# Patient Record
Sex: FEMALE | Race: WHITE | NOT HISPANIC OR LATINO | Employment: OTHER | URBAN - METROPOLITAN AREA
[De-identification: names, ages, dates, MRNs, and addresses within clinical notes are randomized per-mention and may not be internally consistent; named-entity substitution may affect disease eponyms.]

---

## 2017-02-03 ENCOUNTER — TELEPHONE (OUTPATIENT)
Dept: FAMILY MEDICINE | Facility: CLINIC | Age: 77
End: 2017-02-03

## 2017-02-03 NOTE — TELEPHONE ENCOUNTER
Please call patient and ask her to schedule a appointment to follow up on lipids, fasting labs, thanks! -Lea  Health Maintenance Due   Topic Date Due     DEXA Q2 YR  05/06/2012     EYE EXAM Q1 YEAR( NO INBASKET)  07/24/2016     INFLUENZA VACCINE (SYSTEM ASSIGNED)  09/01/2016     ADVANCE DIRECTIVE PLANNING Q5 YRS (NO INBASKET)  11/03/2016      LDL CHOLESTEROL CALCULATED   Date Value Ref Range Status   10/27/2016 149* <100 mg/dL Final     Comment:     Above desirable:  100-129 mg/dl   Borderline High:  130-159 mg/dL   High:             160-189 mg/dL   Very high:       >189 mg/dl     03/04/2016 91 <100 mg/dL Final     Comment:     Desirable:       <100 mg/dl

## 2017-03-16 ENCOUNTER — ALLIED HEALTH/NURSE VISIT (OUTPATIENT)
Dept: NURSING | Facility: CLINIC | Age: 77
End: 2017-03-16
Payer: COMMERCIAL

## 2017-03-16 VITALS — DIASTOLIC BLOOD PRESSURE: 84 MMHG | SYSTOLIC BLOOD PRESSURE: 138 MMHG

## 2017-03-16 DIAGNOSIS — Z01.30 BP CHECK: Primary | ICD-10-CM

## 2017-03-16 PROCEDURE — 99207 ZZC NO CHARGE NURSE ONLY: CPT

## 2017-03-28 ENCOUNTER — OFFICE VISIT (OUTPATIENT)
Dept: FAMILY MEDICINE | Facility: CLINIC | Age: 77
End: 2017-03-28
Payer: COMMERCIAL

## 2017-03-28 VITALS
HEART RATE: 88 BPM | TEMPERATURE: 97.6 F | DIASTOLIC BLOOD PRESSURE: 82 MMHG | BODY MASS INDEX: 29.41 KG/M2 | SYSTOLIC BLOOD PRESSURE: 138 MMHG | WEIGHT: 166 LBS | HEIGHT: 63 IN

## 2017-03-28 DIAGNOSIS — I10 ESSENTIAL HYPERTENSION WITH GOAL BLOOD PRESSURE LESS THAN 140/90: ICD-10-CM

## 2017-03-28 DIAGNOSIS — E78.5 HYPERLIPIDEMIA LDL GOAL <100: ICD-10-CM

## 2017-03-28 DIAGNOSIS — I10 HYPERTENSION GOAL BP (BLOOD PRESSURE) < 140/90: ICD-10-CM

## 2017-03-28 DIAGNOSIS — I35.0 NONRHEUMATIC AORTIC VALVE STENOSIS: ICD-10-CM

## 2017-03-28 DIAGNOSIS — E11.9 TYPE 2 DIABETES MELLITUS WITHOUT COMPLICATION, WITHOUT LONG-TERM CURRENT USE OF INSULIN (H): ICD-10-CM

## 2017-03-28 DIAGNOSIS — Z01.818 PREOP GENERAL PHYSICAL EXAM: Primary | ICD-10-CM

## 2017-03-28 LAB
ALBUMIN SERPL-MCNC: 4.1 G/DL (ref 3.4–5)
ALP SERPL-CCNC: 90 U/L (ref 40–150)
ALT SERPL W P-5'-P-CCNC: 18 U/L (ref 0–50)
ANION GAP SERPL CALCULATED.3IONS-SCNC: 10 MMOL/L (ref 3–14)
AST SERPL W P-5'-P-CCNC: 11 U/L (ref 0–45)
BASOPHILS # BLD AUTO: 0 10E9/L (ref 0–0.2)
BASOPHILS NFR BLD AUTO: 0.1 %
BILIRUB SERPL-MCNC: 0.5 MG/DL (ref 0.2–1.3)
BUN SERPL-MCNC: 18 MG/DL (ref 7–30)
CALCIUM SERPL-MCNC: 9.5 MG/DL (ref 8.5–10.1)
CHLORIDE SERPL-SCNC: 105 MMOL/L (ref 94–109)
CHOLEST SERPL-MCNC: 215 MG/DL
CO2 SERPL-SCNC: 25 MMOL/L (ref 20–32)
CREAT SERPL-MCNC: 0.7 MG/DL (ref 0.52–1.04)
DIFFERENTIAL METHOD BLD: ABNORMAL
EOSINOPHIL # BLD AUTO: 0.1 10E9/L (ref 0–0.7)
EOSINOPHIL NFR BLD AUTO: 1.7 %
ERYTHROCYTE [DISTWIDTH] IN BLOOD BY AUTOMATED COUNT: 13.2 % (ref 10–15)
GFR SERPL CREATININE-BSD FRML MDRD: 81 ML/MIN/1.7M2
GLUCOSE SERPL-MCNC: 146 MG/DL (ref 70–99)
HBA1C MFR BLD: 8.4 % (ref 4.3–6)
HCT VFR BLD AUTO: 43.8 % (ref 35–47)
HDLC SERPL-MCNC: 67 MG/DL
HGB BLD-MCNC: 14.7 G/DL (ref 11.7–15.7)
LDLC SERPL CALC-MCNC: 131 MG/DL
LYMPHOCYTES # BLD AUTO: 1.6 10E9/L (ref 0.8–5.3)
LYMPHOCYTES NFR BLD AUTO: 22.8 %
MCH RBC QN AUTO: 28.2 PG (ref 26.5–33)
MCHC RBC AUTO-ENTMCNC: 33.6 G/DL (ref 31.5–36.5)
MCV RBC AUTO: 84 FL (ref 78–100)
MONOCYTES # BLD AUTO: 0.5 10E9/L (ref 0–1.3)
MONOCYTES NFR BLD AUTO: 7.1 %
NEUTROPHILS # BLD AUTO: 4.9 10E9/L (ref 1.6–8.3)
NEUTROPHILS NFR BLD AUTO: 68.3 %
NONHDLC SERPL-MCNC: 148 MG/DL
PLATELET # BLD AUTO: 347 10E9/L (ref 150–450)
POTASSIUM SERPL-SCNC: 4.2 MMOL/L (ref 3.4–5.3)
PROT SERPL-MCNC: 7.4 G/DL (ref 6.8–8.8)
RBC # BLD AUTO: 5.21 10E12/L (ref 3.8–5.2)
SODIUM SERPL-SCNC: 140 MMOL/L (ref 133–144)
TRIGL SERPL-MCNC: 83 MG/DL
WBC # BLD AUTO: 7.1 10E9/L (ref 4–11)

## 2017-03-28 PROCEDURE — 80053 COMPREHEN METABOLIC PANEL: CPT | Performed by: FAMILY MEDICINE

## 2017-03-28 PROCEDURE — 93000 ELECTROCARDIOGRAM COMPLETE: CPT | Performed by: FAMILY MEDICINE

## 2017-03-28 PROCEDURE — 36415 COLL VENOUS BLD VENIPUNCTURE: CPT | Performed by: FAMILY MEDICINE

## 2017-03-28 PROCEDURE — 85025 COMPLETE CBC W/AUTO DIFF WBC: CPT | Performed by: FAMILY MEDICINE

## 2017-03-28 PROCEDURE — 83036 HEMOGLOBIN GLYCOSYLATED A1C: CPT | Performed by: FAMILY MEDICINE

## 2017-03-28 PROCEDURE — 80061 LIPID PANEL: CPT | Performed by: FAMILY MEDICINE

## 2017-03-28 PROCEDURE — 99214 OFFICE O/P EST MOD 30 MIN: CPT | Performed by: FAMILY MEDICINE

## 2017-03-28 RX ORDER — METOPROLOL SUCCINATE 25 MG/1
12.5 TABLET, EXTENDED RELEASE ORAL DAILY
Qty: 90 TABLET | Refills: 3
Start: 2017-03-28 | End: 2017-06-20

## 2017-03-28 NOTE — MR AVS SNAPSHOT
After Visit Summary   3/28/2017    Kelly Barnes    MRN: [id number]           Patient Information     Date Of Birth          1940        Visit Information        Provider Department      3/28/2017 9:00 AM Lea Lopez MD [facility]        Today's Diagnoses     Preop general physical exam    -  1    Hypertension goal BP (blood pressure) < 140/90        Hyperlipidemia LDL goal <100        Type 2 diabetes mellitus without complication, without long-term current use of insulin (H)        Nonrheumatic aortic valve stenosis          Care Instructions    Results for orders placed or performed in visit on 03/28/17   CBC with platelets and differential   Result Value Ref Range    WBC 7.1 4.0 - 11.0 10e9/L    RBC Count 5.21 (H) 3.8 - 5.2 10e12/L    Hemoglobin 14.7 11.7 - 15.7 g/dL    Hematocrit 43.8 35.0 - 47.0 %    MCV 84 78 - 100 fl    MCH 28.2 26.5 - 33.0 pg    MCHC 33.6 31.5 - 36.5 g/dL    RDW 13.2 10.0 - 15.0 %    Platelet Count 347 150 - 450 10e9/L    Diff Method Automated Method     % Neutrophils 68.3 %    % Lymphocytes 22.8 %    % Monocytes 7.1 %    % Eosinophils 1.7 %    % Basophils 0.1 %    Absolute Neutrophil 4.9 1.6 - 8.3 10e9/L    Absolute Lymphocytes 1.6 0.8 - 5.3 10e9/L    Absolute Monocytes 0.5 0.0 - 1.3 10e9/L    Absolute Eosinophils 0.1 0.0 - 0.7 10e9/L    Absolute Basophils 0.0 0.0 - 0.2 10e9/L   Hemoglobin A1c   Result Value Ref Range    Hemoglobin A1C 8.4 (H) 4.3 - 6.0 %      Before Your Surgery      Call your surgeon if there is any change in your health. This includes signs of a cold or flu (such as a sore throat, runny nose, cough, rash or fever).    Do not smoke, drink alcohol or take over the counter medicine (unless your surgeon or primary care doctor tells you to) for the 24 hours before and after surgery.    If you take prescribed drugs: Follow your doctor s orders about which medicines to take and which to stop until after surgery.    Eating and  drinking prior to surgery: follow the instructions from your surgeon    Take a shower or bath the night before surgery. Use the soap your surgeon gave you to gently clean your skin. If you do not have soap from your surgeon, use your regular soap. Do not shave or scrub the surgery site.  Wear clean pajamas and have clean sheets on your bed.         Follow-ups after your visit        Additional Services     CARDIOLOGY PROCEDURE ADULT REFERRAL       Your provider has referred you to:   [facility]: Heart Care - [place] [phone]   [email or web address]  [facility]: Heart Care - [facility] [phone]   [email or web address]    Cardiology procedures to be completed: cardiac echo, reason for procedure hx of aortic stenosis    Please be aware that coverage of these services is subject to the terms and limitations of your health insurance plan.  Call member services at your health plan with any benefit or coverage questions.     Please bring the following to your appointment:  >>   Any x-rays, CTs or MRIs which have been performed.  Contact the facility where they were done to arrange for  prior to your scheduled appointment.   >>   List of current medications  >>   This referral request   >>   Any documents/labs given to you for this referral    [facility]   For scheduling questions call [phone]    [facility]  For scheduling questions call [phone]                  Who to contact     If you have questions or need follow up information about today's clinic visit or your schedule please contact [facility] directly at [phone].  Normal or non-critical lab and imaging results will be communicated to you by MyChart, letter or phone within 4 business days after the clinic has received the results. If you  "do not hear from us within 7 days, please contact the clinic through [employer] or phone. If you have a critical or abnormal lab result, we will notify you by phone as soon as possible.  Submit refill requests through [employer] or call your pharmacy and they will forward the refill request to us. Please allow 3 business days for your refill to be completed.          Additional Information About Your Visit        [employer] Information     [employer] lets you send messages to your doctor, view your test results, renew your prescriptions, schedule appointments and more. To sign up, go to [email or web address] . Click on \"Log in\" on the left side of the screen, which will take you to the Welcome page. Then click on \"Sign up Now\" on the right side of the page.     You will be asked to enter the access code listed below, as well as some personal information. Please follow the directions to create your username and password.     Your access code is: [email or web address]  Expires: 2017 10:09 AM     Your access code will  in 90 days. If you need help or a new code, please call your [place] clinic or [phone].        Care EveryWhere ID     This is your Care EveryWhere ID. This could be used by other organizations to access your [place] medical records  [id number]        Your Vitals Were     Pulse Temperature Height BMI (Body Mass Index)          88 97.6  F (36.4  C) (Tympanic) 5' 3\" (1.6 m) 29.41 kg/m2         Blood Pressure from Last 3 Encounters:   17 (!) 151/93   17 138/84   10/27/16 112/74    Weight from Last 3 Encounters:   17 166 lb (75.3 kg)   10/27/16 170 lb (77.1 kg)   16 166 lb (75.3 kg)              We Performed the Following     CARDIOLOGY PROCEDURE ADULT REFERRAL     CBC with platelets and differential     Comprehensive metabolic panel     EKG 12-lead complete w/read - Clinics     Hemoglobin A1c     Lipid Profile with reflex to direct LDL        Primary Care Provider Office Phone # Fax " #    Lea Lopez [phone]       [facility] [address]  [facility] [address]        Thank you!     Thank you for choosing [facility]  for your care. Our goal is always to provide you with excellent care. Hearing back from our patients is one way we can continue to improve our services. Please take a few minutes to complete the written survey that you may receive in the mail after your visit with us. Thank you!             Your Updated Medication List - Protect others around you: Learn how to safely use, store and throw away your medicines at www.disposemymeds.org.          This list is accurate as of: 3/28/17 10:03 AM.  Always use your most recent med list.                   Brand Name Dispense Instructions for use    AMBIEN 10 MG tablet   Generic drug:  zolpidem     30 tablet    Take 1 tablet by mouth nightly as needed for sleep. at bedtime.       aspirin 81 MG tablet     100    ONE DAILY       * blood glucose monitor Kit     1 kit    1 kit daily.       * blood glucose monitoring meter device kit     1 kit    Use to test blood sugar 3 times daily       * blood glucose monitoring test strip    no brand specified    1 Box    by In Vitro route daily.       * blood glucose monitoring test strip    ONE TOUCH ULTRA    1 Box    Use to test blood sugar  daily or as directed. Dispense 1 box of 100 test strips, #3 refills.       busPIRone 5 MG tablet    BUSPAR    60 tablet    Take 1 tablet (5 mg) by mouth 2 times daily As needed for chest pain       cyclobenzaprine 10 MG tablet    FLEXERIL    30 tablet    Take 0.5-1 tablets (5-10 mg) by mouth 3 times daily as needed for muscle spasms       fluticasone 50 MCG/ACT spray    FLONASE    1 Package    Spray 2 sprays into both nostrils daily       losartan 50 MG tablet    COZAAR    90 tablet    Take 1 tablet (50 mg) by mouth daily       metFORMIN 500 MG tablet    GLUCOPHAGE    180 tablet    Take 1 tablet (500 mg) by  mouth 2 times daily (with meals)       metoprolol 25 MG 24 hr tablet    TOPROL-XL    45 tablet    Take 0.5 tablets (12.5 mg) by mouth every other day       * ONE TOUCH LANCETS Misc          * blood glucose monitoring lancets     1 Box    Use to test blood sugars one times daily or as directed.       STATIN NOT PRESCRIBED (INTENTIONAL)     0 each    Statin not prescribed intentionally due to Other patient refusal (This option does not exclude patient from measure)       * Notice:  This list has 6 medication(s) that are the same as other medications prescribed for you. Read the directions carefully, and ask your doctor or other care provider to review them with you.

## 2017-03-28 NOTE — PROGRESS NOTES
[facility]  [address] [place] [id number] [phone]  Dept: [phone]    PRE-OP EVALUATION:  Today's date: 3/28/2017    Kelly Barnes (: 1940) presents for pre-operative evaluation assessment.  She requires evaluation and anesthesia risk assessment prior to undergoing surgery/procedure for treatment of Cataract .  Proposed procedure: cataract removal    Date of Surgery/ Procedure: 2017 & 2017  Time of Surgery/ Procedure: [facility]  Hospital/Surgical Facility: [facility]  Fax number for surgical facility: [phone]  Primary Physician: Lea Lopez  Type of Anesthesia Anticipated: to be determined    Patient has a Health Care Directive or Living Will:  YES     1. NO - Do you have a history of heart attack, stroke, stent, bypass or surgery on an artery in the head, neck, heart or legs?  2. NO - Do you ever have any pain or discomfort in your chest?  3. NO - Do you have a history of  Heart Failure?  4. NO - Are you troubled by shortness of breath when: walking on the level, up a slight hill or at night?  5. NO - Do you currently have a cold, bronchitis or other respiratory infection?  6. NO - Do you have a cough, shortness of breath or wheezing?  7. NO - Do you sometimes get pains in the calves of your legs when you walk?  8. NO - Do you or anyone in your family have previous history of blood clots?  9. NO - Do you or does anyone in your family have a serious bleeding problem such as prolonged bleeding following surgeries or cuts?  10. NO - Have you ever had problems with anemia or been told to take iron pills?  11. NO - Have you had any abnormal blood loss such as black, tarry or bloody stools, or abnormal vaginal bleeding?  12. NO - Have you ever had a blood transfusion?  13. NO - Have you or any of your relatives ever had problems with anesthesia?  14. NO - Do you have sleep apnea, excessive snoring or daytime drowsiness?  15. NO - Do  you have any prosthetic heart valves?  16. NO - Do you have prosthetic joints?  17. NO - Is there any chance that you may be pregnant?      HPI:                                                      Brief HPI related to upcoming procedure: She's had increasing difficulty seeing, described as feeling as if she's wearing glasses that need to be cleaned, vision is obscured. She has bilateral cataracts; plans to have left cataract removed 4/6 and right 4/20. She doesn't have any prior hx of eye surgery.      DIABETES - Patient has a longstanding history of DiabetesType Type II . Patient is being treated with oral agents and denies significant side effects. Control has been fair.                                                                                               .  HYPERTENSION - Patient has longstanding history of mod-severe HTN , currently denies any symptoms referable to elevated blood pressure. Specifically denies chest pain, palpitations, dyspnea, orthopnea, PND or peripheral edema. Blood pressure readings have been in normal range. Current medication regimen is as listed below. Patient denies any side effects of medication.                                                                                                                                                                                          .  HYPERLIPIDEMIA - Patient has a long history of significant Hyperlipidemia requiring medication for treatment with recent good control. Patient reports no problems or side effects with the medication.                                                                                                                                                       .    MEDICAL HISTORY:                                                      Patient Active Problem List    Diagnosis Date Noted     Nonrheumatic aortic valve stenosis 03/28/2017     Priority: Medium     Musculoskeletal chest pain 11/25/2016     Priority:  Medium     Type 2 diabetes mellitus without complication, without long-term current use of insulin (H) 10/07/2016     Priority: Medium     Aortic stenosis 02/29/2016     Priority: Medium     With new murmur noted 2/2016, normal echo, repeat echo 2/2017.       Obesity, Class I, BMI 30-34.9 11/11/2015     Priority: Medium     Leg edema, left 07/24/2015     Priority: Medium     Atypical chest pain 07/24/2015     Priority: Medium     Dizzy spells 07/24/2015     Priority: Medium     Hyperlipidemia LDL goal <100 11/01/2012     Priority: Medium     Obesity      Priority: Medium     Hypertension goal BP (blood pressure) < 140/90      Priority: Medium     Osteopenia 01/14/2010     Priority: Medium     Allergic rhinitis      Priority: Medium     Problem list name updated by automated process. Provider to review        Past Medical History:   Diagnosis Date     Allergic rhinitis, cause unspecified      Aortic stenosis 2/29/2016    With new murmur noted 2/2016, normal echo, repeat echo 2/2017.     cuboid     left foot     Hyperlipidemia LDL goal <100 11/1/2012     Hypertension goal BP (blood pressure) < 140/90      Obesity      Obesity, Class I, BMI 30-34.9 11/11/2015     Pneumonia 3/16     Type 2 diabetes, HbA1c goal < 7% (H)      Past Surgical History:   Procedure Laterality Date     C NONSPECIFIC PROCEDURE      none     Current Outpatient Prescriptions   Medication Sig Dispense Refill     cyclobenzaprine (FLEXERIL) 10 MG tablet Take 0.5-1 tablets (5-10 mg) by mouth 3 times daily as needed for muscle spasms 30 tablet 1     metFORMIN (GLUCOPHAGE) 500 MG tablet Take 1 tablet (500 mg) by mouth 2 times daily (with meals) 180 tablet 3     losartan (COZAAR) 50 MG tablet Take 1 tablet (50 mg) by mouth daily 90 tablet 3     busPIRone (BUSPAR) 5 MG tablet Take 1 tablet (5 mg) by mouth 2 times daily As needed for chest pain 60 tablet 1     metoprolol (TOPROL-XL) 25 MG 24 hr tablet Take 0.5 tablets (12.5 mg) by mouth every other day 45  tablet 3     blood glucose monitoring (ONE TOUCH DELICA) lancets Use to test blood sugars one times daily or as directed. 1 Box 11     blood glucose monitoring (ONE TOUCH ULTRA) test strip Use to test blood sugar  daily or as directed. Dispense 1 box of 100 test strips, #3 refills. 1 Box 3     STATIN NOT PRESCRIBED, INTENTIONAL, Statin not prescribed intentionally due to Other patient refusal (This option does not exclude patient from measure) 0 each 0     ONE TOUCH LANCETS MISC        fluticasone (FLONASE) 50 MCG/ACT nasal spray Spray 2 sprays into both nostrils daily 1 Package 1     blood glucose monitoring (ONE TOUCH ULTRA 2) meter device kit Use to test blood sugar 3 times daily 1 kit 0     blood glucose monitor KIT 1 kit daily. 1 kit 0     glucose blood VI test strips strip by In Vitro route daily. 1 Box 12     AMBIEN 10 MG tablet Take 1 tablet by mouth nightly as needed for sleep. at bedtime. 30 tablet 1     ASPIRIN 81 MG OR TABS ONE DAILY 100 3     OTC products: no recent use of OTC ASA, NSAIDS or Steroids    Allergies   Allergen Reactions     Ibuprofen      numbness in hands and feet      Latex Allergy: NO    Social History   Substance Use Topics     Smoking status: Never Smoker     Smokeless tobacco: Never Used     Alcohol use 6.0 oz/week      Comment: 1-2 drinks per week     History   Drug Use No       REVIEW OF SYSTEMS:                                                    C: NEGATIVE for fever, chills, change in weight  I: NEGATIVE for worrisome rashes, moles or lesions  E: NEGATIVE for vision changes or irritation  E/M: NEGATIVE for ear, mouth and throat problems  R: NEGATIVE for significant cough or SOB  B: NEGATIVE for masses, tenderness or discharge  CV: NEGATIVE for chest pain, palpitations or peripheral edema  GI: NEGATIVE for nausea, abdominal pain, heartburn, or change in bowel habits  : NEGATIVE for frequency, dysuria, or hematuria  M: NEGATIVE for significant arthralgias or myalgia  N: NEGATIVE  "for weakness, dizziness or paresthesias  E: NEGATIVE for temperature intolerance, skin/hair changes  H: NEGATIVE for bleeding problems  P: NEGATIVE for changes in mood or affect    EXAM:                                                    /82  Pulse 88  Temp 97.6  F (36.4  C) (Tympanic)  Ht 5' 3\" (1.6 m)  Wt 166 lb (75.3 kg)  BMI 29.41 kg/m2  GENERAL APPEARANCE: healthy, alert and no distress  HENT: ear canals and TM's normal and nose and mouth without ulcers or lesions  RESP: lungs clear to auscultation - no rales, rhonchi or wheezes  CV: regular rate and rhythm, normal S1 S2, no S3 or S4 and no murmur, click or rub   ABDOMEN: soft, nontender, no HSM or masses and bowel sounds normal  NEURO: Normal strength and tone, sensory exam grossly normal, mentation intact and speech normal    DIAGNOSTICS:                                                    EKG: appears normal, NSR, normal axis, normal intervals, no acute ST/T changes c/w ischemia, unchanged from previous tracings    Recent Labs   Lab Test  10/27/16   0825  03/29/16   1545  03/04/16   0930  03/02/16   1957   11/10/15   0829   HGB   --    --   14.1  15.1   < >   --    PLT   --    --   332  245   < >   --    NA  138  138  131*  131*   < >   --    POTASSIUM  4.0  4.0  4.5  4.1   < >   --    CR  0.82  0.66  0.73  0.88   < >   --    A1C  7.5*   --    --    --    --   7.0*    < > = values in this interval not displayed.      Results for orders placed or performed in visit on 03/28/17   CBC with platelets and differential   Result Value Ref Range    WBC 7.1 4.0 - 11.0 10e9/L    RBC Count 5.21 (H) 3.8 - 5.2 10e12/L    Hemoglobin 14.7 11.7 - 15.7 g/dL    Hematocrit 43.8 35.0 - 47.0 %    MCV 84 78 - 100 fl    MCH 28.2 26.5 - 33.0 pg    MCHC 33.6 31.5 - 36.5 g/dL    RDW 13.2 10.0 - 15.0 %    Platelet Count 347 150 - 450 10e9/L    Diff Method Automated Method     % Neutrophils 68.3 %    % Lymphocytes 22.8 %    % Monocytes 7.1 %    % Eosinophils 1.7 %    % " Basophils 0.1 %    Absolute Neutrophil 4.9 1.6 - 8.3 10e9/L    Absolute Lymphocytes 1.6 0.8 - 5.3 10e9/L    Absolute Monocytes 0.5 0.0 - 1.3 10e9/L    Absolute Eosinophils 0.1 0.0 - 0.7 10e9/L    Absolute Basophils 0.0 0.0 - 0.2 10e9/L   Hemoglobin A1c   Result Value Ref Range    Hemoglobin A1C 8.4 (H) 4.3 - 6.0 %        IMPRESSION:                                                    Reason for surgery/procedure: cataracts  Diagnosis/reason for consult: pre-op    The proposed surgical procedure is considered LOW risk.    REVISED CARDIAC RISK INDEX  The patient has the following serious cardiovascular risks for perioperative complications such as (MI, PE, VFib and 3  AV Block):  No serious cardiac risks  INTERPRETATION: 0 risks: Class I (very low risk - 0.4% complication rate)    The patient has the following additional risks for perioperative complications:  No identified additional risks      ICD-10-CM    1. Preop general physical exam Z01.818 EKG 12-lead complete w/read - Clinics   2. Hypertension goal BP (blood pressure) < 140/90 I10 CBC with platelets and differential     Comprehensive metabolic panel   3. Hyperlipidemia LDL goal <100 E78.5 Lipid Profile with reflex to direct LDL   4. Type 2 diabetes mellitus without complication, without long-term current use of insulin (H) E11.9 Hemoglobin A1c   5. Nonrheumatic aortic valve stenosis I35.0 CARDIOLOGY PROCEDURE ADULT REFERRAL       RECOMMENDATIONS:                                                    -Patient is to take all scheduled medications on the day of surgery.    APPROVAL GIVEN to proceed with proposed procedure, without further diagnostic evaluation       Signed Electronically by: Lea Lopez MD    Copy of this evaluation report is provided to requesting physician.    [facility] Preop Guidelines

## 2017-03-28 NOTE — PATIENT INSTRUCTIONS
Results for orders placed or performed in visit on 03/28/17   CBC with platelets and differential   Result Value Ref Range    WBC 7.1 4.0 - 11.0 10e9/L    RBC Count 5.21 (H) 3.8 - 5.2 10e12/L    Hemoglobin 14.7 11.7 - 15.7 g/dL    Hematocrit 43.8 35.0 - 47.0 %    MCV 84 78 - 100 fl    MCH 28.2 26.5 - 33.0 pg    MCHC 33.6 31.5 - 36.5 g/dL    RDW 13.2 10.0 - 15.0 %    Platelet Count 347 150 - 450 10e9/L    Diff Method Automated Method     % Neutrophils 68.3 %    % Lymphocytes 22.8 %    % Monocytes 7.1 %    % Eosinophils 1.7 %    % Basophils 0.1 %    Absolute Neutrophil 4.9 1.6 - 8.3 10e9/L    Absolute Lymphocytes 1.6 0.8 - 5.3 10e9/L    Absolute Monocytes 0.5 0.0 - 1.3 10e9/L    Absolute Eosinophils 0.1 0.0 - 0.7 10e9/L    Absolute Basophils 0.0 0.0 - 0.2 10e9/L   Hemoglobin A1c   Result Value Ref Range    Hemoglobin A1C 8.4 (H) 4.3 - 6.0 %      Before Your Surgery      Call your surgeon if there is any change in your health. This includes signs of a cold or flu (such as a sore throat, runny nose, cough, rash or fever).    Do not smoke, drink alcohol or take over the counter medicine (unless your surgeon or primary care doctor tells you to) for the 24 hours before and after surgery.    If you take prescribed drugs: Follow your doctor s orders about which medicines to take and which to stop until after surgery.    Eating and drinking prior to surgery: follow the instructions from your surgeon    Take a shower or bath the night before surgery. Use the soap your surgeon gave you to gently clean your skin. If you do not have soap from your surgeon, use your regular soap. Do not shave or scrub the surgery site.  Wear clean pajamas and have clean sheets on your bed.

## 2017-03-28 NOTE — PROGRESS NOTES
Patient was seen today in clinic.  I discussed results in clinic, please see clinic progress note.    Lea Lopez 3/28/2017

## 2017-03-28 NOTE — LETTER
[facility]   [address] [place]   [address]  [phone]    March 30, 2017      Kelly Barnes  [address] [facility] [address]              Dear Kathleen Kowalski!  It was a pleasure to see you in clinic!  Thank you for getting labs done.   I hope the surgery goes well and you recover quickly!    As we discussed, your cholesterol is not at goal and as you know, I do recommend starting a statin to lower your LDL, but I know you don't want to do this!  I'm here anytime you want to discuss this!    The testing of your kidney function, liver function and electrolytes was normal.     Your HgA1C, also called glycosylated hemoglobin, which measures the level of sugar in your blood over the past few months, is not  goal, so we need to find a way to control your blood sugar better.  However, it should be safe to proceed with the surgery.  I think increasing your metformin should lower your blood sugars and get your A1C to goal soon. Please return to clinic in about 3 months to recheck your A1C.    Your cbc, or complete blood count, which measures red blood cells (to check for anemia and other vitamin deficiencies) and white blood cells (to check for infection and leukemia) is normal, which is great!  Your platelets, which reflect liver function and ability to clot, are normal as well.     If you have any questions, please contact the clinic or schedule an appointment with me, thank you!      Results for orders placed or performed in visit on 03/28/17   CBC with platelets and differential   Result Value Ref Range    WBC 7.1 4.0 - 11.0 10e9/L    RBC Count 5.21 (H) 3.8 - 5.2 10e12/L    Hemoglobin 14.7 11.7 - 15.7 g/dL    Hematocrit 43.8 35.0 - 47.0 %    MCV 84 78 - 100 fl    MCH 28.2 26.5 - 33.0 pg    MCHC 33.6 31.5 - 36.5 g/dL    RDW 13.2 10.0 - 15.0 %    Platelet Count 347 150 - 450 10e9/L    Diff Method Automated Method     % Neutrophils 68.3 %    % Lymphocytes 22.8 %    % Monocytes  7.1 %    % Eosinophils 1.7 %    % Basophils 0.1 %    Absolute Neutrophil 4.9 1.6 - 8.3 10e9/L    Absolute Lymphocytes 1.6 0.8 - 5.3 10e9/L    Absolute Monocytes 0.5 0.0 - 1.3 10e9/L    Absolute Eosinophils 0.1 0.0 - 0.7 10e9/L    Absolute Basophils 0.0 0.0 - 0.2 10e9/L   Lipid Profile with reflex to direct LDL   Result Value Ref Range    Cholesterol 215 (H) <200 mg/dL    Triglycerides 83 <150 mg/dL    HDL Cholesterol 67 >49 mg/dL    LDL Cholesterol Calculated 131 (H) <100 mg/dL    Non HDL Cholesterol 148 (H) <130 mg/dL   Hemoglobin A1c   Result Value Ref Range    Hemoglobin A1C 8.4 (H) 4.3 - 6.0 %   Comprehensive metabolic panel   Result Value Ref Range    Sodium 140 133 - 144 mmol/L    Potassium 4.2 3.4 - 5.3 mmol/L    Chloride 105 94 - 109 mmol/L    Carbon Dioxide 25 20 - 32 mmol/L    Anion Gap 10 3 - 14 mmol/L    Glucose 146 (H) 70 - 99 mg/dL    Urea Nitrogen 18 7 - 30 mg/dL    Creatinine 0.70 0.52 - 1.04 mg/dL    GFR Estimate 81 >60 mL/min/1.7m2    GFR Estimate If Black >90   GFR Calc   >60 mL/min/1.7m2    Calcium 9.5 8.5 - 10.1 mg/dL    Bilirubin Total 0.5 0.2 - 1.3 mg/dL    Albumin 4.1 3.4 - 5.0 g/dL    Protein Total 7.4 6.8 - 8.8 g/dL    Alkaline Phosphatase 90 40 - 150 U/L    ALT 18 0 - 50 U/L    AST 11 0 - 45 U/L           Sincerely,    Lea Lopez MD/nr

## 2017-03-28 NOTE — NURSING NOTE
"Chief Complaint   Patient presents with     Pre-Op Exam       Initial There were no vitals taken for this visit. Estimated body mass index is 30.11 kg/(m^2) as calculated from the following:    Height as of 3/29/16: 5' 3\" (1.6 m).    Weight as of 10/27/16: 170 lb (77.1 kg).  Medication Reconciliation: complete     Vidya Webber MA    "

## 2017-03-29 DIAGNOSIS — E11.9 TYPE 2 DIABETES MELLITUS WITHOUT COMPLICATION, WITHOUT LONG-TERM CURRENT USE OF INSULIN (H): ICD-10-CM

## 2017-03-30 NOTE — PROGRESS NOTES
Hello!  It was a pleasure to see you in clinic!  Thank you for getting labs done.   I hope the surgery goes well and you recover quickly!    As we discussed, your cholesterol is not at goal and as you know, I do recommend starting a statin to lower your LDL, but I know you don't want to do this!  I'm here anytime you want to discuss this!    The testing of your kidney function, liver function and electrolytes was normal.     Your HgA1C, also called glycosylated hemoglobin, which measures the level of sugar in your blood over the past few months, is not  goal, so we need to find a way to control your blood sugar better.  However, it should be safe to proceed with the surgery.  I think increasing your metformin should lower your blood sugars and get your A1C to goal soon. Please return to clinic in about 3 months to recheck your A1C.    Your cbc, or complete blood count, which measures red blood cells (to check for anemia and other vitamin deficiencies) and white blood cells (to check for infection and leukemia) is normal, which is great!  Your platelets, which reflect liver function and ability to clot, are normal as well.     If you have any questions, please contact the clinic or schedule an appointment with me, thank you!    Sincerely,  Dr. Lea Lopez MD  3/29/2017

## 2017-05-03 ENCOUNTER — HOSPITAL ENCOUNTER (OUTPATIENT)
Dept: CARDIOLOGY | Facility: CLINIC | Age: 77
Discharge: HOME | End: 2017-05-03
Attending: FAMILY MEDICINE | Admitting: FAMILY MEDICINE
Payer: COMMERCIAL

## 2017-05-03 DIAGNOSIS — Z86.79 HISTORY OF AORTIC VALVE STENOSIS: ICD-10-CM

## 2017-05-03 PROCEDURE — 93306 TTE W/DOPPLER COMPLETE: CPT

## 2017-05-03 PROCEDURE — 93306 TTE W/DOPPLER COMPLETE: CPT | Mod: 26 | Performed by: INTERNAL MEDICINE

## 2017-05-04 NOTE — PROGRESS NOTES
Thank you for getting your echo!  Everything looks pretty good. I hope the surgery goes well!    Sincerely,  Dr. Lea Lopez MD  5/3/2017

## 2017-05-09 ENCOUNTER — OFFICE VISIT (OUTPATIENT)
Dept: CARDIOLOGY | Facility: CLINIC | Age: 77
End: 2017-05-09
Attending: FAMILY MEDICINE
Payer: COMMERCIAL

## 2017-05-09 VITALS
WEIGHT: 165 LBS | SYSTOLIC BLOOD PRESSURE: 118 MMHG | HEART RATE: 72 BPM | HEIGHT: 62 IN | DIASTOLIC BLOOD PRESSURE: 68 MMHG | BODY MASS INDEX: 30.36 KG/M2

## 2017-05-09 DIAGNOSIS — R60.9 EDEMA, UNSPECIFIED TYPE: ICD-10-CM

## 2017-05-09 DIAGNOSIS — Z86.79 HISTORY OF AORTIC VALVE STENOSIS: Primary | ICD-10-CM

## 2017-05-09 DIAGNOSIS — E08.00 DIABETES MELLITUS DUE TO UNDERLYING CONDITION WITH HYPEROSMOLARITY WITHOUT COMA, UNSPECIFIED LONG TERM INSULIN USE STATUS: ICD-10-CM

## 2017-05-09 DIAGNOSIS — E78.5 HYPERLIPIDEMIA LDL GOAL <130: ICD-10-CM

## 2017-05-09 DIAGNOSIS — I10 BENIGN ESSENTIAL HYPERTENSION: ICD-10-CM

## 2017-05-09 PROCEDURE — 99204 OFFICE O/P NEW MOD 45 MIN: CPT | Performed by: INTERNAL MEDICINE

## 2017-05-09 NOTE — MR AVS SNAPSHOT
"              After Visit Summary   5/9/2017    Kelly Barnes    MRN: [id number]           Patient Information     Date Of Birth          1940        Visit Information        Provider Department      5/9/2017 11:15 AM Edward Bustamante MD [facility] [place]        Today's Diagnoses     History of aortic valve stenosis    -  1    Diabetes mellitus due to underlying condition with hyperosmolarity without coma, unspecified long term insulin use status (H)        Benign essential hypertension        Edema, unspecified type        Hyperlipidemia LDL goal <130           Follow-ups after your visit        Who to contact     If you have questions or need follow up information about today's clinic visit or your schedule please contact [facility] directly at [phone].  Normal or non-critical lab and imaging results will be communicated to you by BBspacehart, letter or phone within 4 business days after the clinic has received the results. If you do not hear from us within 7 days, please contact the clinic through MyChart or phone. If you have a critical or abnormal lab result, we will notify you by phone as soon as possible.  Submit refill requests through [employer] or call your pharmacy and they will forward the refill request to us. Please allow 3 business days for your refill to be completed.          Additional Information About Your Visit        BBspaceharHackPad Information     [employer] lets you send messages to your doctor, view your test results, renew your prescriptions, schedule appointments and more. To sign up, go to [email or web address] . Click on \"Log in\" on the left side of the screen, which will take you to the Welcome page. Then click on \"Sign up Now\" on the right side of the page.     You will be asked to enter the access code listed below, as well as some personal information. Please follow the directions to create your username " "and password.     Your access code is: [email or web address]  Expires: 2017 10:09 AM     Your access code will  in 90 days. If you need help or a new code, please call your [facility] clinic or [phone].        Care EveryWhere ID     This is your Care EveryWhere ID. This could be used by other organizations to access your [facility] medical records  [id number]        Your Vitals Were     Pulse Height BMI (Body Mass Index)             72 1.575 m (5' 2\") 30.18 kg/m2          Blood Pressure from Last 3 Encounters:   17 118/68   17 138/82   17 138/84    Weight from Last 3 Encounters:   17 74.8 kg (165 lb)   17 75.3 kg (166 lb)   10/27/16 77.1 kg (170 lb)              Today, you had the following     No orders found for display       Primary Care Provider Office Phone # Fax #    Lea Lopez [phone]       [facility] [address]  [facility] [address]        Thank you!     Thank you for choosing [facility] [place]  for your care. Our goal is always to provide you with excellent care. Hearing back from our patients is one way we can continue to improve our services. Please take a few minutes to complete the written survey that you may receive in the mail after your visit with us. Thank you!             Your Updated Medication List - Protect others around you: Learn how to safely use, store and throw away your medicines at www.disposemymeds.org.          This list is accurate as of: 17  3:21 PM.  Always use your most recent med list.                   Brand Name Dispense Instructions for use    AMBIEN 10 MG tablet   Generic drug:  zolpidem     30 tablet    Take 1 tablet by mouth nightly as needed for sleep. at bedtime.       aspirin 81 MG tablet     100    ONE DAILY       * blood glucose monitor Kit     1 kit    1 kit daily.       * blood glucose monitoring meter device kit     1 kit    Use to test blood " sugar 3 times daily       * blood glucose monitoring test strip    no brand specified    1 Box    by In Vitro route daily.       * blood glucose monitoring test strip    ONE TOUCH ULTRA    1 Box    Use to test blood sugar  daily or as directed. Dispense 1 box of 100 test strips, #3 refills.       busPIRone 5 MG tablet    BUSPAR    60 tablet    Take 1 tablet (5 mg) by mouth 2 times daily As needed for chest pain       cyclobenzaprine 10 MG tablet    FLEXERIL    30 tablet    Take 0.5-1 tablets (5-10 mg) by mouth 3 times daily as needed for muscle spasms       fluticasone 50 MCG/ACT spray    FLONASE    1 Package    Spray 2 sprays into both nostrils daily       losartan 50 MG tablet    COZAAR    90 tablet    Take 1 tablet (50 mg) by mouth daily       metFORMIN 500 MG tablet    GLUCOPHAGE    270 tablet    Take 2 tablets in the morning and one tablet at night       metoprolol 25 MG 24 hr tablet    TOPROL-XL    90 tablet    Take 0.5 tablets (12.5 mg) by mouth daily       * ONE TOUCH LANCETS Misc          * blood glucose monitoring lancets     1 Box    Use to test blood sugars one times daily or as directed.       STATIN NOT PRESCRIBED (INTENTIONAL)     0 each    Statin not prescribed intentionally due to Other patient refusal (This option does not exclude patient from measure)       * Notice:  This list has 6 medication(s) that are the same as other medications prescribed for you. Read the directions carefully, and ask your doctor or other care provider to review them with you.

## 2017-05-09 NOTE — PROGRESS NOTES
HPI and Plan:   See dictation    No orders of the defined types were placed in this encounter.    No orders of the defined types were placed in this encounter.    There are no discontinued medications.      Encounter Diagnoses   Name Primary?     History of aortic valve stenosis Yes     Diabetes mellitus due to underlying condition with hyperosmolarity without coma, unspecified long term insulin use status (H)      Benign essential hypertension      Edema, unspecified type      Hyperlipidemia LDL goal <130        CURRENT MEDICATIONS:  Current Outpatient Prescriptions   Medication Sig Dispense Refill     metFORMIN (GLUCOPHAGE) 500 MG tablet Take 2 tablets in the morning and one tablet at night 270 tablet 3     blood glucose monitoring (ONE TOUCH ULTRA) test strip Use to test blood sugar  daily or as directed. Dispense 1 box of 100 test strips, #3 refills. 1 Box 3     metoprolol (TOPROL-XL) 25 MG 24 hr tablet Take 0.5 tablets (12.5 mg) by mouth daily 90 tablet 3     cyclobenzaprine (FLEXERIL) 10 MG tablet Take 0.5-1 tablets (5-10 mg) by mouth 3 times daily as needed for muscle spasms 30 tablet 1     losartan (COZAAR) 50 MG tablet Take 1 tablet (50 mg) by mouth daily 90 tablet 3     busPIRone (BUSPAR) 5 MG tablet Take 1 tablet (5 mg) by mouth 2 times daily As needed for chest pain 60 tablet 1     blood glucose monitoring (ONE TOUCH DELICA) lancets Use to test blood sugars one times daily or as directed. 1 Box 11     STATIN NOT PRESCRIBED, INTENTIONAL, Statin not prescribed intentionally due to Other patient refusal (This option does not exclude patient from measure) 0 each 0     ONE TOUCH LANCETS MISC        fluticasone (FLONASE) 50 MCG/ACT nasal spray Spray 2 sprays into both nostrils daily 1 Package 1     blood glucose monitoring (ONE TOUCH ULTRA 2) meter device kit Use to test blood sugar 3 times daily 1 kit 0     glucose blood VI test strips strip by In Vitro route daily. 1 Box 12     AMBIEN 10 MG tablet Take 1  tablet by mouth nightly as needed for sleep. at bedtime. 30 tablet 1     ASPIRIN 81 MG OR TABS ONE DAILY 100 3     blood glucose monitor KIT 1 kit daily. 1 kit 0       ALLERGIES     Allergies   Allergen Reactions     Ibuprofen      numbness in hands and feet       PAST MEDICAL HISTORY:  Past Medical History:   Diagnosis Date     Allergic rhinitis, cause unspecified      Aortic stenosis 2/29/2016    With new murmur noted 2/2016, normal echo, repeat echo 2/2017.     Atypical chest pain 7/24/2015     cuboid     left foot     Hyperlipidemia LDL goal <100 11/1/2012     Hypertension goal BP (blood pressure) < 140/90      Musculoskeletal chest pain 11/25/2016     Obesity, Class I, BMI 30-34.9 11/11/2015     Pneumonia 3/16     Type 2 diabetes, HbA1c goal < 7% (H)        PAST SURGICAL HISTORY:  Past Surgical History:   Procedure Laterality Date     C NONSPECIFIC PROCEDURE      none       FAMILY HISTORY:  Family History   Problem Relation Age of Onset     Hypertension Mother      DIABETES No family hx of      CEREBROVASCULAR DISEASE No family hx of      Breast Cancer No family hx of      Cancer - colorectal No family hx of      Prostate Cancer No family hx of        SOCIAL HISTORY:  Social History     Social History     Marital status:      Spouse name: N/A     Number of children: N/A     Years of education: N/A     Social History Main Topics     Smoking status: Never Smoker     Smokeless tobacco: Never Used     Alcohol use 6.0 oz/week      Comment: 1-2 drinks per week     Drug use: No     Sexual activity: Yes     Partners: Male     Other Topics Concern      Service No     Blood Transfusions No     Caffeine Concern No     Occupational Exposure No     Hobby Hazards No     Sleep Concern Yes     Stress Concern No     Weight Concern Yes     Special Diet No     Back Care No     Exercise Yes     walk     Bike Helmet No     Seat Belt Yes     Self-Exams Yes     Parent/Sibling W/ Cabg, Mi Or Angioplasty Before 65f 55m?  "No     dad had a heart attack in his 70's     Social History Narrative       Review of Systems:  Skin:  Negative     Eyes:  Negative    ENT:  Negative    Respiratory:  Negative    Cardiovascular:  Negative;palpitations;chest pain Positive for;edema  Gastroenterology: Negative    Genitourinary:  Negative    Musculoskeletal:  Negative    Neurologic:  Negative    Psychiatric:  Negative    Heme/Lymph/Imm:  Negative for    Endocrine:  Positive for diabetes    Physical Exam:  Vitals: /68  Pulse 72  Ht 1.575 m (5' 2\")  Wt 74.8 kg (165 lb)  BMI 30.18 kg/m2    Constitutional:  cooperative, alert and oriented, well developed, well nourished, in no acute distress        Skin:  warm and dry to the touch, no apparent skin lesions or masses noted        Head:  normocephalic, no masses or lesions        Eyes:  pupils equal and round, conjunctivae and lids unremarkable, sclera white, no xanthalasma, EOMS intact, no nystagmus        ENT:  no pallor or cyanosis, dentition good        Neck:  carotid pulses are full and equal bilaterally, JVP normal, no carotid bruit, no thyromegaly        Chest:  normal breath sounds, clear to auscultation, normal A-P diameter, normal symmetry, normal respiratory excursion, no use of accessory muscles        Cardiac: regular rhythm;normal S1 and S2       grade 1;systolic murmur          Abdomen:  abdomen soft, non-tender, BS normoactive, no mass, no HSM, no bruits        Vascular: pulses full and equal, no bruits auscultated                                      Extremities and Back:  no deformities, clubbing, cyanosis, erythema observed        Neurological:  affect appropriate, oriented to time, person and place          Recent Lab Results:  LIPID RESULTS:  Lab Results   Component Value Date    CHOL 215 (H) 03/28/2017    HDL 67 03/28/2017     (H) 03/28/2017    TRIG 83 03/28/2017    CHOLHDLRATIO 3.1 11/10/2015       LIVER ENZYME RESULTS:  Lab Results   Component Value Date    AST 11 " 03/28/2017    ALT 18 03/28/2017       CBC RESULTS:  Lab Results   Component Value Date    WBC 7.1 03/28/2017    RBC 5.21 (H) 03/28/2017    HGB 14.7 03/28/2017    HCT 43.8 03/28/2017    MCV 84 03/28/2017    MCH 28.2 03/28/2017    MCHC 33.6 03/28/2017    RDW 13.2 03/28/2017     03/28/2017       BMP RESULTS:  Lab Results   Component Value Date     03/28/2017    POTASSIUM 4.2 03/28/2017    CHLORIDE 105 03/28/2017    CO2 25 03/28/2017    ANIONGAP 10 03/28/2017     (H) 03/28/2017    BUN 18 03/28/2017    CR 0.70 03/28/2017    GFRESTIMATED 81 03/28/2017    GFRESTBLACK >90   GFR Calc   03/28/2017    JAN 9.5 03/28/2017        A1C RESULTS:  Lab Results   Component Value Date    A1C 8.4 (H) 03/28/2017       INR RESULTS:  Lab Results   Component Value Date    INR 1.10 01/27/2005           CC  Lea Lopez MD  [facility]  [address] [place] [address]

## 2017-05-09 NOTE — LETTER
5/9/2017    Lea Lopez MD  [facility]  [address] [place] [address]    RE: Kelly CHIRINOS Cameron       Dear Colleague,    I had the pleasure of seeing Kelly Barnes in the [facility].    Kelly is a very pleasant 77-year-old referred here for an abnormal echocardiogram.  She has a history of hypertension, diabetes, hyperlipidemia and underwent an echocardiogram showing some aortic valve disease.  Kelly currently denies any chest pain, chest pressure, shortness of breath, heart racing, palpitations, lightheadedness, dizziness, syncope, near-syncope, PND or orthopnea.  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  She has no pain of her lower extremities.  She has never had a stroke, myocardial infarction, angina pectoris or acute coronary syndrome.      Her father has had a myocardial infarction in his 60s, and her mother had hypertension.  She has refused statin therapy in the past.  She is not sure why she is here but was told it may have to do with her valve condition on her echocardiogram.  Review of her echocardiogram shows that actually the LV function is normal.  There is trivial aortic valve stenosis with a mean gradient of 5 mmHg.  There is no aortic insufficiency.  There is 1+ pulmonic insufficiency.  Pulmonary pressure estimates are normal.     Outpatient Encounter Prescriptions as of 5/9/2017   Medication Sig Dispense Refill     metFORMIN (GLUCOPHAGE) 500 MG tablet Take 2 tablets in the morning and one tablet at night 270 tablet 3     blood glucose monitoring (ONE TOUCH ULTRA) test strip Use to test blood sugar  daily or as directed. Dispense 1 box of 100 test strips, #3 refills. 1 Box 3     [DISCONTINUED] metoprolol (TOPROL-XL) 25 MG 24 hr tablet Take 0.5 tablets (12.5 mg) by mouth daily 90 tablet 3     cyclobenzaprine (FLEXERIL) 10 MG  tablet Take 0.5-1 tablets (5-10 mg) by mouth 3 times daily as needed for muscle spasms 30 tablet 1     losartan (COZAAR) 50 MG tablet Take 1 tablet (50 mg) by mouth daily 90 tablet 3     busPIRone (BUSPAR) 5 MG tablet Take 1 tablet (5 mg) by mouth 2 times daily As needed for chest pain 60 tablet 1     blood glucose monitoring (ONE TOUCH DELICA) lancets Use to test blood sugars one times daily or as directed. 1 Box 11     STATIN NOT PRESCRIBED, INTENTIONAL, Statin not prescribed intentionally due to Other patient refusal (This option does not exclude patient from measure) 0 each 0     ONE TOUCH LANCETS MISC        fluticasone (FLONASE) 50 MCG/ACT nasal spray Spray 2 sprays into both nostrils daily 1 Package 1     blood glucose monitoring (ONE TOUCH ULTRA 2) meter device kit Use to test blood sugar 3 times daily 1 kit 0     glucose blood VI test strips strip by In Vitro route daily. 1 Box 12     AMBIEN 10 MG tablet Take 1 tablet by mouth nightly as needed for sleep. at bedtime. 30 tablet 1     ASPIRIN 81 MG OR TABS ONE DAILY 100 3     blood glucose monitor KIT 1 kit daily. 1 kit 0     No facility-administered encounter medications on file as of 5/9/2017.       ASSESSMENT AND PLAN:   1.  Kelly has trivial aortic valve stenosis which is asymptomatic.  No intervention or therapy is warranted.  I would recommend an echocardiogram perhaps be repeated in 3 to 5 years with Dr. Lopez.      Regarding her hyperlipidemia, she is diabetic and has family history.  I would recommend medical therapy to achieve an LDL near 100.  I told her she could likely get away with a low dose statin, and she will speak with Dr. Lopez about that, as she feels like she would be willing to do so now.      Her hypertension is well controlled.  She has no anginal-like symptoms.  Her blood pressure and physical exam are stable.  I am happy to see her back on an as-needed basis.     Again, thank you for allowing me to participate in the care of your  patient.      Sincerely,    ROMIE DANIEL MD     [facility]

## 2017-05-10 NOTE — PROGRESS NOTES
HISTORY OF PRESENT ILLNESS:  Kelly is a very pleasant 77-year-old referred here for an abnormal echocardiogram.  She has a history of hypertension, diabetes, hyperlipidemia and underwent an echocardiogram showing some aortic valve disease.  Kelly currently denies any chest pain, chest pressure, shortness of breath, heart racing, palpitations, lightheadedness, dizziness, syncope, near-syncope, PND or orthopnea.  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  She has no pain of her lower extremities.  She has never had a stroke, myocardial infarction, angina pectoris or acute coronary syndrome.      Her father has had a myocardial infarction in his 60s, and her mother had hypertension.  She has refused statin therapy in the past.  She is not sure why she is here but was told it may have to do with her valve condition on her echocardiogram.  Review of her echocardiogram shows that actually the LV function is normal.  There is trivial aortic valve stenosis with a mean gradient of 5 mmHg.  There is no aortic insufficiency.  There is 1+ pulmonic insufficiency.  Pulmonary pressure estimates are normal.      ASSESSMENT AND PLAN:   1.  Kelly has trivial aortic valve stenosis which is asymptomatic.  No intervention or therapy is warranted.  I would recommend an echocardiogram perhaps be repeated in 3 to 5 years with Dr. Lopez.      Regarding her hyperlipidemia, she is diabetic and has family history.  I would recommend medical therapy to achieve an LDL near 100.  I told her she could likely get away with a low dose statin, and she will speak with Dr. Lopez about that, as she feels like she would be willing to do so now.      Her hypertension is well controlled.  She has no anginal-like symptoms.  Her blood pressure and physical exam are stable.  I am happy to see her back on an as-needed basis.      cc:   Lea  MD Jessica    [address]  [address]         ROMIE DANIEL MD [facility]             D: 2017 11:34   T: 2017 21:58   MT: EMILIANA      Name:     HAIM ROCK   MRN:      [id number]        Account:      [id number]   :      1940           Service Date: 2017      Document: [id number]

## 2017-06-19 DIAGNOSIS — I10 HYPERTENSION GOAL BP (BLOOD PRESSURE) < 140/90: ICD-10-CM

## 2017-06-20 RX ORDER — METOPROLOL SUCCINATE 25 MG/1
TABLET, EXTENDED RELEASE ORAL
Qty: 45 TABLET | Refills: 1 | Status: SHIPPED | OUTPATIENT
Start: 2017-06-20 | End: 2017-10-27

## 2017-06-20 NOTE — TELEPHONE ENCOUNTER
Prescription approved per [facility] Refill Protocol.  KATHLEEN Roper, RN      metoprolol (TOPROL-XL) 25 MG 24 hr tablet      Last Written Prescription Date: N/A  Last Fill Quantity: N/A, # refills: n/A    Last Office Visit with [facility], [facility] or [facility] prescribing provider:  3/28/17   Future Office Visit:        BP Readings from Last 3 Encounters:   05/09/17 118/68   03/28/17 138/82   03/16/17 138/84

## 2017-10-24 ENCOUNTER — TELEPHONE (OUTPATIENT)
Dept: FAMILY MEDICINE | Facility: CLINIC | Age: 77
End: 2017-10-24

## 2017-10-24 DIAGNOSIS — I10 HYPERTENSION GOAL BP (BLOOD PRESSURE) < 140/90: ICD-10-CM

## 2017-10-24 DIAGNOSIS — E11.9 TYPE 2 DIABETES MELLITUS WITHOUT COMPLICATION, WITHOUT LONG-TERM CURRENT USE OF INSULIN (H): Primary | ICD-10-CM

## 2017-10-24 NOTE — TELEPHONE ENCOUNTER
Pt has appt on 10-27-17.  Would like to come in on an earlier date to get lab work done prior to appt.  No orders in chart.  Will you put lab orders in so we can schedule this appt?  Please call when done.  Ok to leave message on voicemail.

## 2017-10-25 ENCOUNTER — ALLIED HEALTH/NURSE VISIT (OUTPATIENT)
Dept: NURSING | Facility: CLINIC | Age: 77
End: 2017-10-25
Payer: COMMERCIAL

## 2017-10-25 VITALS — HEART RATE: 93 BPM | DIASTOLIC BLOOD PRESSURE: 92 MMHG | SYSTOLIC BLOOD PRESSURE: 158 MMHG

## 2017-10-25 DIAGNOSIS — I10 HYPERTENSION GOAL BP (BLOOD PRESSURE) < 140/90: Primary | ICD-10-CM

## 2017-10-25 PROCEDURE — 99207 ZZC NO CHARGE NURSE ONLY: CPT

## 2017-10-26 DIAGNOSIS — I10 HYPERTENSION GOAL BP (BLOOD PRESSURE) < 140/90: ICD-10-CM

## 2017-10-26 DIAGNOSIS — E11.9 TYPE 2 DIABETES MELLITUS WITHOUT COMPLICATION, WITHOUT LONG-TERM CURRENT USE OF INSULIN (H): ICD-10-CM

## 2017-10-26 LAB
CREAT UR-MCNC: 102 MG/DL
HBA1C MFR BLD: 7.3 % (ref 4.3–6)
MICROALBUMIN UR-MCNC: 334 MG/L
MICROALBUMIN/CREAT UR: 327.45 MG/G CR (ref 0–25)
TSH SERPL DL<=0.005 MIU/L-ACNC: 1.52 MU/L (ref 0.4–4)

## 2017-10-26 PROCEDURE — 36415 COLL VENOUS BLD VENIPUNCTURE: CPT | Performed by: FAMILY MEDICINE

## 2017-10-26 PROCEDURE — 83036 HEMOGLOBIN GLYCOSYLATED A1C: CPT | Performed by: FAMILY MEDICINE

## 2017-10-26 PROCEDURE — 84443 ASSAY THYROID STIM HORMONE: CPT | Performed by: FAMILY MEDICINE

## 2017-10-26 PROCEDURE — 82043 UR ALBUMIN QUANTITATIVE: CPT | Performed by: FAMILY MEDICINE

## 2017-10-26 NOTE — LETTER
October 27, 2017      Kelly Barnes  [address]  [place] [address]        Dear ,    We are writing to inform you of your test results.    Hello!  It was a pleasure to see you in clinic!  Thank you for getting labs done.     Your thyroid function is normal, which is good news.  This means that you do not have hyperthyroidism or hypothyroidism.     Your microalbumen is elevated. This means you have some protein in the urine. It indicates that your kidneys are being affected by diabetes. Keeping blood pressure and blood fats low is the best treatment in order to keep this  stable. People with microalbumen should avoid anti-inflamatory agents such as motrin, alleve and ibuprofen as much as possible. Anybody that has this should be on losartanl-as you already are-since this is protective for the kidneys.    Your HgA1C, also called glycosylated hemoglobin, which measures the level of sugar in your blood over the past few months, is at goal, and more than a point lower than 7 months ago, which is great! Congratulations!    Keep up the good work!    Resulted Orders   TSH with free T4 reflex   Result Value Ref Range    TSH 1.52 0.40 - 4.00 mU/L   Hemoglobin A1c   Result Value Ref Range    Hemoglobin A1C 7.3 (H) 4.3 - 6.0 %   Albumin Random Urine Quantitative with Creat Ratio   Result Value Ref Range    Creatinine Urine 102 mg/dL    Albumin Urine mg/L 334 mg/L    Albumin Urine mg/g Cr 327.45 (H) 0 - 25 mg/g Cr       If you have any questions or concerns, please call the clinic at the number listed above.     Sincerely,  Lea Lopez MD/nr

## 2017-10-27 ENCOUNTER — OFFICE VISIT (OUTPATIENT)
Dept: FAMILY MEDICINE | Facility: CLINIC | Age: 77
End: 2017-10-27
Payer: COMMERCIAL

## 2017-10-27 VITALS
TEMPERATURE: 97.5 F | DIASTOLIC BLOOD PRESSURE: 72 MMHG | OXYGEN SATURATION: 95 % | RESPIRATION RATE: 14 BRPM | WEIGHT: 166 LBS | HEIGHT: 62 IN | SYSTOLIC BLOOD PRESSURE: 118 MMHG | BODY MASS INDEX: 30.55 KG/M2

## 2017-10-27 DIAGNOSIS — E11.9 TYPE 2 DIABETES MELLITUS WITHOUT COMPLICATION, WITHOUT LONG-TERM CURRENT USE OF INSULIN (H): ICD-10-CM

## 2017-10-27 DIAGNOSIS — Z23 NEED FOR PNEUMOCOCCAL VACCINATION: ICD-10-CM

## 2017-10-27 DIAGNOSIS — E78.5 HYPERLIPIDEMIA LDL GOAL <100: Primary | ICD-10-CM

## 2017-10-27 DIAGNOSIS — Z51.81 ENCOUNTER FOR THERAPEUTIC DRUG MONITORING: ICD-10-CM

## 2017-10-27 DIAGNOSIS — I10 ESSENTIAL HYPERTENSION WITH GOAL BLOOD PRESSURE LESS THAN 140/90: ICD-10-CM

## 2017-10-27 PROCEDURE — 99214 OFFICE O/P EST MOD 30 MIN: CPT | Mod: 25 | Performed by: FAMILY MEDICINE

## 2017-10-27 PROCEDURE — G0009 ADMIN PNEUMOCOCCAL VACCINE: HCPCS | Performed by: FAMILY MEDICINE

## 2017-10-27 PROCEDURE — 99207 C FOOT EXAM  NO CHARGE: CPT | Performed by: FAMILY MEDICINE

## 2017-10-27 PROCEDURE — 90732 PPSV23 VACC 2 YRS+ SUBQ/IM: CPT | Performed by: FAMILY MEDICINE

## 2017-10-27 PROCEDURE — 99207 C PAF COMPLETED  NO CHARGE: CPT | Mod: 25 | Performed by: FAMILY MEDICINE

## 2017-10-27 RX ORDER — METOPROLOL SUCCINATE 25 MG/1
12.5 TABLET, EXTENDED RELEASE ORAL DAILY
Qty: 45 TABLET | Refills: 3 | Status: SHIPPED | OUTPATIENT
Start: 2017-10-27 | End: 2018-10-25

## 2017-10-27 RX ORDER — SIMVASTATIN 10 MG
10 TABLET ORAL AT BEDTIME
Qty: 90 TABLET | Refills: 1 | Status: SHIPPED | OUTPATIENT
Start: 2017-10-27 | End: 2018-10-25

## 2017-10-27 RX ORDER — LOSARTAN POTASSIUM 50 MG/1
50 TABLET ORAL DAILY
Qty: 90 TABLET | Refills: 3 | Status: SHIPPED | OUTPATIENT
Start: 2017-10-27 | End: 2018-10-25

## 2017-10-27 NOTE — MR AVS SNAPSHOT
After Visit Summary   10/27/2017    Kelly Barnes    MRN: [id number]           Patient Information     Date Of Birth          1940        Visit Information        Provider Department      10/27/2017 8:40 AM Lea Lopez MD [facility]        Today's Diagnoses     Hyperlipidemia LDL goal <100    -  1    Type 2 diabetes mellitus without complication, without long-term current use of insulin (H)        Essential hypertension with goal blood pressure less than 140/90        Encounter for therapeutic drug monitoring          Care Instructions    Results for orders placed or performed in visit on 10/26/17   TSH with free T4 reflex   Result Value Ref Range    TSH 1.52 0.40 - 4.00 mU/L   Hemoglobin A1c   Result Value Ref Range    Hemoglobin A1C 7.3 (H) 4.3 - 6.0 %   Albumin Random Urine Quantitative with Creat Ratio   Result Value Ref Range    Creatinine Urine 102 mg/dL    Albumin Urine mg/L 334 mg/L    Albumin Urine mg/g Cr 327.45 (H) 0 - 25 mg/g Cr      GFR Estimate   Date Value Ref Range Status   03/28/2017 81 >60 mL/min/1.7m2 Final     Comment:     Non  GFR Calc   10/27/2016 68 >60 mL/min/1.7m2 Final     Comment:     Non  GFR Calc   03/29/2016 87 >60 mL/min/1.7m2 Final     Comment:     Non  GFR Calc     Recent Labs   Lab Test  03/28/17   0941  10/27/16   0825   11/10/15   0829  07/23/15   0800   CHOL  215*  232*   < >  210*  235*   HDL  67  64   < >  67  69   LDL  131*  149*   < >  126  147*   TRIG  83  93   < >  86  96   CHOLHDLRATIO   --    --    --   3.1  3.4    < > = values in this interval not displayed.     Start taking statin at night (either with dinner or at bedtime, whatever is easiest).  If you notice muscle aches while taking this medication, schedule a lab appointment to check your CK and liver function. Orders are already in, so you just need to schedule a lab appointment at clinic.          Follow-ups after your  "visit        Follow-up notes from your care team     Return in about 6 months (around 2018), or chronic disease.      Future tests that were ordered for you today     Open Future Orders        Priority Expected Expires Ordered    CK total Routine 2018 10/27/2018 10/27/2017    Lipid panel reflex to direct LDL Fasting Routine 2018 10/27/2018 10/27/2017    Hepatic panel Routine 2018 10/27/2018 10/27/2017            Who to contact     If you have questions or need follow up information about today's clinic visit or your schedule please contact [facility] directly at [phone].  Normal or non-critical lab and imaging results will be communicated to you by MyChart, letter or phone within 4 business days after the clinic has received the results. If you do not hear from us within 7 days, please contact the clinic through [facility] or phone. If you have a critical or abnormal lab result, we will notify you by phone as soon as possible.  Submit refill requests through [employer] or call your pharmacy and they will forward the refill request to us. Please allow 3 business days for your refill to be completed.          Additional Information About Your Visit        MyChart Information     [employer] lets you send messages to your doctor, view your test results, renew your prescriptions, schedule appointments and more. To sign up, go to [email or web address] . Click on \"Log in\" on the left side of the screen, which will take you to the Welcome page. Then click on \"Sign up Now\" on the right side of the page.     You will be asked to enter the access code listed below, as well as some personal information. Please follow the directions to create your username and password.     Your access code is: [email or web address]  Expires: 2018  3:37 PM     Your access code will  in 90 days. If you need help or a new code, please call your [facility] or [phone].        Care EveryWhere ID     This is " "your Care EveryWhere ID. This could be used by other organizations to access your [facility] medical records  [id number]        Your Vitals Were     Temperature Respirations Height Pulse Oximetry BMI (Body Mass Index)       97.5  F (36.4  C) (Tympanic) 14 5' 2\" (1.575 m) 95% 30.36 kg/m2        Blood Pressure from Last 3 Encounters:   10/27/17 118/72   10/25/17 (!) 158/92   05/09/17 118/68    Weight from Last 3 Encounters:   10/27/17 166 lb (75.3 kg)   05/09/17 165 lb (74.8 kg)   03/28/17 166 lb (75.3 kg)              We Performed the Following     PAF COMPLETED          Today's Medication Changes          These changes are accurate as of: 10/27/17  9:33 AM.  If you have any questions, ask your nurse or doctor.               These medicines have changed or have updated prescriptions.        Dose/Directions    metoprolol 25 MG 24 hr tablet   Commonly known as:  TOPROL-XL   This may have changed:  See the new instructions.   Used for:  Essential hypertension with goal blood pressure less than 140/90   Changed by:  Lea Lopez MD        Dose:  12.5 mg   Take 0.5 tablets (12.5 mg) by mouth daily   Quantity:  45 tablet   Refills:  3            Where to get your medicines      These medications were sent to [facility] [place], [place] - [address]  [address] [facility] [place] [facility] [address]     Phone:  [phone]     blood glucose monitoring test strip    losartan 50 MG tablet    metFORMIN 500 MG tablet    metoprolol 25 MG 24 hr tablet                Primary Care Provider Office Phone # Fax #    Lea Lopez [phone]       [address]  [facility] [address]        Equal Access to Services     LOREE CORONADO : Umair Rincon, keenan vázquez, libra kaalmada myrna, anastasia sellers. So [facility] [phone].    ATENCIÓN: Si habla español, tiene a whyte disposición servicios gratuitos de asistencia lingüística. Llame al " [phone].    We comply with applicable federal civil rights laws and [place] laws. We do not discriminate on the basis of race, color, national origin, age, disability, sex, sexual orientation, or gender identity.            Thank you!     Thank you for choosing [facility]  for your care. Our goal is always to provide you with excellent care. Hearing back from our patients is one way we can continue to improve our services. Please take a few minutes to complete the written survey that you may receive in the mail after your visit with us. Thank you!             Your Updated Medication List - Protect others around you: Learn how to safely use, store and throw away your medicines at www.disposemymeds.org.          This list is accurate as of: 10/27/17  9:33 AM.  Always use your most recent med list.                   Brand Name Dispense Instructions for use Diagnosis    AMBIEN 10 MG tablet   Generic drug:  zolpidem     30 tablet    Take 1 tablet by mouth nightly as needed for sleep. at bedtime.    Insomnia       aspirin 81 MG tablet     100    ONE DAILY    Unspecified essential hypertension       * blood glucose monitor Kit     1 kit    1 kit daily.    Type 2 diabetes, HbA1c goal < 7% (H)       * blood glucose monitoring meter device kit     1 kit    Use to test blood sugar 3 times daily    DM type 2, goal A1C below 8.0       * blood glucose monitoring test strip    no brand specified    1 Box    by In Vitro route daily.    Type 2 diabetes, HbA1c goal < 7% (H)       * blood glucose monitoring test strip    ONE TOUCH ULTRA    1 Box    Use to test blood sugar  daily or as directed. Dispense 1 box of 100 test strips, #3 refills.    Type 2 diabetes mellitus without complication, without long-term current use of insulin (H)       busPIRone 5 MG tablet    BUSPAR    60 tablet    Take 1 tablet (5 mg) by mouth 2 times daily As needed for chest pain    Atypical chest pain       cyclobenzaprine 10 MG tablet     FLEXERIL    30 tablet    Take 0.5-1 tablets (5-10 mg) by mouth 3 times daily as needed for muscle spasms    Musculoskeletal chest pain       fluticasone 50 MCG/ACT spray    FLONASE    1 Package    Spray 2 sprays into both nostrils daily    Allergic rhinitis, cause unspecified       losartan 50 MG tablet    COZAAR    90 tablet    Take 1 tablet (50 mg) by mouth daily    Essential hypertension with goal blood pressure less than 140/90       metFORMIN 500 MG tablet    GLUCOPHAGE    270 tablet    Take 2 tablets in the morning and one tablet at night    Type 2 diabetes mellitus without complication, without long-term current use of insulin (H)       metoprolol 25 MG 24 hr tablet    TOPROL-XL    45 tablet    Take 0.5 tablets (12.5 mg) by mouth daily    Essential hypertension with goal blood pressure less than 140/90       * ONE TOUCH LANCETS Misc           * blood glucose monitoring lancets     1 Box    Use to test blood sugars one times daily or as directed.    Type 2 diabetes mellitus without complication, without long-term current use of insulin (H)       STATIN NOT PRESCRIBED (INTENTIONAL)     0 each    Statin not prescribed intentionally due to Other patient refusal (This option does not exclude patient from measure)    Type 2 diabetes mellitus without complication (H)       * Notice:  This list has 6 medication(s) that are the same as other medications prescribed for you. Read the directions carefully, and ask your doctor or other care provider to review them with you.

## 2017-10-27 NOTE — PATIENT INSTRUCTIONS
Results for orders placed or performed in visit on 10/26/17   TSH with free T4 reflex   Result Value Ref Range    TSH 1.52 0.40 - 4.00 mU/L   Hemoglobin A1c   Result Value Ref Range    Hemoglobin A1C 7.3 (H) 4.3 - 6.0 %   Albumin Random Urine Quantitative with Creat Ratio   Result Value Ref Range    Creatinine Urine 102 mg/dL    Albumin Urine mg/L 334 mg/L    Albumin Urine mg/g Cr 327.45 (H) 0 - 25 mg/g Cr      GFR Estimate   Date Value Ref Range Status   03/28/2017 81 >60 mL/min/1.7m2 Final     Comment:     Non  GFR Calc   10/27/2016 68 >60 mL/min/1.7m2 Final     Comment:     Non  GFR Calc   03/29/2016 87 >60 mL/min/1.7m2 Final     Comment:     Non  GFR Calc     Recent Labs   Lab Test  03/28/17   0941  10/27/16   0825   11/10/15   0829  07/23/15   0800   CHOL  215*  232*   < >  210*  235*   HDL  67  64   < >  67  69   LDL  131*  149*   < >  126  147*   TRIG  83  93   < >  86  96   CHOLHDLRATIO   --    --    --   3.1  3.4    < > = values in this interval not displayed.     Start taking statin at night (either with dinner or at bedtime, whatever is easiest).  If you notice muscle aches while taking this medication, schedule a lab appointment to check your CK and liver function. Orders are already in, so you just need to schedule a lab appointment at clinic.

## 2017-10-27 NOTE — PROGRESS NOTES
Hello!  It was a pleasure to see you in clinic!  Thank you for getting labs done.     Your thyroid function is normal, which is good news.  This means that you do not have hyperthyroidism or hypothyroidism.     Your microalbumen is elevated. This means you have some protein in the urine. It indicates that your kidneys are being affected by diabetes. Keeping blood pressure and blood fats low is the best treatment in order to keep this  stable. People with microalbumen should avoid anti-inflamatory agents such as motrin, alleve and ibuprofen as much as possible. Anybody that has this should be on losartanl-as you already are-since this is protective for the kidneys.    Your HgA1C, also called glycosylated hemoglobin, which measures the level of sugar in your blood over the past few months, is at goal, and more than a point lower than 7 months ago, which is great! Congratulations!    Keep up the good work!    Sincerely,  Dr. Lea Lopez MD  10/27/2017

## 2017-10-27 NOTE — NURSING NOTE
"Chief Complaint   Patient presents with     Hypertension       Initial /72  Temp 97.5  F (36.4  C) (Tympanic)  Resp 14  Ht 5' 2\" (1.575 m)  Wt 166 lb (75.3 kg)  SpO2 95%  BMI 30.36 kg/m2 Estimated body mass index is 30.36 kg/(m^2) as calculated from the following:    Height as of this encounter: 5' 2\" (1.575 m).    Weight as of this encounter: 166 lb (75.3 kg).  Medication Reconciliation: complete     Vidya Webber MA    "

## 2017-10-27 NOTE — PROGRESS NOTES
SUBJECTIVE:   Kelly Barnes is a 77 year old female who presents to clinic today for the following health issues:      Diabetes Follow-up      Patient is checking blood sugars: Once or Twice a day - 8-830 Am & Noon or right before supper    Diabetic concerns: None     Symptoms of hypoglycemia (low blood sugar): none     Paresthesias (numbness or burning in feet) or sores: No     Date of last diabetic eye exam: 05/2017    Hyperlipidemia Follow-Up      Rate your low fat/cholesterol diet?: fair    Taking statin?  No    Other lipid medications/supplements?:  none    Hypertension Follow-up      Outpatient blood pressures are being checked at home.  Results are 130s/70s.  Low Salt Diet: no added salt  She uses an Omron machine at home, she has had highly variable blood pressure measurements at home,   Last night 133/80 right arm, 152/81 left arm at same time, adjusted, recheck 132/77  She doesn't usually have anything caffeinated because it can cause chest pain  Otherwise no chest pain or SOB noted      Amount of exercise or physical activity: 6-7 days/week for an average of 15-30 minutes    Problems taking medications regularly: No    Medication side effects: none    Diet: regular (no restrictions)    Problem list and histories reviewed & adjusted, as indicated.  Additional history: as documented    Patient Active Problem List   Diagnosis     Allergic rhinitis     Osteopenia     Obesity     Hypertension goal BP (blood pressure) < 140/90     Hyperlipidemia LDL goal <100     Leg edema, left     Atypical chest pain     Dizzy spells     Obesity, Class I, BMI 30-34.9     Aortic stenosis     Type 2 diabetes mellitus without complication, without long-term current use of insulin (H)     Musculoskeletal chest pain     Nonrheumatic aortic valve stenosis     Past Surgical History:   Procedure Laterality Date     C NONSPECIFIC PROCEDURE      none       Social History   Substance Use Topics     Smoking status: Never Smoker      Smokeless tobacco: Never Used     Alcohol use 6.0 oz/week      Comment: 1-2 drinks per week     Family History   Problem Relation Age of Onset     Hypertension Mother      DIABETES No family hx of      CEREBROVASCULAR DISEASE No family hx of      Breast Cancer No family hx of      Cancer - colorectal No family hx of      Prostate Cancer No family hx of          Current Outpatient Prescriptions   Medication Sig Dispense Refill     metFORMIN (GLUCOPHAGE) 500 MG tablet Take 2 tablets in the morning and one tablet at night 270 tablet 3     losartan (COZAAR) 50 MG tablet Take 1 tablet (50 mg) by mouth daily 90 tablet 3     metoprolol (TOPROL-XL) 25 MG 24 hr tablet Take 0.5 tablets (12.5 mg) by mouth daily 45 tablet 3     blood glucose monitoring (ONE TOUCH ULTRA) test strip Use to test blood sugar  daily or as directed. Dispense 1 box of 100 test strips, #3 refills. 1 Box 3     simvastatin (ZOCOR) 10 MG tablet Take 1 tablet (10 mg) by mouth At Bedtime 90 tablet 1     cyclobenzaprine (FLEXERIL) 10 MG tablet Take 0.5-1 tablets (5-10 mg) by mouth 3 times daily as needed for muscle spasms 30 tablet 1     busPIRone (BUSPAR) 5 MG tablet Take 1 tablet (5 mg) by mouth 2 times daily As needed for chest pain 60 tablet 1     blood glucose monitoring (ONE TOUCH DELICA) lancets Use to test blood sugars one times daily or as directed. 1 Box 11     STATIN NOT PRESCRIBED, INTENTIONAL, Statin not prescribed intentionally due to Other patient refusal (This option does not exclude patient from measure) 0 each 0     ONE TOUCH LANCETS MISC        fluticasone (FLONASE) 50 MCG/ACT nasal spray Spray 2 sprays into both nostrils daily 1 Package 1     blood glucose monitoring (ONE TOUCH ULTRA 2) meter device kit Use to test blood sugar 3 times daily 1 kit 0     blood glucose monitor KIT 1 kit daily. 1 kit 0     glucose blood VI test strips strip by In Vitro route daily. 1 Box 12     AMBIEN 10 MG tablet Take 1 tablet by mouth nightly as needed for  "sleep. at bedtime. 30 tablet 1     ASPIRIN 81 MG OR TABS ONE DAILY 100 3     [DISCONTINUED] metoprolol (TOPROL-XL) 25 MG 24 hr tablet TAKE ONE-HALF TABLET BY MOUTH EVERY DAY 45 tablet 1     [DISCONTINUED] metFORMIN (GLUCOPHAGE) 500 MG tablet Take 2 tablets in the morning and one tablet at night 270 tablet 3     [DISCONTINUED] losartan (COZAAR) 50 MG tablet Take 1 tablet (50 mg) by mouth daily 90 tablet 3     Allergies   Allergen Reactions     Ibuprofen      numbness in hands and feet         Reviewed and updated as needed this visit by clinical staff     Reviewed and updated as needed this visit by Provider         ROS:  Constitutional, HEENT, cardiovascular, pulmonary, GI, , musculoskeletal, neuro, skin, endocrine and psych systems are negative, except as otherwise noted.      OBJECTIVE:   /72  Temp 97.5  F (36.4  C) (Tympanic)  Resp 14  Ht 5' 2\" (1.575 m)  Wt 166 lb (75.3 kg)  SpO2 95%  BMI 30.36 kg/m2  Body mass index is 30.36 kg/(m^2).  GENERAL: healthy, alert and no distress  EYES: Eyes grossly normal to inspection, PERRL and conjunctivae and sclerae normal  HENT: ear canals and TM's normal, nose and mouth without ulcers or lesions  NECK: no adenopathy, no asymmetry, masses, or scars, thyroid normal to palpation and no carotid bruits  RESP: lungs clear to auscultation - no rales, rhonchi or wheezes  CV: regular rate and rhythm, normal S1 S2, no S3 or S4, no murmur, click or rub, no peripheral edema and peripheral pulses strong  ABDOMEN: soft, nontender, no hepatosplenomegaly, no masses and bowel sounds normal  MS: no gross musculoskeletal defects noted, no edema  SKIN: no suspicious lesions or rashes  NEURO: Normal strength and tone, mentation intact and speech normal  PSYCH: mentation appears normal, affect normal/bright  Diabetic foot exam: normal DP and PT pulses, no trophic changes or ulcerative lesions, normal sensory exam and normal monofilament exam    Diagnostic Test Results:  Results for " orders placed or performed in visit on 10/26/17   TSH with free T4 reflex   Result Value Ref Range    TSH 1.52 0.40 - 4.00 mU/L   Hemoglobin A1c   Result Value Ref Range    Hemoglobin A1C 7.3 (H) 4.3 - 6.0 %   Albumin Random Urine Quantitative with Creat Ratio   Result Value Ref Range    Creatinine Urine 102 mg/dL    Albumin Urine mg/L 334 mg/L    Albumin Urine mg/g Cr 327.45 (H) 0 - 25 mg/g Cr      No results found for this or any previous visit (from the past 24 hour(s)).    ASSESSMENT/PLAN:       1. Type 2 diabetes mellitus without complication, without long-term current use of insulin (H)  At goal, improved!  The current medical regimen is effective;  continue present plan and medications.   - metFORMIN (GLUCOPHAGE) 500 MG tablet; Take 2 tablets in the morning and one tablet at night  Dispense: 270 tablet; Refill: 3  - blood glucose monitoring (ONE TOUCH ULTRA) test strip; Use to test blood sugar  daily or as directed. Dispense 1 box of 100 test strips, #3 refills.  Dispense: 1 Box; Refill: 3    2. Essential hypertension with goal blood pressure less than 140/90  BP Readings from Last 3 Encounters:   10/27/17 118/72   10/25/17 (!) 158/92   05/09/17 118/68    at goal  - losartan (COZAAR) 50 MG tablet; Take 1 tablet (50 mg) by mouth daily  Dispense: 90 tablet; Refill: 3  - metoprolol (TOPROL-XL) 25 MG 24 hr tablet; Take 0.5 tablets (12.5 mg) by mouth daily  Dispense: 45 tablet; Refill: 3    3. Hyperlipidemia LDL goal <100  LDL Cholesterol Calculated   Date Value Ref Range Status   03/28/2017 131 (H) <100 mg/dL Final     Comment:     Above desirable:  100-129 mg/dl   Borderline High:  130-159 mg/dL   High:             160-189 mg/dL   Very high:       >189 mg/dl     ] not at goal, discussed pros/cons statins, she has some concerns, as  had elevated CK, but agreed to start today.  If develops muscle aches, she will stop statin and schedule lab appointment, future orders placed today.  - CK total; Future  -  Lipid panel reflex to direct LDL Fasting; Future  - Hepatic panel; Future  - simvastatin (ZOCOR) 10 MG tablet; Take 1 tablet (10 mg) by mouth At Bedtime  Dispense: 90 tablet; Refill: 1    4. Encounter for therapeutic drug monitoring  See above  - CK total; Future  - Lipid panel reflex to direct LDL Fasting; Future  - Hepatic panel; Future    5. Need for pneumococcal vaccination  Done today  - PNEUMOCOCCAL VACCINE,ADULT,SQ OR IM    Return to clinic 6 months routine chronic disease management.  Flu shot offered, recommended, patient declined today.     Lea Lopez MD  [facility]

## 2018-01-12 ENCOUNTER — TELEPHONE (OUTPATIENT)
Dept: FAMILY MEDICINE | Facility: CLINIC | Age: 78
End: 2018-01-12

## 2018-01-12 NOTE — TELEPHONE ENCOUNTER
Patient called and spoke with writer.    Per patient:  1. Cold symptoms   A. Nasal congestion   B. Dry cough  2. Temperature is 100.8 F  3. Slight body aches  4. Denied:   A. Sore throat   B. Vomiting   C. Diarrhea   D. Difficulty breathing   E. Known exposure to Influenza  5. Should she go to Urgent Care?    Writer informed patient she is welcome to be evaluated in Urgent Care, but writer would recommend home care measures:  1. Rest  2. Push fluids, frequent small sips of water or sugar free sports drink  3. Warm tea with 1/2 tsp honey  4. Breathe steam several times daily to help break up nasal congestion  5. Sleep with humidifier on  6. Can take Tylenol for elevated temperature.  Follow medication label instructions. Reviewed [facility] hours today, per patient's request  7. Call back with any new, changing or worsening symptoms.  Reviewed [facility] coverage during off hours.    MICHAEL DiggsN, RN

## 2018-04-24 DIAGNOSIS — E11.9 TYPE 2 DIABETES MELLITUS WITHOUT COMPLICATION, WITHOUT LONG-TERM CURRENT USE OF INSULIN (H): ICD-10-CM

## 2018-04-24 NOTE — TELEPHONE ENCOUNTER
"Requested Prescriptions   Pending Prescriptions Disp Refills     ONETOUCH DELICA LANCETS 33G MISC [Pharmacy Med Name: ONETOUCH DELICA LANCETS 33G  MISC]  Last Written Prescription Date:  10/7/2016  Last Fill Quantity: 1,  # refills: 11   Last Office Visit: 10/27/2017   Future Office Visit:      100 each 11     Sig: USE TO TEST BLOOD SUGAR ONCE A DAY OR AS DIRECTED    Diabetic Supplies Protocol Passed    4/24/2018  6:38 AM       Passed - Patient is 18 years of age or older       Passed - Recent (6 mo) or future (30 days) visit within the authorizing provider's specialty    Patient had office visit in the last 6 months or has a visit in the next 30 days with authorizing provider.  See \"Patient Info\" tab in inbasket, or \"Choose Columns\" in Meds & Orders section of the refill encounter.              "

## 2018-04-25 RX ORDER — LANCETS 33 GAUGE
1 EACH MISCELLANEOUS 2 TIMES DAILY
Qty: 100 EACH | Refills: 11 | Status: SHIPPED | OUTPATIENT
Start: 2018-04-25 | End: 2019-11-22

## 2018-04-25 NOTE — TELEPHONE ENCOUNTER
Health Maintenance Due   Topic Date Due     DEXA Q2 YR  05/06/2012     ADVANCE DIRECTIVE PLANNING Q5 YRS  11/03/2016     INFLUENZA VACCINE (1) 09/01/2017     A1C Q3 MO  01/26/2018     CMP Q1 YR  03/28/2018     LIPID MONITORING Q1 YEAR  03/28/2018      BP Readings from Last 3 Encounters:   10/27/17 118/72   10/25/17 (!) 158/92   05/09/17 118/68      Please schedule office appointment with labs, thank you! Dr. Lopze lancets refilled.

## 2018-10-25 ENCOUNTER — OFFICE VISIT (OUTPATIENT)
Dept: FAMILY MEDICINE | Facility: CLINIC | Age: 78
End: 2018-10-25
Payer: COMMERCIAL

## 2018-10-25 VITALS
OXYGEN SATURATION: 99 % | BODY MASS INDEX: 29.63 KG/M2 | DIASTOLIC BLOOD PRESSURE: 72 MMHG | SYSTOLIC BLOOD PRESSURE: 128 MMHG | RESPIRATION RATE: 14 BRPM | HEART RATE: 70 BPM | TEMPERATURE: 98.1 F | HEIGHT: 62 IN | WEIGHT: 161 LBS

## 2018-10-25 DIAGNOSIS — E66.811 OBESITY, CLASS I, BMI 30-34.9: ICD-10-CM

## 2018-10-25 DIAGNOSIS — I10 HYPERTENSION GOAL BP (BLOOD PRESSURE) < 140/90: Primary | ICD-10-CM

## 2018-10-25 DIAGNOSIS — E11.9 TYPE 2 DIABETES MELLITUS WITHOUT COMPLICATION, WITHOUT LONG-TERM CURRENT USE OF INSULIN (H): ICD-10-CM

## 2018-10-25 DIAGNOSIS — Z80.3 FAMILY HISTORY OF BREAST CANCER IN SISTER: ICD-10-CM

## 2018-10-25 DIAGNOSIS — E78.5 HYPERLIPIDEMIA LDL GOAL <100: ICD-10-CM

## 2018-10-25 LAB
ALBUMIN SERPL-MCNC: 3.8 G/DL (ref 3.4–5)
ALP SERPL-CCNC: 86 U/L (ref 40–150)
ALT SERPL W P-5'-P-CCNC: 18 U/L (ref 0–50)
ANION GAP SERPL CALCULATED.3IONS-SCNC: 10 MMOL/L (ref 3–14)
AST SERPL W P-5'-P-CCNC: 16 U/L (ref 0–45)
BILIRUB SERPL-MCNC: 0.4 MG/DL (ref 0.2–1.3)
BUN SERPL-MCNC: 19 MG/DL (ref 7–30)
CALCIUM SERPL-MCNC: 9.2 MG/DL (ref 8.5–10.1)
CHLORIDE SERPL-SCNC: 103 MMOL/L (ref 94–109)
CHOLEST SERPL-MCNC: 215 MG/DL
CO2 SERPL-SCNC: 25 MMOL/L (ref 20–32)
CREAT SERPL-MCNC: 0.82 MG/DL (ref 0.52–1.04)
GFR SERPL CREATININE-BSD FRML MDRD: 67 ML/MIN/1.7M2
GLUCOSE SERPL-MCNC: 130 MG/DL (ref 70–99)
HBA1C MFR BLD: 6.7 % (ref 0–5.6)
HDLC SERPL-MCNC: 74 MG/DL
LDLC SERPL CALC-MCNC: 126 MG/DL
NONHDLC SERPL-MCNC: 141 MG/DL
POTASSIUM SERPL-SCNC: 4.3 MMOL/L (ref 3.4–5.3)
PROT SERPL-MCNC: 7.6 G/DL (ref 6.8–8.8)
SODIUM SERPL-SCNC: 138 MMOL/L (ref 133–144)
TRIGL SERPL-MCNC: 76 MG/DL

## 2018-10-25 PROCEDURE — 36415 COLL VENOUS BLD VENIPUNCTURE: CPT | Performed by: FAMILY MEDICINE

## 2018-10-25 PROCEDURE — 80053 COMPREHEN METABOLIC PANEL: CPT | Performed by: FAMILY MEDICINE

## 2018-10-25 PROCEDURE — 80061 LIPID PANEL: CPT | Performed by: FAMILY MEDICINE

## 2018-10-25 PROCEDURE — 99214 OFFICE O/P EST MOD 30 MIN: CPT | Performed by: FAMILY MEDICINE

## 2018-10-25 PROCEDURE — 83036 HEMOGLOBIN GLYCOSYLATED A1C: CPT | Performed by: FAMILY MEDICINE

## 2018-10-25 PROCEDURE — 99207 C FOOT EXAM  NO CHARGE: CPT | Mod: 25 | Performed by: FAMILY MEDICINE

## 2018-10-25 RX ORDER — METOPROLOL SUCCINATE 25 MG/1
12.5 TABLET, EXTENDED RELEASE ORAL DAILY
Qty: 45 TABLET | Refills: 3 | Status: SHIPPED | OUTPATIENT
Start: 2018-10-25 | End: 2019-11-22

## 2018-10-25 RX ORDER — SIMVASTATIN 10 MG
10 TABLET ORAL AT BEDTIME
Qty: 90 TABLET | Refills: 1
Start: 2018-10-25 | End: 2019-10-17

## 2018-10-25 RX ORDER — LOSARTAN POTASSIUM 50 MG/1
50 TABLET ORAL DAILY
Qty: 90 TABLET | Refills: 3 | Status: SHIPPED | OUTPATIENT
Start: 2018-10-25 | End: 2019-10-17

## 2018-10-25 NOTE — LETTER
[facility]  [address] [address]  [phone]        February 27, 2019    Kelly Barnes  [address] [facility] [address]              Dear Kelly Barnes    This is to remind you that your fasting lab is due.    You may call our office at [phone] to schedule an appointment.    Please disregard this notice if you have already had your labs drawn or made an appointment.        Sincerely,        Lea Lopez MD

## 2018-10-25 NOTE — PATIENT INSTRUCTIONS
Results for orders placed or performed in visit on 10/25/18   Hemoglobin A1c   Result Value Ref Range    Hemoglobin A1C 6.7 (H) 0 - 5.6 %      The 10-year ASCVD risk score (Abdiazizayush RAMIREZ Jr, et al., 2013) is: 44.4%    Values used to calculate the score:      Age: 78 years      Sex: Female      Is Non- : No      Diabetic: Yes      Tobacco smoker: No      Systolic Blood Pressure: 128 mmHg      Is BP treated: Yes      HDL Cholesterol: 67 mg/dL      Total Cholesterol: 215 mg/dL     Please start the statin!    Clinical referral to Mammography [facility] Appointment line [phone]

## 2018-10-25 NOTE — MR AVS SNAPSHOT
After Visit Summary   10/25/2018    Kelly Barnes    MRN: [id number]           Patient Information     Date Of Birth          1940        Visit Information        Provider Department      10/25/2018 9:40 AM Lea Lopez MD [facility]        Today's Diagnoses     Hypertension goal BP (blood pressure) < 140/90    -  1    Hyperlipidemia LDL goal <100        Type 2 diabetes mellitus without complication, without long-term current use of insulin (H)        Obesity, Class I, BMI 30-34.9        Essential hypertension with goal blood pressure less than 140/90          Care Instructions    Results for orders placed or performed in visit on 10/25/18   Hemoglobin A1c   Result Value Ref Range    Hemoglobin A1C 6.7 (H) 0 - 5.6 %      The 10-year ASCVD risk score (Abdiazizayush RAMIREZ Jr, et al., 2013) is: 44.4%    Values used to calculate the score:      Age: 78 years      Sex: Female      Is Non- : No      Diabetic: Yes      Tobacco smoker: No      Systolic Blood Pressure: 128 mmHg      Is BP treated: Yes      HDL Cholesterol: 67 mg/dL      Total Cholesterol: 215 mg/dL     Please start the statin!          Follow-ups after your visit        Who to contact     If you have questions or need follow up information about today's clinic visit or your schedule please contact [facility] directly at [phone].  Normal or non-critical lab and imaging results will be communicated to you by MyChart, letter or phone within 4 business days after the clinic has received the results. If you do not hear from us within 7 days, please contact the clinic through MyChart or phone. If you have a critical or abnormal lab result, we will notify you by phone as soon as possible.  Submit refill requests through [employer] or call your pharmacy and they will forward the refill request to us. Please allow 3 business days for your refill to be completed.          Additional  "Information About Your Visit        Care EveryWhere ID     This is your Care EveryWhere ID. This could be used by other organizations to access your [place] medical records  [id number]        Your Vitals Were     Pulse Temperature Respirations Height Pulse Oximetry BMI (Body Mass Index)    70 98.1  F (36.7  C) (Oral) 14 5' 2\" (1.575 m) 99% 29.45 kg/m2       Blood Pressure from Last 3 Encounters:   10/25/18 128/72   10/27/17 118/72   10/25/17 (!) 158/92    Weight from Last 3 Encounters:   10/25/18 161 lb (73 kg)   10/27/17 166 lb (75.3 kg)   05/09/17 165 lb (74.8 kg)              We Performed the Following     Albumin Random Urine Quantitative with Creat Ratio     Basic metabolic panel     C FOOT EXAM  NO CHARGE     Comprehensive metabolic panel     Hemoglobin A1c     Lipid panel reflex to direct LDL Fasting          Today's Medication Changes          These changes are accurate as of 10/25/18 10:26 AM.  If you have any questions, ask your nurse or doctor.               Stop taking these medicines if you haven't already. Please contact your care team if you have questions.     cyclobenzaprine 10 MG tablet   Commonly known as:  FLEXERIL                Where to get your medicines      These medications were sent to [facility] [address]  [address] [facility] [address]     Phone:  [phone]     blood glucose monitoring test strip    losartan 50 MG tablet    metFORMIN 500 MG tablet    metoprolol succinate 25 MG 24 hr tablet         Some of these will need a paper prescription and others can be bought over the counter.  Ask your nurse if you have questions.     You don't need a prescription for these medications     simvastatin 10 MG tablet                Primary Care Provider Office Phone # Fax #    Lea Lopez [phone] [address]  [facility] [address]        Equal Access to Services     CARTER CORONADO AH: Umair sinha " Mckenzie, waanjumda luqadaha, qaybta kaalmada myrna, anastasia sellers. So [facility] [phone].    ATENCIÓN: Si patricia null, tiene a whyte disposición servicios gratuitos de asistencia lingüística. Michelle al [phone].    We comply with applicable federal civil rights laws and [place] laws. We do not discriminate on the basis of race, color, national origin, age, disability, sex, sexual orientation, or gender identity.            Thank you!     Thank you for choosing [facility]  for your care. Our goal is always to provide you with excellent care. Hearing back from our patients is one way we can continue to improve our services. Please take a few minutes to complete the written survey that you may receive in the mail after your visit with us. Thank you!             Your Updated Medication List - Protect others around you: Learn how to safely use, store and throw away your medicines at www.disposemymeds.org.          This list is accurate as of 10/25/18 10:26 AM.  Always use your most recent med list.                   Brand Name Dispense Instructions for use Diagnosis    aspirin 81 MG tablet     100    ONE DAILY    Unspecified essential hypertension       * blood glucose monitor Kit     1 kit    1 kit daily.    Type 2 diabetes, HbA1c goal < 7% (H)       * blood glucose monitoring meter device kit     1 kit    Use to test blood sugar 3 times daily    DM type 2, goal A1C below 8.0       blood glucose monitoring test strip    ONETOUCH ULTRA    1 Box    Use to test blood sugar  daily or as directed. Dispense 1 box of 100 test strips, #3 refills.    Type 2 diabetes mellitus without complication, without long-term current use of insulin (H)       fluticasone 50 MCG/ACT spray    FLONASE    1 Package    Spray 2 sprays into both nostrils daily    Allergic rhinitis, cause unspecified       losartan 50 MG tablet    COZAAR    90 tablet    Take 1 tablet (50 mg) by mouth daily    Essential  hypertension with goal blood pressure less than 140/90       metFORMIN 500 MG tablet    GLUCOPHAGE    270 tablet    Take 2 tablets in the morning and one tablet at night    Type 2 diabetes mellitus without complication, without long-term current use of insulin (H)       metoprolol succinate 25 MG 24 hr tablet    TOPROL-XL    45 tablet    Take 0.5 tablets (12.5 mg) by mouth daily    Essential hypertension with goal blood pressure less than 140/90       * ONETOUCH LANCETS Misc           * ONETOUCH DELICA LANCETS 33G Misc     100 each    1 Device by In Vitro route 2 times daily Please schedule office appointment with labs, thank you! Dr. Lopez    Type 2 diabetes mellitus without complication, without long-term current use of insulin (H)       simvastatin 10 MG tablet    ZOCOR    90 tablet    Take 1 tablet (10 mg) by mouth At Bedtime    Hyperlipidemia LDL goal <100       * Notice:  This list has 4 medication(s) that are the same as other medications prescribed for you. Read the directions carefully, and ask your doctor or other care provider to review them with you.

## 2018-10-25 NOTE — LETTER
October 26, 2018      Kelly CHIRINOS Cameron  [address]  [facility] [address]        Dear ,    We are writing to inform you of your test results.    Hello!  It was a pleasure to see you in clinic!  Thank you for getting labs done.     The testing of your blood sugar, liver function and electrolytes was normal.  Kidney function is assessed by blood work that measures creatinine and calculates estimated GFR (glomerular filtration rate).  By current criteria you do have stage 3 chronic kidney disease as your eGFR is < 60.  This is not something that is urgent as your value has not changed much with time.    However, it does mean will monitor your labs (urine and blood tests) every 6-12 months and we will keep trying to make sure your blood pressure and cholesterol are at goal to keep your kidneys as healthy as possible.     Your HgA1C, also called glycosylated hemoglobin, which measures the level of sugar in your blood over the past few months, is at goal, which is great! Congratulations!    Your cholesterol is a little abnormal. Your total cholesterol and LDL are too high. As we discussed, starting a statin (lipid lowering medication) will reduce your chance of having a stroke or heart attack.  Please return to clinic in about 3 months to recheck your fasting cholesterol levels. The medication works best if you take it at night.  You can still have grapefruit daily if you like (just not 8 grapefruit at once!).    If you have any questions, please contact the clinic or schedule an appointment with me, thank you!    Resulted Orders   Hemoglobin A1c   Result Value Ref Range    Hemoglobin A1C 6.7 (H) 0 - 5.6 %      Comment:      Normal <5.7% Prediabetes 5.7-6.4%  Diabetes 6.5% or higher - adopted from ADA   consensus guidelines.     Comprehensive metabolic panel   Result Value Ref Range    Sodium 138 133 - 144 mmol/L    Potassium 4.3 3.4 - 5.3 mmol/L    Chloride 103 94 - 109 mmol/L    Carbon Dioxide 25 20 - 32  mmol/L    Anion Gap 10 3 - 14 mmol/L    Glucose 130 (H) 70 - 99 mg/dL      Comment:      Fasting specimen    Urea Nitrogen 19 7 - 30 mg/dL    Creatinine 0.82 0.52 - 1.04 mg/dL    GFR Estimate 67 >60 mL/min/1.7m2      Comment:      Non  GFR Calc    GFR Estimate If Black 82 >60 mL/min/1.7m2      Comment:       GFR Calc    Calcium 9.2 8.5 - 10.1 mg/dL    Bilirubin Total 0.4 0.2 - 1.3 mg/dL    Albumin 3.8 3.4 - 5.0 g/dL    Protein Total 7.6 6.8 - 8.8 g/dL    Alkaline Phosphatase 86 40 - 150 U/L    ALT 18 0 - 50 U/L    AST 16 0 - 45 U/L   Lipid panel reflex to direct LDL Fasting   Result Value Ref Range    Cholesterol 215 (H) <200 mg/dL      Comment:      Desirable:       <200 mg/dl    Triglycerides 76 <150 mg/dL      Comment:      Fasting specimen    HDL Cholesterol 74 >49 mg/dL    LDL Cholesterol Calculated 126 (H) <100 mg/dL      Comment:      Above desirable:  100-129 mg/dl  Borderline High:  130-159 mg/dL  High:             160-189 mg/dL  Very high:       >189 mg/dl      Non HDL Cholesterol 141 (H) <130 mg/dL      Comment:      Above Desirable:  130-159 mg/dl  Borderline high:  160-189 mg/dl  High:             190-219 mg/dl  Very high:       >219 mg/dl       If you have any questions or concerns, please call the clinic at the number listed above.     Sincerely,      Lea Lopez MD/nr

## 2018-10-25 NOTE — PROGRESS NOTES
Health Maintenance Due   Topic Date Due     DEXA Q2 YR  05/06/2012     FOOT EXAM Q1 YEAR  10/27/2018     EYE EXAM Q1 YEAR  10/27/2018          SUBJECTIVE:   Kelly Barnes is a 78 year old female who presents to clinic today for the following health issues:  Diabetes Follow-up    Patient is checking blood sugars: twice daily.  Results are as follows: 140-145 in am. <120 at noon    Diabetic concerns: None     Symptoms of hypoglycemia (low blood sugar): none     Paresthesias (numbness or burning in feet) or sores: No     Date of last diabetic eye exam: 04/2017    Diabetes Management Resources    Hyperlipidemia Follow-Up      Rate your low fat/cholesterol diet?: fair    Taking statin?  Yes, no muscle aches from statin    Other lipid medications/supplements?:  None  LDL Cholesterol Calculated   Date Value Ref Range Status   03/28/2017 131 (H) <100 mg/dL Final     Comment:     Above desirable:  100-129 mg/dl   Borderline High:  130-159 mg/dL   High:             160-189 mg/dL   Very high:       >189 mg/dl     The 10-year ASCVD risk score ([place] RAMIREZ Jr, et al., 2013) is: 44.4%    Values used to calculate the score:      Age: 78 years      Sex: Female      Is Non- : No      Diabetic: Yes      Tobacco smoker: No      Systolic Blood Pressure: 128 mmHg      Is BP treated: Yes      HDL Cholesterol: 67 mg/dL        Total Cholesterol: 215 mg/dL     Hypertension Follow-up      Outpatient blood pressures are being checked at home.  Results varies.    Low Salt Diet: no added salt  Wt Readings from Last 4 Encounters:   10/25/18 161 lb (73 kg)   10/27/17 166 lb (75.3 kg)   05/09/17 165 lb (74.8 kg)   03/28/17 166 lb (75.3 kg)         BP Readings from Last 2 Encounters:   10/25/18 128/72   10/27/17 118/72     Hemoglobin A1C (%)   Date Value   10/25/2018 6.7 (H)   10/26/2017 7.3 (H)     LDL Cholesterol Calculated (mg/dL)   Date Value   03/28/2017 131 (H)   10/27/2016 149 (H)     GFR Estimate   Date Value Ref Range  Status   03/28/2017 81 >60 mL/min/1.7m2 Final     Comment:     Non  GFR Calc   10/27/2016 68 >60 mL/min/1.7m2 Final     Comment:     Non  GFR Calc   03/29/2016 87 >60 mL/min/1.7m2 Final     Comment:     Non  GFR Calc       Amount of exercise or physical activity: patient walks on a daily basis    Problems taking medications regularly: No    Medication side effects: none    Diet: regular (no added salt, monitoring carbs)    Problem list and histories reviewed & adjusted, as indicated.  Additional history: as documented    Patient Active Problem List   Diagnosis     Allergic rhinitis     Osteopenia     Obesity     Hypertension goal BP (blood pressure) < 140/90     Hyperlipidemia LDL goal <100     Leg edema, left     Atypical chest pain     Dizzy spells     Obesity, Class I, BMI 30-34.9     Aortic stenosis     Type 2 diabetes mellitus without complication, without long-term current use of insulin (H)     Musculoskeletal chest pain     Nonrheumatic aortic valve stenosis     Past Surgical History:   Procedure Laterality Date     C NONSPECIFIC PROCEDURE      none       Social History   Substance Use Topics     Smoking status: Never Smoker     Smokeless tobacco: Never Used     Alcohol use 6.0 oz/week      Comment: 1-2 drinks per week     Family History   Problem Relation Age of Onset     Hypertension Mother      Diabetes No family hx of      Cerebrovascular Disease No family hx of      Breast Cancer No family hx of      Cancer - colorectal No family hx of      Prostate Cancer No family hx of          Current Outpatient Prescriptions   Medication Sig Dispense Refill     AMBIEN 10 MG tablet Take 1 tablet by mouth nightly as needed for sleep. at bedtime. 30 tablet 1     ASPIRIN 81 MG OR TABS ONE DAILY 100 3     blood glucose monitor KIT 1 kit daily. 1 kit 0     blood glucose monitoring (ONE TOUCH ULTRA 2) meter device kit Use to test blood sugar 3 times daily 1 kit 0     blood  glucose monitoring (ONE TOUCH ULTRA) test strip Use to test blood sugar  daily or as directed. Dispense 1 box of 100 test strips, #3 refills. 1 Box 3     fluticasone (FLONASE) 50 MCG/ACT nasal spray Spray 2 sprays into both nostrils daily 1 Package 1     glucose blood VI test strips strip by In Vitro route daily. 1 Box 12     losartan (COZAAR) 50 MG tablet Take 1 tablet (50 mg) by mouth daily 90 tablet 3     metFORMIN (GLUCOPHAGE) 500 MG tablet Take 2 tablets in the morning and one tablet at night 270 tablet 3     metoprolol (TOPROL-XL) 25 MG 24 hr tablet Take 0.5 tablets (12.5 mg) by mouth daily 45 tablet 3     ONE TOUCH LANCETS MISC        ONETOUCH DELICA LANCETS 33G MISC 1 Device by In Vitro route 2 times daily Please schedule office appointment with labs, thank you! Dr. Lopez 100 each 11     busPIRone (BUSPAR) 5 MG tablet Take 1 tablet (5 mg) by mouth 2 times daily As needed for chest pain (Patient not taking: Reported on 10/25/2018) 60 tablet 1     cyclobenzaprine (FLEXERIL) 10 MG tablet Take 0.5-1 tablets (5-10 mg) by mouth 3 times daily as needed for muscle spasms (Patient not taking: Reported on 10/25/2018) 30 tablet 1     simvastatin (ZOCOR) 10 MG tablet Take 1 tablet (10 mg) by mouth At Bedtime (Patient not taking: Reported on 10/25/2018) 90 tablet 1     [DISCONTINUED] losartan (COZAAR) 50 MG tablet Take 1 tablet (50 mg) by mouth daily 90 tablet 3     Allergies   Allergen Reactions     Ibuprofen      numbness in hands and feet     Recent Labs   Lab Test  10/25/18   0920  10/26/17   0808  03/28/17   0941  10/27/16   0825   03/04/16   0930   A1C  6.7*  7.3*  8.4*  7.5*   --    --    LDL   --    --   131*  149*   --   91   HDL   --    --   67  64   --   48*   TRIG   --    --   83  93   --   96   ALT   --    --   18  16   --   34   CR   --    --   0.70  0.82   < >  0.73   GFRESTIMATED   --    --   81  68   < >  77   GFRESTBLACK   --    --   >90   GFR Calc    83   < >  >90   GFR  "Calc     POTASSIUM   --    --   4.2  4.0   < >  4.5   TSH   --   1.52   --   1.26   --    --     < > = values in this interval not displayed.      BP Readings from Last 3 Encounters:   10/25/18 128/72   10/27/17 118/72   10/25/17 (!) 158/92    Wt Readings from Last 3 Encounters:   10/25/18 161 lb (73 kg)   10/27/17 166 lb (75.3 kg)   05/09/17 165 lb (74.8 kg)                    Reviewed and updated as needed this visit by clinical staff  Allergies  Meds  Problems       Reviewed and updated as needed this visit by Provider  Allergies  Meds  Problems         ROS:  Constitutional, HEENT, cardiovascular, pulmonary, GI, , musculoskeletal, neuro, skin, endocrine and psych systems are negative, except as otherwise noted.    OBJECTIVE:     /72 (BP Location: Right arm, Patient Position: Sitting, Cuff Size: Adult Regular)  Pulse 70  Temp 98.1  F (36.7  C) (Oral)  Resp 14  Ht 5' 2\" (1.575 m)  Wt 161 lb (73 kg)  SpO2 99%  BMI 29.45 kg/m2  Body mass index is 29.45 kg/(m^2).  GENERAL: healthy, alert and no distress  NECK: no adenopathy, no asymmetry, masses, or scars and thyroid normal to palpation  RESP: lungs clear to auscultation - no rales, rhonchi or wheezes  CV: regular rate and rhythm, normal S1 S2, no S3 or S4, no murmur, click or rub, no peripheral edema and peripheral pulses strong  MS: no gross musculoskeletal defects noted, no edema  NEURO: Normal strength and tone, mentation intact and speech normal  PSYCH: mentation appears normal, affect normal/bright  FOOT: Foot exam - both sides normal; no swelling, tenderness or skin or vascular lesions. Color and temperature is normal. Sensation is intact. Peripheral pulses are palpable. Toenails are normal.      Diagnostic Test Results:  Results for orders placed or performed in visit on 10/25/18 (from the past 24 hour(s))   Hemoglobin A1c   Result Value Ref Range    Hemoglobin A1C 6.7 (H) 0 - 5.6 %   Comprehensive metabolic panel   Result Value Ref Range "    Sodium 138 133 - 144 mmol/L    Potassium 4.3 3.4 - 5.3 mmol/L    Chloride 103 94 - 109 mmol/L    Carbon Dioxide 25 20 - 32 mmol/L    Anion Gap 10 3 - 14 mmol/L    Glucose 130 (H) 70 - 99 mg/dL    Urea Nitrogen 19 7 - 30 mg/dL    Creatinine 0.82 0.52 - 1.04 mg/dL    GFR Estimate 67 >60 mL/min/1.7m2    GFR Estimate If Black 82 >60 mL/min/1.7m2    Calcium 9.2 8.5 - 10.1 mg/dL    Bilirubin Total 0.4 0.2 - 1.3 mg/dL    Albumin 3.8 3.4 - 5.0 g/dL    Protein Total 7.6 6.8 - 8.8 g/dL    Alkaline Phosphatase 86 40 - 150 U/L    ALT 18 0 - 50 U/L    AST 16 0 - 45 U/L   Lipid panel reflex to direct LDL Fasting   Result Value Ref Range    Cholesterol 215 (H) <200 mg/dL    Triglycerides 76 <150 mg/dL    HDL Cholesterol 74 >49 mg/dL    LDL Cholesterol Calculated 126 (H) <100 mg/dL    Non HDL Cholesterol 141 (H) <130 mg/dL       ASSESSMENT/PLAN:       1. Hypertension goal BP (blood pressure) < 140/90  BP Readings from Last 3 Encounters:   10/25/18 128/72   10/27/17 118/72   10/25/17 (!) 158/92    at goal  The current medical regimen is effective;  continue present plan and medications.   - Comprehensive metabolic panel  - Albumin Random Urine Quantitative with Creat Ratio; Future    - losartan (COZAAR) 50 MG tablet; Take 1 tablet (50 mg) by mouth daily  Dispense: 90 tablet; Refill: 3  - metoprolol succinate (TOPROL-XL) 25 MG 24 hr tablet; Take 0.5 tablets (12.5 mg) by mouth daily  Dispense: 45 tablet; Refill: 3    2. Hyperlipidemia LDL goal <100  LDL Cholesterol Calculated   Date Value Ref Range Status   10/25/2018 126 (H) <100 mg/dL Final     Comment:     Above desirable:  100-129 mg/dl  Borderline High:  130-159 mg/dL  High:             160-189 mg/dL  Very high:       >189 mg/dl      not at goal, strongly encouraged taking statin given elevated ASCVD risk, I discussed this with patient and she agreed for stroke and CAD prevention  Recheck lipids in 3 months  - Comprehensive metabolic panel  - Lipid panel reflex to direct LDL  Fasting  - simvastatin (ZOCOR) 10 MG tablet; Take 1 tablet (10 mg) by mouth At Bedtime  Dispense: 90 tablet; Refill: 1    3. Type 2 diabetes mellitus without complication, without long-term current use of insulin (H)  At goal!  - Hemoglobin A1c  - Comprehensive metabolic panel  - C FOOT EXAM  NO CHARGE  - metFORMIN (GLUCOPHAGE) 500 MG tablet; Take 2 tablets in the morning and one tablet at night  Dispense: 270 tablet; Refill: 3  - blood glucose monitoring (ONETOUCH ULTRA) test strip; Use to test blood sugar  daily or as directed. Dispense 1 box of 100 test strips, #3 refills.  Dispense: 1 Box; Refill: 3    4. Obesity, Class I, BMI 30-34.9  Wt Readings from Last 4 Encounters:   10/25/18 161 lb (73 kg)   10/27/17 166 lb (75.3 kg)   05/09/17 165 lb (74.8 kg)   03/28/17 166 lb (75.3 kg)     Losing weight, great job!    5. Family history of breast cancer in sister  Ongoing mammogram screening  - MA Screening Digital Bilateral; Future    Follow up annually    Lea Lopez MD  [facility] [place]

## 2018-10-25 NOTE — PROGRESS NOTES
Patient was seen today in clinic.  I discussed results in clinic, please see clinic progress note.    Lea Lopez 10/25/2018

## 2018-10-26 NOTE — PROGRESS NOTES
Hello!  It was a pleasure to see you in clinic!  Thank you for getting labs done.     The testing of your blood sugar, liver function and electrolytes was normal.  Kidney function is assessed by blood work that measures creatinine and calculates estimated GFR (glomerular filtration rate).  By current criteria you do have stage 3 chronic kidney disease as your eGFR is < 60.  This is not something that is urgent as your value has not changed much with time.    However, it does mean will monitor your labs (urine and blood tests) every 6-12 months and we will keep trying to make sure your blood pressure and cholesterol are at goal to keep your kidneys as healthy as possible.     Your HgA1C, also called glycosylated hemoglobin, which measures the level of sugar in your blood over the past few months, is at goal, which is great! Congratulations!    Your cholesterol is a little abnormal. Your total cholesterol and LDL are too high. As we discussed, starting a statin (lipid lowering medication) will reduce your chance of having a stroke or heart attack.  Please return to clinic in about 3 months to recheck your fasting cholesterol levels. The medication works best if you take it at night.  You can still have grapefruit daily if you like (just not 8 grapefruit at once!).    If you have any questions, please contact the clinic or schedule an appointment with me, thank you!    Sincerely,  Dr. Lea Lopez MD  10/26/2018

## 2018-10-26 NOTE — PROGRESS NOTES
Strep culture neg, consistent with result given in clinic.  See progress note. No follow up needed.  Lea Lopez 10/26/2018

## 2019-04-01 ENCOUNTER — TELEPHONE (OUTPATIENT)
Dept: FAMILY MEDICINE | Facility: CLINIC | Age: 79
End: 2019-04-01

## 2019-04-01 NOTE — TELEPHONE ENCOUNTER
Labs on 10/25/18 have  and letter was sent on 19.    Please close (not just done) this encounter if nothing more needs to be done with it.    Thanks,  lab

## 2019-07-03 ENCOUNTER — OFFICE VISIT (OUTPATIENT)
Dept: URGENT CARE | Facility: URGENT CARE | Age: 79
End: 2019-07-03
Payer: COMMERCIAL

## 2019-07-03 VITALS
OXYGEN SATURATION: 96 % | TEMPERATURE: 102.2 F | DIASTOLIC BLOOD PRESSURE: 78 MMHG | HEIGHT: 62 IN | BODY MASS INDEX: 30 KG/M2 | SYSTOLIC BLOOD PRESSURE: 142 MMHG | WEIGHT: 163 LBS | HEART RATE: 83 BPM

## 2019-07-03 DIAGNOSIS — N10 ACUTE PYELONEPHRITIS: ICD-10-CM

## 2019-07-03 DIAGNOSIS — R82.90 NONSPECIFIC FINDING ON EXAMINATION OF URINE: ICD-10-CM

## 2019-07-03 DIAGNOSIS — R30.0 DYSURIA: ICD-10-CM

## 2019-07-03 LAB
ALBUMIN UR-MCNC: 100 MG/DL
APPEARANCE UR: ABNORMAL
BACTERIA #/AREA URNS HPF: ABNORMAL /HPF
BASOPHILS # BLD AUTO: 0 10E9/L (ref 0–0.2)
BASOPHILS NFR BLD AUTO: 0.2 %
BILIRUB UR QL STRIP: NEGATIVE
COLOR UR AUTO: YELLOW
DIFFERENTIAL METHOD BLD: ABNORMAL
EOSINOPHIL # BLD AUTO: 0.1 10E9/L (ref 0–0.7)
EOSINOPHIL NFR BLD AUTO: 1.1 %
ERYTHROCYTE [DISTWIDTH] IN BLOOD BY AUTOMATED COUNT: 13.2 % (ref 10–15)
GLUCOSE UR STRIP-MCNC: NEGATIVE MG/DL
HCT VFR BLD AUTO: 39.3 % (ref 35–47)
HGB BLD-MCNC: 13.2 G/DL (ref 11.7–15.7)
HGB UR QL STRIP: ABNORMAL
KETONES UR STRIP-MCNC: NEGATIVE MG/DL
LEUKOCYTE ESTERASE UR QL STRIP: ABNORMAL
LYMPHOCYTES # BLD AUTO: 0.8 10E9/L (ref 0.8–5.3)
LYMPHOCYTES NFR BLD AUTO: 7 %
MCH RBC QN AUTO: 28.4 PG (ref 26.5–33)
MCHC RBC AUTO-ENTMCNC: 33.6 G/DL (ref 31.5–36.5)
MCV RBC AUTO: 85 FL (ref 78–100)
MONOCYTES # BLD AUTO: 0.8 10E9/L (ref 0–1.3)
MONOCYTES NFR BLD AUTO: 6.7 %
NEUTROPHILS # BLD AUTO: 9.6 10E9/L (ref 1.6–8.3)
NEUTROPHILS NFR BLD AUTO: 85 %
NITRATE UR QL: POSITIVE
NON-SQ EPI CELLS #/AREA URNS LPF: ABNORMAL /LPF
PH UR STRIP: 5.5 PH (ref 5–7)
PLATELET # BLD AUTO: 288 10E9/L (ref 150–450)
RBC # BLD AUTO: 4.64 10E12/L (ref 3.8–5.2)
RBC #/AREA URNS AUTO: ABNORMAL /HPF
SOURCE: ABNORMAL
SP GR UR STRIP: 1.02 (ref 1–1.03)
UROBILINOGEN UR STRIP-ACNC: 0.2 EU/DL (ref 0.2–1)
WBC # BLD AUTO: 11.3 10E9/L (ref 4–11)
WBC #/AREA URNS AUTO: ABNORMAL /HPF

## 2019-07-03 PROCEDURE — 85025 COMPLETE CBC W/AUTO DIFF WBC: CPT | Performed by: PHYSICIAN ASSISTANT

## 2019-07-03 PROCEDURE — 99214 OFFICE O/P EST MOD 30 MIN: CPT | Mod: 25 | Performed by: PHYSICIAN ASSISTANT

## 2019-07-03 PROCEDURE — 87086 URINE CULTURE/COLONY COUNT: CPT | Performed by: INTERNAL MEDICINE

## 2019-07-03 PROCEDURE — 87088 URINE BACTERIA CULTURE: CPT | Performed by: INTERNAL MEDICINE

## 2019-07-03 PROCEDURE — 36415 COLL VENOUS BLD VENIPUNCTURE: CPT | Performed by: PHYSICIAN ASSISTANT

## 2019-07-03 PROCEDURE — 96372 THER/PROPH/DIAG INJ SC/IM: CPT | Performed by: PHYSICIAN ASSISTANT

## 2019-07-03 PROCEDURE — 87186 SC STD MICRODIL/AGAR DIL: CPT | Performed by: INTERNAL MEDICINE

## 2019-07-03 PROCEDURE — 81001 URINALYSIS AUTO W/SCOPE: CPT | Performed by: INTERNAL MEDICINE

## 2019-07-03 RX ORDER — ACETAMINOPHEN 325 MG/1
325 TABLET ORAL ONCE
Status: COMPLETED | OUTPATIENT
Start: 2019-07-03 | End: 2019-07-03

## 2019-07-03 RX ORDER — ACETAMINOPHEN 500 MG
500 TABLET ORAL ONCE
Status: DISCONTINUED | OUTPATIENT
Start: 2019-07-03 | End: 2019-07-03

## 2019-07-03 RX ORDER — CEFTRIAXONE SODIUM 1 G
1 VIAL (EA) INJECTION ONCE
Status: COMPLETED | OUTPATIENT
Start: 2019-07-03 | End: 2019-07-03

## 2019-07-03 RX ORDER — CIPROFLOXACIN 500 MG/1
500 TABLET, FILM COATED ORAL 2 TIMES DAILY
Qty: 20 TABLET | Refills: 0 | Status: SHIPPED | OUTPATIENT
Start: 2019-07-03 | End: 2019-10-15

## 2019-07-03 RX ADMIN — ACETAMINOPHEN 325 MG: 325 TABLET ORAL at 18:00

## 2019-07-03 RX ADMIN — Medication 1 G: at 18:00

## 2019-07-03 ASSESSMENT — ENCOUNTER SYMPTOMS
BACK PAIN: 0
SHORTNESS OF BREATH: 0
FREQUENCY: 1
DYSURIA: 0
FLANK PAIN: 0
HEMATURIA: 0
VOMITING: 0
FEVER: 1
COUGH: 0
HEADACHES: 0
CHILLS: 1
NAUSEA: 0
ABDOMINAL PAIN: 0
SORE THROAT: 0

## 2019-07-03 ASSESSMENT — MIFFLIN-ST. JEOR: SCORE: 1172.61

## 2019-07-03 NOTE — PROGRESS NOTES
SUBJECTIVE:   Kelly Barnes is a 79 year old female presenting with a chief complaint of   Chief Complaint   Patient presents with     Urgent Care     Fever     Started feeling under the weather last night; noon today realized she had fever: 100.7 orally.  Notes also has urinary frequency.   .    UTI    Onset of symptoms was 1day(s).  Course of illness is worsening  Severity moderately severe  Current and associated symptoms  Fatigue began last night  Measured fever today at noon- 100.7F. Was 102F in clinic today.  States she had shaking chills in the waiting room- had to buy a bottle of tylenol at the pharmacy for this and took a tylenol in the waiting room.  Admits to urinary frequency. No painful urination. No hematuria.  No nausea, vomiting. No abdominal pain. No flank pain.   No cough, runny nose. No rashes.  Treatment and measures tried OTC advil or tylenol   Predisposing factors include   Occasional UTIs  She is diabetic and has HTN  No known history of CKD - last creatinine 0.82 on 10/25/2018    Review of Systems   Constitutional: Positive for chills, fever and malaise/fatigue.   HENT: Negative for sore throat.    Respiratory: Negative for cough and shortness of breath.    Cardiovascular: Negative for chest pain.   Gastrointestinal: Negative for abdominal pain, nausea and vomiting.   Genitourinary: Positive for frequency. Negative for dysuria, flank pain and hematuria.   Musculoskeletal: Negative for back pain.   Skin: Negative for rash.   Neurological: Negative for headaches (mild).         PMH:  Past Medical History:   Diagnosis Date     Allergic rhinitis, cause unspecified      Aortic stenosis 2/29/2016    With new murmur noted 2/2016, normal echo, repeat echo 2/2017.     Atypical chest pain 7/24/2015     cuboid     left foot     Hyperlipidemia LDL goal <100 11/1/2012     Hypertension goal BP (blood pressure) < 140/90      Musculoskeletal chest pain 11/25/2016     Obesity, Class I, BMI 30-34.9  11/11/2015     Pneumonia 3/16     Type 2 diabetes, HbA1c goal < 7% (H)      Patient Active Problem List   Diagnosis     Allergic rhinitis     Osteopenia     Obesity     Hypertension goal BP (blood pressure) < 140/90     Hyperlipidemia LDL goal <100     Leg edema, left     Atypical chest pain     Dizzy spells     Obesity, Class I, BMI 30-34.9     Aortic stenosis     Type 2 diabetes mellitus without complication, without long-term current use of insulin (H)     Musculoskeletal chest pain     Nonrheumatic aortic valve stenosis     Family history of breast cancer in sister         Current Medications:  Current Outpatient Medications   Medication Sig Dispense Refill     ASPIRIN 81 MG OR TABS ONE DAILY 100 3     blood glucose monitor KIT 1 kit daily. 1 kit 0     blood glucose monitoring (ONE TOUCH ULTRA 2) meter device kit Use to test blood sugar 3 times daily 1 kit 0     blood glucose monitoring (ONETOUCH ULTRA) test strip Use to test blood sugar  daily or as directed. Dispense 1 box of 100 test strips, #3 refills. 1 Box 3     ciprofloxacin (CIPRO) 500 MG tablet Take 1 tablet (500 mg) by mouth 2 times daily for 10 days 20 tablet 0     losartan (COZAAR) 50 MG tablet Take 1 tablet (50 mg) by mouth daily 90 tablet 3     metFORMIN (GLUCOPHAGE) 500 MG tablet Take 2 tablets in the morning and one tablet at night 270 tablet 3     metoprolol succinate (TOPROL-XL) 25 MG 24 hr tablet Take 0.5 tablets (12.5 mg) by mouth daily 45 tablet 3     ONE TOUCH LANCETS MISC        ONETOUCH DELICA LANCETS 33G MISC 1 Device by In Vitro route 2 times daily Please schedule office appointment with labs, thank you! Dr. Lopez 100 each 11     fluticasone (FLONASE) 50 MCG/ACT nasal spray Spray 2 sprays into both nostrils daily (Patient not taking: Reported on 7/3/2019) 1 Package 1     simvastatin (ZOCOR) 10 MG tablet Take 1 tablet (10 mg) by mouth At Bedtime (Patient not taking: Reported on 7/3/2019) 90 tablet 1       Surgical History:  Past  "Surgical History:   Procedure Laterality Date     C NONSPECIFIC PROCEDURE      none       Family History:  Family History   Problem Relation Age of Onset     Hypertension Mother      Diabetes No family hx of      Cerebrovascular Disease No family hx of      Breast Cancer No family hx of      Cancer - colorectal No family hx of      Prostate Cancer No family hx of        Social History:  Social History     Tobacco Use     Smoking status: Never Smoker     Smokeless tobacco: Never Used   Substance Use Topics     Alcohol use: Yes     Alcohol/week: 6.0 oz     Comment: 1-2 drinks per week       OBJECTIVE:  Vitals:    07/03/19 1712   BP: 142/78   Pulse: 83   Temp: 102  F (38.9  C)   TempSrc: Oral   SpO2: 96%   Weight: 73.9 kg (163 lb)   Height: 1.575 m (5' 2\")         General: alert, appears comfortable, NAD. Febrile. Appears nontoxic, is talkative.  EENT: Oropharynx: MMM.   Respiratory: No distress. Lungs CTAB.  Cardiovascular:RRR. No murmurs, clicks gallops or rub.   Gastrointestinal: Abdomen soft, nontender. BS normal. No masses, organomegaly.   : no CVA tenderness.  Neurologic: Follows commands. Gait normal.   Psychiatric: mentation appears normal and affect normal/bright         Labs:  Results for orders placed or performed in visit on 07/03/19 (from the past 24 hour(s))   *UA reflex to Microscopic and Culture ([facility] (except [place] and [place])   Result Value Ref Range    Color Urine Yellow     Appearance Urine Cloudy     Glucose Urine Negative NEG^Negative mg/dL    Bilirubin Urine Negative NEG^Negative    Ketones Urine Negative NEG^Negative mg/dL    Specific Gravity Urine 1.020 1.003 - 1.035    Blood Urine Large (A) NEG^Negative    pH Urine 5.5 5.0 - 7.0 pH    Protein Albumin Urine 100 (A) NEG^Negative mg/dL    Urobilinogen Urine 0.2 0.2 - 1.0 EU/dL    Nitrite Urine Positive (A) NEG^Negative    Leukocyte Esterase Urine Large (A) NEG^Negative    Source Midstream Urine    Urine Microscopic "   Result Value Ref Range    WBC Urine 25-50 (A) OTO5^0 - 5 /HPF    RBC Urine 25-50 (A) OTO2^O - 2 /HPF    Squamous Epithelial /LPF Urine Moderate (A) FEW^Few /LPF    Bacteria Urine Many (A) NEG^Negative /HPF   CBC with platelets differential   Result Value Ref Range    WBC 11.3 (H) 4.0 - 11.0 10e9/L    RBC Count 4.64 3.8 - 5.2 10e12/L    Hemoglobin 13.2 11.7 - 15.7 g/dL    Hematocrit 39.3 35.0 - 47.0 %    MCV 85 78 - 100 fl    MCH 28.4 26.5 - 33.0 pg    MCHC 33.6 31.5 - 36.5 g/dL    RDW 13.2 10.0 - 15.0 %    Platelet Count 288 150 - 450 10e9/L    % Neutrophils 85.0 %    % Lymphocytes 7.0 %    % Monocytes 6.7 %    % Eosinophils 1.1 %    % Basophils 0.2 %    Absolute Neutrophil 9.6 (H) 1.6 - 8.3 10e9/L    Absolute Lymphocytes 0.8 0.8 - 5.3 10e9/L    Absolute Monocytes 0.8 0.0 - 1.3 10e9/L    Absolute Eosinophils 0.1 0.0 - 0.7 10e9/L    Absolute Basophils 0.0 0.0 - 0.2 10e9/L    Diff Method Automated Method        X-Ray was not done.      ASSESSMENT/PLAN:    ICD-10-CM    1. Acute pyelonephritis N10 CBC with platelets differential     cefTRIAXone (ROCEPHIN) injection 1 g     ciprofloxacin (CIPRO) 500 MG tablet     acetaminophen (TYLENOL) tablet 325 mg     DISCONTINUED: acetaminophen (TYLENOL) tablet 500 mg   2. Dysuria R30.0 *UA reflex to Microscopic and Culture ([facility] (except [place] and [place])     Urine Microscopic   3. Nonspecific finding on examination of urine R82.90 Urine Culture Aerobic Bacterial           Medical Decision Making:        Serious Comorbid Conditions: T2DM- well controlled, HTN- well controlled    Differential Diagnosis:   Patient with fever at 102F upon presentation today. She took Tylenol in the waiting room. Upon recheck, temperature was 102F still. Thus, we did administer another 500mg   Patient appeared remarkably well upon exam today, non toxic, and other VS were WNL. Patient with symptom of urinary frequency. Urine was dirty today- positive nitrites, 25-50 WBCs,  "25-50 RBCs, bacteria. I highly doubt urosepsis today based on her general well appearance, and the only SIRS criteria she is meeting is fever.   I obtained CBC with diff today for tracking purposes. CBC showed leukocytosis at 11.3 with neutrophils at 9.3.   I ordered for ceftriaxone 1g which was given in clinic today.     Cipro prescribed for pyelonephritis. Recommend patient follow up for recheck in 1-2 days (likely Friday, given that tomorrow is a holiday).  Discussed if worsening, or persistent fever tomorrow, she should be seen in the ER immediately.       At the end of the encounter, I discussed all available results, as well as the diagnosis and any associated medications. I discussed red flags for immediate return to clinic/ER, as well as indications for follow up. Refer to patient instructions below, which were all addressed with patient. Patient understood and agreed to plan. Patient was appropriate for discharge.      Patient Instructions   We gave a dose of antibiotics in shot form today.  Start taking the oral antibiotic today. Take Cipro twice daily x 10 days.  Your white blood cell count was mildly elevated today. Recommend that you follow up with your primary care provider in 1-2 days for a recheck on how you are doing, and they may repeat blood counts at that time if they would like to.    If you do not have improvement in fever in the next 12 hours, or if you start feeling worse, develop nausea/vomiting, abdominal pain, or any other new, concerning symptoms, go to the ER immediately.    Patient Education     Kidney Infection (Adult Female)    An infection in one or both kidneys is called \"pyelonephritis.\" It usually happens when bacteria (or rarely, viruses, fungi, or other disease-causing organisms) get into the kidney. The bacteria (or other disease-causing organisms) can enter the kidneys from the bladder or blood traveling from other parts of the body. A kidney infection can become serious. It " can cause severe illness, scarring of the kidneys, or kidney failure if not treated properly.  Common causes for this problem include:    Not keeping the genital area clean and dry, which promotes the growth of bacteria    Wiping back to front which drags bacteria from the rectum toward the urinary opening (urethra)    Wearing tight pants or underwear (this lets moisture build up in the genital area, which helps bacteria grow)    Holding urine in for long periods of time    Dehydration  Kidney infections can cause symptoms similar to a bladder infection. Symptoms include:    Pain (or burning) when urinating    Having to urinate more often than usual    Blood in the urine (pink or red)    Abdominal pain or discomfort, usually in the lower abdomen    Pain in the side or back    Pain above the pubic bone    Fever or chills    Vomiting    Loss of appetite  Treatment is oral antibiotics, or in more severe cases, intramuscular or IV antibiotics. These are started right away and may be changed once urine culture results determine the infecting organisms. Treatment helps prevent a more serious kidney infection.  Medicines  Medicines can help in the treatment of a bladder infection:    Take antibiotics until they are used up, even if you feel better. It is important to finish them to make sure the infection is gone.    Unless another medicine was prescribed, you can use over-the-counter medicines for pain, fever, or discomfort. If you have chronic liver of kidney disease, talk with your healthcare provider before using these medicines. Also talk with your provider if you've ever had a stomach ulcer or gastrointestinal (GI) bleeding, or are taking blood thinners.  Home care  The following are general care guidelines:    Stay home from work or school. Rest in bed until your fever breaks and you are feeling better, or as advised by your healthcare provider.    Drink lots of fluid unless you must restrict fluids for other  medical reasons. This will force the medicine into your urinary system and flush the bacteria out of your body. Ask your healthcare provider how much you should drink.    Don't have sex until you have finished all of your medicine and your symptoms are gone.    Don't have caffeine, alcohol, or spicy foods. These foods may irritate the kidneys and bladder.    Don't take bubble baths. Sensitivity to the chemicals in bubble baths can irritate the urethra.    Make sure you wipe from front to back after using the toilet.    Wear loose cloths and cotton underwear.  Prevention  These self-care steps can help prevent future infections:    Drink plenty of fluids to prevent dehydration and flush out the bladder. Do this unless you must restrict fluids for other health reasons, or your healthcare provider told you not to.    Proper cleaning after going to the bathroom in important. Make sure you wipe from front to back after using the toilet.    Urinate more often. Don't try to hold urine in for a long time.    Don't wear tight-fitting pants and underwear.    Improve your diet to prevent constipation. Eat more fruits, vegetables, and fiber. Eat less junk and fatty foods. Constipation can make a urinary tract infection more likely. Talk with your healthcare provider if you have trouble with bowel movements.    Urinate right after intercourse to flush out the bladder.  Follow-up care  Follow up with your healthcare provider, or as advised. Additional testing may be needed to make sure the infection has cleared. Close follow-up and further testing is very important to find the cause and to prevent future infections.  If a urine culture was done, you will be contacted if your treatment needs to be changed. If directed, you may call to find out the results.  If you had an X-ray, CT scan, or other diagnostic test, you will be notified of any new findings that may affect your care.  Call 911  Call 911 if any of the following  occur:    Trouble breathing    Fainting or loss of consciousness    Rapid or very slow heart rate    Weakness, dizziness, or fainting    Difficulty arousing or confusion  When to seek medical advice  Call your healthcare provider right away if any of these occur:    Fever 100.4 F (38 C) or higher, or as directed by your healthcare provider    Not feeling better within 1 to 2 days after starting antibiotics    Any symptom that continues after 3 days of treatment    Increasing pain in the stomach, back, side, or groin area    Repeated vomiting    Not able to take prescribed medicine due to nausea or another reason    Bloody, dark-colored, or foul smelling urine    Trouble urinating or decreased urine output    No urine for 8 hours, no tears when crying, sunken eyes, or dry mouth  Date Last Reviewed: 10/1/2016    3543-1917 [employer]. [address] [place], [place] [address]. All rights reserved. This information is not intended as a substitute for professional medical care. Always follow your healthcare professional's instructions.                     Aneta Rangel PA-C

## 2019-07-04 NOTE — NURSING NOTE
Prior to injection, verified patient identity using patient's name and date of birth.  Due to injection administration, patient instructed to remain in clinic for 15 minutes  afterwards, and to report any adverse reaction to me immediately.    Rocephin    Drug Amount Wasted:  None.  Vial/Syringe: Single dose vial  Expiration Date:  11/20  dank chu

## 2019-07-05 RX ORDER — SULFAMETHOXAZOLE/TRIMETHOPRIM 800-160 MG
1 TABLET ORAL 2 TIMES DAILY
Qty: 20 TABLET | Refills: 0 | Status: SHIPPED | OUTPATIENT
Start: 2019-07-05 | End: 2019-10-15

## 2019-07-05 NOTE — RESULT ENCOUNTER NOTE
LM for pt I will call back but we should switch to septra as it is resistant to cipro though sens to rocephin which she was given in clinic

## 2019-07-06 LAB
BACTERIA SPEC CULT: ABNORMAL
BACTERIA SPEC CULT: ABNORMAL
SPECIMEN SOURCE: ABNORMAL

## 2019-08-19 DIAGNOSIS — E78.5 HYPERLIPIDEMIA LDL GOAL <100: ICD-10-CM

## 2019-08-19 NOTE — TELEPHONE ENCOUNTER
"Requested Prescriptions   Pending Prescriptions Disp Refills     simvastatin (ZOCOR) 10 MG tablet [Pharmacy Med Name: SIMVASTATIN 10MG TABS] 90 tablet 1     Sig: TAKE ONE TABLET BY MOUTH AT BEDTIME  Last Written Prescription Date:  10/25/2018  Last Fill Quantity: 90 tablet,  # refills: 1   Last Office Visit: 10/25/2018   Future Office Visit:            Statins Protocol Passed - 8/19/2019  2:17 PM        Passed - LDL on file in past 12 months     Recent Labs   Lab Test 10/25/18  0920   *           Passed - No abnormal creatine kinase in past 12 months     No lab results found.           Passed - Recent (12 mo) or future (30 days) visit within the authorizing provider's specialty     Patient had office visit in the last 12 months or has a visit in the next 30 days with authorizing provider or within the authorizing provider's specialty.  See \"Patient Info\" tab in inbasket, or \"Choose Columns\" in Meds & Orders section of the refill encounter.            Passed - Medication is active on med list        Passed - Patient is age 18 or older        Passed - No active pregnancy on record        Passed - No positive pregnancy test in past 12 months          "

## 2019-08-21 RX ORDER — SIMVASTATIN 10 MG
TABLET ORAL
Qty: 90 TABLET | Refills: 0 | Status: SHIPPED | OUTPATIENT
Start: 2019-08-21 | End: 2019-10-17

## 2019-08-21 NOTE — TELEPHONE ENCOUNTER
LOV: 10/25/2018    Patient needs FLP in October and f/u office visit.     Thanks! Allyn Nassar RN

## 2019-09-13 ENCOUNTER — TELEPHONE (OUTPATIENT)
Dept: FAMILY MEDICINE | Facility: CLINIC | Age: 79
End: 2019-09-13

## 2019-09-13 DIAGNOSIS — Z13.29 SCREENING FOR THYROID DISORDER: ICD-10-CM

## 2019-09-13 DIAGNOSIS — E11.9 TYPE 2 DIABETES MELLITUS WITHOUT COMPLICATION, WITHOUT LONG-TERM CURRENT USE OF INSULIN (H): Primary | ICD-10-CM

## 2019-09-13 NOTE — TELEPHONE ENCOUNTER
Reason for Call: Request for an order or referral:    Order or referral being requested: orders    Date needed: before my next appointment    Has the patient been seen by the PCP for this problem? YES    Additional comments: contacted patient for making a follow up hypertension visit. Appointment has been made for 10-15-19. Patient would like to come in a week before to do any lab work that you will be wanting to have done. Please put orders in so she can go a head and make that lab only appointment so you will have the results when she is seen     Phone number Patient can be reached at:  Home number on file [phone] (home)    Best Time:  any    Can we leave a detailed message on this number?  YES    Call taken on 9/13/2019 at 3:10 PM by Mayte Joseph

## 2019-09-13 NOTE — TELEPHONE ENCOUNTER
Orders placed for patient. All labs r/t DMII and Thyroid as well if needed.     Thanks! Allyn Nassar RN

## 2019-10-15 ENCOUNTER — OFFICE VISIT (OUTPATIENT)
Dept: FAMILY MEDICINE | Facility: CLINIC | Age: 79
End: 2019-10-15
Payer: COMMERCIAL

## 2019-10-15 VITALS
SYSTOLIC BLOOD PRESSURE: 130 MMHG | WEIGHT: 167 LBS | DIASTOLIC BLOOD PRESSURE: 70 MMHG | OXYGEN SATURATION: 97 % | TEMPERATURE: 98.6 F | HEART RATE: 69 BPM | BODY MASS INDEX: 30.54 KG/M2

## 2019-10-15 DIAGNOSIS — E11.9 TYPE 2 DIABETES MELLITUS WITHOUT COMPLICATION, WITHOUT LONG-TERM CURRENT USE OF INSULIN (H): ICD-10-CM

## 2019-10-15 DIAGNOSIS — M79.89 LEFT LEG SWELLING: ICD-10-CM

## 2019-10-15 DIAGNOSIS — E78.5 HYPERLIPIDEMIA LDL GOAL <100: ICD-10-CM

## 2019-10-15 DIAGNOSIS — I10 HYPERTENSION GOAL BP (BLOOD PRESSURE) < 140/90: Primary | ICD-10-CM

## 2019-10-15 DIAGNOSIS — Z23 NEED FOR PROPHYLACTIC VACCINATION AND INOCULATION AGAINST INFLUENZA: ICD-10-CM

## 2019-10-15 DIAGNOSIS — Z78.9 HEPATITIS A IMMUNE: ICD-10-CM

## 2019-10-15 DIAGNOSIS — M85.89 OSTEOPENIA OF MULTIPLE SITES: ICD-10-CM

## 2019-10-15 LAB
ALBUMIN SERPL-MCNC: 3.8 G/DL (ref 3.4–5)
ALP SERPL-CCNC: 86 U/L (ref 40–150)
ALT SERPL W P-5'-P-CCNC: 21 U/L (ref 0–50)
ANION GAP SERPL CALCULATED.3IONS-SCNC: 6 MMOL/L (ref 3–14)
AST SERPL W P-5'-P-CCNC: 15 U/L (ref 0–45)
BILIRUB SERPL-MCNC: 0.4 MG/DL (ref 0.2–1.3)
BUN SERPL-MCNC: 22 MG/DL (ref 7–30)
CALCIUM SERPL-MCNC: 9 MG/DL (ref 8.5–10.1)
CHLORIDE SERPL-SCNC: 107 MMOL/L (ref 94–109)
CHOLEST SERPL-MCNC: 185 MG/DL
CO2 SERPL-SCNC: 25 MMOL/L (ref 20–32)
CREAT SERPL-MCNC: 0.78 MG/DL (ref 0.52–1.04)
CREAT UR-MCNC: 54 MG/DL
GFR SERPL CREATININE-BSD FRML MDRD: 72 ML/MIN/{1.73_M2}
GLUCOSE SERPL-MCNC: 159 MG/DL (ref 70–99)
HBA1C MFR BLD: 7.6 % (ref 0–5.6)
HDLC SERPL-MCNC: 73 MG/DL
LDLC SERPL CALC-MCNC: 94 MG/DL
MICROALBUMIN UR-MCNC: 13 MG/L
MICROALBUMIN/CREAT UR: 24.08 MG/G CR (ref 0–25)
NONHDLC SERPL-MCNC: 112 MG/DL
POTASSIUM SERPL-SCNC: 4.6 MMOL/L (ref 3.4–5.3)
PROT SERPL-MCNC: 7.2 G/DL (ref 6.8–8.8)
SODIUM SERPL-SCNC: 138 MMOL/L (ref 133–144)
TRIGL SERPL-MCNC: 90 MG/DL
TSH SERPL DL<=0.005 MIU/L-ACNC: 1.11 MU/L (ref 0.4–4)

## 2019-10-15 PROCEDURE — 82043 UR ALBUMIN QUANTITATIVE: CPT | Performed by: FAMILY MEDICINE

## 2019-10-15 PROCEDURE — G0008 ADMIN INFLUENZA VIRUS VAC: HCPCS | Performed by: FAMILY MEDICINE

## 2019-10-15 PROCEDURE — 36415 COLL VENOUS BLD VENIPUNCTURE: CPT | Performed by: FAMILY MEDICINE

## 2019-10-15 PROCEDURE — 99214 OFFICE O/P EST MOD 30 MIN: CPT | Mod: 25 | Performed by: FAMILY MEDICINE

## 2019-10-15 PROCEDURE — 83036 HEMOGLOBIN GLYCOSYLATED A1C: CPT | Performed by: FAMILY MEDICINE

## 2019-10-15 PROCEDURE — 80061 LIPID PANEL: CPT | Performed by: FAMILY MEDICINE

## 2019-10-15 PROCEDURE — 99207 C FOOT EXAM  NO CHARGE: CPT | Performed by: FAMILY MEDICINE

## 2019-10-15 PROCEDURE — 80053 COMPREHEN METABOLIC PANEL: CPT | Performed by: FAMILY MEDICINE

## 2019-10-15 PROCEDURE — 90662 IIV NO PRSV INCREASED AG IM: CPT | Performed by: FAMILY MEDICINE

## 2019-10-15 PROCEDURE — 84443 ASSAY THYROID STIM HORMONE: CPT | Performed by: FAMILY MEDICINE

## 2019-10-15 ASSESSMENT — PATIENT HEALTH QUESTIONNAIRE - PHQ9: SUM OF ALL RESPONSES TO PHQ QUESTIONS 1-9: 0

## 2019-10-15 NOTE — PATIENT INSTRUCTIONS
The losartan found to have a contaminent (NDMA) was made in [place] [employer] and distributed by [employer]      It is time for your dexa!  Please call our clinic to schedule this test. You do not need to fast prior to the test, although it is recommended that you NOT take calcium on the day of the test.    If you have any questions, please contact the clinic or schedule an appointment with me, thank you!     [facility] (formerly [facility])  [address] [place] [address]     Phone: [phone]  Fax: [phone]    I strongly recommend getting the shingles vaccine (Shingrix) if you are over age 50, but please check with your insurance to see how much you might have to pay for this vaccine! If you are on Medicare, it's covered if you get it at a pharmacy but not in a clinic.    This shot will prevent shingles, a painful skin rash that you are at risk for getting if you have ever had chicken pox. Sometimes the pain will last the rest of your life, even after the rash has healed, and there is not much that we can do to relieve the pain.    Please consider getting this vaccine!        Health Maintenance Due   Topic Date Due     DTAP/TDAP/TD IMMUNIZATION (1 - Tdap) 01/29/1966     ZOSTER IMMUNIZATION (1 of 2) 01/29/1991     DEXA  05/06/2012     MEDICARE ANNUAL WELLNESS VISIT  10/27/2017     EYE EXAM  10/27/2018     PHQ-2  01/01/2019     A1C  04/25/2019     INFLUENZA VACCINE (1) 09/01/2019     BMP  10/25/2019     CMP  10/25/2019     LIPID  10/25/2019     MICROALBUMIN  10/25/2019     DIABETIC FOOT EXAM  10/25/2019     FALL RISK ASSESSMENT  10/25/2019     TSH W/FREE T4 REFLEX  10/26/2019

## 2019-10-15 NOTE — PROGRESS NOTES
Subjective     Kelly Barnes is a 79 year old female who presents to clinic today for the following health issues:    HPI   Left leg swelling  She reports that her left side of her body is always swollen, including left leg, left breast, but not face or arm, not associated with pitting edema. She does feel that her left foot is bigger as well. symptoms are worse at the end of the day and improve overnight after lying flat. She denies pain or limitation of activity but has leg swelling from ankle to hip, left leg is noticably bigger. She believes this runs in the family. No history of surgery.  She's had normal cardiac echo 3/29/17 and 2/24/2016.    Hypertension Follow-up      Do you check your blood pressure regularly outside of the clinic? No     Are you following a low salt diet? No    Are your blood pressures ever more than 140 on the top number (systolic) OR more   than 90 on the bottom number (diastolic), for example 140/90? Unknown       How many servings of fruits and vegetables do you eat daily?  2-3    On average, how many sweetened beverages do you drink each day (soda, juice, sweet tea, etc)?   0    How many days per week do you miss taking your medication? 0    Diabetes Follow-up      How often are you checking your blood sugar? A few times a week    What time of day are you checking your blood sugars (select all that apply)?  Before meals; sometimes blood sugars are higher in the morning (159 has been the highest); she eats dinner later at night    Have you had any blood sugars above 200?  No    Have you had any blood sugars below 70?  No    What symptoms do you notice when your blood sugar is low?  None    What concerns do you have today about your diabetes? None     Do you have any of these symptoms? (Select all that apply)  No numbness or tingling in feet.  No redness, sores or blisters on feet.  No complaints of excessive thirst.  No reports of blurry vision.  No significant changes to weight.      Have you had a diabetic eye exam in the last 12 months? Yes- Date of last eye exam: November every year    BP Readings from Last 2 Encounters:   10/15/19 130/70   07/03/19 142/78     Hemoglobin A1C (%)   Date Value   10/15/2019 7.6 (H)   10/25/2018 6.7 (H)     LDL Cholesterol Calculated (mg/dL)   Date Value   10/25/2018 126 (H)   03/28/2017 131 (H)       Diabetes Management Resources  Hyperlipidemia Follow-Up      Are you having any of the following symptoms? (Select all that apply)  No complaints of shortness of breath, chest pain or pressure.  No increased sweating or nausea with activity.  No left-sided neck or arm pain.  No complaints of pain in calves when walking 1-2 blocks.    Are you regularly taking any medication or supplement to lower your cholesterol?   Yes- simvastatin 10 mg daily    Are you having muscle aches or other side effects that you think could be caused by your cholesterol lowering medication?  No        Patient Active Problem List   Diagnosis     Allergic rhinitis     Osteopenia     Obesity     Hypertension goal BP (blood pressure) < 140/90     Hyperlipidemia LDL goal <100     Leg edema, left     Atypical chest pain     Dizzy spells     Obesity, Class I, BMI 30-34.9     Aortic stenosis     Type 2 diabetes mellitus without complication, without long-term current use of insulin (H)     Musculoskeletal chest pain     Nonrheumatic aortic valve stenosis     Family history of breast cancer in sister     Hepatitis A immune     Left leg swelling     Past Surgical History:   Procedure Laterality Date     C NONSPECIFIC PROCEDURE      none       Social History     Tobacco Use     Smoking status: Never Smoker     Smokeless tobacco: Never Used   Substance Use Topics     Alcohol use: Yes     Alcohol/week: 10.0 standard drinks     Comment: 1-2 drinks per week     Family History   Problem Relation Age of Onset     Hypertension Mother      Diabetes No family hx of      Cerebrovascular Disease No family hx of       Breast Cancer No family hx of      Cancer - colorectal No family hx of      Prostate Cancer No family hx of          Current Outpatient Medications   Medication Sig Dispense Refill     ASPIRIN 81 MG OR TABS ONE DAILY 100 3     blood glucose monitor KIT 1 kit daily. 1 kit 0     blood glucose monitoring (ONE TOUCH ULTRA 2) meter device kit Use to test blood sugar 3 times daily 1 kit 0     blood glucose monitoring (ONETOUCH ULTRA) test strip Use to test blood sugar  daily or as directed. Dispense 1 box of 100 test strips, #3 refills. 1 Box 3     losartan (COZAAR) 50 MG tablet Take 1 tablet (50 mg) by mouth daily 90 tablet 3     metFORMIN (GLUCOPHAGE) 500 MG tablet Take 2 tablets in the morning and one tablet at night 270 tablet 3     metoprolol succinate (TOPROL-XL) 25 MG 24 hr tablet Take 0.5 tablets (12.5 mg) by mouth daily 45 tablet 3     ONE TOUCH LANCETS MISC        ONETOUCH DELICA LANCETS 33G MISC 1 Device by In Vitro route 2 times daily Please schedule office appointment with labs, thank you! Dr. Lopez 100 each 11     simvastatin (ZOCOR) 10 MG tablet Take 1 tablet (10 mg) by mouth At Bedtime 90 tablet 1     fluticasone (FLONASE) 50 MCG/ACT nasal spray Spray 2 sprays into both nostrils daily (Patient not taking: Reported on 7/3/2019) 1 Package 1     simvastatin (ZOCOR) 10 MG tablet TAKE ONE TABLET BY MOUTH AT BEDTIME 90 tablet 0     Allergies   Allergen Reactions     Ibuprofen      numbness in hands and feet     Recent Labs   Lab Test 10/15/19  0920 10/25/18  0920 10/26/17  0808 03/28/17  0941 10/27/16  0825   A1C 7.6* 6.7* 7.3* 8.4* 7.5*   LDL  --  126*  --  131* 149*   HDL  --  74  --  67 64   TRIG  --  76  --  83 93   ALT  --  18  --  18 16   CR  --  0.82  --  0.70 0.82   GFRESTIMATED  --  67  --  81 68   GFRESTBLACK  --  82  --  >90   GFR Calc   83   POTASSIUM  --  4.3  --  4.2 4.0   TSH  --   --  1.52  --  1.26      BP Readings from Last 3 Encounters:   10/15/19 130/70   07/03/19 142/78    10/25/18 128/72    Wt Readings from Last 3 Encounters:   10/15/19 75.8 kg (167 lb)   07/03/19 73.9 kg (163 lb)   10/25/18 73 kg (161 lb)           Reviewed and updated as needed this visit by Provider         Review of Systems   ROS COMP: Constitutional, HEENT, cardiovascular, pulmonary, GI, , musculoskeletal, neuro, skin, endocrine and psych systems are negative, except as otherwise noted.      Objective    /70   Pulse 69   Temp 98.6  F (37  C) (Oral)   Wt 75.8 kg (167 lb)   SpO2 97%   BMI 30.54 kg/m    Body mass index is 30.54 kg/m .  Physical Exam   GENERAL: healthy, alert and no distress  EYES: Eyes grossly normal to inspection, PERRL and conjunctivae and sclerae normal  HENT: ear canals and TM's normal, nose and mouth without ulcers or lesions  NECK: no adenopathy, no asymmetry, masses, or scars and thyroid normal to palpation  RESP: lungs clear to auscultation - no rales, rhonchi or wheezes  BREAST: normal without masses, tenderness or nipple discharge and no palpable axillary masses or adenopathy  CV: regular rate and rhythm, normal S1 S2, no S3 or S4, no murmur, click or rub, no peripheral edema and peripheral pulses strong  ABDOMEN: soft, nontender, no hepatosplenomegaly, no masses and bowel sounds normal  MS: no gross musculoskeletal defects noted, no edema, left foot and leg is larger in diameter than right with no pitting edema noted  SKIN: no suspicious lesions or rashes  NEURO: Normal strength and tone, mentation intact and speech normal  PSYCH: mentation appears normal, affect normal/bright       Assessment & Plan     1. Hypertension goal BP (blood pressure) < 140/90  BP Readings from Last 3 Encounters:   10/15/19 130/70   07/03/19 142/78   10/25/18 128/72    at goal  - Comprehensive metabolic panel  - Albumin Random Urine Quantitative with Creat Ratio    2. Hyperlipidemia LDL goal <100  LDL Cholesterol Calculated   Date Value Ref Range Status   10/25/2018 126 (H) <100 mg/dL Final      "Comment:     Above desirable:  100-129 mg/dl  Borderline High:  130-159 mg/dL  High:             160-189 mg/dL  Very high:       >189 mg/dl      needs recheck, will increase statin if still not at goal; she started about 4 weeks ago  The 10-year ASCVD risk score (Abdiaziz RAMIREZ Jr., et al., 2013) is: 49.9%    Values used to calculate the score:      Age: 79 years      Sex: Female      Is Non- : No      Diabetic: Yes      Tobacco smoker: No      Systolic Blood Pressure: 130 mmHg      Is BP treated: Yes      HDL Cholesterol: 74 mg/dL      Total Cholesterol: 215 mg/dL   - Comprehensive metabolic panel  - Lipid panel reflex to direct LDL Fasting    3. Type 2 diabetes mellitus without complication, without long-term current use of insulin (H)  At goal, actually improving! Great job!  - Hemoglobin A1c  - Comprehensive metabolic panel  - TSH with free T4 reflex  - C FOOT EXAM  NO CHARGE    4. Osteopenia of multiple sites  - DX Hip/Pelvis/Spine; Future    5. Left leg larger  Suspect possible lymphedema issue, consider wearing compression stockings if desired, reassurance     BMI:   Estimated body mass index is 30.54 kg/m  as calculated from the following:    Height as of 7/3/19: 1.575 m (5' 2\").    Weight as of this encounter: 75.8 kg (167 lb).   Weight management plan: Discussed healthy diet and exercise guidelines        Return in about 6 months (around 4/15/2020) for chronic disease management.    Lea Lopez MD  [facility]– [place][note 2]      "

## 2019-10-15 NOTE — LETTER
[facility] [facility] [place]  [address] [place] [id number] [phone]          October 18, 2019    Kelly Barnes                                                                                                                     [address] [facility] [address]        Dear Kathleen Mendoza!  It was a pleasure to see you in clinic!  Thank you for getting labs done. Everything looks normal, which is good news.     Your cholesterol looks absolutely fantastic!  Great job! The medication is working incredibly well! Please keep taking it as you have been doing.     Your microalbumen is normal, which is great news. This means you do not have protein in the urine, and that your kidneys are currently functioning well. Keeping blood pressure and blood fats low is the best way to preserve your kidney function over your lifetime.     Your thyroid function is normal, which is good news.  This means that you do not have hyperthyroidism or hypothyroidism.     The testing of your blood sugar, kidney function, liver function and electrolytes was normal.   Keep up the good work!     Results for orders placed or performed in visit on 10/15/19   Hemoglobin A1c   Result Value Ref Range    Hemoglobin A1C 7.6 (H) 0 - 5.6 %   Comprehensive metabolic panel   Result Value Ref Range    Sodium 138 133 - 144 mmol/L    Potassium 4.6 3.4 - 5.3 mmol/L    Chloride 107 94 - 109 mmol/L    Carbon Dioxide 25 20 - 32 mmol/L    Anion Gap 6 3 - 14 mmol/L    Glucose 159 (H) 70 - 99 mg/dL    Urea Nitrogen 22 7 - 30 mg/dL    Creatinine 0.78 0.52 - 1.04 mg/dL    GFR Estimate 72 >60 mL/min/[1.73_m2]    GFR Estimate If Black 84 >60 mL/min/[1.73_m2]    Calcium 9.0 8.5 - 10.1 mg/dL    Bilirubin Total 0.4 0.2 - 1.3 mg/dL    Albumin 3.8 3.4 - 5.0 g/dL    Protein Total 7.2 6.8 - 8.8 g/dL    Alkaline Phosphatase 86 40 - 150 U/L    ALT 21 0 - 50 U/L    AST 15 0 - 45 U/L   TSH with free T4 reflex   Result Value Ref Range    TSH 1.11 0.40 - 4.00  mU/L   Lipid panel reflex to direct LDL Fasting   Result Value Ref Range    Cholesterol 185 <200 mg/dL    Triglycerides 90 <150 mg/dL    HDL Cholesterol 73 >49 mg/dL    LDL Cholesterol Calculated 94 <100 mg/dL    Non HDL Cholesterol 112 <130 mg/dL   Albumin Random Urine Quantitative with Creat Ratio   Result Value Ref Range    Creatinine Urine 54 mg/dL    Albumin Urine mg/L 13 mg/L    Albumin Urine mg/g Cr 24.08 0 - 25 mg/g Cr         Sincerely,       Lea Lopez MD

## 2019-10-16 DIAGNOSIS — Z80.3 FAMILY HISTORY OF BREAST CANCER IN SISTER: ICD-10-CM

## 2019-10-16 NOTE — TELEPHONE ENCOUNTER
"Requested Prescriptions   Pending Prescriptions Disp Refills     losartan (COZAAR) 50 MG tablet 90 tablet 3     Sig: Take 1 tablet (50 mg) by mouth daily  Last Written Prescription Date:  10/25/2018  Last Fill Quantity: 90 tablet,  # refills: 3   Last Office Visit: 10/15/2019   Future Office Visit:            Angiotensin-II Receptors Passed - 10/16/2019  1:37 PM        Passed - Last blood pressure under 140/90 in past 12 months     BP Readings from Last 3 Encounters:   10/15/19 130/70   07/03/19 142/78   10/25/18 128/72           Passed - Recent (12 mo) or future (30 days) visit within the authorizing provider's specialty     Patient has had an office visit with the authorizing provider or a provider within the authorizing providers department within the previous 12 mos or has a future within next 30 days. See \"Patient Info\" tab in inbasket, or \"Choose Columns\" in Meds & Orders section of the refill encounter.            Passed - Medication is active on med list        Passed - Patient is age 18 or older        Passed - No active pregnancy on record        Passed - Normal serum creatinine on file in past 12 months     Recent Labs   Lab Test 10/15/19  0920   CR 0.78           Passed - Normal serum potassium on file in past 12 months     Recent Labs   Lab Test 10/15/19  0920   POTASSIUM 4.6            Passed - No positive pregnancy test in past 12 months          "

## 2019-10-17 DIAGNOSIS — E78.5 HYPERLIPIDEMIA LDL GOAL <100: ICD-10-CM

## 2019-10-17 RX ORDER — LOSARTAN POTASSIUM 50 MG/1
50 TABLET ORAL DAILY
Qty: 90 TABLET | Refills: 3 | Status: SHIPPED | OUTPATIENT
Start: 2019-10-17 | End: 2020-04-23 | Stop reason: SINTOL

## 2019-10-17 RX ORDER — SIMVASTATIN 10 MG
TABLET ORAL
Qty: 90 TABLET | Refills: 3 | Status: SHIPPED | OUTPATIENT
Start: 2019-10-17 | End: 2019-11-22

## 2019-10-17 NOTE — TELEPHONE ENCOUNTER
Patient came into clinic today asking about this medication that she thought was getting filled for her when she was seen on 10-15-19 which did not happen. Patient is now completely out and needs filled asap

## 2019-10-17 NOTE — TELEPHONE ENCOUNTER
"LOV: 10/15/2019    Per MD note: \"1. Hypertension goal BP (blood pressure) < 140/90      BP Readings from Last 3 Encounters:   10/15/19 130/70   07/03/19 142/78   10/25/18 128/72    at goal  - Comprehensive metabolic panel  Creatinine WNL.     Prescription approved per [facility] [place] [place] Refill Protocol.  Thanks! Allyn Nassar RN      "

## 2019-10-18 NOTE — RESULT ENCOUNTER NOTE
Hello!  It was a pleasure to see you in clinic!  Thank you for getting labs done. Everything looks normal, which is good news.     Your cholesterol looks absolutely fantastic!  Great job! The medication is working incredibly well! Please keep taking it as you have been doing.    Your microalbumen is normal, which is great news. This means you do not have protein in the urine, and that your kidneys are currently functioning well. Keeping blood pressure and blood fats low is the best way to preserve your kidney function over your lifetime.     Your thyroid function is normal, which is good news.  This means that you do not have hyperthyroidism or hypothyroidism.     The testing of your blood sugar, kidney function, liver function and electrolytes was normal.     Keep up the good work!    Sincerely,  Dr. Lea Lopez MD  10/17/2019

## 2019-11-22 DIAGNOSIS — E11.9 TYPE 2 DIABETES MELLITUS WITHOUT COMPLICATION, WITHOUT LONG-TERM CURRENT USE OF INSULIN (H): ICD-10-CM

## 2019-11-22 DIAGNOSIS — E78.5 HYPERLIPIDEMIA LDL GOAL <100: ICD-10-CM

## 2019-11-22 DIAGNOSIS — Z80.3 FAMILY HISTORY OF BREAST CANCER IN SISTER: ICD-10-CM

## 2019-11-22 RX ORDER — LANCETS 33 GAUGE
1 EACH MISCELLANEOUS 2 TIMES DAILY
Qty: 100 EACH | Refills: 11 | Status: SHIPPED | OUTPATIENT
Start: 2019-11-22 | End: 2021-05-13

## 2019-11-22 RX ORDER — METOPROLOL SUCCINATE 25 MG/1
TABLET, EXTENDED RELEASE ORAL
Qty: 45 TABLET | Refills: 3 | Status: SHIPPED | OUTPATIENT
Start: 2019-11-22 | End: 2020-03-31

## 2019-11-22 RX ORDER — SIMVASTATIN 10 MG
TABLET ORAL
Qty: 90 TABLET | Refills: 3 | Status: SHIPPED | OUTPATIENT
Start: 2019-11-22 | End: 2020-06-30 | Stop reason: SINTOL

## 2019-11-22 NOTE — TELEPHONE ENCOUNTER
Pt stopped into clinic saying she needs the following meds filled.  PT was seen on 10/15/19 and felt the meds were filled.  We may have had pharmacy computer issues.  Pt does need the metoprolol by next week.  PT will check with the pharmacy on Monday.  [facility] pharmacy.    Glucose test strips  Lancets  Metformin  Metoprolol  Simvastatin- I  Resent this current rx.  Will send to refill pool.    Filled the remaining meds from mojgan Alexander RN

## 2020-03-30 ENCOUNTER — OFFICE VISIT (OUTPATIENT)
Dept: FAMILY MEDICINE | Facility: CLINIC | Age: 80
End: 2020-03-30
Payer: COMMERCIAL

## 2020-03-30 VITALS — TEMPERATURE: 98 F | SYSTOLIC BLOOD PRESSURE: 174 MMHG | DIASTOLIC BLOOD PRESSURE: 88 MMHG | HEART RATE: 74 BPM

## 2020-03-30 DIAGNOSIS — Z71.84 TRAVEL ADVICE ENCOUNTER: Primary | ICD-10-CM

## 2020-03-30 DIAGNOSIS — I10 BENIGN ESSENTIAL HYPERTENSION: ICD-10-CM

## 2020-03-30 PROCEDURE — 99402 PREV MED CNSL INDIV APPRX 30: CPT | Performed by: NURSE PRACTITIONER

## 2020-03-30 RX ORDER — ATOVAQUONE AND PROGUANIL HYDROCHLORIDE 250; 100 MG/1; MG/1
1 TABLET, FILM COATED ORAL DAILY
Qty: 30 TABLET | Refills: 0 | Status: SHIPPED | OUTPATIENT
Start: 2020-03-30 | End: 2020-04-23

## 2020-03-30 NOTE — PROGRESS NOTES
exempt  Itinerary:  [place] days > [place] > [place] > [place] > [place]   Traveling with [place] ( family)     Will be visiting family member in [place] after tour     Departure Date: 05/11/2020    Return Date: 06/03/2020    Length of Trip: one month    Purpose of Trip: pleasure and visiting family     Urban/Rural: Both    Accommodations: tents, and family home       IMMUNIZATION HISTORY  Have you received any immunizations within the past 4 weeks?  No  Have you ever fainted from having your blood drawn or from an injection?  No  Have you ever had a fever reaction to vaccination?  No  Have you ever had any bad reaction or side effect from any vaccination?  No  Have you ever had hepatitis A or B vaccine?  Yes  Do you live (or work closely) with anyone who has AIDS, an AIDS-like condition, any other immune disorder or who is on chemotherapy for cancer or a   family history of immunodeficiency?  No  Have you received any injection of immune globulin or any blood products during the past 12 months?  No    Patient roomed by [facility]      Special medical concerns: Type 2 Diabetes  BP (!) 177/101 (BP Location: Right arm, Patient Position: Sitting, Cuff Size: Adult Regular)   Pulse 74   Temp 98  F (36.7  C) (Tympanic)   LMP  (Exact Date)   Breastfeeding No      BP double checked 174/88    EXAM: deferred    Immunizations discussed include: Future vaccine include Typhoid and Tdap . Deferred today due to Covid situation  Malaraia prophylaxis recommended: Malarone  Symptomatic treatment for traveler's diarrhea: loperamide/diphenoxylate    ASSESSMENT/PLAN:    ICD-10-CM    1. Travel advice encounter  Z71.84 atovaquone-proguanil (MALARONE) 250-100 MG tablet     I have reviewed general recommendations for safe travel   including: food/water precautions, insect avoidance, safe sex   practices given high prevalence of HIV and other STDs,   roadway safety. Educational materials and [employer] report  provided.    Personal protective measures reviewed including hand sanitizing and contact precautions for the prevention of viral illnesses. Cover coughs and masking if ill during travel and upon return.  Current COVID 19 outbreak.  Monitor / follow current CDC guidelines. Currently Level 4 and Travel is NOT recommended .       Advised patient to follow up with PCP for elevated BP today.  Advised personal home BP monitoring due to Covid situation     Total visit time 30 minutes with over 50% of time spent counseling patient.

## 2020-03-30 NOTE — PATIENT INSTRUCTIONS
Today March 30, 2020    FUTURE VACCINES    Tetanus (Tdap) Vaccine    Typhoid - injectable. This vaccine is valid for two years.   .    These appointments can be made as a NURSE ONLY visit.    **It is very important for the vaccinations to be given on the scheduled day(s), this helps ensure you receive the full effectiveness of the vaccine.**    Please call [facility] with any questions [phone]    Thank you for visiting [facility]

## 2020-03-30 NOTE — LETTER
March 30, 2020      Name: Kelly Barnes  YOB: 1940  Dates of travel: 05/11/2020-06/03/2020  Countries of travel: [place] > [place] > [place] > [place]         Immunization Exemption Letter: Yellow Fever  The traveler named above is my patient and under my medical care and must be exempted from the requirement for yellow fever immunization. This exemption is valid only for the current trip (noted above). The risk of the vaccine is greater than the risk of the disease       Sincerely,          APRIL Apple CNP  3/30/2020 11:06 AM

## 2020-03-31 ENCOUNTER — VIRTUAL VISIT (OUTPATIENT)
Dept: FAMILY MEDICINE | Facility: CLINIC | Age: 80
End: 2020-03-31
Payer: COMMERCIAL

## 2020-03-31 DIAGNOSIS — Z80.3 FAMILY HISTORY OF BREAST CANCER IN SISTER: ICD-10-CM

## 2020-03-31 DIAGNOSIS — E78.5 HYPERLIPIDEMIA LDL GOAL <100: ICD-10-CM

## 2020-03-31 DIAGNOSIS — E11.9 TYPE 2 DIABETES MELLITUS WITHOUT COMPLICATION, WITHOUT LONG-TERM CURRENT USE OF INSULIN (H): ICD-10-CM

## 2020-03-31 DIAGNOSIS — I10 BENIGN ESSENTIAL HYPERTENSION: Primary | ICD-10-CM

## 2020-03-31 PROCEDURE — 99214 OFFICE O/P EST MOD 30 MIN: CPT | Mod: TEL | Performed by: FAMILY MEDICINE

## 2020-03-31 RX ORDER — METOPROLOL SUCCINATE 25 MG/1
25 TABLET, EXTENDED RELEASE ORAL DAILY
Qty: 90 TABLET | Refills: 3 | Status: SHIPPED | OUTPATIENT
Start: 2020-03-31 | End: 2020-05-01 | Stop reason: SINTOL

## 2020-03-31 NOTE — PROGRESS NOTES
"Subjective     Kelly Barnes is a 80 year old female who is being evaluated via a billable telephone visit.      The patient has been notified of following:     \"This telephone visit will be conducted via a call between you and your physician/provider. We have found that certain health care needs can be provided without the need for a physical exam.  This service lets us provide the care you need with a short phone conversation.  If a prescription is necessary we can send it directly to your pharmacy.  If lab work is needed we can place an order for that and you can then stop by our lab to have the test done at a later time.    If during the course of the call the physician/provider feels a telephone visit is not appropriate, you will not be charged for this service.\"     Patient has given verbal consent for Telephone visit?  Yes    Kelly Barnes complains of No chief complaint on file.      ALLERGIES  Ibuprofen    Hypertension Follow-up      Do you check your blood pressure regularly outside of the clinic? Yes     Are you following a low salt diet? No    Are your blood pressures ever more than 140 on the top number (systolic) OR more   than 90 on the bottom number (diastolic), for example 140/90? Yes      How many servings of fruits and vegetables do you eat daily?  4 or more    On average, how many sweetened beverages do you drink each day (Examples: soda, juice, sweet tea, etc.  Do NOT count diet or artificially sweetened beverages)?   0    How many days per week do you exercise enough to make your heart beat faster? 7    How many minutes a day do you exercise enough to make your heart beat faster? 20 - 29    How many days per week do you miss taking your medication? 0    Was seen yesterday for travel clinic as she plans on traveling to [place] in May for approximately 1 month.     Her blood pressure was elevated at the time of her visit.    She feels fine. Did not think her bp machine was working. " Needed new batteries. Changed the batteries. Checked her bp this morning. This morning was in the 150s/80. Her pulse has been in the 60s and 70s.     Patient Active Problem List   Diagnosis     Allergic rhinitis     Osteopenia     Obesity     Hypertension goal BP (blood pressure) < 140/90     Hyperlipidemia LDL goal <100     Leg edema, left     Atypical chest pain     Dizzy spells     Obesity, Class I, BMI 30-34.9     Aortic stenosis     Type 2 diabetes mellitus without complication, without long-term current use of insulin (H)     Musculoskeletal chest pain     Nonrheumatic aortic valve stenosis     Family history of breast cancer in sister     Hepatitis A immune     Left leg swelling     Past Surgical History:   Procedure Laterality Date     C NONSPECIFIC PROCEDURE      none       Social History     Tobacco Use     Smoking status: Never Smoker     Smokeless tobacco: Never Used   Substance Use Topics     Alcohol use: Yes     Alcohol/week: 10.0 standard drinks     Comment: 1-2 drinks per week     Family History   Problem Relation Age of Onset     Hypertension Mother      Diabetes No family hx of      Cerebrovascular Disease No family hx of      Breast Cancer No family hx of      Cancer - colorectal No family hx of      Prostate Cancer No family hx of          Current Outpatient Medications   Medication Sig Dispense Refill     ASPIRIN 81 MG OR TABS ONE DAILY 100 3     atovaquone-proguanil (MALARONE) 250-100 MG tablet Take 1 tablet by mouth daily Start 1 days before exposure to Malaria and continue daily till  7 days after exposure. 30 tablet 0     blood glucose (ONETOUCH ULTRA) test strip Use to test blood sugar twice daily. Dispense 1 box of 200 test strips, #3 refills. 1 Box 3     blood glucose monitor KIT 1 kit daily. 1 kit 0     blood glucose monitoring (ONE TOUCH ULTRA 2) meter device kit Use to test blood sugar 3 times daily 1 kit 0     fluticasone (FLONASE) 50 MCG/ACT nasal spray Spray 2 sprays into both  nostrils daily (Patient not taking: Reported on 7/3/2019) 1 Package 1     losartan (COZAAR) 50 MG tablet Take 1 tablet (50 mg) by mouth daily 90 tablet 3     metFORMIN (GLUCOPHAGE) 500 MG tablet Take 2 tablets in the morning and one tablet at night 270 tablet 3     metoprolol succinate ER (TOPROL-XL) 25 MG 24 hr tablet TAKE ONE-HALF TABLET BY MOUTH EVERY DAY 45 tablet 3     ONE TOUCH LANCETS MISC        ONETOUCH DELICA LANCETS 33G MISC 1 Device by In Vitro route 2 times daily 100 each 11     simvastatin (ZOCOR) 10 MG tablet TAKE ONE TABLET BY MOUTH AT BEDTIME 90 tablet 3     Allergies   Allergen Reactions     Ibuprofen      numbness in hands and feet     Recent Labs   Lab Test 10/15/19  0920 10/25/18  0920 10/26/17  0808 03/28/17  0941   A1C 7.6* 6.7* 7.3* 8.4*   LDL 94 126*  --  131*   HDL 73 74  --  67   TRIG 90 76  --  83   ALT 21 18  --  18   CR 0.78 0.82  --  0.70   GFRESTIMATED 72 67  --  81   GFRESTBLACK 84 82  --  >90   GFR Calc     POTASSIUM 4.6 4.3  --  4.2   TSH 1.11  --  1.52  --       BP Readings from Last 3 Encounters:   03/30/20 (!) 174/88   10/15/19 130/70   07/03/19 142/78    Wt Readings from Last 3 Encounters:   10/15/19 75.8 kg (167 lb)   07/03/19 73.9 kg (163 lb)   10/25/18 73 kg (161 lb)                    Reviewed and updated as needed this visit by Provider         Review of Systems    Denies headaches, vision changes, weakness, numbness, tingling, chest pain, shortness of breath.           Objective   Reported vitals:  There were no vitals taken for this visit.   alert and no distress  Psych: Alert and oriented times 3; coherent speech, normal   rate and volume, able to articulate logical thoughts, able   to abstract reason, no tangential thoughts, no hallucinations   or delusions       Diagnostic Test Results:  Labs reviewed in Epic      Last Comprehensive Metabolic Panel:  Sodium   Date Value Ref Range Status   10/15/2019 138 133 - 144 mmol/L Final     Potassium   Date Value  Ref Range Status   10/15/2019 4.6 3.4 - 5.3 mmol/L Final     Chloride   Date Value Ref Range Status   10/15/2019 107 94 - 109 mmol/L Final     Carbon Dioxide   Date Value Ref Range Status   10/15/2019 25 20 - 32 mmol/L Final     Anion Gap   Date Value Ref Range Status   10/15/2019 6 3 - 14 mmol/L Final     Glucose   Date Value Ref Range Status   10/15/2019 159 (H) 70 - 99 mg/dL Final     Urea Nitrogen   Date Value Ref Range Status   10/15/2019 22 7 - 30 mg/dL Final     Creatinine   Date Value Ref Range Status   10/15/2019 0.78 0.52 - 1.04 mg/dL Final     GFR Estimate   Date Value Ref Range Status   10/15/2019 72 >60 mL/min/[1.73_m2] Final     Comment:     Non  GFR Calc  Starting 12/18/2018, serum creatinine based estimated GFR (eGFR) will be   calculated using the Chronic Kidney Disease Epidemiology Collaboration   (CKD-EPI) equation.       Calcium   Date Value Ref Range Status   10/15/2019 9.0 8.5 - 10.1 mg/dL Final     Bilirubin Total   Date Value Ref Range Status   10/15/2019 0.4 0.2 - 1.3 mg/dL Final     Alkaline Phosphatase   Date Value Ref Range Status   10/15/2019 86 40 - 150 U/L Final     ALT   Date Value Ref Range Status   10/15/2019 21 0 - 50 U/L Final     AST   Date Value Ref Range Status   10/15/2019 15 0 - 45 U/L Final               Assessment/Plan:  1. Benign essential hypertension  Uncontrolled   She has been on metoprolol 12.5 mg daily, losartan 50 mg daily.  Urine albumin and renal function were normal in October. Pulse has been in the 60s to 70s. Will increase metoprolol to 25mg daily and she will continue to monitor her blood pressure periodically at home over the next week or so.  If her blood pressures are not in goal range, she will schedule another telephone visit.  Discussed of her heart rate goes down below 50s she should also let us know.  The benefit to testing the metoprolol is that we do not need her to come into clinic for additional lab work.  Would recommend increasing  losartan to 100 mg daily next if blood pressure remains uncontrolled    2. Type 2 diabetes mellitus without complication, without long-term current use of insulin (H)    She continues metformin 1000 mg in the morning and 500 mg at night.  She will schedule a telephone visit with her PCP in April.    3. Hyperlipidemia LDL goal <100     She continues simvastatin 10 mg daily.      No follow-ups on file.      Phone call duration:  6 minutes    Radha Mejia MD

## 2020-04-13 ENCOUNTER — MYC MEDICAL ADVICE (OUTPATIENT)
Dept: FAMILY MEDICINE | Facility: CLINIC | Age: 80
End: 2020-04-13

## 2020-04-13 NOTE — TELEPHONE ENCOUNTER
I agree with MA BP check and then schedule telephone or video visit if above goal. Radha Mejia M.D.

## 2020-04-14 ENCOUNTER — ALLIED HEALTH/NURSE VISIT (OUTPATIENT)
Dept: NURSING | Facility: CLINIC | Age: 80
End: 2020-04-14
Payer: COMMERCIAL

## 2020-04-14 VITALS — HEART RATE: 74 BPM | SYSTOLIC BLOOD PRESSURE: 139 MMHG | RESPIRATION RATE: 16 BRPM | DIASTOLIC BLOOD PRESSURE: 74 MMHG

## 2020-04-14 DIAGNOSIS — Z01.30 BLOOD PRESSURE CHECK: Primary | ICD-10-CM

## 2020-04-14 PROCEDURE — 99207 ZZC NO CHARGE NURSE ONLY: CPT

## 2020-04-15 ENCOUNTER — TRANSFERRED RECORDS (OUTPATIENT)
Dept: HEALTH INFORMATION MANAGEMENT | Facility: CLINIC | Age: 80
End: 2020-04-15

## 2020-04-23 ENCOUNTER — VIRTUAL VISIT (OUTPATIENT)
Dept: FAMILY MEDICINE | Facility: CLINIC | Age: 80
End: 2020-04-23
Payer: COMMERCIAL

## 2020-04-23 VITALS — HEIGHT: 62 IN | BODY MASS INDEX: 30.73 KG/M2 | WEIGHT: 167 LBS

## 2020-04-23 DIAGNOSIS — I10 HYPERTENSION GOAL BP (BLOOD PRESSURE) < 140/90: ICD-10-CM

## 2020-04-23 DIAGNOSIS — L23.9 ALLERGIC DERMATITIS: Primary | ICD-10-CM

## 2020-04-23 PROCEDURE — 99213 OFFICE O/P EST LOW 20 MIN: CPT | Mod: 95 | Performed by: FAMILY MEDICINE

## 2020-04-23 ASSESSMENT — MIFFLIN-ST. JEOR: SCORE: 1185.76

## 2020-04-23 NOTE — PROGRESS NOTES
"Kelly Barnes is a 80 year old female who is being evaluated via a billable telephone visit.      The patient has been notified of following:     \"This telephone visit will be conducted via a call between you and your physician/provider. We have found that certain health care needs can be provided without the need for a physical exam.  This service lets us provide the care you need with a short phone conversation.  If a prescription is necessary we can send it directly to your pharmacy.  If lab work is needed we can place an order for that and you can then stop by our lab to have the test done at a later time.    Telephone visits are billed at different rates depending on your insurance coverage. During this emergency period, for some insurers they may be billed the same as an in-person visit.  Please reach out to your insurance provider with any questions.    If during the course of the call the physician/provider feels a telephone visit is not appropriate, you will not be charged for this service.\"    Patient has given verbal consent for Telephone visit?  Yes    How would you like to obtain your AVS? [email or web address]    Subjective     Kelly Barnes is a 80 year old female who presents to clinic today for the following health issues:    HPI     Rash      Duration: January    Bumpy and very itchy, very bothersome, and getting worse, spreading, very uncomfortable    Description  Location: back   Itching: severe    Intensity:  moderate    Accompanying signs and symptoms: None    History (similar episodes/previous evaluation): saw derm, had a biopsy which revealed that rash is caused by a medication    Precipitating or alleviating factors:  New exposures:  None  Recent travel: no      Therapies tried and outcome: triamcinolone, tacrolimus- only helping with itching for first few hours    She got medications from the dermatologist that have only helped a little    She has biopsy, and was told that she had a drug reaction, " possibly to losartan     Hypertension Follow-up      Do you check your blood pressure regularly outside of the clinic? Yes     She was seen in a travel clinic on March 30th, and had severely elevated blood pressure of 174/101    She had metoprol increased from 12.5 to 25 mg, which has worked, with home values of 130's/80's; 130/70 most recently, lowest at home 119/66    Are you following a low salt diet? Yes  She denies chest pain, sob headaches, vision changes  She denies side effects from medication  BP Readings from Last 3 Encounters:   04/14/20 139/74   03/30/20 (!) 174/88   10/15/19 130/70          Patient Active Problem List   Diagnosis     Allergic rhinitis     Osteopenia     Obesity     Hypertension goal BP (blood pressure) < 140/90     Hyperlipidemia LDL goal <100     Leg edema, left     Atypical chest pain     Dizzy spells     Obesity, Class I, BMI 30-34.9     Aortic stenosis     Type 2 diabetes mellitus without complication, without long-term current use of insulin (H)     Musculoskeletal chest pain     Nonrheumatic aortic valve stenosis     Family history of breast cancer in sister     Hepatitis A immune     Left leg swelling     Allergic dermatitis     Past Surgical History:   Procedure Laterality Date     C NONSPECIFIC PROCEDURE      none       Social History     Tobacco Use     Smoking status: Never Smoker     Smokeless tobacco: Never Used   Substance Use Topics     Alcohol use: Yes     Alcohol/week: 10.0 standard drinks     Comment: 1-2 drinks per week     Family History   Problem Relation Age of Onset     Hypertension Mother      Diabetes No family hx of      Cerebrovascular Disease No family hx of      Breast Cancer No family hx of      Cancer - colorectal No family hx of      Prostate Cancer No family hx of          Current Outpatient Medications   Medication Sig Dispense Refill     ASPIRIN 81 MG OR TABS ONE DAILY 100 3     blood glucose (ONETOUCH ULTRA) test strip Use to test blood sugar twice  "daily. Dispense 1 box of 200 test strips, #3 refills. 1 Box 3     blood glucose monitor KIT 1 kit daily. 1 kit 0     blood glucose monitoring (ONE TOUCH ULTRA 2) meter device kit Use to test blood sugar 3 times daily 1 kit 0     metFORMIN (GLUCOPHAGE) 500 MG tablet Take 2 tablets in the morning and one tablet at night 270 tablet 3     metoprolol succinate ER (TOPROL-XL) 25 MG 24 hr tablet Take 1 tablet (25 mg) by mouth daily 90 tablet 3     ONE TOUCH LANCETS MISC        ONETOUCH DELICA LANCETS 33G MISC 1 Device by In Vitro route 2 times daily 100 each 11     simvastatin (ZOCOR) 10 MG tablet TAKE ONE TABLET BY MOUTH AT BEDTIME 90 tablet 3     fluticasone (FLONASE) 50 MCG/ACT nasal spray Spray 2 sprays into both nostrils daily (Patient not taking: Reported on 7/3/2019) 1 Package 1     Allergies   Allergen Reactions     Ibuprofen      numbness in hands and feet     Recent Labs   Lab Test 10/15/19  0920 10/25/18  0920 10/26/17  0808 03/28/17  0941   A1C 7.6* 6.7* 7.3* 8.4*   LDL 94 126*  --  131*   HDL 73 74  --  67   TRIG 90 76  --  83   ALT 21 18  --  18   CR 0.78 0.82  --  0.70   GFRESTIMATED 72 67  --  81   GFRESTBLACK 84 82  --  >90   GFR Calc     POTASSIUM 4.6 4.3  --  4.2   TSH 1.11  --  1.52  --       BP Readings from Last 3 Encounters:   04/14/20 139/74   03/30/20 (!) 174/88   10/15/19 130/70    Wt Readings from Last 3 Encounters:   04/23/20 75.8 kg (167 lb)   10/15/19 75.8 kg (167 lb)   07/03/19 73.9 kg (163 lb)                    Reviewed and updated as needed this visit by Provider  Problems         Review of Systems   ROS COMP: Constitutional, HEENT, cardiovascular, pulmonary, GI, , musculoskeletal, neuro, skin, endocrine and psych systems are negative, except as otherwise noted.       Objective   Reported vitals:  Ht 1.575 m (5' 2\")   Wt 75.8 kg (167 lb)   BMI 30.54 kg/m     healthy, alert and no distress  PSYCH: Alert and oriented times 3; coherent speech, normal   rate and volume, " able to articulate logical thoughts, able   to abstract reason, no tangential thoughts, no hallucinations   or delusions  Her affect is normal  RESP: No cough, no audible wheezing, able to talk in full sentences  Remainder of exam unable to be completed due to telephone visits    Diagnostic Test Results:  Labs reviewed in Epic        Assessment/Plan:  1. Allergic dermatitis  Possible losartan related, will stop losartan, has not taken it today, recheck in 24 hours    2. Hypertension goal BP (blood pressure) < 140/90  At goal, but will stop ARB as above, monitor bps at home today, follow up tomorrow 4/24/20   The patient indicates understanding of these issues and agrees with the plan.       Return in about 1 day (around 4/24/2020) for Hypertension follow up.      Phone call duration:  18 minutes    Lea Lopez MD

## 2020-04-24 ENCOUNTER — VIRTUAL VISIT (OUTPATIENT)
Dept: FAMILY MEDICINE | Facility: CLINIC | Age: 80
End: 2020-04-24
Payer: COMMERCIAL

## 2020-04-24 ENCOUNTER — MYC MEDICAL ADVICE (OUTPATIENT)
Dept: FAMILY MEDICINE | Facility: CLINIC | Age: 80
End: 2020-04-24

## 2020-04-24 VITALS — WEIGHT: 165 LBS | HEIGHT: 62 IN | BODY MASS INDEX: 30.36 KG/M2

## 2020-04-24 DIAGNOSIS — I10 HYPERTENSION GOAL BP (BLOOD PRESSURE) < 140/90: ICD-10-CM

## 2020-04-24 DIAGNOSIS — L23.9 ALLERGIC DERMATITIS: Primary | ICD-10-CM

## 2020-04-24 PROCEDURE — 99213 OFFICE O/P EST LOW 20 MIN: CPT | Mod: 95 | Performed by: FAMILY MEDICINE

## 2020-04-24 ASSESSMENT — MIFFLIN-ST. JEOR: SCORE: 1176.69

## 2020-04-24 NOTE — PATIENT INSTRUCTIONS
I recommend taking benedryl (diphenhydramine) at night.  Switch laundry detergents, and double rinse laundry (sheets and nightgown).   Continue using vanicream, aquaphor, vaseline.  Cooler temperatures are better than warmer.

## 2020-04-24 NOTE — PROGRESS NOTES
"Kelly Barnes is a 80 year old female who is being evaluated via a billable telephone visit.      The patient has been notified of following:     \"This telephone visit will be conducted via a call between you and your physician/provider. We have found that certain health care needs can be provided without the need for a physical exam.  This service lets us provide the care you need with a short phone conversation.  If a prescription is necessary we can send it directly to your pharmacy.  If lab work is needed we can place an order for that and you can then stop by our lab to have the test done at a later time.    Telephone visits are billed at different rates depending on your insurance coverage. During this emergency period, for some insurers they may be billed the same as an in-person visit.  Please reach out to your insurance provider with any questions.    If during the course of the call the physician/provider feels a telephone visit is not appropriate, you will not be charged for this service.\"    Patient has given verbal consent for Telephone visit?  Yes    How would you like to obtain your AVS? Oliva    Subjective     Kelly Barnes is a 80 year old female who presents to clinic today for the following health issues:    HPI     Diabetes Follow-up    How often are you checking your blood sugar? One time daily  What time of day are you checking your blood sugars (select all that apply)?  Before and after meals and At bedtime  Have you had any blood sugars above 200?  No  Have you had any blood sugars below 70?  No    What symptoms do you notice when your blood sugar is low?  None    What concerns do you have today about your diabetes? None     Do you have any of these symptoms? (Select all that apply)  No numbness or tingling in feet.  No redness, sores or blisters on feet.  No complaints of excessive thirst.  No reports of blurry vision.  No significant changes to weight.    Have you had a diabetic eye " exam in the last 12 months? Yes- Date of last eye exam: last summer         BP Readings from Last 2 Encounters:   04/14/20 139/74   03/30/20 (!) 174/88     Hemoglobin A1C (%)   Date Value   10/15/2019 7.6 (H)   10/25/2018 6.7 (H)     LDL Cholesterol Calculated (mg/dL)   Date Value   10/15/2019 94   10/25/2018 126 (H)         Hypertension Follow-up      Do you check your blood pressure regularly outside of the clinic? Yes     Are you following a low salt diet? Yes    Are your blood pressures ever more than 140 on the top number (systolic) OR more   than 90 on the bottom number (diastolic), for example 140/90? Yes 146/75 yesterday, this morning 133/72, 135/75; she hasn't taken her medications yet this morning  Stopped losartan yesterday due to rash (see below)      Rash      Duration: Early February ; bumpy, itchy rash, tops of shoulder, back    Not sun exposure    She had similar symptoms in middle of back this time last year, started in winter, used exzema cream which worked well    symptoms are worse this year    Vanicream does provide some relief    She uses Tide free detergent    Description  Location: middle of back, shoulders and sides  Itching: severe, bothersome at night    Intensity:  moderate    Accompanying signs and symptoms: red spots and bumpy    History (similar episodes/previous evaluation): None    Precipitating or alleviating factors:  New exposures:  None  Recent travel: no      Therapies tried and outcome: hydrocortisone cream -  not effective    She uses Tide free detergent,     She stopped losartan yesterday, rash still itchy    PHQ-2 Score:     PHQ-2 ( 1999 Pf[employer]) 4/24/2020 10/25/2018   Q1: Little interest or pleasure in doing things 0 0   Q2: Feeling down, depressed or hopeless 0 0   PHQ-2 Score 0 0       Patient Active Problem List   Diagnosis     Allergic rhinitis     Osteopenia     Obesity     Hypertension goal BP (blood pressure) < 140/90     Hyperlipidemia LDL goal <100     Leg edema,  left     Atypical chest pain     Dizzy spells     Obesity, Class I, BMI 30-34.9     Aortic stenosis     Type 2 diabetes mellitus without complication, without long-term current use of insulin (H)     Musculoskeletal chest pain     Nonrheumatic aortic valve stenosis     Family history of breast cancer in sister     Hepatitis A immune     Left leg swelling     Allergic dermatitis     Past Surgical History:   Procedure Laterality Date     C NONSPECIFIC PROCEDURE      none       Social History     Tobacco Use     Smoking status: Never Smoker     Smokeless tobacco: Never Used   Substance Use Topics     Alcohol use: Yes     Alcohol/week: 10.0 standard drinks     Comment: 1-2 drinks per week     Family History   Problem Relation Age of Onset     Hypertension Mother      Diabetes No family hx of      Cerebrovascular Disease No family hx of      Breast Cancer No family hx of      Cancer - colorectal No family hx of      Prostate Cancer No family hx of          Current Outpatient Medications   Medication Sig Dispense Refill     ASPIRIN 81 MG OR TABS ONE DAILY 100 3     blood glucose (ONETOUCH ULTRA) test strip Use to test blood sugar twice daily. Dispense 1 box of 200 test strips, #3 refills. 1 Box 3     blood glucose monitor KIT 1 kit daily. 1 kit 0     blood glucose monitoring (ONE TOUCH ULTRA 2) meter device kit Use to test blood sugar 3 times daily 1 kit 0     metFORMIN (GLUCOPHAGE) 500 MG tablet Take 2 tablets in the morning and one tablet at night 270 tablet 3     metoprolol succinate ER (TOPROL-XL) 25 MG 24 hr tablet Take 1 tablet (25 mg) by mouth daily 90 tablet 3     ONE TOUCH LANCETS MISC        ONETOUCH DELICA LANCETS 33G MISC 1 Device by In Vitro route 2 times daily 100 each 11     simvastatin (ZOCOR) 10 MG tablet TAKE ONE TABLET BY MOUTH AT BEDTIME 90 tablet 3     fluticasone (FLONASE) 50 MCG/ACT nasal spray Spray 2 sprays into both nostrils daily (Patient not taking: Reported on 7/3/2019) 1 Package 1  "    Allergies   Allergen Reactions     Ibuprofen      numbness in hands and feet     Recent Labs   Lab Test 10/15/19  0920 10/25/18  0920 10/26/17  0808 03/28/17  0941   A1C 7.6* 6.7* 7.3* 8.4*   LDL 94 126*  --  131*   HDL 73 74  --  67   TRIG 90 76  --  83   ALT 21 18  --  18   CR 0.78 0.82  --  0.70   GFRESTIMATED 72 67  --  81   GFRESTBLACK 84 82  --  >90   GFR Calc     POTASSIUM 4.6 4.3  --  4.2   TSH 1.11  --  1.52  --       BP Readings from Last 3 Encounters:   04/14/20 139/74   03/30/20 (!) 174/88   10/15/19 130/70    Wt Readings from Last 3 Encounters:   04/24/20 74.8 kg (165 lb)   04/23/20 75.8 kg (167 lb)   10/15/19 75.8 kg (167 lb)                    Reviewed and updated as needed this visit by Provider         Review of Systems   ROS COMP: Constitutional, HEENT, cardiovascular, pulmonary, GI, , musculoskeletal, neuro, skin, endocrine and psych systems are negative, except as otherwise noted.       Objective   Reported vitals:  Ht 1.575 m (5' 2\")   Wt 74.8 kg (165 lb)   BMI 30.18 kg/m     healthy, alert and no distress  PSYCH: Alert and oriented times 3; coherent speech, normal   rate and volume, able to articulate logical thoughts, able   to abstract reason, no tangential thoughts, no hallucinations   or delusions  Her affect is normal  RESP: No cough, no audible wheezing, able to talk in full sentences  Remainder of exam unable to be completed due to telephone visits    Diagnostic Test Results:  Labs reviewed in Epic        Assessment/Plan:  ASSESSMENT / PLAN:  (L23.9) Allergic dermatitis  (primary encounter diagnosis)  Comment: still persistent and bothersome  I can't find dermatology report, but she will get this refaxed today and I will review  Plan: ongoing petroleum based ointments frequently, see AVS    (I10) Hypertension goal BP (blood pressure) < 140/90  Comment:   BP Readings from Last 3 Encounters:   04/14/20 139/74   03/30/20 (!) 174/88   10/15/19 130/70      Plan: at " goal, do not restart losartan    Patient Instructions   I recommend taking benedryl (diphenhydramine) at night.  Switch laundry detergents, and double rinse laundry (sheets and nightgown).   Continue using vanicream, aquaphor, vaseline.  Cooler temperatures are better than warmer.        Return in about 3 days (around 4/27/2020) for follow up if not improving; recommend face to face visit.      Phone call duration:  25 minutes    Lea Lopez MD

## 2020-04-29 ENCOUNTER — TELEPHONE (OUTPATIENT)
Dept: FAMILY MEDICINE | Facility: CLINIC | Age: 80
End: 2020-04-29

## 2020-04-29 NOTE — TELEPHONE ENCOUNTER
Patient called stating she was told Dr Lopez wanted to see her this week but there are no openings     Patient is asking if Dr Lopez can still see her?     Patient is aware Dr Lopez in not in today     Please advise and ok to send [employer] message as well

## 2020-04-29 NOTE — TELEPHONE ENCOUNTER
,    THere was an opening on Friday with Jessica. I put her on at 3:20. If this does not work for her she has to see another face to face here this week or go to .     Please call her            Thank you,  Josefina Trejo RN

## 2020-04-30 NOTE — TELEPHONE ENCOUNTER
Pathology report was received and was put in scanning in her chart this morning. Called and let pt know we did receive it.     Printed it out and showed Dr Lopez.      Leanna Ferguson, CMA

## 2020-05-01 ENCOUNTER — OFFICE VISIT (OUTPATIENT)
Dept: FAMILY MEDICINE | Facility: CLINIC | Age: 80
End: 2020-05-01
Payer: COMMERCIAL

## 2020-05-01 VITALS
OXYGEN SATURATION: 97 % | RESPIRATION RATE: 16 BRPM | HEART RATE: 70 BPM | SYSTOLIC BLOOD PRESSURE: 136 MMHG | DIASTOLIC BLOOD PRESSURE: 78 MMHG | WEIGHT: 170 LBS | TEMPERATURE: 98.4 F | BODY MASS INDEX: 31.09 KG/M2

## 2020-05-01 DIAGNOSIS — L30.9 DERMATITIS: ICD-10-CM

## 2020-05-01 DIAGNOSIS — I10 HYPERTENSION GOAL BP (BLOOD PRESSURE) < 140/90: Primary | ICD-10-CM

## 2020-05-01 PROCEDURE — 99214 OFFICE O/P EST MOD 30 MIN: CPT | Performed by: FAMILY MEDICINE

## 2020-05-01 RX ORDER — NIFEDIPINE 30 MG
30 TABLET, EXTENDED RELEASE ORAL AT BEDTIME
Qty: 30 TABLET | Refills: 1 | Status: SHIPPED | OUTPATIENT
Start: 2020-05-01 | End: 2020-05-26

## 2020-05-01 NOTE — PATIENT INSTRUCTIONS
Use emollient frequently that is a cream, ointment or gel such as white crisco, hydrolatum, vanicream, vaseline, aquaphor, eucerin, olive oil, or lanolin.  Do not lotions as these may end up being more drying. Apply immediately after bathing (within 3 minutes) to seal in moisture.   Use  hypoallergenic detergents and sensitive skin soap.      Continue using vanicream and aquaphor.        Patient Education     Patient Education    Verapamil Hydrochloride Modified-release tablet    Verapamil Hydrochloride Oral capsule, extended-release    Verapamil Hydrochloride Oral capsule, modified-release    Verapamil Hydrochloride Oral tablet    Verapamil Hydrochloride Oral tablet, extended-release    Verapamil Hydrochloride Solution for injection  Verapamil Hydrochloride Oral tablet  What is this medicine?  VERAPAMIL (fransisca AP a mil) is a calcium-channel blocker. It affects the amount of calcium found in your heart and muscle cells. This relaxes your blood vessels, which can reduce the amount of work the heart has to do. This medicine is used to treat chest pain caused by angina, high blood pressure, and controls heart rate in certain conditions.  This medicine may be used for other purposes; ask your health care provider or pharmacist if you have questions.  What should I tell my health care provider before I take this medicine?  They need to know if you have any of these conditions:    heart or blood vessel disease    heart rhythm disturbances such as sick sinus syndrome, ventricular arrhythmias, Sherwin-Parkinson-White syndrome, or Olpw-Rhhvjl-Mcikup syndrome    liver or kidney disease    low blood pressure    an unusual or allergic reaction to verapamil, other medicines, foods, dyes, or preservatives    pregnant or trying to get pregnant    breast-feeding  How should I use this medicine?  Take this medicine by mouth with a glass of water. Follow the directions on the prescription label. This medicine can be taken with or without  food. Take your doses at regular intervals. Do not take your medicine more often than directed.  Talk to your pediatrician regarding the use of this medicine in children. Special care may be needed.  Overdosage: If you think you have taken too much of this medicine contact a poison control center or emergency room at once.  NOTE: This medicine is only for you. Do not share this medicine with others.  What if I miss a dose?  If you miss a dose, take it as soon as you can. If it is almost time for your next dose, take only that dose. Do not take double or extra doses.  What may interact with this medicine?  Do not take this medicine with any of the following:    cisapride    disopyramide    dofetilide    grapefruit juice    hawthorn    pimozide    red yeast rice  This medicine may also interact with the following medications:    barbiturates such as phenobarbital    cimetidine    cyclosporine    lithium    local anesthetics or general anesthetics    medicines for heart rhythm problems like amiodarone, digoxin, flecainide, procainamide, quinidine    medicines for high blood pressure or heart problems    medicines for seizures like carbamazepine and phenytoin    rifampin, rifabutin or rifapentine    theophylline or aminophylline  This list may not describe all possible interactions. Give your health care provider a list of all the medicines, herbs, non-prescription drugs, or dietary supplements you use. Also tell them if you smoke, drink alcohol, or use illegal drugs. Some items may interact with your medicine.  What should I watch for while using this medicine?  Check your blood pressure and pulse rate regularly. Ask your doctor or health care professional what your blood pressure and pulse rate should be and when you should contact him or her. Do not suddenly stop taking this medicine. Ask your doctor or health care professional how to gradually reduce the dose.  You may get drowsy or dizzy. Do not drive, use  machinery, or do anything that needs mental alertness until you know how this medicine affects you. Do not stand or sit up quickly, especially if you are an older patient. This reduces the risk of dizzy or fainting spells. Alcohol may interfere with the effect of this medicine. Avoid alcoholic drinks.  What side effects may I notice from receiving this medicine?  Side effects that you should report to your doctor or health care professional as soon as possible:    difficulty breathing    dizziness or light headedness    fainting    fast heartbeat, palpitations, irregular heartbeat, or chest pain    skin rash    slow heartbeat    swelling of the legs or ankles  Side effects that usually do not require medical attention (report to your doctor or health care professional if they continue or are bothersome):    constipation    facial flushing    headache    nausea, vomiting    sexual dysfunction    weakness or tiredness  This list may not describe all possible side effects. Call your doctor for medical advice about side effects. You may report side effects to FDA at 7-035-GWW-8766.  Where should I keep my medicine?  Keep out of the reach of children.  Store at room temperature between 15 and 25 degrees C (59 and 77 degrees F). Protect from light. Keep container tightly closed. Throw away any unused medicine after the expiration date.  NOTE:This sheet is a summary. It may not cover all possible information. If you have questions about this medicine, talk to your doctor, pharmacist, or health care provider. Copyright  2016 [employer]

## 2020-05-12 ENCOUNTER — VIRTUAL VISIT (OUTPATIENT)
Dept: FAMILY MEDICINE | Facility: CLINIC | Age: 80
End: 2020-05-12
Payer: COMMERCIAL

## 2020-05-12 DIAGNOSIS — L23.9 ALLERGIC DERMATITIS: Primary | ICD-10-CM

## 2020-05-12 PROCEDURE — 99213 OFFICE O/P EST LOW 20 MIN: CPT | Mod: 95 | Performed by: FAMILY MEDICINE

## 2020-05-12 NOTE — PROGRESS NOTES
"Kelly Barnes is a 80 year old female who is being evaluated via a billable telephone visit.      The patient has been notified of following:     \"This telephone visit will be conducted via a call between you and your physician/provider. We have found that certain health care needs can be provided without the need for a physical exam.  This service lets us provide the care you need with a short phone conversation.  If a prescription is necessary we can send it directly to your pharmacy.  If lab work is needed we can place an order for that and you can then stop by our lab to have the test done at a later time.    Telephone visits are billed at different rates depending on your insurance coverage. During this emergency period, for some insurers they may be billed the same as an in-person visit.  Please reach out to your insurance provider with any questions.    If during the course of the call the physician/provider feels a telephone visit is not appropriate, you will not be charged for this service.\"    Patient has given verbal consent for Telephone visit?  Yes    What phone number would you like to be contacted at? [phone]    How would you like to obtain your AVS? Oliva Bolton     Kelly Barnes is a 80 year old female who presents to clinic today for the following health issues:    HPI  Rash      Duration: 4 months of suspected drug related dermatitis, has not responded to discontinuing losartan and metoprolol.    She feels she gets redness at night since starting nifedipine. She's also felt chilled at night, notes temps of 96.2 since starting nifedipine. Her blood pressure is at goal.    She's discussed this with dermatologist who advised waiting 6-8 weeks after stopping any one agent to really know if it's the causative agent. She stopped metoprolol one week ago and losartan 3 weeks ago.    Rash is worse at the end of the day.    Description  Location: back, and chest   Itching: " severe    Intensity:  severe    Accompanying signs and symptoms: None    History (similar episodes/previous evaluation):     Precipitating or alleviating factors:  New exposures:  None and medication Nifedipine  Recent travel: no      Therapies tried and outcome: topical steroid - unknown name    Cool showers work the best      Patient Active Problem List   Diagnosis     Allergic rhinitis     Osteopenia     Obesity     Hypertension goal BP (blood pressure) < 140/90     Hyperlipidemia LDL goal <100     Leg edema, left     Atypical chest pain     Dizzy spells     Obesity, Class I, BMI 30-34.9     Aortic stenosis     Type 2 diabetes mellitus without complication, without long-term current use of insulin (H)     Musculoskeletal chest pain     Nonrheumatic aortic valve stenosis     Family history of breast cancer in sister     Hepatitis A immune     Left leg swelling     Allergic dermatitis     Dermatitis     Past Surgical History:   Procedure Laterality Date     C NONSPECIFIC PROCEDURE      none       Social History     Tobacco Use     Smoking status: Never Smoker     Smokeless tobacco: Never Used   Substance Use Topics     Alcohol use: Yes     Alcohol/week: 10.0 standard drinks     Comment: 1-2 drinks per week     Family History   Problem Relation Age of Onset     Hypertension Mother      Diabetes No family hx of      Cerebrovascular Disease No family hx of      Breast Cancer No family hx of      Cancer - colorectal No family hx of      Prostate Cancer No family hx of          Current Outpatient Medications   Medication Sig Dispense Refill     ASPIRIN 81 MG OR TABS ONE DAILY 100 3     blood glucose (ONETOUCH ULTRA) test strip Use to test blood sugar twice daily. Dispense 1 box of 200 test strips, #3 refills. 1 Box 3     blood glucose monitor KIT 1 kit daily. 1 kit 0     blood glucose monitoring (ONE TOUCH ULTRA 2) meter device kit Use to test blood sugar 3 times daily 1 kit 0     metFORMIN (GLUCOPHAGE) 500 MG tablet  Take 2 tablets in the morning and one tablet at night 270 tablet 3     NIFEdipine ER (ADALAT CC) 30 MG 24 hr tablet Take 1 tablet (30 mg) by mouth At Bedtime 30 tablet 1     ONE TOUCH LANCETS MISC        ONETOUCH DELICA LANCETS 33G MISC 1 Device by In Vitro route 2 times daily 100 each 11     simvastatin (ZOCOR) 10 MG tablet TAKE ONE TABLET BY MOUTH AT BEDTIME 90 tablet 3     Allergies   Allergen Reactions     Losartan Rash     Metoprolol Rash     Ibuprofen      numbness in hands and feet     Recent Labs   Lab Test 10/15/19  0920 10/25/18  0920 10/26/17  0808 03/28/17  0941   A1C 7.6* 6.7* 7.3* 8.4*   LDL 94 126*  --  131*   HDL 73 74  --  67   TRIG 90 76  --  83   ALT 21 18  --  18   CR 0.78 0.82  --  0.70   GFRESTIMATED 72 67  --  81   GFRESTBLACK 84 82  --  >90   GFR Calc     POTASSIUM 4.6 4.3  --  4.2   TSH 1.11  --  1.52  --       BP Readings from Last 3 Encounters:   05/01/20 136/78   04/14/20 139/74   03/30/20 (!) 174/88    Wt Readings from Last 3 Encounters:   05/01/20 77.1 kg (170 lb)   04/24/20 74.8 kg (165 lb)   04/23/20 75.8 kg (167 lb)                    Reviewed and updated as needed this visit by Provider         Review of Systems   Constitutional, HEENT, cardiovascular, pulmonary, GI, , musculoskeletal, neuro, skin, endocrine and psych systems are negative, except as otherwise noted.       Objective   Reported vitals:  There were no vitals taken for this visit.   healthy, alert and no distress  PSYCH: Alert and oriented times 3; coherent speech, normal   rate and volume, able to articulate logical thoughts, able   to abstract reason, no tangential thoughts, no hallucinations   or delusions  Her affect is normal  RESP: No cough, no audible wheezing, able to talk in full sentences  Remainder of exam unable to be completed due to telephone visits    Diagnostic Test Results:  Labs reviewed in Epic        Assessment/Plan:  1. Allergic dermatitis  Not responding to discontinuation of  "medications as noted above, see HPI. Options include going ahead and discontinuing metformin, but she prefers not to do this as she doesn't want to take an alternate diabetic agent unless she has to do this, and follow up with dermatology for possible laser treatments. I discussed \"wet to dry\" pajama option (bath, cover body with vaseline, dress in wet pajamas, then cover with dry pajamas). She feels she can wait another 2 weeks to see if symptoms subside. Return to clinic 2 weeks for follow up.      Return in about 2 weeks (around 5/26/2020) for follow up if dermatitis not improving; face to face visit.      Phone call duration:  15 minutes    Lea Lopez MD          "

## 2020-05-26 ENCOUNTER — OFFICE VISIT (OUTPATIENT)
Dept: FAMILY MEDICINE | Facility: CLINIC | Age: 80
End: 2020-05-26
Payer: COMMERCIAL

## 2020-05-26 VITALS
BODY MASS INDEX: 30.73 KG/M2 | WEIGHT: 168 LBS | RESPIRATION RATE: 24 BRPM | OXYGEN SATURATION: 98 % | DIASTOLIC BLOOD PRESSURE: 88 MMHG | HEART RATE: 88 BPM | SYSTOLIC BLOOD PRESSURE: 142 MMHG

## 2020-05-26 DIAGNOSIS — L23.9 ALLERGIC DERMATITIS: Primary | ICD-10-CM

## 2020-05-26 DIAGNOSIS — I10 HYPERTENSION GOAL BP (BLOOD PRESSURE) < 140/90: ICD-10-CM

## 2020-05-26 DIAGNOSIS — E11.9 TYPE 2 DIABETES MELLITUS WITHOUT COMPLICATION, WITHOUT LONG-TERM CURRENT USE OF INSULIN (H): ICD-10-CM

## 2020-05-26 PROCEDURE — 99214 OFFICE O/P EST MOD 30 MIN: CPT | Performed by: FAMILY MEDICINE

## 2020-05-26 RX ORDER — NIFEDIPINE 30 MG
30 TABLET, EXTENDED RELEASE ORAL AT BEDTIME
Qty: 30 TABLET | Refills: 3 | Status: SHIPPED | OUTPATIENT
Start: 2020-05-26 | End: 2020-07-09

## 2020-05-26 NOTE — PROGRESS NOTES
Subjective     Kelly Barnes is a 80 year old female who presents to clinic today for the following health issues:    In to follow up on previous visits about allergic reaction  Subjective     Kelly Barnes is a 80 year old female who presents to clinic today for the following health issues:    HPI   Rash      Duration: 2 months     She's been off losartan for almost 5 weeks (stopped 4/23/20) and stopped metoprolol 5/1/20 (4+ weeks), no difference yet    She's also had #4 laser treatments provided by dermatologist with no response    Heat/humidity seems to make itching worse    Description  Location: back  Itching: severe    Intensity:  severe    Accompanying signs and symptoms: spotty, patchy and dry.    History (similar episodes/previous evaluation): None    Precipitating or alleviating factors:  New exposures:  None   Recent travel: no      Therapies tried and outcome: tacrolimus from derm.    Hypertension Follow-up      Do you check your blood pressure regularly outside of the clinic? Yes     Switched to nifedipine due to possible dermatitis response to metoprolold    She does get red legs in the morning, resolves with exercise and blood pressure has been under control    swAre you following a low salt diet? Yes    Are your blood pressures ever more than 140 on the top number (systolic) OR more   than 90 on the bottom number (diastolic), for example 140/90? No  142/88 today  138/75 at home, 132/69 last week  BP Readings from Last 3 Encounters:   05/01/20 136/78   04/14/20 139/74   03/30/20 (!) 174/88      Diabetes Follow-up    How often are you checking your blood sugar? Two times daily  Blood sugar testing frequency justification:  Adjustment of medication(s)   Am fasting 180's, but 140's after eating, takes metformin with breakfast and dinner, eats regular meals  No bedtime snacking, getting regular exercise  Have you had any blood sugars above 200?  No  Have you had any blood sugars below 70?  No    What  symptoms do you notice when your blood sugar is low?  Has never gotten low    What concerns do you have today about your diabetes? None and Other: may have metformin related dermatitis     Do you have any of these symptoms? (Select all that apply)  No numbness or tingling in feet.  No redness, sores or blisters on feet.  No complaints of excessive thirst.  No reports of blurry vision.  No significant changes to weight.    Have you had a diabetic eye exam in the last 12 months? Yes-  Location: schedule for June        BP Readings from Last 2 Encounters:   05/26/20 (!) 142/88   05/01/20 136/78     Hemoglobin A1C (%)   Date Value   10/15/2019 7.6 (H)   10/25/2018 6.7 (H)     LDL Cholesterol Calculated (mg/dL)   Date Value   10/15/2019 94   10/25/2018 126 (H)       Patient Active Problem List   Diagnosis     Allergic rhinitis     Osteopenia     Obesity     Hypertension goal BP (blood pressure) < 140/90     Hyperlipidemia LDL goal <100     Leg edema, left     Atypical chest pain     Dizzy spells     Obesity, Class I, BMI 30-34.9     Aortic stenosis     Type 2 diabetes mellitus without complication, without long-term current use of insulin (H)     Musculoskeletal chest pain     Nonrheumatic aortic valve stenosis     Family history of breast cancer in sister     Hepatitis A immune     Left leg swelling     Allergic dermatitis     Dermatitis     Past Surgical History:   Procedure Laterality Date     C NONSPECIFIC PROCEDURE      none       Social History     Tobacco Use     Smoking status: Never Smoker     Smokeless tobacco: Never Used   Substance Use Topics     Alcohol use: Yes     Alcohol/week: 10.0 standard drinks     Comment: 1-2 drinks per week     Family History   Problem Relation Age of Onset     Hypertension Mother      Diabetes No family hx of      Cerebrovascular Disease No family hx of      Breast Cancer No family hx of      Cancer - colorectal No family hx of      Prostate Cancer No family hx of          Current  Outpatient Medications   Medication Sig Dispense Refill     NIFEdipine ER (ADALAT CC) 30 MG 24 hr tablet Take 1 tablet (30 mg) by mouth At Bedtime 30 tablet 1     ASPIRIN 81 MG OR TABS ONE DAILY 100 3     blood glucose (ONETOUCH ULTRA) test strip Use to test blood sugar twice daily. Dispense 1 box of 200 test strips, #3 refills. 1 Box 3     blood glucose monitor KIT 1 kit daily. 1 kit 0     blood glucose monitoring (ONE TOUCH ULTRA 2) meter device kit Use to test blood sugar 3 times daily 1 kit 0     fluticasone (FLONASE) 50 MCG/ACT nasal spray Spray 2 sprays into both nostrils daily (Patient not taking: Reported on 7/3/2019) 1 Package 1     metFORMIN (GLUCOPHAGE) 500 MG tablet Take 2 tablets in the morning and one tablet at night 270 tablet 3     ONE TOUCH LANCETS MISC        ONETOUCH DELICA LANCETS 33G MISC 1 Device by In Vitro route 2 times daily 100 each 11     simvastatin (ZOCOR) 10 MG tablet TAKE ONE TABLET BY MOUTH AT BEDTIME 90 tablet 3     Allergies   Allergen Reactions     Losartan Rash     Metoprolol Rash     Ibuprofen      numbness in hands and feet     Recent Labs   Lab Test 10/15/19  0920 10/25/18  0920 10/26/17  0808 03/28/17  0941   A1C 7.6* 6.7* 7.3* 8.4*   LDL 94 126*  --  131*   HDL 73 74  --  67   TRIG 90 76  --  83   ALT 21 18  --  18   CR 0.78 0.82  --  0.70   GFRESTIMATED 72 67  --  81   GFRESTBLACK 84 82  --  >90   GFR Calc     POTASSIUM 4.6 4.3  --  4.2   TSH 1.11  --  1.52  --       BP Readings from Last 3 Encounters:   05/01/20 136/78   04/14/20 139/74   03/30/20 (!) 174/88    Wt Readings from Last 3 Encounters:   05/01/20 77.1 kg (170 lb)   04/24/20 74.8 kg (165 lb)   04/23/20 75.8 kg (167 lb)                 Reviewed and updated as needed this visit by Provider  Allergies         Review of Systems   ROS COMP: Constitutional, HEENT, cardiovascular, pulmonary, GI, , musculoskeletal, neuro, skin, endocrine and psych systems are negative, except as otherwise noted.       Objective    /78 (BP Location: Right arm, Patient Position: Sitting, Cuff Size: Adult Regular)   Pulse 70   Temp 98.4  F (36.9  C) (Tympanic)   Resp 16   Wt 77.1 kg (170 lb)   SpO2 97%   BMI 31.09 kg/m    Body mass index is 31.09 kg/m .  Physical Exam   GENERAL: healthy, alert and no distress  RESP: no respiratory distress, normal air movement  SKIN: rough, sandpaper erythematous rash of lower back, bilateral, appears slightly more confluent than last visit NEURO: Normal strength and tone, mentation intact and speech normal  PSYCH: mentation appears normal, affect normal/bright    Diagnostic Test Results:  Labs reviewed in Epic  Dermatology report reviewed      Assessment & Plan     1. Hypertension goal BP (blood pressure) < 140/90  At goal but needed to discontinue metoprolol as possible dermatitis causative agent  Doing well on current dose of nifedipine  - NIFEdipine ER (ADALAT CC) 30 MG 24 hr tablet; Take 1 tablet (30 mg) by mouth At Bedtime  Dispense: 30 tablet; Refill: 1    2. Dermatitis  Dermatologist felt findings most consistent with drug allergy, but rash not responding to discontinue of metoprolol and losartan, although response after cessation can take 4-6 weeks. Laser treatments haven't seemed to help dramatically. I do feel that most likely will need to discontinue metformin and figure out alternative diabetic agent, most likely glimepiride as this appears to be a covered agent. She will wait 2 more weeks to see if rash improves and will let me know if she wants to discontinue metformin and start glimiperide.    3. Type 2 diabetes  See possible medication change above. For now, given am fasting blood sugars not at goal, will switch dosing to 500 qam and 1000 with dinner.  Eye exam scheduled next month, I asked her to have ophthalmologist to sent report to me.         call in about 2 weeks (around 5/15/2020) for dermatitis follow up and will possible change diabetic oral agents as  above.    Lea Lopez MD  [facility]

## 2020-05-26 NOTE — PATIENT INSTRUCTIONS
Patient Education     Glimepiride Oral tablet  What is this medicine?  GLIMEPIRIDE (GLYE me [place] ride) helps to treat type 2 diabetes. Treatment is combined with diet and exercise. This medicine helps your body use insulin better.  This medicine may be used for other purposes; ask your health care provider or pharmacist if you have questions.  What should I tell my health care provider before I take this medicine?  They need to know if you have any of these conditions:    diabetic ketoacidosis    glucose-6-phosphate dehydrogenase deficiency    heart disease    kidney disease    liver disease    severe infection or injury    thyroid disease    an unusual or allergic reaction to glimepiride, sulfa drugs, other medicines, foods, dyes, or preservatives    pregnancy or recent attempts to get pregnant    breast-feeding  How should I use this medicine?  Take this medicine by mouth. Swallow with a drink of water. Follow the directions on the prescription label. Take your dose at the same time each day, with breakfast or your first large meal. Do not take more often than directed.  Talk to your pediatrician regarding the use of this medicine in children. Special care may be needed.  Elderly patients over 65 years old can have a stronger reaction and need a smaller dose.  Overdosage: If you think you have taken too much of this medicine contact a poison control center or emergency room at once.  NOTE: This medicine is only for you. Do not share this medicine with others.  What if I miss a dose?  If you miss a dose, take it as soon as you can. If it is almost time for your next dose, take only that dose. Do not take double or extra doses.  What may interact with this medicine?    bosentan    chloramphenicol    cisapride    clarithromycin    medicines for fungal or yeast infections    metoclopramide    probenecid    warfarin  Many medications may cause an increase or decrease in blood sugar, these include:    alcohol  containing beverages    aspirin and aspirin-like drugs    chloramphenicol    chromium    female hormones, like estrogens or progestins and birth control pills    fluoxetine    heart medicines like disopyramide    isoniazid    male hormones or anabolic steroids    medicines called MAO Inhibitors like Nardil, Parnate, Marplan, Eldepryl    medicines for allergies, asthma, cold, or cough    medicines for mental problems    medicines for weight loss    niacin    NSAIDs, medicines for pain and inflammation, like ibuprofen or naproxen    pentamidine    phenytoin    probenecid    quinolone antibiotics like ciprofloxacin, levofloxacin, ofloxacin    some herbal dietary supplements    steroid medicines like prednisone or cortisone    thyroid medicine    water pills or diuretics  This list may not describe all possible interactions. Give your health care provider a list of all the medicines, herbs, non-prescription drugs, or dietary supplements you use. Also tell them if you smoke, drink alcohol, or use illegal drugs. Some items may interact with your medicine.  What should I watch for while using this medicine?  Visit your doctor or health care professional for regular checks on your progress.  A test called the HbA1C (A1C) will be monitored. This is a simple blood test. It measures your blood sugar control over the last 2 to 3 months. You will receive this test every 3 to 6 months.  Learn how to check your blood sugar. Learn the symptoms of low and high blood sugar and how to manage them.  Always carry a quick-source of sugar with you in case you have symptoms of low blood sugar. Examples include hard sugar candy or glucose tablets. Make sure others know that you can choke if you eat or drink when you develop serious symptoms of low blood sugar, such as seizures or unconsciousness. They must get medical help at once.  Tell your doctor or health care professional if you have high blood sugar. You might need to change the dose  of your medicine. If you are sick or exercising more than usual, you might need to change the dose of your medicine.  Do not skip meals. Ask your doctor or health care professional if you should avoid alcohol. Many nonprescription cough and cold products contain sugar or alcohol. These can affect blood sugar.  This medicine can make you more sensitive to the sun. Keep out of the sun. If you cannot avoid being in the sun, wear protective clothing and use sunscreen. Do not use sun lamps or tanning beds/booths.  Wear a medical ID bracelet or chain, and carry a card that describes your disease and details of your medicine and dosage times.  What side effects may I notice from receiving this medicine?  Side effects that you should report to your doctor or health care professional as soon as possible:    allergic reactions like skin rash, itching or hives, swelling of the face, lips, or tongue    breathing problems    dark urine    fever, chills, sore throat    signs and symptoms of low blood sugar such as feeling anxious, confusion, dizziness, increased hunger, unusually weak or tired, sweating, shakiness, cold, irritable, headache, blurred vision, fast heartbeat, loss of consciousness    unusual bleeding or bruising    yellowing of the eyes or skin  Side effects that usually do not require medical attention (report to your doctor or health care professional if they continue or are bothersome):    diarrhea    dizziness    headache    heartburn    nausea    stomach gas  This list may not describe all possible side effects. Call your doctor for medical advice about side effects. You may report side effects to FDA at 7-799-FDA-8670.  Where should I keep my medicine?  Keep out of the reach of children.  Store at room temperature below 30 degrees C (86 degrees F). Throw away any unused medicine after the expiration date.  NOTE:This sheet is a summary. It may not cover all possible information. If you have questions about this  medicine, talk to your doctor, pharmacist, or health care provider. Copyright  2016 [employer]

## 2020-06-09 ENCOUNTER — VIRTUAL VISIT (OUTPATIENT)
Dept: FAMILY MEDICINE | Facility: CLINIC | Age: 80
End: 2020-06-09
Payer: COMMERCIAL

## 2020-06-09 DIAGNOSIS — E11.9 TYPE 2 DIABETES MELLITUS WITHOUT COMPLICATION, WITHOUT LONG-TERM CURRENT USE OF INSULIN (H): Primary | ICD-10-CM

## 2020-06-09 DIAGNOSIS — L23.9 ALLERGIC DERMATITIS: ICD-10-CM

## 2020-06-09 PROCEDURE — 99213 OFFICE O/P EST LOW 20 MIN: CPT | Mod: 95 | Performed by: FAMILY MEDICINE

## 2020-06-09 NOTE — PATIENT INSTRUCTIONS
1. Type 2 diabetes mellitus without complication, without long-term current use of insulin (H)  Not really at goal right now and with possible metformin dermatitis.   Wait one more week to see if dermatitis improves. If it does, at that point consider increasing metformin to 1000 mg (2 tablets) both morning and evening.    2. Allergic dermatitis  If dermatitis doesn't improve or worsens, I recommend switching from metformin to glimipiride. You can just send a [employer] message with your decision and your pharmacy and I'll send in a prescription.     If dermatitis still doesn't improve after stopping metformin, I would recommend follow up with your dermatologist.

## 2020-06-09 NOTE — PROGRESS NOTES
"Kelly Barnes is a 80 year old female who is being evaluated via a billable telephone visit.      The patient has been notified of following:     \"This telephone visit will be conducted via a call between you and your physician/provider. We have found that certain health care needs can be provided without the need for a physical exam.  This service lets us provide the care you need with a short phone conversation.  If a prescription is necessary we can send it directly to your pharmacy.  If lab work is needed we can place an order for that and you can then stop by our lab to have the test done at a later time.    Telephone visits are billed at different rates depending on your insurance coverage. During this emergency period, for some insurers they may be billed the same as an in-person visit.  Please reach out to your insurance provider with any questions.    If during the course of the call the physician/provider feels a telephone visit is not appropriate, you will not be charged for this service.\"    Patient has given verbal consent for Telephone visit?  Yes    What phone number would you like to be contacted at? [phone]    How would you like to obtain your AVS? Oliva Bolton     Kelly Barnes is a 80 year old female who presents via phone visit today for the following health issues:    HPI  Pt want to discuss/review her medication due to rash that has now decrease.  Rash       Duration: 2 1/2 months     She's been off losartan for almost 5 weeks (stopped 4/23/20) and stopped metoprolol 5/1/20 (4+ weeks), no difference yet    She's also had #4 laser treatments provided by dermatologist with no response    Heat/humidity seems to make itching worse    Description  Location: back  Itching: severe    Intensity:  severe    Accompanying signs and symptoms: spotty, patchy and dry.    History (similar episodes/previous evaluation): None    Precipitating or alleviating factors:  New exposures:  None "   Recent travel: no      Therapies tried and outcome: tacrolimus from derm.    Diabetes Follow-up       How often are you checking your blood sugar? Two times daily    Blood sugar testing frequency justification:  Adjustment of medication(s)     Am fasting 180's, but 140's after eating, takes metformin with breakfast and dinner, 2 at night and one in the morning, she reports that she eats regular meals    No bedtime snacking, getting regular exercise    Have you had any blood sugars above 200?  No    Have you had any blood sugars below 70?  No    What symptoms do you notice when your blood sugar is low?  Has never gotten low    What concerns do you have today about your diabetes? None and Other: may have metformin related dermatitis     Do you have any of these symptoms? (Select all that apply)  No numbness or tingling in feet.  No redness, sores or blisters on feet.  No complaints of excessive thirst.  No reports of blurry vision.  No significant changes to weight.    Have you had a diabetic eye exam in the last 12 months? Yes-  Location: she will schedule for June    Patient Active Problem List   Diagnosis     Allergic rhinitis     Osteopenia     Obesity     Hypertension goal BP (blood pressure) < 140/90     Hyperlipidemia LDL goal <100     Leg edema, left     Atypical chest pain     Dizzy spells     Obesity, Class I, BMI 30-34.9     Aortic stenosis     Type 2 diabetes mellitus without complication, without long-term current use of insulin (H)     Musculoskeletal chest pain     Nonrheumatic aortic valve stenosis     Family history of breast cancer in sister     Hepatitis A immune     Left leg swelling     Allergic dermatitis     Dermatitis     Past Surgical History:   Procedure Laterality Date     C NONSPECIFIC PROCEDURE      none       Social History     Tobacco Use     Smoking status: Never Smoker     Smokeless tobacco: Never Used   Substance Use Topics     Alcohol use: Yes     Alcohol/week: 10.0 standard drinks      Comment: 1-2 drinks per week     Family History   Problem Relation Age of Onset     Hypertension Mother      Diabetes No family hx of      Cerebrovascular Disease No family hx of      Breast Cancer No family hx of      Cancer - colorectal No family hx of      Prostate Cancer No family hx of          Current Outpatient Medications   Medication Sig Dispense Refill     ASPIRIN 81 MG OR TABS ONE DAILY 100 3     blood glucose (ONETOUCH ULTRA) test strip Use to test blood sugar twice daily. Dispense 1 box of 200 test strips, #3 refills. 1 Box 3     blood glucose monitor KIT 1 kit daily. 1 kit 0     blood glucose monitoring (ONE TOUCH ULTRA 2) meter device kit Use to test blood sugar 3 times daily 1 kit 0     metFORMIN (GLUCOPHAGE) 500 MG tablet Take 2 tablets in the morning and one tablet at night 270 tablet 3     NIFEdipine ER (ADALAT CC) 30 MG 24 hr tablet Take 1 tablet (30 mg) by mouth At Bedtime 30 tablet 3     ONE TOUCH LANCETS MISC        ONETOUCH DELICA LANCETS 33G MISC 1 Device by In Vitro route 2 times daily 100 each 11     simvastatin (ZOCOR) 10 MG tablet TAKE ONE TABLET BY MOUTH AT BEDTIME 90 tablet 3     Allergies   Allergen Reactions     Losartan Rash     Metoprolol Rash     Ibuprofen      numbness in hands and feet     Recent Labs   Lab Test 10/15/19  0920 10/25/18  0920 10/26/17  0808 03/28/17  0941   A1C 7.6* 6.7* 7.3* 8.4*   LDL 94 126*  --  131*   HDL 73 74  --  67   TRIG 90 76  --  83   ALT 21 18  --  18   CR 0.78 0.82  --  0.70   GFRESTIMATED 72 67  --  81   GFRESTBLACK 84 82  --  >90   GFR Calc     POTASSIUM 4.6 4.3  --  4.2   TSH 1.11  --  1.52  --       BP Readings from Last 3 Encounters:   05/26/20 (!) 142/88   05/01/20 136/78   04/14/20 139/74    Wt Readings from Last 3 Encounters:   05/26/20 76.2 kg (168 lb)   05/01/20 77.1 kg (170 lb)   04/24/20 74.8 kg (165 lb)                    Reviewed and updated as needed this visit by Provider         Review of Systems    Constitutional, HEENT, cardiovascular, pulmonary, GI, , musculoskeletal, neuro, skin, endocrine and psych systems are negative, except as otherwise noted.       Objective   Reported vitals:  There were no vitals taken for this visit.   healthy, alert and no distress  PSYCH: Alert and oriented times 3; coherent speech, normal   rate and volume, able to articulate logical thoughts, able   to abstract reason, no tangential thoughts, no hallucinations   or delusions  Her affect is normal  RESP: No cough, no audible wheezing, able to talk in full sentences  Remainder of exam unable to be completed due to telephone visits    Diagnostic Test Results:  Labs reviewed in Epic        Assessment/Plan:  1. Type 2 diabetes mellitus without complication, without long-term current use of insulin (H)  Not really at goal right now and with possible metformin dermatitis. She wants to wait one more week to see if dermatitis improves. If it does, at that point she could consider increasing metformin to 1000 mg bid.    2. Allergic dermatitis  See above, if dermatitis doesn't improve or worsens, will switch from metformin to glimipiride. She can just send a [employer] message. If dermatitis still doesn't improve, follow up with dermatologist.    The patient indicates understanding of these issues and agrees with the plan.       Return in about 1 week (around 6/16/2020) for follow up. she will send [employer] message.      Phone call duration:  13 minutes    Lea Lopez MD

## 2020-06-18 ENCOUNTER — MYC MEDICAL ADVICE (OUTPATIENT)
Dept: FAMILY MEDICINE | Facility: CLINIC | Age: 80
End: 2020-06-18

## 2020-06-22 ENCOUNTER — TELEPHONE (OUTPATIENT)
Dept: FAMILY MEDICINE | Facility: CLINIC | Age: 80
End: 2020-06-22

## 2020-06-22 NOTE — TELEPHONE ENCOUNTER
Patient called back stating she would like to at least do a phone visit tomorrow with Dr Juarez if able and wants to speak to Dr Lopez only     Please advise

## 2020-06-22 NOTE — TELEPHONE ENCOUNTER
Reason for call:  Patient reporting a symptom    Symptom or request: pt states she has a rash, blood pressure and diabetic issues she would like to discuss and come in the clinic to be seen for    Duration (how long have symptoms been present): a week    Have you been treated for this before? No    Additional comments: asap     Phone Number patient can be reached at:  Home number on file [phone] (home)    Best Time:  asap    Can we leave a detailed message on this number:  YES    Call taken on 6/22/2020 at 7:25 AM by Neha Lewis

## 2020-06-22 NOTE — TELEPHONE ENCOUNTER
If patient calls back, she may be scheduled with Dr. Lopez this week and same day hold may be used.  See 6/118/20 MyChart encounter for further information.    Writer called patient: no answer and unable to leave voicemail.    Recall at another time or patient may return call to clinic.    MICHAEL DiggsN, RN

## 2020-06-25 ENCOUNTER — OFFICE VISIT (OUTPATIENT)
Dept: FAMILY MEDICINE | Facility: CLINIC | Age: 80
End: 2020-06-25
Payer: COMMERCIAL

## 2020-06-25 VITALS
RESPIRATION RATE: 16 BRPM | SYSTOLIC BLOOD PRESSURE: 146 MMHG | OXYGEN SATURATION: 98 % | BODY MASS INDEX: 29.41 KG/M2 | WEIGHT: 160.8 LBS | TEMPERATURE: 98.5 F | DIASTOLIC BLOOD PRESSURE: 84 MMHG | HEART RATE: 80 BPM

## 2020-06-25 DIAGNOSIS — Z80.3 FAMILY HISTORY OF BREAST CANCER IN SISTER: ICD-10-CM

## 2020-06-25 DIAGNOSIS — I10 HYPERTENSION GOAL BP (BLOOD PRESSURE) < 140/90: ICD-10-CM

## 2020-06-25 DIAGNOSIS — E11.9 TYPE 2 DIABETES MELLITUS WITHOUT COMPLICATION, WITHOUT LONG-TERM CURRENT USE OF INSULIN (H): Primary | ICD-10-CM

## 2020-06-25 PROCEDURE — 99214 OFFICE O/P EST MOD 30 MIN: CPT | Performed by: FAMILY MEDICINE

## 2020-06-25 RX ORDER — GLIMEPIRIDE 2 MG/1
2 TABLET ORAL
Qty: 30 TABLET | Refills: 1 | Status: SHIPPED | OUTPATIENT
Start: 2020-06-25 | End: 2020-07-09

## 2020-06-25 RX ORDER — METOPROLOL SUCCINATE 25 MG/1
25 TABLET, EXTENDED RELEASE ORAL DAILY
Qty: 30 TABLET | Refills: 0
Start: 2020-06-25 | End: 2020-07-09

## 2020-06-25 NOTE — PATIENT INSTRUCTIONS
Stop metformin. Start glimipiride, 2 mg every morning. If blood sugars are too high after about one week, increase to 4 mg every morning. Please let me know!    Shingles vaccine  I strongly recommend getting the shingles vaccine (Shingrix) if you are over age 50, but please check with your insurance to see how much you might have to pay for this vaccine! If you are on most insurance plans including Medicare, it's covered if you get it at a pharmacy but not in a clinic.    This shot will prevent shingles, a painful skin rash that you are at risk for getting if you have ever had chicken pox. Sometimes the pain will last the rest of your life, even after the rash has healed, and there is not much that we can do to relieve the pain. The vaccine is 98% effective at preventing shingles.    Please consider getting this vaccine! You'll need #2 vaccines 2-4 months apart to be protected.

## 2020-07-09 ENCOUNTER — VIRTUAL VISIT (OUTPATIENT)
Dept: FAMILY MEDICINE | Facility: CLINIC | Age: 80
End: 2020-07-09
Payer: COMMERCIAL

## 2020-07-09 DIAGNOSIS — E11.9 TYPE 2 DIABETES MELLITUS WITHOUT COMPLICATION, WITHOUT LONG-TERM CURRENT USE OF INSULIN (H): ICD-10-CM

## 2020-07-09 DIAGNOSIS — I10 HYPERTENSION GOAL BP (BLOOD PRESSURE) < 140/90: Primary | ICD-10-CM

## 2020-07-09 PROCEDURE — 99214 OFFICE O/P EST MOD 30 MIN: CPT | Mod: 95 | Performed by: FAMILY MEDICINE

## 2020-07-09 RX ORDER — METOPROLOL SUCCINATE 25 MG/1
25 TABLET, EXTENDED RELEASE ORAL 2 TIMES DAILY
Qty: 60 TABLET | Refills: 0
Start: 2020-07-09 | End: 2020-07-30

## 2020-07-09 RX ORDER — GLIMEPIRIDE 2 MG/1
2 TABLET ORAL
Qty: 90 TABLET | Refills: 3 | Status: SHIPPED | OUTPATIENT
Start: 2020-07-09 | End: 2020-10-20

## 2020-07-09 RX ORDER — NIFEDIPINE 30 MG
30 TABLET, EXTENDED RELEASE ORAL AT BEDTIME
Qty: 90 TABLET | Refills: 3 | Status: SHIPPED | OUTPATIENT
Start: 2020-07-09 | End: 2020-07-30 | Stop reason: SINTOL

## 2020-07-09 NOTE — PROGRESS NOTES
"Kelly Barnes is a 80 year old female who is being evaluated via a billable telephone visit.      The patient has been notified of following:     \"This telephone visit will be conducted via a call between you and your physician/provider. We have found that certain health care needs can be provided without the need for a physical exam.  This service lets us provide the care you need with a short phone conversation.  If a prescription is necessary we can send it directly to your pharmacy.  If lab work is needed we can place an order for that and you can then stop by our lab to have the test done at a later time.    Telephone visits are billed at different rates depending on your insurance coverage. During this emergency period, for some insurers they may be billed the same as an in-person visit.  Please reach out to your insurance provider with any questions.    If during the course of the call the physician/provider feels a telephone visit is not appropriate, you will not be charged for this service.\"    Patient has given verbal consent for Telephone visit?  Yes    What phone number would you like to be contacted at? [phone]    How would you like to obtain your AVS? Oliva Bolton     Kelly Barnes is a 80 year old female who presents via phone visit today for the following health issues:    HPI  Diabetes Follow-up    How often are you checking your blood sugar? Blood sugar testing frequency justification:  Adjustment of medication(s)l stopped metformin as may have been causing a rash, started glimepiride 6/25/2020  What time of day are you checking your blood sugars (select all that apply)?  Before meals   135 the am, lowest 92  Have you had any blood sugars above 200?  No  Have you had any blood sugars below 70?  No    What symptoms do you notice when your blood sugar is low?  None    What concerns do you have today about your diabetes? None     Do you have any of these symptoms? (Select all that " apply)  No numbness or tingling in feet.  No redness, sores or blisters on feet.  No complaints of excessive thirst.  No reports of blurry vision.  No significant changes to weight.      BP Readings from Last 2 Encounters:   06/25/20 (!) 146/84   05/26/20 (!) 142/88     Hemoglobin A1C (%)   Date Value   10/15/2019 7.6 (H)   10/25/2018 6.7 (H)     LDL Cholesterol Calculated (mg/dL)   Date Value   10/15/2019 94   10/25/2018 126 (H)         Hypertension Follow-up      Do you check your blood pressure regularly outside of the clinic? Yes   Two times daily; seems to go up during the day, so at goal first thing thing in the morning usually good, today 136/80, higher at night 155/84, DBP always < 90  She restarted metoprolol and takes it in the morning, takes nifedipine at night    Dermatitis  She'd stopped the blood pressure medications in the past due to dermatitis, but stopping these didn't help, so she's restarted,   Rash does seem to keep improving so it might have been metformin related.        Are you following a low salt diet? Yes    Are your blood pressures ever more than 140 on the top number (systolic) OR more   than 90 on the bottom number (diastolic), for example 140/90? Yes      How many servings of fruits and vegetables do you eat daily?  2-3    On average, how many sweetened beverages do you drink each day (Examples: soda, juice, sweet tea, etc.  Do NOT count diet or artificially sweetened beverages)?   0    How many days per week do you exercise enough to make your heart beat faster? 7    How many minutes a day do you exercise enough to make your heart beat faster? 20 - 29    How many days per week do you miss taking your medication? 0    Patient Active Problem List   Diagnosis     Allergic rhinitis     Osteopenia     Obesity     Hypertension goal BP (blood pressure) < 140/90     Hyperlipidemia LDL goal <100     Leg edema, left     Atypical chest pain     Dizzy spells     Obesity, Class I, BMI 30-34.9      Aortic stenosis     Type 2 diabetes mellitus without complication, without long-term current use of insulin (H)     Musculoskeletal chest pain     Nonrheumatic aortic valve stenosis     Family history of breast cancer in sister     Hepatitis A immune     Left leg swelling     Allergic dermatitis     Dermatitis     Past Surgical History:   Procedure Laterality Date     C NONSPECIFIC PROCEDURE      none       Social History     Tobacco Use     Smoking status: Never Smoker     Smokeless tobacco: Never Used   Substance Use Topics     Alcohol use: Yes     Alcohol/week: 10.0 standard drinks     Comment: 1-2 drinks per week     Family History   Problem Relation Age of Onset     Hypertension Mother      Diabetes No family hx of      Cerebrovascular Disease No family hx of      Breast Cancer No family hx of      Cancer - colorectal No family hx of      Prostate Cancer No family hx of          Current Outpatient Medications   Medication Sig Dispense Refill     ASPIRIN 81 MG OR TABS ONE DAILY 100 3     blood glucose (ONETOUCH ULTRA) test strip Use to test blood sugar twice daily. Dispense 1 box of 200 test strips, #3 refills. 1 Box 3     blood glucose monitor KIT 1 kit daily. 1 kit 0     blood glucose monitoring (ONE TOUCH ULTRA 2) meter device kit Use to test blood sugar 3 times daily 1 kit 0     glimepiride (AMARYL) 2 MG tablet Take 1 tablet (2 mg) by mouth every morning (before breakfast) 90 tablet 3     metoprolol succinate ER (TOPROL-XL) 25 MG 24 hr tablet Take 1 tablet (25 mg) by mouth 2 times daily 60 tablet 0     NIFEdipine ER (ADALAT CC) 30 MG 24 hr tablet Take 1 tablet (30 mg) by mouth At Bedtime 90 tablet 3     ONE TOUCH LANCETS MISC        ONETOUCH DELICA LANCETS 33G MISC 1 Device by In Vitro route 2 times daily 100 each 11     simvastatin (ZOCOR) 10 MG tablet TAKE ONE TABLET BY MOUTH AT BEDTIME 90 tablet 3     Allergies   Allergen Reactions     Losartan Rash     Ibuprofen      numbness in hands and feet     Recent  Labs   Lab Test 10/15/19  0920 10/25/18  0920 10/26/17  0808 03/28/17  0941   A1C 7.6* 6.7* 7.3* 8.4*   LDL 94 126*  --  131*   HDL 73 74  --  67   TRIG 90 76  --  83   ALT 21 18  --  18   CR 0.78 0.82  --  0.70   GFRESTIMATED 72 67  --  81   GFRESTBLACK 84 82  --  >90   GFR Calc     POTASSIUM 4.6 4.3  --  4.2   TSH 1.11  --  1.52  --       BP Readings from Last 3 Encounters:   06/25/20 (!) 146/84   05/26/20 (!) 142/88   05/01/20 136/78    Wt Readings from Last 3 Encounters:   06/25/20 72.9 kg (160 lb 12.8 oz)   05/26/20 76.2 kg (168 lb)   05/01/20 77.1 kg (170 lb)                    Reviewed and updated as needed this visit by Provider  Problems         Review of Systems   Constitutional, HEENT, cardiovascular, pulmonary, GI, , musculoskeletal, neuro, skin, endocrine and psych systems are negative, except as otherwise noted.       Objective   Reported vitals:  There were no vitals taken for this visit.   healthy, alert and no distress  PSYCH: Alert and oriented times 3; coherent speech, normal   rate and volume, able to articulate logical thoughts, able   to abstract reason, no tangential thoughts, no hallucinations   or delusions  Her affect is normal  RESP: No cough, no audible wheezing, able to talk in full sentences  Remainder of exam unable to be completed due to telephone visits    Diagnostic Test Results:  Labs reviewed in Epic      Assessment/Plan:  1. Hypertension goal BP (blood pressure) < 140/90  Not at goal, will add metoprolol 25 mg with dinner per her request  - metoprolol succinate ER (TOPROL-XL) 25 MG 24 hr tablet; Take 1 tablet (25 mg) by mouth 2 times daily  Dispense: 60 tablet; Refill: 0  - NIFEdipine ER (ADALAT CC) 30 MG 24 hr tablet; Take 1 tablet (30 mg) by mouth At Bedtime  Dispense: 90 tablet; Refill: 3    2. Type 2 diabetes mellitus without complication, without long-term current use of insulin (H)  Blood sugars are at goal! Great job!  - glimepiride (AMARYL) 2 MG tablet;  Take 1 tablet (2 mg) by mouth every morning (before breakfast)  Dispense: 90 tablet; Refill: 3      Return in about 3 weeks (around 7/30/2020) for Hypertension follow up 8:20 am.      Phone call duration:  18 minutes    Lea Lopez MD

## 2020-07-09 NOTE — TELEPHONE ENCOUNTER
See visit note today, increased metoprolol to 50 mg daily.  Sincerely,  Dr. Lea Lopez MD  7/9/2020

## 2020-07-18 ENCOUNTER — VIRTUAL VISIT (OUTPATIENT)
Dept: URGENT CARE | Facility: CLINIC | Age: 80
End: 2020-07-18
Payer: COMMERCIAL

## 2020-07-18 DIAGNOSIS — R50.9 FEVER AND CHILLS: Primary | ICD-10-CM

## 2020-07-18 DIAGNOSIS — R10.9 ABDOMINAL CRAMPS: ICD-10-CM

## 2020-07-18 PROCEDURE — 99213 OFFICE O/P EST LOW 20 MIN: CPT | Mod: 95 | Performed by: PHYSICIAN ASSISTANT

## 2020-07-18 NOTE — PROGRESS NOTES
"Kelly Barnes is a 80 year old female who is being evaluated via a billable telephone visit.      The patient has been notified of following:     \"This telephone visit will be conducted via a call between you and your physician/provider. We have found that certain health care needs can be provided without the need for a physical exam.  This service lets us provide the care you need with a short phone conversation.  If a prescription is necessary we can send it directly to your pharmacy.  If lab work is needed we can place an order for that and you can then stop by our lab to have the test done at a later time.    Telephone visits are billed at different rates depending on your insurance coverage. During this emergency period, for some insurers they may be billed the same as an in-person visit.  Please reach out to your insurance provider with any questions.    If during the course of the call the physician/provider feels a telephone visit is not appropriate, you will not be charged for this service.\"    Patient has given verbal consent for Telephone visit?  Yes    How would you like to obtain your AVS? Zbigniewhart    Subjective     Kelly Barnes is a 80 year old female who presents via phone visit today for the following health issues:    HPI   Patient has developed a fever today. She has not noted any other symptoms other than some constipation that she has been having due to some medication changes.     Acute Illness   Acute illness concerns: fever  Onset: this morning      Fever: YES- 100.1    Chills/Sweats: slight chills    Headache (location?): no    Sinus Pressure:no    Conjunctivitis:  no    Ear Pain: no    Rhinorrhea: no    Congestion: no    Sore Throat: no     Cough: no    Wheeze: no    Decreased Appetite: no    Nausea: no    Stomach pain low in the stomach that comes on in the night and she describes it as feeling like period cramps. She says it seems to come on when she takes her Nifedipine     Vomiting: " no    Diarrhea:  no    Dysuria/Freq.: no    Fatigue/Achiness: YES- some fatigue    Sick/Strep Exposure: no     Therapies Tried and outcome: none       BP Readings from Last 3 Encounters:   06/25/20 (!) 146/84   05/26/20 (!) 142/88   05/01/20 136/78    Wt Readings from Last 3 Encounters:   06/25/20 72.9 kg (160 lb 12.8 oz)   05/26/20 76.2 kg (168 lb)   05/01/20 77.1 kg (170 lb)                    Reviewed and updated as needed this visit by Provider         Review of Systems   Constitutional, HEENT, cardiovascular, pulmonary, GI, , musculoskeletal, neuro, skin, endocrine and psych systems are negative, except as otherwise noted.       Objective   Reported vitals:  There were no vitals taken for this visit.   alert and no distress  PSYCH: Alert and oriented times 3; coherent speech, normal   rate and volume, able to articulate logical thoughts, able   to abstract reason, no tangential thoughts, no hallucinations   or delusions  Her affect is normal  RESP: No cough, no audible wheezing, able to talk in full sentences  Remainder of exam unable to be completed due to telephone visits    Diagnostic Test Results:  none         Assessment/Plan:    ASSESSMENT AND PLAN    ICD-10-CM    1. Fever and chills  R50.9 Symptomatic COVID-19 Virus (Coronavirus) by PCR   2. Abdominal cramps  R10.9 Symptomatic COVID-19 Virus (Coronavirus) by PCR     I will refer patient for Covid testing and we will follow up with results.   Her abdominal symptoms are mild at this time and she believes they are due to constipation. She was instructed to go to the ER if acute abdominal pain, chest pain or difficulty breathing. If Covid testing is negative but her fever persists I would have her follow up with her PCP next week for further evaluation.     Phone call duration:  12 minutes    Pooja Obando PA-C

## 2020-07-18 NOTE — PATIENT INSTRUCTIONS
Patient Education   After Your COVID-19 (Coronavirus) Test  You have been tested for COVID-19 (coronavirus).   If you'll have surgery in the next few days, we'll let you know ahead of time if you have the virus. Please call your surgeon's office with any questions.  For all other patients: Results are usually available within 7 to 10 days. Our testing sites do not have access to your test results.     If your test result is positive, you'll get a phone call letting you know. (A positive test means that you have the virus.)    If your test result is negative, you'll get a letter in the mail. (A negative test suggests you do not have the virus.) If you use [employer], you'll get a message from [employer] when your result is ready.  After 7 to 10 days, if you have not gotten your results:     Call [phone] ([phone]) and ask to speak with our COVID-19 results team.    If you're being treated at an infusion center: Call your infusion center directly.  What are the symptoms of COVID-19?  Symptoms may include any of the following: Fever, cough, trouble breathing, headache, body aches, sore throat, runny or stuffy nose, fatigue (feeling very tired), diarrhea (loose poop), and nausea or vomiting (feeling sick to the stomach or throwing up).  You may already have symptoms of COVID-19, or they may show up later.  What should I do if I have symptoms?  If you're having surgery: Call your surgeon's office.  For all other patients: Stay home and away from others (self-isolate) until ...    You've had no fever--and no medicine that reduces fever--for 3 full days (72 hours), AND    Other symptoms have gotten better. For example, your cough or breathing has improved, AND    At least 10 days have passed since your symptoms first started.  During this time    Stay in your own room, even for meals. Use your own bathroom if you can.    Stay away from others in your home. No hugging, kissing or shaking hands. No  "visitors.    Don't go to work, school or anywhere else.    Clean \"high touch\" surfaces often (doorknobs, counters, handles). Use household cleaning spray or wipes. You'll find a full list of  on the EPA website: www.epa.gov/pesticide-registration/list-n-disinfectants-use-against-sars-cov-2.    Cover your mouth and nose with a mask, tissue or washcloth to avoid spreading germs.    Wash your hands and face often. Use soap and water.    Caregivers in these groups are at risk for severe illness due to COVID-19:  ? People 65 years and older  ? People who live in a nursing home or long-term care facility  ? People with chronic disease (lung, heart, cancer, diabetes, kidney, liver, immunologic)  ? People who have a weakened immune system, including those who:    Are in cancer treatment    Take medicine that weakens the immune system, such as corticosteroids    Had a bone marrow or organ transplant    Have an immune deficiency    Have poorly controlled HIV or AIDS    Are obese (body mass index of 40 or higher)    Smoke regularly    Caregivers should wear gloves while washing dishes, handling laundry and cleaning bedrooms and bathrooms.    Use caution when washing and drying laundry: Don't shake dirty laundry and use the warmest water setting that you can.    For more tips on managing your health at home, go to www.cdc.gov/coronavirus/2019-ncov/downloads/10Things.pdf.  How can I take care of myself at home?  1. Get lots of rest. Drink extra fluids (unless a doctor has told you not to).  2. Take Tylenol (acetaminophen) for fever or pain. If you have liver or kidney problems, ask your family doctor if it's okay to take Tylenol.     Adults can take either:  ? 650 mg (two 325 mg pills) every 4 to 6 hours, or   ? 1,000 mg (two 500 mg pills) every 8 hours as needed.  ? Note: Don't take more than 3,000 mg in one day. Acetaminophen is found in many medicines (both prescribed and over-the-counter medicines). Read all labels " to be sure you don't take too much.   For children, check the Tylenol bottle for the right dose. The dose is based on the child's age or weight.  3. If you have other health problems (like cancer, heart failure, an organ transplant or severe kidney disease): Call your specialty clinic if you don't feel better in the next 2 days.  4. Know when to call 911. Emergency warning signs include:  ? Trouble breathing or shortness of breath  ? Chest pain or pressure that doesn't go away  ? Feeling confused like you haven't felt before, or not being able to wake up  ? Bluish-colored lips or face  5. If your doctor prescribed a blood thinner medicine: Follow their instructions.  Where can I get more information?    [facility] - About COVID-19:   [email or web address]    CDC - If You're Sick: cdc.gov/coronavirus/2019-ncov/about/steps-when-sick.html    CDC - Ending Home Isolation: www.cdc.gov/coronavirus/2019-ncov/hcp/disposition-in-home-patients.html    CDC - Caring for Someone: www.cdc.gov/coronavirus/2019-ncov/if-you-are-sick/care-for-someone.html    [facility] - Interim Guidance for Hospital Discharge to Home: www.health.[facility].[place]/diseases/coronavirus/hcp/hospdischarge.pdf    [facility] clinical trials (COVID-19 research studies): [email or web address].[email or web address]-clinical-trials    Below are the COVID-19 hotlines at the [facility] of [facility] ([facility]). Interpreters are available.  ? For health questions: Call 657-055-3103 or 1-608.985.2376 (7 a.m. to 7 p.m.)  ? For questions about schools and childcare: Call 627-548-8306 or 1-213.605.6484 (7 a.m. to 7 p.m.)    For informational purposes only. Not to replace the advice of your health care provider. Clinically reviewed by Infection Prevention and the [facility] COVID-19 Clinical Team. Copyright   2020 [employer]. All rights reserved. [employer] [id number] - Rev 06/07/20.

## 2020-07-22 DIAGNOSIS — R50.9 FEVER AND CHILLS: ICD-10-CM

## 2020-07-22 DIAGNOSIS — R10.9 ABDOMINAL CRAMPS: ICD-10-CM

## 2020-07-22 PROCEDURE — U0003 INFECTIOUS AGENT DETECTION BY NUCLEIC ACID (DNA OR RNA); SEVERE ACUTE RESPIRATORY SYNDROME CORONAVIRUS 2 (SARS-COV-2) (CORONAVIRUS DISEASE [COVID-19]), AMPLIFIED PROBE TECHNIQUE, MAKING USE OF HIGH THROUGHPUT TECHNOLOGIES AS DESCRIBED BY CMS-2020-01-R: HCPCS | Performed by: PHYSICIAN ASSISTANT

## 2020-07-22 NOTE — LETTER
July 24, 2020        Kelly Barnes  [address]  [place] [address]    COVID-19 Virus PCR to [facility] - Result   Date Value Ref Range Status   07/22/2020 Not Detected  Final     Comment:     Collection of multiple specimens from the same patient may be necessary to   detect the virus. The possibility of a false negative should be considered if   the patient's recent exposure or clinical presentation suggests 2019 nCOV   infection and diagnostic tests for other causes of illness are negative.   Repeat testing may be considered in this setting.  Viral RNA was extracted via a validated method and subsequently underwent   single step reverse transcriptase-real time polymerase chain reaction using   primers to the CDC specified N1,N2 gene targets of CoV2 and human RNP as an   internal control.  A negative result does not rule out the presence of real-time PCR inhibitors   in the specimen or COVID-19 RNA in concentrations below the limit of detection   of the assay. The possibility of a false negative should be considered if the   patients recent exposure or clinical presentation suggests COVID-19.   Additional testing or repeat testing requires consultation with the   laboratory.  Nasopharyngeal specimen is the preferred choice for swab-based SARS CoV2   testing. When collection of a nasopharyngeal swab is not possible the   following are acceptable alternatives:  an oropharyngeal (OP) specimen collected by a healthcare professional, or a   nasal mid-turbinate (NMT) swab collected by a healthcare professional or by   onsite self-collection (using a flocked tapered swab), or an anterior nares   specimen collected by a healthcare professional or by onsite self-collection   (using a round foam swab). (Centers for Disease Control)  Testing performed by [facility], Room [address]210, [address] [place] [address], [place] [address]. This test was developed and its   performance characteristics  determined by the [facility] [employer]   Center. It has not been cleared or approved by the FDA.  The laboratory is regulated under the Clinical Laboratory Improvement   Amendments of 1988 (CLIA-88) as qualified to perform high-complexity testing.   This test is used for clinical purposes. It should not be regarded as   investigational or for research.         No results found for: SARSCOVRES    This letter provides a written record that you were tested for COVID-19.      Your result was negative. This means that we didn t find the virus that causes COVID-19 in your sample. A test may show negative when you do actually have the virus. This can happen when the virus is in the early stages of infection, before you feel illness symptoms.    If you have symptoms   Stay home and away from others (self-isolate) until you meet ALL of the guidelines below:    You ve had no fever--and no medicine that reduces fever--for 3 full days (72 hours). And      Your other symptoms have gotten better. For example, your cough or breathing has improved. And     At least 10 days have passed since your symptoms started.    During this time:    Stay home. Don t go to work, school or anywhere else.     Stay in your own room, including for meals. Use your own bathroom if you can.    Stay away from others in your home. No hugging, kissing or shaking hands. No visitors.    Clean  high touch  surfaces often (doorknobs, counters, handles, etc.). Use a household cleaning spray or wipes. You can find a full list on the EPA website at www.epa.gov/pesticide-registration/list-n-disinfectants-use-against-sars-cov-2.    Cover your mouth and nose with a mask, tissue or washcloth to avoid spreading germs.    Wash your hands and face often with soap and water.    Going back to work  Check with your employer for any guidelines to follow for going back to work.    Employers: This document serves as formal notice that your employee tested  negative for COVID-19, as of the testing date shown above.

## 2020-07-23 LAB
SARS-COV-2 RNA SPEC QL NAA+PROBE: NOT DETECTED
SPECIMEN SOURCE: NORMAL

## 2020-07-30 ENCOUNTER — VIRTUAL VISIT (OUTPATIENT)
Dept: FAMILY MEDICINE | Facility: CLINIC | Age: 80
End: 2020-07-30
Payer: COMMERCIAL

## 2020-07-30 DIAGNOSIS — K59.03 DRUG-INDUCED CONSTIPATION: ICD-10-CM

## 2020-07-30 DIAGNOSIS — Z00.00 ENCOUNTER FOR MEDICARE ANNUAL WELLNESS EXAM: Primary | ICD-10-CM

## 2020-07-30 DIAGNOSIS — I10 HYPERTENSION GOAL BP (BLOOD PRESSURE) < 140/90: ICD-10-CM

## 2020-07-30 DIAGNOSIS — E78.5 HYPERLIPIDEMIA LDL GOAL <100: ICD-10-CM

## 2020-07-30 DIAGNOSIS — E11.9 TYPE 2 DIABETES MELLITUS WITHOUT COMPLICATION, WITHOUT LONG-TERM CURRENT USE OF INSULIN (H): ICD-10-CM

## 2020-07-30 PROCEDURE — G0439 PPPS, SUBSEQ VISIT: HCPCS | Performed by: FAMILY MEDICINE

## 2020-07-30 PROCEDURE — 99214 OFFICE O/P EST MOD 30 MIN: CPT | Mod: 25 | Performed by: FAMILY MEDICINE

## 2020-07-30 RX ORDER — METOPROLOL SUCCINATE 25 MG/1
25 TABLET, EXTENDED RELEASE ORAL DAILY
Qty: 30 TABLET | Refills: 0
Start: 2020-07-30 | End: 2020-10-08

## 2020-07-30 NOTE — NURSING NOTE
Offered pt to schedule Medicare Wellness Visit in the future via video visit. Pt does not have ability to do video visits.     Aylni Calhoun MA

## 2020-07-30 NOTE — PROGRESS NOTES
"Kelly Barnes is a 80 year old female who is being evaluated via a billable telephone visit.      The patient has been notified of following:     \"This telephone visit will be conducted via a call between you and your physician/provider. We have found that certain health care needs can be provided without the need for a physical exam.  This service lets us provide the care you need with a short phone conversation.  If a prescription is necessary we can send it directly to your pharmacy.  If lab work is needed we can place an order for that and you can then stop by our lab to have the test done at a later time.    Telephone visits are billed at different rates depending on your insurance coverage. During this emergency period, for some insurers they may be billed the same as an in-person visit.  Please reach out to your insurance provider with any questions.    If during the course of the call the physician/provider feels a telephone visit is not appropriate, you will not be charged for this service.\"    Patient has given verbal consent for Telephone visit?  Yes    What phone number would you like to be contacted at? [phone]    How would you like to obtain your AVS? Oliva Bolton     Kelly Barnes is a 80 year old female who presents via phone visit today for the following health issues:    HPI   Reason for Visit: Medication problem:     Hyperlipidemia Follow-Up      Are you regularly taking any medication or supplement to lower your cholesterol?   Yes- simvastatin 10 mg daily    Are you having muscle aches or other side effects that you think could be caused by your cholesterol lowering medication?  No    Hypertension Follow-up      Do you check your blood pressure regularly outside of the clinic? Yes     Are you following a low salt diet? Yes    Are your blood pressures ever more than 140 on the top number (systolic) OR more   than 90 on the bottom number (diastolic), for example 140/90? Yes "   140/86, HR 65   144/76 HR 57 yesterday am, noon 130/74 HR 65  BP Readings from Last 3 Encounters:   06/25/20 (!) 146/84   05/26/20 (!) 142/88   05/01/20 136/78      Stopped taking NIFEdipine ER (ADALAT CC) 30 MG 24 hr tablet: side effect: severe constipation and stomach pain,   She started taking metoprolol instead, but noted pulse dropped to low 50's, no symptoms noted (denies orthostatic hypotension) while taking 50 mg daily, so reduced to once daily (.    Patient reports taking metoprolol succinate ER (TOPROL-XL) 25 MG 24 hr tablet: once a day instead of twice a day     Diabetes Follow-up      How often are you checking your blood sugar? 2-3 times a day     What concerns do you have today about your diabetes? Sometimes its high in the mornings; 164, 172, usually 150's - 160's, before dinner 87, 138, 127, 112. Mostly under control      Do you have any of these symptoms? (Select all that apply)  No numbness or tingling in feet.  No redness, sores or blisters on feet.  No complaints of excessive thirst.  No reports of blurry vision.  No significant changes to weight.  (She is trying to lose weight)     Hemoglobin A1C (%)   Date Value   10/15/2019 7.6 (H)   10/25/2018 6.7 (H)     LDL Cholesterol Calculated (mg/dL)   Date Value   10/15/2019 94   10/25/2018 126 (H)       How many servings of fruits and vegetables do you eat daily?  At least 4     On average, how many sweetened beverages do you drink each day (Examples: soda, juice, sweet tea, etc.  Do NOT count diet or artificially sweetened beverages)? 0    How many days per week do you exercise enough to make your heart beat faster? Walking most days unless high humidity     How many days per week do you miss taking your medication? 0    Annual Wellness Visit  Are you in the first 12 months of your Medicare Part B coverage?  No    Physical Health:    In general, how would you rate your overall physical health? good    Outside of work, how many days during the week  "do you exercise?6-7 days/week    Outside of work, approximately how many minutes a day do you exercise?15-30 minutes    If you drink alcohol do you typically have >3 drinks per day or >7 drinks per week? No    Do you usually eat at least 4 servings of fruit and vegetables a day, include whole grains & fiber and avoid regularly eating high fat or \"junk\" foods? Yes    Do you have any problems taking medications regularly? No    Do you have any side effects from medications? ongoing rash, see prior visit notes, improving but still present    Needs assistance for the following daily activities: no assistance needed    Which of the following safety concerns are present in your home?  none identified     Hearing impairment: No    Weight: patient doesn't weight herself  Height: Provided by patient 5\" 2.5\"  BMI: Based on patient-provided information  Blood Pressure: Provided by patient    Mental Health:    In general, how would you rate your overall mental or emotional health? good  PHQ-2 Score:      Do you feel safe in your environment? Yes    Have you ever done Advance Care Planning? (For example, a Health Directive, POLST, or a discussion with a medical provider or your loved ones about your wishes)? Yes, advance care planning is on file.    Fall risk:   Cognitive Screenin) alert, orientated to person place time, memory intact  2) Clock draw: unable to complete on telephone    [employer]. Licensed by the author for use in [facility]; reprinted with permission ([email or web address]). All rights reserved.      Do you have sleep apnea, excessive snoring or daytime drowsiness?: no    Current providers sharing in care for this patient include:   Patient Care Team:  Lea Lopez MD as PCP - General (Family Practice)  Lea Lopez MD as Assigned PCP    Patient Active Problem List   Diagnosis     Allergic rhinitis     Osteopenia     Obesity     Hypertension goal BP (blood " pressure) < 140/90     Hyperlipidemia LDL goal <100     Leg edema, left     Atypical chest pain     Dizzy spells     Obesity, Class I, BMI 30-34.9     Aortic stenosis     Type 2 diabetes mellitus without complication, without long-term current use of insulin (H)     Musculoskeletal chest pain     Nonrheumatic aortic valve stenosis     Family history of breast cancer in sister     Hepatitis A immune     Left leg swelling     Allergic dermatitis     Dermatitis     Drug-induced constipation     Past Surgical History:   Procedure Laterality Date     C NONSPECIFIC PROCEDURE      none       Social History     Tobacco Use     Smoking status: Never Smoker     Smokeless tobacco: Never Used   Substance Use Topics     Alcohol use: Yes     Alcohol/week: 10.0 standard drinks     Comment: 1-2 drinks per week     Family History   Problem Relation Age of Onset     Hypertension Mother      Diabetes No family hx of      Cerebrovascular Disease No family hx of      Breast Cancer No family hx of      Cancer - colorectal No family hx of      Prostate Cancer No family hx of          Current Outpatient Medications   Medication Sig Dispense Refill     ASPIRIN 81 MG OR TABS ONE DAILY 100 3     blood glucose (ONETOUCH ULTRA) test strip Use to test blood sugar twice daily. Dispense 1 box of 200 test strips, #3 refills. 1 Box 3     blood glucose monitor KIT 1 kit daily. 1 kit 0     blood glucose monitoring (ONE TOUCH ULTRA 2) meter device kit Use to test blood sugar 3 times daily 1 kit 0     glimepiride (AMARYL) 2 MG tablet Take 1 tablet (2 mg) by mouth every morning (before breakfast) 90 tablet 3     metoprolol succinate ER (TOPROL-XL) 25 MG 24 hr tablet Take 1 tablet (25 mg) by mouth daily 30 tablet 0     ONE TOUCH LANCETS MISC        ONETOUCH DELICA LANCETS 33G MISC 1 Device by In Vitro route 2 times daily 100 each 11     simvastatin (ZOCOR) 10 MG tablet TAKE ONE TABLET BY MOUTH AT BEDTIME 90 tablet 3     Allergies   Allergen Reactions      Losartan Rash     Ibuprofen      numbness in hands and feet     Recent Labs   Lab Test 10/15/19  0920 10/25/18  0920 10/26/17  0808 03/28/17  0941   A1C 7.6* 6.7* 7.3* 8.4*   LDL 94 126*  --  131*   HDL 73 74  --  67   TRIG 90 76  --  83   ALT 21 18  --  18   CR 0.78 0.82  --  0.70   GFRESTIMATED 72 67  --  81   GFRESTBLACK 84 82  --  >90   GFR Calc     POTASSIUM 4.6 4.3  --  4.2   TSH 1.11  --  1.52  --       BP Readings from Last 3 Encounters:   06/25/20 (!) 146/84   05/26/20 (!) 142/88   05/01/20 136/78    Wt Readings from Last 3 Encounters:   06/25/20 72.9 kg (160 lb 12.8 oz)   05/26/20 76.2 kg (168 lb)   05/01/20 77.1 kg (170 lb)                    Reviewed and updated as needed this visit by Provider         Review of Systems   Constitutional, HEENT, cardiovascular, pulmonary, GI, , musculoskeletal, neuro, skin, endocrine and psych systems are negative, except as otherwise noted.       Objective   Reported vitals:  There were no vitals taken for this visit.     PSYCH: Alert and oriented times 3; coherent speech, normal   rate and volume, able to articulate logical thoughts, able   to abstract reason, no tangential thoughts, no hallucinations   or delusions  Her affect is normal  RESP: No cough, no audible wheezing, able to talk in full sentences  Remainder of exam unable to be completed due to telephone visits    Diagnostic Test Results:  Labs reviewed in Epic        Assessment/Plan:    1. Encounter for Medicare annual wellness exam  routine    2. Hypertension goal BP (blood pressure) < 140/90  At goal right now, isidoro as she develops adverse symptoms at higher doses or alternate medications, so no changes for now  - Comprehensive metabolic panel; Future    3. Hyperlipidemia LDL goal <100  LDL Cholesterol Calculated   Date Value Ref Range Status   10/15/2019 94 <100 mg/dL Final     Comment:     Desirable:       <100 mg/dl      - Lipid panel reflex to direct LDL Fasting; Future    4. Type 2  diabetes mellitus without complication, without long-term current use of insulin (H)  At goal. Recheck labs in October  - Hemoglobin A1c; Future  - Comprehensive metabolic panel; Future  - Albumin Random Urine Quantitative with Creat Ratio; Future    5. Constipation  Possibly CCB related, although still has ongoing problems. I recommend miralax, but she has trouble tolerating it, will continue doculax 1-2 times per week, strongly encouraged high fiber diet with plenty of fluids and ongoing daily exercise, see AVS. The patient indicates understanding of these issues and agrees with the plan.     Return in about 2 months (around 10/12/2020) for fasting labs, Hypertension follow up, Diabetes follow up.      Phone call duration:  24 minutes    Lea Lopez MD

## 2020-07-30 NOTE — PATIENT INSTRUCTIONS
Patient Education   Personalized Prevention Plan  You are due for the preventive services outlined below.  Your care team is available to assist you in scheduling these services.  If you have already completed any of these items, please share that information with your care team to update in your medical record.  Health Maintenance Due   Topic Date Due     Hepatitis B Vaccine (1 of 3 - Risk 3-dose series) [birth year]29/1960     Diptheria Tetanus Pertussis (DTAP/TDAP/TD) Vaccine (1 - Tdap) 01/29/1966     Zoster (Shingles) Vaccine (1 of 2) 01/29/1991     Osteoporosis Screening  05/06/2012     Annual Wellness Visit  10/27/2017     Preventive Health Recommendations    See your health care provider every year to    Review health changes.     Discuss preventive care.      Review your medicines if your doctor has prescribed any.    You no longer need a yearly Pap test unless you've had an abnormal Pap test in the past 10 years. If you have vaginal symptoms, such as bleeding or discharge, be sure to talk with your provider about a Pap test.    Every 1 to 2 years, have a mammogram.  If you are over 69, talk with your health care provider about whether or not you want to continue having screening mammograms.    Every 10 years, have a colonoscopy. Or, have a yearly FIT test (stool test). These exams will check for colon cancer.     Have a cholesterol test every 5 years, or more often if your doctor advises it.     Have a diabetes test (fasting glucose) every three years. If you are at risk for diabetes, you should have this test more often.     At age 65, have a bone density scan (DEXA) to check for osteoporosis (brittle bone disease).    Shots:    Get a flu shot each year.    Get a tetanus shot every 10 years.    Talk to your doctor about your pneumonia vaccines. There are now two you should receive - Pneumovax (PPSV 23) and Prevnar (PCV 13).    Talk to your pharmacist about the shingles vaccine.    Talk to your doctor about the  hepatitis B vaccine.    Nutrition:     Eat at least 5 servings of fruits and vegetables each day.    Eat whole-grain bread, whole-wheat pasta and brown rice instead of white grains and rice.    Get adequate Calcium and Vitamin D.     Lifestyle    Exercise at least 150 minutes a week (30 minutes a day, 5 days a week). This will help you control your weight and prevent disease.    Limit alcohol to one drink per day.    No smoking.     Wear sunscreen to prevent skin cancer.     See your dentist twice a year for an exam and cleaning.    See your eye doctor every 1 to 2 years to screen for conditions such as glaucoma, macular degeneration and cataracts.      Patient Education   Coping with Constipation  What is constipation?  If you have hard, dry stools that are difficult to pass, you have constipation. You may also have bloating, gas and stomach cramps.  How is it treated?  If the problem is severe, your care team can suggest medicine to relieve it (a laxative, stool softener or enema). Do not use medicine unless your care team tells you to.  You should not use an enema (rectal wash) if your white blood cell or platelet count is low.  What else can I do to treat or prevent constipation?    Eat at the same times each day.    Add fiber to your diet by eating:  ? Whole grain breads, cereals and pastas  ? Whole grains such as barley or brown rice  ? Raw vegetables  ? Fresh and dried fruits  ? Dried beans and peas  ? Nuts, seeds and popcorn.    Drink lots of fluids: at least eight to ten 8-ounce glasses each day. Try water, prune juice, warm juices, herbal teas and lemonade.    Have a hot drink with high-fiber foods for breakfast.    Eat the skins on fruits and potatoes. Wash them well before eating.    Add wheat bran to cereals, casseroles and homemade breads.    If gas is a problem:  ? Avoid gas-forming foods such carbonated (fizzy) drinks, broccoli, cabbage, cauliflower, dried beans and peas, peppers and onions.  ? Do  not use a straw.  ? Do not chew gum.    Stay active. Simply getting out for a walk can help. Increase your exercise as you are able.    Try to use the toilet at the same times each day. Keep a record of your bowel movements.    If you take medicine that may cause constipation, your doctor may ask you to take a stool softener.  When should I call my care team?  Call your care team if:    You do not have a bowel movement for two or more days.    You have sudden, severe belly pain.    You notice blood in your stool.    You have severe hemorrhoids (swelling, itching and pain around the anus).  Comments:  __________________________________________  __________________________________________  __________________________________________  __________________________________________  __________________________________________  __________________________________________  __________________________________________  __________________________________________  __________________________________________  __________________________________________  For informational purposes only. Not to replace the advice of your health care provider. Copyright   2006 [employer]. All rights reserved. Clinically reviewed by [facility]. SMARTworks 192984 - REV 04/19.

## 2020-09-05 ENCOUNTER — OFFICE VISIT (OUTPATIENT)
Dept: URGENT CARE | Facility: URGENT CARE | Age: 80
End: 2020-09-05
Payer: COMMERCIAL

## 2020-09-05 VITALS
OXYGEN SATURATION: 98 % | HEART RATE: 77 BPM | HEIGHT: 63 IN | BODY MASS INDEX: 28.35 KG/M2 | WEIGHT: 160 LBS | DIASTOLIC BLOOD PRESSURE: 70 MMHG | SYSTOLIC BLOOD PRESSURE: 138 MMHG | TEMPERATURE: 98.6 F

## 2020-09-05 DIAGNOSIS — L23.7 CONTACT DERMATITIS DUE TO POISON IVY: Primary | ICD-10-CM

## 2020-09-05 PROCEDURE — 99213 OFFICE O/P EST LOW 20 MIN: CPT | Performed by: NURSE PRACTITIONER

## 2020-09-05 ASSESSMENT — MIFFLIN-ST. JEOR: SCORE: 1161.95

## 2020-09-05 NOTE — PATIENT INSTRUCTIONS
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 Use as directed.  Hold on prednisone due to previous adverse effects and diabetes.  Take an antihistamine such as Claritin (loratadine), Zyrtec (cetirizine) or Allegra (fexofenadine) daily.  Take benadryl (diphenhydramine) at night to help reduce itching at night and aid in sleep.  Advil (ibuprofen) for inflammation    Wash area as soon as possible after contact with poision ivy to avoid spread and reduce reaction. Washing in 5-10 minutes can prevent reaction but even if you wash 2 hours after exposure, you can significantly reduce the reaction.  Rash is not spread by fluid in the blisters.  Wash sheets, clothes and animals exposed to get rid of toxins.  Cool compresses, ice packs, cooler showers for itching relief.  Baking soda paste, calamine lotion or aveeno oatmeal packs for itching.  Rub with affected are with ice cube.  Avoid sunlight and heat.  Avoid scratching to prevent secondary infection.  Watch for any signs of infection - (fever, bright red color, more pain, discharge - yellow/white/green) worsening warmth, may need antibiotic for cellulitis  Discussed other allergic reaction symptoms to watch for including difficulty breathing, throat swelling and/or shortness of breath.

## 2020-09-05 NOTE — PROGRESS NOTES
Chief Complaint   Patient presents with     Urgent Care     Pt in clinic to have  eval for bilateral lower leg blisters and rash.     Blister     SUBJECTIVE:  Kelly Barnes is a 80 year old female who presents to the clinic today for a rash.  Onset of rash was 4 day(s) ago.   Rash is gradual onset and still present.   Location of the rash: bilateral lower ankles  Quality/symptoms of rash: pruritic, burning, erythematous, linear, vesicles with larger clear filled bullae, 2+ pitting edema  Associated symptoms include: none  Symptoms are severe and rash seems to be stable.  Previous history of a similar rash? No  Treatment measures tried include:  none  Recent exposure history: gardening  Patient denies new meds, pets, foods, soaps, detergents, lotions, or enviornmental contacts.  Relevant history: diabetic with venous insufficiency, saw cardiologist for bilateral LE edema with normal workup.    Past Medical History:   Diagnosis Date     Allergic rhinitis, cause unspecified      Aortic stenosis 2/29/2016    With new murmur noted 2/2016, normal echo, repeat echo 2/2017.     Atypical chest pain 7/24/2015     cuboid     left foot     Hyperlipidemia LDL goal <100 11/1/2012     Hypertension goal BP (blood pressure) < 140/90      Musculoskeletal chest pain 11/25/2016     Obesity, Class I, BMI 30-34.9 11/11/2015     Pneumonia 3/16     Type 2 diabetes, HbA1c goal < 7% (H)      ASPIRIN 81 MG OR TABS, ONE DAILY  blood glucose (ONETOUCH ULTRA) test strip, Use to test blood sugar twice daily. Dispense 1 box of 200 test strips, #3 refills.  blood glucose monitor KIT, 1 kit daily.  blood glucose monitoring (ONE TOUCH ULTRA 2) meter device kit, Use to test blood sugar 3 times daily  glimepiride (AMARYL) 2 MG tablet, Take 1 tablet (2 mg) by mouth every morning (before breakfast)  metoprolol succinate ER (TOPROL-XL) 25 MG 24 hr tablet, Take 1 tablet (25 mg) by mouth daily  ONE TOUCH LANCETS MISC,   ONETOUCH DELICA LANCETS 33G MISC, 1  "Device by In Vitro route 2 times daily  simvastatin (ZOCOR) 10 MG tablet, TAKE ONE TABLET BY MOUTH AT BEDTIME (Patient not taking: Reported on 9/5/2020)    No current facility-administered medications on file prior to visit.     Social History     Tobacco Use     Smoking status: Never Smoker     Smokeless tobacco: Never Used   Substance Use Topics     Alcohol use: Yes     Alcohol/week: 10.0 standard drinks     Comment: 1-2 drinks per week     Allergies   Allergen Reactions     Losartan Rash     Ibuprofen      numbness in hands and feet     Review of Systems   Constitutional: Negative for activity change, appetite change, chills, diaphoresis, fatigue and fever.   Cardiovascular: Positive for peripheral edema. Negative for chest pain and palpitations.   Gastrointestinal: Negative for abdominal pain, nausea and vomiting.   Musculoskeletal: Negative for arthralgias and myalgias.   Skin: Positive for color change and rash.   Neurological: Negative for weakness, numbness and paresthesias.   Psychiatric/Behavioral: Negative for sleep disturbance.     EXAM:   /70   Pulse 77   Temp 98.6  F (37  C) (Oral)   Ht 1.588 m (5' 2.5\")   Wt 72.6 kg (160 lb)   SpO2 98%   BMI 28.80 kg/m      Physical Exam  Vitals signs reviewed.   Constitutional:       Appearance: Normal appearance.   HENT:      Head: Normocephalic and atraumatic.   Cardiovascular:      Rate and Rhythm: Normal rate.   Pulmonary:      Effort: Pulmonary effort is normal.   Musculoskeletal: Normal range of motion.      Right lower leg: Edema present.      Left lower leg: Edema present.   Skin:     General: Skin is warm and dry.      Findings: Erythema and rash present.      Comments: pruritic, burning, erythematous, linear, vesicles with larger clear filled bullae, 2+ pitting edema bilateral LEs   Neurological:      General: No focal deficit present.      Mental Status: She is alert and oriented to person, place, and time.   Psychiatric:         Mood and " Affect: Mood normal.         Behavior: Behavior normal.       ASSESSMENT:    ICD-10-CM    1. 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 Use as directed.  Hold on prednisone due to previous adverse effects and diabetes.  Take an antihistamine such as Claritin (loratadine), Zyrtec (cetirizine) or Allegra (fexofenadine) daily.  Take benadryl (diphenhydramine) at night to help reduce itching at night and aid in sleep.  Advil (ibuprofen) for inflammation    Wash area as soon as possible after contact with poision ivy to avoid spread and reduce reaction. Washing in 5-10 minutes can prevent reaction but even if you wash 2 hours after exposure, you can significantly reduce the reaction.  Rash is not spread by fluid in the blisters.  Wash sheets, clothes and animals exposed to get rid of toxins.  Cool compresses, ice packs, cooler showers for itching relief.  Baking soda paste, calamine lotion or aveeno oatmeal packs for itching.  Rub with affected are with ice cube.  Avoid sunlight and heat.  Avoid scratching to prevent secondary infection.  Watch for any signs of infection - (fever, bright red color, more pain, discharge - yellow/white/green) worsening warmth, may need antibiotic for cellulitis  Discussed other allergic reaction symptoms to watch for including difficulty breathing, throat swelling and/or shortness of breath.    Follow up with primary care provider with any problems, questions or concerns or if symptoms worsen or fail to improve. Patient agreed to plan and verbalized understanding.    Tiffani John, [facility]

## 2020-09-06 ASSESSMENT — ENCOUNTER SYMPTOMS
CHILLS: 0
MYALGIAS: 0
SLEEP DISTURBANCE: 0
PARESTHESIAS: 0
FEVER: 0
DIAPHORESIS: 0
APPETITE CHANGE: 0
WEAKNESS: 0
ACTIVITY CHANGE: 0
NAUSEA: 0
PALPITATIONS: 0
FATIGUE: 0
COLOR CHANGE: 1
ABDOMINAL PAIN: 0
ARTHRALGIAS: 0
NUMBNESS: 0
VOMITING: 0

## 2020-09-15 ENCOUNTER — TRANSFERRED RECORDS (OUTPATIENT)
Dept: HEALTH INFORMATION MANAGEMENT | Facility: CLINIC | Age: 80
End: 2020-09-15

## 2020-09-16 ENCOUNTER — TRANSFERRED RECORDS (OUTPATIENT)
Dept: HEALTH INFORMATION MANAGEMENT | Facility: CLINIC | Age: 80
End: 2020-09-16

## 2020-09-17 ENCOUNTER — OFFICE VISIT (OUTPATIENT)
Dept: FAMILY MEDICINE | Facility: CLINIC | Age: 80
End: 2020-09-17
Payer: COMMERCIAL

## 2020-09-17 VITALS
DIASTOLIC BLOOD PRESSURE: 86 MMHG | RESPIRATION RATE: 16 BRPM | OXYGEN SATURATION: 98 % | BODY MASS INDEX: 29.01 KG/M2 | HEART RATE: 68 BPM | SYSTOLIC BLOOD PRESSURE: 142 MMHG | WEIGHT: 161.2 LBS | TEMPERATURE: 98.2 F

## 2020-09-17 DIAGNOSIS — R60.0 LOWER EXTREMITY EDEMA: ICD-10-CM

## 2020-09-17 DIAGNOSIS — L13.9 BULLOUS ERUPTION: ICD-10-CM

## 2020-09-17 DIAGNOSIS — I10 HYPERTENSION GOAL BP (BLOOD PRESSURE) < 140/90: Primary | ICD-10-CM

## 2020-09-17 DIAGNOSIS — R82.90 CLOUDY URINE: ICD-10-CM

## 2020-09-17 PROCEDURE — 99214 OFFICE O/P EST MOD 30 MIN: CPT | Performed by: FAMILY MEDICINE

## 2020-09-17 PROCEDURE — 99207 C FOOT EXAM  NO CHARGE: CPT | Performed by: FAMILY MEDICINE

## 2020-09-17 RX ORDER — TRIAMCINOLONE ACETONIDE 1 MG/G
OINTMENT TOPICAL
COMMUNITY
Start: 2020-09-11 | End: 2020-12-20

## 2020-09-17 NOTE — PATIENT INSTRUCTIONS
Please schedule a fasting lab appointment at [facility] anytime, thank you!    Address: [address], [place] [address]    Phone: [phone]

## 2020-09-17 NOTE — PROGRESS NOTES
Subjective     Kelly Barnes is a 80 year old female who presents to clinic today for the following health issues:    HPI       Hypertension Follow-up      Do you check your blood pressure regularly outside of the clinic? Yes     Are you following a low salt diet? Sort of    Are your blood pressures ever more than 140 on the top number (systolic) OR more   than 90 on the bottom number (diastolic), for example 140/90? Yes sometimes on top  BP Readings from Last 3 Encounters:   09/17/20 (!) 142/86   09/05/20 138/70   06/25/20 (!) 146/84          How many servings of fruits and vegetables do you eat daily?  4 or more    On average, how many sweetened beverages do you drink each day (Examples: soda, juice, sweet tea, etc.  Do NOT count diet or artificially sweetened beverages)?   0    How many days per week do you exercise enough to make your heart beat faster? 6    How many minutes a day do you exercise enough to make your heart beat faster? 30 - 60    How many days per week do you miss taking your medication? 0    Rash  Onset/Duration: 2 weeks  Description  Location: AKILAH legs, hips  Character: red, blisters  Itching: moderate  Intensity:  moderate  Progression of Symptoms:  improving  Accompanying signs and symptoms:   Fever: no  Body aches or joint pain: no  Sore throat symptoms: no  Recent cold symptoms: no  History:           Previous episodes of similar rash: None, had prior different rash on back for years which is now gone  New exposures:  Possibly from a plant, unsure  Recent travel: no  Exposure to similar rash: no  Precipitating or alleviating factors: none  Therapies tried and outcome: triamcinolone from derm prescribed 9/11    Lower extremity edema  Onset years ago, getting worse, now bothering her and would like some treatment. Her brother has similar problems.       Review of Systems   Constitutional, HEENT, cardiovascular, pulmonary, GI, , musculoskeletal, neuro, skin, endocrine and psych systems are  negative, except as otherwise noted.      Objective    BP (!) 142/86 (BP Location: Right arm, Patient Position: Chair, Cuff Size: Adult Regular)   Pulse 68   Temp 98.2  F (36.8  C) (Oral)   Resp 16   Wt 73.1 kg (161 lb 3.2 oz)   SpO2 98%   BMI 29.01 kg/m    Body mass index is 29.01 kg/m .  Physical Exam   GENERAL: healthy, alert and no distress  NECK: no adenopathy, no asymmetry, masses, or scars and thyroid normal to palpation  RESP: lungs clear to auscultation - no rales, rhonchi or wheezes  CV: regular rate and rhythm, normal S1 S2, no S3 or S4, no murmur, click or rub, no peripheral edema and peripheral pulses strong  MS: 2+ edema to mid shin with skin thickening and reddening  SKIN: lower extremities are erythematous and indurated with scattered clear-straw colored fluid filled bullae, some appear to have collapsed, with wet, white circular erosions noted. Scattered erythematous, indurated patches noted of both flanks and scattered erythematous nodules noted of arms.    No results found for any visits on 09/17/20.      No components found for: URINE    Assessment & Plan     Hypertension goal BP (blood pressure) < 140/90  BP Readings from Last 3 Encounters:   09/17/20 (!) 142/86   09/05/20 138/70   06/25/20 (!) 146/84    not at goal, but current health problem as below  , and has had multiple medication changes in an effort to eradicate a persistent dermatitis. I did NOT change medications today but will monitor closely.    Bullous eruption  New, previously undiagnosed problem with uncertain prognosis and additional work-up planned.   She had biopsy done yesterday by outside dermatologist, reports not available through [facility] and not yet scanned. Differential large and includes possibly (although rare) bullous pemphigoid (note Kelly does NOT have oral lesions), medication related allergy but NOT Lazo Johnsons, patient was concerned about this and I could reassure her, dermatitis herpetiformis,  etc. Follow up with dermatology is essential for evaluation, diagnosis and treatment, and she is already following up with Dr. Parra.     Lower extremity edema  See orders  - LYMPHEDEMA THERAPY REFERRAL; Future    Cloudy urine  No other evidence of UTI, leave UA as desired  - **UA reflex to Microscopic FUTURE anytime; Future       Return in about 1 week (around 9/24/2020) for dermatologist for biopsy results.    Lea Lopez MD  [facility]

## 2020-10-07 DIAGNOSIS — I10 HYPERTENSION GOAL BP (BLOOD PRESSURE) < 140/90: ICD-10-CM

## 2020-10-07 DIAGNOSIS — E78.5 HYPERLIPIDEMIA LDL GOAL <100: ICD-10-CM

## 2020-10-07 DIAGNOSIS — E11.9 TYPE 2 DIABETES MELLITUS WITHOUT COMPLICATION, WITHOUT LONG-TERM CURRENT USE OF INSULIN (H): ICD-10-CM

## 2020-10-07 LAB
ALBUMIN SERPL-MCNC: 3.5 G/DL (ref 3.4–5)
ALP SERPL-CCNC: 91 U/L (ref 40–150)
ALT SERPL W P-5'-P-CCNC: 20 U/L (ref 0–50)
ANION GAP SERPL CALCULATED.3IONS-SCNC: 3 MMOL/L (ref 3–14)
AST SERPL W P-5'-P-CCNC: 15 U/L (ref 0–45)
BILIRUB SERPL-MCNC: 0.3 MG/DL (ref 0.2–1.3)
BUN SERPL-MCNC: 27 MG/DL (ref 7–30)
CALCIUM SERPL-MCNC: 9.3 MG/DL (ref 8.5–10.1)
CHLORIDE SERPL-SCNC: 108 MMOL/L (ref 94–109)
CHOLEST SERPL-MCNC: 223 MG/DL
CO2 SERPL-SCNC: 26 MMOL/L (ref 20–32)
CREAT SERPL-MCNC: 0.96 MG/DL (ref 0.52–1.04)
GFR SERPL CREATININE-BSD FRML MDRD: 56 ML/MIN/{1.73_M2}
GLUCOSE SERPL-MCNC: 152 MG/DL (ref 70–99)
HBA1C MFR BLD: 7.2 % (ref 0–5.6)
HDLC SERPL-MCNC: 71 MG/DL
LDLC SERPL CALC-MCNC: 134 MG/DL
NONHDLC SERPL-MCNC: 152 MG/DL
POTASSIUM SERPL-SCNC: 4.6 MMOL/L (ref 3.4–5.3)
PROT SERPL-MCNC: 7.6 G/DL (ref 6.8–8.8)
SODIUM SERPL-SCNC: 137 MMOL/L (ref 133–144)
TRIGL SERPL-MCNC: 90 MG/DL

## 2020-10-07 PROCEDURE — 36415 COLL VENOUS BLD VENIPUNCTURE: CPT | Performed by: FAMILY MEDICINE

## 2020-10-07 PROCEDURE — 80053 COMPREHEN METABOLIC PANEL: CPT | Performed by: FAMILY MEDICINE

## 2020-10-07 PROCEDURE — 83036 HEMOGLOBIN GLYCOSYLATED A1C: CPT | Performed by: FAMILY MEDICINE

## 2020-10-07 PROCEDURE — 82043 UR ALBUMIN QUANTITATIVE: CPT | Performed by: FAMILY MEDICINE

## 2020-10-07 PROCEDURE — 80061 LIPID PANEL: CPT | Performed by: FAMILY MEDICINE

## 2020-10-08 ENCOUNTER — OFFICE VISIT (OUTPATIENT)
Dept: FAMILY MEDICINE | Facility: CLINIC | Age: 80
End: 2020-10-08
Payer: COMMERCIAL

## 2020-10-08 VITALS
RESPIRATION RATE: 15 BRPM | BODY MASS INDEX: 27.64 KG/M2 | HEIGHT: 63 IN | DIASTOLIC BLOOD PRESSURE: 74 MMHG | SYSTOLIC BLOOD PRESSURE: 152 MMHG | TEMPERATURE: 97.6 F | WEIGHT: 156 LBS | HEART RATE: 74 BPM | OXYGEN SATURATION: 98 %

## 2020-10-08 DIAGNOSIS — L13.9 BULLOUS ERUPTION: ICD-10-CM

## 2020-10-08 DIAGNOSIS — L30.9 DERMATITIS: ICD-10-CM

## 2020-10-08 DIAGNOSIS — Z23 FLU VACCINE NEED: ICD-10-CM

## 2020-10-08 DIAGNOSIS — I10 HYPERTENSION GOAL BP (BLOOD PRESSURE) < 140/90: Primary | ICD-10-CM

## 2020-10-08 LAB
CREAT UR-MCNC: 51 MG/DL
MICROALBUMIN UR-MCNC: 117 MG/L
MICROALBUMIN/CREAT UR: 228.07 MG/G CR (ref 0–25)

## 2020-10-08 PROCEDURE — G0008 ADMIN INFLUENZA VIRUS VAC: HCPCS | Performed by: FAMILY MEDICINE

## 2020-10-08 PROCEDURE — 99214 OFFICE O/P EST MOD 30 MIN: CPT | Performed by: FAMILY MEDICINE

## 2020-10-08 PROCEDURE — 90662 IIV NO PRSV INCREASED AG IM: CPT | Performed by: FAMILY MEDICINE

## 2020-10-08 RX ORDER — LOSARTAN POTASSIUM AND HYDROCHLOROTHIAZIDE 12.5; 5 MG/1; MG/1
1 TABLET ORAL DAILY
Qty: 30 TABLET | Refills: 3 | Status: SHIPPED | OUTPATIENT
Start: 2020-10-08 | End: 2020-10-20

## 2020-10-08 ASSESSMENT — MIFFLIN-ST. JEOR: SCORE: 1143.8

## 2020-10-08 NOTE — NURSING NOTE

## 2020-10-08 NOTE — PROGRESS NOTES
Subjective     Kelly Barnes is a 80 year old female who presents to clinic today for the following health issues:    HPI       Hypertension Follow-up      Do you check your blood pressure regularly outside of the clinic? Yes, at least once per day, usually 130's/70's     Are you following a low salt diet? Yes    Are your blood pressures ever more than 140 on the top number (systolic) OR more   than 90 on the bottom number (diastolic), for example 140/90? No   Lisinopril has caused significant coughing in sister and brother  She's worried about verapamil causing more swelling in her legs.      How many servings of fruits and vegetables do you eat daily?  2-3    On average, how many sweetened beverages do you drink each day (Examples: soda, juice, sweet tea, etc.  Do NOT count diet or artificially sweetened beverages)?   0    How many days per week do you exercise enough to make your heart beat faster? 7    How many minutes a day do you exercise enough to make your heart beat faster? 20 - 29    How many days per week do you miss taking your medication? 0        2. Rash   pt state was recommend to follow up on rash. She's just seen the the dermatologist and has been told she should wear prescription pressure socks to reduce edema in her lower leg and ankles. She has been told by dermatologist she does not have bullous pemphigus and medication reaction still the most likely diagnosis. Leg and back are itchy, but lower leg blisters are healing.    Diabetes Follow-up    How often are you checking your blood sugar? One time daily  Fasting 180 before taking glimipiride, and 120's after taking it  What time of day are you checking your blood sugars (select all that apply)?  Before meals  Have you had any blood sugars above 200?  No  Have you had any blood sugars below 70?  No    What symptoms do you notice when your blood sugar is low?  None    What concerns do you have today about your diabetes? None     Do you have any  "of these symptoms? (Select all that apply)  No numbness or tingling in feet.  No redness, sores or blisters on feet.  No complaints of excessive thirst.  No reports of blurry vision.  No significant changes to weight.      BP Readings from Last 2 Encounters:   10/08/20 (!) 152/74   09/17/20 (!) 142/86     Hemoglobin A1C (%)   Date Value   10/07/2020 7.2 (H)   10/15/2019 7.6 (H)     LDL Cholesterol Calculated (mg/dL)   Date Value   10/07/2020 134 (H)   10/15/2019 94         Hyperlipidemia Follow-Up      Are you regularly taking any medication or supplement to lower your cholesterol?   No  Medication discontinued due to possible dermatitis related to statin    Past Medical History:   Diagnosis Date     Allergic rhinitis, cause unspecified      Aortic stenosis 2/29/2016    With new murmur noted 2/2016, normal echo, repeat echo 2/2017.     Atypical chest pain 7/24/2015     cuboid     left foot     Hyperlipidemia LDL goal <100 11/1/2012     Hypertension goal BP (blood pressure) < 140/90      Musculoskeletal chest pain 11/25/2016     Obesity, Class I, BMI 30-34.9 11/11/2015     Pneumonia 3/16     Type 2 diabetes, HbA1c goal < 7% (H)          Allergies   Allergen Reactions     Ibuprofen Other (See Comments)     numbness in hands and feet      Review of Systems   Constitutional, HEENT, cardiovascular, pulmonary, gi and gu systems are negative, except as otherwise noted.      Objective    BP (!) 152/74 (BP Location: Right arm, Patient Position: Chair, Cuff Size: Adult Regular)   Pulse 74   Temp 97.6  F (36.4  C) (Tympanic)   Resp 15   Ht 1.588 m (5' 2.5\")   Wt 70.8 kg (156 lb)   SpO2 98%   BMI 28.08 kg/m    Body mass index is 28.08 kg/m .  Physical Exam   GENERAL: healthy, alert and no distress  NECK: no adenopathy, no asymmetry, masses, or scars and thyroid normal to palpation  RESP: lungs clear to auscultation - no rales, rhonchi or wheezes  CV: regular rate and rhythm, normal S1 S2, no S3 or S4, no murmur, click or " "rub, no peripheral edema and peripheral pulses strong  ABDOMEN: soft, nontender, no hepatosplenomegaly, no masses and bowel sounds normal  MS: no gross musculoskeletal defects noted, no edema  SKIN: both ankles have slight hypermelanosis with circular, dark rings consistent with healing blisters, no new lesions noted. Cluster of small erythematous bumps of left lower back most consistent with mosquito bites.          Assessment & Plan     Hypertension goal BP (blood pressure) < 140/90  BP Readings from Last 3 Encounters:   10/08/20 (!) 152/74   09/17/20 (!) 142/86   09/05/20 138/70    not at goal, added medication as below, check BP at home daily and let me know results next week  - losartan-hydrochlorothiazide (HYZAAR) 50-12.5 MG tablet; Take 1 tablet by mouth daily    Bullous eruption  And  Dermatitis\  Ongoing and evolving symptoms despite stopping and replacing all prior medications  Dermatologist has suspected a medication related dermatitis, patient is wondering about allergy referral, certainly seems reasonable, see below  - ALLERGY/ASTHMA ADULT REFERRAL    Flu vaccine need    - ADMIN INFLUENZA VIRUS VAC (MEDICARE)  - INFLUENZA (HIGH DOSE) 3 VALENT VACCINE [27594]     BMI:   Estimated body mass index is 28.08 kg/m  as calculated from the following:    Height as of this encounter: 1.588 m (5' 2.5\").    Weight as of this encounter: 70.8 kg (156 lb).           Return in about 1 week (around 10/15/2020) for Hypertension follow up.    Lea Lopez MD  [facility]    "

## 2020-10-08 NOTE — PATIENT INSTRUCTIONS
Recent Labs   Lab Test 10/07/20  0831 10/15/19  0920 11/10/15  0829 11/10/15  0829 07/23/15  0800   CHOL 223* 185   < > 210* 235*   HDL 71 73   < > 67 69   * 94   < > 126 147*   TRIG 90 90   < > 86 96   CHOLHDLRATIO  --   --   --  3.1 3.4    < > = values in this interval not displayed.     1. Consider stopping aspirin for 4-8 weeks

## 2020-10-15 PROBLEM — N18.30 CKD (CHRONIC KIDNEY DISEASE) STAGE 3, GFR 30-59 ML/MIN (H): Status: ACTIVE | Noted: 2020-10-15

## 2020-10-15 NOTE — RESULT ENCOUNTER NOTE
"Hello!  It was a pleasure to see you in clinic!  Thank you for getting labs done.     Your microalbumen is elevated. This means you have some protein in the urine. It indicates that your kidneys are being affected by diabetes. Keeping blood pressure and blood fats low is the best treatment in order to keep this  stable. People with microalbumen should avoid anti-inflamatory agents such as motrin, alleve and ibuprofen as much as possible.     Your LDL, or bad cholesterol, is not at goal. LDL is also called \"bad cholesterol\" because it contributes to heart disease and stroke.  If you aren't already, I recommend increasing whole grains with every meal, and adding flax seed to your food.      One easy way to do this is to eat oatmeal every morning.  Steel cut oats take longer to cook but are better for you than instant, but any oatmeal at all improves your cholesterol.  You can add flax seed to oatmeal, yoghurt, applesauce and other soft foods; or include it in food that you make including baked goods.    If you are able to make changes, I recommend rechecking your fasting lipids in about 2-3 months to see if your cholesterol has improved. If it still hasn't, I do recommend starting a cholesterol lowering medication called a statin. Your LDL last year was normal, so I do think diet changes will work!    The testing of your blood sugar, liver function and electrolytes was normal.  Kidney function is assessed by blood work that measures creatinine and calculates estimated GFR (glomerular filtration rate).  By current criteria you do have stage 3 chronic kidney disease as your eGFR is < 60.  This is not something that is urgent as your value has not changed much with time.    Your HgA1C, also called glycosylated hemoglobin, which measures the level of sugar in your blood over the past few months, is at goal of less than 9, which is great! Congratulations!    However, it does mean will monitor your labs (urine and blood " tests) every 6-12 months and we will keep trying to make sure your blood pressure and cholesterol are at goal to keep your kidneys as healthy as possible.     If you have any questions, please contact the clinic or schedule an appointment with me, thank you!    Sincerely,    Lea Lopez MD   10/15/2020

## 2020-10-20 ENCOUNTER — OFFICE VISIT (OUTPATIENT)
Dept: FAMILY MEDICINE | Facility: CLINIC | Age: 80
End: 2020-10-20
Payer: COMMERCIAL

## 2020-10-20 VITALS — SYSTOLIC BLOOD PRESSURE: 121 MMHG | DIASTOLIC BLOOD PRESSURE: 72 MMHG

## 2020-10-20 DIAGNOSIS — N18.31 STAGE 3A CHRONIC KIDNEY DISEASE (H): ICD-10-CM

## 2020-10-20 DIAGNOSIS — E11.9 TYPE 2 DIABETES MELLITUS WITHOUT COMPLICATION, WITHOUT LONG-TERM CURRENT USE OF INSULIN (H): Primary | ICD-10-CM

## 2020-10-20 DIAGNOSIS — E78.5 HYPERLIPIDEMIA LDL GOAL <100: ICD-10-CM

## 2020-10-20 DIAGNOSIS — I10 HYPERTENSION GOAL BP (BLOOD PRESSURE) < 140/90: ICD-10-CM

## 2020-10-20 PROCEDURE — 99443 PR PHYSICIAN TELEPHONE EVALUATION 21-30 MIN: CPT | Mod: 95 | Performed by: FAMILY MEDICINE

## 2020-10-20 RX ORDER — MUPIROCIN 20 MG/G
OINTMENT TOPICAL
COMMUNITY
Start: 2020-09-24 | End: 2021-05-13

## 2020-10-20 RX ORDER — FLUOCINONIDE TOPICAL SOLUTION USP, 0.05% 0.5 MG/ML
SOLUTION TOPICAL
COMMUNITY
Start: 2020-03-18 | End: 2021-05-13

## 2020-10-20 RX ORDER — TACROLIMUS 1 MG/G
OINTMENT TOPICAL
COMMUNITY
Start: 2020-04-15 | End: 2020-12-20

## 2020-10-20 RX ORDER — NIFEDIPINE 30 MG
TABLET, EXTENDED RELEASE ORAL
COMMUNITY
Start: 2020-07-21 | End: 2020-12-20

## 2020-10-20 RX ORDER — SIMVASTATIN 10 MG
TABLET ORAL
COMMUNITY
Start: 2020-03-17 | End: 2020-12-20

## 2020-10-20 RX ORDER — GLIMEPIRIDE 2 MG/1
TABLET ORAL
Qty: 90 TABLET | Refills: 3
Start: 2020-10-20 | End: 2020-10-20

## 2020-10-20 RX ORDER — LOSARTAN POTASSIUM 50 MG/1
TABLET ORAL
COMMUNITY
Start: 2020-04-07 | End: 2020-12-20

## 2020-10-20 RX ORDER — LOSARTAN POTASSIUM AND HYDROCHLOROTHIAZIDE 12.5; 5 MG/1; MG/1
1 TABLET ORAL DAILY
Qty: 90 TABLET | Refills: 3 | Status: SHIPPED | OUTPATIENT
Start: 2020-10-20 | End: 2021-02-09

## 2020-10-20 RX ORDER — GLIMEPIRIDE 2 MG/1
TABLET ORAL
Qty: 90 TABLET | Refills: 3 | Status: SHIPPED | OUTPATIENT
Start: 2020-10-20 | End: 2021-02-09

## 2020-10-20 RX ORDER — GLIMEPIRIDE 1 MG/1
1 TABLET ORAL
Qty: 90 TABLET | Refills: 3 | Status: SHIPPED | OUTPATIENT
Start: 2020-10-20 | End: 2021-01-05 | Stop reason: DRUGHIGH

## 2020-10-20 RX ORDER — METOPROLOL SUCCINATE 25 MG/1
TABLET, EXTENDED RELEASE ORAL
COMMUNITY
Start: 2020-09-09 | End: 2020-12-20

## 2020-10-20 NOTE — PROGRESS NOTES
"Kelly Barnes is a 80 year old female who is being evaluated via a billable telephone visit.      The patient has been notified of following:     \"This telephone visit will be conducted via a call between you and your physician/provider. We have found that certain health care needs can be provided without the need for a physical exam.  This service lets us provide the care you need with a short phone conversation.  If a prescription is necessary we can send it directly to your pharmacy.  If lab work is needed we can place an order for that and you can then stop by our lab to have the test done at a later time.    Telephone visits are billed at different rates depending on your insurance coverage. During this emergency period, for some insurers they may be billed the same as an in-person visit.  Please reach out to your insurance provider with any questions.    If during the course of the call the physician/provider feels a telephone visit is not appropriate, you will not be charged for this service.\"    Patient has given verbal consent for Telephone visit?  Yes    What phone number would you like to be contacted at? [phone]    How would you like to obtain your AVS? Oliva Bolton     Kelly Barnes is a 80 year old female who presents via phone visit today for the following health issues:    HPI     Hypertension Follow-up      Do you check your blood pressure regularly outside of the clinic? Yes 133/71    Are you following a low salt diet? Yes    Are your blood pressures ever more than 140 on the top number (systolic) OR more   than 90 on the bottom number (diastolic), for example 140/90? No   121/79,  133/74, 125/68  Today:  127/75 left, 139/79 right (taken first), repeated 10 minutes 121/72    BP Readings from Last 3 Encounters:   10/20/20 121/72   10/08/20 (!) 152/74   09/17/20 (!) 142/86     GFR Estimate   Date Value Ref Range Status   10/07/2020 56 (L) >60 mL/min/[1.73_m2] Final     Comment:     " Non  GFR Calc  Starting 12/18/2018, serum creatinine based estimated GFR (eGFR) will be   calculated using the Chronic Kidney Disease Epidemiology Collaboration   (CKD-EPI) equation.     10/15/2019 72 >60 mL/min/[1.73_m2] Final     Comment:     Non  GFR Calc  Starting 12/18/2018, serum creatinine based estimated GFR (eGFR) will be   calculated using the Chronic Kidney Disease Epidemiology Collaboration   (CKD-EPI) equation.     10/25/2018 67 >60 mL/min/1.7m2 Final     Comment:     Non  GFR Calc       How many servings of fruits and vegetables do you eat daily?  2-3    On average, how many sweetened beverages do you drink each day (Examples: soda, juice, sweet tea, etc.  Do NOT count diet or artificially sweetened beverages)?   1    How many days per week do you exercise enough to make your heart beat faster? 3 or less    How many minutes a day do you exercise enough to make your heart beat faster? 9 or less    How many days per week do you miss taking your medication? 0    Other concerns: Follow-up on rash on the back since FEB, but is going away.    Hyperlipidemia Follow-Up      Are you regularly taking any medication or supplement to lower your cholesterol?   Yes- simvastatin 10 mg    Are you having muscle aches or other side effects that you think could be caused by your cholesterol lowering medication?  No   LDL Cholesterol Calculated   Date Value Ref Range Status   10/07/2020 134 (H) <100 mg/dL Final     Comment:     Above desirable:  100-129 mg/dl  Borderline High:  130-159 mg/dL  High:             160-189 mg/dL  Very high:       >189 mg/dl         Chronic Kidney Disease Follow-up      Do you take any over the counter pain medicine?: No    Diabetes Follow-up    How often are you checking your blood sugar? Two times daily  Blood sugar testing frequency justification:  Patient modifying lifestyle changes (diet, exercise) with blood sugars and recent medication  change, and elevated am blood sugars  What time of day are you checking your blood sugars (select all that apply)?  Before meals  Have you had any blood sugars above 200?  Yes AM before breakfast  Have you had any blood sugars below 70?  No    What symptoms do you notice when your blood sugar is low?  None    What concerns do you have today about your diabetes? Blood sugar is often over 200     Do you have any of these symptoms? (Select all that apply)  No numbness or tingling in feet.  No redness, sores or blisters on feet.  No complaints of excessive thirst.  No reports of blurry vision.  No significant changes to weight.  Eats breakfast 9:30 am, lunch 1:30, dinner around 7 pm, and she doesn't snack before bedtime  She walks every day, sometimes morning after breakfast and sometimes before dinner  AM blood sugars: 176, 199. 204, 220, 202 am fasting  Evening before dinner sugars: 106, 102, 116, 129 199 (after large lunch with friends)    BP Readings from Last 2 Encounters:   10/20/20 121/72   10/08/20 (!) 152/74     Hemoglobin A1C (%)   Date Value   10/07/2020 7.2 (H)   10/15/2019 7.6 (H)     LDL Cholesterol Calculated (mg/dL)   Date Value   10/07/2020 134 (H)   10/15/2019 94       Review of Systems   Constitutional, HEENT, cardiovascular, pulmonary, GI, , musculoskeletal, neuro, skin, endocrine and psych systems are negative, except as otherwise noted.       Objective          Vitals:  No vitals were obtained today due to virtual visit.  /72      healthy, alert and no distress  PSYCH: Alert and oriented times 3; coherent speech, normal   rate and volume, able to articulate logical thoughts, able   to abstract reason, no tangential thoughts, no hallucinations   or delusions  Her affect is normal  RESP: No cough, no audible wheezing, able to talk in full sentences  Remainder of exam unable to be completed due to telephone visits    No results found for this or any previous visit (from the past 24 hour(s)).         Assessment/Plan:    Assessment & Plan     Type 2 diabetes mellitus without complication, without long-term current use of insulin (H)  Not at goal, glimepiride appears to wear off over night  - glimepiride (AMARYL) 2 MG tablet; Take 2 mg with breakfast and 1 mg (1/2 tablet) with dinner.    Hypertension goal BP (blood pressure) < 140/90  BP Readings from Last 3 Encounters:   10/20/20 121/72   10/08/20 (!) 152/74   09/17/20 (!) 142/86    At goal!  The current medical regimen is effective;  continue present plan and medications.    - losartan-hydrochlorothiazide (HYZAAR) 50-12.5 MG tablet; Take 1 tablet by mouth daily    Stage 3a chronic kidney disease  Newer diagnosis, will check    Hyperlipidemia LDL goal <100  LDL Cholesterol Calculated   Date Value Ref Range Status   10/07/2020 134 (H) <100 mg/dL Final     Comment:     Above desirable:  100-129 mg/dl  Borderline High:  130-159 mg/dL  High:             160-189 mg/dL  Very high:       >189 mg/dl        Recent Labs   Lab Test 10/07/20  0831 10/15/19  0920 11/10/15  0829 11/10/15  0829 07/23/15  0800   CHOL 223* 185   < > 210* 235*   HDL 71 73   < > 67 69   * 94   < > 126 147*   TRIG 90 90   < > 86 96   CHOLHDLRATIO  --   --   --  3.1 3.4    < > = values in this interval not displayed.   Now on a statin, repeat labs in 3 months        Return in about 3 months (around 1/20/2021) for consider repeat cholesterol and A1C check.    Lea Lopez MD  [facility]    Phone call duration:  22 minutes

## 2020-10-20 NOTE — PATIENT INSTRUCTIONS
You can call to schedule a blood pressure check ([place] only visit) at [facility] anytime.  Address: [address], [place] [address]  Phone: [phone]      Shingles vaccine  I strongly recommend getting the shingles vaccine (Shingrix) if you are over age 50, but please check with your insurance to see how much you might have to pay for this vaccine! If you are on most insurance plans including Medicare, it's covered if you get it at a pharmacy but not in a clinic.    This shot will prevent shingles, a painful skin rash that you are at risk for getting if you have ever had chicken pox. Sometimes the pain will last the rest of your life, even after the rash has healed, and there is not much that we can do to relieve the pain. The vaccine is 98% effective at preventing shingles.    Please consider getting this vaccine! You'll need #2 vaccines 2-4 months apart to be protected.      You are also due for a tetanus vaccine, you can get this at the same time.  If you're traveling abroad, I recommend getting a Hepatitis A vaccine as well.

## 2020-12-07 ENCOUNTER — VIRTUAL VISIT (OUTPATIENT)
Dept: FAMILY MEDICINE | Facility: OTHER | Age: 80
End: 2020-12-07
Payer: COMMERCIAL

## 2020-12-07 PROCEDURE — 99421 OL DIG E/M SVC 5-10 MIN: CPT | Performed by: PHYSICIAN ASSISTANT

## 2020-12-08 DIAGNOSIS — Z20.822 SUSPECTED 2019 NOVEL CORONAVIRUS INFECTION: ICD-10-CM

## 2020-12-08 DIAGNOSIS — Z20.822 SUSPECTED 2019 NOVEL CORONAVIRUS INFECTION: Primary | ICD-10-CM

## 2020-12-08 PROCEDURE — U0003 INFECTIOUS AGENT DETECTION BY NUCLEIC ACID (DNA OR RNA); SEVERE ACUTE RESPIRATORY SYNDROME CORONAVIRUS 2 (SARS-COV-2) (CORONAVIRUS DISEASE [COVID-19]), AMPLIFIED PROBE TECHNIQUE, MAKING USE OF HIGH THROUGHPUT TECHNOLOGIES AS DESCRIBED BY CMS-2020-01-R: HCPCS | Performed by: FAMILY MEDICINE

## 2020-12-08 NOTE — PROGRESS NOTES
"Date: 2020 22:12:43  Clinician: Elijah Mitchell  Clinician NPI: [id number]  Patient: Kelly Barnes  Patient : 1940  Patient Address: [address] [place] [id number]  Patient Phone: [phone]  Visit Protocol: URI  Patient Summary:  Kelly is a 80 year old ( : 1940 ) female who initiated a [facility] Visit for COVID-19 (Coronavirus) evaluation and screening. When asked the question \"Please sign me up to receive news, health information and promotions from [facility].\", Kelly responded \"No\".    Kelly states her symptoms started today.   Her symptoms consist of chills. Kelly also feels feverish.   Symptom details   Temperature: Her current temperature is 100.9 degrees Fahrenheit. Kelly has had a temperature over 100 degrees Fahrenheit for 1-2 days.    Kelly denies having ear pain, headache, wheezing, cough, nasal congestion, nausea, vomiting, rhinitis, facial pain or pressure, myalgias, malaise, sore throat, teeth pain, ageusia, diarrhea, and anosmia. She also denies taking antibiotic medication in the past month and having recent facial or sinus surgery in the past 60 days. She is not experiencing dyspnea.   Precipitating events  She has not recently been exposed to someone with influenza. Kelly has not been in close contact with any high risk individuals.   Pertinent COVID-19 (Coronavirus) information  Kelly does not work or volunteer as healthcare worker or a . In the past 14 days, Kelly has not worked or volunteered at a healthcare facility or group living setting.   In the past 14 days, she also has not lived in a congregate living setting.   Kelly has not had a close contact with a laboratory-confirmed COVID-19 patient within 14 days of symptom onset.    Since 2019, Kelly has been tested for COVID-19 and has not had upper respiratory infection or influenza-like illness.      Result of COVID-19 test: Negative     Date of her COVID-19 test: 2020      Pertinent " medical history  Kelly has diabetes. She has been told by her provider that her diabetes is in control.   She has not been told by her provider to avoid NSAIDs.   Kelly does not get yeast infections when she takes antibiotics.   She denies having immunosuppressive conditions (e.g., chemotherapy, HIV, organ transplant, long-term use of steroids or other immunosuppressive medications, splenectomy). She does not have severe COPD and congestive heart failure. She does not have asthma.   Kelly does not need a return to work/school note.   Weight: 158 lbs   Kelly does not smoke or use smokeless tobacco.   Weight: 158 lbs    MEDICATIONS: glimepiride oral, losartan-hydrochlorothiazide oral, ALLERGIES: NKDA  Clinician Response:  Dear Kelly,  Based on the information provided, you have an influenza-like illness. This is an infection that has the same symptoms of the flu, but the specific virus is not known. Lab testing would be required to confirm the flu virus, but this is often not necessary because the treatment will be the same no matter what is causing your symptoms.  Your symptoms should improve gradually over the next week.  Medication information  I am prescribing:     Ibuprofen 800 mg oral tablet. Take 1 tablet by mouth 3 times per day as needed for 7 days. Do not exceed 2400mg in 24 hours. There are no refills with this prescription.   The CDC recommends treatment for flu only if antiviral medications can be started within 48 hours of the first flu symptoms or if you fall into one of the high-risk groups that are more likely to get flu complications.  Since you do not meet guidelines for treatment with antiviral medications, antiviral treatment is not recommended for you. Treatment focuses on controlling your symptoms.  Unless you are allergic to the over-the-counter medication(s) below, I recommend using:     Acetaminophen (Tylenol or store brand) oral tablet. Take 1-2 tablets by mouth every 4-6 hours to help with  the discomfort.   Over-the-counter medications do not require a prescription. Ask the pharmacist if you have any questions.  Self care  Steps you can take to be as comfortable as possible:     Rest.    Drink plenty of fluids.     If you have a fever, stay home until your temperature has returned to normal for 24 hours and you feel well enough for daily activities. And of course, wash your hands often to prevent spreading the flu and other illnesses. However, the best way to prevent the flu is to get a flu shot before each flu season.  When to seek care  Please be seen in a clinic or urgent care if any of the following occur:   New symptoms develop, or symptoms become worse   Call ahead before going to the clinic or urgent care.  Additional treatment plan   Your symptoms show that you may have coronavirus (COVID-19). This illness can cause fever, cough and trouble breathing. Many people get a mild case and get better on their own. Some people can get very sick.  What should I do?  We would like to test you for this virus.   1. Please call [phone] to schedule your visit. Explain that you were referred by [facility] to have a COVID-19 test. Be ready to share your [facility] visit [facility] number.  * If you need to schedule in [facility] please call [phone] or for [employer] employees please call [phone].  * If you need to schedule in the [place] area please call [phone]. [place] employees call [phone].  The following will serve as your written order for this COVID Test, ordered by me, for the indication of suspected COVID [Z20.828]: The test will be ordered in [employer], our electronic health record, after you are scheduled. It will show as ordered and authorized by Franklin Parra MD.  Order: COVID-19 (Coronavirus) PCR for SYMPTOMATIC testing from [facility].   2. When it's time for your COVID test:  Stay at least 6 feet away from others. (If someone will drive you to your test, stay in the backseat, as far away from  "the  as you can.)   Cover your mouth and nose with a mask, tissue or washcloth.  Go straight to the testing site. Don't make any stops on the way there or back.      3.Starting now: Stay home and away from others (self-isolate) until:   You've had no fever---and no medicine that reduces fever---for one full day (24 hours). And...   Your other symptoms have gotten better. For example, your cough or breathing has improved. And...   At least 10 days have passed since your symptoms started.       During this time, don't leave the house except for testing or medical care.   Stay in your own room, even for meals. Use your own bathroom if you can.   Stay away from others in your home. No hugging, kissing or shaking hands. No visitors.  Don't go to work, school or anywhere else.    Clean \"high touch\" surfaces often (doorknobs, counters, handles, etc.). Use a household cleaning spray or wipes. You'll find a full list of  on the EPA website: www.epa.gov/pesticide-registration/list-n-disinfectants-use-against-sars-cov-2.   Cover your mouth and nose with a mask, tissue or washcloth to avoid spreading germs.  Wash your hands and face often. Use soap and water.  Caregivers in these groups are at risk for severe illness due to COVID-19:  o People 65 years and older  o People who live in a nursing home or long-term care facility  o People with chronic disease (lung, heart, cancer, diabetes, kidney, liver, immunologic)  o People who have a weakened immune system, including those who:   Are in cancer treatment  Take medicine that weakens the immune system, such as corticosteroids  Had a bone marrow or organ transplant  Have an immune deficiency  Have poorly controlled HIV or AIDS  Are obese (body mass index of 40 or higher)  Smoke regularly   o Caregivers should wear gloves while washing dishes, handling laundry and cleaning bedrooms and bathrooms.  o Use caution when washing and drying laundry: Don't shake dirty " laundry, and use the warmest water setting that you can.  o For more tips, go to www.cdc.gov/coronavirus/2019-ncov/downloads/10Things.pdf.    4.Sign up for [employer]. We know it's scary to hear that you might have COVID-19. We want to track your symptoms to make sure you're okay over the next 2 weeks. Please look for an email from [employer]---this is a free, online program that we'll use to keep in touch. To sign up, follow the link in the email. Learn more at [email or web address]  How can I take care of myself?   Get lots of rest. Drink extra fluids (unless a doctor has told you not to).   Take Tylenol (acetaminophen) for fever or pain. If you have liver or kidney problems, ask your family doctor if it's okay to take Tylenol.   Adults can take either:    650 mg (two 325 mg pills) every 4 to 6 hours, or...   1,000 mg (two 500 mg pills) every 8 hours as needed.    Note: Don't take more than 3,000 mg in one day. Acetaminophen is found in many medicines (both prescribed and over-the-counter medicines). Read all labels to be sure you don't take too much.   For children, check the Tylenol bottle for the right dose. The dose is based on the child's age or weight.    If you have other health problems (like cancer, heart failure, an organ transplant or severe kidney disease): Call your specialty clinic if you don't feel better in the next 2 days.       Know when to call 911. Emergency warning signs include:    Trouble breathing or shortness of breath Pain or pressure in the chest that doesn't go away Feeling confused like you haven't felt before, or not being able to wake up Bluish-colored lips or face.  Where can I get more information?    [employer] -- About COVID-19: [email or web address]   CDC -- What to Do If You're Sick: www.cdc.gov/coronavirus/2019-ncov/about/steps-when-sick.html   CDC -- Ending Home Isolation: www.cdc.gov/coronavirus/2019-ncov/hcp/disposition-in-home-patients.html    CDC -- Caring for Someone: www.cdc.gov/coronavirus/2019-ncov/if-you-are-sick/care-for-someone.html   [facility] -- Interim Guidance for Hospital Discharge to Home: www.health.[facility].[place].us/diseases/coronavirus/hcp/hospdischarge.pdf   [facility] clinical trials (COVID-19 research studies): clinicalaffairs.[facility]-clinical-trials    Below are the COVID-19 hotlines at the [place] Department of Health ([facility]). Interpreters are available.    For health questions: Call 167-729-7950 or 1-695.423.2879 (7 a.m. to 7 p.m.) For questions about schools and childcare: Call 168-309-1002 or 1-100.585.7426 (7 a.m. to 7 p.m.)    COVID-19 (Coronavirus) General Information  Because there is currently no vaccine to prevent infection, the best way to protect yourself is to avoid being exposed to this virus. Common symptoms of COVID-19 include but are not limited to fever, cough, and shortness of breath. These symptoms appear 2-14 days after you are exposed to the virus that causes COVID-19. Click here for more information from the CDC on how to protect yourself.  If you are sick with COVID-19 or suspect you are infected with the virus that causes COVID-19, follow the steps here from the CDC to help prevent the disease from spreading to people in your home and community.  Click here for general information from the CDC on testing.  If you develop any of these emergency warning signs for COVID-19, get medical attention immediately:     Trouble breathing    Persistent pain or pressure in the chest    New confusion or inability to arouse    Bluish lips or face      Call your doctor or clinic before going in. Call [phone] if you have a medical emergency and notify the  you have or think you may have COVID-19.  For more detailed and up to date information on COVID-19 (Coronavirus), please visit the CDC website.   Diagnosis: COVID-19  Diagnosis ICD: U07.1  Prescription: ibuprofen (IBU) 800 mg oral tablet 21 tablet, 7 days supply.  Take 1 tablet by mouth 3 times per day as needed for 7 days. Refills: 0, Refill as needed: no, Allow substitutions: yes

## 2020-12-09 LAB
SARS-COV-2 RNA SPEC QL NAA+PROBE: NOT DETECTED
SPECIMEN SOURCE: NORMAL

## 2020-12-19 ENCOUNTER — HOSPITAL ENCOUNTER (INPATIENT)
Facility: CLINIC | Age: 80
LOS: 3 days | Discharge: HOME | DRG: 872 | End: 2020-12-22
Attending: INTERNAL MEDICINE | Admitting: STUDENT IN AN ORGANIZED HEALTH CARE EDUCATION/TRAINING PROGRAM
Payer: COMMERCIAL

## 2020-12-19 ENCOUNTER — APPOINTMENT (OUTPATIENT)
Dept: GENERAL RADIOLOGY | Facility: CLINIC | Age: 80
DRG: 872 | End: 2020-12-19
Attending: INTERNAL MEDICINE
Payer: COMMERCIAL

## 2020-12-19 ENCOUNTER — OFFICE VISIT (OUTPATIENT)
Dept: URGENT CARE | Facility: URGENT CARE | Age: 80
End: 2020-12-19
Payer: COMMERCIAL

## 2020-12-19 VITALS
BODY MASS INDEX: 28.08 KG/M2 | WEIGHT: 156 LBS | SYSTOLIC BLOOD PRESSURE: 124 MMHG | DIASTOLIC BLOOD PRESSURE: 79 MMHG | OXYGEN SATURATION: 97 % | TEMPERATURE: 101.2 F | HEART RATE: 113 BPM

## 2020-12-19 DIAGNOSIS — E87.6 HYPOKALEMIA: ICD-10-CM

## 2020-12-19 DIAGNOSIS — N10 PYELONEPHRITIS, ACUTE: Primary | ICD-10-CM

## 2020-12-19 DIAGNOSIS — N17.9 ACUTE KIDNEY INJURY (H): ICD-10-CM

## 2020-12-19 DIAGNOSIS — N10 ACUTE PYELONEPHRITIS: ICD-10-CM

## 2020-12-19 DIAGNOSIS — R35.0 URINARY FREQUENCY: ICD-10-CM

## 2020-12-19 DIAGNOSIS — R50.9 FEVER AND CHILLS: ICD-10-CM

## 2020-12-19 DIAGNOSIS — R30.0 DYSURIA: ICD-10-CM

## 2020-12-19 DIAGNOSIS — Z20.828 CONTACT WITH AND (SUSPECTED) EXPOSURE TO OTHER VIRAL COMMUNICABLE DISEASES: ICD-10-CM

## 2020-12-19 DIAGNOSIS — A41.9 SEPSIS, DUE TO UNSPECIFIED ORGANISM, UNSPECIFIED WHETHER ACUTE ORGAN DYSFUNCTION PRESENT (H): ICD-10-CM

## 2020-12-19 DIAGNOSIS — R82.90 NONSPECIFIC FINDING ON EXAMINATION OF URINE: ICD-10-CM

## 2020-12-19 LAB
ALBUMIN SERPL-MCNC: 3.1 G/DL (ref 3.4–5)
ALBUMIN UR-MCNC: 100 MG/DL
ALBUMIN UR-MCNC: 30 MG/DL
ALP SERPL-CCNC: 87 U/L (ref 40–150)
ALT SERPL W P-5'-P-CCNC: 19 U/L (ref 0–50)
ANION GAP SERPL CALCULATED.3IONS-SCNC: 10 MMOL/L (ref 3–14)
APPEARANCE UR: ABNORMAL
APPEARANCE UR: ABNORMAL
AST SERPL W P-5'-P-CCNC: 15 U/L (ref 0–45)
BACTERIA #/AREA URNS HPF: ABNORMAL /HPF
BACTERIA #/AREA URNS HPF: ABNORMAL /HPF
BASOPHILS # BLD AUTO: 0 10E9/L (ref 0–0.2)
BASOPHILS # BLD AUTO: 0 10E9/L (ref 0–0.2)
BASOPHILS NFR BLD AUTO: 0.2 %
BASOPHILS NFR BLD AUTO: 0.2 %
BILIRUB SERPL-MCNC: 0.5 MG/DL (ref 0.2–1.3)
BILIRUB UR QL STRIP: NEGATIVE
BILIRUB UR QL STRIP: NEGATIVE
BUN SERPL-MCNC: 28 MG/DL (ref 7–30)
CALCIUM SERPL-MCNC: 8.8 MG/DL (ref 8.5–10.1)
CHLORIDE SERPL-SCNC: 99 MMOL/L (ref 94–109)
CO2 SERPL-SCNC: 25 MMOL/L (ref 20–32)
COLOR UR AUTO: YELLOW
COLOR UR AUTO: YELLOW
CREAT SERPL-MCNC: 1.29 MG/DL (ref 0.52–1.04)
CRP SERPL-MCNC: 140 MG/L (ref 0–8)
DIFFERENTIAL METHOD BLD: ABNORMAL
DIFFERENTIAL METHOD BLD: ABNORMAL
EOSINOPHIL # BLD AUTO: 0 10E9/L (ref 0–0.7)
EOSINOPHIL # BLD AUTO: 0 10E9/L (ref 0–0.7)
EOSINOPHIL NFR BLD AUTO: 0 %
EOSINOPHIL NFR BLD AUTO: 0.2 %
ERYTHROCYTE [DISTWIDTH] IN BLOOD BY AUTOMATED COUNT: 13 % (ref 10–15)
ERYTHROCYTE [DISTWIDTH] IN BLOOD BY AUTOMATED COUNT: 13.2 % (ref 10–15)
ERYTHROCYTE [SEDIMENTATION RATE] IN BLOOD BY WESTERGREN METHOD: 30 MM/H (ref 0–30)
GFR SERPL CREATININE-BSD FRML MDRD: 39 ML/MIN/{1.73_M2}
GLUCOSE SERPL-MCNC: 273 MG/DL (ref 70–99)
GLUCOSE UR STRIP-MCNC: 30 MG/DL
GLUCOSE UR STRIP-MCNC: NEGATIVE MG/DL
HCT VFR BLD AUTO: 38.7 % (ref 35–47)
HCT VFR BLD AUTO: 44.5 % (ref 35–47)
HGB BLD-MCNC: 12.8 G/DL (ref 11.7–15.7)
HGB BLD-MCNC: 14.9 G/DL (ref 11.7–15.7)
HGB UR QL STRIP: ABNORMAL
HGB UR QL STRIP: ABNORMAL
IMM GRANULOCYTES # BLD: 0.1 10E9/L (ref 0–0.4)
IMM GRANULOCYTES NFR BLD: 0.5 %
KETONES UR STRIP-MCNC: 10 MG/DL
KETONES UR STRIP-MCNC: 15 MG/DL
LACTATE BLD-SCNC: 1.2 MMOL/L (ref 0.7–2)
LEUKOCYTE ESTERASE UR QL STRIP: ABNORMAL
LEUKOCYTE ESTERASE UR QL STRIP: ABNORMAL
LYMPHOCYTES # BLD AUTO: 0.5 10E9/L (ref 0.8–5.3)
LYMPHOCYTES # BLD AUTO: 1.1 10E9/L (ref 0.8–5.3)
LYMPHOCYTES NFR BLD AUTO: 2.6 %
LYMPHOCYTES NFR BLD AUTO: 6 %
MCH RBC QN AUTO: 27.4 PG (ref 26.5–33)
MCH RBC QN AUTO: 27.8 PG (ref 26.5–33)
MCHC RBC AUTO-ENTMCNC: 33.1 G/DL (ref 31.5–36.5)
MCHC RBC AUTO-ENTMCNC: 33.5 G/DL (ref 31.5–36.5)
MCV RBC AUTO: 83 FL (ref 78–100)
MCV RBC AUTO: 83 FL (ref 78–100)
MONOCYTES # BLD AUTO: 1 10E9/L (ref 0–1.3)
MONOCYTES # BLD AUTO: 1 10E9/L (ref 0–1.3)
MONOCYTES NFR BLD AUTO: 5.7 %
MONOCYTES NFR BLD AUTO: 5.7 %
MUCOUS THREADS #/AREA URNS LPF: PRESENT /LPF
NEUTROPHILS # BLD AUTO: 16 10E9/L (ref 1.6–8.3)
NEUTROPHILS # BLD AUTO: 16.1 10E9/L (ref 1.6–8.3)
NEUTROPHILS NFR BLD AUTO: 87.9 %
NEUTROPHILS NFR BLD AUTO: 91 %
NITRATE UR QL: POSITIVE
NITRATE UR QL: POSITIVE
NON-SQ EPI CELLS #/AREA URNS LPF: ABNORMAL /LPF
NRBC # BLD AUTO: 0 10*3/UL
NRBC BLD AUTO-RTO: 0 /100
PH UR STRIP: 5.5 PH (ref 5–7)
PH UR STRIP: 5.5 PH (ref 5–7)
PLATELET # BLD AUTO: 330 10E9/L (ref 150–450)
PLATELET # BLD AUTO: 364 10E9/L (ref 150–450)
POTASSIUM SERPL-SCNC: 2.9 MMOL/L (ref 3.4–5.3)
PROT SERPL-MCNC: 7.1 G/DL (ref 6.8–8.8)
RBC # BLD AUTO: 4.68 10E12/L (ref 3.8–5.2)
RBC # BLD AUTO: 5.36 10E12/L (ref 3.8–5.2)
RBC #/AREA URNS AUTO: >182 /HPF (ref 0–2)
RBC #/AREA URNS AUTO: ABNORMAL /HPF
SODIUM SERPL-SCNC: 134 MMOL/L (ref 133–144)
SOURCE: ABNORMAL
SOURCE: ABNORMAL
SP GR UR STRIP: 1.01 (ref 1–1.03)
SP GR UR STRIP: 1.02 (ref 1–1.03)
SQUAMOUS #/AREA URNS AUTO: 2 /HPF (ref 0–1)
TRANS CELLS #/AREA URNS HPF: 5 /HPF (ref 0–1)
UROBILINOGEN UR STRIP-ACNC: 0.2 EU/DL (ref 0.2–1)
UROBILINOGEN UR STRIP-MCNC: NORMAL MG/DL (ref 0–2)
WBC # BLD AUTO: 17.7 10E9/L (ref 4–11)
WBC # BLD AUTO: 18.2 10E9/L (ref 4–11)
WBC #/AREA URNS AUTO: >182 /HPF (ref 0–5)
WBC #/AREA URNS AUTO: ABNORMAL /HPF
WBC CLUMPS #/AREA URNS HPF: PRESENT /HPF

## 2020-12-19 PROCEDURE — 83605 ASSAY OF LACTIC ACID: CPT | Performed by: INTERNAL MEDICINE

## 2020-12-19 PROCEDURE — 85025 COMPLETE CBC W/AUTO DIFF WBC: CPT | Performed by: INTERNAL MEDICINE

## 2020-12-19 PROCEDURE — 87088 URINE BACTERIA CULTURE: CPT | Performed by: PHYSICIAN ASSISTANT

## 2020-12-19 PROCEDURE — 96361 HYDRATE IV INFUSION ADD-ON: CPT | Performed by: INTERNAL MEDICINE

## 2020-12-19 PROCEDURE — 80053 COMPREHEN METABOLIC PANEL: CPT | Performed by: INTERNAL MEDICINE

## 2020-12-19 PROCEDURE — C9803 HOPD COVID-19 SPEC COLLECT: HCPCS | Performed by: INTERNAL MEDICINE

## 2020-12-19 PROCEDURE — 99207 PR INPT ADMISSION FROM CLINIC: CPT | Mod: 25 | Performed by: PHYSICIAN ASSISTANT

## 2020-12-19 PROCEDURE — 36415 COLL VENOUS BLD VENIPUNCTURE: CPT | Performed by: PHYSICIAN ASSISTANT

## 2020-12-19 PROCEDURE — 87086 URINE CULTURE/COLONY COUNT: CPT | Performed by: INTERNAL MEDICINE

## 2020-12-19 PROCEDURE — 99291 CRITICAL CARE FIRST HOUR: CPT | Mod: 25 | Performed by: INTERNAL MEDICINE

## 2020-12-19 PROCEDURE — 71045 X-RAY EXAM CHEST 1 VIEW: CPT

## 2020-12-19 PROCEDURE — 120N000002 HC R&B MED SURG/OB UMMC

## 2020-12-19 PROCEDURE — 87186 SC STD MICRODIL/AGAR DIL: CPT | Performed by: PHYSICIAN ASSISTANT

## 2020-12-19 PROCEDURE — 85025 COMPLETE CBC W/AUTO DIFF WBC: CPT | Performed by: PHYSICIAN ASSISTANT

## 2020-12-19 PROCEDURE — 258N000003 HC RX IP 258 OP 636: Performed by: INTERNAL MEDICINE

## 2020-12-19 PROCEDURE — 93005 ELECTROCARDIOGRAM TRACING: CPT | Performed by: INTERNAL MEDICINE

## 2020-12-19 PROCEDURE — 87040 BLOOD CULTURE FOR BACTERIA: CPT | Performed by: INTERNAL MEDICINE

## 2020-12-19 PROCEDURE — 87086 URINE CULTURE/COLONY COUNT: CPT | Performed by: PHYSICIAN ASSISTANT

## 2020-12-19 PROCEDURE — 81001 URINALYSIS AUTO W/SCOPE: CPT | Performed by: PHYSICIAN ASSISTANT

## 2020-12-19 PROCEDURE — 99285 EMERGENCY DEPT VISIT HI MDM: CPT | Mod: 25 | Performed by: INTERNAL MEDICINE

## 2020-12-19 PROCEDURE — 85652 RBC SED RATE AUTOMATED: CPT | Performed by: INTERNAL MEDICINE

## 2020-12-19 PROCEDURE — 86140 C-REACTIVE PROTEIN: CPT | Performed by: INTERNAL MEDICINE

## 2020-12-19 PROCEDURE — 87636 SARSCOV2 & INF A&B AMP PRB: CPT | Performed by: INTERNAL MEDICINE

## 2020-12-19 PROCEDURE — 96372 THER/PROPH/DIAG INJ SC/IM: CPT | Performed by: PHYSICIAN ASSISTANT

## 2020-12-19 PROCEDURE — 93010 ELECTROCARDIOGRAM REPORT: CPT | Performed by: INTERNAL MEDICINE

## 2020-12-19 PROCEDURE — 81003 URINALYSIS AUTO W/O SCOPE: CPT | Performed by: INTERNAL MEDICINE

## 2020-12-19 RX ORDER — SODIUM CHLORIDE 9 MG/ML
INJECTION, SOLUTION INTRAVENOUS CONTINUOUS
Status: DISCONTINUED | OUTPATIENT
Start: 2020-12-19 | End: 2020-12-21

## 2020-12-19 RX ORDER — SULFAMETHOXAZOLE/TRIMETHOPRIM 800-160 MG
1 TABLET ORAL 2 TIMES DAILY
Qty: 20 TABLET | Refills: 0 | Status: ON HOLD | OUTPATIENT
Start: 2020-12-19 | End: 2020-12-22

## 2020-12-19 RX ORDER — CEFTRIAXONE SODIUM 1 G
1 VIAL (EA) INJECTION ONCE
Status: DISCONTINUED | OUTPATIENT
Start: 2020-12-19 | End: 2020-12-19

## 2020-12-19 RX ORDER — PIPERACILLIN SODIUM, TAZOBACTAM SODIUM 3; .375 G/15ML; G/15ML
3.38 INJECTION, POWDER, LYOPHILIZED, FOR SOLUTION INTRAVENOUS ONCE
Status: COMPLETED | OUTPATIENT
Start: 2020-12-19 | End: 2020-12-20

## 2020-12-19 RX ORDER — POTASSIUM CHLORIDE 7.45 MG/ML
10 INJECTION INTRAVENOUS CONTINUOUS
Status: DISCONTINUED | OUTPATIENT
Start: 2020-12-19 | End: 2020-12-20

## 2020-12-19 RX ADMIN — SODIUM CHLORIDE 1000 ML: 9 INJECTION, SOLUTION INTRAVENOUS at 23:18

## 2020-12-19 RX ADMIN — Medication 1 G: at 11:51

## 2020-12-19 ASSESSMENT — ENCOUNTER SYMPTOMS
FATIGUE: 1
EYE REDNESS: 0
ARTHRALGIAS: 0
NECK STIFFNESS: 0
HEADACHES: 0
VOMITING: 0
WEAKNESS: 1
APPETITE CHANGE: 1
ABDOMINAL PAIN: 0
COLOR CHANGE: 0
DIARRHEA: 0
FEVER: 1
DYSURIA: 1
SHORTNESS OF BREATH: 0
DIFFICULTY URINATING: 0
CONFUSION: 0
COUGH: 0
BACK PAIN: 0
NAUSEA: 0

## 2020-12-19 NOTE — PROGRESS NOTES
SUBJECTIVE:   Kelly Barnes is a 80 year old female presenting with a chief complaint of fever and chills.  Fever 12/7-12/10/20.  Increased urinary frequency for 4 days.  Intermittent burning.  Feels otehrwise well with the exception of the fever/chills      Past Medical History:   Diagnosis Date     Allergic rhinitis, cause unspecified      Aortic stenosis 2/29/2016    With new murmur noted 2/2016, normal echo, repeat echo 2/2017.     Atypical chest pain 7/24/2015     cuboid     left foot     Hyperlipidemia LDL goal <100 11/1/2012     Hypertension goal BP (blood pressure) < 140/90      Musculoskeletal chest pain 11/25/2016     Obesity, Class I, BMI 30-34.9 11/11/2015     Pneumonia 3/16     Type 2 diabetes, HbA1c goal < 7% (H)      Current Outpatient Medications   Medication Sig Dispense Refill     ASPIRIN 81 MG OR TABS ONE DAILY 100 3     blood glucose (NO BRAND SPECIFIED) test strip        blood glucose (ONETOUCH ULTRA) test strip Use to test blood sugar twice daily. Dispense 1 box of 200 test strips, #3 refills. 1 Box 3     blood glucose monitor KIT 1 kit daily. 1 kit 0     blood glucose monitoring (ONE TOUCH ULTRA 2) meter device kit Use to test blood sugar 3 times daily 1 kit 0     fluocinonide (LIDEX) 0.05 % external solution        glimepiride (AMARYL) 1 MG tablet Take 1 tablet (1 mg) by mouth daily (with dinner) She will also take separate 2 mg dose with breakfast. 90 tablet 3     glimepiride (AMARYL) 2 MG tablet Take 2 mg with breakfast (she will also take a 1 mg tablet with dinner) 90 tablet 3     losartan (COZAAR) 50 MG tablet        losartan-hydrochlorothiazide (HYZAAR) 50-12.5 MG tablet Take 1 tablet by mouth daily 90 tablet 3     mupirocin (BACTROBAN) 2 % external ointment        ONE TOUCH LANCETS MISC        ONETOUCH DELICA LANCETS 33G MISC 1 Device by In Vitro route 2 times daily 100 each 11     tacrolimus (PROTOPIC) 0.1 % external ointment        triamcinolone (KENALOG) 0.1 % external ointment         metoprolol succinate ER (TOPROL-XL) 25 MG 24 hr tablet        NIFEdipine ER (ADALAT CC) 30 MG 24 hr tablet        simvastatin (ZOCOR) 10 MG tablet        Social History     Tobacco Use     Smoking status: Never Smoker     Smokeless tobacco: Never Used   Substance Use Topics     Alcohol use: Yes     Alcohol/week: 10.0 standard drinks     Comment: 1-2 drinks per week       ROS:  10 point ROS negative except as listed above      OBJECTIVE:  /79   Pulse 113   Temp 101.2  F (38.4  C) (Tympanic)   Wt 70.8 kg (156 lb)   SpO2 97%   BMI 28.08 kg/m    GENERAL APPEARANCE: healthy, alert and no distress  EYES:  conjunctiva clear  RESP: lungs clear to auscultation - no rales, rhonchi or wheezes  CV: regular rates and rhythm, normal S1 S2, no murmur noted  NEURO: Normal strength and tone, sensory exam grossly normal,  normal speech and mentation  SKIN: no suspicious lesions or rashes      Results for orders placed or performed in visit on 12/19/20   *UA reflex to Microscopic and Culture ([facility] (except [place] and [place])     Status: Abnormal    Specimen: Midstream Urine   Result Value Ref [facility]    Color Urine Yellow     Appearance Urine Cloudy     Glucose Urine Negative NEG^Negative mg/dL    Bilirubin Urine Negative NEG^Negative    Ketones Urine 15 (A) NEG^Negative mg/dL    Specific Gravity Urine 1.015 1.003 - 1.035    Blood Urine Moderate (A) NEG^Negative    pH Urine 5.5 5.0 - 7.0 pH    Protein Albumin Urine 30 (A) NEG^Negative mg/dL    Urobilinogen Urine 0.2 0.2 - 1.0 EU/dL    Nitrite Urine Positive (A) NEG^Negative    Leukocyte Esterase Urine Large (A) NEG^Negative    Source Midstream Urine    CBC with platelets and differential     Status: Abnormal   Result Value Ref [facility]    WBC 18.2 (H) 4.0 - 11.0 10e9/L    RBC Count 5.36 (H) 3.8 - 5.2 10e12/L    Hemoglobin 14.9 11.7 - 15.7 g/dL    Hematocrit 44.5 35.0 - 47.0 %    MCV 83 78 - 100 fl    MCH 27.8 26.5 - 33.0 pg    MCHC 33.5 31.5 - 36.5  g/dL    RDW 13.2 10.0 - 15.0 %    Platelet Count 364 150 - 450 10e9/L    Diff Method Automated Method     % Neutrophils 87.9 %    % Lymphocytes 6.0 %    % Monocytes 5.7 %    % Eosinophils 0.2 %    % Basophils 0.2 %    Absolute Neutrophil 16.0 (H) 1.6 - 8.3 10e9/L    Absolute Lymphocytes 1.1 0.8 - 5.3 10e9/L    Absolute Monocytes 1.0 0.0 - 1.3 10e9/L    Absolute Eosinophils 0.0 0.0 - 0.7 10e9/L    Absolute Basophils 0.0 0.0 - 0.2 10e9/L   Urine Microscopic     Status: Abnormal   Result Value Ref [facility]    WBC Urine  (A) OTO5^0 - 5 /HPF    RBC Urine 10-25 (A) OTO2^O - 2 /HPF    Squamous Epithelial /LPF Urine Moderate (A) FEW^Few /LPF    Bacteria Urine Many (A) NEG^Negative /HPF   Urine Culture Aerobic Bacterial     Status: None (Preliminary result)    Specimen: Midstream Urine   Result Value Ref [facility]    Specimen Description Midstream Urine     Special Requests Specimen received in preservative     Culture Micro PENDING        ASSESSMENT:  (N10) Pyelonephritis, acute  (primary encounter diagnosis)  Plan: cefTRIAXone (ROCEPHIN) injection 1 g, INJECTION        INTRAMUSCULAR OR SUB-Q,         sulfamethoxazole-trimethoprim (BACTRIM DS)         800-160 MG tablet    Red flags and emergent follow up discussed, and understood by patient  Follow up with PCP if symptoms worsen or fail to improve      (R50.9) Fever and chills  Plan: CBC with platelets and differential, Urine         Microscopic  Close follow up with ER if feeling even the least bit worse.      (R35.0) Urinary frequency  Plan: *UA reflex to Microscopic and Culture ([facility]         [facility] (except [place] and         [place])      (R30.0) Dysuria  Plan: *UA reflex to Microscopic and Culture ([facility]         and [facility] (except [place] and         [place])      (R82.90) Nonspecific finding on examination of urine  Plan: Urine Culture Aerobic Bacterial      Patient Instructions   Emergency Department with worsening of  symptoms    Please call if not improving in 24-48 hours    Patient Education     Kidney Infection (Adult Female)     An infection in one or both kidneys is called pyelonephritis. It usually happens when bacteria ) get into the kidney. Rarely it is caused when other germs such as viruses, fungi, or other disease-causing organisms get into the kidney. The bacteria or other disease-causing organisms can enter the kidneys from the bladder or blood traveling from other parts of the body. A kidney infection can become serious. It can cause severe illness, scarring of the kidneys, or kidney failure if not treated correctly.  Common causes for this problem include:    Not keeping the genital area clean and dry, which promotes the growth of bacteria    Wiping back to front. This drags bacteria from the rectum toward the urinary opening (urethra).    Wearing tight pants or underwear. This lets moisture build up in the genital area, which helps bacteria grow.    Holding urine in for long periods of time    Dehydration    Urinary tract infections    Blockages of urine draining from the kidney, such as a kidney stone  Kidney infections can cause symptoms similar to a bladder infection. Symptoms include:    Pain (or burning) when urinating    Having to urinate more often than usual    Blood in the urine (pink or red)    Belly (abdominal) pain or discomfort, usually in the lower abdomen    Pain in the side or back    Pain above the pubic bone    Fever or chills    Vomiting    Loss of appetite  Treatment is oral antibiotics. More severe cases are treated with intramuscular or IV (intravenous) antibiotics. These are started right away and may be changed once urine culture results show the infecting organisms. Treatment helps prevent a more serious kidney infection. Symptoms of kidney infections can vary based on your age.  Medicines  Medicines can help in the treatment of a bladder infection:    Take antibiotics exactly as  prescribed and until they are used up, even if you feel better. It's important to finish them to make sure the infection is gone.    Unless another medicine was prescribed, you can use over-the-counter medicines for pain, fever, or discomfort. If you have chronic liver of kidney disease, talk with your healthcare provider before using these medicines. Also talk with your provider if you've ever had a stomach ulcer or digestive bleeding, or are taking blood thinners.  Home care  The following are general care guidelines:    Stay home from work or school. Rest in bed until your fever breaks and you are feeling better, or as advised by your healthcare provider.    Drink lots of fluid unless you must restrict fluids for other medical reasons. This will force the medicine into your urinary system and flush the bacteria out of your body. Ask your healthcare provider how much you should drink.    Don't have sex until you have finished all of your medicine and your symptoms are gone.    Don't have caffeine, alcohol, or spicy foods. These foods may irritate the kidneys and bladder.    Don't take bubble baths. Sensitivity to the chemicals in bubble baths can irritate the urethra.    Make sure you wipe from front to back after using the toilet.    Wear loose cloths and cotton underwear.  Prevention  These self-care steps can help prevent future infections:    Drink plenty of fluids to prevent dehydration and flush out the bladder. Do this unless you must restrict fluids for other health reasons, or your healthcare provider told you not to.    Correct cleaning after going to the bathroom in important. Make sure you wipe from front to back after using the toilet.    Urinate more often. Don't try to hold urine in for a long time.    Don't wear tight-fitting pants and underwear.    Improve your diet to prevent constipation. Eat more fruits, vegetables, and fiber. Eat less junk and fatty foods. Constipation can make a urinary tract  infection more likely. Talk with your healthcare provider if you have trouble with bowel movements.    Urinate right after sex to flush out the bladder.  Follow-up care  Follow up with your healthcare provider, or as advised. Additional testing may be needed to make sure the infection has cleared. Close follow-up and further testing is very important to find the cause and to prevent future infections.  If a urine culture was done, you will be contacted if your treatment needs to be changed. If directed, you may call to find out the results.  If you had an X-ray, CT scan, or other diagnostic test, you will be notified of any new findings that may affect your care.  Call 911  Call 911 if any of the following occur:    Trouble breathing    Fainting or loss of consciousness    Rapid or very slow heart rate    Weakness, dizziness, or fainting    Trouble arousing or confusion  When to seek medical advice  Call your healthcare provider right away if any of these occur:    Fever 100.4 F (38 C) or higher, or as directed by your healthcare provider    Not feeling better or symptoms get worse within 1 to 2 days after starting antibiotics    Any symptom that continues after 3 days of treatment    Increasing pain in the stomach, back, side, or groin area    Repeated vomiting    Not able to take prescribed medicine due to nausea or another reason    Bloody, dark-colored, or foul smelling urine    Trouble urinating or decreased urine output    No urine for 8 hours, no tears when crying, confusion, sunken eyes, or dry mouth  [employer] last reviewed this educational content on 11/1/2019 2000-2020 [employer]. [address] [place] [address], [place] [address]. All rights reserved. This information is not intended as a substitute for professional medical care. Always follow your healthcare professional's instructions.

## 2020-12-20 LAB
ANION GAP SERPL CALCULATED.3IONS-SCNC: 5 MMOL/L (ref 3–14)
ANION GAP SERPL CALCULATED.3IONS-SCNC: 7 MMOL/L (ref 3–14)
BACTERIA SPEC CULT: ABNORMAL
BUN SERPL-MCNC: 20 MG/DL (ref 7–30)
BUN SERPL-MCNC: 22 MG/DL (ref 7–30)
CALCIUM SERPL-MCNC: 8.1 MG/DL (ref 8.5–10.1)
CALCIUM SERPL-MCNC: 8.2 MG/DL (ref 8.5–10.1)
CHLORIDE SERPL-SCNC: 104 MMOL/L (ref 94–109)
CHLORIDE SERPL-SCNC: 106 MMOL/L (ref 94–109)
CO2 SERPL-SCNC: 26 MMOL/L (ref 20–32)
CO2 SERPL-SCNC: 27 MMOL/L (ref 20–32)
CREAT SERPL-MCNC: 1.08 MG/DL (ref 0.52–1.04)
CREAT SERPL-MCNC: 1.11 MG/DL (ref 0.52–1.04)
ERYTHROCYTE [DISTWIDTH] IN BLOOD BY AUTOMATED COUNT: 13 % (ref 10–15)
FLUAV+FLUBV RNA SPEC QL NAA+PROBE: NEGATIVE
FLUAV+FLUBV RNA SPEC QL NAA+PROBE: NEGATIVE
GFR SERPL CREATININE-BSD FRML MDRD: 47 ML/MIN/{1.73_M2}
GFR SERPL CREATININE-BSD FRML MDRD: 48 ML/MIN/{1.73_M2}
GLUCOSE BLDC GLUCOMTR-MCNC: 110 MG/DL (ref 70–99)
GLUCOSE BLDC GLUCOMTR-MCNC: 162 MG/DL (ref 70–99)
GLUCOSE BLDC GLUCOMTR-MCNC: 183 MG/DL (ref 70–99)
GLUCOSE BLDC GLUCOMTR-MCNC: 185 MG/DL (ref 70–99)
GLUCOSE SERPL-MCNC: 198 MG/DL (ref 70–99)
GLUCOSE SERPL-MCNC: 251 MG/DL (ref 70–99)
HCT VFR BLD AUTO: 37.7 % (ref 35–47)
HGB BLD-MCNC: 12.5 G/DL (ref 11.7–15.7)
LABORATORY COMMENT REPORT: NORMAL
Lab: ABNORMAL
MAGNESIUM SERPL-MCNC: 2 MG/DL (ref 1.6–2.3)
MCH RBC QN AUTO: 27.8 PG (ref 26.5–33)
MCHC RBC AUTO-ENTMCNC: 33.2 G/DL (ref 31.5–36.5)
MCV RBC AUTO: 84 FL (ref 78–100)
PLATELET # BLD AUTO: 303 10E9/L (ref 150–450)
POTASSIUM SERPL-SCNC: 3.8 MMOL/L (ref 3.4–5.3)
POTASSIUM SERPL-SCNC: 3.9 MMOL/L (ref 3.4–5.3)
RBC # BLD AUTO: 4.49 10E12/L (ref 3.8–5.2)
RSV RNA SPEC QL NAA+PROBE: NEGATIVE
SARS-COV-2 RNA SPEC QL NAA+PROBE: NEGATIVE
SODIUM SERPL-SCNC: 137 MMOL/L (ref 133–144)
SODIUM SERPL-SCNC: 138 MMOL/L (ref 133–144)
SPECIMEN SOURCE: ABNORMAL
SPECIMEN SOURCE: NORMAL
WBC # BLD AUTO: 13.9 10E9/L (ref 4–11)

## 2020-12-20 PROCEDURE — 999N001017 HC STATISTIC GLUCOSE BY METER IP

## 2020-12-20 PROCEDURE — 250N000011 HC RX IP 250 OP 636: Performed by: STUDENT IN AN ORGANIZED HEALTH CARE EDUCATION/TRAINING PROGRAM

## 2020-12-20 PROCEDURE — 96366 THER/PROPH/DIAG IV INF ADDON: CPT | Performed by: INTERNAL MEDICINE

## 2020-12-20 PROCEDURE — 250N000013 HC RX MED GY IP 250 OP 250 PS 637: Performed by: INTERNAL MEDICINE

## 2020-12-20 PROCEDURE — 258N000003 HC RX IP 258 OP 636: Performed by: INTERNAL MEDICINE

## 2020-12-20 PROCEDURE — 83735 ASSAY OF MAGNESIUM: CPT | Performed by: STUDENT IN AN ORGANIZED HEALTH CARE EDUCATION/TRAINING PROGRAM

## 2020-12-20 PROCEDURE — 85027 COMPLETE CBC AUTOMATED: CPT | Performed by: STUDENT IN AN ORGANIZED HEALTH CARE EDUCATION/TRAINING PROGRAM

## 2020-12-20 PROCEDURE — 96365 THER/PROPH/DIAG IV INF INIT: CPT | Performed by: INTERNAL MEDICINE

## 2020-12-20 PROCEDURE — 36415 COLL VENOUS BLD VENIPUNCTURE: CPT | Performed by: STUDENT IN AN ORGANIZED HEALTH CARE EDUCATION/TRAINING PROGRAM

## 2020-12-20 PROCEDURE — 80048 BASIC METABOLIC PNL TOTAL CA: CPT | Performed by: STUDENT IN AN ORGANIZED HEALTH CARE EDUCATION/TRAINING PROGRAM

## 2020-12-20 PROCEDURE — 80048 BASIC METABOLIC PNL TOTAL CA: CPT | Performed by: INTERNAL MEDICINE

## 2020-12-20 PROCEDURE — 120N000002 HC R&B MED SURG/OB UMMC

## 2020-12-20 PROCEDURE — 250N000011 HC RX IP 250 OP 636: Performed by: INTERNAL MEDICINE

## 2020-12-20 PROCEDURE — 96368 THER/DIAG CONCURRENT INF: CPT | Performed by: INTERNAL MEDICINE

## 2020-12-20 PROCEDURE — 99222 1ST HOSP IP/OBS MODERATE 55: CPT | Mod: AI | Performed by: STUDENT IN AN ORGANIZED HEALTH CARE EDUCATION/TRAINING PROGRAM

## 2020-12-20 PROCEDURE — 96367 TX/PROPH/DG ADDL SEQ IV INF: CPT | Performed by: INTERNAL MEDICINE

## 2020-12-20 RX ORDER — AMOXICILLIN 250 MG
2 CAPSULE ORAL 2 TIMES DAILY PRN
Status: DISCONTINUED | OUTPATIENT
Start: 2020-12-20 | End: 2020-12-22 | Stop reason: HOSPADM

## 2020-12-20 RX ORDER — PIPERACILLIN SODIUM, TAZOBACTAM SODIUM 2; .25 G/10ML; G/10ML
2.25 INJECTION, POWDER, LYOPHILIZED, FOR SOLUTION INTRAVENOUS EVERY 6 HOURS
Status: DISCONTINUED | OUTPATIENT
Start: 2020-12-20 | End: 2020-12-21

## 2020-12-20 RX ORDER — NICOTINE POLACRILEX 4 MG
15-30 LOZENGE BUCCAL
Status: DISCONTINUED | OUTPATIENT
Start: 2020-12-20 | End: 2020-12-22 | Stop reason: HOSPADM

## 2020-12-20 RX ORDER — POTASSIUM CHLORIDE 750 MG/1
40 TABLET, EXTENDED RELEASE ORAL ONCE
Status: COMPLETED | OUTPATIENT
Start: 2020-12-20 | End: 2020-12-20

## 2020-12-20 RX ORDER — LIDOCAINE 40 MG/G
CREAM TOPICAL
Status: DISCONTINUED | OUTPATIENT
Start: 2020-12-20 | End: 2020-12-22 | Stop reason: HOSPADM

## 2020-12-20 RX ORDER — AMOXICILLIN 250 MG
1 CAPSULE ORAL 2 TIMES DAILY PRN
Status: DISCONTINUED | OUTPATIENT
Start: 2020-12-20 | End: 2020-12-22 | Stop reason: HOSPADM

## 2020-12-20 RX ORDER — ONDANSETRON 4 MG/1
4 TABLET, ORALLY DISINTEGRATING ORAL EVERY 6 HOURS PRN
Status: DISCONTINUED | OUTPATIENT
Start: 2020-12-20 | End: 2020-12-22 | Stop reason: HOSPADM

## 2020-12-20 RX ORDER — ONDANSETRON 2 MG/ML
4 INJECTION INTRAMUSCULAR; INTRAVENOUS EVERY 6 HOURS PRN
Status: DISCONTINUED | OUTPATIENT
Start: 2020-12-20 | End: 2020-12-22 | Stop reason: HOSPADM

## 2020-12-20 RX ORDER — ACETAMINOPHEN 325 MG/1
650 TABLET ORAL EVERY 4 HOURS PRN
Status: DISCONTINUED | OUTPATIENT
Start: 2020-12-20 | End: 2020-12-22 | Stop reason: HOSPADM

## 2020-12-20 RX ORDER — DEXTROSE MONOHYDRATE 25 G/50ML
25-50 INJECTION, SOLUTION INTRAVENOUS
Status: DISCONTINUED | OUTPATIENT
Start: 2020-12-20 | End: 2020-12-22 | Stop reason: HOSPADM

## 2020-12-20 RX ADMIN — POTASSIUM CHLORIDE 40 MEQ: 750 TABLET, EXTENDED RELEASE ORAL at 01:31

## 2020-12-20 RX ADMIN — VANCOMYCIN HYDROCHLORIDE 1500 MG: 10 INJECTION, POWDER, LYOPHILIZED, FOR SOLUTION INTRAVENOUS at 02:03

## 2020-12-20 RX ADMIN — SODIUM CHLORIDE: 9 INJECTION, SOLUTION INTRAVENOUS at 01:22

## 2020-12-20 RX ADMIN — POTASSIUM CHLORIDE 10 MEQ: 7.46 INJECTION, SOLUTION INTRAVENOUS at 01:25

## 2020-12-20 RX ADMIN — PIPERACILLIN SODIUM AND TAZOBACTAM SODIUM 3.38 G: 3; .375 INJECTION, POWDER, LYOPHILIZED, FOR SOLUTION INTRAVENOUS at 01:23

## 2020-12-20 RX ADMIN — PIPERACILLIN SODIUM AND TAZOBACTAM SODIUM 2.25 G: 2; .25 INJECTION, POWDER, LYOPHILIZED, FOR SOLUTION INTRAVENOUS at 14:04

## 2020-12-20 RX ADMIN — SODIUM CHLORIDE: 9 INJECTION, SOLUTION INTRAVENOUS at 14:05

## 2020-12-20 RX ADMIN — PIPERACILLIN SODIUM AND TAZOBACTAM SODIUM 2.25 G: 2; .25 INJECTION, POWDER, LYOPHILIZED, FOR SOLUTION INTRAVENOUS at 08:41

## 2020-12-20 RX ADMIN — PIPERACILLIN SODIUM AND TAZOBACTAM SODIUM 2.25 G: 2; .25 INJECTION, POWDER, LYOPHILIZED, FOR SOLUTION INTRAVENOUS at 21:38

## 2020-12-20 NOTE — PHARMACY-VANCOMYCIN DOSING SERVICE
Pharmacy Vancomycin Initial Note  Date of Service 2020  Patient's  1940  80 year old, female    Indication: Sepsis    Current estimated CrCl = Estimated Creatinine Clearance: 33 mL/min (A) (based on SCr of 1.29 mg/dL (H)).    Creatinine for last 3 days  2020: 11:07 PM Creatinine 1.29 mg/dL    Recent Vancomycin Level(s) for last 3 days  No results found for requested labs within last 72 hours.      Vancomycin IV Administrations (past 72 hours)      No vancomycin orders with administrations in past 72 hours.                Nephrotoxins and other renal medications (From now, onward)    Start     Dose/Rate Route Frequency Ordered Stop    20 0030  vancomycin (VANCOCIN) 1,500 mg in sodium chloride 0.9 % 250 mL intermittent infusion      20 mg/kg × 70.8 kg  over 90 Minutes Intravenous EVERY 24 HOURS 20 0007      20 2350  piperacillin-tazobactam (ZOSYN) 3.375 g vial to attach to  mL bag      3.375 g  over 30 Minutes Intravenous ONCE 20 2348            Contrast Orders - past 72 hours (72h ago, onward)    None                Plan:  1.  Start vancomycin 1500 mg ( 20 mg/kg ) IV q24h.   2.  Goal Trough Level: 15-20 mg/L   3.  Pharmacy will check trough levels as appropriate in 1-3 Days.    4. Serum creatinine levels will be ordered daily for the first week of therapy and at least twice weekly for subsequent weeks.    5. [place] method utilized to dose vancomycin therapy: Method 2    Carl Ortega [facility]

## 2020-12-20 NOTE — PROGRESS NOTES
Pt was seen, circumstances of admission reviewed.  Admission H&P from this am reviewed    Pt feels well, has been hemodynamically stable    Assessment    Sepsis secondary to pyelonephritis,  GERSON, improved    Plan (as outlined in H&P)  IV antibiotics  IV fluids  Monitor renal function, BG, await culture results

## 2020-12-20 NOTE — ED NOTES
[facility]    ED Nurse to Floor Handoff     Kelly Barnes is a 80 year old female who speaks English and lives with family members,  in a home  They arrived in the ED by car from home    ED Chief Complaint: Fever (seen at urgent care today for fever, dx'd with pyelo-given IM antibiotics. Fever at home was 103.2 around 8pm, came to ED per d/c instructions at )    ED Dx;   Final diagnoses:   None         Needed?: No    Allergies:   Allergies   Allergen Reactions     Ibuprofen Other (See Comments)     numbness in hands and feet   .  Past Medical Hx:   Past Medical History:   Diagnosis Date     Allergic rhinitis, cause unspecified      Aortic stenosis 2/29/2016    With new murmur noted 2/2016, normal echo, repeat echo 2/2017.     Atypical chest pain 7/24/2015     cuboid     left foot     Hyperlipidemia LDL goal <100 11/1/2012     Hypertension goal BP (blood pressure) < 140/90      Musculoskeletal chest pain 11/25/2016     Obesity, Class I, BMI 30-34.9 11/11/2015     Pneumonia 3/16     Type 2 diabetes, HbA1c goal < 7% (H)       Baseline Mental status: WDL  Current Mental Status changes: at basesline    Infection present or suspected this encounter: cultures pending  Sepsis suspected: No  Isolation type: Special Precautions-COVID-19  Patient tested for COVID 19 prior to admission: YES     Activity level - Baseline/Home:  Independent  Activity Level - Current:   Independent    Bariatric equipment needed?: No    In the ED these meds were given:   Medications   0.9% sodium chloride BOLUS (1,000 mLs Intravenous New Bag 12/19/20 7831)     Followed by   sodium chloride 0.9% infusion (has no administration in time range)       Drips running?  Yes    Home pump  No    Current LDAs  Peripheral IV 12/19/20 Right Wrist (Active)   Site Assessment WDL 12/19/20 1014   Line Status Infusing 12/19/20 5597   Dressing Intervention New dressing  12/19/20 3877   Phlebitis Scale  0-->no symptoms 12/19/20 2317   Infiltration Scale 0 12/19/20 2317   Infiltration Site Treatment Method  None 12/19/20 2317   If infiltrated, was a Vesicant infusing? No 12/19/20 2317   Number of days: 0       Labs results:   Labs Ordered and Resulted from Time of ED Arrival Up to the Time of Departure from the ED   CBC WITH PLATELETS DIFFERENTIAL - Abnormal; Notable for the following components:       Result Value    WBC 17.7 (*)     Absolute Neutrophil 16.1 (*)     Absolute Lymphocytes 0.5 (*)     All other components within normal limits   UA MACROSCOPIC WITH REFLEX TO MICRO AND CULTURE - Abnormal; Notable for the following components:    Glucose Urine 30 (*)     Ketones Urine 10 (*)     Blood Urine Large (*)     Protein Albumin Urine 100 (*)     Nitrite Urine Positive (*)     Leukocyte Esterase Urine Large (*)     RBC Urine >182 (*)     WBC Urine >182 (*)     WBC Clumps Present (*)     Bacteria Urine Few (*)     Squamous Epithelial /HPF Urine 2 (*)     Transitional Epi 5 (*)     Mucous Urine Present (*)     All other components within normal limits   COMPREHENSIVE METABOLIC PANEL   LACTIC ACID WHOLE BLOOD   ERYTHROCYTE SEDIMENTATION RATE AUTO   CRP INFLAMMATION   INFLUENZA A/B & SARS-COV2 PCR MULTIPLEX   CARDIAC CONTINUOUS MONITORING   VITAL SIGNS   BLOOD CULTURE   BLOOD CULTURE   URINE CULTURE AEROBIC BACTERIAL       Imaging Studies: No results found for this or any previous visit (from the past 24 hour(s)).    Recent vital signs:   /64   Pulse 86   Temp 99.7  F (37.6  C) (Oral)   Resp 12   SpO2 95%     Radha Coma Scale Score: 15 (12/19/20 2300)       Cardiac Rhythm: Normal Sinus  Pt needs tele? No  Skin/wound Issues: None    Code Status: none in file    Pain control: fair    Nausea control: good    Abnormal labs/tests/findings requiring intervention: awaiting for results    Family present during ED course? No   Family Comments/Social Situation comments: none    Tasks needing completion: None    Ivy  Hellen RN  ascom -- [phone] [facility]

## 2020-12-20 NOTE — ED PROVIDER NOTES
[facility] EMERGENCY DEPARTMENT ([facility])     December 19, 2020  History     Chief Complaint   Patient presents with     Fever     seen at urgent care today for fever, dx'd with pyelo-given IM antibiotics. Fever at home was 103.2 around 8pm, came to ED per d/c instructions at [facility]  Kelly Barnes is a 80 year old female with a PMH of DM 2, HLD, HTN, aortic stenosis, musculoskeletal chest pain, obesity, drug-induced constipation, osteopenia, and CKD stage III who presents the ED today complaining of a fever.  Patient had a urine culture in July 2019 which was positive for E. Coli, was sensitive to second and third-generation cephalosporin, but resistant to quinolones.  Patient initially spiked a temperature on 12/7/2020, and was tested for Covid which was negative.  For the past couple days, she spiked a fever again and was getting weak, so she decided to go to urgent care where she was diagnosed with pyelonephritis and given Rocephin IM and discharged with Bactrim.  She instructed to come to the ED if symptoms get worse, and she spiked another high fever.  She presents to the ED today complaining of fever, fatigue, and weakness.  Patient also endorses slight dysuria, as well as loss of appetite.  Patient can still tolerate p.o. intake somewhat.  Patient denies respiratory symptoms, GI symptoms, or back pain.    I have reviewed the Medications, Allergies, Past Medical and Surgical History, and Social History in the [facility] system.  PAST MEDICAL HISTORY:   Past Medical History:   Diagnosis Date     Allergic rhinitis, cause unspecified      Aortic stenosis 2/29/2016    With new murmur noted 2/2016, normal echo, repeat echo 2/2017.     Atypical chest pain 7/24/2015     cuboid     left foot     Hyperlipidemia LDL goal <100 11/1/2012     Hypertension goal BP (blood pressure) < 140/90      Musculoskeletal chest pain 11/25/2016     Obesity, Class I, BMI 30-34.9 11/11/2015     Pneumonia 3/16     Type 2  diabetes, HbA1c goal < 7% (H)        PAST SURGICAL HISTORY:   Past Surgical History:   Procedure Laterality Date     ZZC NONSPECIFIC PROCEDURE      none       Past medical history, past surgical history, medications, and allergies were reviewed with the patient. Additional pertinent items: None    FAMILY HISTORY:   Family History   Problem Relation Age of Onset     Hypertension Mother      Diabetes No family hx of      Cerebrovascular Disease No family hx of      Breast Cancer No family hx of      Cancer - colorectal No family hx of      Prostate Cancer No family hx of        SOCIAL HISTORY:   Social History     Tobacco Use     Smoking status: Never Smoker     Smokeless tobacco: Never Used   Substance Use Topics     Alcohol use: Yes     Alcohol/week: 10.0 standard drinks     Comment: 1-2 drinks per week     Social history was reviewed with the patient. Additional pertinent items: None      Patient's Medications   New Prescriptions    No medications on file   Previous Medications    ASPIRIN 81 MG OR TABS    ONE DAILY    BLOOD GLUCOSE (NO BRAND SPECIFIED) TEST STRIP        BLOOD GLUCOSE (ONETOUCH ULTRA) TEST STRIP    Use to test blood sugar twice daily. Dispense 1 box of 200 test strips, #3 refills.    BLOOD GLUCOSE MONITOR KIT    1 kit daily.    BLOOD GLUCOSE MONITORING (ONE TOUCH ULTRA 2) METER DEVICE KIT    Use to test blood sugar 3 times daily    FLUOCINONIDE (LIDEX) 0.05 % EXTERNAL SOLUTION        GLIMEPIRIDE (AMARYL) 1 MG TABLET    Take 1 tablet (1 mg) by mouth daily (with dinner) She will also take separate 2 mg dose with breakfast.    GLIMEPIRIDE (AMARYL) 2 MG TABLET    Take 2 mg with breakfast (she will also take a 1 mg tablet with dinner)    LOSARTAN (COZAAR) 50 MG TABLET        LOSARTAN-HYDROCHLOROTHIAZIDE (HYZAAR) 50-12.5 MG TABLET    Take 1 tablet by mouth daily    METOPROLOL SUCCINATE ER (TOPROL-XL) 25 MG 24 HR TABLET        MUPIROCIN (BACTROBAN) 2 % EXTERNAL OINTMENT        NIFEDIPINE ER (ADALAT CC) 30  MG 24 HR TABLET        ONE TOUCH LANCETS MISC        ONETOUCH DELICA LANCETS 33G MISC    1 Device by In Vitro route 2 times daily    SIMVASTATIN (ZOCOR) 10 MG TABLET        SULFAMETHOXAZOLE-TRIMETHOPRIM (BACTRIM DS) 800-160 MG TABLET    Take 1 tablet by mouth 2 times daily for 10 days    TACROLIMUS (PROTOPIC) 0.1 % EXTERNAL OINTMENT        TRIAMCINOLONE (KENALOG) 0.1 % EXTERNAL OINTMENT       Modified Medications    No medications on file   Discontinued Medications    No medications on file          Allergies   Allergen Reactions     Ibuprofen Other (See Comments)     numbness in hands and feet        Review of Systems   Constitutional: Positive for appetite change (loss of appetite), fatigue and fever.   HENT: Negative for congestion.    Eyes: Negative for redness.   Respiratory: Negative for cough and shortness of breath.    Cardiovascular: Negative for chest pain.   Gastrointestinal: Negative for abdominal pain, diarrhea, nausea and vomiting.   Genitourinary: Positive for dysuria (mild). Negative for difficulty urinating.   Musculoskeletal: Negative for arthralgias, back pain and neck stiffness.   Skin: Negative for color change.   Neurological: Positive for weakness. Negative for headaches.   Psychiatric/Behavioral: Negative for confusion.   All other systems reviewed and are negative.    A complete review of systems was performed with pertinent positives and negatives noted in the HPI, and all other systems negative.    Physical Exam   BP: (!) 144/72  Pulse: 108  Temp: 99.1  F (37.3  C)  Resp: 16  SpO2: 96 %      Physical Exam  Constitutional:       General: She is not in acute distress.     Appearance: She is not diaphoretic.   HENT:      Head: Atraumatic.      Mouth/Throat:      Pharynx: No oropharyngeal exudate.   Eyes:      General: No scleral icterus.     Pupils: Pupils are equal, round, and reactive to light.   Neck:      Musculoskeletal: Neck supple.   Cardiovascular:      Rate and Rhythm: Regular  rhythm. Tachycardia present.      Heart sounds: Normal heart sounds. No murmur. No friction rub. No gallop.    Pulmonary:      Effort: Pulmonary effort is normal. No respiratory distress.      Breath sounds: Normal breath sounds. No stridor. No wheezing, rhonchi or rales.   Chest:      Chest wall: No tenderness.   Abdominal:      General: Abdomen is flat. Bowel sounds are normal. There is no distension.      Palpations: Abdomen is soft. There is no mass.      Tenderness: There is no abdominal tenderness. There is no right CVA tenderness, left CVA tenderness, guarding or rebound.      Hernia: No hernia is present.   Musculoskeletal:         General: No tenderness.   Skin:     General: Skin is warm.      Findings: No rash.   Neurological:      General: No focal deficit present.         ED Course   10:40 PM  The patient was seen and examined by Elvin Ramírez MD in Room ED20.        Procedures             EKG Interpretation:      Interpreted by Elvin Ramírez MD, MD  Time reviewed: on arrival  Symptoms at time of EKG: weakness   Rhythm: normal sinus   Rate: Normal  Axis: Normal  Ectopy: none  Conduction: normal  ST Segments/ T Waves: No ST-T wave changes  Q Waves: none  Comparison to prior: Unchanged from 2017    Clinical Impression: no acute changes            Critical Care time was 30 minutes for this patient excluding procedures.        Results for orders placed or performed during the hospital encounter of 12/19/20 (from the past 24 hour(s))   UA reflex to Microscopic and Culture    Specimen: Urine clean catch   Result Value Ref Range    Color Urine Yellow     Appearance Urine Cloudy     Glucose Urine 30 (A) NEG^Negative mg/dL    Bilirubin Urine Negative NEG^Negative    Ketones Urine 10 (A) NEG^Negative mg/dL    Specific Gravity Urine 1.020 1.003 - 1.035    Blood Urine Large (A) NEG^Negative    pH Urine 5.5 5.0 - 7.0 pH    Protein Albumin Urine 100 (A) NEG^Negative mg/dL    Urobilinogen mg/dL Normal 0.0 - 2.0 mg/dL     Nitrite Urine Positive (A) NEG^Negative    Leukocyte Esterase Urine Large (A) NEG^Negative    Source Clean catch urine     RBC Urine >182 (H) 0 - 2 /HPF    WBC Urine >182 (H) 0 - 5 /HPF    WBC Clumps Present (A) NEG^Negative /HPF    Bacteria Urine Few (A) NEG^Negative /HPF    Squamous Epithelial /HPF Urine 2 (H) 0 - 1 /HPF    Transitional Epi 5 (H) 0 - 1 /HPF    Mucous Urine Present (A) NEG^Negative /LPF   EKG 12-lead, tracing only   Result Value Ref Range    Interpretation ECG Click View Image link to view waveform and result    CBC with platelets differential   Result Value Ref Range    WBC 17.7 (H) 4.0 - 11.0 10e9/L    RBC Count 4.68 3.8 - 5.2 10e12/L    Hemoglobin 12.8 11.7 - 15.7 g/dL    Hematocrit 38.7 35.0 - 47.0 %    MCV 83 78 - 100 fl    MCH 27.4 26.5 - 33.0 pg    MCHC 33.1 31.5 - 36.5 g/dL    RDW 13.0 10.0 - 15.0 %    Platelet Count 330 150 - 450 10e9/L    Diff Method Automated Method     % Neutrophils 91.0 %    % Lymphocytes 2.6 %    % Monocytes 5.7 %    % Eosinophils 0.0 %    % Basophils 0.2 %    % Immature Granulocytes 0.5 %    Nucleated RBCs 0 0 /100    Absolute Neutrophil 16.1 (H) 1.6 - 8.3 10e9/L    Absolute Lymphocytes 0.5 (L) 0.8 - 5.3 10e9/L    Absolute Monocytes 1.0 0.0 - 1.3 10e9/L    Absolute Eosinophils 0.0 0.0 - 0.7 10e9/L    Absolute Basophils 0.0 0.0 - 0.2 10e9/L    Abs Immature Granulocytes 0.1 0 - 0.4 10e9/L    Absolute Nucleated RBC 0.0    Comprehensive metabolic panel   Result Value Ref Range    Sodium 134 133 - 144 mmol/L    Potassium 2.9 (L) 3.4 - 5.3 mmol/L    Chloride 99 94 - 109 mmol/L    Carbon Dioxide 25 20 - 32 mmol/L    Anion Gap 10 3 - 14 mmol/L    Glucose 273 (H) 70 - 99 mg/dL    Urea Nitrogen 28 7 - 30 mg/dL    Creatinine 1.29 (H) 0.52 - 1.04 mg/dL    GFR Estimate 39 (L) >60 mL/min/[1.73_m2]    GFR Estimate If Black 45 (L) >60 mL/min/[1.73_m2]    Calcium 8.8 8.5 - 10.1 mg/dL    Bilirubin Total 0.5 0.2 - 1.3 mg/dL    Albumin 3.1 (L) 3.4 - 5.0 g/dL    Protein Total 7.1 6.8  - 8.8 g/dL    Alkaline Phosphatase 87 40 - 150 U/L    ALT 19 0 - 50 U/L    AST 15 0 - 45 U/L   Lactic acid whole blood   Result Value Ref Range    Lactic Acid 1.2 0.7 - 2.0 mmol/L   Erythrocyte sedimentation rate auto   Result Value Ref Range    Sed Rate 30 0 - 30 mm/h   CRP inflammation   Result Value Ref Range    CRP Inflammation 140.0 (H) 0.0 - 8.0 mg/L   XR Chest Port 1 View    Narrative    EXAM: XR CHEST PORT 1 VW  LOCATION: [facility]  DATE/TIME: 12/19/2020 11:37 PM    INDICATION: Fever  COMPARISON: 04/19/2016      Impression    IMPRESSION: Negative chest.     Medications   0.9% sodium chloride BOLUS (1,000 mLs Intravenous New Bag 12/19/20 2543)     Followed by   sodium chloride 0.9% infusion (has no administration in time range)   piperacillin-tazobactam (ZOSYN) 3.375 g vial to attach to  mL bag (has no administration in time range)   potassium chloride 10 mEq in 100 mL sterile water intermittent infusion (premix) (has no administration in time range)   vancomycin (VANCOCIN) 1,500 mg in sodium chloride 0.9 % 250 mL intermittent infusion (has no administration in time range)   potassium chloride ER (KLOR-CON M) CR tablet 40 mEq (has no administration in time range)             Assessments & Plan (with Medical Decision Making)    Acute pyelonephritis not better after rocephin IM then discharged with bactrim at  earlier today, HD stable and lactic acid still normal but GERSON Cr bumped from baseline and K low?-repleting and no EKG change, cx'ed and started zosyn vanco, NS, will admit to Medicine/Hospitalist, provided K ok after repletions.         I have reviewed the nursing notes.    I have reviewed the findings, diagnosis, plan and need for follow up with the patient.    New Prescriptions    No medications on file       Final diagnoses:   Sepsis, due to unspecified organism, unspecified whether acute organ dysfunction present (H)   Acute pyelonephritis   Acute kidney injury (H)   Hypokalemia    I, Maurice Chirinos, am serving as a trained medical scribe to document services personally performed by Elvin Ramírez Md, MD, based on the provider's statements to me.     I, Elvin Ramírez Md, MD, was physically present and have reviewed and verified the accuracy of this note documented by Maurice Chirinos.      12/19/2020   [facility] EMERGENCY DEPARTMENT     Elvin Ramírez MD  12/20/20 0042

## 2020-12-20 NOTE — PLAN OF CARE
VS: VSS, pt denies CP or SOB.   O2: Room air sat. > 90%.   Output: Voiding adequate amount without difficulty.    Last BM: 12/20/20   Activity: Up independent in the room.    Skin: Intact.    Pain: Denies pain or discomfort.    CMS: CMS and neuro intact.    Dressing: None.    Diet: Regular tolerating okay.    LDA: PIV infusing.    Equipment: IV pole and personal belongings.    Plan: TBD.    Additional Info:

## 2020-12-20 NOTE — ED NOTES
[facility]    ED Nurse to Floor Handoff     Kelly Barnes is a 80 year old female who speaks English and lives with family members,  in a home  They arrived in the ED by car from home    ED Chief Complaint: Fever (seen at urgent care today for fever, dx'd with pyelo-given IM antibiotics. Fever at home was 103.2 around 8pm, came to ED per d/c instructions at )    ED Dx;   Final diagnoses:   Sepsis, due to unspecified organism, unspecified whether acute organ dysfunction present (H)   Acute pyelonephritis   Acute kidney injury (H)   Hypokalemia         Needed?: No    Allergies:   Allergies   Allergen Reactions     Ibuprofen Other (See Comments)     numbness in hands and feet   .  Past Medical Hx:   Past Medical History:   Diagnosis Date     Allergic rhinitis, cause unspecified      Aortic stenosis 2/29/2016    With new murmur noted 2/2016, normal echo, repeat echo 2/2017.     Atypical chest pain 7/24/2015     cuboid     left foot     Hyperlipidemia LDL goal <100 11/1/2012     Hypertension goal BP (blood pressure) < 140/90      Musculoskeletal chest pain 11/25/2016     Obesity, Class I, BMI 30-34.9 11/11/2015     Pneumonia 3/16     Type 2 diabetes, HbA1c goal < 7% (H)       Baseline Mental status: WDL  Current Mental Status changes: at basesline    Infection present or suspected this encounter:   Sepsis suspected: yes  Isolation type: No active isolations  Patient tested for COVID 19 prior to admission: YES     Activity level - Baseline/Home:  Independent  Activity Level - Current:   Stand with Assist    Bariatric equipment needed?: No    In the ED these meds were given:   Medications   0.9% sodium chloride BOLUS (0 mLs Intravenous Stopped 12/20/20 0121)     Followed by   sodium chloride 0.9% infusion ( Intravenous Rate/Dose Verify 12/20/20 0435)   potassium chloride 10 mEq in 100 mL sterile water intermittent infusion (premix) (0 mEq Intravenous Stopped  12/20/20 0355)   vancomycin (VANCOCIN) 1,500 mg in sodium chloride 0.9 % 250 mL intermittent infusion (0 mg Intravenous Stopped 12/20/20 0404)   piperacillin-tazobactam (ZOSYN) 3.375 g vial to attach to  mL bag (0 g Intravenous Stopped 12/20/20 0202)   potassium chloride ER (KLOR-CON M) CR tablet 40 mEq (40 mEq Oral Given 12/20/20 0131)       Drips running?  No    Home pump  No    Current LDAs  Peripheral IV 12/19/20 Right Wrist (Active)   Site Assessment WDL 12/19/20 2317   Line Status Infusing 12/19/20 2317   Dressing Intervention New dressing  12/19/20 2317   Phlebitis Scale 0-->no symptoms 12/19/20 2317   Infiltration Scale 0 12/19/20 2317   Infiltration Site Treatment Method  None 12/19/20 2317   If infiltrated, was a Vesicant infusing? No 12/19/20 2317   Number of days: 1       Peripheral IV 12/20/20 Left Hand (Active)   Site Assessment WDL 12/20/20 0116   Line Status Infusing 12/20/20 0116   Dressing Intervention New dressing  12/20/20 0116   Phlebitis Scale 0-->no symptoms 12/20/20 0116   Infiltration Scale 0 12/20/20 0116   Number of days: 0       Labs results:   Labs Ordered and Resulted from Time of ED Arrival Up to the Time of Departure from the ED   CBC WITH PLATELETS DIFFERENTIAL - Abnormal; Notable for the following components:       Result Value    WBC 17.7 (*)     Absolute Neutrophil 16.1 (*)     Absolute Lymphocytes 0.5 (*)     All other components within normal limits   COMPREHENSIVE METABOLIC PANEL - Abnormal; Notable for the following components:    Potassium 2.9 (*)     Glucose 273 (*)     Creatinine 1.29 (*)     GFR Estimate 39 (*)     GFR Estimate If Black 45 (*)     Albumin 3.1 (*)     All other components within normal limits   CRP INFLAMMATION - Abnormal; Notable for the following components:    CRP Inflammation 140.0 (*)     All other components within normal limits   UA MACROSCOPIC WITH REFLEX TO MICRO AND CULTURE - Abnormal; Notable for the following components:    Glucose Urine  30 (*)     Ketones Urine 10 (*)     Blood Urine Large (*)     Protein Albumin Urine 100 (*)     Nitrite Urine Positive (*)     Leukocyte Esterase Urine Large (*)     RBC Urine >182 (*)     WBC Urine >182 (*)     WBC Clumps Present (*)     Bacteria Urine Few (*)     Squamous Epithelial /HPF Urine 2 (*)     Transitional Epi 5 (*)     Mucous Urine Present (*)     All other components within normal limits   BASIC METABOLIC PANEL - Abnormal; Notable for the following components:    Glucose 251 (*)     Creatinine 1.11 (*)     GFR Estimate 47 (*)     GFR Estimate If Black 54 (*)     Calcium 8.2 (*)     All other components within normal limits   LACTIC ACID WHOLE BLOOD   ERYTHROCYTE SEDIMENTATION RATE AUTO   INFLUENZA A/B & SARS-COV2 PCR MULTIPLEX   CARDIAC CONTINUOUS MONITORING   VITAL SIGNS   BLOOD CULTURE   BLOOD CULTURE   URINE CULTURE AEROBIC BACTERIAL       Imaging Studies:   Recent Results (from the past 24 hour(s))   XR Chest Port 1 View    Narrative    EXAM: XR CHEST PORT 1 VW  LOCATION: [facility]  DATE/TIME: 12/19/2020 11:37 PM    INDICATION: Fever  COMPARISON: 04/19/2016      Impression    IMPRESSION: Negative chest.       Recent vital signs:   /73   Pulse 82   Temp 99.7  F (37.6  C) (Oral)   Resp 17   SpO2 94%     Radha Coma Scale Score: 15 (12/19/20 2300)       Cardiac Rhythm: Normal Sinus  Pt needs tele? No  Skin/wound Issues: None    Code Status:     Pain control: pt had none    Nausea control: pt had none    Abnormal labs/tests/findings requiring intervention: K+ replaced and repeat K+ 3.8    Family present during ED course? No   Family Comments/Social Situation comments:     Tasks needing completion: None    EVENS FLOWER RN  [facility] --   [phone] [place] [facility]  [phone] [facility]

## 2020-12-20 NOTE — PROGRESS NOTES
Pt arrived to the unit @0645 via w/c alert and oriented x4. Pt oriented to room and to the call light system. Call light within reach. VSS, IV infusing.

## 2020-12-20 NOTE — H&P
[facility]     History and Physical - Hospitalist Service, [facility] [place]       Date of Admission:  12/19/2020    Assessment & Plan   Kelly Barnes is a 80 year old female with a history of DM2, HLD, HTN, aortic stenosis, and CKD who presented with fever, dysuria, and polyuria for approximately 2 days. Admitted for management of likely pyelonephritis, GERSON and hypokalemia after failure of outpatient management.    Admit to inpatient.    #Pyelonephritis  #Fevers  Constellation of fevers, dysuria, polyuria, and findings on UA suggestive of likely UTI/pyelonephritis. Patient has had prior UTI with E. Coli (sensitive to 3rd gen cephalosporins, resistant to fluoroquinolones) in 2019. She denies a history of MRSA infections in the past. COVID negative one approximately 10 days ago, pending currently. Urine cultures taken here and prior to a dose of ceftriaxone received in urgent care setting on the day of presentation.  -Monitor fever curve, follow blood and urine cultures within EHR (12/19 1109 urine sample appears to be prior to antibiotics).  -Continue zosyn for now, likely can de-escalate antibiotics to ceftriaxone after 24 hours given existing culture data (or based on culture data if available).  -STOP vancomycin as no history of MRSA, prior culture data, GERSON, and zosyn covers most gram positive organisms.   -Acetaminophen prn fevers/pain    #GERSON  #Hypokalemia  GERSON likely pre-renal in the setting of lower intake and higher insensible losses. Received bolus fluids in ED. Last echo in our system 2017 with EF 55-60%. Potassium at admisison 2.9, with repletion received in ED.  -Continue IV fluids with NS at 125cc/hr  -Monitor intake and output  -Monitor and replete potassium per scales  -Monitor magnesium  -Hold losartan-hydrochlorothiazide for now  -Consider further workup for GERSON if not improving on AM BMP.    #DM2: Last A1c 7.2 10/7/2020  -Hold home gliperamide  -sliding  "scale insulin with mealtime and at bedtime POCT glucose checks.    #HTN  -Hold losartan-hydrochlorothiazide for now; restart if renal function improving  -Can consider use of labetalol prn SBP > 180 with hold parameters (though reports history of allergy to metoprolol).       Diet:   Regular  DVT Prophylaxis: Pneumatic Compression Devices  Blanchard Catheter: not present  Code Status:   FULL (confirmed)  Rule Out COVID-19 Handoff:  Kelly is NOT A LOW SUSPICION PUI (needs further investigation).    Follow these instructions:    If COVID test is positive -> continue isolation precautions    If 1st COVID test is negative -> continue isolation precautions    -  Order a repeat COVID test to be done 72 hours after the 1st test  -  Place \"PUI Isolation\" nurse communication order  -  Consider ID consult    If 2nd COVID test performed after 72 hours is negative -> consider discontinuing COVID-specific isolation precautions if clinical course atypical for COVID and/or an alternative diagnosis emerges       Disposition Plan   Expected discharge: 2 - 3 days, recommended to prior living arrangement once antibiotic plan established, renal function improved and SIRS/Sepsis treated.  Entered: Kirk Mandel MD 12/20/2020, 2:26 AM     The patient's care was discussed with the Patient.    Kirk Mandel MD  [facility]   Contact information available via [facility] Paging/Directory      ______________________________________________________________________    Chief Complaint   Fever    History is obtained from the patient    History of Present Illness   Kelly Barnes is a 80 year old female with a history of DM2, HLD, HTN, aortic stenosis, and CKD who presented with fever, dysuria, and polyuria for approximately 2 days.     Patient reports that she developed fevers initially on 12/7/20 and presented related to this. No other symptoms at that time, and COVID testing negative. Fevers " went away. They returned approximately 2 days ago, with a high of 103.2F. She went to urgent care on the day of presentation to the ED, where she was diagnosed with a likely pyelonephritis and treated with IM ceftriaxone and future course of PO bactrim. She went home, but because she had further fevers presented to the ED.    In the ED: febrile but otherwise with stable vital signs. Labs notable for hypokalemia (2.9), creatinine of 1.29 (baseline 0.8-0.9), elevated CRP to 140, normal lactate at 1.2. CBC with leukocytosis of 17.7, ANC 16.1. UA both at ED and UC visit suggestive of infection, with cultures obtained from both. Blood cultures obtained from ED. EKG without significant changes from 2017. CXR read as negative.    Review of Systems    The 10 point Review of Systems is negative other than noted in the HPI or here.     Past Medical History    I have reviewed this patient's medical history and updated it with pertinent information if needed.   Past Medical History:   Diagnosis Date     Allergic rhinitis, cause unspecified      Aortic stenosis 2/29/2016    With new murmur noted 2/2016, normal echo, repeat echo 2/2017.     Atypical chest pain 7/24/2015     cuboid     left foot     Hyperlipidemia LDL goal <100 11/1/2012     Hypertension goal BP (blood pressure) < 140/90      Musculoskeletal chest pain 11/25/2016     Obesity, Class I, BMI 30-34.9 11/11/2015     Pneumonia 3/16     Type 2 diabetes, HbA1c goal < 7% (H)        Past Surgical History   I have reviewed this patient's surgical history and updated it with pertinent information if needed.  Past Surgical History:   Procedure Laterality Date     [facility] NONSPECIFIC PROCEDURE      none       Social History   I have reviewed this patient's social history and updated it with pertinent information if needed.  Social History     Tobacco Use     Smoking status: Never Smoker     Smokeless tobacco: Never Used   Substance Use Topics     Alcohol use: Yes     Alcohol/week:  10.0 standard drinks     Comment: 1-2 drinks per week     Drug use: No       Family History   I have reviewed this patient's family history and updated it with pertinent information if needed.  Family History   Problem Relation Age of Onset     Hypertension Mother      Diabetes No family hx of      Cerebrovascular Disease No family hx of      Breast Cancer No family hx of      Cancer - colorectal No family hx of      Prostate Cancer No family hx of        Prior to Admission Medications   Prior to Admission Medications   Prescriptions Last Dose Informant Patient Reported? Taking?   ASPIRIN 81 MG OR TABS Unknown at Unknown time  Yes No   Sig: ONE DAILY   ONE TOUCH LANCETS MISC Unknown at Unknown time  Yes No   ONETOUCH DELICA LANCETS 33G MISC Unknown at Unknown time  No No   Si Device by In Vitro route 2 times daily   blood glucose (NO BRAND SPECIFIED) test strip Unknown at Unknown time  Yes No   blood glucose (ONETOUCH ULTRA) test strip Unknown at Unknown time  No No   Sig: Use to test blood sugar twice daily. Dispense 1 box of 200 test strips, #3 refills.   blood glucose monitor KIT Unknown at Unknown time  No No   Si kit daily.   blood glucose monitoring (ONE TOUCH ULTRA 2) meter device kit Unknown at Unknown time  No No   Sig: Use to test blood sugar 3 times daily   fluocinonide (LIDEX) 0.05 % external solution Unknown at Unknown time  Yes No   glimepiride (AMARYL) 1 MG tablet Unknown at Unknown time  No No   Sig: Take 1 tablet (1 mg) by mouth daily (with dinner) She will also take separate 2 mg dose with breakfast.   glimepiride (AMARYL) 2 MG tablet 2020 at Unknown time  No Yes   Sig: Take 2 mg with breakfast (she will also take a 1 mg tablet with dinner)   losartan-hydrochlorothiazide (HYZAAR) 50-12.5 MG tablet 2020 at Unknown time  No Yes   Sig: Take 1 tablet by mouth daily   mupirocin (BACTROBAN) 2 % external ointment Unknown at Unknown time  Yes No   sulfamethoxazole-trimethoprim (BACTRIM  DS) 800-160 MG tablet 12/19/2020 at Unknown time  No Yes   Sig: Take 1 tablet by mouth 2 times daily for 10 days      Facility-Administered Medications Last Administration Doses Remaining   cefTRIAXone (ROCEPHIN) injection 1 g 12/19/2020 11:51 AM 0        Allergies   Allergies   Allergen Reactions     Ibuprofen Other (See Comments)     numbness in hands and feet       Physical Exam   Vital Signs: Temp: 99.7  F (37.6  C)(pt took Tyl ~3 hours ago) Temp src: Oral BP: 103/64 Pulse: 86   Resp: 12 SpO2: 95 %      Weight: 0 lbs 0 oz    General Appearance: NAD, pleasant, sitting up on gurney eating crackers  Eyes: PERRL, EOMI  HEENT: MMM, NC/AT  Respiratory: CTAB, no signs of respiratory distress  Cardiovascular: RRR, systolic murmur. LE mild pitting edema bilaterally.  GI: Soft, NTND  Skin: No new rashes  Neurologic: Awake, alert, oriented    Data   Data reviewed today: I reviewed all medications, new labs and imaging results over the last 24 hours. I personally reviewed the EKG tracing showing NSR.    Recent Labs   Lab 12/19/20  2307 12/19/20  1110   WBC 17.7* 18.2*   HGB 12.8 14.9   MCV 83 83    364     --    POTASSIUM 2.9*  --    CHLORIDE 99  --    CO2 25  --    BUN 28  --    CR 1.29*  --    ANIONGAP 10  --    JAN 8.8  --    *  --    ALBUMIN 3.1*  --    PROTTOTAL 7.1  --    BILITOTAL 0.5  --    ALKPHOS 87  --    ALT 19  --    AST 15  --      Recent Results (from the past 24 hour(s))   XR Chest Port 1 View    Narrative    EXAM: XR CHEST PORT 1 VW  LOCATION: [facility]  DATE/TIME: 12/19/2020 11:37 PM    INDICATION: Fever  COMPARISON: 04/19/2016      Impression    IMPRESSION: Negative chest.

## 2020-12-21 LAB
ANION GAP SERPL CALCULATED.3IONS-SCNC: 6 MMOL/L (ref 3–14)
BACTERIA SPEC CULT: NORMAL
BASOPHILS # BLD AUTO: 0 10E9/L (ref 0–0.2)
BASOPHILS NFR BLD AUTO: 0.3 %
BUN SERPL-MCNC: 14 MG/DL (ref 7–30)
CALCIUM SERPL-MCNC: 7.9 MG/DL (ref 8.5–10.1)
CHLORIDE SERPL-SCNC: 110 MMOL/L (ref 94–109)
CO2 SERPL-SCNC: 23 MMOL/L (ref 20–32)
CREAT SERPL-MCNC: 1.04 MG/DL (ref 0.52–1.04)
CRP SERPL-MCNC: 63 MG/L (ref 0–8)
DIFFERENTIAL METHOD BLD: ABNORMAL
EOSINOPHIL # BLD AUTO: 0.1 10E9/L (ref 0–0.7)
EOSINOPHIL NFR BLD AUTO: 1.3 %
ERYTHROCYTE [DISTWIDTH] IN BLOOD BY AUTOMATED COUNT: 13.3 % (ref 10–15)
GFR SERPL CREATININE-BSD FRML MDRD: 51 ML/MIN/{1.73_M2}
GLUCOSE BLDC GLUCOMTR-MCNC: 109 MG/DL (ref 70–99)
GLUCOSE BLDC GLUCOMTR-MCNC: 163 MG/DL (ref 70–99)
GLUCOSE BLDC GLUCOMTR-MCNC: 175 MG/DL (ref 70–99)
GLUCOSE BLDC GLUCOMTR-MCNC: 184 MG/DL (ref 70–99)
GLUCOSE BLDC GLUCOMTR-MCNC: 225 MG/DL (ref 70–99)
GLUCOSE SERPL-MCNC: 169 MG/DL (ref 70–99)
HCT VFR BLD AUTO: 34.8 % (ref 35–47)
HGB BLD-MCNC: 11.3 G/DL (ref 11.7–15.7)
IMM GRANULOCYTES # BLD: 0 10E9/L (ref 0–0.4)
IMM GRANULOCYTES NFR BLD: 0.4 %
INTERPRETATION ECG - MUSE: NORMAL
LYMPHOCYTES # BLD AUTO: 1.1 10E9/L (ref 0.8–5.3)
LYMPHOCYTES NFR BLD AUTO: 11.4 %
Lab: NORMAL
MCH RBC QN AUTO: 27.2 PG (ref 26.5–33)
MCHC RBC AUTO-ENTMCNC: 32.5 G/DL (ref 31.5–36.5)
MCV RBC AUTO: 84 FL (ref 78–100)
MONOCYTES # BLD AUTO: 0.9 10E9/L (ref 0–1.3)
MONOCYTES NFR BLD AUTO: 9.4 %
NEUTROPHILS # BLD AUTO: 7.1 10E9/L (ref 1.6–8.3)
NEUTROPHILS NFR BLD AUTO: 77.2 %
NRBC # BLD AUTO: 0 10*3/UL
NRBC BLD AUTO-RTO: 0 /100
PLATELET # BLD AUTO: 287 10E9/L (ref 150–450)
POTASSIUM SERPL-SCNC: 4.1 MMOL/L (ref 3.4–5.3)
RBC # BLD AUTO: 4.15 10E12/L (ref 3.8–5.2)
SODIUM SERPL-SCNC: 139 MMOL/L (ref 133–144)
SPECIMEN SOURCE: NORMAL
WBC # BLD AUTO: 9.2 10E9/L (ref 4–11)

## 2020-12-21 PROCEDURE — 250N000011 HC RX IP 250 OP 636: Performed by: STUDENT IN AN ORGANIZED HEALTH CARE EDUCATION/TRAINING PROGRAM

## 2020-12-21 PROCEDURE — 250N000013 HC RX MED GY IP 250 OP 250 PS 637: Performed by: INTERNAL MEDICINE

## 2020-12-21 PROCEDURE — 250N000011 HC RX IP 250 OP 636: Performed by: INTERNAL MEDICINE

## 2020-12-21 PROCEDURE — 258N000003 HC RX IP 258 OP 636: Performed by: INTERNAL MEDICINE

## 2020-12-21 PROCEDURE — 80048 BASIC METABOLIC PNL TOTAL CA: CPT | Performed by: INTERNAL MEDICINE

## 2020-12-21 PROCEDURE — 99232 SBSQ HOSP IP/OBS MODERATE 35: CPT | Performed by: INTERNAL MEDICINE

## 2020-12-21 PROCEDURE — 120N000002 HC R&B MED SURG/OB UMMC

## 2020-12-21 PROCEDURE — 36415 COLL VENOUS BLD VENIPUNCTURE: CPT | Performed by: INTERNAL MEDICINE

## 2020-12-21 PROCEDURE — 999N001017 HC STATISTIC GLUCOSE BY METER IP

## 2020-12-21 PROCEDURE — 85025 COMPLETE CBC W/AUTO DIFF WBC: CPT | Performed by: INTERNAL MEDICINE

## 2020-12-21 PROCEDURE — 86140 C-REACTIVE PROTEIN: CPT | Performed by: INTERNAL MEDICINE

## 2020-12-21 RX ORDER — GLIMEPIRIDE 1 MG/1
1 TABLET ORAL 2 TIMES DAILY
Status: DISCONTINUED | OUTPATIENT
Start: 2020-12-21 | End: 2020-12-21

## 2020-12-21 RX ORDER — GLIMEPIRIDE 1 MG/1
1 TABLET ORAL
Status: DISCONTINUED | OUTPATIENT
Start: 2020-12-21 | End: 2020-12-22 | Stop reason: HOSPADM

## 2020-12-21 RX ORDER — CEFTRIAXONE 2 G/1
2 INJECTION, POWDER, FOR SOLUTION INTRAMUSCULAR; INTRAVENOUS EVERY 24 HOURS
Status: DISCONTINUED | OUTPATIENT
Start: 2020-12-21 | End: 2020-12-22 | Stop reason: HOSPADM

## 2020-12-21 RX ORDER — GLIMEPIRIDE 2 MG/1
2 TABLET ORAL
Status: DISCONTINUED | OUTPATIENT
Start: 2020-12-21 | End: 2020-12-22 | Stop reason: HOSPADM

## 2020-12-21 RX ADMIN — CEFTRIAXONE SODIUM 2 G: 2 INJECTION, POWDER, FOR SOLUTION INTRAMUSCULAR; INTRAVENOUS at 08:03

## 2020-12-21 RX ADMIN — PIPERACILLIN SODIUM AND TAZOBACTAM SODIUM 2.25 G: 2; .25 INJECTION, POWDER, LYOPHILIZED, FOR SOLUTION INTRAVENOUS at 03:02

## 2020-12-21 RX ADMIN — GLIMEPIRIDE 1 MG: 1 TABLET ORAL at 18:19

## 2020-12-21 RX ADMIN — GLIMEPIRIDE 2 MG: 2 TABLET ORAL at 10:11

## 2020-12-21 RX ADMIN — SODIUM CHLORIDE: 9 INJECTION, SOLUTION INTRAVENOUS at 03:16

## 2020-12-21 NOTE — PHARMACY-ADMISSION MEDICATION HISTORY
Admission medication history interview status for the 12/19/2020 admission is complete. See Epic admission navigator for allergy information, pharmacy, prior to admission medications and immunization status.     Medication history interview sources:  Patient via phone and surescripts    Changes made to PTA medication list (reason)  Added: none  Deleted: none  Changed: Aspirin 81 mg >> 325 mg po daily prn    Additional medication history information (including reliability of information, actions taken by pharmacist): Patient knows her medications well. She stated that she's only on 2 medications: glimepiride and Losartan-hydrochlorothiazide.   Bactrim: a new prescription. Patient only took 2 doses at home.   Fluocinonide solution and mupirocin ointment: patient wants to keep them on her list.       Prior to Admission medications    Medication Sig Last Dose Taking? Auth Provider   aspirin (ASA) 325 MG EC tablet Take 325 mg by mouth daily as needed for moderate pain  Yes Unknown, Entered By History   glimepiride (AMARYL) 2 MG tablet Take 2 mg with breakfast (she will also take a 1 mg tablet with dinner) 12/19/2020 at Unknown time Yes Lea Lopez MD   losartan-hydrochlorothiazide (HYZAAR) 50-12.5 MG tablet Take 1 tablet by mouth daily 12/19/2020 at Unknown time Yes Lea Lopez MD   sulfamethoxazole-trimethoprim (BACTRIM DS) 800-160 MG tablet Take 1 tablet by mouth 2 times daily for 10 days  12/19/2020 at Unknown time Yes Alvarez Strong PA-C   blood glucose (NO BRAND SPECIFIED) test strip  Unknown at Unknown time  Reported, Patient   blood glucose (ONETOUCH ULTRA) test strip Use to test blood sugar twice daily. Dispense 1 box of 200 test strips, #3 refills. Unknown at Unknown time  Lea Lopez MD   blood glucose monitor KIT 1 kit daily. Unknown at Unknown time  Spatz, Lisa, MD   blood glucose monitoring (ONE TOUCH ULTRA 2) meter device kit Use to test blood sugar 3 times  daily Unknown at Unknown time  Lea Lopez MD   fluocinonide (LIDEX) 0.05 % external solution  Unknown at Unknown time  Reported, Patient   glimepiride (AMARYL) 1 MG tablet Take 1 tablet (1 mg) by mouth daily (with dinner) She will also take separate 2 mg dose with breakfast. Unknown at Unknown time  Lea Lopez MD   mupirocin (BACTROBAN) 2 % external ointment  Unknown at Unknown time  Reported, Patient   ONE TOUCH LANCETS MISC  Unknown at Unknown time  Reported, Patient   ONETOUCH DELICA LANCETS 33G MISC 1 Device by In Vitro route 2 times daily Unknown at Unknown time  Lea Lopez MD         Medication history completed by: Lionel Trevino PharmD, BCPS December 21, 2020

## 2020-12-21 NOTE — PLAN OF CARE
Pt A&O x's 4. VSS. Pt febrile.complained of chills. Declined tylenol.  02 sats in the 90s on RA.  Lungs clear. Denies SOB, CP or nausea. Tolerating regular diet. Bowel sound active in all quadrants. No BM but passing gas. Voiding adequate amount into the toilet. Denies pain when asked. CMS intact, denies N/T. Pt declined PCD. PIV patent and infusing. Pt slept between care and is able to make needs known, call light with in reach. Will continue to monitor.

## 2020-12-21 NOTE — RESULT ENCOUNTER NOTE
The bacteria identified should be killed by the antibiotics you are taking. Unclear why you got worse and wound up in the hospital

## 2020-12-21 NOTE — PROGRESS NOTES
[facility]     Medicine Progress Note - Hospitalist Service       Date of Admission:  12/19/2020    Pt was seen, course reviewed with nursing staff, PA student      Assessment & Plan         Kelly Barnes is a 80 year old female with a PMH of DM2, HLD, HTN, aortic stenosis, and CKD who presented with 2 days of fever, dysuria, and polyuria. Admitted for management of likely pyelonephritis, GERSON and hypokalemia after failure of outpatient management. Patient is currently hemodynamically stable.     #Pyelonephritis  #Fevers  Patient has had prior UTI with E. Coli (sensitive to 3rd gen cephalosporins, resistant to fluoroquinolones) in 2019. Denies history of MRSA. Received single dose IM ceftriaxone and started on bactrim at urgent care 12/19. When admitted later the same day, started on zosyn and vancomycin.    Presented with fevers, dysuria, polyuria, and findings on UA suggestive of likely UTI/pyelonephritis. UC resulted growing E coli with resistance to amoxicillin, ciprofloxacin, and levofloxacin. Sensitive to cephalosporins and somewhat to zosyn. No BC growth on either culture from 12/19. LA of 1.2 on admission. White count 18.2 on admission down to 9.2 on 12/21.  on admission down to 63 on 12/21. Temp of 101.2 on admission down to 98.8 AM of 12/21, though she did have a single fever of 100 with chills overnight. Vitals otherwise WNL. Negative covid, influenza, and RSV with negative CXR.     AM of 12/21 patient has complete resolution of symptoms. No longer has dysuria or increase frequency due to urge. Continues to have increased frequency, but likely due to IV fluids in conjunction with oral fluids. Notes urine is still cloudy.     - Start Ceftriaxone IV 2g once daily for sensitive E. Coli infection   - Stop Vancomycin and Zosyn   - If patient continues to improve, plan to transition to oral abx 12/22. Likely 3rd generation cephalosporin such as cefdinir or  cefpodoxime.   - Continue Acetaminophen prn fevers/pain    #HTN  Has been WNL since admission, most likely due to the setting of sepsis. Up to 137/68 on 12/21 from 103/64 on the day of admission. Has not been receiving her PTA losartan-hydrochlorothiazide.     - Continue holding PTA losartan-hydrochlorothiazide.   - Patient appears hypervolemic the morning of 12/21 but pressures are still on the lower side. Will reevaluate restarting 12/22.     #GERSON  #Hypokalemia  GERSON likely pre-renal in the setting of lower intake and higher insensible losses.    Potassium up to 4.1 on 12/21 from 2.9 on 12/19. Mag 2.0 on 12/20. Creatinine down to 1.04 on 12/21 from 1.29 on admission. No baseline creatinine known, but .96 in 10/2020.     Patient urinating every 2 hours and notes increased swelling in legs compared to normal. 2+ pitting edema on exam. Tolerating PO fluids. Denies difficulty breathing and no bibasilar crackles to suggest pulmonary edema in setting of hypervolemia. 2017 echo showing EF 55-60% without dilation or hypertrophy of ventricles.     - Stop IV fluids. PO from here out.   - No current potassium supplementation necessary.   - Continue trending Creatinine with BMP in the AM's      #DM2: Last A1c 7.2 10/7/2020. Blood sugar 273 on admission, but has been between 110-185 since. According to patient this is her normal range at home on PTA glimepiride.     - Stop insulin   - Start home glimepiride. 2mg in the morning before/at breakfast and 1mg in the evening/at dinner.   - Continue monitoring blood sugars      Diet: Combination Diet Regular Diet Adult    DVT Prophylaxis: Patient refused to use pneumatic compression devices. Now with COVID ruled out patient can ambulate hallways for prevention.   Blanchard Catheter: not present  Code Status: Full Code           Disposition Plan   Expected discharge: Tomorrow, recommended to prior living arrangement once antibiotic plan established and patient remains afebrile  .  ______________________________________________________________________    Interval History   Patient spiked fever of 100 at 0300 with complaints of chills. Otherwise no events overnight and patient remained hemodynamically stable.     Patient notes general improvement of symptoms. Notes that she no longer has increased frequency due to urge or dysuria. Does note continued increased frequency, just not due to urge. Urine remains cloudy. Denies hematuria, suprapubic pain, or flank pain. Notes her legs are normally swollen, but have become more swollen today. Denies redness, pain, or warmth.     Denies current fever, chills, changes in vision, changes in hearing, chest pain, shortness of breath, rhinorrhea, cough, abdominal pain, diarrhea, melena, bloody stools, change in stool consistency, new weakness, or new numbness.     Data reviewed today: I reviewed all medications, new labs and imaging results over the last 24 hours. I personally reviewed no images or EKG's today.    Physical Exam   Vital Signs: Temp: 98.8  F (37.1  C) Temp src: Oral BP: 126/67 Pulse: 77   Resp: 18 SpO2: 98 % O2 Device: None (Room air)    Weight: 0 lbs 0 oz  General Appearance: Well appearing female in no acute distress. Laying comfortably in bed.   Respiratory: Minor crackles in left lower lobe, likely atelectasis due to lack of symptoms with negative CXR/covid/flu/RSV. Otherwise, lungs clear to ausculation bilaterally. Normal work of breathing on room air.   Cardiovascular: 2+ LE pitting edema bilaterally. No erythema, warmth, or pain to compression. Negative homans sign. RRR without rubs, gallops, or murmurs.   GI: Active bowel sounds throughout. Soft, non tender, without masses or organomegaly.   : No CVA tenderness bilaterally.   Skin: No erythema, rashes, or ecchymosis on exposed skin.      Data   Recent Labs   Lab 12/21/20  0705 12/20/20  0654 12/20/20  0405 12/19/20  2307   WBC 9.2 13.9*  --  17.7*   HGB 11.3* 12.5  --  12.8   MCV  84 84  --  83    303  --  330    138 137 134   POTASSIUM 4.1 3.9 3.8 2.9*   CHLORIDE 110* 106 104 99   CO2 23 27 26 25   BUN 14 20 22 28   CR 1.04 1.08* 1.11* 1.29*   ANIONGAP 6 5 7 10   JAN 7.9* 8.1* 8.2* 8.8   * 198* 251* 273*   ALBUMIN  --   --   --  3.1*   PROTTOTAL  --   --   --  7.1   BILITOTAL  --   --   --  0.5   ALKPHOS  --   --   --  87   ALT  --   --   --  19   AST  --   --   --  15     No results found for this or any previous visit (from the past 24 hour(s)).  Medications       cefTRIAXone  2 g Intravenous Q24H     glimepiride  1 mg Oral Daily with supper     glimepiride  2 mg Oral Daily with breakfast     sodium chloride (PF)  3 mL Intracatheter Q8H

## 2020-12-21 NOTE — PLAN OF CARE
VS: VSS. Denies /SOB   O2: >90% on RA    Output: Voiding adequate amounts w/o pain or difficulty    Last BM: 12/21/20   Activity: Up independently, steady gait    Up for meals? Yes   Skin: Intact    Pain: Denies    CMS: Intact   Dressing: None   Diet: Regular, tolerating well. BGs 109 and 175.    LDA: PIV saline locked    Equipment: IV pole   Plan: Potential discharge home tomorrow    Additional Info:

## 2020-12-22 ENCOUNTER — PATIENT OUTREACH (OUTPATIENT)
Dept: CARE COORDINATION | Facility: CLINIC | Age: 80
End: 2020-12-22

## 2020-12-22 VITALS
SYSTOLIC BLOOD PRESSURE: 132 MMHG | DIASTOLIC BLOOD PRESSURE: 81 MMHG | HEART RATE: 79 BPM | RESPIRATION RATE: 16 BRPM | OXYGEN SATURATION: 97 % | TEMPERATURE: 97.1 F

## 2020-12-22 LAB — GLUCOSE BLDC GLUCOMTR-MCNC: 147 MG/DL (ref 70–99)

## 2020-12-22 PROCEDURE — 999N001017 HC STATISTIC GLUCOSE BY METER IP

## 2020-12-22 PROCEDURE — 99238 HOSP IP/OBS DSCHRG MGMT 30/<: CPT | Performed by: INTERNAL MEDICINE

## 2020-12-22 PROCEDURE — 250N000011 HC RX IP 250 OP 636: Performed by: INTERNAL MEDICINE

## 2020-12-22 PROCEDURE — 250N000013 HC RX MED GY IP 250 OP 250 PS 637: Performed by: INTERNAL MEDICINE

## 2020-12-22 PROCEDURE — 99207 PR CDG-CODE INCORRECT PER BILLING BASED ON TIME: CPT | Performed by: INTERNAL MEDICINE

## 2020-12-22 RX ORDER — CEFPODOXIME PROXETIL 200 MG/1
200 TABLET, FILM COATED ORAL 2 TIMES DAILY
Qty: 14 TABLET | Refills: 0 | Status: SHIPPED | OUTPATIENT
Start: 2020-12-23 | End: 2020-12-22

## 2020-12-22 RX ORDER — CEFPODOXIME PROXETIL 200 MG/1
200 TABLET, FILM COATED ORAL 2 TIMES DAILY
Qty: 14 TABLET | Refills: 0 | Status: SHIPPED | OUTPATIENT
Start: 2020-12-23 | End: 2021-05-13

## 2020-12-22 RX ADMIN — GLIMEPIRIDE 2 MG: 2 TABLET ORAL at 08:42

## 2020-12-22 RX ADMIN — CEFTRIAXONE SODIUM 2 G: 2 INJECTION, POWDER, FOR SOLUTION INTRAMUSCULAR; INTRAVENOUS at 08:42

## 2020-12-22 NOTE — PLAN OF CARE
VS: VSS.   O2: >90% on RA    Output: Voiding adequate amounts without difficulty   Last BM: 12/21/20   Activity: Independent   Skin: Intact    Pain: Denies    CMS: Intact   Dressing: None   Diet: Regular   LDA: PIV saline locked    Equipment: IV pole   Plan: Likely discharge home 12/22   Additional Info:

## 2020-12-22 NOTE — PLAN OF CARE
Pt. discharged at 1420 to home, was accompanied by son, and left with personal belongings. Pt. received complete discharge paperwork and antibiotic medication as filled by discharge pharmacy. Pt. was given times of last dose for all discharge medications in writing on discharge medication sheets. Discharge teaching included medication administration education, pain management, and signs and symptoms to watch for. Pt. to follow up with primary care in 14 days. Pt. had no further questions at the time of discharge and no unmet needs were identified.

## 2020-12-22 NOTE — DISCHARGE SUMMARY
[facility]   Hospitalist Discharge Summary      Date of Admission:  12/19/2020  Date of Discharge:  12/22/2020  Discharging Provider: Samson Rg MD    Discharge Diagnoses      Pyelonephritis   Hypertension   Acute Kidney Injury   Hypokalemia   Type 2 Diabetes Mellitus   Sepsis     Follow-ups Needed After Discharge   Follow up with PCP in 2 weeks after finishing outpatient course of antibiotics. Evaluate for signs or symptoms of UTI or pyelonephritis.     Unresulted Labs Ordered in the Past 30 Days of this Admission     Date and Time Order Name Status Description    12/19/2020 2241 Blood culture Preliminary     12/19/2020 2241 Blood culture Preliminary           Discharge Disposition   Discharged to home  Condition at discharge: Stable    Hospital Course     Patient initially presented to an urgent care on 12/19 with 4 days of fevers, dysuria, polyuria, and then had a positive UA. Received one IM dose of ceftriaxone and discharged on oral bactrim. The patient later came in to the ED because of worsening fever and chill post treatment in the urgent care.     Here the patient was found to have GERSON and hypokalemia as well. The patient had vital signs of: 144/72, 108 bpm, 99.1F, 16 RR, 96% O2. Negative COVID, influenza, and RSV. CBC with elevated whitew of 17.7 with ANC of 16.1. CRP elevated to 140. ESR and LA WNL of 30 and 1.2 respectively. The patient was admitted for correction of electrolyte disturbances and treatment for pyelonephritis.    Course of conditions managed discussed below:     Pyelonephritis  UA on presentation had WBC and WBC clumps, bacteria, RBC, Leukocyte esterase, and Nitrites. Patient was started on empiric treatment with IV zosyn and IV vancomycin.    UC grew E. Coli that was resistant to ciprofloxacin, levofloxacin, amoxacillin, and unasyn. This is similar to a past UTI in 2019. Patient taken off zosyn and vanco, and switched to IV ceftriaxone. BC  were ordered and have been negative without any growth throughout admission.     By 12/21 the patient had improvement in symptoms and remained afebrile other than a single elevated temperature of 100 overnight on 12/20. CRP trended down to 63 from 140. White count dropped to 9.2 from 18.2.     On 12/22 the patient remained asymptomatic and afebrile. Another dose of IV ceftriaxone was administered and the patient was discharged on Cefpodoxime 200mg BID for 10 days. (last date 1/1/2021). The patient was hemodynamically stable on discharge.      GERSON, Hypokalemia  On arrival,  potassium of 2.9 and a creatinine of 1.29. No baseline creatinine known, but previously .96 in 10/2020. The patient was treated IV fluids,and K replacement. Creatinine quickly improved and was down to 1.04 on 12/21/2020. Potassium also improved to 4.1 by 12/21/2020.     # Hypertension, Sepsis   Patient has a history of hypertension that is treated at home with losartan-hydrochlorothiazide. Throughout the patient's admission she remained within normal range so her blood pressure medication was held. This was assumed to be related to minor sepsis on admission. The patient had a fever of 101 at the urgent care, was tachycardic at 108, and had an elevated white count of 17.7. Blood pressure now trending higher, so patient t was instructed to start her blood pressure medications starting 12/23/2020.     # Type 2 Diabetes Mellitus  The patient has a history of DM 2 that is normally controlled with  Glimepiride. Last A1C was 7.2 in 10/2020 and patient notes that her blood sugars are normally in the 110-180's at home.     On admission the patients blood glucose was 273 and with the possibility of a minor sepsis the patient was started on Insulin aspart pre prandially and also with correction dosing. The patient's blood glucose corrected back down to normal range of 110-180 by 12/20.  On 12/21 patient was started back on PTA glimerpiride at 2 mg in the AM  and 1 mg at dinner.     Consultations This Hospital Stay   PHARMACY TO DOSE VANCO  CARE MANAGEMENT / SOCIAL WORK IP CONSULT  MEDICATION HISTORY IP PHARMACY CONSULT    Code Status   Full Code    Time Spent on this Encounter   I, Dr. Samson Rg, personally saw the patient today and spent less than or equal to 30 minutes discharging this patient.  ______________________________________________________________________    Physical Exam   Vital Signs: Temp: 97.1  F (36.2  C) Temp src: Oral BP: 132/81 Pulse: 79   Resp: 16 SpO2: 97 % O2 Device: None (Room air)    Weight: 0 lbs 0 oz  General Appearance: Patient well appearing in no acute distress. Sitting up comfortably in bed.   Respiratory: Normal work of breathing on room air. Lungs clear to auscultation bilaterally.   Cardiovascular: RRR without rubs, gallops, or murmurs. LE without edema, erythema, or pain to compression.   : No CVA tenderness bilaterally.   Skin: No ecchymosis, rashes, or erythema of exposed skin.         Primary Care Physician   Lea Lopez    Discharge Orders   No discharge procedures on file.    Significant Results and Procedures   Most Recent 3 CBC's:  Recent Labs   Lab Test 12/21/20  0705 12/20/20  0654 12/19/20  2307   WBC 9.2 13.9* 17.7*   HGB 11.3* 12.5 12.8   MCV 84 84 83    303 330     Most Recent 3 BMP's:  Recent Labs   Lab Test 12/21/20  0705 12/20/20  0654 12/20/20  0405    138 137   POTASSIUM 4.1 3.9 3.8   CHLORIDE 110* 106 104   CO2 23 27 26   BUN 14 20 22   CR 1.04 1.08* 1.11*   ANIONGAP 6 5 7   JAN 7.9* 8.1* 8.2*   * 198* 251*     Most Recent 2 LFT's:  Recent Labs   Lab Test 12/19/20  2307 10/07/20  0831   AST 15 15   ALT 19 20   ALKPHOS 87 91   BILITOTAL 0.5 0.3     Most Recent 3 Creatinines:  Recent Labs   Lab Test 12/21/20  0705 12/20/20  0654 12/20/20  0405   CR 1.04 1.08* 1.11*     Most Recent 3 Hemoglobins:  Recent Labs   Lab Test 12/21/20  0705 12/20/20  0654 12/19/20  2307   HGB 11.3* 12.5  12.8     Most Recent 6 Bacteria Isolates From Any Culture (See EPIC Reports for Culture Details):  Recent Labs   Lab Test 12/19/20  2318 12/19/20  2307 12/19/20  2242 12/19/20  1109 07/03/19  1714 03/02/16 2024   CULT No growth after 2 days No growth after 2 days 10,000 to 50,000 colonies/mL  mixed urogenital erum  Susceptibility testing not routinely done   >100,000 colonies/mL  Escherichia coli  * >100,000 colonies/mL  Escherichia coli  *  10,000 to 50,000 colonies/mL  mixed urogenital erum  Susceptibility testing not routinely done   No growth     Most Recent Hemoglobin A1c:  Recent Labs   Lab Test 10/07/20  0831   A1C 7.2*     Most Recent 6 glucoses:  Recent Labs   Lab Test 12/21/20  0705 12/20/20  0654 12/20/20  0405 12/19/20  2307 10/07/20  0831 10/15/19  0920   * 198* 251* 273* 152* 159*     Most Recent Urinalysis:  Recent Labs   Lab Test 12/19/20  2242 12/19/20  1109   COLOR Yellow Yellow   APPEARANCE Cloudy Cloudy   URINEGLC 30* Negative   URINEBILI Negative Negative   URINEKETONE 10* 15*   SG 1.020 1.015   UBLD Large* Moderate*   URINEPH 5.5 5.5   PROTEIN 100* 30*   UROBILINOGEN  --  0.2   NITRITE Positive* Positive*   LEUKEST Large* Large*   RBCU >182* 10-25*   WBCU >182* *     Most Recent ESR & CRP:  Recent Labs   Lab Test 12/21/20  0705 12/19/20 2307   SED  --  30   CRP 63.0* 140.0*   ,   Results for orders placed or performed during the hospital encounter of 12/19/20   XR Chest Port 1 View    Narrative    EXAM: XR CHEST PORT 1 VW  LOCATION: [facility]  DATE/TIME: 12/19/2020 11:37 PM    INDICATION: Fever  COMPARISON: 04/19/2016      Impression    IMPRESSION: Negative chest.       Discharge Medications   Current Discharge Medication List      CONTINUE these medications which have NOT CHANGED    Details   aspirin (ASA) 325 MG EC tablet Take 325 mg by mouth daily as needed for moderate pain      !! glimepiride (AMARYL) 2 MG tablet Take 2 mg with breakfast (she will also take  a 1 mg tablet with dinner)  Qty: 90 tablet, Refills: 3    Associated Diagnoses: Type 2 diabetes mellitus without complication, without long-term current use of insulin (H)      losartan-hydrochlorothiazide (HYZAAR) 50-12.5 MG tablet Take 1 tablet by mouth daily  Qty: 90 tablet, Refills: 3    Associated Diagnoses: Hypertension goal BP (blood pressure) < 140/90      sulfamethoxazole-trimethoprim (BACTRIM DS) 800-160 MG tablet Take 1 tablet by mouth 2 times daily for 10 days  Qty: 20 tablet, Refills: 0    Associated Diagnoses: Pyelonephritis, acute      !! blood glucose (NO BRAND SPECIFIED) test strip       !! blood glucose (ONETOUCH ULTRA) test strip Use to test blood sugar twice daily. Dispense 1 box of 200 test strips, #3 refills.  Qty: 1 Box, Refills: 3    Associated Diagnoses: Type 2 diabetes mellitus without complication, without long-term current use of insulin (H)      !! blood glucose monitor KIT 1 kit daily.  Qty: 1 kit, Refills: 0    Associated Diagnoses: Type 2 diabetes, HbA1c goal < 7% (H)      !! blood glucose monitoring (ONE TOUCH ULTRA 2) meter device kit Use to test blood sugar 3 times daily  Qty: 1 kit, Refills: 0    Associated Diagnoses: DM type 2, goal A1C below 8.0      fluocinonide (LIDEX) 0.05 % external solution       !! glimepiride (AMARYL) 1 MG tablet Take 1 tablet (1 mg) by mouth daily (with dinner) She will also take separate 2 mg dose with breakfast.  Qty: 90 tablet, Refills: 3    Associated Diagnoses: Type 2 diabetes mellitus without complication, without long-term current use of insulin (H)      mupirocin (BACTROBAN) 2 % external ointment       !! ONE TOUCH LANCETS MISC       !! ONETOUCH DELICA LANCETS 33G MISC 1 Device by In Vitro route 2 times daily  Qty: 100 each, Refills: 11    Associated Diagnoses: Type 2 diabetes mellitus without complication, without long-term current use of insulin (H)       !! - Potential duplicate medications found. Please discuss with provider.        Allergies    Allergies   Allergen Reactions     Ibuprofen Other (See Comments)     numbness in hands and feet

## 2020-12-22 NOTE — CONSULTS
Care Management Initial Consult    General Information  Assessment completed with: Kelly Dykes  Type of CM/SW Visit: Initial Assessment    Primary Care Provider verified and updated as needed: Yes   Readmission within the last 30 days: no previous admission in last 30 days         Advance Care Planning: Advance Care Planning Reviewed: no concerns identified     General Information Comments: (On Consult list.  Work Up pending.)    Communication Assessment  Patient's communication style: spoken language (English or Bilingual)    Hearing Difficulty or Deaf: no   Wear Glasses or Blind: yes    Cognitive  Cognitive/Neuro/Behavioral: WDL     Arousal Level: opens eyes spontaneously  Orientation: oriented x 4  Mood/Behavior: calm, cooperative  Best Language: 0 - No aphasia  Speech: spontaneous, clear    Living Environment:   People in home: child(vanda), adult     Current living Arrangements: house      Able to return to prior arrangements: yes       Family/Social Support:  Care provided by: self  Provides care for: no one  Marital Status:   Children          Description of Support System: Supportive, Involved         Current Resources:   Skilled Home Care Services:    Community Resources: None  Equipment currently used at home: none  Supplies currently used at home:      Employment/Financial:  Employment Status: retired        Financial Concerns: No concerns identified           Lifestyle & Psychosocial Needs:        Socioeconomic History     Marital status:      Spouse name: Not on file     Number of children: Not on file     Years of education: Not on file     Highest education level: Not on file     Tobacco Use     Smoking status: Never Smoker     Smokeless tobacco: Never Used   Substance and Sexual Activity     Alcohol use: Yes     Alcohol/week: 10.0 standard drinks     Comment: 1-2 drinks per week     Drug use: No     Sexual activity: Yes     Partners: Male       Functional Status:  Prior to admission  patient needed assistance: None    Mental Health Status:  No concerns      Chemical Dependency Status:   No concerns         Values/Beliefs:  Spiritual, Cultural Beliefs, Taoist Practices, Values that affect care: no               Additional Information:  Per MD team, patient is ready for discharge today. She will discharge on oral abx. Met with patient to discuss plan and provide IMM. She reports that she lives with her son and he will transport home today. She has no concerns r/t discharge plan.     Grady Abdi RN Care Coordinator [phone]

## 2020-12-23 ENCOUNTER — PATIENT OUTREACH (OUTPATIENT)
Dept: CARE COORDINATION | Facility: CLINIC | Age: 80
End: 2020-12-23

## 2020-12-23 DIAGNOSIS — E87.6 HYPOKALEMIA: Primary | ICD-10-CM

## 2020-12-23 DIAGNOSIS — Z71.89 OTHER SPECIFIED COUNSELING: ICD-10-CM

## 2020-12-23 DIAGNOSIS — N10 ACUTE PYELONEPHRITIS: ICD-10-CM

## 2020-12-23 NOTE — PROGRESS NOTES
Clinic Care Coordination Contact  [facility]/Voicemail    Referral Source: ED Follow-Up  [facility]   Hospitalist Discharge Summary       Date of Admission:  12/19/2020  Date of Discharge:  12/22/2020  Discharging Provider: Samson Rg MD      Discharge Diagnoses      Pyelonephritis   Hypertension   Acute Kidney Injury   Hypokalemia   Type 2 Diabetes Mellitus   Sepsis   Clinical Data: Care Coordinator Outreach  Outreach attempted x 1.  Left message on patient's voicemail with call back information and requested return call.  Plan: Care Coordinator will try to reach patient again in 1-2 business days.  [facility]   Nhung Paredes RN, Care Coordinator   [facility]   E-mail [email or web address]   [phone]

## 2020-12-24 ENCOUNTER — PATIENT OUTREACH (OUTPATIENT)
Dept: NURSING | Facility: CLINIC | Age: 80
End: 2020-12-24
Payer: COMMERCIAL

## 2020-12-24 DIAGNOSIS — N10 ACUTE PYELONEPHRITIS: Primary | ICD-10-CM

## 2020-12-24 NOTE — PROGRESS NOTES
Clinic Care Coordination Contact    Clinic Care Coordination Contact  OUTREACH    Referral Information:       Primary Diagnosis: Genitourinary Disorders    Chief Complaint   Patient presents with     Clinic Care Coordination - Post Hospital     Clinic Care Coordination RN        Universal Utilization:   [facility]   Hospitalist Discharge Summary       Date of Admission:  12/19/2020  Date of Discharge:  12/22/2020  Discharging Provider: Samson Rg MD      Discharge Diagnoses       Pyelonephritis   Hypertension   Acute Kidney Injury   Hypokalemia   Type 2 Diabetes Mellitus   Sepsis     COVID-19 GetWell Loop:   Testing Results: negative   Email on file OR Smartphone: Yes   Does the patient speak English: Yes   Is the patient pregnant: N/A   Candidate for GetWell Loop: Yes   Referral placed per criteria above.   Reviewed wahing hands, disinfecting,distancing,masking and to seek medical help if increased SOB ,fever or increase flank pain   Clinic Utilization  Difficulty keeping appointments:: No  Compliance Concerns: No  No-Show Concerns: No  No PCP office visit in Past Year: No  Utilization    Last refreshed: 12/24/2020  3:37 AM: Hospital Admissions 1           Last refreshed: 12/24/2020  3:37 AM: ED Visits 1           Last refreshed: 12/24/2020  3:37 AM: No Show Count (past year) 0              Current as of: 12/24/2020  3:37 AM            Clinical Concerns:  Current Medical Concerns:  Brief conversation with the patient due to she was not happy with her experience with on care.  Patient continues to be on the antibiotic and no further fever experienced. Patient agrees to call if reoccurrence of fever ,burning frequency or flank pain    Current Behavioral Concerns: Not discussed   Education Provided to patient: See above        Health Maintenance Reviewed: Not assessed  Clinical Pathway: None    Medication Management:  Discussed antibiotic        Socioeconomic History      Marital status:      Spouse name: Not on file     Number of children: Not on file     Years of education: Not on file     Highest education level: Not on file     Tobacco Use     Smoking status: Never Smoker     Smokeless tobacco: Never Used   Substance and Sexual Activity     Alcohol use: Yes     Alcohol/week: 10.0 standard drinks     Comment: 1-2 drinks per week     Drug use: No     Sexual activity: Yes     Partners: Male       Patient/Caregiver understanding: Expresses good understanding of discharge instructions        Future Appointments              Today Rn, [facility] MITESH [facility]          Plan:   Patient will make a hospital follow up with PCP  No unmet needs, no further care coordination needed at this time   [facility]   Nhung Paredes RN, Care Coordinator   [facility]   E-mail [email or web address]   [phone]

## 2020-12-26 LAB
BACTERIA SPEC CULT: NO GROWTH
BACTERIA SPEC CULT: NO GROWTH
SPECIMEN SOURCE: NORMAL
SPECIMEN SOURCE: NORMAL

## 2021-01-05 ENCOUNTER — VIRTUAL VISIT (OUTPATIENT)
Dept: FAMILY MEDICINE | Facility: CLINIC | Age: 81
End: 2021-01-05
Payer: COMMERCIAL

## 2021-01-05 DIAGNOSIS — N12 PYELONEPHRITIS: Primary | ICD-10-CM

## 2021-01-05 DIAGNOSIS — Z87.440 PERSONAL HISTORY OF URINARY TRACT INFECTION: ICD-10-CM

## 2021-01-05 DIAGNOSIS — E78.5 HYPERLIPIDEMIA LDL GOAL <100: ICD-10-CM

## 2021-01-05 DIAGNOSIS — E11.9 TYPE 2 DIABETES MELLITUS WITHOUT COMPLICATION, WITHOUT LONG-TERM CURRENT USE OF INSULIN (H): ICD-10-CM

## 2021-01-05 PROCEDURE — 99442 PR PHYSICIAN TELEPHONE EVALUATION 11-20 MIN: CPT | Mod: 95 | Performed by: FAMILY MEDICINE

## 2021-01-05 RX ORDER — GLIMEPIRIDE 2 MG/1
2 TABLET ORAL 2 TIMES DAILY WITH MEALS
Qty: 180 TABLET | Refills: 3 | Status: SHIPPED | OUTPATIENT
Start: 2021-01-05 | End: 2021-09-21

## 2021-01-05 RX ORDER — SULFAMETHOXAZOLE/TRIMETHOPRIM 800-160 MG
1 TABLET ORAL 2 TIMES DAILY
Qty: 7 TABLET | Refills: 1 | Status: SHIPPED | OUTPATIENT
Start: 2021-01-05 | End: 2021-07-13

## 2021-01-05 NOTE — PROGRESS NOTES
Kelly Barnes is a 80 year old female who is being evaluated via a billable telephone visit.      What phone number would you like to be contacted at? [phone]  How would you like to obtain your AVS? MyChart  Assessment & Plan     Pyelonephritis  Now resolved    Personal history of urinary tract infection  Patient very concerned about reoccurrence given seriousness of recent infection, I reviewed chart and noted past history of sensitivities as noted above, I do recommend starting immediately with bactrim with any symptoms of fever, fatigue, abdominal pain, or dysuria, and could definitely switch to vantin if needed, The patient indicates understanding of these issues and agrees with the plan.   - sulfamethoxazole-trimethoprim (BACTRIM DS) 800-160 MG tablet; Take 1 tablet by mouth 2 times daily    Type 2 diabetes mellitus without complication, without long-term current use of insulin (H)  Blood sugars a little high, will increase dose to 2 mg bid  - glimepiride (AMARYL) 2 MG tablet; Take 1 tablet (2 mg) by mouth 2 times daily (with meals) She will also take separate 2 mg dose with breakfast.    Hyperlipidemia LDL goal <100  Due for fasting labs  - Lipid panel reflex to direct LDL Fasting; Future    Review of external notes as documented above                          Return in about 7 months (around 8/2/2021) for preventive visit (wellness/annual physical exam).    Lea Lopez MD  [facility]     Kelly Barnes is a 80 year old female who presents to clinic today for the following health issues     HPI       Hospital Follow-up Visit:    Hospital/Nursing Home/ Rehab Facility: [facility]  Date of Admission: 12/19/20  Date of Discharge: 12/22/20  Reason(s) for Admission: infections  She's worried about reoccurrence, she had a uti in July 2019, may be prone to getting dehydrated, she she's trying to drink plenty of fluids.  She's had e  coli both times, resistent to amp, cipro and levo, but sensitive to bactrim ,and discharged on cefpodoxime 200 mg po bid for 7 days    Discharge Diagnoses         Pyelonephritis   Hypertension   Acute Kidney Injury   Hypokalemia   Type 2 Diabetes Mellitus   Sepsis       Was your hospitalization related to COVID-19? No   Problems taking medications regularly:  None  Medication changes since discharge: None  Problems adhering to non-medication therapy:  None    Summary of hospitalization:  [facility] discharge summary reviewed  Diagnostic Tests/Treatments reviewed.  Follow up needed: none  Other Healthcare Providers Involved in Patient s Care:         None  Update since discharge: improved. Post Discharge Medication Reconciliation: done.  Plan of care communicated with patient            Review of Systems   Constitutional, HEENT, cardiovascular, pulmonary, GI, , musculoskeletal, neuro, skin, endocrine and psych systems are negative, except as otherwise noted.      Objective           Vitals:  No vitals were obtained today due to virtual visit.    Physical Exam   healthy, alert and no distress  PSYCH: Alert and oriented times 3; coherent speech, normal   rate and volume, able to articulate logical thoughts, able   to abstract reason, no tangential thoughts, no hallucinations   or delusions  Her affect is normal  RESP: No cough, no audible wheezing, able to talk in full sentences  Remainder of exam unable to be completed due to telephone visits       Phone call duration: 17 minutes

## 2021-02-09 ENCOUNTER — VIRTUAL VISIT (OUTPATIENT)
Dept: FAMILY MEDICINE | Facility: CLINIC | Age: 81
End: 2021-02-09
Payer: COMMERCIAL

## 2021-02-09 DIAGNOSIS — E11.9 TYPE 2 DIABETES MELLITUS WITHOUT COMPLICATION, WITHOUT LONG-TERM CURRENT USE OF INSULIN (H): ICD-10-CM

## 2021-02-09 DIAGNOSIS — E78.5 HYPERLIPIDEMIA LDL GOAL <100: Primary | ICD-10-CM

## 2021-02-09 DIAGNOSIS — I10 HYPERTENSION GOAL BP (BLOOD PRESSURE) < 140/90: ICD-10-CM

## 2021-02-09 PROCEDURE — 99442 PR PHYSICIAN TELEPHONE EVALUATION 11-20 MIN: CPT | Mod: 95 | Performed by: FAMILY MEDICINE

## 2021-02-09 RX ORDER — LOSARTAN POTASSIUM AND HYDROCHLOROTHIAZIDE 12.5; 5 MG/1; MG/1
1 TABLET ORAL DAILY
Qty: 90 TABLET | Refills: 3 | Status: SHIPPED | OUTPATIENT
Start: 2021-02-09 | End: 2021-08-27

## 2021-02-09 RX ORDER — SIMVASTATIN 10 MG
TABLET ORAL
Qty: 90 TABLET | Refills: 3 | Status: SHIPPED | OUTPATIENT
Start: 2021-02-09 | End: 2021-08-27

## 2021-02-09 NOTE — PROGRESS NOTES
Kelly is a 81 year old who is being evaluated via a billable telephone visit.      What phone number would you like to be contacted at? [phone]  How would you like to obtain your AVS? MyChart    Assessment & Plan     Hyperlipidemia LDL goal <100  LDL Cholesterol Calculated   Date Value Ref Range Status   10/07/2020 134 (H) <100 mg/dL Final     Comment:     Above desirable:  100-129 mg/dl  Borderline High:  130-159 mg/dL  High:             160-189 mg/dL  Very high:       >189 mg/dl      not at goal, I do recommend restarting statin today, return to clinic 3 months for lipid recheck    Hypertension goal BP (blood pressure) < 140/90  At goal, continue current medications    Type 2 diabetes mellitus without complication, without long-term current use of insulin (H)  At goal      Return in about 3 months (around 5/9/2021) for Diabetes follow up, cholesterol follow up, repeat labs.    Lea Lopez MD  [facility]   Kelly is a 81 year old who presents for the following health issues     HPI       Diabetes Follow-up    How often are you checking your blood sugar? One time daily    What time of day are you checking your blood sugars (select all that apply)?  Before meals   She's noticed that if she walks 30 to 40 minutes daily her blood sugars seem better controlled  She's trying to lower carbohydrates  Have you had any blood sugars above 200?  No  Have you had any blood sugars below 70?  No    What symptoms do you notice when your blood sugar is low?  None    What concerns do you have today about your diabetes? None     Do you have any of these symptoms? (Select all that apply)  No numbness or tingling in feet.  No redness, sores or blisters on feet.  No complaints of excessive thirst.  No reports of blurry vision.  No significant changes to weight.       Hyperlipidemia Follow-Up      Are you regularly taking any medication or supplement to lower your cholesterol?    No    Are you having muscle aches or other side effects that you think could be caused by your cholesterol lowering medication?  N/a  LDL Cholesterol Calculated   Date Value Ref Range Status   10/07/2020 134 (H) <100 mg/dL Final     Comment:     Above desirable:  100-129 mg/dl  Borderline High:  130-159 mg/dL  High:             160-189 mg/dL  Very high:       >189 mg/dl         Hypertension Follow-up      Do you check your blood pressure regularly outside of the clinic? Yes     Are you following a low salt diet? Yes, does not add salt    Are your blood pressures ever more than 140 on the top number (systolic) OR more   than 90 on the bottom number (diastolic), for example 140/90? Yes occasionally but usually 120's/70's, sometimes DBP as low as 60's,  last night 151/81    BP Readings from Last 2 Encounters:   12/22/20 132/81   12/19/20 124/79     Hemoglobin A1C (%)   Date Value   10/07/2020 7.2 (H)   10/15/2019 7.6 (H)     LDL Cholesterol Calculated (mg/dL)   Date Value   10/07/2020 134 (H)   10/15/2019 94         How many servings of fruits and vegetables do you eat daily?  4 or more    On average, how many sweetened beverages do you drink each day (Examples: soda, juice, sweet tea, etc.  Do NOT count diet or artificially sweetened beverages)?   0    How many days per week do you exercise enough to make your heart beat faster? 7    How many minutes a day do you exercise enough to make your heart beat faster? 30 - 60    How many days per week do you miss taking your medication? 0      Review of Systems   Constitutional, HEENT, cardiovascular, pulmonary, GI, , musculoskeletal, neuro, skin, endocrine and psych systems are negative, except as otherwise noted.      Objective           Vitals:  No vitals were obtained today due to virtual visit.    Physical Exam   healthy, alert and no distress  PSYCH: Alert and oriented times 3; coherent speech, normal   rate and volume, able to articulate logical thoughts, able   to  abstract reason, no tangential thoughts, no hallucinations   or delusions  Her affect is normal  RESP: No cough, no audible wheezing, able to talk in full sentences  Remainder of exam unable to be completed due to telephone visits            Phone call duration: 20 minutes

## 2021-02-10 ENCOUNTER — IMMUNIZATION (OUTPATIENT)
Dept: NURSING | Facility: CLINIC | Age: 81
End: 2021-02-10
Payer: COMMERCIAL

## 2021-02-10 PROCEDURE — 0001A PR COVID VAC PFIZER DIL RECON 30 MCG/0.3 ML IM: CPT

## 2021-02-10 PROCEDURE — 91300 PR COVID VAC PFIZER DIL RECON 30 MCG/0.3 ML IM: CPT

## 2021-03-03 ENCOUNTER — IMMUNIZATION (OUTPATIENT)
Dept: NURSING | Facility: CLINIC | Age: 81
End: 2021-03-03
Attending: INTERNAL MEDICINE
Payer: COMMERCIAL

## 2021-03-03 PROCEDURE — 0002A PR COVID VAC PFIZER DIL RECON 30 MCG/0.3 ML IM: CPT

## 2021-03-03 PROCEDURE — 91300 PR COVID VAC PFIZER DIL RECON 30 MCG/0.3 ML IM: CPT

## 2021-05-09 ENCOUNTER — HEALTH MAINTENANCE LETTER (OUTPATIENT)
Age: 81
End: 2021-05-09

## 2021-05-11 DIAGNOSIS — N18.31 STAGE 3A CHRONIC KIDNEY DISEASE (H): ICD-10-CM

## 2021-05-11 DIAGNOSIS — E11.9 TYPE 2 DIABETES MELLITUS WITHOUT COMPLICATION, WITHOUT LONG-TERM CURRENT USE OF INSULIN (H): ICD-10-CM

## 2021-05-11 DIAGNOSIS — I10 HYPERTENSION GOAL BP (BLOOD PRESSURE) < 140/90: ICD-10-CM

## 2021-05-11 DIAGNOSIS — E78.5 HYPERLIPIDEMIA LDL GOAL <100: ICD-10-CM

## 2021-05-11 LAB
ANION GAP SERPL CALCULATED.3IONS-SCNC: 5 MMOL/L (ref 3–14)
BUN SERPL-MCNC: 30 MG/DL (ref 7–30)
CALCIUM SERPL-MCNC: 9.1 MG/DL (ref 8.5–10.1)
CHLORIDE SERPL-SCNC: 108 MMOL/L (ref 94–109)
CHOLEST SERPL-MCNC: 194 MG/DL
CO2 SERPL-SCNC: 27 MMOL/L (ref 20–32)
CREAT SERPL-MCNC: 0.97 MG/DL (ref 0.52–1.04)
GFR SERPL CREATININE-BSD FRML MDRD: 55 ML/MIN/{1.73_M2}
GLUCOSE SERPL-MCNC: 157 MG/DL (ref 70–99)
HBA1C MFR BLD: 7.3 % (ref 0–5.6)
HDLC SERPL-MCNC: 77 MG/DL
LDLC SERPL CALC-MCNC: 103 MG/DL
NONHDLC SERPL-MCNC: 117 MG/DL
POTASSIUM SERPL-SCNC: 4.9 MMOL/L (ref 3.4–5.3)
SODIUM SERPL-SCNC: 140 MMOL/L (ref 133–144)
TRIGL SERPL-MCNC: 68 MG/DL

## 2021-05-11 PROCEDURE — 80048 BASIC METABOLIC PNL TOTAL CA: CPT | Performed by: FAMILY MEDICINE

## 2021-05-11 PROCEDURE — 36415 COLL VENOUS BLD VENIPUNCTURE: CPT | Performed by: FAMILY MEDICINE

## 2021-05-11 PROCEDURE — 80061 LIPID PANEL: CPT | Performed by: FAMILY MEDICINE

## 2021-05-11 PROCEDURE — 83036 HEMOGLOBIN GLYCOSYLATED A1C: CPT | Performed by: FAMILY MEDICINE

## 2021-05-11 NOTE — RESULT ENCOUNTER NOTE
Hello!  It was a pleasure to see you in clinic!  Thank you for getting labs done.     Your HgA1C, also called glycosylated hemoglobin, which measures the level of sugar in your blood over the past few months, is at goal, which is great! Congratulations! Keep up the good work!    If you have any questions, please contact the clinic or schedule an appointment with me, thank you!    Sincerely,    CHRISTA CABALLERO MD   5/11/2021

## 2021-05-13 ENCOUNTER — OFFICE VISIT (OUTPATIENT)
Dept: FAMILY MEDICINE | Facility: CLINIC | Age: 81
End: 2021-05-13
Payer: COMMERCIAL

## 2021-05-13 VITALS
TEMPERATURE: 97.9 F | DIASTOLIC BLOOD PRESSURE: 72 MMHG | SYSTOLIC BLOOD PRESSURE: 128 MMHG | WEIGHT: 158 LBS | OXYGEN SATURATION: 99 % | HEART RATE: 70 BPM | BODY MASS INDEX: 28.44 KG/M2 | RESPIRATION RATE: 16 BRPM

## 2021-05-13 DIAGNOSIS — N18.31 STAGE 3A CHRONIC KIDNEY DISEASE (H): ICD-10-CM

## 2021-05-13 DIAGNOSIS — I10 HYPERTENSION GOAL BP (BLOOD PRESSURE) < 140/90: Primary | ICD-10-CM

## 2021-05-13 DIAGNOSIS — R06.02 SOB (SHORTNESS OF BREATH): ICD-10-CM

## 2021-05-13 DIAGNOSIS — E78.5 HYPERLIPIDEMIA LDL GOAL <100: ICD-10-CM

## 2021-05-13 DIAGNOSIS — E11.9 TYPE 2 DIABETES MELLITUS WITHOUT COMPLICATION, WITHOUT LONG-TERM CURRENT USE OF INSULIN (H): ICD-10-CM

## 2021-05-13 DIAGNOSIS — F41.9 ANXIETY: ICD-10-CM

## 2021-05-13 PROBLEM — N10 ACUTE PYELONEPHRITIS: Status: RESOLVED | Noted: 2020-12-19 | Resolved: 2021-05-13

## 2021-05-13 PROBLEM — A41.9 SEPSIS, DUE TO UNSPECIFIED ORGANISM, UNSPECIFIED WHETHER ACUTE ORGAN DYSFUNCTION PRESENT (H): Status: RESOLVED | Noted: 2020-12-19 | Resolved: 2021-05-13

## 2021-05-13 PROBLEM — N17.9 ACUTE KIDNEY INJURY (H): Status: RESOLVED | Noted: 2020-12-19 | Resolved: 2021-05-13

## 2021-05-13 PROCEDURE — 99214 OFFICE O/P EST MOD 30 MIN: CPT | Performed by: FAMILY MEDICINE

## 2021-05-13 RX ORDER — LANCETS 33 GAUGE
1 EACH MISCELLANEOUS 2 TIMES DAILY
Qty: 100 EACH | Refills: 11 | Status: SHIPPED | OUTPATIENT
Start: 2021-05-13 | End: 2022-09-13

## 2021-05-13 NOTE — PROGRESS NOTES
Blood     Assessment & Plan     Hypertension goal BP (blood pressure) < 140/90  At goal  The current medical regimen is effective;  continue present plan and medications.    Stable.    Hyperlipidemia LDL goal <100  LDL Cholesterol Calculated   Date Value Ref Range Status   05/11/2021 103 (H) <100 mg/dL Final     Comment:     Above desirable:  100-129 mg/dl  Borderline High:  130-159 mg/dL  High:             160-189 mg/dL  Very high:       >189 mg/dl      not quite at goal, but hasn't been on statin very long, repeat labs next visit.    Type 2 diabetes mellitus without complication, without long-term current use of insulin (H)  At goal  The current medical regimen is effective;  continue present plan and medications.    - blood glucose (ONETOUCH ULTRA) test strip; Use to test blood sugar twice daily. Dispense 1 box of 200 test strips, #3 refills.  - OneTouch Delica Lancets 33G MISC; 1 Device by In Vitro route 2 times daily    Stage 3a chronic kidney disease  Stable  GFR Estimate   Date Value Ref Range Status   05/11/2021 55 (L) >60 mL/min/[1.73_m2] Final     Comment:     Non  GFR Calc  Starting 12/18/2018, serum creatinine based estimated GFR (eGFR) will be   calculated using the Chronic Kidney Disease Epidemiology Collaboration   (CKD-EPI) equation.     12/21/2020 51 (L) >60 mL/min/[1.73_m2] Final     Comment:     Non  GFR Calc  Starting 12/18/2018, serum creatinine based estimated GFR (eGFR) will be   calculated using the Chronic Kidney Disease Epidemiology Collaboration   (CKD-EPI) equation.     12/20/2020 48 (L) >60 mL/min/[1.73_m2] Final     Comment:     Non  GFR Calc  Starting 12/18/2018, serum creatinine based estimated GFR (eGFR) will be   calculated using the Chronic Kidney Disease Epidemiology Collaboration   (CKD-EPI) equation.           SOB (shortness of breath)  Due to  Anxiety  Suspected breath holding vs shallow breathing or both,discussed progressive  "relaxation and relaxation breathing, see AVS. Return to clinic if symptoms worsen or progress.                  BMI:   Estimated body mass index is 28.44 kg/m  as calculated from the following:    Height as of 10/8/20: 1.588 m (5' 2.5\").    Weight as of this encounter: 71.7 kg (158 lb).       Return in about 4 months (around 9/13/2021) for preventive visit (wellness/annual physical exam).    Lea Lopez MD  [facility]    Hoang Mendoza is a 81 year old who presents for the following health issues     HPI     Diabetes Follow-up    How often are you checking your blood sugar? Two times daily  Blood sugar testing frequency justification:  some elevated blood sugars  What time of day are you checking your blood sugars (select all that apply)?  Before meals  Have you had any blood sugars above 200?  Yes once 207   Have you had any blood sugars below 70?  No    What symptoms do you notice when your blood sugar is low?  None and Not applicable    What concerns do you have today about your diabetes? None     Do you have any of these symptoms? (Select all that apply)  No numbness or tingling in feet.  No redness, sores or blisters on feet.  No complaints of excessive thirst.  No reports of blurry vision.  No significant changes to weight.      BP Readings from Last 2 Encounters:   05/13/21 128/72   12/22/20 132/81     Hemoglobin A1C (%)   Date Value   05/11/2021 7.3 (H)   10/07/2020 7.2 (H)     LDL Cholesterol Calculated (mg/dL)   Date Value   05/11/2021 103 (H)   10/07/2020 134 (H)     Hyperlipidemia Follow-Up      Are you regularly taking any medication or supplement to lower your cholesterol?   Yes- simvastatin since April (one month)    Are you having muscle aches or other side effects that you think could be caused by your cholesterol lowering medication?  No   LDL Cholesterol Calculated   Date Value Ref Range Status   05/11/2021 103 (H) <100 mg/dL Final     Comment:     Above " desirable:  100-129 mg/dl  Borderline High:  130-159 mg/dL  High:             160-189 mg/dL  Very high:       >189 mg/dl              How many servings of fruits and vegetables do you eat daily?  2-3    On average, how many sweetened beverages do you drink each day (Examples: soda, juice, sweet tea, etc.  Do NOT count diet or artificially sweetened beverages)?   0    How many days per week do you exercise enough to make your heart beat faster? 7    How many minutes a day do you exercise enough to make your heart beat faster? 20 - 29  How many days per week do you miss taking your medication? sometimes    What makes it hard for you to take your medications?  remembering to take and at night    Acute Illness  Acute illness concerns: SOB  Onset/Duration: since March- thinking may be stress related, had this in the past after loosing  and on and off for 8 years .  She's noticed that sometimes lying in bed she'll feel short of breath, takes some deep breaths and symptoms resolve. She hasn't had any sob or exercise intolerance with daily 30 minute walks or climbing stairs.  She's worried about her brother in long term nursing care, and several friends have had serious health problems this year. She does feel that she's anxious.    Hypertension Follow-up      Do you check your blood pressure regularly outside of the clinic? Yes     Are you following a low salt diet? Yes    Are your blood pressures ever more than 140 on the top number (systolic) OR more   than 90 on the bottom number (diastolic), for example 140/90? Yes    Chronic Kidney Disease Follow-up      Do you take any over the counter pain medicine?: No       Review of Systems   Constitutional, HEENT, cardiovascular, pulmonary, GI, , musculoskeletal, neuro, skin, endocrine and psych systems are negative, except as otherwise noted.      Objective    /72   Pulse 70   Temp 97.9  F (36.6  C) (Tympanic)   Resp 16   Wt 71.7 kg (158 lb)   SpO2 99%    BMI 28.44 kg/m    Body mass index is 28.44 kg/m .  Physical Exam   GENERAL: healthy, alert and no distress  NECK: no adenopathy, no asymmetry, masses, or scars and thyroid normal to palpation  RESP: lungs clear to auscultation - no rales, rhonchi or wheezes  CV: regular rates and rhythm, normal S1 S2, no S3 or S4, grade 2/6 soft INOCENCIA murmur heard best over the RLSB, peripheral pulses strong and no peripheral edema  MS: genus valgus with slowed, wide based gait, edema

## 2021-05-13 NOTE — PATIENT INSTRUCTIONS
Patient Education     Progressive Relaxation    Your body needs relaxation to reduce stress and calm the fight-or-flight response. It helps to plan for 20 minutes of relaxation every day when you can take time for yourself. Sit or lie comfortably limiting distractions like phones. Put on some soft music or simply sit in silence.  Then incorporate the progressive relaxation technique below.  How to do progressive relaxation  Progressive relaxation helps your whole body relax. To try this technique, follow these steps:  1. Find a quiet room. Sit in a comfortable chair or lie on your back.  2. Breathe in deeply to a slow count of 5. Feel your belly, chest, and back expand. Breathe out slowly to a count of 5. Do this for several minutes.  3. After a few minutes, breathe in deeply again, but this time tighten the muscles in your feet. Notice how it feels. Hold the tension for 3 seconds.  4. Breathe out while relaxing the tightened muscles. Notice how relaxed you feel.  5. Repeat steps 3 and 4 with another muscle group. You can move from your feet, calves, and thighs to your stomach, arms, and hands.  Remember the 4 A s    Avoid a stressor. For example, if someone is smoking when you re trying to quit, leave the room.    Accept a stressor you can t change, like a job loss, by knowing that your feelings are normal.    Alter how you deal with a stressor. For example, if a constantly ringing phone is a stressor, let the answering machine .    Adapt to some stressors. For example, when starting a new exercise program, instead of focusing on how hard it will be, think how good you will feel.  [employer] last reviewed this educational content on 6/1/2018 2000-2021 [employer]. All rights reserved. This information is not intended as a substitute for professional medical care. Always follow your healthcare professional's instructions.           Patient Education     Breathing Tips to Help You  "Relax  [facility]  How can breathing help me relax?  When we are have strong emotions, our bodies can tense up, and our breathing can change to short, quick breaths. Sometimes, we hold our breath without even thinking about it. Taking slow, deep breaths can help us calm down and feel more balanced.   How do I do it?  Start by tensing different muscle groups as you breathe in through your nose. Then, gradually relax them as you breathe out (exhale) For example:   1. Legs and feet: Point or flex your toes. Breathe in as you tighten the muscles in your legs. Relax these muscles as you breathe out slowly.  2. Arms: Breathe in as you tighten your arm muscles. Pretend you're squeezing meliza with your hands. Relax your muscles as you breathe out slowly.  3. Face: Breathe in as you tighten the muscles of your face. Relax your muscles as you breathe out slowly.  4. Breathe out through pursed lips, like you are blowing out a candle. Notice how the air feels on the tip of your nose, and then expands your belly and ribs. Try sensing your shoulders move up--like you're filling a cup. Focus on allowing the breath to flow, instead of forcing it.  Tips to become the most relaxed:    Imagine the breath first emptying from your belly, then the lung and rib area, and then the upper chest. It helps to count and draw the number out through the entire exhale (for example, \"onnnnnnnnneeeeeee\").    Try to let all the air out when you breathe out. Your out breath (exhale) should be about twice as long as your in breath (inhale).    Close your eyes, or try watching your belly rise and fall. You can also focus on something in the room or a word picture from your imagination.    You can breathe loudly (like you're trying to fog up a mirror) or silently.    Notice how your breath feels as you inhale and exhale through your nose, mouth or both.    It may help to place a hand over your belly, upper chest area or any part of " your body that is tense.    If your mind wanders, bring it back to what you are focusing on. Be kind to yourself--this breathing technique takes practice.    Set a time each day to practice. Work up to longer periods of time, if you can. Even brief periods of regular practice can improve mood and brain function.  For informational purposes only. Not to replace the advice of your health care provider.   Copyright   2013 [facility]. All rights reserved. [employer] 914417 - Rev 02/16.  Wt Readings from Last 4 Encounters:   05/13/21 71.7 kg (158 lb)   12/19/20 70.8 kg (156 lb)   10/08/20 70.8 kg (156 lb)   09/17/20 73.1 kg (161 lb 3.2 oz)

## 2021-05-13 NOTE — RESULT ENCOUNTER NOTE
Hello!  It was a pleasure to see you in clinic!  Thank you for getting labs done. Everything looks normal, which is good news.     Your HgA1C, also called glycosylated hemoglobin, which measures the level of sugar in your blood over the past few months, is at goal, which is great! Congratulations!    The testing of your blood sugar, liver function and electrolytes was normal.  Kidney function is assessed by blood work that measures creatinine and calculates estimated GFR (glomerular filtration rate).  By current criteria you do have stage 3 chronic kidney disease as your eGFR is < 60.  This is not something that is urgent as your value has not changed much with time.    However, it does mean will monitor your labs (urine and blood tests) every 6-12 months and we will keep trying to make sure your blood pressure and cholesterol are at goal to keep your kidneys as healthy as possible.     Your cholesterol looks fine, and will probably improve over the next few months as you've been on the simvastatin for longer.    If you have any questions, please contact the clinic or schedule an appointment with me, thank you!    Sincerely,  Dr. Lea Lopez MD  5/13/2021

## 2021-06-29 ENCOUNTER — TRANSFERRED RECORDS (OUTPATIENT)
Dept: HEALTH INFORMATION MANAGEMENT | Facility: CLINIC | Age: 81
End: 2021-06-29

## 2021-06-29 LAB — RETINOPATHY: NEGATIVE

## 2021-07-08 ENCOUNTER — TELEPHONE (OUTPATIENT)
Dept: FAMILY MEDICINE | Facility: CLINIC | Age: 81
End: 2021-07-08

## 2021-07-08 NOTE — TELEPHONE ENCOUNTER
[facility], Dr. Rodriguez  Normal eye exam, no retinopathy 1/4/2021  Sincerely,  Dr. Lea Lopez MD  7/8/2021

## 2021-07-13 ENCOUNTER — HOSPITAL ENCOUNTER (INPATIENT)
Facility: CLINIC | Age: 81
LOS: 3 days | Discharge: HOME | DRG: 661 | End: 2021-07-16
Attending: EMERGENCY MEDICINE | Admitting: HOSPITALIST
Payer: COMMERCIAL

## 2021-07-13 ENCOUNTER — APPOINTMENT (OUTPATIENT)
Dept: CT IMAGING | Facility: CLINIC | Age: 81
DRG: 661 | End: 2021-07-13
Attending: EMERGENCY MEDICINE
Payer: COMMERCIAL

## 2021-07-13 DIAGNOSIS — N18.30 CKD (CHRONIC KIDNEY DISEASE) STAGE 3, GFR 30-59 ML/MIN (H): ICD-10-CM

## 2021-07-13 DIAGNOSIS — Z11.52 ENCOUNTER FOR SCREENING LABORATORY TESTING FOR SEVERE ACUTE RESPIRATORY SYNDROME CORONAVIRUS 2 (SARS-COV-2): ICD-10-CM

## 2021-07-13 DIAGNOSIS — N20.0 KIDNEY STONE: ICD-10-CM

## 2021-07-13 DIAGNOSIS — N12 EMPHYSEMATOUS PYELONEPHRITIS: ICD-10-CM

## 2021-07-13 DIAGNOSIS — N10 ACUTE PYELONEPHRITIS: Primary | ICD-10-CM

## 2021-07-13 LAB
ALBUMIN SERPL-MCNC: 3.5 G/DL (ref 3.4–5)
ALBUMIN UR-MCNC: 30 MG/DL
ALP SERPL-CCNC: 84 U/L (ref 40–150)
ALT SERPL W P-5'-P-CCNC: 20 U/L (ref 0–50)
ANION GAP SERPL CALCULATED.3IONS-SCNC: 8 MMOL/L (ref 3–14)
APPEARANCE UR: ABNORMAL
AST SERPL W P-5'-P-CCNC: 18 U/L (ref 0–45)
BACTERIA #/AREA URNS HPF: ABNORMAL /HPF
BASOPHILS # BLD AUTO: 0.1 10E3/UL (ref 0–0.2)
BASOPHILS NFR BLD AUTO: 0 %
BILIRUB DIRECT SERPL-MCNC: 0.2 MG/DL (ref 0–0.2)
BILIRUB SERPL-MCNC: 0.8 MG/DL (ref 0.2–1.3)
BILIRUB UR QL STRIP: NEGATIVE
BUN SERPL-MCNC: 20 MG/DL (ref 7–30)
CALCIUM SERPL-MCNC: 8.4 MG/DL (ref 8.5–10.1)
CHLORIDE BLD-SCNC: 100 MMOL/L (ref 94–109)
CO2 SERPL-SCNC: 25 MMOL/L (ref 20–32)
COLOR UR AUTO: ABNORMAL
CREAT SERPL-MCNC: 1.14 MG/DL (ref 0.52–1.04)
EOSINOPHIL # BLD AUTO: 0 10E3/UL (ref 0–0.7)
EOSINOPHIL NFR BLD AUTO: 0 %
ERYTHROCYTE [DISTWIDTH] IN BLOOD BY AUTOMATED COUNT: 12.7 % (ref 10–15)
GFR SERPL CREATININE-BSD FRML MDRD: 46 ML/MIN/1.73M2
GLUCOSE BLD-MCNC: 205 MG/DL (ref 70–99)
GLUCOSE BLDC GLUCOMTR-MCNC: 176 MG/DL (ref 70–99)
GLUCOSE BLDC GLUCOMTR-MCNC: 187 MG/DL (ref 70–99)
GLUCOSE UR STRIP-MCNC: NEGATIVE MG/DL
HBA1C MFR BLD: 7.4 % (ref 0–5.6)
HCT VFR BLD AUTO: 40.6 % (ref 35–47)
HGB BLD-MCNC: 13.6 G/DL (ref 11.7–15.7)
HGB UR QL STRIP: ABNORMAL
HOLD SPECIMEN: NORMAL
IMM GRANULOCYTES # BLD: 0.1 10E3/UL
IMM GRANULOCYTES NFR BLD: 0 %
KETONES UR STRIP-MCNC: 5 MG/DL
LACTATE SERPL-SCNC: 1.4 MMOL/L (ref 0.7–2)
LACTATE SERPL-SCNC: 1.4 MMOL/L (ref 0.7–2)
LEUKOCYTE ESTERASE UR QL STRIP: ABNORMAL
LIPASE SERPL-CCNC: 76 U/L (ref 73–393)
LYMPHOCYTES # BLD AUTO: 0.4 10E3/UL (ref 0.8–5.3)
LYMPHOCYTES NFR BLD AUTO: 2 %
MCH RBC QN AUTO: 27.6 PG (ref 26.5–33)
MCHC RBC AUTO-ENTMCNC: 33.5 G/DL (ref 31.5–36.5)
MCV RBC AUTO: 82 FL (ref 78–100)
MONOCYTES # BLD AUTO: 0.6 10E3/UL (ref 0–1.3)
MONOCYTES NFR BLD AUTO: 3 %
MUCOUS THREADS #/AREA URNS LPF: PRESENT /LPF
NEUTROPHILS # BLD AUTO: 18 10E3/UL (ref 1.6–8.3)
NEUTROPHILS NFR BLD AUTO: 95 %
NITRATE UR QL: POSITIVE
NRBC # BLD AUTO: 0 10E3/UL
NRBC BLD AUTO-RTO: 0 /100
PH UR STRIP: 5.5 [PH] (ref 5–7)
PLATELET # BLD AUTO: 268 10E3/UL (ref 150–450)
POTASSIUM BLD-SCNC: 3.8 MMOL/L (ref 3.4–5.3)
PROT SERPL-MCNC: 6.9 G/DL (ref 6.8–8.8)
RBC # BLD AUTO: 4.93 10E6/UL (ref 3.8–5.2)
RBC URINE: >100 /HPF
SARS-COV-2 RNA RESP QL NAA+PROBE: NEGATIVE
SODIUM SERPL-SCNC: 133 MMOL/L (ref 133–144)
SP GR UR STRIP: 1.02 (ref 1–1.03)
SQUAMOUS EPITHELIAL: 5 /HPF
TRANSITIONAL EPI: 1 /HPF
UROBILINOGEN UR STRIP-MCNC: NORMAL MG/DL
WBC # BLD AUTO: 19.2 10E3/UL (ref 4–11)
WBC CLUMPS #/AREA URNS HPF: PRESENT /HPF
WBC URINE: >100 /HPF

## 2021-07-13 PROCEDURE — 96361 HYDRATE IV INFUSION ADD-ON: CPT | Performed by: EMERGENCY MEDICINE

## 2021-07-13 PROCEDURE — 83605 ASSAY OF LACTIC ACID: CPT | Performed by: EMERGENCY MEDICINE

## 2021-07-13 PROCEDURE — 36592 COLLECT BLOOD FROM PICC: CPT | Performed by: EMERGENCY MEDICINE

## 2021-07-13 PROCEDURE — U0003 INFECTIOUS AGENT DETECTION BY NUCLEIC ACID (DNA OR RNA); SEVERE ACUTE RESPIRATORY SYNDROME CORONAVIRUS 2 (SARS-COV-2) (CORONAVIRUS DISEASE [COVID-19]), AMPLIFIED PROBE TECHNIQUE, MAKING USE OF HIGH THROUGHPUT TECHNOLOGIES AS DESCRIBED BY CMS-2020-01-R: HCPCS | Performed by: EMERGENCY MEDICINE

## 2021-07-13 PROCEDURE — 83690 ASSAY OF LIPASE: CPT | Performed by: EMERGENCY MEDICINE

## 2021-07-13 PROCEDURE — 74176 CT ABD & PELVIS W/O CONTRAST: CPT

## 2021-07-13 PROCEDURE — 99285 EMERGENCY DEPT VISIT HI MDM: CPT | Performed by: EMERGENCY MEDICINE

## 2021-07-13 PROCEDURE — 87086 URINE CULTURE/COLONY COUNT: CPT | Performed by: EMERGENCY MEDICINE

## 2021-07-13 PROCEDURE — 82248 BILIRUBIN DIRECT: CPT | Performed by: EMERGENCY MEDICINE

## 2021-07-13 PROCEDURE — 81001 URINALYSIS AUTO W/SCOPE: CPT | Performed by: EMERGENCY MEDICINE

## 2021-07-13 PROCEDURE — 250N000013 HC RX MED GY IP 250 OP 250 PS 637: Performed by: EMERGENCY MEDICINE

## 2021-07-13 PROCEDURE — 83036 HEMOGLOBIN GLYCOSYLATED A1C: CPT | Performed by: HOSPITALIST

## 2021-07-13 PROCEDURE — 258N000003 HC RX IP 258 OP 636: Performed by: HOSPITALIST

## 2021-07-13 PROCEDURE — 82962 GLUCOSE BLOOD TEST: CPT

## 2021-07-13 PROCEDURE — 85025 COMPLETE CBC W/AUTO DIFF WBC: CPT | Performed by: EMERGENCY MEDICINE

## 2021-07-13 PROCEDURE — C9803 HOPD COVID-19 SPEC COLLECT: HCPCS | Performed by: EMERGENCY MEDICINE

## 2021-07-13 PROCEDURE — 250N000013 HC RX MED GY IP 250 OP 250 PS 637: Performed by: HOSPITALIST

## 2021-07-13 PROCEDURE — 80053 COMPREHEN METABOLIC PANEL: CPT | Performed by: EMERGENCY MEDICINE

## 2021-07-13 PROCEDURE — 87040 BLOOD CULTURE FOR BACTERIA: CPT | Performed by: EMERGENCY MEDICINE

## 2021-07-13 PROCEDURE — 36415 COLL VENOUS BLD VENIPUNCTURE: CPT | Performed by: EMERGENCY MEDICINE

## 2021-07-13 PROCEDURE — 250N000012 HC RX MED GY IP 250 OP 636 PS 637: Performed by: HOSPITALIST

## 2021-07-13 PROCEDURE — 99285 EMERGENCY DEPT VISIT HI MDM: CPT | Mod: 25 | Performed by: EMERGENCY MEDICINE

## 2021-07-13 PROCEDURE — 96365 THER/PROPH/DIAG IV INF INIT: CPT | Performed by: EMERGENCY MEDICINE

## 2021-07-13 PROCEDURE — 250N000011 HC RX IP 250 OP 636: Performed by: EMERGENCY MEDICINE

## 2021-07-13 PROCEDURE — 80048 BASIC METABOLIC PNL TOTAL CA: CPT | Performed by: EMERGENCY MEDICINE

## 2021-07-13 PROCEDURE — 258N000003 HC RX IP 258 OP 636: Performed by: EMERGENCY MEDICINE

## 2021-07-13 PROCEDURE — 120N000002 HC R&B MED SURG/OB UMMC

## 2021-07-13 PROCEDURE — 99222 1ST HOSP IP/OBS MODERATE 55: CPT | Mod: AI | Performed by: HOSPITALIST

## 2021-07-13 RX ORDER — DEXTROSE MONOHYDRATE 25 G/50ML
25-50 INJECTION, SOLUTION INTRAVENOUS
Status: DISCONTINUED | OUTPATIENT
Start: 2021-07-13 | End: 2021-07-16 | Stop reason: HOSPADM

## 2021-07-13 RX ORDER — ACETAMINOPHEN 325 MG/1
650 TABLET ORAL ONCE
Status: COMPLETED | OUTPATIENT
Start: 2021-07-13 | End: 2021-07-13

## 2021-07-13 RX ORDER — ONDANSETRON 2 MG/ML
4 INJECTION INTRAMUSCULAR; INTRAVENOUS ONCE
Status: DISCONTINUED | OUTPATIENT
Start: 2021-07-13 | End: 2021-07-13

## 2021-07-13 RX ORDER — PIPERACILLIN SODIUM, TAZOBACTAM SODIUM 3; .375 G/15ML; G/15ML
3.38 INJECTION, POWDER, LYOPHILIZED, FOR SOLUTION INTRAVENOUS ONCE
Status: DISCONTINUED | OUTPATIENT
Start: 2021-07-13 | End: 2021-07-13

## 2021-07-13 RX ORDER — LIDOCAINE 40 MG/G
CREAM TOPICAL
Status: DISCONTINUED | OUTPATIENT
Start: 2021-07-13 | End: 2021-07-16 | Stop reason: HOSPADM

## 2021-07-13 RX ORDER — NICOTINE POLACRILEX 4 MG
15-30 LOZENGE BUCCAL
Status: DISCONTINUED | OUTPATIENT
Start: 2021-07-13 | End: 2021-07-16 | Stop reason: HOSPADM

## 2021-07-13 RX ORDER — SODIUM CHLORIDE 9 MG/ML
INJECTION, SOLUTION INTRAVENOUS CONTINUOUS
Status: DISCONTINUED | OUTPATIENT
Start: 2021-07-13 | End: 2021-07-16 | Stop reason: HOSPADM

## 2021-07-13 RX ORDER — ACETAMINOPHEN 325 MG/1
650 TABLET ORAL EVERY 4 HOURS PRN
Status: DISCONTINUED | OUTPATIENT
Start: 2021-07-13 | End: 2021-07-16 | Stop reason: HOSPADM

## 2021-07-13 RX ORDER — CEFTRIAXONE 2 G/1
2 INJECTION, POWDER, FOR SOLUTION INTRAMUSCULAR; INTRAVENOUS ONCE
Status: COMPLETED | OUTPATIENT
Start: 2021-07-13 | End: 2021-07-13

## 2021-07-13 RX ORDER — LOSARTAN POTASSIUM AND HYDROCHLOROTHIAZIDE 12.5; 5 MG/1; MG/1
1 TABLET ORAL DAILY
Status: DISCONTINUED | OUTPATIENT
Start: 2021-07-13 | End: 2021-07-13

## 2021-07-13 RX ORDER — CEFTRIAXONE 1 G/1
1 INJECTION, POWDER, FOR SOLUTION INTRAMUSCULAR; INTRAVENOUS EVERY 24 HOURS
Status: DISCONTINUED | OUTPATIENT
Start: 2021-07-14 | End: 2021-07-16 | Stop reason: HOSPADM

## 2021-07-13 RX ORDER — ACETAMINOPHEN 325 MG/1
650 TABLET ORAL EVERY 6 HOURS PRN
Status: DISCONTINUED | OUTPATIENT
Start: 2021-07-13 | End: 2021-07-13

## 2021-07-13 RX ORDER — SIMVASTATIN 10 MG
10 TABLET ORAL AT BEDTIME
Status: DISCONTINUED | OUTPATIENT
Start: 2021-07-13 | End: 2021-07-16 | Stop reason: HOSPADM

## 2021-07-13 RX ADMIN — LOSARTAN POTASSIUM AND HYDROCHLOROTHIAZIDE 1 TABLET: 50; 12.5 TABLET, FILM COATED ORAL at 16:17

## 2021-07-13 RX ADMIN — CEFTRIAXONE SODIUM 2 G: 2 INJECTION, POWDER, FOR SOLUTION INTRAMUSCULAR; INTRAVENOUS at 08:49

## 2021-07-13 RX ADMIN — ACETAMINOPHEN 650 MG: 325 TABLET, FILM COATED ORAL at 15:19

## 2021-07-13 RX ADMIN — SIMVASTATIN 10 MG: 10 TABLET, FILM COATED ORAL at 22:02

## 2021-07-13 RX ADMIN — INSULIN ASPART 1 UNITS: 100 INJECTION, SOLUTION INTRAVENOUS; SUBCUTANEOUS at 18:44

## 2021-07-13 RX ADMIN — ACETAMINOPHEN 650 MG: 325 TABLET, FILM COATED ORAL at 08:49

## 2021-07-13 RX ADMIN — ACETAMINOPHEN 650 MG: 325 TABLET, FILM COATED ORAL at 19:52

## 2021-07-13 RX ADMIN — SODIUM CHLORIDE: 9 INJECTION, SOLUTION INTRAVENOUS at 17:10

## 2021-07-13 RX ADMIN — SODIUM CHLORIDE 1000 ML: 9 INJECTION, SOLUTION INTRAVENOUS at 08:49

## 2021-07-13 ASSESSMENT — ENCOUNTER SYMPTOMS
MYALGIAS: 0
DIZZINESS: 0
SHORTNESS OF BREATH: 0
FEVER: 1
RHINORRHEA: 0
CHILLS: 1
DIARRHEA: 0
HEADACHES: 0
COUGH: 0
NAUSEA: 1
APPETITE CHANGE: 1
SORE THROAT: 0
VOMITING: 1
LIGHT-HEADEDNESS: 0
WOUND: 0
CONSTIPATION: 0
FATIGUE: 1
FREQUENCY: 1
ARTHRALGIAS: 0
DYSURIA: 0
PALPITATIONS: 0

## 2021-07-13 NOTE — ED NOTES
[facility]   ED Nurse to Floor Handoff     Kelly Barnes is a 81 year old female who speaks English and lives with family members,  in a home  They arrived in the ED by car from home    ED Chief Complaint: Abdominal Pain (Patient presents to ED with c/o lower abd pain and fever. Sxs onset yesterday. )    ED Dx;   Final diagnoses:   Emphysematous pyelonephritis         Needed?: No    Allergies:   Allergies   Allergen Reactions     Ibuprofen Other (See Comments)     numbness in hands and feet   .  Past Medical Hx:   Past Medical History:   Diagnosis Date     Acute kidney injury (H) 12/19/2020     Allergic rhinitis, cause unspecified      Aortic stenosis 2/29/2016    With new murmur noted 2/2016, normal echo, repeat echo 2/2017.     Atypical chest pain 7/24/2015     cuboid     left foot     Hyperlipidemia LDL goal <100 11/1/2012     Hypertension goal BP (blood pressure) < 140/90      Musculoskeletal chest pain 11/25/2016     Obesity, Class I, BMI 30-34.9 11/11/2015     Pneumonia 3/16     Sepsis, due to unspecified organism, unspecified whether acute organ dysfunction present (H) 12/19/2020     Type 2 diabetes, HbA1c goal < 7% (H)       Baseline Mental status: WDL  Current Mental Status changes: at basesline    Infection present or suspected this encounter: yes urinary  Sepsis suspected: No  Isolation type: No active isolations  Patient tested for COVID 19 prior to admission: YES     Activity level - Baseline/Home:  Independent  Activity Level - Current:   Independent    Bariatric equipment needed?: No    In the ED these meds were given:   Medications   ondansetron (ZOFRAN) injection 4 mg (4 mg Intravenous Not Given 7/13/21 1059)   0.9% sodium chloride BOLUS (0 mLs Intravenous Stopped 7/13/21 1059)   acetaminophen (TYLENOL) tablet 650 mg (650 mg Oral Given 7/13/21 0849)   cefTRIAXone (ROCEPHIN) 2 g vial to attach to  ml bag for ADULTS or NS 50 ml bag for  PEDS (0 g Intravenous Stopped 7/13/21 0946)       Drips running?  No    Home pump  No    Current LDAs  Peripheral IV 07/13/21 Right (Active)   Number of days: 0       Labs results:   Labs Ordered and Resulted from Time of ED Arrival Up to the Time of Departure from the ED   BASIC METABOLIC PANEL - Abnormal; Notable for the following components:       Result Value    Creatinine 1.14 (*)     Calcium 8.4 (*)     Glucose 205 (*)     GFR Estimate 46 (*)     All other components within normal limits   ROUTINE UA WITH MICROSCOPIC REFLEX TO CULTURE - Abnormal; Notable for the following components:    Appearance Urine Cloudy (*)     Ketones Urine 5  (*)     Blood Urine Large (*)     Protein Albumin Urine 30  (*)     Nitrite Urine Positive (*)     Leukocyte Esterase Urine Large (*)     Bacteria Urine Moderate (*)     WBC Clumps Urine Present (*)     Mucus Urine Present (*)     RBC Urine >100 (*)     WBC Urine >100 (*)     Squamous Epithelials Urine 5 (*)     All other components within normal limits    Narrative:     Urine Culture ordered based on laboratory criteria   CBC WITH PLATELETS AND DIFFERENTIAL - Abnormal; Notable for the following components:    WBC Count 19.2 (*)     Absolute Neutrophils 18.0 (*)     Absolute Lymphocytes 0.4 (*)     Absolute Immature Granulocytes 0.1 (*)     All other components within normal limits   HEPATIC FUNCTION PANEL - Normal   LIPASE - Normal   LACTIC ACID WHOLE BLOOD - Normal   EXTRA RED TOP TUBE   EXTRA GREEN TOP (LITHIUM HEPARIN) TUBE   SARS-COV2 (COVID-19) VIRUS RT-PCR   PERIPHERAL IV CATHETER   PERIPHERAL IV CATHETER   IP ACUTE INDWELLING URINARY CATHETER (CORBIN)   DISCONTINUE INDWELLING URINARY CATHETER (CORBIN)   BLADDER SCAN AND STRAIGHT CATH   BLOOD CULTURE   BLOOD CULTURE   URINE CULTURE   COVID-19 VIRUS (CORONAVIRUS) BY PCR    Narrative:     The following orders were created for panel order Asymptomatic COVID-19 Virus (Coronavirus) by PCR Nasopharyngeal.  Procedure                                Abnormality         Status                     ---------                               -----------         ------                     SARS-COV2 (COVID-19) Vir...[[id number]]                                                   Please view results for these tests on the individual orders.   CBC WITH PLATELETS & DIFFERENTIAL    Narrative:     The following orders were created for panel order CBC with Platelets & Differential.  Procedure                               Abnormality         Status                     ---------                               -----------         ------                     CBC with platelets and d...[[id number]]  Abnormal            Final result                 Please view results for these tests on the individual orders.   EXTRA TUBE    Narrative:     The following orders were created for panel order [place] draw.  Procedure                               Abnormality         Status                     ---------                               -----------         ------                     Extra Red Top Tube[[id number]]                               Final result                 Please view results for these tests on the individual orders.   EXTRA TUBE    Narrative:     The following orders were created for panel order [place] Draw.  Procedure                               Abnormality         Status                     ---------                               -----------         ------                     Extra Green Top (Lithium...[[id number]]                                                   Please view results for these tests on the individual orders.       Imaging Studies:   Recent Results (from the past 24 hour(s))   CT Abdomen Pelvis w/o Contrast    Narrative    CT ABDOMEN PELVIS W/O CONTRAST 7/13/2021 9:38 AM    CLINICAL HISTORY: Abdominal pain, fever  TECHNIQUE: CT scan of the abdomen and pelvis was performed without IV  contrast. Multiplanar reformats were obtained. Dose  reduction  techniques were used.  CONTRAST: None.    COMPARISON: None.    FINDINGS:   LOWER CHEST: Mild coronary artery calcifications.    HEPATOBILIARY: Normal.    PANCREAS: Normal.    SPLEEN: Normal.    ADRENAL GLANDS: Normal.    KIDNEYS/BLADDER: Small amount of air within the bladder lumen as well  as a small amount of air within the right renal collecting system.  Bilateral perinephric edema, asymmetrically greater on the right.  Multiple large stones throughout the right renal collecting system  including a dominant 2.9 cm stone extending from the mid pelvis into  the lower calyceal region. Associated urothelial thickening and  peripelvic edema. No hydronephrosis or hydroureter. No obstructing  ureteral stone.    BOWEL: Normal caliber small bowel. Normal appendix. Extensive colonic  diverticulosis without evidence of acute diverticulitis. No free air  or free fluid.    LYMPH NODES: Normal.    VASCULATURE: Mild atherosclerosis.    PELVIC ORGANS: Normal.    OTHER: None.    MUSCULOSKELETAL: Spinal and pelvic degenerative changes.      Impression    IMPRESSION:   1.  Multiple large nonobstructing right renal stones. No  hydronephrosis or obstructing ureteral stone.  2.  Air within the bladder lumen as well as a small amount of air  within the right renal collecting system. This is a very to reflect  sequelae of recent intervention/catheterization rather than  gas-forming infection. However, correlation is recommended.  3.  Asymmetric right perinephric/peripelvic edema with urothelial  thickening which may reflect inflammatory or infectious pyelitis.  Recommend laboratory correlation. No definite renal abscess/drainable  fluid collection within the limits of a noncontrast exam.    JENNIFER MOSQUEDA MD         SYSTEM ID:  [id number]       Recent vital signs:   /76   Pulse 79   Temp 100.9  F (38.3  C) (Oral)   Resp 20   Wt 72 kg (158 lb 11.2 oz)   SpO2 97%   BMI 28.56 kg/m      Radha Coma Scale Score: 15  (07/13/21 0655)       Cardiac Rhythm: Other  Pt needs tele? No  Skin/wound Issues: None    Code Status:     Pain control: pt had none    Nausea control: pt had none    Abnormal labs/tests/findings requiring intervention:   CT Abdomen Pelvis w/o Contrast   Final Result   IMPRESSION:    1.  Multiple large nonobstructing right renal stones. No   hydronephrosis or obstructing ureteral stone.   2.  Air within the bladder lumen as well as a small amount of air   within the right renal collecting system. This is a very to reflect   sequelae of recent intervention/catheterization rather than   gas-forming infection. However, correlation is recommended.   3.  Asymmetric right perinephric/peripelvic edema with urothelial   thickening which may reflect inflammatory or infectious pyelitis.   Recommend laboratory correlation. No definite renal abscess/drainable   fluid collection within the limits of a noncontrast exam.      JENNIFER MOSQUEDA MD            SYSTEM ID:  [id number]        Labs Ordered and Resulted from Time of ED Arrival Up to the Time of Departure from the ED   BASIC METABOLIC PANEL - Abnormal; Notable for the following components:       Result Value    Creatinine 1.14 (*)     Calcium 8.4 (*)     Glucose 205 (*)     GFR Estimate 46 (*)     All other components within normal limits   ROUTINE UA WITH MICROSCOPIC REFLEX TO CULTURE - Abnormal; Notable for the following components:    Appearance Urine Cloudy (*)     Ketones Urine 5  (*)     Blood Urine Large (*)     Protein Albumin Urine 30  (*)     Nitrite Urine Positive (*)     Leukocyte Esterase Urine Large (*)     Bacteria Urine Moderate (*)     WBC Clumps Urine Present (*)     Mucus Urine Present (*)     RBC Urine >100 (*)     WBC Urine >100 (*)     Squamous Epithelials Urine 5 (*)     All other components within normal limits    Narrative:     Urine Culture ordered based on laboratory criteria   CBC WITH PLATELETS AND DIFFERENTIAL - Abnormal; Notable for the  following components:    WBC Count 19.2 (*)     Absolute Neutrophils 18.0 (*)     Absolute Lymphocytes 0.4 (*)     Absolute Immature Granulocytes 0.1 (*)     All other components within normal limits   HEPATIC FUNCTION PANEL - Normal   LIPASE - Normal   LACTIC ACID WHOLE BLOOD - Normal   EXTRA RED TOP TUBE   EXTRA GREEN TOP (LITHIUM HEPARIN) TUBE   SARS-COV2 (COVID-19) VIRUS RT-PCR   PERIPHERAL IV CATHETER   PERIPHERAL IV CATHETER   IP ACUTE INDWELLING URINARY CATHETER (CORBIN)   DISCONTINUE INDWELLING URINARY CATHETER (CORBIN)   BLADDER SCAN AND STRAIGHT CATH   BLOOD CULTURE   BLOOD CULTURE   URINE CULTURE   COVID-19 VIRUS (CORONAVIRUS) BY PCR    Narrative:     The following orders were created for panel order Asymptomatic COVID-19 Virus (Coronavirus) by PCR Nasopharyngeal.  Procedure                               Abnormality         Status                     ---------                               -----------         ------                     SARS-COV2 (COVID-19) Vir...[[id number]]                                                   Please view results for these tests on the individual orders.   CBC WITH PLATELETS & DIFFERENTIAL    Narrative:     The following orders were created for panel order CBC with Platelets & Differential.  Procedure                               Abnormality         Status                     ---------                               -----------         ------                     CBC with platelets and d...[[id number]]  Abnormal            Final result                 Please view results for these tests on the individual orders.   EXTRA TUBE    Narrative:     The following orders were created for panel order [place] draw.  Procedure                               Abnormality         Status                     ---------                               -----------         ------                     Extra Red Top Tube[[id number]]                               Final result                 Please view  results for these tests on the individual orders.   EXTRA TUBE    Narrative:     The following orders were created for panel order [place] Draw.  Procedure                               Abnormality         Status                     ---------                               -----------         ------                     Extra Green Top (Lithium...[[id number]]                                                   Please view results for these tests on the individual orders.       Family present during ED course? No   Family Comments/Social Situation comments:     Tasks needing completion: None    Pam Garcia RN  [facility] --   [phone] [facility]

## 2021-07-13 NOTE — CONSULTS
Urology Consult, History and Physical    Name: Kelly Barnes    MRN: [id number]   YOB: 1940               Chief Complaint:   Large right renal stones in setting of UTI    History is obtained from the patient and chart review          History of Present Illness:   Kelly Barnes is a 81 year old female with PMH of DM2, HTN, and Urologic hx of rUTIs who presents with fevers, chills and nausea. Symptoms started yesterday. Denies flank pain. Patient denies any history of previous stones. Patient denies dysuria, burning, urgency. Positive for baseline frequency for years. Endorses nausea and dry heaving this morning. She last ate some crackers here in the ER about 1-2 hours ago and just drank some water. Last full meal was yesterday. Not on anticoagulation. No prior surgeries.    In the ER her workup was notable for the following lab results (below), including UA c/w UTI and CT revealing nonobstructing large right renal stone burden (developing staghorn stone) as well as gas in right renal collecting system and bladder but not within the kidney parenchyma or bladder wall. She denied any recent  instrumentation to explain these findings.    Recent Labs   Lab 07/13/21  0721   WBC 19.2*     Recent Labs   Lab 07/13/21  0721   CR 1.14*     Urinalysis  Recent Labs   Lab Test 07/13/21  0724 12/19/20  2242 12/19/20  1109   UBLD Large* Large* Moderate*   NITRITE Positive* Positive* Positive*   LEUKEST Large* Large* Large*   RBCU >100* >182* 10-25*   WBCU >100* >182* *            Past Medical History:     Past Medical History:   Diagnosis Date     Acute kidney injury (H) 12/19/2020     Allergic rhinitis, cause unspecified      Aortic stenosis 2/29/2016    With new murmur noted 2/2016, normal echo, repeat echo 2/2017.     Atypical chest pain 7/24/2015     cuboid     left foot     Hyperlipidemia LDL goal <100 11/1/2012     Hypertension goal BP (blood pressure) < 140/90      Musculoskeletal chest pain  11/25/2016     Obesity, Class I, BMI 30-34.9 11/11/2015     Pneumonia 3/16     Sepsis, due to unspecified organism, unspecified whether acute organ dysfunction present (H) 12/19/2020     Type 2 diabetes, HbA1c goal < 7% (H)             Past Surgical History:     Past Surgical History:   Procedure Laterality Date     ZZC NONSPECIFIC PROCEDURE      none            Social History:     Social History     Tobacco Use     Smoking status: Never Smoker     Smokeless tobacco: Never Used   Substance Use Topics     Alcohol use: Yes     Alcohol/week: 10.0 standard drinks     Comment: 1-2 drinks per week            Family History:     Family History   Problem Relation Age of Onset     Hypertension Mother      Diabetes No family hx of      Cerebrovascular Disease No family hx of      Breast Cancer No family hx of      Cancer - colorectal No family hx of      Prostate Cancer No family hx of             Allergies:     Allergies   Allergen Reactions     Ibuprofen Other (See Comments)     numbness in hands and feet            Medications:     Current Facility-Administered Medications   Medication     acetaminophen (TYLENOL) tablet 650 mg     [START ON 7/14/2021] cefTRIAXone (ROCEPHIN) 1 g vial to attach to  mL bag for ADULTS or NS 50 mL bag for PEDS     glucose gel 15-30 g    Or     dextrose 50 % injection 25-50 mL    Or     glucagon injection 1 mg     enoxaparin ANTICOAGULANT (LOVENOX) injection 40 mg     insulin aspart (NovoLOG) injection (RAPID ACTING)     insulin aspart (NovoLOG) injection (RAPID ACTING)     lidocaine (LMX4) cream     lidocaine 1 % 0.1-1 mL     losartan-hydrochlorothiazide (HYZAAR) 50-12.5 MG per tablet 1 tablet     melatonin tablet 1 mg     ondansetron (ZOFRAN) injection 4 mg     simvastatin (ZOCOR) tablet 10 mg     sodium chloride (PF) 0.9% PF flush 3 mL     sodium chloride (PF) 0.9% PF flush 3 mL     sodium chloride 0.9% infusion     Current Outpatient Medications   Medication Sig     blood glucose  (ONETOUCH ULTRA) test strip Use to test blood sugar twice daily. Dispense 1 box of 200 test strips, #3 refills.     blood glucose monitoring (ONE TOUCH ULTRA 2) meter device kit Use to test blood sugar 3 times daily     glimepiride (AMARYL) 2 MG tablet Take 1 tablet (2 mg) by mouth 2 times daily (with meals) She will also take separate 2 mg dose with breakfast.     losartan-hydrochlorothiazide (HYZAAR) 50-12.5 MG tablet Take 1 tablet by mouth daily     OneTouch Delica Lancets 33G MISC 1 Device by In Vitro route 2 times daily     simvastatin (ZOCOR) 10 MG tablet TAKE ONE TABLET BY MOUTH AT BEDTIME             Review of Systems:   10 point ROS negative unless otherwise specified in HPI          Physical Exam:   VS:  T: 100.9    HR: 77    BP: 133/62    RR: 20   GEN:  AOx3.  NAD.  Pleasant. Shaking/rigors  HEENT:  Sclerae anicteric.  Conjunctivae pink.  MMM.  CV: Warm and well perfused  LUNGS: Non-labored breathing.  BACK:  No CVA tenderness.  ABD:  Soft. Obese. NT.  ND.  No rebound or guarding.  No suprapubic tenderness.  EXT:  No edema.  SKIN:  Warm.  Dry.  No rashes.  NEURO:  CN grossly intact.          Data:   All laboratory data reviewed and highlighted above:    Urine culture - 7/3/19  > 100 K E coli (Resistent to ampicillin, amp-sulbactam, cipro, levaquin)    Urine culture - 12/19/20  > 100 K E coli (Resistent to ampicillin, amp-sulbactam, cipro, levaquin)    Urine culture - 7/13/21 - PENDING    All pertinent imaging reviewed:  CT scan of the abdomen - 7/13/21:   FINDINGS:   LOWER CHEST: Mild coronary artery calcifications.     HEPATOBILIARY: Normal.     PANCREAS: Normal.     SPLEEN: Normal.     ADRENAL GLANDS: Normal.     KIDNEYS/BLADDER: Small amount of air within the bladder lumen as well  as a small amount of air within the right renal collecting system.  Bilateral perinephric edema, asymmetrically greater on the right.  Multiple large stones throughout the right renal collecting system  including a  dominant 2.9 cm stone extending from the mid pelvis into  the lower calyceal region. Associated urothelial thickening and  peripelvic edema. No hydronephrosis or hydroureter. No obstructing  ureteral stone.     BOWEL: Normal caliber small bowel. Normal appendix. Extensive colonic  diverticulosis without evidence of acute diverticulitis. No free air  or free fluid.     LYMPH NODES: Normal.     VASCULATURE: Mild atherosclerosis.     PELVIC ORGANS: Normal.     OTHER: None.     MUSCULOSKELETAL: Spinal and pelvic degenerative changes.                                                                      IMPRESSION:   1.  Multiple large nonobstructing right renal stones. No  hydronephrosis or obstructing ureteral stone.  2.  Air within the bladder lumen as well as a small amount of air  within the right renal collecting system. This is a very to reflect  sequelae of recent intervention/catheterization rather than  gas-forming infection. However, correlation is recommended.  3.  Asymmetric right perinephric/peripelvic edema with urothelial  thickening which may reflect inflammatory or infectious pyelitis.  Recommend laboratory correlation. No definite renal abscess/drainable  fluid collection within the limits of a noncontrast exam.        PVR via bladder scan today - 14 mL         Impression and Plan:   Impression:   Kelly Barnes is a 81 year old female with hx of HTN, DM2, E coli UTIs who presents to ER with UTI as well as large non-obstructing right renal stone burden (developing staghorn) on CT. She has low grade fever (101.5) but HDS, rigors on exam, leukocytosis (19) and UA c/w infection with UCx pending. CT with gas in bladder (but not within bladder wall) and right renal collecting system c/f emphysematous pyelitis. Low PVR. Received ceftriaxone. Warrants empiric IV abx followed by culture directed therapy and observation. Planning for admission to Medicine.      Plan:   -No acute urologic intervention indicated  at this time  -Recommend IR consultation for possible R PNT placement for drainage of kidney in setting of emphysematous pyelitis and in preparation for future PCNL for stone treatment. Discussed with IR on call as well as primary Medicine team. Patient will be NPO Midnight tonight for R PNT tomorrow, 7/14.  -Continue IV abx and narrow based on urine culture  -Patient is voiding well based on PVR obtained in ED but if she were to clinically decompensate please place torres catheter and contact Urology team.  -She should discharge home on a prolonged antibiotic course (at least 14 days) as this is a complicated UTI and stone is likely colonized. Urology to set up surgical intervention as an outpatient while she remains on antibiotics or after completion of antibiotic therapy.  -Urology will follow peripherally. Please page on call resident with any questions or concerns.       Discussed with staff surgeon, Dr. Hernandez.      Edmar Heller MD  Urology, PGY-4  (p) [phone]

## 2021-07-13 NOTE — CONSULTS
INTERVENTIONAL RADIOLOGY CONSULT NOTE    Reason for referral:   Right PNT placement.     History:   Kelly Barnes is a 81 year old female with hx of E coli UTIs who presents to ER with UTI with large stagehorns burden in the right renal collecting system associated with pyelitis.     Currently she has low grade fever (101.5) but HDS. She is being admitted for inpatient for empiric IV abx.     Labs:  Lab Results   Component Value Date    HGB 13.6 07/13/2021    HGB 11.3 12/21/2020     Lab Results   Component Value Date     07/13/2021     12/21/2020     Lab Results   Component Value Date    WBC 19.2 07/13/2021    WBC 9.2 12/21/2020       Lab Results   Component Value Date    INR 1.10 01/27/2005       Lab Results   Component Value Date    PROTTOTAL 6.9 07/13/2021    PROTTOTAL 7.1 12/19/2020      Lab Results   Component Value Date    ALBUMIN 3.5 07/13/2021    ALBUMIN 3.1 12/19/2020     Lab Results   Component Value Date    BILITOTAL 0.8 07/13/2021    BILITOTAL 0.5 12/19/2020     Lab Results   Component Value Date    BILICONJ 0.0 07/30/2002      Lab Results   Component Value Date    ALKPHOS 84 07/13/2021    ALKPHOS 87 12/19/2020     Lab Results   Component Value Date    AST 18 07/13/2021    AST 15 12/19/2020     Lab Results   Component Value Date    ALT 20 07/13/2021    ALT 19 12/19/2020       Lab Results   Component Value Date    CR 1.14 07/13/2021    CR 0.97 05/11/2021     Lab Results   Component Value Date    BUN 20 07/13/2021    BUN 30 05/11/2021       Imaging:   Large staghorn calculi in the right renal calyces with extension into the right renal pelvis. Collectin system is relatively larger than the contralateral side but without nohemi dilation. Left kidney is unremarkable.     Assessment:   82 y/o female with pyelitis secondary to large staghorn calculi in the right renal pelvis/calyces. The collecting system doesn't appear significantly dilated thus making the access to the calyces and renal pelvis  difficult. In addition, because of the staghorn calculi forming a complete pigtail will be extremely difficult therefore limits its ability to adequately drain the collecting system.     Plan:   1. Agree with continues IV antibiotic.   2. If there is an acute changes in patient clinically, IR will defer to urology for retrograde access as adequate draining through PNT will be suboptimal in this case.    3. IR will discuss in the morning round to possibly attempt to gain access into the right kidney for future percutaneous nephrolithotomy. IR to contact primary team regards ultimate decision.   4. Keep Pt NPO overnight for possible procedure the next day.     If procedure has been approved, IR charge RN can be contacted at [phone] for estimated time of procedure.     Discussed with Dr. López (IR Attending).    Miranda Ramirez MD.   Diagnostic/Interventional Radiology PGY-6  IR pass pager: [phone]

## 2021-07-13 NOTE — ED PROVIDER NOTES
"ED Provider Note  [facility]      History     Chief Complaint   Patient presents with     Abdominal Pain     Patient presents to ED with c/o lower abd pain and fever. Sxs onset yesterday.      HPI  Kelly Barnes is a 81 year old female with a past medical history of type 2 diabetes, CKD stage III, hypertension, and hyperlipidemia who presents to the ED for 1 day of fevers and lower abdominal pain.  Peak temp 102.3.  Abdominal pain feels similar to menstrual cramps or like she needs to have a bowel movement.  Reports slightly increased urinary frequency and \"hot\" urine.  Denies dysuria, foul-smelling urine, or urgency.  Also endorses decreased appetite, nausea, dry heaving, dizziness, fatigue. Tylenol did not seem to help with fevers.  She messaged her PCP who prescribed Bactrim.  She took 2 pills yesterday, but none today.    Vaccinated for Covid in March.    Past Medical History  Past Medical History:   Diagnosis Date     Acute kidney injury (H) 12/19/2020     Allergic rhinitis, cause unspecified      Aortic stenosis 2/29/2016    With new murmur noted 2/2016, normal echo, repeat echo 2/2017.     Atypical chest pain 7/24/2015     cuboid     left foot     Hyperlipidemia LDL goal <100 11/1/2012     Hypertension goal BP (blood pressure) < 140/90      Musculoskeletal chest pain 11/25/2016     Obesity, Class I, BMI 30-34.9 11/11/2015     Pneumonia 3/16     Sepsis, due to unspecified organism, unspecified whether acute organ dysfunction present (H) 12/19/2020     Type 2 diabetes, HbA1c goal < 7% (H)      Past Surgical History:   Procedure Laterality Date     ZZC NONSPECIFIC PROCEDURE      none     blood glucose (ONETOUCH ULTRA) test strip  blood glucose monitoring (ONE TOUCH ULTRA 2) meter device kit  glimepiride (AMARYL) 2 MG tablet  losartan-hydrochlorothiazide (HYZAAR) 50-12.5 MG tablet  OneTouch Delica Lancets 33G MISC  simvastatin (ZOCOR) 10 MG tablet  sulfamethoxazole-trimethoprim " (BACTRIM DS) 800-160 MG tablet      Allergies   Allergen Reactions     Ibuprofen Other (See Comments)     numbness in hands and feet     Family History  Family History   Problem Relation Age of Onset     Hypertension Mother      Diabetes No family hx of      Cerebrovascular Disease No family hx of      Breast Cancer No family hx of      Cancer - colorectal No family hx of      Prostate Cancer No family hx of      Social History   Social History     Tobacco Use     Smoking status: Never Smoker     Smokeless tobacco: Never Used   Substance Use Topics     Alcohol use: Yes     Alcohol/week: 10.0 standard drinks     Comment: 1-2 drinks per week     Drug use: No      Past medical history, past surgical history, medications, allergies, family history, and social history were reviewed with the patient. No additional pertinent items.       Review of Systems   Constitutional: Positive for appetite change, chills, fatigue and fever.   HENT: Negative for rhinorrhea and sore throat.    Eyes: Negative for visual disturbance.   Respiratory: Negative for cough and shortness of breath.    Cardiovascular: Positive for leg swelling (chronic). Negative for chest pain and palpitations.   Gastrointestinal: Positive for nausea and vomiting (dry heave). Negative for constipation and diarrhea.   Genitourinary: Positive for frequency. Negative for dysuria and urgency.   Musculoskeletal: Negative for arthralgias and myalgias.   Skin: Negative for wound.   Neurological: Negative for dizziness, light-headedness and headaches.     A complete review of systems was performed with pertinent positives and negatives noted in the HPI, and all other systems negative.    Physical Exam   BP: (!) 141/74  Pulse: 92  Temp: 99.7  F (37.6  C)  Resp: 18  Weight: 72 kg (158 lb 11.2 oz)  SpO2: 95 %  Physical Exam  Constitutional:       General: She is not in acute distress.     Appearance: She is not ill-appearing or diaphoretic.      Comments: Appears younger  than stated age   HENT:      Head: Normocephalic and atraumatic.   Eyes:      Extraocular Movements: Extraocular movements intact.   Cardiovascular:      Rate and Rhythm: Normal rate and regular rhythm.      Heart sounds: Normal heart sounds.   Pulmonary:      Effort: Pulmonary effort is normal.      Breath sounds: Normal breath sounds. No wheezing.   Abdominal:      Palpations: Abdomen is soft.      Tenderness: There is abdominal tenderness in the suprapubic area.   Skin:     General: Skin is warm and dry.   Neurological:      General: No focal deficit present.      Mental Status: She is alert and oriented to person, place, and time.   Psychiatric:         Behavior: Behavior normal.         ED Course      Procedures       The medical record was reviewed and interpreted.  Current labs reviewed and interpreted.  Current images reviewed as below.     Results for orders placed or performed during the hospital encounter of 07/13/21   CT Abdomen Pelvis w/o Contrast     Status: None    Narrative    CT ABDOMEN PELVIS W/O CONTRAST 7/13/2021 9:38 AM    CLINICAL HISTORY: Abdominal pain, fever  TECHNIQUE: CT scan of the abdomen and pelvis was performed without IV  contrast. Multiplanar reformats were obtained. Dose reduction  techniques were used.  CONTRAST: None.    COMPARISON: None.    FINDINGS:   LOWER CHEST: Mild coronary artery calcifications.    HEPATOBILIARY: Normal.    PANCREAS: Normal.    SPLEEN: Normal.    ADRENAL GLANDS: Normal.    KIDNEYS/BLADDER: Small amount of air within the bladder lumen as well  as a small amount of air within the right renal collecting system.  Bilateral perinephric edema, asymmetrically greater on the right.  Multiple large stones throughout the right renal collecting system  including a dominant 2.9 cm stone extending from the mid pelvis into  the lower calyceal region. Associated urothelial thickening and  peripelvic edema. No hydronephrosis or hydroureter. No obstructing  ureteral  stone.    BOWEL: Normal caliber small bowel. Normal appendix. Extensive colonic  diverticulosis without evidence of acute diverticulitis. No free air  or free fluid.    LYMPH NODES: Normal.    VASCULATURE: Mild atherosclerosis.    PELVIC ORGANS: Normal.    OTHER: None.    MUSCULOSKELETAL: Spinal and pelvic degenerative changes.      Impression    IMPRESSION:   1.  Multiple large nonobstructing right renal stones. No  hydronephrosis or obstructing ureteral stone.  2.  Air within the bladder lumen as well as a small amount of air  within the right renal collecting system. This is a very to reflect  sequelae of recent intervention/catheterization rather than  gas-forming infection. However, correlation is recommended.  3.  Asymmetric right perinephric/peripelvic edema with urothelial  thickening which may reflect inflammatory or infectious pyelitis.  Recommend laboratory correlation. No definite renal abscess/drainable  fluid collection within the limits of a noncontrast exam.    JENNIFER MOSQUEDA MD         SYSTEM ID:  [id number]   Basic metabolic panel     Status: Abnormal   Result Value Ref Range    Sodium 133 133 - 144 mmol/L    Potassium 3.8 3.4 - 5.3 mmol/L    Chloride 100 94 - 109 mmol/L    Carbon Dioxide (CO2) 25 20 - 32 mmol/L    Anion Gap 8 3 - 14 mmol/L    Urea Nitrogen 20 7 - 30 mg/dL    Creatinine 1.14 (H) 0.52 - 1.04 mg/dL    Calcium 8.4 (L) 8.5 - 10.1 mg/dL    Glucose 205 (H) 70 - 99 mg/dL    GFR Estimate 46 (L) >60 mL/min/1.73m2   Hepatic function panel     Status: Normal   Result Value Ref Range    Bilirubin Total 0.8 0.2 - 1.3 mg/dL    Bilirubin Direct 0.2 0.0 - 0.2 mg/dL    Protein Total 6.9 6.8 - 8.8 g/dL    Albumin 3.5 3.4 - 5.0 g/dL    Alkaline Phosphatase 84 40 - 150 U/L    AST 18 0 - 45 U/L    ALT 20 0 - 50 U/L   UA with Microscopic reflex to Culture     Status: Abnormal    Specimen: Urine, Midstream   Result Value Ref Range    Color Urine Straw Colorless, Straw, Light Yellow, Yellow     Appearance Urine Cloudy (A) Clear    Glucose Urine Negative Negative mg/dL    Bilirubin Urine Negative Negative    Ketones Urine 5  (A) Negative mg/dL    Specific Gravity Urine 1.023 1.003 - 1.035    Blood Urine Large (A) Negative    pH Urine 5.5 5.0 - 7.0    Protein Albumin Urine 30  (A) Negative mg/dL    Urobilinogen Urine Normal Normal, 2.0 mg/dL    Nitrite Urine Positive (A) Negative    Leukocyte Esterase Urine Large (A) Negative    Bacteria Urine Moderate (A) None Seen /HPF    WBC Clumps Urine Present (A) None Seen /HPF    Mucus Urine Present (A) None Seen /LPF    RBC Urine >100 (H) <=2 /HPF    WBC Urine >100 (H) <=5 /HPF    Squamous Epithelials Urine 5 (H) <=1 /HPF    Transitional Epithelials Urine 1 <=1 /HPF    Narrative    Urine Culture ordered based on laboratory criteria   CBC with platelets and differential     Status: Abnormal   Result Value Ref Range    WBC Count 19.2 (H) 4.0 - 11.0 10e3/uL    RBC Count 4.93 3.80 - 5.20 10e6/uL    Hemoglobin 13.6 11.7 - 15.7 g/dL    Hematocrit 40.6 35.0 - 47.0 %    MCV 82 78 - 100 fL    MCH 27.6 26.5 - 33.0 pg    MCHC 33.5 31.5 - 36.5 g/dL    RDW 12.7 10.0 - 15.0 %    Platelet Count 268 150 - 450 10e3/uL    % Neutrophils 95 %    % Lymphocytes 2 %    % Monocytes 3 %    % Eosinophils 0 %    % Basophils 0 %    % Immature Granulocytes 0 %    NRBCs per 100 WBC 0 <1 /100    Absolute Neutrophils 18.0 (H) 1.6 - 8.3 10e3/uL    Absolute Lymphocytes 0.4 (L) 0.8 - 5.3 10e3/uL    Absolute Monocytes 0.6 0.0 - 1.3 10e3/uL    Absolute Eosinophils 0.0 0.0 - 0.7 10e3/uL    Absolute Basophils 0.1 0.0 - 0.2 10e3/uL    Absolute Immature Granulocytes 0.1 (H) <=0.0 10e3/uL    Absolute NRBCs 0.0 10e3/uL   Extra Red Top Tube     Status: None   Result Value Ref Range    Hold Specimen JIC    Lipase     Status: Normal   Result Value Ref Range    Lipase 76 73 - 393 U/L   Lactic acid whole blood     Status: Normal   Result Value Ref Range    Lactic Acid 1.4 0.7 - 2.0 mmol/L   CBC with Platelets &  Differential     Status: Abnormal    Narrative    The following orders were created for panel order CBC with Platelets & Differential.  Procedure                               Abnormality         Status                     ---------                               -----------         ------                     CBC with platelets and d...[[id number]]  Abnormal            Final result                 Please view results for these tests on the individual orders.   [place] draw     Status: None    Narrative    The following orders were created for panel order [place] draw.  Procedure                               Abnormality         Status                     ---------                               -----------         ------                     Extra Red Top Tube[[id number]]                               Final result                 Please view results for these tests on the individual orders.   [place] Draw     Status: None ()    Narrative    The following orders were created for panel order [place] Draw.  Procedure                               Abnormality         Status                     ---------                               -----------         ------                     Extra Green Top (Lithium...[[id number]]                                                   Please view results for these tests on the individual orders.     Medications   ondansetron (ZOFRAN) injection 4 mg (has no administration in time range)   0.9% sodium chloride BOLUS (1,000 mLs Intravenous New Bag 7/13/21 0849)   acetaminophen (TYLENOL) tablet 650 mg (650 mg Oral Given 7/13/21 0849)   cefTRIAXone (ROCEPHIN) 2 g vial to attach to  ml bag for ADULTS or NS 50 ml bag for PEDS (0 g Intravenous Stopped 7/13/21 0946)        Assessments & Plan (with Medical Decision Making)   81 year old female with a past medical history of type 2 diabetes, CKD stage III, hypertension, and hyperlipidemia who presents to the ED for 1 day of fever (peak 102.5),  "suprapubic discomfort and tenderness, mild urinary frequency, decreased appetite, nausea, dry heaves, dizziness, and fatigue. She took 2 Bactrim yesterday from her PCP. DDx includes UTI, pyelonephritis, infected nephrolithiasis and less likely diverticulitis, constipation, pancreatitis, cholecystitis, or cellulitis. WBC 19.2. Lactate 1.4. Urinalysis showed leukocyte esterase, WBC, RBC, large blood, and nitrite, indicative of UTI vs pyelonephritis vs infected nephrolithiasis. CT showed \"multiple large nonobstructing right renal stone, air within the bladder lumen as well as a small amount of air within the right renal collecting system, right perinephric/peripelvic edema with urothelial thickening which may reflect inflammatory or infectious pyelitis, no definite renal abscess/drainable fluid collection within the limits of a noncontrast exam. Bcx collected, UC pending. Previous UC from 2019 showed E coli sensitive to ceftriaxone.  Urology consulted, agree with ceftriaxone and admission to medicine. Recommended catheter placement.    Attestation- Patient discussed with resident.  Above documentation accurately reflects history and physical.  Patient seen independently of house staff.  81 year old male to the emergency department with lower abdominal pain and UTI type symptoms for 1 day.  She arrived with a temperature of 100.9 but otherwise normal vital signs.  Patient has a normal head neck exam.  Her heart is regular rate and rhythm without murmur.  Lungs are clear to auscultation bilaterally.  Abdomen is soft and nontender.  Diagnostic evaluation reveals a white count of over 19,000.  Her lactate level is normal.  Her urinalysis appears infected.  CT stone protocol reveals air in the bladder as well as in the right kidney.  She has extensive right kidney stones but no ureteral stone or obstruction.  Patient was given ceftriaxone IV.  Urine culture is pending.  Discussed with urology who recommends Blanchard catheter " placement which was ordered.  The patient will be admitted to the hospitalist service for further evaluation and management.  The patient has 1 previous urine culture in our system which was E. coli that was resistant to fluoroquinolones.    GUILLAUME GEORGE MD, MD     Patient discussed with urology and medicine, to be admitted to their service for further management. Plan was discussed with patient who understands and agrees with plan.     I have reviewed the nursing notes. I have reviewed the findings, diagnosis, plan and need for follow up with the patient.    New Prescriptions    No medications on file       Final diagnoses:   Emphysematous pyelonephritis     This patient was seen and discussed with my attending physician.  Latonya Rowell MD  PGY-1 Psychiatry Resident Physician  --  Guillaume George Md  [facility] EMERGENCY DEPARTMENT  7/13/2021     Guillaume George MD  07/13/21 1134

## 2021-07-13 NOTE — H&P
"[facility]  HISTORY AND PHYSICAL   Chief Complaint   Fever    History of Present Illness       Kelly Barnes is 81 year old year old woman with history of DMII, HTN, CKDIII and hyperlipidemia who presented to the ED with fever and lower abdominal discomfort.   She reports being in her usual state of health until two days PTA when she felt tired despite adequate sleep. Yesterday, she reports initial fever of 100.3F and then up to 102.3. There is associated lower abdominal discomfort. She reports \"hot urine\"   but denies dysuria, frequency or foul smelling urine. She spoke to her primary care physician who prescribed bactrim which she only took for one day.     In ED, febrile to 100.9F, otherwise vitals WNL. Her lab work is notable for slightly elevated Cr, leukocytosis of 19 and UA c/w infection. CT abd/pel shows e/o pyelonephritis. There are also renal stones but they are non-obstructing.            Past Medical History     Past Medical History:   Diagnosis Date     Acute kidney injury (H) 12/19/2020     Allergic rhinitis, cause unspecified      Aortic stenosis 2/29/2016    With new murmur noted 2/2016, normal echo, repeat echo 2/2017.     Atypical chest pain 7/24/2015     cuboid     left foot     Hyperlipidemia LDL goal <100 11/1/2012     Hypertension goal BP (blood pressure) < 140/90      Musculoskeletal chest pain 11/25/2016     Obesity, Class I, BMI 30-34.9 11/11/2015     Pneumonia 3/16     Sepsis, due to unspecified organism, unspecified whether acute organ dysfunction present (H) 12/19/2020     Type 2 diabetes, HbA1c goal < 7% (H)          Past Surgical History     Past Surgical History:   Procedure Laterality Date     ZZC NONSPECIFIC PROCEDURE      none        Social History     Social History     Socioeconomic History     Marital status:      Spouse name: Not on file     Number of children: Not on file     Years of education: Not on file     Highest education level: Not " on file   Occupational History     Not on file   Tobacco Use     Smoking status: Never Smoker     Smokeless tobacco: Never Used   Substance and Sexual Activity     Alcohol use: Yes     Alcohol/week: 10.0 standard drinks     Comment: 1-2 drinks per week     Drug use: No     Sexual activity: Yes     Partners: Male   Other Topics Concern      Service No     Blood Transfusions No     Caffeine Concern No     Occupational Exposure No     Hobby Hazards No     Sleep Concern Yes     Stress Concern No     Weight Concern Yes     Special Diet No     Back Care No     Exercise Yes     Comment: walk     Bike Helmet No     Seat Belt Yes     Self-Exams Yes     Parent/sibling w/ CABG, MI or angioplasty before 65F 55M? No     Comment: dad had a heart attack in his 70's   Social History Narrative     Not on file     Social Determinants of Health     Financial Resource Strain:      Difficulty of Paying Living Expenses:    Food Insecurity:      Worried About Running Out of Food in the Last Year:      Ran Out of Food in the Last Year:    Transportation Needs:      Lack of Transportation (Medical):      Lack of Transportation (Non-Medical):    Physical Activity:      Days of Exercise per Week:      Minutes of Exercise per Session:    Stress:      Feeling of Stress :    Social Connections:      Frequency of Communication with Friends and Family:      Frequency of Social Gatherings with Friends and Family:      Attends Anab[employer]ist Services:      Active Member of Clubs or Organizations:      Attends Club or Organization Meetings:      Marital Status:    Intimate Partner Violence:      Fear of Current or Ex-Partner:      Emotionally Abused:      Physically Abused:      Sexually Abused:      Family History     Family History   Problem Relation Age of Onset     Hypertension Mother      Diabetes No family hx of      Cerebrovascular Disease No family hx of      Breast Cancer No family hx of      Cancer - colorectal No family hx of       "Prostate Cancer No family hx of          Medications     Reviewed and medication reconciliation done.  Current Facility-Administered Medications   Medication     acetaminophen (TYLENOL) tablet 650 mg     [START ON 7/14/2021] cefTRIAXone (ROCEPHIN) 1 g vial to attach to  mL bag for ADULTS or NS 50 mL bag for PEDS     glucose gel 15-30 g    Or     dextrose 50 % injection 25-50 mL    Or     glucagon injection 1 mg     enoxaparin ANTICOAGULANT (LOVENOX) injection 40 mg     insulin aspart (NovoLOG) injection (RAPID ACTING)     insulin aspart (NovoLOG) injection (RAPID ACTING)     lidocaine (LMX4) cream     lidocaine 1 % 0.1-1 mL     losartan-hydrochlorothiazide (HYZAAR) 50-12.5 MG per tablet 1 tablet     melatonin tablet 1 mg     ondansetron (ZOFRAN) injection 4 mg     simvastatin (ZOCOR) tablet 10 mg     sodium chloride (PF) 0.9% PF flush 3 mL     sodium chloride (PF) 0.9% PF flush 3 mL     sodium chloride 0.9% infusion     Current Outpatient Medications   Medication     blood glucose (ONETOUCH ULTRA) test strip     blood glucose monitoring (ONE TOUCH ULTRA 2) meter device kit     glimepiride (AMARYL) 2 MG tablet     losartan-hydrochlorothiazide (HYZAAR) 50-12.5 MG tablet     OneTouch Delica Lancets 33G MISC     simvastatin (ZOCOR) 10 MG tablet          Review of Systems     10 point  review of systems negative, except for what is mentioned above      Physical Exam     General:   Vital signs:    Blood pressure 133/62, pulse 77, temperature 100.9  F (38.3  C), temperature source Oral, resp. rate 20, weight 72 kg (158 lb 11.2 oz), SpO2 97 %, not currently breastfeeding.  Estimated body mass index is 28.56 kg/m  as calculated from the following:    Height as of 10/8/20: 1.588 m (5' 2.5\").    Weight as of this encounter: 72 kg (158 lb 11.2 oz).    General: Well appearing, in NAD.  HEENT: NCAT, EOMI, MMM  Neck: Supple  CV: Normal S1S2, regular rhythm, normal rate  Lungs: CTAB, no crackles  Abdomen: Soft, NT, ND, +BS. " No CVA tenderness.  Extremities: No LE edema b/l  Skin: No rashes on exposed skin  Neuro: AAOx3.   Psych: Normal Affect.           Laboratory/Imaging Studies     I have reviewed  laboratory and imaging studies in the Epic. Pertinent findings are as below    CMP  Recent Labs   Lab 07/13/21  0721      POTASSIUM 3.8   CHLORIDE 100   CO2 25   ANIONGAP 8   *   BUN 20   CR 1.14*   GFRESTIMATED 46*   JAN 8.4*   PROTTOTAL 6.9   ALBUMIN 3.5   BILITOTAL 0.8   ALKPHOS 84   AST 18   ALT 20     CBC  Recent Labs   Lab 07/13/21  0721   WBC 19.2*   RBC 4.93   HGB 13.6   HCT 40.6   MCV 82   MCH 27.6   MCHC 33.5   RDW 12.7        INRNo lab results found in last 7 days.      Impression/Plan       Kelly Barnes is an 81 year old year old woman with history of DMII, HTN, CKDIII and hyperlipidemia who presented to the ED with fever and lower abdominal discomfort and admitted for management of pyelonephritis.     Pyelonephritis   - Hemodynamically stable. WBC 19, no lactic acidosis  - Follow blood and urine cultures  - Continue Ceftriaxone 1 g daily pending culture ID/Sensitivity   - IVF  - Tylenol for pain and supportive care    Addendum: Spoke to urology, recommending IR drainage and placement of PCN. Will coordinate. Will make NPO after midnight for procedure in AM. If patient were to decompensate overnight, may need placement of stent by Urology.     Elevated Cr  Hx of CKDIII  - Cr up to 1.14 in setting of above.  - Continue IVF  - Will continue her ACE-I, do not feel this Cr elevation is significant enough to hold but will monitor renal fxn progress     DMII  - Hold home glipizide.  - POC Glucose AC/Bedtime + Insulin sliding scale    HTN/HLD  - Continue Losartan/HCTZ & monitor  - Continue Simvastatin         # Diet: Adult regular  # Prophylaxis: Lovenox  # Central Lines: N/A  # Blanchard: N/A  # Code status: Full code        Brianna Amos MD  Hospitalist ( Internal medicine)  Pager: [phone]

## 2021-07-14 DIAGNOSIS — N20.0 KIDNEY STONE: Primary | ICD-10-CM

## 2021-07-14 LAB
ANION GAP SERPL CALCULATED.3IONS-SCNC: 5 MMOL/L (ref 3–14)
BACTERIA UR CULT: ABNORMAL
BUN SERPL-MCNC: 17 MG/DL (ref 7–30)
CALCIUM SERPL-MCNC: 8 MG/DL (ref 8.5–10.1)
CHLORIDE BLD-SCNC: 107 MMOL/L (ref 94–109)
CO2 SERPL-SCNC: 26 MMOL/L (ref 20–32)
CREAT SERPL-MCNC: 1.05 MG/DL (ref 0.52–1.04)
ERYTHROCYTE [DISTWIDTH] IN BLOOD BY AUTOMATED COUNT: 13.2 % (ref 10–15)
GFR SERPL CREATININE-BSD FRML MDRD: 50 ML/MIN/1.73M2
GLUCOSE BLD-MCNC: 114 MG/DL (ref 70–99)
GLUCOSE BLDC GLUCOMTR-MCNC: 101 MG/DL (ref 70–99)
GLUCOSE BLDC GLUCOMTR-MCNC: 105 MG/DL (ref 70–99)
GLUCOSE BLDC GLUCOMTR-MCNC: 129 MG/DL (ref 70–99)
GLUCOSE BLDC GLUCOMTR-MCNC: 194 MG/DL (ref 70–99)
GLUCOSE BLDC GLUCOMTR-MCNC: 89 MG/DL (ref 70–99)
HCT VFR BLD AUTO: 35 % (ref 35–47)
HGB BLD-MCNC: 11.5 G/DL (ref 11.7–15.7)
MAGNESIUM SERPL-MCNC: 2.2 MG/DL (ref 1.6–2.3)
MCH RBC QN AUTO: 27.3 PG (ref 26.5–33)
MCHC RBC AUTO-ENTMCNC: 32.9 G/DL (ref 31.5–36.5)
MCV RBC AUTO: 83 FL (ref 78–100)
PLATELET # BLD AUTO: 196 10E3/UL (ref 150–450)
POTASSIUM BLD-SCNC: 3.1 MMOL/L (ref 3.4–5.3)
RBC # BLD AUTO: 4.21 10E6/UL (ref 3.8–5.2)
SODIUM SERPL-SCNC: 138 MMOL/L (ref 133–144)
WBC # BLD AUTO: 9.6 10E3/UL (ref 4–11)

## 2021-07-14 PROCEDURE — 250N000011 HC RX IP 250 OP 636: Performed by: HOSPITALIST

## 2021-07-14 PROCEDURE — 250N000013 HC RX MED GY IP 250 OP 250 PS 637: Performed by: HOSPITALIST

## 2021-07-14 PROCEDURE — 120N000002 HC R&B MED SURG/OB UMMC

## 2021-07-14 PROCEDURE — 250N000013 HC RX MED GY IP 250 OP 250 PS 637: Performed by: EMERGENCY MEDICINE

## 2021-07-14 PROCEDURE — 80048 BASIC METABOLIC PNL TOTAL CA: CPT | Performed by: HOSPITALIST

## 2021-07-14 PROCEDURE — 36415 COLL VENOUS BLD VENIPUNCTURE: CPT | Performed by: HOSPITALIST

## 2021-07-14 PROCEDURE — 82962 GLUCOSE BLOOD TEST: CPT

## 2021-07-14 PROCEDURE — 83735 ASSAY OF MAGNESIUM: CPT | Performed by: HOSPITALIST

## 2021-07-14 PROCEDURE — 85027 COMPLETE CBC AUTOMATED: CPT | Performed by: HOSPITALIST

## 2021-07-14 PROCEDURE — 99232 SBSQ HOSP IP/OBS MODERATE 35: CPT | Performed by: HOSPITALIST

## 2021-07-14 PROCEDURE — 258N000003 HC RX IP 258 OP 636: Performed by: HOSPITALIST

## 2021-07-14 RX ORDER — ACETAMINOPHEN 500 MG
500-1000 TABLET ORAL EVERY 6 HOURS PRN
Status: ON HOLD | COMMUNITY
End: 2021-07-16

## 2021-07-14 RX ORDER — POTASSIUM CHLORIDE 750 MG/1
40 TABLET, EXTENDED RELEASE ORAL ONCE
Status: COMPLETED | OUTPATIENT
Start: 2021-07-14 | End: 2021-07-14

## 2021-07-14 RX ADMIN — SODIUM CHLORIDE: 9 INJECTION, SOLUTION INTRAVENOUS at 03:12

## 2021-07-14 RX ADMIN — ACETAMINOPHEN 650 MG: 325 TABLET, FILM COATED ORAL at 08:28

## 2021-07-14 RX ADMIN — POTASSIUM CHLORIDE 40 MEQ: 750 TABLET, EXTENDED RELEASE ORAL at 08:28

## 2021-07-14 RX ADMIN — CEFTRIAXONE SODIUM 1 G: 1 INJECTION, POWDER, FOR SOLUTION INTRAMUSCULAR; INTRAVENOUS at 08:27

## 2021-07-14 RX ADMIN — ACETAMINOPHEN 650 MG: 325 TABLET, FILM COATED ORAL at 20:33

## 2021-07-14 RX ADMIN — ACETAMINOPHEN 650 MG: 325 TABLET, FILM COATED ORAL at 14:16

## 2021-07-14 RX ADMIN — SODIUM CHLORIDE: 9 INJECTION, SOLUTION INTRAVENOUS at 14:16

## 2021-07-14 RX ADMIN — ACETAMINOPHEN 650 MG: 325 TABLET, FILM COATED ORAL at 00:28

## 2021-07-14 ASSESSMENT — ACTIVITIES OF DAILY LIVING (ADL)
DRESSING/BATHING_DIFFICULTY: NO
DIFFICULTY_COMMUNICATING: NO
DIFFICULTY_EATING/SWALLOWING: NO
TOILETING_ISSUES: NO
FALL_HISTORY_WITHIN_LAST_SIX_MONTHS: NO

## 2021-07-14 NOTE — PLAN OF CARE
Pt had an episode of 101.4 F fever at 20:30 but has since improved to 99.4 F after given tylenol.     VS: Temp 99.4 F oral / Resp 16 / Pulse 75 / /66 / SpO2 96%    Output: Pt states she is voiding adequately and without difficulty.     Activity: Pt moves independently in the room. She often sits at the bedside and dangles her feet.     Skin: WDL ex. Bilateral edema of ankles. Pt states this is baseline.     Pain: Pt reported a 7/10 pain level which later improved to a 3/10 with Tylenol. Pain was located in her stomach.     Neuro/CMS: A&Ox4. CMS intact.     Dressing(s): None.    Diet: Regular diet. NPO after midnight tonight due to stent placement tomorrow.     LDA: Currently saline locked on left PIV.     Equipment: Call light.     Plan: Stent placement tomorrow afternoon.     Additional Info: Pt stated that she is concerned that Tylenol is the only thing keeping her vitals to baseline.

## 2021-07-14 NOTE — PROGRESS NOTES
Urology Daily Progress Note    24 hour events/Subjective:     - Afebrile overnight, downtrending leukocytosis   - No pain, feels well    O:  Vitals: /69 (BP Location: Left arm)   Pulse 80   Temp 99.3  F (37.4  C) (Oral)   Resp 16   Wt 72 kg (158 lb 11.2 oz)   SpO2 99%   BMI 28.56 kg/m      General: Alert, interactive, in NAD  Resp: Non-labored breathing on RA  Abdomen: Soft, non tender, non distended  Ext: Warm and well perfused    I/O  UOP NR/350 (voiding spontaneously)    Labs  Today's labs are noted for WBC 9.6, Cr 1.05.    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 PVR minimal.     Initially urology recommended IR placement of R PNT today in anticipation for future stone treatment, though given patient has clinically improved on IV abx overnight, with no fevers, leukocytosis returning WNL, and difficulty with placement given lack of significant dilation of collecting system, they are planning to defer.    Plan:    -NPO at midnight.  -Urology will add on tomorrow for cystoscopy, right ureteral stent placement (7/15/21).  -Continue IV abx, follow cultures and tailor appropriately. Growing >100k E Coli, sensitivites/susceptibilities pending. Blood cultures have been negative thus far.  - Patient is voiding well based on PVR but if she were to clinically decompensate please place torres catheter and contact Urology team.  -Urology will continue to follow. Please page on call with questions or concerns.    Patient seen and examined with Dr. Law.    --    Silva Miramontes,  MD  Urology Resident

## 2021-07-14 NOTE — PROGRESS NOTES
"[facility], [place]   Internal Medicine Daily Note           Interval History/Events     No acute events overnight.  Patient reports she is hungry this morning.  Denies fevers, chills, CP, SOB, n/v/d. Abdominal discomfort has completely resolved.          Review of Systems        4 point ROS including Respiratory, CV, GI and , other than that noted above is negative      Medications   I have reviewed current medications  in the \"current medication\" section of Epic.  Relevant changes include:     Physical Exam       Vital signs:    Blood pressure 115/69, pulse 80, temperature 99.3  F (37.4  C), temperature source Oral, resp. rate 16, weight 72 kg (158 lb 11.2 oz), SpO2 99 %, not currently breastfeeding.  Estimated body mass index is 28.56 kg/m  as calculated from the following:    Height as of 10/8/20: 1.588 m (5' 2.5\").    Weight as of this encounter: 72 kg (158 lb 11.2 oz).      Intake/Output Summary (Last 24 hours) at 7/14/2021 1219  Last data filed at 7/14/2021 0600  Gross per 24 hour   Intake --   Output 350 ml   Net -350 ml        Gen: Well Appearing. In NAD.  HEENT: NC/AT, EOMI, Anicteric Sclera  Neck: Supple  CV: Normal S1S2, Regular rhythm, normal rate  Lungs: Clear to ascultation b/l  Abd: Soft, NT/ND, +BS  Extremties:  No LE Edema b/l   Skin: No rashes on exposed skin  Neurologic: AAOx3     Laboratory and Imaging Studies     I have reviewed  laboratory and imaging studies in the Epic. Pertinent findings are as below:    BMP  Recent Labs   Lab 07/14/21  1119 07/14/21  0839 07/14/21  0639 07/14/21  0156 07/13/21  0721   NA  --   --  138  --  133   POTASSIUM  --   --  3.1*  --  3.8   CHLORIDE  --   --  107  --  100   JAN  --   --  8.0*  --  8.4*   CO2  --   --  26  --  25   BUN  --   --  17  --  20   CR  --   --  1.05*  --  1.14*   * 105* 114* 129* 205*     CBC  Recent Labs   Lab 07/14/21  0639 07/13/21  0721   WBC 9.6 19.2*   RBC 4.21 4.93   HGB 11.5* 13.6   HCT 35.0 " 40.6   MCV 83 82   MCH 27.3 27.6   MCHC 32.9 33.5   RDW 13.2 12.7    268     INRNo lab results found in last 7 days.  LFTs  Recent Labs   Lab 07/13/21  0721   ALKPHOS 84   AST 18   ALT 20   BILITOTAL 0.8   PROTTOTAL 6.9   ALBUMIN 3.5      PANC  Recent Labs   Lab 07/13/21  0723   LIPASE 76           Impression/Plan        Kelly Barnes is an 81 year old year old woman with history of DMII, HTN, CKDIII and hyperlipidemia who presented to the ED with fever and lower abdominal discomfort and admitted for management of pyelonephritis.      Pyelonephritis due to E.Coli  Hx of E. Coli UTIs  - Hemodynamically stable. WBC 19 on admission, now 9.6  - CT w/ large staghorn burden in right renal collecting system   - UCX +E.COli. BCX NGTD.  - Continue Ceftriaxone 1 g daily   - Urology following: Plan for cystoscopy + stent placement raeann. Keep NPO overnight.  - Continue IVF  - Tylenol for pain and supportive care     Hypokalemia  - Replete    Elevated Cr - resolved  Hx of CKDIII  Renal Calculi w/o obstruction/hydronephrosis  - Renal function stable  - Continue IVF. Monitor.      DMII  - Hold home glipizide.  - POC Glucose AC/Bedtime + Insulin sliding scale     HTN/HLD  - Continue Losartan/HCTZ & monitor  - Continue Simvastatin         # Diet: Adult regular  # Prophylaxis: Lovenox (declining due to personal reasons)  # Central Lines: N/A  # Blanchard: N/A  # Code status: Full code      Expected Discharge/Disposition: 2-3 days back to prior living arrangement pending clinical course. Planned for OR raeann with urology.         Pt's care was discussed with bedside RN, patient and  during Care Team Rounds.               Brianna Amos MD  Hospitalist ( Internal medicine)  Pager: [phone]

## 2021-07-14 NOTE — PHARMACY-ADMISSION MEDICATION HISTORY
Admission Medication History Completed by Pharmacy    See [facility] Admission Navigator for allergy information, preferred outpatient pharmacy, prior to admission medications and immunization status.     Medication history sources:  Patient via phone; Surescripts dispense report     Pertinent changes made to PTA medication list:  Added:   - Acetaminophen 500 mg tablets (per patient)   - Aspirin 325 mg tablets (per patient)  Deleted: N/A  Changed: N/A    Additional medication history information:   - Patient was a good historian and was able to recall all medication names, strengths, and directions.   - Of note, patient's PTA losartan/hydrochlorothiazide has been discontinued since admission.   - Patient denies taking any additional Rx/OTC medications other than the ones listed below.    Prior to Admission medications    Medication Sig Last Dose Taking? Auth Provider   acetaminophen (TYLENOL) 500 MG tablet Take 500-1,000 mg by mouth every 6 hours as needed for mild pain Past Month at PRN Yes Unknown, Entered By History   aspirin (ASA) 325 MG EC tablet Take 325 mg by mouth every 6 hours as needed for moderate pain Past Month at PRN  Yes Unknown, Entered By History   glimepiride (AMARYL) 2 MG tablet Take 1 tablet (2 mg) by mouth 2 times daily (with meals)  7/13/2021 Yes Lea Lopez MD   losartan-hydrochlorothiazide (HYZAAR) 50-12.5 MG tablet Take 1 tablet by mouth daily 7/13/2021 Yes Lea Lopez MD   simvastatin (ZOCOR) 10 MG tablet TAKE ONE TABLET BY MOUTH AT BEDTIME 7/13/2021 Yes Lea Lopez MD       Date completed: 07/14/21    Medication history completed by:   Valeria Villalobos, PharmD  PGY-2 Pharmacy Resident   [facility]  *[phone]

## 2021-07-14 NOTE — PLAN OF CARE
VS: BP 98/54   Pulse 75   Temp 98.7  F (37.1  C)   Resp 16   Wt 72 kg (158 lb 11.2 oz)   SpO2 94%   BMI 28.56 kg/m    Room air   Output: Voiding spontaneously without difficulty- denies pain , LBM 7/12.   Lungs: CTA   Activity: Up IND in room   Skin: intact   Pain:   Pain controlled with Q4 tylenol   Neuro/CMS:   A&Ox4, denies numbness and tingling   Dressing(s):   none   Diet:   Carb restricted diet- sliding scale insulin   LDA:   R PIV infusing   Equipment:   IV pole,    Plan:   Continue to monitor and follow plan of care. Able to make needs known and call light within reach.    Additional Info:

## 2021-07-14 NOTE — PROGRESS NOTES
IR follow-up note.    Case and imaging was reviewed this AM with Dr. López and Dr. Arizmendi from IR. Patient with improvement in white count this AM and no fevers noted overnight after initiation of IV antibiotics. Given stone burden in the right renal collecting system and lack of significant dilation it is unlikely IR will be able to gain access and form a pigtail in the kidney. Recommend retrograde access for urologic intervention.    Discussed with Dr. Heller.    Natalia Yost, JOON, APRN  Interventional Radiology   IR on-call pager: [phone]

## 2021-07-14 NOTE — PLAN OF CARE
VS:   BP 98/54   Pulse 75   Temp 98.7  F (37.1  C)   Resp 16   Wt 72 kg (158 lb 11.2 oz)   SpO2 94%   BMI 28.56 kg/m    RA   Output:   Voiding adequately without difficulty per patient. Last BM 07/12/21.    Activity:   Independent   Skin: WDL ex. Bilateral lower leg edema - baseline per patient.    Pain:   Patient stated pain more as bladder pressure as compared to pain for shift. Tylenol given per MAR.    Neuro/CMS:   A&Ox4. CMS intact.    Dressing(s):   None    Diet:   NPO after midnight.    LDA:   PIL infusing  mL/hr.    Equipment:   IV pole, call light.    Plan:   Keep patient NPO until further notice. Continue to assess pain, watch output and plan of care.   Possible drainage and placement of Percutaneous nephrostomy (PCN) after morning rounding.    Additional Info:

## 2021-07-14 NOTE — PLAN OF CARE
VS:    Afebrile, vital signs are stable. O2 saturations wnlWt 72 kg (158 lb 11.2 oz)  BMI 28.56 kg/m    RA   Output:    Voiding adequately without difficulty per patient. Last BM 07/12/21.    Activity:    Independent   Skin: WDL ex. Bilateral lower leg edema - baseline per patient.    Pain:    Patient stated pain more as bladder pressure as compared to pain for shift. Tylenol given per MAR.    Neuro/CMS:    A&Ox4. CMS intact.    Dressing(s):    None    Diet:    Regular diet. Will need to be  NPO after midnight for stent placement tomorrow.    LDA:    PIL infusing  mL/hr.    Equipment:    IV pole, call light.    Plan:    Stent placement tomorrow afternoon.    Additional Info:

## 2021-07-15 ENCOUNTER — APPOINTMENT (OUTPATIENT)
Dept: GENERAL RADIOLOGY | Facility: CLINIC | Age: 81
DRG: 661 | End: 2021-07-15
Attending: UROLOGY
Payer: COMMERCIAL

## 2021-07-15 ENCOUNTER — ANESTHESIA EVENT (OUTPATIENT)
Dept: SURGERY | Facility: CLINIC | Age: 81
DRG: 661 | End: 2021-07-15
Payer: COMMERCIAL

## 2021-07-15 ENCOUNTER — ANESTHESIA (OUTPATIENT)
Dept: SURGERY | Facility: CLINIC | Age: 81
DRG: 661 | End: 2021-07-15
Payer: COMMERCIAL

## 2021-07-15 DIAGNOSIS — N20.0 KIDNEY STONE: Primary | ICD-10-CM

## 2021-07-15 LAB
ANION GAP SERPL CALCULATED.3IONS-SCNC: 8 MMOL/L (ref 3–14)
BUN SERPL-MCNC: 16 MG/DL (ref 7–30)
CALCIUM SERPL-MCNC: 8.5 MG/DL (ref 8.5–10.1)
CHLORIDE BLD-SCNC: 107 MMOL/L (ref 94–109)
CO2 SERPL-SCNC: 23 MMOL/L (ref 20–32)
CREAT SERPL-MCNC: 1.03 MG/DL (ref 0.52–1.04)
ERYTHROCYTE [DISTWIDTH] IN BLOOD BY AUTOMATED COUNT: 13.2 % (ref 10–15)
GFR SERPL CREATININE-BSD FRML MDRD: 51 ML/MIN/1.73M2
GLUCOSE BLD-MCNC: 166 MG/DL (ref 70–99)
GLUCOSE BLDC GLUCOMTR-MCNC: 132 MG/DL (ref 70–99)
GLUCOSE BLDC GLUCOMTR-MCNC: 163 MG/DL (ref 70–99)
GLUCOSE BLDC GLUCOMTR-MCNC: 168 MG/DL (ref 70–99)
GLUCOSE BLDC GLUCOMTR-MCNC: 204 MG/DL (ref 70–99)
GLUCOSE BLDC GLUCOMTR-MCNC: 211 MG/DL (ref 70–99)
HCT VFR BLD AUTO: 38.3 % (ref 35–47)
HGB BLD-MCNC: 12.2 G/DL (ref 11.7–15.7)
MAGNESIUM SERPL-MCNC: 2.4 MG/DL (ref 1.6–2.3)
MCH RBC QN AUTO: 26.9 PG (ref 26.5–33)
MCHC RBC AUTO-ENTMCNC: 31.9 G/DL (ref 31.5–36.5)
MCV RBC AUTO: 85 FL (ref 78–100)
PLATELET # BLD AUTO: 212 10E3/UL (ref 150–450)
POTASSIUM BLD-SCNC: 4.2 MMOL/L (ref 3.4–5.3)
RBC # BLD AUTO: 4.53 10E6/UL (ref 3.8–5.2)
SODIUM SERPL-SCNC: 138 MMOL/L (ref 133–144)
WBC # BLD AUTO: 7.7 10E3/UL (ref 4–11)

## 2021-07-15 PROCEDURE — 87086 URINE CULTURE/COLONY COUNT: CPT | Performed by: UROLOGY

## 2021-07-15 PROCEDURE — 0T768DZ DILATION OF RIGHT URETER WITH INTRALUMINAL DEVICE, VIA NATURAL OR ARTIFICIAL OPENING ENDOSCOPIC: ICD-10-PCS | Performed by: UROLOGY

## 2021-07-15 PROCEDURE — 250N000013 HC RX MED GY IP 250 OP 250 PS 637: Performed by: STUDENT IN AN ORGANIZED HEALTH CARE EDUCATION/TRAINING PROGRAM

## 2021-07-15 PROCEDURE — 85027 COMPLETE CBC AUTOMATED: CPT | Performed by: HOSPITALIST

## 2021-07-15 PROCEDURE — 999N000141 HC STATISTIC PRE-PROCEDURE NURSING ASSESSMENT: Performed by: UROLOGY

## 2021-07-15 PROCEDURE — 999N000180 XR SURGERY CARM FLUORO LESS THAN 5 MIN: Mod: TC

## 2021-07-15 PROCEDURE — 74420 UROGRAPHY RTRGR +-KUB: CPT | Mod: 26 | Performed by: STUDENT IN AN ORGANIZED HEALTH CARE EDUCATION/TRAINING PROGRAM

## 2021-07-15 PROCEDURE — 360N000082 HC SURGERY LEVEL 2 W/ FLUORO, PER MIN: Performed by: UROLOGY

## 2021-07-15 PROCEDURE — 250N000011 HC RX IP 250 OP 636: Performed by: HOSPITALIST

## 2021-07-15 PROCEDURE — 255N000002 HC RX 255 OP 636: Performed by: UROLOGY

## 2021-07-15 PROCEDURE — C2617 STENT, NON-COR, TEM W/O DEL: HCPCS | Performed by: UROLOGY

## 2021-07-15 PROCEDURE — 80048 BASIC METABOLIC PNL TOTAL CA: CPT | Performed by: HOSPITALIST

## 2021-07-15 PROCEDURE — 258N000003 HC RX IP 258 OP 636: Performed by: NURSE ANESTHETIST, CERTIFIED REGISTERED

## 2021-07-15 PROCEDURE — 99232 SBSQ HOSP IP/OBS MODERATE 35: CPT | Performed by: HOSPITALIST

## 2021-07-15 PROCEDURE — 250N000011 HC RX IP 250 OP 636: Performed by: NURSE ANESTHETIST, CERTIFIED REGISTERED

## 2021-07-15 PROCEDURE — 272N000001 HC OR GENERAL SUPPLY STERILE: Performed by: UROLOGY

## 2021-07-15 PROCEDURE — 36415 COLL VENOUS BLD VENIPUNCTURE: CPT | Performed by: HOSPITALIST

## 2021-07-15 PROCEDURE — 120N000002 HC R&B MED SURG/OB UMMC

## 2021-07-15 PROCEDURE — 83735 ASSAY OF MAGNESIUM: CPT | Performed by: HOSPITALIST

## 2021-07-15 PROCEDURE — 250N000009 HC RX 250: Performed by: NURSE ANESTHETIST, CERTIFIED REGISTERED

## 2021-07-15 PROCEDURE — 370N000017 HC ANESTHESIA TECHNICAL FEE, PER MIN: Performed by: UROLOGY

## 2021-07-15 PROCEDURE — 87186 SC STD MICRODIL/AGAR DIL: CPT | Performed by: UROLOGY

## 2021-07-15 PROCEDURE — C1769 GUIDE WIRE: HCPCS | Performed by: UROLOGY

## 2021-07-15 PROCEDURE — 250N000013 HC RX MED GY IP 250 OP 250 PS 637: Performed by: EMERGENCY MEDICINE

## 2021-07-15 PROCEDURE — 52332 CYSTOSCOPY AND TREATMENT: CPT | Mod: RT | Performed by: STUDENT IN AN ORGANIZED HEALTH CARE EDUCATION/TRAINING PROGRAM

## 2021-07-15 DEVICE — URETERAL STENT
Type: IMPLANTABLE DEVICE | Site: URETER | Status: FUNCTIONAL
Brand: PERCUFLEX™ PLUS

## 2021-07-15 RX ORDER — EPHEDRINE SULFATE 50 MG/ML
INJECTION, SOLUTION INTRAMUSCULAR; INTRAVENOUS; SUBCUTANEOUS PRN
Status: DISCONTINUED | OUTPATIENT
Start: 2021-07-15 | End: 2021-07-15

## 2021-07-15 RX ORDER — FENTANYL CITRATE 50 UG/ML
INJECTION, SOLUTION INTRAMUSCULAR; INTRAVENOUS PRN
Status: DISCONTINUED | OUTPATIENT
Start: 2021-07-15 | End: 2021-07-15

## 2021-07-15 RX ORDER — ONDANSETRON 2 MG/ML
INJECTION INTRAMUSCULAR; INTRAVENOUS PRN
Status: DISCONTINUED | OUTPATIENT
Start: 2021-07-15 | End: 2021-07-15

## 2021-07-15 RX ORDER — SODIUM CHLORIDE, SODIUM LACTATE, POTASSIUM CHLORIDE, CALCIUM CHLORIDE 600; 310; 30; 20 MG/100ML; MG/100ML; MG/100ML; MG/100ML
INJECTION, SOLUTION INTRAVENOUS CONTINUOUS PRN
Status: DISCONTINUED | OUTPATIENT
Start: 2021-07-15 | End: 2021-07-15

## 2021-07-15 RX ORDER — PROPOFOL 10 MG/ML
INJECTION, EMULSION INTRAVENOUS CONTINUOUS PRN
Status: DISCONTINUED | OUTPATIENT
Start: 2021-07-15 | End: 2021-07-15

## 2021-07-15 RX ORDER — LABETALOL HYDROCHLORIDE 5 MG/ML
10 INJECTION, SOLUTION INTRAVENOUS
Status: CANCELLED | OUTPATIENT
Start: 2021-07-15

## 2021-07-15 RX ORDER — ONDANSETRON 4 MG/1
4 TABLET, ORALLY DISINTEGRATING ORAL EVERY 30 MIN PRN
Status: CANCELLED | OUTPATIENT
Start: 2021-07-15

## 2021-07-15 RX ORDER — TOLTERODINE 4 MG/1
4 CAPSULE, EXTENDED RELEASE ORAL DAILY
Status: DISCONTINUED | OUTPATIENT
Start: 2021-07-15 | End: 2021-07-16 | Stop reason: HOSPADM

## 2021-07-15 RX ORDER — ONDANSETRON 2 MG/ML
4 INJECTION INTRAMUSCULAR; INTRAVENOUS EVERY 30 MIN PRN
Status: CANCELLED | OUTPATIENT
Start: 2021-07-15

## 2021-07-15 RX ORDER — FENTANYL CITRATE 50 UG/ML
25 INJECTION, SOLUTION INTRAMUSCULAR; INTRAVENOUS EVERY 5 MIN PRN
Status: CANCELLED | OUTPATIENT
Start: 2021-07-15 | End: 2021-07-15

## 2021-07-15 RX ORDER — SODIUM CHLORIDE, SODIUM LACTATE, POTASSIUM CHLORIDE, CALCIUM CHLORIDE 600; 310; 30; 20 MG/100ML; MG/100ML; MG/100ML; MG/100ML
INJECTION, SOLUTION INTRAVENOUS CONTINUOUS
Status: CANCELLED | OUTPATIENT
Start: 2021-07-15

## 2021-07-15 RX ORDER — TAMSULOSIN HYDROCHLORIDE 0.4 MG/1
0.4 CAPSULE ORAL DAILY
Status: DISCONTINUED | OUTPATIENT
Start: 2021-07-15 | End: 2021-07-16 | Stop reason: HOSPADM

## 2021-07-15 RX ORDER — PROPOFOL 10 MG/ML
INJECTION, EMULSION INTRAVENOUS PRN
Status: DISCONTINUED | OUTPATIENT
Start: 2021-07-15 | End: 2021-07-15

## 2021-07-15 RX ADMIN — ACETAMINOPHEN 650 MG: 325 TABLET, FILM COATED ORAL at 21:23

## 2021-07-15 RX ADMIN — Medication 5 MG: at 12:16

## 2021-07-15 RX ADMIN — CEFTRIAXONE SODIUM 1 G: 1 INJECTION, POWDER, FOR SOLUTION INTRAMUSCULAR; INTRAVENOUS at 09:44

## 2021-07-15 RX ADMIN — TOLTERODINE 4 MG: 4 CAPSULE, EXTENDED RELEASE ORAL at 13:33

## 2021-07-15 RX ADMIN — ACETAMINOPHEN 650 MG: 325 TABLET, FILM COATED ORAL at 15:51

## 2021-07-15 RX ADMIN — INSULIN ASPART 1 UNITS: 100 INJECTION, SOLUTION INTRAVENOUS; SUBCUTANEOUS at 19:14

## 2021-07-15 RX ADMIN — SODIUM CHLORIDE, POTASSIUM CHLORIDE, SODIUM LACTATE AND CALCIUM CHLORIDE: 600; 310; 30; 20 INJECTION, SOLUTION INTRAVENOUS at 11:50

## 2021-07-15 RX ADMIN — SIMVASTATIN 10 MG: 10 TABLET, FILM COATED ORAL at 21:20

## 2021-07-15 RX ADMIN — TAMSULOSIN HYDROCHLORIDE 0.4 MG: 0.4 CAPSULE ORAL at 13:33

## 2021-07-15 RX ADMIN — ACETAMINOPHEN 650 MG: 325 TABLET, FILM COATED ORAL at 05:44

## 2021-07-15 RX ADMIN — FENTANYL CITRATE 12.5 MCG: 50 INJECTION, SOLUTION INTRAMUSCULAR; INTRAVENOUS at 12:21

## 2021-07-15 RX ADMIN — FENTANYL CITRATE 12.5 MCG: 50 INJECTION, SOLUTION INTRAMUSCULAR; INTRAVENOUS at 12:02

## 2021-07-15 RX ADMIN — ACETAMINOPHEN 650 MG: 325 TABLET, FILM COATED ORAL at 10:34

## 2021-07-15 RX ADMIN — PROPOFOL 150 MCG/KG/MIN: 10 INJECTION, EMULSION INTRAVENOUS at 11:59

## 2021-07-15 RX ADMIN — PROPOFOL 20 MG: 10 INJECTION, EMULSION INTRAVENOUS at 11:59

## 2021-07-15 RX ADMIN — ACETAMINOPHEN 650 MG: 325 TABLET, FILM COATED ORAL at 01:44

## 2021-07-15 RX ADMIN — ONDANSETRON 4 MG: 2 INJECTION INTRAMUSCULAR; INTRAVENOUS at 12:26

## 2021-07-15 NOTE — PLAN OF CARE
VS: /70 (BP Location: Left arm)   Pulse 68   Temp 98.2  F (36.8  C) (Oral)   Resp 16   Wt 72 kg (158 lb 11.2 oz)   SpO2 98%   BMI 28.56 kg/m       Output: Voiding spontaneously without difficulties, per pt report - urine yellow/cloudy. LBM 7/12, BS active x4.     Lungs: LS clear, on RA. Denies SOB, chest pain, cough.      Activity: Up to bathroom independently overnight, steady gait. Ambulated to shower this morning w/ SBA.     Skin: Intact   Pain:   Abdominal discomfort/cramping controlled w/ prn tylenol q4hr   Neuro/CMS:   A&Ox4, CMS intact, denies N/T  Edema +2 to bilateral feet - baseline, per pt report     Dressing(s):   None   Diet:   NPO @ midnight   LDA:   L hand PIV - SL   Equipment:   IV pole   Plan:   Stent placement scheduled for this afternoon. Monitor temperature and continue to follow plan of care.      Additional Info:   Pt requests tylenol q4hr to keep temperature WNL

## 2021-07-15 NOTE — PROGRESS NOTES
I met with Ms. Barnes, reviewed nature of empysematous pyelitis, staghorn stone, UTI.  She is doing well symptomatically with improvement in infectious symptoms but still intermittant fevers.  No flank pain.  Reviewed short term recommendation to maximally drain kidney with placement of ureteral stent and delayed stone removal via percutaneous approach.  Risks, benefits, alternatives reviewed.

## 2021-07-15 NOTE — PROGRESS NOTES
"[facility], [place]   Internal Medicine Daily Note           Interval History/Events     No acute events overnight.  Reports feeling comfortable.  Denies fevers, chills, CP, SOB, n/v/d.   For OR this afternoon.       Review of Systems        4 point ROS including Respiratory, CV, GI and , other than that noted above is negative      Medications   I have reviewed current medications  in the \"current medication\" section of Epic.  Relevant changes include:     Physical Exam       Vital signs:    Blood pressure (!) 148/78, pulse 111, temperature 98.4  F (36.9  C), temperature source Oral, resp. rate 18, weight 72 kg (158 lb 11.2 oz), SpO2 97 %, not currently breastfeeding.  Estimated body mass index is 28.56 kg/m  as calculated from the following:    Height as of 10/8/20: 1.588 m (5' 2.5\").    Weight as of this encounter: 72 kg (158 lb 11.2 oz).      Intake/Output Summary (Last 24 hours) at 7/14/2021 1219  Last data filed at 7/14/2021 0600  Gross per 24 hour   Intake --   Output 350 ml   Net -350 ml        Gen: Well Appearing. In NAD.  HEENT: NC/AT, EOMI, Anicteric Sclera  Neck: Supple  CV: Normal S1S2, Regular rhythm, normal rate  Lungs: Clear to ascultation b/l  Abd: Soft, NT/ND, +BS  Extremties:  No LE Edema b/l   Skin: No rashes on exposed skin  Neurologic: AAOx3     Laboratory and Imaging Studies     I have reviewed  laboratory and imaging studies in the Epic. Pertinent findings are as below:    BMP  Recent Labs   Lab 07/15/21  0839 07/15/21  0803 07/15/21  0146 07/14/21 2053 07/14/21  0639 07/13/21  0721     --   --   --  138 133   POTASSIUM 4.2  --   --   --  3.1* 3.8   CHLORIDE 107  --   --   --  107 100   JAN 8.5  --   --   --  8.0* 8.4*   CO2 23  --   --   --  26 25   BUN 16  --   --   --  17 20   CR 1.03  --   --   --  1.05* 1.14*   * 163* 168* 194* 114* 205*     CBC  Recent Labs   Lab 07/15/21  0839 07/14/21  0639 07/13/21  0721   WBC 7.7 9.6 19.2*   RBC 4.53 4.21 " 4.93   HGB 12.2 11.5* 13.6   HCT 38.3 35.0 40.6   MCV 85 83 82   MCH 26.9 27.3 27.6   MCHC 31.9 32.9 33.5   RDW 13.2 13.2 12.7    196 268     INRNo lab results found in last 7 days.  LFTs  Recent Labs   Lab 07/13/21  0721   ALKPHOS 84   AST 18   ALT 20   BILITOTAL 0.8   PROTTOTAL 6.9   ALBUMIN 3.5      PANC  Recent Labs   Lab 07/13/21  0723   LIPASE 76           Impression/Plan        Kelly Barnes is an 81 year old year old woman with history of DMII, HTN, CKDIII and hyperlipidemia who presented to the ED with fever and lower abdominal discomfort and admitted for management of pyelonephritis.      Pyelonephritis due to E.Coli  Hx of E. Coli UTIs  - Hemodynamically stable. 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 BCX NGTD.  - Continue Ceftriaxone 1 g daily   - Urology following: Plan for cystoscopy + stent placement today  - Continue IVF  - Tylenol for pain and supportive care     Hypokalemia  - Resolved.    Elevated Cr - resolved  Hx of CKDIII  Renal Calculi w/o obstruction/hydronephrosis  - Renal function stable  - Continue IVF. Monitor.      DMII  - Hold home glipizide.  - POC Glucose AC/Bedtime + Insulin sliding scale     HTN/HLD  - Continue Losartan/HCTZ & monitor  - Continue Simvastatin         # Diet: Adult regular  # Prophylaxis: Lovenox (declining due to personal reasons)  # Central Lines: N/A  # Blanchard: N/A  # Code status: Full code      Expected Discharge/Disposition: 2-3 days back to prior living arrangement pending clinical course. Planned for OR today with urology.         Pt's care was discussed with bedside RN, patient and  during Care Team Rounds.               Brianna Amos MD  Hospitalist ( Internal medicine)  Pager: [phone]

## 2021-07-15 NOTE — BRIEF OP NOTE
[facility]    Brief Operative Note    Pre-operative diagnosis: Kidney stone [N20.0]  Post-operative diagnosis Same as pre-operative diagnosis    Procedure: Procedure(s):  CYSTOURETEROSCOPY, WITH RETROGRADE PYELOGRAM,  AND STENT INSERTION RIGHT  Surgeon: Surgeon(s) and Role:     * Kirk Law MD - Primary  Anesthesia: General   Estimated blood loss: None  Drains:  6 Fr x 26 cm double J right ureteral stent   Specimens:   ID Type Source Tests Collected by Time Destination   A : Right Kidney Urine for Culture Urine Kidney, Right URINE CULTURE Kirk Law MD 7/15/2021 12:29 PM      Findings:   Large stone of the inferior right renal pelvis w/ moderate hydronephrosis of the upper pole. Stent placed with good curl in the right upper pole and bladder.   Complications: None.  Implants:   Implant Name Type Inv. Item Serial No.  Lot No. LRB No. Used Action   STENT URETERAL PERCUFLEX PLUS [id number] - [id number] Stent STENT URETERAL PERCUFLEX PLUS [id number]  [employer] [id number] Right 1 Implanted

## 2021-07-15 NOTE — ANESTHESIA PREPROCEDURE EVALUATION
Anesthesia Pre-Procedure Evaluation    Patient: Kelly Barnes   MRN: [id number] : 1940        Preoperative Diagnosis: Kidney stone [N20.0]   Procedure : Procedure(s):  CYSTOURETEROSCOPY, WITH RETROGRADE PYELOGRAM, HOLMIUM LASER LITHOTRIPSY AND STENT INSERTION RIGHT     Past Medical History:   Diagnosis Date     Acute kidney injury (H) 2020     Allergic rhinitis, cause unspecified      Aortic stenosis 2016    With new murmur noted 2016, normal echo, repeat echo 2017.     Atypical chest pain 2015     cuboid     left foot     Hyperlipidemia LDL goal <100 2012     Hypertension goal BP (blood pressure) < 140/90      Musculoskeletal chest pain 2016     Obesity, Class I, BMI 30-34.9 2015     Pneumonia 3/16     Sepsis, due to unspecified organism, unspecified whether acute organ dysfunction present (H) 2020     Type 2 diabetes, HbA1c goal < 7% (H)       Past Surgical History:   Procedure Laterality Date     ZZC NONSPECIFIC PROCEDURE      none      Allergies   Allergen Reactions     Ibuprofen Other (See Comments)     numbness in hands and feet      Social History     Tobacco Use     Smoking status: Never Smoker     Smokeless tobacco: Never Used   Substance Use Topics     Alcohol use: Yes     Alcohol/week: 10.0 standard drinks     Comment: 1-2 drinks per week      Wt Readings from Last 1 Encounters:   21 72 kg (158 lb 11.2 oz)        Anesthesia Evaluation            ROS/MED HX  ENT/Pulmonary:       Neurologic:       Cardiovascular:     (+) Dyslipidemia hypertension-----    METS/Exercise Tolerance:     Hematologic:       Musculoskeletal:       GI/Hepatic:       Renal/Genitourinary:     (+) renal disease, type: CRI,     Endo:     (+) type II DM,     Psychiatric/Substance Use:       Infectious Disease:       Malignancy:       Other:            Physical Exam    Airway        Mallampati: II   TM distance: > 3 FB   Neck ROM: full   Mouth opening: > 3 cm    Respiratory  Devices and Support         Dental           Cardiovascular          Rhythm and rate: regular     Pulmonary   pulmonary exam normal                OUTSIDE LABS:  CBC:   Lab Results   Component Value Date    WBC 7.7 07/15/2021    WBC 9.6 07/14/2021    HGB 12.2 07/15/2021    HGB 11.5 (L) 07/14/2021    HCT 38.3 07/15/2021    HCT 35.0 07/14/2021     07/15/2021     07/14/2021     BMP:   Lab Results   Component Value Date     07/15/2021     07/14/2021    POTASSIUM 4.2 07/15/2021    POTASSIUM 3.1 (L) 07/14/2021    CHLORIDE 107 07/15/2021    CHLORIDE 107 07/14/2021    CO2 23 07/15/2021    CO2 26 07/14/2021    BUN 16 07/15/2021    BUN 17 07/14/2021    CR 1.03 07/15/2021    CR 1.05 (H) 07/14/2021     (H) 07/15/2021     (H) 07/15/2021     COAGS:   Lab Results   Component Value Date    INR 1.10 01/27/2005     POC:   Lab Results   Component Value Date     (H) 12/22/2020     HEPATIC:   Lab Results   Component Value Date    ALBUMIN 3.5 07/13/2021    PROTTOTAL 6.9 07/13/2021    ALT 20 07/13/2021    AST 18 07/13/2021    ALKPHOS 84 07/13/2021    BILITOTAL 0.8 07/13/2021     OTHER:   Lab Results   Component Value Date    LACT 1.4 07/13/2021    A1C 7.4 (H) 07/13/2021    JAN 8.5 07/15/2021    MAG 2.4 (H) 07/15/2021    LIPASE 76 07/13/2021    TSH 1.11 10/15/2019    CRP 63.0 (H) 12/21/2020    SED 30 12/19/2020       Anesthesia Plan    ASA Status:  3      Anesthesia Type: MAC.   Induction: Intravenous.   Maintenance: TIVA.        Consents    Anesthesia Plan(s) and associated risks, benefits, and realistic alternatives discussed. Questions answered and patient/representative(s) expressed understanding.     - Discussed with:  Patient         Postoperative Care    Pain management: Multi-modal analgesia.   PONV prophylaxis: Ondansetron (or other 5HT-3)     Comments:                Jeison Wasserman MD

## 2021-07-15 NOTE — PLAN OF CARE
"  VS: BP (!) 148/78 (BP Location: Left arm)   Pulse 111   Temp 98.4  F (36.9  C) (Oral)   Resp 18   Wt 72 kg (158 lb 11.2 oz)   SpO2 97%   BMI 28.56 kg/m     Denies chest pain and SOB  Alert and oriented   O2: >90% on RA    Output: Voids via toilet   Last BM: 21 - per pt report  Bowel sounds active   Activity: Independent   Skin: Intact   Pain: Managed w/ PRN tylenol  \"Barely noticeable pain\" 2/10. Pt did not want anything for pain   CMS: Denies numbness and tingling. Bilateral edema to feet   Dressing: PIV dressing - CDI   Diet: Regular. Was NPO at midnight due to procedure this am.    LDA: PIV L arm - infusing   Equipment: Personal belongings, IV pole   Plan: Continue to monitor. Pt left for surgery to at 10:00am. Got moved up from 2pm   Additional Info: B, 132      Pt left for procedure around 10am  Pt arrived to unit after procedure around 1pm. Pt arrived via cart. Pt was able to ambulate to bed with SBA, had no complaints of pain, and reoriented to unit. Call light within reach. Pt had no further questions at the time of arrival  "

## 2021-07-15 NOTE — ANESTHESIA POSTPROCEDURE EVALUATION
Patient: Kelly Barnes    Procedure(s):  CYSTOURETEROSCOPY, WITH RETROGRADE PYELOGRAM,  AND STENT INSERTION RIGHT    Diagnosis:Kidney stone [N20.0]  Diagnosis Additional Information: No value filed.    Anesthesia Type:  MAC    Note:  Disposition: Outpatient   Postop Pain Control: Uneventful            Sign Out: Well controlled pain   PONV: No   Neuro/Psych: Uneventful            Sign Out: Acceptable/Baseline neuro status   Airway/Respiratory: Uneventful            Sign Out: Acceptable/Baseline resp. status   CV/Hemodynamics: Uneventful            Sign Out: Acceptable CV status; No obvious hypovolemia; No obvious fluid overload   Other NRE:    DID A NON-ROUTINE EVENT OCCUR?            Last vitals:  Vitals:    07/15/21 0624 07/15/21 0955 07/15/21 1346   BP: 137/70 (!) 148/78 129/70   Pulse: 68 111 65   Resp: 16 18 16   Temp: 36.8  C (98.2  F) 36.9  C (98.4  F) 36.6  C (97.8  F)   SpO2: 98% 97% 100%       Last vitals prior to Anesthesia Care Transfer:  CRNA VITALS  7/15/2021 1215 - 7/15/2021 1315      7/15/2021             Resp Rate (observed):  (!) 1          Electronically Signed By: Jeison Wasserman MD  July 15, 2021  1:49 PM

## 2021-07-15 NOTE — ANESTHESIA CARE TRANSFER NOTE
Patient: Kelly Barnes    Procedure(s):  CYSTOURETEROSCOPY, WITH RETROGRADE PYELOGRAM,  AND STENT INSERTION RIGHT    Diagnosis: Kidney stone [N20.0]  Diagnosis Additional Information: No value filed.    Anesthesia Type:   MAC     Note:    Oropharynx: oropharynx clear of all foreign objects and spontaneously breathing  Level of Consciousness: awake  Oxygen Supplementation: room air    Independent Airway: airway patency satisfactory and stable  Dentition: dentition unchanged  Vital Signs Stable: post-procedure vital signs reviewed and stable  Report to RN Given: handoff report given  Patient transferred to: Medical/Surgical Unit  Comments: Patient transported to inpatient room 812 by CRNA and RN. Patient walked from cart to inpatient bed. RN bedside for report.   Handoff Report: Identifed the Patient, Identified the Reponsible Provider, Reviewed the pertinent medical history, Discussed the surgical course, Reviewed Intra-OP anesthesia mangement and issues during anesthesia, Set expectations for post-procedure period and Allowed opportunity for questions and acknowledgement of understanding      Vitals: (Last set prior to Anesthesia Care Transfer)  CRNA VITALS  7/15/2021 1215 - 7/15/2021 1257      7/15/2021             Resp Rate (observed): 10        Electronically Signed By: APRIL Carvalho CRNA  July 15, 2021  12:57 PM

## 2021-07-15 NOTE — OP NOTE
OPERATIVE REPORT    DATE AND TIME OF OP NOTE/SURGERY: July 15, 2021    PREOPERATIVE DIAGNOSIS: Right ureteral stone(s)    POSTOPERATIVE DIAGNOSIS:  Same    PROCEDURES PERFORMED:   1. Cystourethroscopy with right retrograde pyelography  2. Placement of right ureteral stent.  3. Intraoperative interpretation of fluoroscopic imaging.     STAFF SURGEON: Dr. Kirk Law MD    RESIDENT: Oumar Hamlin MD    ANESTHESIA: General    ESTIMATED BLOOD LOSS: 0 mL.     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 The patient was counseled on the alternatives, risks, and benefits and elected to proceed with the above stated procedure.    DESCRIPTION OF PROCEDURE:    After informed consent was obtained, the patient was taken to the operating room, and moved to the operating table.  After adequate anesthesia was induced, the patient was repositioned in dorsal lithotomy position and prepped and draped in the usual sterile fashion. A timeout was taken to confirm correct patient, procedure and laterality.     A 22-Monegasque cystoscope was inserted into a well lubricated urethra. The urethra was unremarkable.  The bladder was free of tumors, stones or diverticuli.  The media was very turbid with a significant amount of debris coming out of the right ureteral orifice. Bilateral ureteral orifices were orthotopic. The appeared normal w/o tumors, diverticula, or stones. A Sensor guidewire was advanced into the right renal pelvis with the aid of a 5-Monegasque open ended ureteral catheter. A urine sample was taken from the renal pelvis, there was a moderate speed drip indicating higher than normal renal pelvis pressure. This was sent for a urine culture. The urine was turbid. A retrograde pyelogram demonstrated moderate upper pole hydronephrosis and a filling defect of the lower pole corresponding  to the location of the staghorn calculi. Note the stone was radio lucent on fluoroscopy. The wire was replaced and a 6Fr x 26 cm double J Right ureteral stent was advanced over the guidewire under fluoroscopic guidance with a good curl in the renal pelvis and bladder.  The stent passed up easily with no appreciable resistance.  The bladder was then drained.    The patient tolerated the procedure well.  There were no complications.       PLAN:   - Return to medicine service, ok to discharge once clinically stable from our perspective   - High dose 500mg keflex for 1 week, QID, then BID keflex until surgery  - Urine culture taken during surgery, should follow and tailor antibiotics accordingly   - Will schedule for PCNL w/ Dr. Law  - Tamsulosin and detrol for symptom control  - Recommend ibuprofen and tylenol for pain control    I, Kirk Law, was present and participatory for the entirety of the procedure

## 2021-07-16 VITALS
BODY MASS INDEX: 28.56 KG/M2 | DIASTOLIC BLOOD PRESSURE: 71 MMHG | WEIGHT: 158.7 LBS | TEMPERATURE: 98.3 F | SYSTOLIC BLOOD PRESSURE: 131 MMHG | OXYGEN SATURATION: 97 % | RESPIRATION RATE: 15 BRPM | HEART RATE: 76 BPM

## 2021-07-16 LAB
ANION GAP SERPL CALCULATED.3IONS-SCNC: 6 MMOL/L (ref 3–14)
BUN SERPL-MCNC: 16 MG/DL (ref 7–30)
CALCIUM SERPL-MCNC: 8.2 MG/DL (ref 8.5–10.1)
CHLORIDE BLD-SCNC: 107 MMOL/L (ref 94–109)
CO2 SERPL-SCNC: 23 MMOL/L (ref 20–32)
CREAT SERPL-MCNC: 1.01 MG/DL (ref 0.52–1.04)
ERYTHROCYTE [DISTWIDTH] IN BLOOD BY AUTOMATED COUNT: 13 % (ref 10–15)
GFR SERPL CREATININE-BSD FRML MDRD: 53 ML/MIN/1.73M2
GLUCOSE BLD-MCNC: 200 MG/DL (ref 70–99)
GLUCOSE BLDC GLUCOMTR-MCNC: 191 MG/DL (ref 70–99)
GLUCOSE BLDC GLUCOMTR-MCNC: 222 MG/DL (ref 70–99)
GLUCOSE BLDC GLUCOMTR-MCNC: 229 MG/DL (ref 70–99)
GLUCOSE BLDC GLUCOMTR-MCNC: 244 MG/DL (ref 70–99)
HCT VFR BLD AUTO: 35.6 % (ref 35–47)
HGB BLD-MCNC: 11.7 G/DL (ref 11.7–15.7)
MCH RBC QN AUTO: 27.3 PG (ref 26.5–33)
MCHC RBC AUTO-ENTMCNC: 32.9 G/DL (ref 31.5–36.5)
MCV RBC AUTO: 83 FL (ref 78–100)
PLATELET # BLD AUTO: 221 10E3/UL (ref 150–450)
POTASSIUM BLD-SCNC: 4 MMOL/L (ref 3.4–5.3)
RBC # BLD AUTO: 4.29 10E6/UL (ref 3.8–5.2)
SODIUM SERPL-SCNC: 136 MMOL/L (ref 133–144)
WBC # BLD AUTO: 8 10E3/UL (ref 4–11)

## 2021-07-16 PROCEDURE — 999N000040 HC STATISTIC CONSULT NO CHARGE VASC ACCESS

## 2021-07-16 PROCEDURE — 80048 BASIC METABOLIC PNL TOTAL CA: CPT | Performed by: STUDENT IN AN ORGANIZED HEALTH CARE EDUCATION/TRAINING PROGRAM

## 2021-07-16 PROCEDURE — 999N000127 HC STATISTIC PERIPHERAL IV START W US GUIDANCE

## 2021-07-16 PROCEDURE — 250N000013 HC RX MED GY IP 250 OP 250 PS 637: Performed by: STUDENT IN AN ORGANIZED HEALTH CARE EDUCATION/TRAINING PROGRAM

## 2021-07-16 PROCEDURE — 85027 COMPLETE CBC AUTOMATED: CPT | Performed by: STUDENT IN AN ORGANIZED HEALTH CARE EDUCATION/TRAINING PROGRAM

## 2021-07-16 PROCEDURE — 258N000003 HC RX IP 258 OP 636

## 2021-07-16 PROCEDURE — 36415 COLL VENOUS BLD VENIPUNCTURE: CPT | Performed by: STUDENT IN AN ORGANIZED HEALTH CARE EDUCATION/TRAINING PROGRAM

## 2021-07-16 PROCEDURE — 250N000011 HC RX IP 250 OP 636: Performed by: STUDENT IN AN ORGANIZED HEALTH CARE EDUCATION/TRAINING PROGRAM

## 2021-07-16 PROCEDURE — 99239 HOSP IP/OBS DSCHRG MGMT >30: CPT | Performed by: HOSPITALIST

## 2021-07-16 RX ORDER — CEPHALEXIN 500 MG/1
500 CAPSULE ORAL 2 TIMES DAILY
Qty: 14 CAPSULE | Refills: 0 | Status: SHIPPED | OUTPATIENT
Start: 2021-07-24 | End: 2021-07-23

## 2021-07-16 RX ORDER — CEPHALEXIN 500 MG/1
500 CAPSULE ORAL 2 TIMES DAILY
Qty: 14 CAPSULE | Refills: 0 | Status: SHIPPED | OUTPATIENT
Start: 2021-07-24 | End: 2021-07-16

## 2021-07-16 RX ORDER — TAMSULOSIN HYDROCHLORIDE 0.4 MG/1
0.4 CAPSULE ORAL DAILY
Qty: 30 CAPSULE | Refills: 0 | Status: SHIPPED | OUTPATIENT
Start: 2021-07-17 | End: 2021-08-27 | Stop reason: SINTOL

## 2021-07-16 RX ORDER — SODIUM CHLORIDE 9 MG/ML
INJECTION, SOLUTION INTRAVENOUS
Status: COMPLETED
Start: 2021-07-16 | End: 2021-07-16

## 2021-07-16 RX ORDER — ACETAMINOPHEN 325 MG/1
650 TABLET ORAL EVERY 6 HOURS PRN
Qty: 112 TABLET | Refills: 0 | Status: SHIPPED | OUTPATIENT
Start: 2021-07-16 | End: 2021-09-07

## 2021-07-16 RX ORDER — ACETAMINOPHEN 325 MG/1
650 TABLET ORAL EVERY 6 HOURS PRN
Qty: 112 TABLET | Refills: 0 | Status: SHIPPED | OUTPATIENT
Start: 2021-07-16 | End: 2021-07-16

## 2021-07-16 RX ORDER — CEPHALEXIN 500 MG/1
500 CAPSULE ORAL 4 TIMES DAILY
Qty: 28 CAPSULE | Refills: 0 | Status: SHIPPED | OUTPATIENT
Start: 2021-07-17 | End: 2021-07-16

## 2021-07-16 RX ORDER — TOLTERODINE 4 MG/1
4 CAPSULE, EXTENDED RELEASE ORAL DAILY
Qty: 30 CAPSULE | Refills: 0 | Status: SHIPPED | OUTPATIENT
Start: 2021-07-17 | End: 2021-08-27 | Stop reason: SINTOL

## 2021-07-16 RX ORDER — TOLTERODINE 4 MG/1
4 CAPSULE, EXTENDED RELEASE ORAL DAILY
Qty: 30 CAPSULE | Refills: 0 | Status: SHIPPED | OUTPATIENT
Start: 2021-07-17 | End: 2021-07-16

## 2021-07-16 RX ORDER — TAMSULOSIN HYDROCHLORIDE 0.4 MG/1
0.4 CAPSULE ORAL DAILY
Qty: 30 CAPSULE | Refills: 0 | Status: SHIPPED | OUTPATIENT
Start: 2021-07-17 | End: 2021-07-16

## 2021-07-16 RX ORDER — CEPHALEXIN 500 MG/1
500 CAPSULE ORAL 4 TIMES DAILY
Qty: 28 CAPSULE | Refills: 0 | Status: SHIPPED | OUTPATIENT
Start: 2021-07-17 | End: 2021-07-23

## 2021-07-16 RX ADMIN — SODIUM CHLORIDE 500 ML: 9 INJECTION, SOLUTION INTRAVENOUS at 09:42

## 2021-07-16 RX ADMIN — TAMSULOSIN HYDROCHLORIDE 0.4 MG: 0.4 CAPSULE ORAL at 08:39

## 2021-07-16 RX ADMIN — INSULIN ASPART 1 UNITS: 100 INJECTION, SOLUTION INTRAVENOUS; SUBCUTANEOUS at 08:37

## 2021-07-16 RX ADMIN — INSULIN ASPART 2 UNITS: 100 INJECTION, SOLUTION INTRAVENOUS; SUBCUTANEOUS at 13:23

## 2021-07-16 RX ADMIN — TOLTERODINE 4 MG: 4 CAPSULE, EXTENDED RELEASE ORAL at 08:39

## 2021-07-16 RX ADMIN — CEFTRIAXONE SODIUM 1 G: 1 INJECTION, POWDER, FOR SOLUTION INTRAMUSCULAR; INTRAVENOUS at 09:40

## 2021-07-16 NOTE — PLAN OF CARE
VS: BP (!) (P) 148/81 (BP Location: Right arm)   Pulse (P) 84   Temp (P) 98.7  F (37.1  C) (Oral)   Resp (P) 15   Wt 72 kg (158 lb 11.2 oz)   SpO2 (P) 96%   BMI 28.56 kg/m        Output: Voiding spontaneously without difficulties. LBM 7/16, BS active x4.      Lungs: LS clear, on RA. Denies SOB, chest pain, cough.       Activity: Up to bathroom independently overnight, steady gait.       Skin: Intact   Pain:    Abdominal discomfort/cramping, but declined intervention   Neuro/CMS:    A&Ox4, CMS intact, denies N/T  Edema +2 to bilateral feet - baseline, per pt report      Dressing(s):    None   Diet:    Regular diet, tolerating well   LDA:    L hand PIV - SL   Equipment:    IV pole   Plan:    Anticipated discharge later today. Monitor VS and continue to follow plan of care.       Additional Info:    Pt states that she feels like she has a fever, even though it's WNL; tylenol offered, but pt declined

## 2021-07-16 NOTE — PROGRESS NOTES
Urology Daily Progress Note    24 hour events/Subjective:     - No acute events overnight   - Feels well, does note increased frequency of urination (every 2hrs)   - Otherwise no other issues, pain minimal   - Subjective chills improving, afebrile objectively overnight    O:  Vitals: /71 (BP Location: Right arm)   Pulse 76   Temp 98.3  F (36.8  C) (Oral)   Resp 15   Wt 72 kg (158 lb 11.2 oz)   SpO2 97%   BMI 28.56 kg/m      General: Alert, interactive, in NAD  Resp: Non-labored breathing  Abdomen: Soft, non tender  Ext: Warm and well perfused    I/O  UOP  Voiding spontaneously    Labs  No AM labs    Heme:Recent Labs   Lab 07/15/21  0839 07/14/21  0639 07/13/21  0721   WBC 7.7 9.6 19.2*   HGB 12.2 11.5* 13.6    196 268     Chem:  Recent Labs   Lab 07/15/21  0839 07/14/21  0639 07/13/21  0721    138 133   POTASSIUM 4.2 3.1* 3.8   CR 1.03 1.05* 1.14*       Assessment/Plan  Kelly Barnes is a 81 year old female who presented with 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 Now status post right ureteral stent placement on 7/15/21.    PLAN:   - Ok to discharge once clinically stable from our perspective   - High dose 500mg keflex for 1 week, QID, then BID keflex until surgery  - Urine culture taken during surgery, should follow and tailor antibiotics accordingly   - Will schedule for PCNL w/ Dr. Law  - Discharge with tamsulosin and detrol for symptom control  - Recommend ibuprofen and tylenol for pain control  - Urology signing off    To be discussed with Dr. Law.    --    Silva Miramontes MD  Urology Resident

## 2021-07-16 NOTE — DISCHARGE SUMMARY
Discharge Summary    Kelly Barnes MRN# [id number]   YOB: 1940 Age: 81 year old     Date of Admission:  7/13/2021  Date of Discharge:  7/16/2021  2:56 PM  Admitting Physician:  Sharmin Rangel MD  Discharge Physician:  Brianna Amos MD   Discharging Service:  Internal Medicine     Primary Provider: Lea Lopez          Discharge Diagnosis:     Acute Pyelonephritis due to E.Coli  Renal Calculi            Brief History of Illness:     For more details, please refer to the admission H&P on 7/13/2021             Hospital Course:     Kelly Barnes is an 81 year old year old woman with history of DMII, HTN, CKDIII and hyperlipidemia who presented to the ED with fever and lower abdominal discomfort and admitted for management of pyelonephritis.      Pyelonephritis due to E.Coli  Renal Calculi w/o obstruction/hydronephrosis  Hx of E. Coli UTIs  - Hemodynamically stable. 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 BCX NGTD. Was receiving IV ceftriaxone daily.  - Urology following: s/p cystoscopy + stent placement on 7/15  - Per Urology, discharge with keflex 500 mg QID x 7 days then will be on BID dosing until planned PCNL with urology outpatient.   - Patient DC'd with 2 weeks worth of anti-biotics. Told to follow up with urology sometime next week.   - Flomax on discharge  - Tylenol & detrol for symptomatic relief  - Informed to return to the ED in event of fevers.    Hypokalemia  - Resolved following repletion.     Elevated Cr - resolved  Hx of CKDIII  - Renal function stable     DMII  - Resume home glipizide     HTN/HLD  - Continue Losartan/HCTZ & monitor  - Continue Simvastatin        Discharge Medications:     Current Discharge Medication List      START taking these medications    Details   !! cephALEXin (KEFLEX) 500 MG capsule Take 1 capsule (500 mg) by mouth 4 times daily for 7 days  Qty: 28 capsule, Refills: 0     Associated Diagnoses: Kidney stone; Acute pyelonephritis      !! cephALEXin (KEFLEX) 500 MG capsule Take 1 capsule (500 mg) by mouth 2 times daily for 7 days  Qty: 14 capsule, Refills: 0    Associated Diagnoses: Kidney stone; Acute pyelonephritis      tamsulosin (FLOMAX) 0.4 MG capsule Take 1 capsule (0.4 mg) by mouth daily  Qty: 30 capsule, Refills: 0    Associated Diagnoses: Kidney stone      tolterodine ER (DETROL LA) 4 MG 24 hr capsule Take 1 capsule (4 mg) by mouth daily  Qty: 30 capsule, Refills: 0    Associated Diagnoses: Kidney stone       !! - Potential duplicate medications found. Please discuss with provider.      CONTINUE these medications which have CHANGED    Details   acetaminophen (TYLENOL) 325 MG tablet Take 2 tablets (650 mg) by mouth every 6 hours as needed for mild pain or fever  Qty: 112 tablet, Refills: 0    Associated Diagnoses: Kidney stone; Acute pyelonephritis         CONTINUE these medications which have NOT CHANGED    Details   blood glucose (ONETOUCH ULTRA) test strip Use to test blood sugar twice daily. Dispense 1 box of 200 test strips, #3 refills.  Qty: 100 strip, Refills: 3    Associated Diagnoses: Type 2 diabetes mellitus without complication, without long-term current use of insulin (H)      blood glucose monitoring (ONE TOUCH ULTRA 2) meter device kit Use to test blood sugar 3 times daily  Qty: 1 kit, Refills: 0    Associated Diagnoses: DM type 2, goal A1C below 8.0      glimepiride (AMARYL) 2 MG tablet Take 1 tablet (2 mg) by mouth 2 times daily (with meals) She will also take separate 2 mg dose with breakfast.  Qty: 180 tablet, Refills: 3    Associated Diagnoses: Type 2 diabetes mellitus without complication, without long-term current use of insulin (H)      losartan-hydrochlorothiazide (HYZAAR) 50-12.5 MG tablet Take 1 tablet by mouth daily  Qty: 90 tablet, Refills: 3    Associated Diagnoses: Hypertension goal BP (blood pressure) < 140/90      OneTouch Delica Lancets 33G MISC 1  Device by In Vitro route 2 times daily  Qty: 100 each, Refills: 11    Associated Diagnoses: Type 2 diabetes mellitus without complication, without long-term current use of insulin (H)      simvastatin (ZOCOR) 10 MG tablet TAKE ONE TABLET BY MOUTH AT BEDTIME  Qty: 90 tablet, Refills: 3    Associated Diagnoses: Hyperlipidemia LDL goal <100         STOP taking these medications       aspirin (ASA) 325 MG EC tablet Comments:   Reason for Stopping:                   Procedures/Consultations:   - s/p cystoscopy + stent placement on 7/15  - Consultation during this admission received from urology           Final Day of Progress before Discharge:     Kelly reports urinary frequency but otherwise is feeling well. She denies fevers or chills. She denies abdominal or flank pain. She is not having nausea/vomiting. She is eager to go home.    Physical Exam:  Blood pressure (!) (P) 148/81, pulse (P) 84, temperature (P) 98.7  F (37.1  C), temperature source (P) Oral, resp. rate (P) 15, weight 72 kg (158 lb 11.2 oz), SpO2 (P) 96 %, not currently breastfeeding.  Gen: Well Appearing. In NAD.  HEENT: NC/AT, EOMI, Anicteric Sclera  Neck: Supple  CV: Normal S1S2, Regular rhythm, normal rate  Lungs: Clear to ascultation b/l  Abd: Soft, NT/ND, +BS  Extremties:  No LE Edema b/l   Skin: No rashes on exposed skin  Neurologic: AAOx3    Data:  All laboratory data reviewed             Condition on Discharge:     Discharge condition: Stable   Code status on discharge: Full Code                Discharge Disposition:     Discharged to home               Pending Results:     Unresulted Labs Ordered in the Past 30 Days of this Admission     Date and Time Order Name Status Description    7/15/2021 12:30 PM Urine Culture In process     7/13/2021  8:15 AM Blood Culture Line, venous Preliminary     7/13/2021  8:15 AM Blood Culture Peripheral Blood Preliminary                   Discharge Instructions and Follow-Up:     Discharge Procedure Orders   Reason  for your hospital stay   Order Comments: Acute pyelonephritis due to E. Coli   Kidney Stone     Activity   Order Comments: Your activity upon discharge: activity as tolerated     Order Specific Question Answer Comments   Is discharge order? Yes      Follow Up ([facility])   Order Comments: Follow up with primary care provider, Lea Lopez, within 7 days to evaluate medication change, to evaluate treatment change, for hospital follow- up, and regarding new diagnosis.  No follow up labs or test are needed.    Follow up with Dr. Law , at [address] [place] [address], [id number]  within 2 weeks to evaluate medication change, to evaluate treatment change, and guidance on antibiotics prior to surgery. No follow up labs or test are needed.    Dr. Law's office number : [phone]      Appointments on [place] and/or [facility] (with [facility] or [facility] provider or service). Call [phone] if you haven't heard regarding these appointments within 7 days of discharge.     Diet   Order Comments: Follow this diet upon discharge: Orders Placed This Encounter      Regular Diet Adult     Order Specific Question Answer Comments   Is discharge order? Yes           Attestation:  Brianna Amos MD.    Time spent on patient: 35 minutes total including face to face and coordinating care time reviewing current illness, any medication changes, and the care plan for today.

## 2021-07-16 NOTE — PLAN OF CARE
"  VS: BP (!) (P) 148/81 (BP Location: Right arm)   Pulse (P) 84   Temp (P) 98.7  F (37.1  C) (Oral)   Resp (P) 15   Wt 72 kg (158 lb 11.2 oz)   SpO2 (P) 96%   BMI 28.56 kg/m     Denies chest pain and SOB  Alert and oriented   O2: >90% on RA   Output: Voids via toilet   Last BM: 21  Bowel sounds active  Pt stated that they had diarrhea this am. When writer asked what BM looked like, pt stated that it looked like dark coffee grounds. Writer paged MD. Amos stated that \"they do not know what to do with that, if she did not throw it up, or does not have bloody stools, not concerned.\"   Activity: Independent   Skin: Skin intact   Pain: Denies. Has PRN tylenol available   CMS: Denies numbness and tingling. BLE edema   Dressing: N/A   Diet: Regular   LDA: PIV L arm - SL   Equipment: Personal belongings, IV pole   Plan: Continue to monitor. Plan to discharge home today.   Pt discharged home today at 2:45pm   Additional Info: B, 244 - 2 units given before pt left per pt request      "

## 2021-07-16 NOTE — DISCHARGE INSTRUCTIONS
Antibiotics:  Take Keflex 500 mg FOUR TIMES A DAY start on 7/17/2021, last day on 7/23/2021  Take Keflex 500 mg TWO TIMES A DAY start this on 7/24/2021. You are expected to take this twice daily dose until your surgery. Follow up with urology in clinic for refill of anti-biotics.    Return to the Emergency Room for fevers.    Follow up with primary care provider, Lea Lopez, within 7 days to evaluate medication change, to evaluate treatment change, for hospital follow- up, and regarding new diagnosis.  No follow up labs or test are needed.      Follow up with Dr. Law , at [address] [place] [address], [phone]  within 2 weeks to evaluate medication change, to evaluate treatment change, and guidance on antibiotics prior to surgery. No follow up labs or test are needed.    Dr. Law's office number : [phone]

## 2021-07-16 NOTE — DISCHARGE SUMMARY
Pt. discharged at 2:45pm to home, was accompanied by transport and picked up by son per pt report, and left with personal belongings. Pt. received complete discharge paperwork and medications as filled by discharge pharmacy. Pt. was given times of last dose for all discharge medications in writing on discharge medication sheets. Discharge teaching included medication, pain management, activity restrictions, and signs and symptoms of infection. Pt. to follow up with primary provider in 7 days. Pt. had no further questions at the time of discharge and no unmet needs were identified. Pt stated that son will be picking her up at the front door of the [facility].

## 2021-07-16 NOTE — PROGRESS NOTES
Patient is A&O x4, lungs sound clear, bowel sound active. Denied CP, lightheadedness, dizziness, numbness, tingling and SOB. Continue to have BLE edema. VSS.  and 211, insulin given as order. Left lower forearm PIV is patent and SL. Pt. is drinking well and voiding spontaneously without difficulties, LBM 7/14 per pt. Report. Pt. is able to wiggle toes, CMS intact. Denied pain but PRN tylenol given per pt. request. Pt. stated she have fever, temperature has been checked several times, see flow sheet for detail. Pt. is SBA for transfer, education provided to call for assistance. Self repositioned and turned in bed, able to use call light appropriately and make needs known, will continue to monitor patient as POC.

## 2021-07-18 LAB
BACTERIA BLD CULT: NO GROWTH
BACTERIA BLD CULT: NO GROWTH
BACTERIA UR CULT: ABNORMAL

## 2021-07-19 ENCOUNTER — TELEPHONE (OUTPATIENT)
Dept: NURSING | Facility: CLINIC | Age: 81
End: 2021-07-19

## 2021-07-19 ENCOUNTER — TELEPHONE (OUTPATIENT)
Dept: FAMILY MEDICINE | Facility: CLINIC | Age: 81
End: 2021-07-19

## 2021-07-19 ENCOUNTER — TELEPHONE (OUTPATIENT)
Dept: UROLOGY | Facility: CLINIC | Age: 81
End: 2021-07-19

## 2021-07-19 ENCOUNTER — PATIENT OUTREACH (OUTPATIENT)
Dept: CARE COORDINATION | Facility: CLINIC | Age: 81
End: 2021-07-19

## 2021-07-19 NOTE — TELEPHONE ENCOUNTER
Called patient and she has rash on her back only and it is not severe  TOLD her to stop keflex and take benadryl and I will reach out to MD about what medication she should take in place of the Keflex  Chart has been marked Norah Olivia LPN Staff Nurse

## 2021-07-19 NOTE — TELEPHONE ENCOUNTER
.  [facility]: Nurse Triage Note  SITUATION/BACKGROUND                                                      Kelly Barnes is a 81 year old female s/p Cystourethroscopy with right retrograde pyelography; Placement of right ureteral stent; Intraoperative interpretation of fluoroscopic imaging on 7/13/21- 7/16/21 (hospital admission thru discharge).   Patient calling to report having break out on her back since Saturday, July 17th. Rash located on the upper left shoulder. Scattered light red bumps, various sizes, flat and resembles mosquito bites. Itchy.   She had a  similiar reaction in the past that was due to medication ( no longer taking those medications).   Patient prescribed the following new medication per hospital stay/discharge:   Keflex 500 mg tablet - instructed to take qid x 1 week, then BID x 1 week; Detrol 4 mg every day; Flomax 0.4 mg capsule daily.   Denies any other symptoms allergic reaction protocol.   Home care instructions given per allergic reaction protocol.   Routed to Urology as High Priority to review and follow up call to patient at [phone] regarding medication concerns and follow up appointment.       RECOMMENDATION/PLAN                                                      RECOMMENDED DISPOSITION: home care instructions given per allergic reaction protocol.   Seek ED care with difficulty breathing; change in vision; confusion; chest pain; sudden onset of hoarseness. Routed to Urology to review and follow up call to patient at [phone] (mobile)  Will comply with recommendation: Yes    If further questions/concerns or if symptoms do not improve, worsen or new symptoms develop, call your PCP or [phone] to talk with the Resident on call, as soon as possible.    Guideline used: allergic reaction  Telephone Triage Protocols for Nurses, Fifth Edition, Dione Esqueda, RN, RN

## 2021-07-19 NOTE — TELEPHONE ENCOUNTER
Sent message to care team to change her medication   patient called and told again to stop the keflex and take benadryl Norah Olivia LPN Staff Nurse

## 2021-07-19 NOTE — PROGRESS NOTES
Clinic Care Coordination Contact  [facility]/Voicemail    Referral Source: IP Handoff    Clinical Data: Care Coordinator Outreach    Outreach attempted x 1.  Left message on patient's voicemail with call back information and requested return call.    Plan: Care Coordinator will try to reach patient again in 1-2 business days.    Zaida Romano, RN Care Coordinator     [facility] Ambulatory Care Management  [facility] and   [email or web address]    Office: [phone]

## 2021-07-20 ENCOUNTER — NURSE TRIAGE (OUTPATIENT)
Dept: UROLOGY | Facility: CLINIC | Age: 81
End: 2021-07-20

## 2021-07-20 ENCOUNTER — PATIENT OUTREACH (OUTPATIENT)
Dept: NURSING | Facility: CLINIC | Age: 81
End: 2021-07-20
Payer: COMMERCIAL

## 2021-07-20 ASSESSMENT — ACTIVITIES OF DAILY LIVING (ADL): DEPENDENT_IADLS:: INDEPENDENT

## 2021-07-20 NOTE — LETTER
[facility] CARE COORDINATION  [address] [place] [facility] [address]  July 23, 2021    Kelly Barnes  [address] [place] [facility] [address]      Dear Kelly,    I am a clinic care coordinator who works with Lea Lopez MD at [facility]. I wanted to thank you for spending the time to talk with me.  Below is a description of clinic care coordination and how I can further assist you.      The clinic care coordination team is made up of a registered nurse,  and community health worker who understand the health care system. The goal of clinic care coordination is to help you manage your health and improve access to the health care system in the most efficient manner. The team can assist you in meeting your health care goals by providing education, coordinating services, strengthening the communication among your providers and supporting you with any resource needs.    Please feel free to contact me at [phone] with any questions or concerns. We are focused on providing you with the highest-quality healthcare experience possible and that all starts with you.     Sincerely,     Zaida Romano RN Care Coordinator     [facility] Ambulatory Care Management  [facility] and   [email or web address]    Office: [phone]

## 2021-07-20 NOTE — TELEPHONE ENCOUNTER
----- Message from Dione Holley RN sent at 7/20/2021  1:43 PM CDT -----    ----- Message -----  From: Rachel Schuler MD  Sent: 7/19/2021   5:24 PM CDT  To: Dione Holley RN, STEFAN Cook - it looks like this is a patient of Dr Lopez - do you know why the message is coming to me?   ----- Message -----  From: Norah Olivia LPN  Sent: 7/19/2021   4:27 PM CDT  To: Rachel Schuler MD, Dione Holley RN    Given keflex has rash on her back told her to stop she will need another medication Norah Olivia LPN Staff Nurse

## 2021-07-20 NOTE — LETTER
[facility]  Complex Care Plan  About Me:    Patient Name:  Kelly Barnes    YOB: 1940  Age:         81 year old   [place] MRN:    [id number] Telephone Information:  Home Phone [phone]   Mobile [phone]       Address:  [address]  [facility] [address] Email address:  [email or web address]      Emergency Contact(s)    Name Relationship Lgl Grd Work Phone Home Phone Mobile Phone   1BRIDGETTE FIELDS Son   [phone]           Primary language:  English     needed? No   [facility]:  [phone] op. 1  Other communication barriers: None  Preferred Method of Communication:  Mail  Current living arrangement: I live in a private home with family  Mobility Status/ Medical Equipment: Independent        My Access Plan  Medical Emergency 911   Primary Clinic Line [facility] [phone]   24 Hour Appointment Line [phone] or  [phone]) (toll-free)   24 Hour Nurse Line [phone] (toll-free)   Preferred Urgent Care Other   Preferred Hospital [facility]  [phone]   Preferred Pharmacy [facility] [place] [address]     Behavioral Health Crisis Line The National Suicide Prevention Lifeline at 1-981.402.5113 or 911             My Care Team Members  Patient Care Team       Relationship Specialty Notifications Start End    Lea Lopez MD PCP - General Family Practice  6/9/14     Phone: [phone] Fax: [phone]         [address] [facility] [address]    Lea Lopez MD Assigned PCP   10/11/20     Phone: [phone] Fax: [phone]         [facility] [address] [address]    Zaida Romano, RN Lead Care Coordinator  Admissions 7/19/21             My Care Plans  Self Management and Treatment Plan  Goals   Goals        General     Medical  (pt-stated)      Notes - Note created  7/20/2021  3:36 PM by Zaida Romano RN     Goal Statement: I will recover from my recent hospitalization, back to baseline, in the next month.     Date goal set: 7/20/2021  Measure of Success: Patient stated return to baseline    Date to Achieve By: 8/20/2021  Patient expressed understanding of goal: Yes    Action steps to achieve this goal  1. I will consume a diet rich in fiber and drink adequate fluids  2. I will take my medications as prescribed  3. I will attend follow up appointments when scheduled  4. Registered Nurse care coordinator will reach out in 1 week to offer support and resources  5. I will reach out to my care team for any concerning symptoms or questions related to my health              Advance Care Plans/Directives Type:   Type Advanced Care Plans/Directives: Advanced Directive - On File    My Medical and Care Information  Problem List   Patient Active Problem List   Diagnosis     Osteopenia     Hypertension goal BP (blood pressure) < 140/90     Hyperlipidemia LDL goal <100     Leg edema, left     Type 2 diabetes mellitus without complication, without long-term current use of insulin (H)     Family history of breast cancer in sister     Hepatitis A immune     Left leg swelling     Allergic dermatitis     CKD (chronic kidney disease) stage 3, GFR 30-59 ml/min     Hypokalemia     Emphysematous pyelonephritis     Kidney stone     Acute kidney injury (H)     Fever in adult     Diarrhea, unspecified type      Current Medications and Allergies:  See printed Medication Report.  No current facility-administered medications for this visit.     Current Outpatient Medications   Medication     acetaminophen (TYLENOL) 325 MG tablet     blood glucose (ONETOUCH ULTRA) test strip     blood glucose monitoring (ONE TOUCH ULTRA 2) meter device kit     glimepiride (AMARYL) 2 MG tablet     losartan-hydrochlorothiazide (HYZAAR) 50-12.5 MG tablet     nitroFURantoin  macrocrystal-monohydrate (MACROBID) 100 MG capsule     OneTouch Delica Lancets 33G MISC     simvastatin (ZOCOR) 10 MG tablet     sulfamethoxazole-trimethoprim (BACTRIM DS) 800-160 MG tablet     tamsulosin (FLOMAX) 0.4 MG capsule     tolterodine ER (DETROL LA) 4 MG 24 hr capsule     Facility-Administered Medications Ordered in Other Visits   Medication     acetaminophen (TYLENOL) tablet 650 mg     glucose gel 15-30 g    Or     dextrose 50 % injection 25-50 mL    Or     glucagon injection 1 mg     insulin aspart (NovoLOG) injection (RAPID ACTING)     insulin aspart (NovoLOG) injection (RAPID ACTING)     lidocaine (LMX4) cream     lidocaine 1 % 0.1-1 mL     losartan-hydrochlorothiazide (HYZAAR) 50-12.5 MG per tablet 1 tablet     melatonin tablet 1 mg     ondansetron (ZOFRAN) injection 4 mg     simvastatin (ZOCOR) tablet 10 mg     sodium chloride (PF) 0.9% PF flush 3 mL     sodium chloride (PF) 0.9% PF flush 3 mL     sodium chloride 0.9% infusion     tamsulosin (FLOMAX) capsule 0.4 mg     tolterodine ER (DETROL LA) 24 hr capsule 4 mg     vancomycin (VANCOCIN) capsule 125 mg     Care Coordination Start Date: 7/20/2021   Frequency of Care Coordination: weekly   Form Last Updated: 07/23/2021     Zaida Romano, RN Care Coordinator     [facility] Ambulatory Care Management  [facility] and   [email or web address]    Office: [phone]

## 2021-07-20 NOTE — PROGRESS NOTES
Clinic Care Coordination Contact    Clinic Care Coordination Contact  OUTREACH    Referral Information:  Referral Source: IP Handoff    Primary Diagnosis: Genitourinary Disorders    Chief Complaint   Patient presents with     Clinic Care Coordination - Initial     Initial outreach        [place] Utilization:   Clinic Utilization  Difficulty keeping appointments:: No  Compliance Concerns: No  No-Show Concerns: No  No PCP office visit in Past Year: No  Utilization    Hospital Admissions  2             ED Visits  2             No Show Count (past year)  0                Current as of: 7/20/2021  5:50 AM              Clinical Concerns:  Current Medical Concerns: Diarrhea  Current Behavioral Concerns: None identified      Education Provided to patient:     Education provided to patient regarding signs and symptoms to report to care team    RNCC confirmed patient has contact information for scheduling follow up appointments. Patient declined help with scheduling appointments during visit.     Education provided regarding importance of fluids, fiber, and exercise to regulate bowels.     Education provided regarding importance of healthy diet.     RNCC reviewed discharge instructions with patient.     Education provided regarding safe ambulation.     Pain  Pain (GOAL):: No, patient denies pain this visit  Health Maintenance Reviewed: Due/Overdue, patient will address at PCP visit)    Medication Management:  Medication review status: Medications reviewed.  Changes noted per patient report. Patient verbalizes understanding of medication regimen and denies questions or concerns at this time.        Functional Status:  Dependent ADLs:: Independent  Dependent IADLs:: Independent  Bed or wheelchair confined:: No  Mobility Status: Independent  Fallen 2 or more times in the past year?: No  Any fall with injury in the past year?: No    Living Situation:  Current living arrangement:: I live in a private home with family  Type of  residence:: Private home - no stairs    Lifestyle & Psychosocial Needs:    Social Determinants of Health     Tobacco Use: Low Risk      Smoking Tobacco Use: Never Smoker     Smokeless Tobacco Use: Never Used   Alcohol Use:      Frequency of Alcohol Consumption:      Average Number of Drinks:      Frequency of Binge Drinking:    Financial Resource Strain:      Difficulty of Paying Living Expenses:    Food Insecurity:      Worried About Running Out of Food in the Last Year:      Ran Out of Food in the Last Year:    Transportation Needs:      Lack of Transportation (Medical):      Lack of Transportation (Non-Medical):    Physical Activity:      Days of Exercise per Week:      Minutes of Exercise per Session:    Stress:      Feeling of Stress :    Social Connections:      Frequency of Communication with Friends and Family:      Frequency of Social Gatherings with Friends and Family:      Attends Caodaism Services:      Active Member of Clubs or Organizations:      Attends Club or Organization Meetings:      Marital Status:    Intimate Partner Violence:      Fear of Current or Ex-Partner:      Emotionally Abused:      Physically Abused:      Sexually Abused:    Depression: Not at risk     PHQ-2 Score: 0   Housing Stability:      Unable to Pay for Housing in the Last Year:      Number of Places Lived in the Last Year:      Unstable Housing in the Last Year:      Inadequate nutrition (GOAL):: No  Tube Feeding: No  Inadequate activity/exercise (GOAL):: No  Significant changes in sleep pattern (GOAL): No  Transportation means:: Family, Regular car     Caodaism or spiritual beliefs that impact treatment:: No  Mental health DX:: No  Mental health management concern (GOAL):: No  Chemical Dependency Status: No Current Concerns  Informal Support system:: Children      Resources and Interventions:  Current Resources:      Community Resources: None  Supplies used at home:: None  Equipment Currently Used at Home: none    Advance  Care Plan/Directive  Advanced Care Plans/Directives on file:: Yes  Type Advanced Care Plans/Directives: Advanced Directive - On File    Referrals Placed: None     Goals:   Goals        General     Medical (pt-stated)      Notes - Note created  7/20/2021  3:36 PM by Zaida Romano RN     Goal Statement: I will recover from my recent hospitalization, back to baseline, in the next month.     Date goal set: 7/20/2021  Measure of Success: Patient stated return to baseline    Date to Achieve By: 8/20/2021  Patient expressed understanding of goal: Yes    Action steps to achieve this goal  1. I will consume a diet rich in fiber and drink adequate fluids  2. I will take my medications as prescribed  3. I will attend follow up appointments when scheduled  4. Registered Nurse care coordinator will reach out in 1 week to offer support and resources  5. I will reach out to my care team for any concerning symptoms or questions related to my health             Patient/Caregiver understanding: Patient verbalizes understanding of discharge instructions and goals.     Outreach Frequency: weekly  Future Appointments              In 1 month Mona Adams MD [facility]          Plan:    Patient will make appointments with PCP and urology within the next week.     Patient will consume a diet high in fiber, get plenty of fluids, and exercise regularly.    Patient will reach out to care team if temperature increases or she has any concerning symtoms.     Patient will work towards goals as outlined above.     RNCC will follow up in one week.

## 2021-07-20 NOTE — TELEPHONE ENCOUNTER
Sent to Dr Law  Reason for Disposition    [1] Severe localized itching AND [2] after 2 days of steroid cream    Additional Information    Negative: [1] Sudden onset of rash (within last 2 hours) AND [2] difficulty with breathing or swallowing    Negative: Sounds like a life-threatening emergency to the triager    Negative: [1] Localized purple or blood-colored spots or dots AND [2] not from injury or friction AND [3] fever    Negative: Poison ivy, oak, or sumac contact suspected    Negative: Insect bite(s) suspected    Negative: Ringworm suspected (i.e., round pink patch, sometimes looks like ring, usually 1/2 to 1 inch [12-25 mm],  in size, slowly increasing in size)    Negative: Athlete's Foot suspected (i.e., itchy rash between the toes)    Negative: Jock Itch suspected (i.e., itchy rash on inner thighs near genital area)    Negative: Wound infection suspected (i.e., pain, spreading redness, or pus; in a cut, puncture, scrape or sutured wound)    Negative: Impetigo suspected  (i.e., painless infected superficial small sores, less than 1 inch or 2.5 cm, often covered by a soft, yellow-brown scab or crust; sometimes occurring near nasal openings)    Negative: Shingles suspected (i.e., painful rash, multiple small blisters grouped together in one area of body; dermatomal distribution)    Negative: Rash of external female genital area (vulva)    Negative: Rash of penis or scrotum    Negative: Small spot, skin growth, or mole    Negative: Sores or skin ulcer, not a rash    Negative: Localized lump (or swelling) without redness or rash    Negative: Patient sounds very sick or weak to the triager    Negative: [1] Red area or streak AND [2] fever    Negative: [1] Rash is painful to touch AND [2] fever    Negative: [1] Looks infected (spreading redness, pus) AND [2] large red area (> 2 in. or 5 cm)    Negative: [1] Looks infected (spreading redness, pus) AND [2] diabetes mellitus or weak immune system (e.g., HIV  positive, cancer chemo, splenectomy, organ transplant, chronic steroids)    Negative: [1] Localized purple or blood-colored spots or dots AND [2] not from injury or friction AND [3] no fever    Negative: [1] Looks infected (spreading redness, pus) AND [2] no fever    Negative: Looks like a boil, infected sore, deep ulcer or other infected rash    Negative: [1] Localized rash is very painful AND [2] no fever    Negative: Genital area rash    Negative: Lyme disease suspected (e.g., bull's eye rash or tick bite / exposure)    Negative: [1] Pimples (localized) AND [2 ] no improvement after using CARE ADVICE    Negative: Tender bumps in armpits    Negative: Localized rash present > 7 days    Negative: Red, moist, irritated area between skin folds (or under larger breasts)    Negative: [1] Localized area of skin darkening or thickening on lower legs or ankles AND [2] has NOT been evaluated by a doctor (or NP/PA)    Negative: Mild localized rash    Negative: Pimples    Negative: [1] Redness or itching where jewelry (or metal) touches skin AND [2] jewelry contains nickel    Protocols used: RASH OR REDNESS - [id number]

## 2021-07-21 ENCOUNTER — TELEPHONE (OUTPATIENT)
Dept: UROLOGY | Facility: CLINIC | Age: 81
End: 2021-07-21

## 2021-07-21 ENCOUNTER — MYC MEDICAL ADVICE (OUTPATIENT)
Dept: FAMILY MEDICINE | Facility: CLINIC | Age: 81
End: 2021-07-21

## 2021-07-21 ENCOUNTER — OFFICE VISIT (OUTPATIENT)
Dept: FAMILY MEDICINE | Facility: CLINIC | Age: 81
End: 2021-07-21
Payer: COMMERCIAL

## 2021-07-21 VITALS
HEART RATE: 103 BPM | OXYGEN SATURATION: 98 % | BODY MASS INDEX: 26.82 KG/M2 | SYSTOLIC BLOOD PRESSURE: 142 MMHG | WEIGHT: 149 LBS | DIASTOLIC BLOOD PRESSURE: 92 MMHG | TEMPERATURE: 98.5 F

## 2021-07-21 DIAGNOSIS — N10 ACUTE PYELONEPHRITIS: Primary | ICD-10-CM

## 2021-07-21 DIAGNOSIS — E11.9 TYPE 2 DIABETES MELLITUS WITHOUT COMPLICATION, WITHOUT LONG-TERM CURRENT USE OF INSULIN (H): ICD-10-CM

## 2021-07-21 DIAGNOSIS — N20.0 KIDNEY STONE: ICD-10-CM

## 2021-07-21 PROCEDURE — 99495 TRANSJ CARE MGMT MOD F2F 14D: CPT | Performed by: STUDENT IN AN ORGANIZED HEALTH CARE EDUCATION/TRAINING PROGRAM

## 2021-07-21 RX ORDER — SULFAMETHOXAZOLE/TRIMETHOPRIM 800-160 MG
1 TABLET ORAL 2 TIMES DAILY
Qty: 14 TABLET | Refills: 0 | Status: ON HOLD | OUTPATIENT
Start: 2021-07-21 | End: 2021-07-27

## 2021-07-21 NOTE — TELEPHONE ENCOUNTER
Looks like the patient had another physician send bactrim to her pharmacy.  Left message asking her to call if she has questions    Vannesa Moore RN   Care Coordinator Urology

## 2021-07-21 NOTE — PATIENT INSTRUCTIONS
Switch to Bactrim twice daily. I will message Dr. Law's team to help you set up a follow up appointment.    Continue glimepiride twice daily for next two weeks. If blood sugars are still high (>200), you can go up to three times a day but please also schedule a follow up with Dr. Lopez to discuss diabetes.

## 2021-07-21 NOTE — PROGRESS NOTES
Assessment & Plan     Acute pyelonephritis  Rash does not appear like typical drug reaction, however we will switch to Bactrim. Urine sensitivities reviewed. She has been on oral antibiotics since 7/16 and I have prescribed a one week course. She was found to have a large renal stone and is s/p stent placement. Appears plan was to have her on antibiotics until PCNL could be completed outpatient. She does not have follow up set up with urology. I have contacted Dr. Law to  Help coordinate follow up as she is having difficulty setting up an appointment. Overall, her symptoms have improved. She will notify me of any further issues.   - sulfamethoxazole-trimethoprim (BACTRIM DS) 800-160 MG tablet  Dispense: 14 tablet; Refill: 0    Type 2 diabetes mellitus without complication, without long-term current use of insulin (H)  Appears she was supposed to increase glimepiride to three times daily back in January however she did not. Her recent BGs have been elevated in context of acute illness. She is currently taking 2 mg twice daily. I advised continue on this regimen for another week and if still having BG >200 consistently, add additional dose with third meal of day.  Follow up with PCP for diabetes check in 1 month. Advised risk of hypoglycemia and to monitor for symptoms of this and check BGs multiple times per day.     Kidney stone  Plan as above.       40 minutes spent on the date of the encounter doing chart review, history and exam, documentation and further activities per the note    Keila Beasley MD  [facility]    Hoang Mendoza is a 81 year old who presents for the following health issues    HPI       Hospital Follow-up Visit:    Hospital/Nursing Home/IP Rehab Facility: [facility]  Date of Admission: 7/13  Date of Discharge: 7/16/21  Reason(s) for Admission: Acute pyelonephritis, renal calculi       Was your hospitalization  related to COVID-19? No   Problems taking medications regularly:  Yes see below  Medication changes since discharge: added Keflex  Problems adhering to non-medication therapy:  None    Summary of hospitalization:  [facility] discharge summary reviewed  Diagnostic Tests/Treatments reviewed.  Follow up needed: none    Discharge summary reviewed:    Kelly Barnes is an 81 year old year old woman with history of DMII, HTN, CKDIII and hyperlipidemia who presented to the ED with fever and lower abdominal discomfort and admitted for management of pyelonephritis.      Pyelonephritis due to E.Coli  Renal Calculi w/o obstruction/hydronephrosis  Hx of E. Coli UTIs  - Hemodynamically stable. 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 BCX NGTD. Was receiving IV ceftriaxone daily.  - Urology following: s/p cystoscopy + stent placement on 7/15  - Per Urology, discharge with keflex 500 mg QID x 7 days then will be on BID dosing until planned PCNL with urology outpatient.   - Patient DC'd with 2 weeks worth of anti-biotics. Told to follow up with urology sometime next week.   - Flomax on discharge  - Tylenol & detrol for symptomatic relief  - Informed to return to the ED in event of fevers.     Hypokalemia  - Resolved following repletion.     Elevated Cr - resolved  Hx of CKDIII  - Renal function stable     DMII  - Resume home glipizide     HTN/HLD  - Continue Losartan/HCTZ & monitor  - Continue Simvastatin       Other Healthcare Providers Involved in Patient s Care:         None  Update since discharge: SEE BELOW     Patient was discharged on Keflex and noted an allergic reaction to the Keflex described as rash on her back. She contacted her urologist's office and was instructed to switch to Bactrim. She was reluctant to start Bactrim as she had taken it for two days before going into the ER. She was discharged with Keflex to take qid for one  week and then twice daily for an additional week.     Since then, she has been taking Keflex four times daily. She took one today but stopped after being contacted by her urologist's office and being instructed to switch to Bactrim.     Her urine culture grew E Coli. And is resistant to ampicillin, ciprofloxacin, levofloxacin. Sensitive to Septra.     Currently, she has urinary frequency.She is also noting some constipation. She took miralax this morning. Hasn't noted fever today. Temp yesterday was 99.4 and was the highest it has been since discharge. No nausea, vomiting, blood in stool or urine. Notes slight burning with urination, which is mild. Appetite is getting better.     She did restart glimepiride and is taking 2 mg bid. She is wondering why her Rx says to take three times daily.     Post Discharge Medication Reconciliation: discharge medications reconciled and changed, per note/orders.  Plan of care communicated with patient                Objective    BP (!) 142/92   Pulse 103   Temp 98.5  F (36.9  C)   Wt 67.6 kg (149 lb)   SpO2 98%   Breastfeeding No   BMI 26.82 kg/m    Body mass index is 26.82 kg/m .  Physical Exam   GENERAL: healthy, alert and no distress  HENT: ear canals and TM's normal, nose and mouth without ulcers or lesions  NECK: no adenopathy, no asymmetry, masses, or scars and thyroid normal to palpation  RESP: lungs clear to auscultation - no rales, rhonchi or wheezes  CV: regular rate and rhythm, normal S1 S2, no S3 or S4, no murmur, click or rub, no peripheral edema and peripheral pulses strong  ABDOMEN: soft, nontender, no hepatosplenomegaly, no masses and bowel sounds normal  : no CVA tenderness  MS: no gross musculoskeletal defects noted  SKIN: fading diffuse papular rash with some scabbing on her back   NEURO: Normal strength and tone, mentation intact and speech normal  PSYCH: mentation appears normal, affect normal/bright     Labs 7/16 reviewed:  Cr 1.01 and at baseline,  remainder BMP unremarkable except glu 200  CBC wnl  Urine culture as written above

## 2021-07-21 NOTE — TELEPHONE ENCOUNTER
Called patient and told her that she needs to stop keflex but she refuses and still having a rash  She feels she needs to have an antibiotic on board.  Spoke to [facility] care coordinator that Dr Nassar ordered bactrim and she refused to take it.  Norah Olivia, STEFAN Staff Nurse

## 2021-07-22 ENCOUNTER — PATIENT OUTREACH (OUTPATIENT)
Dept: UROLOGY | Facility: CLINIC | Age: 81
End: 2021-07-22

## 2021-07-22 RX ORDER — NITROFURANTOIN MACROCRYSTAL 100 MG
100 CAPSULE ORAL 2 TIMES DAILY
Qty: 20 CAPSULE | Refills: 0 | Status: SHIPPED | OUTPATIENT
Start: 2021-07-22 | End: 2021-07-23

## 2021-07-22 NOTE — TELEPHONE ENCOUNTER
ASSESSMENT / PLAN:  (N10) Acute pyelonephritis  (primary encounter diagnosis)  Comment:   Plan: nitroFURantoin macrocrystal (MACRODANTIN) 100         MG capsule          I sent the following my chart message. Nothing further to do. Note closed.  Sincerely,    Lea Lopez MD   7/22/2021      HI, at this point I recommend switching to an antibiotic called nitrofurantoin (macrodantin), 100 mg twice per day for 10 days, but certainly if you hear something different from your urologist, please go with their recommendation.    I sent the prescription to [facility] pharmacy.    Sincerely,  Dr. Lea Lopez MD  7/22/2021

## 2021-07-22 NOTE — TELEPHONE ENCOUNTER
Dr Lopez - please see patient message.  Recently discontinue from hospital with Pyelonephritis and feels she needs a different antibiotic prescription.  Renu Nix RN  [facility]

## 2021-07-22 NOTE — TELEPHONE ENCOUNTER
Patient left message saying she needs to go to the ER since she has lower abdominal pain when she goes to the bathroom to urinate. She reports she does have flatus with slight stool each time she goes. She rates her pain 8/10. Patient does not remember when her BM was, but has strong urges and can not go completely. She does not want to go to the ER at this time.  She will try using a laxative, prune juice, and possibly try suppository.  Patient wants her surgery expedited to ASAP. She would like to speak with Dr. Law before surgery just to confirm what was said at the first meeting.    Vannesa Moore, RN   Care Coordinator Urology

## 2021-07-23 ENCOUNTER — HOSPITAL ENCOUNTER (INPATIENT)
Facility: CLINIC | Age: 81
LOS: 4 days | Discharge: HOME | DRG: 872 | End: 2021-07-27
Attending: EMERGENCY MEDICINE | Admitting: STUDENT IN AN ORGANIZED HEALTH CARE EDUCATION/TRAINING PROGRAM
Payer: COMMERCIAL

## 2021-07-23 ENCOUNTER — APPOINTMENT (OUTPATIENT)
Dept: CT IMAGING | Facility: CLINIC | Age: 81
DRG: 872 | End: 2021-07-23
Attending: EMERGENCY MEDICINE
Payer: COMMERCIAL

## 2021-07-23 DIAGNOSIS — N20.0 KIDNEY STONE: Primary | ICD-10-CM

## 2021-07-23 DIAGNOSIS — N17.9 ACUTE KIDNEY INJURY (H): ICD-10-CM

## 2021-07-23 DIAGNOSIS — N10 ACUTE PYELONEPHRITIS: Primary | ICD-10-CM

## 2021-07-23 DIAGNOSIS — Z11.52 ENCOUNTER FOR SCREENING LABORATORY TESTING FOR SEVERE ACUTE RESPIRATORY SYNDROME CORONAVIRUS 2 (SARS-COV-2): ICD-10-CM

## 2021-07-23 DIAGNOSIS — R50.9 FEVER IN ADULT: ICD-10-CM

## 2021-07-23 DIAGNOSIS — A04.72 C. DIFFICILE COLITIS: ICD-10-CM

## 2021-07-23 DIAGNOSIS — R19.7 DIARRHEA, UNSPECIFIED TYPE: ICD-10-CM

## 2021-07-23 LAB
ALBUMIN SERPL-MCNC: 3 G/DL (ref 3.4–5)
ALBUMIN UR-MCNC: NEGATIVE MG/DL
ALP SERPL-CCNC: 108 U/L (ref 40–150)
ALT SERPL W P-5'-P-CCNC: 26 U/L (ref 0–50)
ANION GAP SERPL CALCULATED.3IONS-SCNC: 10 MMOL/L (ref 3–14)
ANION GAP SERPL CALCULATED.3IONS-SCNC: 11 MMOL/L (ref 3–14)
APPEARANCE UR: CLEAR
AST SERPL W P-5'-P-CCNC: 12 U/L (ref 0–45)
BACTERIA #/AREA URNS HPF: ABNORMAL /HPF
BASOPHILS # BLD AUTO: 0.1 10E3/UL (ref 0–0.2)
BASOPHILS NFR BLD AUTO: 0 %
BILIRUB SERPL-MCNC: 0.5 MG/DL (ref 0.2–1.3)
BILIRUB UR QL STRIP: ABNORMAL
BUN SERPL-MCNC: 21 MG/DL (ref 7–30)
BUN SERPL-MCNC: 22 MG/DL (ref 7–30)
C COLI+JEJUNI+LARI FUSA STL QL NAA+PROBE: NOT DETECTED
C DIFF TOX B STL QL: POSITIVE
CALCIUM SERPL-MCNC: 8.8 MG/DL (ref 8.5–10.1)
CALCIUM SERPL-MCNC: 9 MG/DL (ref 8.5–10.1)
CHLORIDE BLD-SCNC: 95 MMOL/L (ref 94–109)
CHLORIDE BLD-SCNC: 96 MMOL/L (ref 94–109)
CO2 SERPL-SCNC: 23 MMOL/L (ref 20–32)
CO2 SERPL-SCNC: 23 MMOL/L (ref 20–32)
COLOR UR AUTO: YELLOW
CREAT SERPL-MCNC: 1.41 MG/DL (ref 0.52–1.04)
CREAT SERPL-MCNC: 1.43 MG/DL (ref 0.52–1.04)
EC STX1 GENE STL QL NAA+PROBE: NOT DETECTED
EC STX2 GENE STL QL NAA+PROBE: NOT DETECTED
EOSINOPHIL # BLD AUTO: 0 10E3/UL (ref 0–0.7)
EOSINOPHIL NFR BLD AUTO: 0 %
ERYTHROCYTE [DISTWIDTH] IN BLOOD BY AUTOMATED COUNT: 12.8 % (ref 10–15)
GFR SERPL CREATININE-BSD FRML MDRD: 35 ML/MIN/1.73M2
GFR SERPL CREATININE-BSD FRML MDRD: 35 ML/MIN/1.73M2
GLUCOSE BLD-MCNC: 285 MG/DL (ref 70–99)
GLUCOSE BLD-MCNC: 298 MG/DL (ref 70–99)
GLUCOSE BLDC GLUCOMTR-MCNC: 190 MG/DL (ref 70–99)
GLUCOSE BLDC GLUCOMTR-MCNC: 214 MG/DL (ref 70–99)
GLUCOSE BLDC GLUCOMTR-MCNC: 243 MG/DL (ref 70–99)
GLUCOSE BLDC GLUCOMTR-MCNC: 281 MG/DL (ref 70–99)
GLUCOSE UR STRIP-MCNC: NEGATIVE MG/DL
HCT VFR BLD AUTO: 38.9 % (ref 35–47)
HGB BLD-MCNC: 12.9 G/DL (ref 11.7–15.7)
HGB UR QL STRIP: ABNORMAL
IMM GRANULOCYTES # BLD: 0.2 10E3/UL
IMM GRANULOCYTES NFR BLD: 1 %
KETONES UR STRIP-MCNC: 40 MG/DL
LACTATE SERPL-SCNC: 1.3 MMOL/L (ref 0.7–2)
LEUKOCYTE ESTERASE UR QL STRIP: ABNORMAL
LIPASE SERPL-CCNC: 71 U/L (ref 73–393)
LYMPHOCYTES # BLD AUTO: 0.5 10E3/UL (ref 0.8–5.3)
LYMPHOCYTES NFR BLD AUTO: 2 %
MCH RBC QN AUTO: 27.3 PG (ref 26.5–33)
MCHC RBC AUTO-ENTMCNC: 33.2 G/DL (ref 31.5–36.5)
MCV RBC AUTO: 82 FL (ref 78–100)
MONOCYTES # BLD AUTO: 1.6 10E3/UL (ref 0–1.3)
MONOCYTES NFR BLD AUTO: 5 %
NEUTROPHILS # BLD AUTO: 28.5 10E3/UL (ref 1.6–8.3)
NEUTROPHILS NFR BLD AUTO: 92 %
NITRATE UR QL: NEGATIVE
NOROV GI+II ORF1-ORF2 JNC STL QL NAA+PR: NOT DETECTED
NRBC # BLD AUTO: 0 10E3/UL
NRBC BLD AUTO-RTO: 0 /100
PH UR STRIP: 5 [PH] (ref 5–7)
PLATELET # BLD AUTO: 501 10E3/UL (ref 150–450)
POTASSIUM BLD-SCNC: 3.8 MMOL/L (ref 3.4–5.3)
POTASSIUM BLD-SCNC: 3.8 MMOL/L (ref 3.4–5.3)
PROT SERPL-MCNC: 7.4 G/DL (ref 6.8–8.8)
RBC # BLD AUTO: 4.72 10E6/UL (ref 3.8–5.2)
RBC URINE: 10 /HPF
RVA NSP5 STL QL NAA+PROBE: NOT DETECTED
SALMONELLA SP RPOD STL QL NAA+PROBE: NOT DETECTED
SARS-COV-2 RNA RESP QL NAA+PROBE: NEGATIVE
SHIGELLA SP+EIEC IPAH STL QL NAA+PROBE: NOT DETECTED
SODIUM SERPL-SCNC: 129 MMOL/L (ref 133–144)
SODIUM SERPL-SCNC: 129 MMOL/L (ref 133–144)
SP GR UR STRIP: 1.01 (ref 1–1.03)
SQUAMOUS EPITHELIAL: 3 /HPF
UROBILINOGEN UR STRIP-MCNC: NORMAL MG/DL
V CHOL+PARA RFBL+TRKH+TNAA STL QL NAA+PR: NOT DETECTED
WBC # BLD AUTO: 31 10E3/UL (ref 4–11)
WBC URINE: 50 /HPF
Y ENTERO RECN STL QL NAA+PROBE: NOT DETECTED

## 2021-07-23 PROCEDURE — 36415 COLL VENOUS BLD VENIPUNCTURE: CPT | Performed by: EMERGENCY MEDICINE

## 2021-07-23 PROCEDURE — 99285 EMERGENCY DEPT VISIT HI MDM: CPT | Mod: 25 | Performed by: EMERGENCY MEDICINE

## 2021-07-23 PROCEDURE — 258N000003 HC RX IP 258 OP 636: Performed by: EMERGENCY MEDICINE

## 2021-07-23 PROCEDURE — 87040 BLOOD CULTURE FOR BACTERIA: CPT | Performed by: EMERGENCY MEDICINE

## 2021-07-23 PROCEDURE — 250N000013 HC RX MED GY IP 250 OP 250 PS 637: Performed by: EMERGENCY MEDICINE

## 2021-07-23 PROCEDURE — 96361 HYDRATE IV INFUSION ADD-ON: CPT | Performed by: EMERGENCY MEDICINE

## 2021-07-23 PROCEDURE — 258N000003 HC RX IP 258 OP 636

## 2021-07-23 PROCEDURE — 74176 CT ABD & PELVIS W/O CONTRAST: CPT

## 2021-07-23 PROCEDURE — 96376 TX/PRO/DX INJ SAME DRUG ADON: CPT | Performed by: EMERGENCY MEDICINE

## 2021-07-23 PROCEDURE — 96366 THER/PROPH/DIAG IV INF ADDON: CPT | Performed by: EMERGENCY MEDICINE

## 2021-07-23 PROCEDURE — 83605 ASSAY OF LACTIC ACID: CPT | Performed by: EMERGENCY MEDICINE

## 2021-07-23 PROCEDURE — 87493 C DIFF AMPLIFIED PROBE: CPT | Performed by: EMERGENCY MEDICINE

## 2021-07-23 PROCEDURE — 87506 IADNA-DNA/RNA PROBE TQ 6-11: CPT | Performed by: EMERGENCY MEDICINE

## 2021-07-23 PROCEDURE — 250N000013 HC RX MED GY IP 250 OP 250 PS 637: Performed by: STUDENT IN AN ORGANIZED HEALTH CARE EDUCATION/TRAINING PROGRAM

## 2021-07-23 PROCEDURE — 96375 TX/PRO/DX INJ NEW DRUG ADDON: CPT | Performed by: EMERGENCY MEDICINE

## 2021-07-23 PROCEDURE — 250N000012 HC RX MED GY IP 250 OP 636 PS 637: Performed by: STUDENT IN AN ORGANIZED HEALTH CARE EDUCATION/TRAINING PROGRAM

## 2021-07-23 PROCEDURE — 87086 URINE CULTURE/COLONY COUNT: CPT | Performed by: EMERGENCY MEDICINE

## 2021-07-23 PROCEDURE — 120N000002 HC R&B MED SURG/OB UMMC

## 2021-07-23 PROCEDURE — 99285 EMERGENCY DEPT VISIT HI MDM: CPT | Performed by: EMERGENCY MEDICINE

## 2021-07-23 PROCEDURE — 81001 URINALYSIS AUTO W/SCOPE: CPT | Performed by: EMERGENCY MEDICINE

## 2021-07-23 PROCEDURE — 96365 THER/PROPH/DIAG IV INF INIT: CPT | Performed by: EMERGENCY MEDICINE

## 2021-07-23 PROCEDURE — 82310 ASSAY OF CALCIUM: CPT | Performed by: STUDENT IN AN ORGANIZED HEALTH CARE EDUCATION/TRAINING PROGRAM

## 2021-07-23 PROCEDURE — 99222 1ST HOSP IP/OBS MODERATE 55: CPT | Performed by: STUDENT IN AN ORGANIZED HEALTH CARE EDUCATION/TRAINING PROGRAM

## 2021-07-23 PROCEDURE — 250N000011 HC RX IP 250 OP 636: Performed by: EMERGENCY MEDICINE

## 2021-07-23 PROCEDURE — 82374 ASSAY BLOOD CARBON DIOXIDE: CPT | Performed by: EMERGENCY MEDICINE

## 2021-07-23 PROCEDURE — C9803 HOPD COVID-19 SPEC COLLECT: HCPCS | Performed by: EMERGENCY MEDICINE

## 2021-07-23 PROCEDURE — 85025 COMPLETE CBC W/AUTO DIFF WBC: CPT | Performed by: EMERGENCY MEDICINE

## 2021-07-23 PROCEDURE — 87635 SARS-COV-2 COVID-19 AMP PRB: CPT | Performed by: EMERGENCY MEDICINE

## 2021-07-23 PROCEDURE — 99207 PR CDG-EXAM COMPONENT: MEETS DETAILED - DOWN CODED: CPT | Performed by: STUDENT IN AN ORGANIZED HEALTH CARE EDUCATION/TRAINING PROGRAM

## 2021-07-23 PROCEDURE — 83690 ASSAY OF LIPASE: CPT | Performed by: EMERGENCY MEDICINE

## 2021-07-23 RX ORDER — TAMSULOSIN HYDROCHLORIDE 0.4 MG/1
0.4 CAPSULE ORAL DAILY
Status: DISCONTINUED | OUTPATIENT
Start: 2021-07-23 | End: 2021-07-27 | Stop reason: HOSPADM

## 2021-07-23 RX ORDER — SODIUM CHLORIDE 9 MG/ML
INJECTION, SOLUTION INTRAVENOUS
Status: COMPLETED
Start: 2021-07-23 | End: 2021-07-23

## 2021-07-23 RX ORDER — SULFAMETHOXAZOLE AND TRIMETHOPRIM 400; 80 MG/1; MG/1
1 TABLET ORAL 2 TIMES DAILY
Status: DISCONTINUED | OUTPATIENT
Start: 2021-07-24 | End: 2021-07-25

## 2021-07-23 RX ORDER — SIMVASTATIN 10 MG
10 TABLET ORAL AT BEDTIME
Status: DISCONTINUED | OUTPATIENT
Start: 2021-07-23 | End: 2021-07-27 | Stop reason: HOSPADM

## 2021-07-23 RX ORDER — NICOTINE POLACRILEX 4 MG
15-30 LOZENGE BUCCAL
Status: DISCONTINUED | OUTPATIENT
Start: 2021-07-23 | End: 2021-07-23

## 2021-07-23 RX ORDER — CEFTRIAXONE 2 G/1
2 INJECTION, POWDER, FOR SOLUTION INTRAMUSCULAR; INTRAVENOUS ONCE
Status: COMPLETED | OUTPATIENT
Start: 2021-07-23 | End: 2021-07-23

## 2021-07-23 RX ORDER — TOLTERODINE 4 MG/1
4 CAPSULE, EXTENDED RELEASE ORAL DAILY
Status: DISCONTINUED | OUTPATIENT
Start: 2021-07-23 | End: 2021-07-27 | Stop reason: HOSPADM

## 2021-07-23 RX ORDER — DEXTROSE MONOHYDRATE 25 G/50ML
25-50 INJECTION, SOLUTION INTRAVENOUS
Status: DISCONTINUED | OUTPATIENT
Start: 2021-07-23 | End: 2021-07-27 | Stop reason: HOSPADM

## 2021-07-23 RX ORDER — VANCOMYCIN HYDROCHLORIDE 125 MG/1
125 CAPSULE ORAL 4 TIMES DAILY
Status: DISCONTINUED | OUTPATIENT
Start: 2021-07-23 | End: 2021-07-27 | Stop reason: HOSPADM

## 2021-07-23 RX ORDER — NITROFURANTOIN 25; 75 MG/1; MG/1
100 CAPSULE ORAL 2 TIMES DAILY
Qty: 20 CAPSULE | Refills: 0 | Status: ON HOLD | OUTPATIENT
Start: 2021-07-23 | End: 2021-07-24

## 2021-07-23 RX ORDER — NICOTINE POLACRILEX 4 MG
15-30 LOZENGE BUCCAL
Status: DISCONTINUED | OUTPATIENT
Start: 2021-07-23 | End: 2021-07-27 | Stop reason: HOSPADM

## 2021-07-23 RX ORDER — SODIUM CHLORIDE 9 MG/ML
INJECTION, SOLUTION INTRAVENOUS CONTINUOUS
Status: DISCONTINUED | OUTPATIENT
Start: 2021-07-23 | End: 2021-07-24

## 2021-07-23 RX ORDER — LIDOCAINE 40 MG/G
CREAM TOPICAL
Status: DISCONTINUED | OUTPATIENT
Start: 2021-07-23 | End: 2021-07-27 | Stop reason: HOSPADM

## 2021-07-23 RX ORDER — ACETAMINOPHEN 325 MG/1
650 TABLET ORAL EVERY 6 HOURS PRN
Status: DISCONTINUED | OUTPATIENT
Start: 2021-07-23 | End: 2021-07-27 | Stop reason: HOSPADM

## 2021-07-23 RX ORDER — LOSARTAN POTASSIUM AND HYDROCHLOROTHIAZIDE 12.5; 5 MG/1; MG/1
1 TABLET ORAL DAILY
Status: DISCONTINUED | OUTPATIENT
Start: 2021-07-23 | End: 2021-07-27 | Stop reason: HOSPADM

## 2021-07-23 RX ORDER — ONDANSETRON 2 MG/ML
4 INJECTION INTRAMUSCULAR; INTRAVENOUS EVERY 30 MIN PRN
Status: COMPLETED | OUTPATIENT
Start: 2021-07-23 | End: 2021-07-25

## 2021-07-23 RX ORDER — DEXTROSE MONOHYDRATE 25 G/50ML
25-50 INJECTION, SOLUTION INTRAVENOUS
Status: DISCONTINUED | OUTPATIENT
Start: 2021-07-23 | End: 2021-07-23

## 2021-07-23 RX ADMIN — TAMSULOSIN HYDROCHLORIDE 0.4 MG: 0.4 CAPSULE ORAL at 07:30

## 2021-07-23 RX ADMIN — INSULIN ASPART 2 UNITS: 100 INJECTION, SOLUTION INTRAVENOUS; SUBCUTANEOUS at 07:30

## 2021-07-23 RX ADMIN — VANCOMYCIN HYDROCHLORIDE 125 MG: 125 CAPSULE ORAL at 20:53

## 2021-07-23 RX ADMIN — LOSARTAN POTASSIUM AND HYDROCHLOROTHIAZIDE 1 TABLET: 50; 12.5 TABLET, FILM COATED ORAL at 08:42

## 2021-07-23 RX ADMIN — SODIUM CHLORIDE: 9 INJECTION, SOLUTION INTRAVENOUS at 21:04

## 2021-07-23 RX ADMIN — Medication 500 ML: at 02:06

## 2021-07-23 RX ADMIN — SODIUM CHLORIDE: 9 INJECTION, SOLUTION INTRAVENOUS at 05:33

## 2021-07-23 RX ADMIN — VANCOMYCIN HYDROCHLORIDE 125 MG: 125 CAPSULE ORAL at 07:31

## 2021-07-23 RX ADMIN — ONDANSETRON 4 MG: 2 INJECTION INTRAMUSCULAR; INTRAVENOUS at 02:14

## 2021-07-23 RX ADMIN — SODIUM CHLORIDE: 9 INJECTION, SOLUTION INTRAVENOUS at 11:30

## 2021-07-23 RX ADMIN — SODIUM CHLORIDE 500 ML: 9 INJECTION, SOLUTION INTRAVENOUS at 02:06

## 2021-07-23 RX ADMIN — ONDANSETRON 4 MG: 2 INJECTION INTRAMUSCULAR; INTRAVENOUS at 05:33

## 2021-07-23 RX ADMIN — SIMVASTATIN 10 MG: 10 TABLET, FILM COATED ORAL at 20:53

## 2021-07-23 RX ADMIN — CEFTRIAXONE SODIUM 2 G: 2 INJECTION, POWDER, FOR SOLUTION INTRAMUSCULAR; INTRAVENOUS at 05:33

## 2021-07-23 RX ADMIN — VANCOMYCIN HYDROCHLORIDE 125 MG: 125 CAPSULE ORAL at 12:47

## 2021-07-23 RX ADMIN — SIMVASTATIN 10 MG: 10 TABLET, FILM COATED ORAL at 07:31

## 2021-07-23 RX ADMIN — VANCOMYCIN HYDROCHLORIDE 125 MG: 125 CAPSULE ORAL at 17:50

## 2021-07-23 ASSESSMENT — ACTIVITIES OF DAILY LIVING (ADL)
DOING_ERRANDS_INDEPENDENTLY_DIFFICULTY: NO
CONCENTRATING,_REMEMBERING_OR_MAKING_DECISIONS_DIFFICULTY: NO
ADLS_ACUITY_SCORE: 15
ADLS_ACUITY_SCORE: 15
TOILETING_ISSUES: NO
VISION_MANAGEMENT: GLASSES
WEAR_GLASSES_OR_BLIND: YES
WALKING_OR_CLIMBING_STAIRS_DIFFICULTY: NO
DIFFICULTY_COMMUNICATING: NO
DRESSING/BATHING_DIFFICULTY: NO
DIFFICULTY_EATING/SWALLOWING: NO
FALL_HISTORY_WITHIN_LAST_SIX_MONTHS: NO
HEARING_DIFFICULTY_OR_DEAF: NO

## 2021-07-23 ASSESSMENT — ENCOUNTER SYMPTOMS
HEMATURIA: 0
DYSURIA: 0
COLOR CHANGE: 0
SHORTNESS OF BREATH: 0
FEVER: 1
COUGH: 0
NECK STIFFNESS: 0
ABDOMINAL PAIN: 1
VOMITING: 1
ARTHRALGIAS: 0
BRUISES/BLEEDS EASILY: 0
DIFFICULTY URINATING: 0
EYE REDNESS: 0
CONFUSION: 0
HEADACHES: 0
DIARRHEA: 1

## 2021-07-23 NOTE — PROGRESS NOTES
"Admit to 5B at 1240 from [facility]. Here with CDiff colitis. /58 (BP Location: Right arm)   Pulse 83   Temp 98.2  F (36.8  C) (Oral)   Resp 18   Wt 70.2 kg (154 lb 12.8 oz)   SpO2 94%   BMI 27.86 kg/m    Denies pain or nausea. States that she is \"feeling better than yesterday.\" Up independently in room. BMx2, void x2. Ordering food. . Resting comfortably in bed.     Belongings she comes with at admission: clothes, glasses, jewelery, bag, mona, phone, .    Pair of shoes left at [facility]. Spoke with RN at ED and was told that shoes will be couriered to 5B.  "

## 2021-07-23 NOTE — CONFIDENTIAL NOTE
ASSESSMENT / PLAN:  (N10) Acute pyelonephritis  (primary encounter diagnosis)  Comment: changed to macrobid from macrodantin  Note gfr > 30  GFR Estimate   Date Value Ref Range Status   07/23/2021 35 (L) >60 mL/min/1.73m2 Final     Comment:     As of July 11, 2021, eGFR is calculated by the CKD-EPI creatinine equation, without race adjustment. eGFR can be influenced by muscle mass, exercise, and diet. The reported eGFR is an estimation only and is only applicable if the renal function is stable.   07/16/2021 53 (L) >60 mL/min/1.73m2 Final     Comment:     As of July 11, 2021, eGFR is calculated by the CKD-EPI creatinine equation, without race adjustment. eGFR can be influenced by muscle mass, exercise, and diet. The reported eGFR is an estimation only and is only applicable if the renal function is stable.   07/15/2021 51 (L) >60 mL/min/1.73m2 Final     Comment:     As of July 11, 2021, eGFR is calculated by the CKD-EPI creatinine equation, without race adjustment. eGFR can be influenced by muscle mass, exercise, and diet. The reported eGFR is an estimation only and is only applicable if the renal function is stable.   05/11/2021 55 (L) >60 mL/min/[1.73_m2] Final     Comment:     Non  GFR Calc  Starting 12/18/2018, serum creatinine based estimated GFR (eGFR) will be   calculated using the Chronic Kidney Disease Epidemiology Collaboration   (CKD-EPI) equation.     12/21/2020 51 (L) >60 mL/min/[1.73_m2] Final     Comment:     Non  GFR Calc  Starting 12/18/2018, serum creatinine based estimated GFR (eGFR) will be   calculated using the Chronic Kidney Disease Epidemiology Collaboration   (CKD-EPI) equation.     12/20/2020 48 (L) >60 mL/min/[1.73_m2] Final     Comment:     Non  GFR Calc  Starting 12/18/2018, serum creatinine based estimated GFR (eGFR) will be   calculated using the Chronic Kidney Disease Epidemiology Collaboration   (CKD-EPI) equation.           Plan:  nitroFURantoin macrocrystal-monohydrate         (MACROBID) 100 MG capsule          Sincerely,  Dr. Lea Lopez MD  7/23/2021

## 2021-07-23 NOTE — ED NOTES
[facility]   ED Nurse to Floor Handoff     Kelly Barnes is a 81 year old female who speaks English and lives with family members,  in a home  They arrived in the ED by car from home    ED Chief Complaint: Nausea, Vomiting, & Diarrhea (for about 3-4 days, pt reports she had to go every two hours and watery stool. Nauseated and retching.)    ED Dx;   Final diagnoses:   Diarrhea, unspecified type   Acute kidney injury (H)   Fever in adult   C. difficile colitis         Needed?: No    Allergies:   Allergies   Allergen Reactions     Ibuprofen Other (See Comments)     numbness in hands and feet     Keflex [Cephalexin] Rash   .  Past Medical Hx:   Past Medical History:   Diagnosis Date     Acute kidney injury (H) 12/19/2020     Allergic rhinitis, cause unspecified      Aortic stenosis 2/29/2016    With new murmur noted 2/2016, normal echo, repeat echo 2/2017.     Atypical chest pain 7/24/2015     cuboid     left foot     Hyperlipidemia LDL goal <100 11/1/2012     Hypertension goal BP (blood pressure) < 140/90      Musculoskeletal chest pain 11/25/2016     Obesity, Class I, BMI 30-34.9 11/11/2015     Pneumonia 3/16     Sepsis, due to unspecified organism, unspecified whether acute organ dysfunction present (H) 12/19/2020     Type 2 diabetes, HbA1c goal < 7% (H)       Baseline Mental status: WDL  Current Mental Status changes: at basesline    Infection present or suspected this encounter: cultures pending  Sepsis suspected: No  Isolation type: Enteric  Patient tested for COVID 19 prior to admission: YES     Activity level - Baseline/Home:  Independent  Activity Level - Current:   Independent    Bariatric equipment needed?: No    In the ED these meds were given:   Medications   ondansetron (ZOFRAN) injection 4 mg (4 mg Intravenous Given 7/23/21 0033)   0.9% sodium chloride BOLUS (0 mLs Intravenous Stopped 7/23/21 3332)     Followed by   sodium chloride 0.9% infusion  ( Intravenous New Bag 7/23/21 0533)   lidocaine 1 % 0.1-1 mL (has no administration in time range)   lidocaine (LMX4) cream (has no administration in time range)   sodium chloride (PF) 0.9% PF flush 3 mL (3 mLs Intracatheter Not Given 7/23/21 0714)   sodium chloride (PF) 0.9% PF flush 3 mL (has no administration in time range)   melatonin tablet 1 mg (has no administration in time range)   acetaminophen (TYLENOL) tablet 650 mg (has no administration in time range)   losartan-hydrochlorothiazide (HYZAAR) 50-12.5 MG per tablet 1 tablet (has no administration in time range)   simvastatin (ZOCOR) tablet 10 mg (has no administration in time range)   tamsulosin (FLOMAX) capsule 0.4 mg (has no administration in time range)   tolterodine ER (DETROL LA) 24 hr capsule 4 mg (has no administration in time range)   glucose gel 15-30 g (has no administration in time range)     Or   dextrose 50 % injection 25-50 mL (has no administration in time range)     Or   glucagon injection 1 mg (has no administration in time range)   insulin aspart (NovoLOG) injection (RAPID ACTING) (has no administration in time range)   insulin aspart (NovoLOG) injection (RAPID ACTING) (1 Units Subcutaneous Not Given 7/23/21 0713)   vancomycin (VANCOCIN) capsule 125 mg (has no administration in time range)   cefTRIAXone (ROCEPHIN) 2 g vial to attach to  ml bag for ADULTS or NS 50 ml bag for PEDS (0 g Intravenous Stopped 7/23/21 0656)       Drips running?  Yes    Home pump  No    Current LDAs  Peripheral IV 07/23/21 Left Upper forearm (Active)   Site Assessment WDL 07/23/21 0526   Line Status Infusing 07/23/21 0526   Dressing Intervention New dressing  07/23/21 0526   Phlebitis Scale 0-->no symptoms 07/23/21 0526   Number of days: 0       Labs results:   Labs Ordered and Resulted from Time of ED Arrival Up to the Time of Departure from the ED   COMPREHENSIVE METABOLIC PANEL - Abnormal; Notable for the following components:       Result Value     Sodium 129 (*)     Creatinine 1.41 (*)     Glucose 298 (*)     Albumin 3.0 (*)     GFR Estimate 35 (*)     All other components within normal limits   LIPASE - Abnormal; Notable for the following components:    Lipase 71 (*)     All other components within normal limits   ROUTINE UA WITH MICROSCOPIC REFLEX TO CULTURE - Abnormal; Notable for the following components:    Bilirubin Urine Moderate (*)     Ketones Urine 40  (*)     Blood Urine Large (*)     Leukocyte Esterase Urine Large (*)     Bacteria Urine Few (*)     RBC Urine 10 (*)     WBC Urine 50 (*)     Squamous Epithelials Urine 3 (*)     All other components within normal limits    Narrative:     Urine Culture ordered based on laboratory criteria   CBC WITH PLATELETS AND DIFFERENTIAL - Abnormal; Notable for the following components:    WBC Count 31.0 (*)     Platelet Count 501 (*)     Absolute Neutrophils 28.5 (*)     Absolute Lymphocytes 0.5 (*)     Absolute Monocytes 1.6 (*)     Absolute Immature Granulocytes 0.2 (*)     All other components within normal limits   CLOSTRIDIUM DIFFICILE TOXIN B - Abnormal; Notable for the following components:    C Difficile Toxin B by PCR Positive (*)     All other components within normal limits    Narrative:     The [employer] Xpert C. difficile Assay, performed on the [employer] [place]  [employer]ument Systems, is a qualitative in vitro diagnostic test for rapid detection of toxin B gene sequences from unformed (liquid or soft) stool specimens collected from patients suspected of having Clostridioides difficile infection (CDI). The test utilizes automated real-time polymerase chain reaction (PCR) to detect toxin gene sequences associated with toxin producing C. difficile. The Xpert C. difficile Assay is intended as an aid in the diagnosis of CDI.   LACTIC ACID WHOLE BLOOD - Normal   SARS-COV2 (COVID-19) VIRUS RT-PCR - Normal    Narrative:     Testing was performed using the [employer]  SARS-CoV-2 & Influenza A/B Assay on the [employer]   [employer]  System.  This test should be ordered for the detection of SARS-COV-2 in individuals who meet SARS-CoV-2 clinical and/or epidemiological criteria. Test performance is unknown in asymptomatic patients.  This test is for in vitro diagnostic use under the FDA EUA for laboratories certified under CLIA to perform moderate and/or high complexity testing. This test has not been FDA cleared or approved.  A negative test does not rule out the presence of PCR inhibitors in the specimen or target RNA in concentration below the limit of detection for the assay. The possibility of a false negative should be considered if the patient's recent exposure or clinical presentation suggests COVID-19.  [facility] are certified under the Clinical Laboratory Improvement Amendments of 1988 (CLIA-88) as qualified to perform moderate and/or high complexity laboratory testing.   GLUCOSE MONITOR NURSING POCT   GLUCOSE MONITOR NURSING POCT   GLUCOSE MONITOR NURSING POCT   GLUCOSE MONITOR NURSING POCT   GLUCOSE MONITOR NURSING POCT   PERIPHERAL IV CATHETER   NURSING DRAW AND HOLD   IP ASSIGN PROVIDER TEAM TO TREATMENT TEAM   NO VTE PROPHYLAXIS   VITAL SIGNS   PERIPHERAL IV CATHETER   ACTIVITY   ASSESS FOR HYPOGLYCEMIA SYMPTOMS   NOTIFY PHYSICIAN   COVID-19 VIRUS (CORONAVIRUS) BY PCR    Narrative:     The following orders were created for panel order Asymptomatic COVID-19 Virus (Coronavirus) by PCR Nasopharyngeal.  Procedure                               Abnormality         Status                     ---------                               -----------         ------                     SARS-COV2 (COVID-19) Vir...[[id number]]  Normal              Final result                 Please view results for these tests on the individual orders.   ENTERIC BACTERIA AND VIRUS PANEL BY JOCE STOOL   URINE CULTURE   BLOOD CULTURE   BLOOD CULTURE   CBC WITH PLATELETS & DIFFERENTIAL    Narrative:     The following orders were created for  panel order CBC with platelets differential.  Procedure                               Abnormality         Status                     ---------                               -----------         ------                     CBC with platelets and d...[[id number]]  Abnormal            Final result                 Please view results for these tests on the individual orders.       Imaging Studies:   Recent Results (from the past 24 hour(s))   CT Abdomen Pelvis w/o Contrast    Narrative    EXAM: CT ABDOMEN PELVIS W/O CONTRAST  LOCATION: [facility]  DATE/TIME: 7/23/2021 3:47 AM    INDICATION: Abdominal pain, fever  COMPARISON: CT of the abdomen and pelvis 07/13/2021  TECHNIQUE: CT scan of the abdomen and pelvis was performed without IV contrast. Multiplanar reformats were obtained. Dose reduction techniques were used.  CONTRAST: None.    FINDINGS:   LOWER CHEST: Linear bands of atelectasis in the medial and posterior lower lobes associated with cylindrical bronchiectasis. Small hiatal hernia.    HEPATOBILIARY: Nondistended gallbladder. No calcified gallstones. No bile duct enlargement. Liver is not enlarged. Smooth liver border. No liver lesions.    PANCREAS: Normal.    SPLEEN: Normal.    ADRENAL GLANDS: Normal.    KIDNEYS/BLADDER: Proximal portion of right nephroureteral stent resides in an upper pole calyx and distal portion appropriately in the right posterior bladder. Large right staghorn calculus which occupies most of the renal pelvis and extends into   multiple upper and lower pole calyces, unchanged. Left kidney is normal in size without nephrolithiasis. Urinary bladder contains mild to moderate amount of urine and has a smooth uniform wall. Tiny focus of air in the nondependent bladder from   instrumentation.    BOWEL: Decompressed stomach. Normal caliber small bowel. There are numerous diverticula throughout all segments of the colon. There are no specific diverticula  which are enlarged/inflamed and there is no localized inflammatory stranding in the adjacent   mesenteric fat. Transverse and descending colon are decompressed however the wall wall appears slightly thicker than 10 days earlier. While the sigmoid colon is also decompressed wall thickening is more convincing.    LYMPH NODES: Normal.    VASCULATURE: Mild aortoiliac atheromatous calcifications.    PELVIC ORGANS: The uterus is normal in size. No mass in the adnexa or pelvic free fluid.    MUSCULOSKELETAL: Lower lumbar facet arthropathy. Minimal anterolisthesis of L4 on L5. Small lower thoracic degenerative osteophytes. No new bone or body wall abnormalities.      Impression    IMPRESSION:     1.  Appropriately positioned right nephroureteral stent. Large right staghorn calculus is unchanged.  2.  Development of wall thickening of long segment of the transverse, descending and to a greater extent sigmoid colon, likely an infectious colitis. Diffuse colonic diverticulosis but no specific diverticula are inflamed.         Recent vital signs:   /76   Pulse 111   Temp 99.6  F (37.6  C) (Oral)   Resp 18   Wt 70.2 kg (154 lb 12.8 oz)   SpO2 96%   BMI 27.86 kg/m              Cardiac Rhythm: Normal Sinus  Pt needs tele? No  Skin/wound Issues: None    Code Status: Full Code    Pain control: good    Nausea control: good    Abnormal labs/tests/findings requiring intervention: see EPIC    Family present during ED course? No   Family Comments/Social Situation comments: n/a    Tasks needing completion: None    Tanisha Clayton, RN  [phone] [place] [facility]  [phone] [facility]

## 2021-07-23 NOTE — ED PROVIDER NOTES
"    [facility] [place] EMERGENCY DEPARTMENT ([facility])  7/23/21  History     Chief Complaint   Patient presents with     Nausea, Vomiting, & Diarrhea     for about 3-4 days, pt reports she had to go every two hours and watery stool. Nauseated and retching.     The history is provided by the patient and medical records.     Kelly Barnes is a 81 year old female with a past medical history significant for hypertension, type 2 diabetes mellitus, aortic stenosis, CKD stage III (GFR 30-59 mL/min), recent ED to hospital admission here (7/13/2021) for acute pyelonephritis who presents to the Emergency Department for evaluation of diarrhea, vomiting, and fever.  Patient reports that 1 week ago she was hospitalized for a kidney stone and had a stent placed in her kidney.  Patient reports she was started on Keflex, but this made her break out.  Patient states she was recently switched to Bactrim twice a day and has taken 3 doses currently. Patient states that she has had a fever on and off since she got home from her kidney stent placement.  Patient states she started having nonbloody diarrhea 3 days ago that looked very \"weird\".  Patient reports that since she had her stent put in she urinates, and almost always has a BM at the same time, every 2 hours. Patient denies dysuria or hematuria. Patient states that she began vomiting today and has vomited 4x.  Patient endorses lower abdominal pain that comes and goes.  Patient reports all of her symptoms have worsened in the past few days.  Patient states that she spoke with a nurse today who recommended she take a laxative and to present to the ED if she began vomiting.  Patient reports she took MiraLAX today.  Patient denies chest pain, trouble breathing, cough.  Patient states her leg swelling is at baseline.  Patient reports she had been eating and drinking normally up until today when she began vomiting.    Per review of patient's medical records she presented here to the ED " on 7/13/2021 for a fever and lower abdominal pain.  Patient was admitted for management of kidney stone and pyelonephritis due to E. Coli.  Patient underwent a cystoscopy + stent placement on 7/15.  She was discharged on 7/16 with a prescription for Keflex, Flomax, Detrol LA and was told to follow-up with her PCP within 7 days.    CT ABDOMEN/PELVIS W/O CONTRAST (7/13/2021)                                                                 IMPRESSION:   1.  Multiple large nonobstructing right renal stones. No  hydronephrosis or obstructing ureteral stone.  2.  Air within the bladder lumen as well as a small amount of air  within the right renal collecting system. This is a very to reflect  sequelae of recent intervention/catheterization rather than  gas-forming infection. However, correlation is recommended.  3.  Asymmetric right perinephric/peripelvic edema with urothelial  thickening which may reflect inflammatory or infectious pyelitis.  Recommend laboratory correlation. No definite renal abscess/drainable  fluid collection within the limits of a noncontrast exam.    Past Medical History  Past Medical History:   Diagnosis Date     Acute kidney injury (H) 12/19/2020     Allergic rhinitis, cause unspecified      Aortic stenosis 2/29/2016    With new murmur noted 2/2016, normal echo, repeat echo 2/2017.     Atypical chest pain 7/24/2015     cuboid     left foot     Hyperlipidemia LDL goal <100 11/1/2012     Hypertension goal BP (blood pressure) < 140/90      Musculoskeletal chest pain 11/25/2016     Obesity, Class I, BMI 30-34.9 11/11/2015     Pneumonia 3/16     Sepsis, due to unspecified organism, unspecified whether acute organ dysfunction present (H) 12/19/2020     Type 2 diabetes, HbA1c goal < 7% (H)      Past Surgical History:   Procedure Laterality Date     CYSTOSCOPY, RETROGRADES, INSERT STENT URETER(S), COMBINED Right 7/15/2021    Procedure: CYSTOURETEROSCOPY, WITH RETROGRADE PYELOGRAM,  AND STENT INSERTION  RIGHT;  Surgeon: Kirk Law MD;  Location: [facility] OR     ZC NONSPECIFIC PROCEDURE      none     acetaminophen (TYLENOL) 325 MG tablet  glimepiride (AMARYL) 2 MG tablet  losartan-hydrochlorothiazide (HYZAAR) 50-12.5 MG tablet  simvastatin (ZOCOR) 10 MG tablet  sulfamethoxazole-trimethoprim (BACTRIM DS) 800-160 MG tablet  tamsulosin (FLOMAX) 0.4 MG capsule  tolterodine ER (DETROL LA) 4 MG 24 hr capsule  blood glucose (ONETOUCH ULTRA) test strip  blood glucose monitoring (ONE TOUCH ULTRA 2) meter device kit  OneTouch Delica Lancets 33G MISC      Allergies   Allergen Reactions     Ibuprofen Other (See Comments)     numbness in hands and feet     Keflex [Cephalexin] Rash     Family History  Family History   Problem Relation Age of Onset     Hypertension Mother      Diabetes No family hx of      Cerebrovascular Disease No family hx of      Breast Cancer No family hx of      Cancer - colorectal No family hx of      Prostate Cancer No family hx of      Social History   Social History     Tobacco Use     Smoking status: Never Smoker     Smokeless tobacco: Never Used   Substance Use Topics     Alcohol use: Yes     Alcohol/week: 10.0 standard drinks     Comment: 1-2 drinks per week     Drug use: No      Past medical history, past surgical history, medications, allergies, family history, and social history were reviewed with the patient. No additional pertinent items.       Review of Systems   Constitutional: Positive for fever.   HENT: Negative for congestion.    Eyes: Negative for redness.   Respiratory: Negative for cough and shortness of breath.    Cardiovascular: Negative for chest pain.   Gastrointestinal: Positive for abdominal pain (Lower), diarrhea and vomiting.   Endocrine: Negative for polyuria.   Genitourinary: Negative for difficulty urinating, dysuria and hematuria.   Musculoskeletal: Negative for arthralgias and neck stiffness.   Skin: Negative for color change.   Allergic/Immunologic: Negative for  immunocompromised state.   Neurological: Negative for headaches.   Hematological: Does not bruise/bleed easily.   Psychiatric/Behavioral: Negative for confusion.   All other systems reviewed and are negative.      Physical Exam   BP: 135/76  Pulse: 111  Temp: 99.6  F (37.6  C)  Resp: 18  Weight: 70.2 kg (154 lb 12.8 oz)  SpO2: 96 %  Physical Exam  General: Afebrile, no acute distress   HEENT: Normocephalic, atraumatic, conjunctivae normal. MMM  Neck: non-tender, supple  Cardio: regular rate. regular rhythm   Resp: Normal work of breathing, no respiratory distress, lungs clear bilaterally, no wheezing, rhonchi, rales  Chest/Back: no visual signs of trauma, no CVA tenderness   Abdomen: soft, non distension, no tenderness, no peritoneal signs   Neuro: alert and fully oriented. CN II-XII grossly intact. Grossly normal strength and sensation in all extremities.   MSK: b/l lower extremity edema (baseline per patient), no deformities. Normal range of motion  Integumentary/Skin: no rash visualized, normal color  Psych: normal affect, normal behavior  ED Course     1:22 AM  The patient was seen and examined by Martha Collins MD in Room ED02.   Procedures            Results for orders placed or performed during the hospital encounter of 07/23/21   Comprehensive metabolic panel     Status: Abnormal   Result Value Ref Range    Sodium 129 (L) 133 - 144 mmol/L    Potassium 3.8 3.4 - 5.3 mmol/L    Chloride 95 94 - 109 mmol/L    Carbon Dioxide (CO2) 23 20 - 32 mmol/L    Anion Gap 11 3 - 14 mmol/L    Urea Nitrogen 22 7 - 30 mg/dL    Creatinine 1.41 (H) 0.52 - 1.04 mg/dL    Calcium 8.8 8.5 - 10.1 mg/dL    Glucose 298 (H) 70 - 99 mg/dL    Alkaline Phosphatase 108 40 - 150 U/L    AST 12 0 - 45 U/L    ALT 26 0 - 50 U/L    Protein Total 7.4 6.8 - 8.8 g/dL    Albumin 3.0 (L) 3.4 - 5.0 g/dL    Bilirubin Total 0.5 0.2 - 1.3 mg/dL    GFR Estimate 35 (L) >60 mL/min/1.73m2   Lactic acid whole blood     Status: Normal   Result Value Ref Range     Lactic Acid 1.3 0.7 - 2.0 mmol/L   Lipase     Status: Abnormal   Result Value Ref Range    Lipase 71 (L) 73 - 393 U/L   UA with Microscopic reflex to Culture     Status: Abnormal    Specimen: Urine, Midstream   Result Value Ref Range    Color Urine Yellow Colorless, Straw, Light Yellow, Yellow    Appearance Urine Clear Clear    Glucose Urine Negative Negative mg/dL    Bilirubin Urine Moderate (A) Negative    Ketones Urine 40  (A) Negative mg/dL    Specific Gravity Urine 1.015 1.003 - 1.035    Blood Urine Large (A) Negative    pH Urine 5.0 5.0 - 7.0    Protein Albumin Urine Negative Negative mg/dL    Urobilinogen Urine Normal Normal, 2.0 mg/dL    Nitrite Urine Negative Negative    Leukocyte Esterase Urine Large (A) Negative    Bacteria Urine Few (A) None Seen /HPF    RBC Urine 10 (H) <=2 /HPF    WBC Urine 50 (H) <=5 /HPF    Squamous Epithelials Urine 3 (H) <=1 /HPF    Narrative    Urine Culture ordered based on laboratory criteria   CBC with platelets and differential     Status: Abnormal   Result Value Ref Range    WBC Count 31.0 (H) 4.0 - 11.0 10e3/uL    RBC Count 4.72 3.80 - 5.20 10e6/uL    Hemoglobin 12.9 11.7 - 15.7 g/dL    Hematocrit 38.9 35.0 - 47.0 %    MCV 82 78 - 100 fL    MCH 27.3 26.5 - 33.0 pg    MCHC 33.2 31.5 - 36.5 g/dL    RDW 12.8 10.0 - 15.0 %    Platelet Count 501 (H) 150 - 450 10e3/uL    % Neutrophils 92 %    % Lymphocytes 2 %    % Monocytes 5 %    % Eosinophils 0 %    % Basophils 0 %    % Immature Granulocytes 1 %    NRBCs per 100 WBC 0 <1 /100    Absolute Neutrophils 28.5 (H) 1.6 - 8.3 10e3/uL    Absolute Lymphocytes 0.5 (L) 0.8 - 5.3 10e3/uL    Absolute Monocytes 1.6 (H) 0.0 - 1.3 10e3/uL    Absolute Eosinophils 0.0 0.0 - 0.7 10e3/uL    Absolute Basophils 0.1 0.0 - 0.2 10e3/uL    Absolute Immature Granulocytes 0.2 (H) <=0.0 10e3/uL    Absolute NRBCs 0.0 10e3/uL   SARS-COV2 (COVID-19) Virus RT-PCR     Status: Normal    Specimen: Nasopharyngeal; Swab   Result Value Ref Range    SARS CoV2 PCR  Negative Negative    Narrative    Testing was performed using the [employer]  SARS-CoV-2 & Influenza A/B Assay on the [employer]  Wanda  System.  This test should be ordered for the detection of SARS-COV-2 in individuals who meet SARS-CoV-2 clinical and/or epidemiological criteria. Test performance is unknown in asymptomatic patients.  This test is for in vitro diagnostic use under the FDA EUA for laboratories certified under CLIA to perform moderate and/or high complexity testing. This test has not been FDA cleared or approved.  A negative test does not rule out the presence of PCR inhibitors in the specimen or target RNA in concentration below the limit of detection for the assay. The possibility of a false negative should be considered if the patient's recent exposure or clinical presentation suggests COVID-19.  [facility] are certified under the Clinical Laboratory Improvement Amendments of 1988 (CLIA-88) as qualified to perform moderate and/or high complexity laboratory testing.   Clostridium difficile toxin B PCR     Status: Abnormal    Specimen: Per Rectum; Stool   Result Value Ref Range    C Difficile Toxin B by PCR Positive (A) Negative    Narrative    The [employer] Xpert C. difficile Assay, performed on the [employer]  Instrument Systems, is a qualitative in vitro diagnostic test for rapid detection of toxin B gene sequences from unformed (liquid or soft) stool specimens collected from patients suspected of having Clostridioides difficile infection (CDI). The test utilizes automated real-time polymerase chain reaction (PCR) to detect toxin gene sequences associated with toxin producing C. difficile. The Xpert C. difficile Assay is intended as an aid in the diagnosis of CDI.   Asymptomatic COVID-19 Virus (Coronavirus) by PCR Nasopharyngeal     Status: Normal    Specimen: Nasopharyngeal; Swab    Narrative    The following orders were created for panel order Asymptomatic COVID-19 Virus  (Coronavirus) by PCR Nasopharyngeal.  Procedure                               Abnormality         Status                     ---------                               -----------         ------                     SARS-COV2 (COVID-19) Vir...[[id number]]  Normal              Final result                 Please view results for these tests on the individual orders.   CBC with platelets differential     Status: Abnormal    Narrative    The following orders were created for panel order CBC with platelets differential.  Procedure                               Abnormality         Status                     ---------                               -----------         ------                     CBC with platelets and d...[[id number]]  Abnormal            Final result                 Please view results for these tests on the individual orders.     Medications   ondansetron (ZOFRAN) injection 4 mg (4 mg Intravenous Given 7/23/21 0533)   0.9% sodium chloride BOLUS (0 mLs Intravenous Stopped 7/23/21 0532)     Followed by   sodium chloride 0.9% infusion ( Intravenous New Bag 7/23/21 0533)   lidocaine 1 % 0.1-1 mL (has no administration in time range)   lidocaine (LMX4) cream (has no administration in time range)   sodium chloride (PF) 0.9% PF flush 3 mL (has no administration in time range)   sodium chloride (PF) 0.9% PF flush 3 mL (has no administration in time range)   melatonin tablet 1 mg (has no administration in time range)   acetaminophen (TYLENOL) tablet 650 mg (has no administration in time range)   losartan-hydrochlorothiazide (HYZAAR) 50-12.5 MG per tablet 1 tablet (has no administration in time range)   simvastatin (ZOCOR) tablet 10 mg (has no administration in time range)   tamsulosin (FLOMAX) capsule 0.4 mg (has no administration in time range)   tolterodine ER (DETROL LA) 24 hr capsule 4 mg (has no administration in time range)   glucose gel 15-30 g (has no administration in time range)     Or   dextrose 50 %  injection 25-50 mL (has no administration in time range)     Or   glucagon injection 1 mg (has no administration in time range)   insulin aspart (NovoLOG) injection (RAPID ACTING) (has no administration in time range)   insulin aspart (NovoLOG) injection (RAPID ACTING) (has no administration in time range)   cefTRIAXone (ROCEPHIN) 2 g vial to attach to  ml bag for ADULTS or NS 50 ml bag for PEDS (2 g Intravenous New Bag 7/23/21 0533)        Assessments & Plan (with Medical Decision Making)   Kelly Barnes is a 81 year old female with a past medical history significant for hypertension, type 2 diabetes mellitus, aortic stenosis, CKD stage III (GFR 30-59 mL/min), recent ED to hospital admission here (7/13/2021) for acute pyelonephritis who presents to the Emergency Department for evaluation of diarrhea, nausea, vomiting, and fever.  Upon arrival patient is well-appearing, afebrile, mild distress secondary to diarrhea and nausea.  Patient tachycardic with a heart rate of 111 bpm, temperature 99.6, blood pressure 135/76, oxygen 96% on room air.  Abdomen is soft, nontender, nondistended, no peritoneal signs.  Differential diagnosis includes but is not limited to viral illness versus gastroenteritis versus C. difficile colitis versus colitis versus viral enteritis versus pyelonephritis versus cystitis versus sepsis among others.    At this time will plan for comprehensive labs, stool culture, urinalysis, will hydrate with 500 cc IV fluid bolus, Zofran given for nausea, and plan on reevaluation.  I reviewed comprehensive labs which are remarkable for leukocytosis with white blood cell count of 31, absolute neutrophils 8.5, hemoglobin 12.9, patient does have acute kidney injury with elevation of creatinine to 1.41, glucose 298, sodium mildly low at 129, lipase 71, normal lactic acid at 1.3, no other acute metabolic or electrolyte abnormality.  Urinalysis with moderate amount of bilirubin, ketones, large amount of  blood, large leukocyte esterase, few bacteria, white blood cells 50, red blood cells 10, patient is currently being treated for pyelonephritis along with stent placement on Keflex however recently switched to Bactrim and has had 3 doses of Bactrim.  Stool culture and C. difficile pending.  Given patient's leukocytosis, ongoing symptoms as well as recent pyelonephritis and ureteral stent placement will obtain a noncontrast CT to further evaluate.    Plan on admission to the internal medicine team for further evaluation, IV hydration, IV antibiotics.  Given 2 g of Rocephin in the emergency department.  Patient understands and agrees with the plan.  Discussed this case with internal medicine hospitalist.  C. Difficile positive. CT of abdomen pelvis pending.    I have reviewed the nursing notes. I have reviewed the findings, diagnosis, plan and need for follow up with the patient.    New Prescriptions    No medications on file       Final diagnoses:   Diarrhea, unspecified type   Acute kidney injury (H)   Fever in adult   C. difficile colitis     I, Bianca Cartwright, am serving as a trained medical scribe to document services personally performed by Martha Collins MD, based on the provider's statements to me.     IMartha MD, was physically present and have reviewed and verified the accuracy of this note documented by Bianca Cartwright.  --  Martha Collins MD  [facility] EMERGENCY DEPARTMENT  7/23/2021     Martha Collins MD  07/23/21 0613

## 2021-07-23 NOTE — H&P
[facility]    History and Physical - Hospitalist Service       Date of Admission:  7/23/2021    Assessment & Plan      Kelly Barnes is a 81 year old female admitted on 7/23/2021. She was recently admitted 7/13-7/16 for pyelnephritis, infected stone and ureter stent placement with urology service. She is now presenting with signs and symptoms of infecious diarrhea. Highest on ddx is C. Diff given recent abx exposure and elevated white count.     #Nausea,vomiting, diarrhea   - Antiemetic panel   - fluid plan (IV infiltration, hard stick) is 500cc bolus + mIVF   - await micro results before starting rx for c. Diff (would be oral vanco as this would be primary infxn)     #acute kidney injury   Suspect pre-renal given history. Fluids as above   - recheck in AM     #hyponatremia   Suspect hypovolemia given history. Will recheck with AM labs after bolus + maintenance fluids.   - morning Na     #pyelonephritis with recent stent placement   - CT noncon read in process at time of this note  - urology consult - would like to do anything possible to finish oral abx asap, will need stent retrieval     #DM2  - hold oral anti-hyperglycemics   - POC glucose AC/bedtime + sliding scale insulin  - hypoglycemia protocol     #Home meds   - home aspirin on hold from prev admission, likely because of fresh stent.   - continue Losartan/hctz   - continue statin     #HCM   - has received COVID 19 vaccine        Diet:  Regular   DVT Prophylaxis: Ambulate every shift  Blanchard Catheter: Not present  Central Lines: None  Code Status:   Full     Disposition Plan   Expected discharge: 2-3 days recommended to prior living arrangement once antibiotic plan established.       Barbi Pickett MD  [facility]  Securely message with the Vocera Web Console (learn more here)  Text page via [employer]  Paging/Directory      ______________________________________________________________________    Chief Complaint   Nausea vomiting diarrhea    History is obtained from the patient    History of Present Illness   Kelly Barnes is a 81 year old woman who was admitted - for pyelonephritis and infected stone s/p ureteral stenting presenting with gastrointestinal symptoms for 3 days as well as intermittent measured fever since discharge. She has had constant loose stools and nonbloody diarrhea. Vomiting that started on . She has been able to eat about 1 small meal in the past 24 hours and has been trying very hard to stay hydrated.     She was discharged with plan to f/up with urology to plan stent retrieval. That follow up has not yet happened. She was switched from Keflex to bactrim because of a rash.     She has leg swelling, currently at baseline. She does not have chest pain, shortness of breath, sore throat, congestion, headache, or joint pain. She does have lower abdominal pain that does not change with bowel movements.     Does all ADLs and iADLS on her own. Lives in a house in [place] [place] [facility] with her son.    from brain tumor.      Review of Systems    The 10 point Review of Systems is negative other than noted in the HPI or here.     Past Medical History    I have reviewed this patient's medical history and updated it with pertinent information if needed.   Past Medical History:   Diagnosis Date     Acute kidney injury (H) 2020     Allergic rhinitis, cause unspecified      Aortic stenosis 2016    With new murmur noted 2016, normal echo, repeat echo 2017.     Atypical chest pain 2015     cuboid     left foot     Hyperlipidemia LDL goal <100 2012     Hypertension goal BP (blood pressure) < 140/90      Musculoskeletal chest pain 2016     Obesity, Class I, BMI 30-34.9 2015     Pneumonia 3/16     Sepsis, due to unspecified organism, unspecified  whether acute organ dysfunction present (H) 2020     Type 2 diabetes, HbA1c goal < 7% (H)        Past Surgical History   I have reviewed this patient's surgical history and updated it with pertinent information if needed.  Past Surgical History:   Procedure Laterality Date     CYSTOSCOPY, RETROGRADES, INSERT STENT URETER(S), COMBINED Right 7/15/2021    Procedure: CYSTOURETEROSCOPY, WITH RETROGRADE PYELOGRAM,  AND STENT INSERTION RIGHT;  Surgeon: Kirk Law MD;  Location:  OR     [facility] NONSPECIFIC PROCEDURE      none       Social History   I have reviewed this patient's social history and updated it with pertinent information if needed.  Social History     Tobacco Use     Smoking status: Never Smoker     Smokeless tobacco: Never Used   Substance Use Topics     Alcohol use: Yes     Alcohol/week: 10.0 standard drinks     Comment: 1-2 drinks per week     Drug use: No       Family History   I have reviewed this patient's family history and updated it with pertinent information if needed.  Family History   Problem Relation Age of Onset     Hypertension Mother      Diabetes No family hx of      Cerebrovascular Disease No family hx of      Breast Cancer No family hx of      Cancer - colorectal No family hx of      Prostate Cancer No family hx of        Prior to Admission Medications   Prior to Admission Medications   Prescriptions Last Dose Informant Patient Reported? Taking?   OneTouch Delica Lancets 33G MISC   No No   Si Device by In Vitro route 2 times daily   acetaminophen (TYLENOL) 325 MG tablet 2021 at 6pm  No Yes   Sig: Take 2 tablets (650 mg) by mouth every 6 hours as needed for mild pain or fever   blood glucose (ONETOUCH ULTRA) test strip   No No   Sig: Use to test blood sugar twice daily. Dispense 1 box of 200 test strips, #3 refills.   blood glucose monitoring (ONE TOUCH ULTRA 2) meter device kit   No No   Sig: Use to test blood sugar 3 times daily   glimepiride (AMARYL) 2 MG tablet  7/23/2021 at pm  No Yes   Sig: Take 1 tablet (2 mg) by mouth 2 times daily (with meals) She will also take separate 2 mg dose with breakfast.   Patient taking differently: Take 2 mg by mouth 2 times daily (with meals)    losartan-hydrochlorothiazide (HYZAAR) 50-12.5 MG tablet 7/23/2021 at am  No Yes   Sig: Take 1 tablet by mouth daily   simvastatin (ZOCOR) 10 MG tablet Past Week at Unknown time  No Yes   Sig: TAKE ONE TABLET BY MOUTH AT BEDTIME   sulfamethoxazole-trimethoprim (BACTRIM DS) 800-160 MG tablet 7/23/2021 at pm  No Yes   Sig: Take 1 tablet by mouth 2 times daily for 7 days   tamsulosin (FLOMAX) 0.4 MG capsule 7/22/2021 at Unknown time  No Yes   Sig: Take 1 capsule (0.4 mg) by mouth daily   tolterodine ER (DETROL LA) 4 MG 24 hr capsule 7/22/2021 at Unknown time  No Yes   Sig: Take 1 capsule (4 mg) by mouth daily      Facility-Administered Medications: None     Allergies   Allergies   Allergen Reactions     Ibuprofen Other (See Comments)     numbness in hands and feet     Keflex [Cephalexin] Rash       Physical Exam   Vital Signs: Temp: 99.6  F (37.6  C) Temp src: Oral BP: 135/76 Pulse: 111   Resp: 18 SpO2: 96 % O2 Device: None (Room air)    Weight: 154 lbs 12.8 oz    General Appearance: Well appearing woman in hospital bed   Eyes: Pupils reactive   Respiratory: Breathing comfortably on room air   Cardiovascular: Extremities warm and well perfused 2+ radial pulses   GI: Abd with mild bloating and tender to palpation in bilateral lower quadrants.   Skin: Warm no rash  Musculoskeletal: 2+ edema in bilateral lower legs L>R to the knee bilaterally.   Neurologic: A&O x4     Data   Data reviewed today: I reviewed all medications, new labs and imaging results over the last 24 hours. I personally reviewed the abdominal CT image(s) showing awaiting radiology read. Per my read no abdominal emergencies. .

## 2021-07-23 NOTE — PROGRESS NOTES
[facility]    Medicine Progress Note - Hospitalist Service, Regi Fields       Date of Admission:  7/23/2021    Assessment and plan  81-year-old woman with history of recent pyelonephritis with infected stone and antibiotic use who [place] with nausea vomiting and diarrhea and found to have sepsis due to C. difficile colitis.    Sepsis due to C. difficile colitis  Hemodynamically stable.  Marked leukocytosis related to C. difficile. CT abdomen last night with no evidence of megacolon.  Minimal abdominal tenderness.  Given degree of leukocytosis she is at risk of complications including toxic megacolon. Discussed briefly with surgery to let them know about her in case any issues.  Lactate normal  - Continue oral vancomycin  - trend WBC  - Would get lactate,repeat imaging, consult surgery if she develops worrisome signs/symptoms    Remainder of plan per history and physical from earlier today.  No bill from the service as she was already seen by hospitalist earlier today.    James Hernandez MD   of Medicine  Med-Peds Hospitalist  Pager [phone]      Exam on arrival:  Gen: Awake, alert, NAD  ENT: MMM  CV: RRR, no MRG, no LE edema  Resp: CTAB, non-labored  GI: Soft, minimally tender in the left lower quadrant.  No rebound or guarding.  Positive bowel sounds

## 2021-07-24 LAB
ANION GAP SERPL CALCULATED.3IONS-SCNC: 8 MMOL/L (ref 3–14)
BACTERIA UR CULT: NORMAL
BUN SERPL-MCNC: 13 MG/DL (ref 7–30)
CALCIUM SERPL-MCNC: 8.1 MG/DL (ref 8.5–10.1)
CHLORIDE BLD-SCNC: 104 MMOL/L (ref 94–109)
CO2 SERPL-SCNC: 24 MMOL/L (ref 20–32)
CREAT SERPL-MCNC: 1.05 MG/DL (ref 0.52–1.04)
ERYTHROCYTE [DISTWIDTH] IN BLOOD BY AUTOMATED COUNT: 13.2 % (ref 10–15)
GFR SERPL CREATININE-BSD FRML MDRD: 50 ML/MIN/1.73M2
GLUCOSE BLD-MCNC: 219 MG/DL (ref 70–99)
GLUCOSE BLDC GLUCOMTR-MCNC: 160 MG/DL (ref 70–99)
GLUCOSE BLDC GLUCOMTR-MCNC: 201 MG/DL (ref 70–99)
GLUCOSE BLDC GLUCOMTR-MCNC: 210 MG/DL (ref 70–99)
GLUCOSE BLDC GLUCOMTR-MCNC: 232 MG/DL (ref 70–99)
HCT VFR BLD AUTO: 37 % (ref 35–47)
HGB BLD-MCNC: 12 G/DL (ref 11.7–15.7)
LACTATE SERPL-SCNC: 1.5 MMOL/L (ref 0.7–2)
MCH RBC QN AUTO: 27.3 PG (ref 26.5–33)
MCHC RBC AUTO-ENTMCNC: 32.4 G/DL (ref 31.5–36.5)
MCV RBC AUTO: 84 FL (ref 78–100)
PLATELET # BLD AUTO: 395 10E3/UL (ref 150–450)
POTASSIUM BLD-SCNC: 3.6 MMOL/L (ref 3.4–5.3)
RBC # BLD AUTO: 4.39 10E6/UL (ref 3.8–5.2)
SODIUM SERPL-SCNC: 136 MMOL/L (ref 133–144)
WBC # BLD AUTO: 18 10E3/UL (ref 4–11)

## 2021-07-24 PROCEDURE — 120N000002 HC R&B MED SURG/OB UMMC

## 2021-07-24 PROCEDURE — 258N000003 HC RX IP 258 OP 636: Performed by: INTERNAL MEDICINE

## 2021-07-24 PROCEDURE — 80048 BASIC METABOLIC PNL TOTAL CA: CPT | Performed by: STUDENT IN AN ORGANIZED HEALTH CARE EDUCATION/TRAINING PROGRAM

## 2021-07-24 PROCEDURE — 250N000013 HC RX MED GY IP 250 OP 250 PS 637: Performed by: STUDENT IN AN ORGANIZED HEALTH CARE EDUCATION/TRAINING PROGRAM

## 2021-07-24 PROCEDURE — 85027 COMPLETE CBC AUTOMATED: CPT | Performed by: STUDENT IN AN ORGANIZED HEALTH CARE EDUCATION/TRAINING PROGRAM

## 2021-07-24 PROCEDURE — 258N000003 HC RX IP 258 OP 636: Performed by: EMERGENCY MEDICINE

## 2021-07-24 PROCEDURE — 36415 COLL VENOUS BLD VENIPUNCTURE: CPT | Performed by: STUDENT IN AN ORGANIZED HEALTH CARE EDUCATION/TRAINING PROGRAM

## 2021-07-24 PROCEDURE — 250N000013 HC RX MED GY IP 250 OP 250 PS 637: Performed by: EMERGENCY MEDICINE

## 2021-07-24 PROCEDURE — 83605 ASSAY OF LACTIC ACID: CPT | Performed by: INTERNAL MEDICINE

## 2021-07-24 PROCEDURE — 99233 SBSQ HOSP IP/OBS HIGH 50: CPT | Mod: GC | Performed by: INTERNAL MEDICINE

## 2021-07-24 PROCEDURE — 250N000012 HC RX MED GY IP 250 OP 636 PS 637: Performed by: STUDENT IN AN ORGANIZED HEALTH CARE EDUCATION/TRAINING PROGRAM

## 2021-07-24 PROCEDURE — 36415 COLL VENOUS BLD VENIPUNCTURE: CPT | Performed by: INTERNAL MEDICINE

## 2021-07-24 PROCEDURE — 250N000013 HC RX MED GY IP 250 OP 250 PS 637: Performed by: INTERNAL MEDICINE

## 2021-07-24 RX ORDER — SODIUM CHLORIDE, SODIUM LACTATE, POTASSIUM CHLORIDE, CALCIUM CHLORIDE 600; 310; 30; 20 MG/100ML; MG/100ML; MG/100ML; MG/100ML
INJECTION, SOLUTION INTRAVENOUS CONTINUOUS
Status: DISCONTINUED | OUTPATIENT
Start: 2021-07-24 | End: 2021-07-25

## 2021-07-24 RX ORDER — LACTOBACILLUS RHAMNOSUS GG 10B CELL
1 CAPSULE ORAL 2 TIMES DAILY
Status: DISCONTINUED | OUTPATIENT
Start: 2021-07-24 | End: 2021-07-27 | Stop reason: HOSPADM

## 2021-07-24 RX ADMIN — INSULIN ASPART 2 UNITS: 100 INJECTION, SOLUTION INTRAVENOUS; SUBCUTANEOUS at 11:38

## 2021-07-24 RX ADMIN — ACETAMINOPHEN 650 MG: 325 TABLET, FILM COATED ORAL at 14:25

## 2021-07-24 RX ADMIN — VANCOMYCIN HYDROCHLORIDE 125 MG: 125 CAPSULE ORAL at 17:20

## 2021-07-24 RX ADMIN — TAMSULOSIN HYDROCHLORIDE 0.4 MG: 0.4 CAPSULE ORAL at 08:01

## 2021-07-24 RX ADMIN — VANCOMYCIN HYDROCHLORIDE 125 MG: 125 CAPSULE ORAL at 11:29

## 2021-07-24 RX ADMIN — SULFAMETHOXAZOLE AND TRIMETHOPRIM 1 TABLET: 400; 80 TABLET ORAL at 20:46

## 2021-07-24 RX ADMIN — SODIUM CHLORIDE: 9 INJECTION, SOLUTION INTRAVENOUS at 04:41

## 2021-07-24 RX ADMIN — VANCOMYCIN HYDROCHLORIDE 125 MG: 125 CAPSULE ORAL at 20:46

## 2021-07-24 RX ADMIN — SULFAMETHOXAZOLE AND TRIMETHOPRIM 1 TABLET: 400; 80 TABLET ORAL at 08:01

## 2021-07-24 RX ADMIN — Medication 1 CAPSULE: at 20:46

## 2021-07-24 RX ADMIN — VANCOMYCIN HYDROCHLORIDE 125 MG: 125 CAPSULE ORAL at 08:01

## 2021-07-24 RX ADMIN — INSULIN ASPART 2 UNITS: 100 INJECTION, SOLUTION INTRAVENOUS; SUBCUTANEOUS at 08:01

## 2021-07-24 RX ADMIN — ACETAMINOPHEN 650 MG: 325 TABLET, FILM COATED ORAL at 20:46

## 2021-07-24 RX ADMIN — TOLTERODINE 4 MG: 4 CAPSULE, EXTENDED RELEASE ORAL at 08:01

## 2021-07-24 RX ADMIN — SODIUM CHLORIDE, POTASSIUM CHLORIDE, SODIUM LACTATE AND CALCIUM CHLORIDE: 600; 310; 30; 20 INJECTION, SOLUTION INTRAVENOUS at 11:29

## 2021-07-24 RX ADMIN — INSULIN ASPART 1 UNITS: 100 INJECTION, SOLUTION INTRAVENOUS; SUBCUTANEOUS at 17:49

## 2021-07-24 RX ADMIN — LOSARTAN POTASSIUM AND HYDROCHLOROTHIAZIDE 1 TABLET: 50; 12.5 TABLET, FILM COATED ORAL at 08:01

## 2021-07-24 RX ADMIN — Medication 1 CAPSULE: at 11:29

## 2021-07-24 ASSESSMENT — ACTIVITIES OF DAILY LIVING (ADL)
ADLS_ACUITY_SCORE: 15

## 2021-07-24 NOTE — PLAN OF CARE
Assumed cares [id number]. VSS on RA. Up ad linda in room. Endorsed abdominal pain when having bowel mmovements but declined any interventions. Voiding spontaneously and pt had four loose, watery BMs. LPIV infusing NS @ 125 mL/hr. Sleeping in between cares. Will continue POC.

## 2021-07-24 NOTE — PROGRESS NOTES
Felt like she was continue to improve this morning but by 1730 she was complaining of fever, chills, nausea, increase in frequency of diarrhea. /67   Pulse 106   Temp 100.1  F (37.8  C)   Resp 16   Wt 71.2 kg (156 lb 14.4 oz)   SpO2 96%   BMI 28.24 kg/m    Sepsis protocol triggered, lactic acid result 1.5. Abdomen is distended and soft. Tolerating oral intake. Will monitor closely for any changes in status.

## 2021-07-24 NOTE — PROGRESS NOTES
[facility]    Progress Note - Regi Fields Service        Date of Admission:  7/23/2021    Assessment & Plan      Kelly Barnes is a 81 year old female admitted on 7/23/2021. She was recently admitted 7/13-7/16 for pyelnephritis, infected stone and ureter stent placement with urology service. Presenting with sepsis 2/2 c. Diff.     #Sepsis due to C.diff, resolving:  # Diarrhea 2/2 c.diff   #Nausea,vomiting, improving  Diagnosis based on Temp > 38 C, HR > 90 bpm and WBC > 12 due to C. Diff.  WBC count improving and abdominal exam reassuring. Continues to have frequent BMs  - Antiemetic panel   - Continue oral vanc, will require 10 days (7/23-) (will pan to continue at least until off abx)     #acute kidney injury, resolved   Likely prerenal in the setting of infection and frequent losses due to diarrhea. Repeat creatinine 1.05      #hypovolemic hyponatremia, resolved     #pyelonephritis with recent stent placement   Had been taking keflex after discharge and then recently transitioned to bactrim for ppx until stent removal.  - Continue bactrim   - Plan for outpatient urology follow up for stent removal     #DM2  - hold oral anti-hyperglycemics   - POC glucose AC/bedtime + MSSI   - hypoglycemia protocol      #Home meds   - home aspirin on hold from prev admission, likely because of fresh stent.   - continue Losartan/hctz   - continue statin      #HCM   - has received COVID 19 vaccine      Diet: Combination Diet Regular Diet Adult    DVT Prophylaxis: Low Risk/Ambulatory with no VTE prophylaxis indicated  Blanchard Catheter: Not present  Fluids: PO ad linda  Central Lines: None  Code Status: Full Code      Disposition Plan   Expected discharge: 07/26/2021 recommended to prior living arrangement once able to maintain adequate PO .     The patient's care was discussed with the Attending Physician, Dr. Hernandez and Patient.    Mamie Rizo MD  [place] [id number] Service  [facility]  [facility]  Securely message with the [employer] Web Console (learn more here)  Text page via [facility] Paging/Directory  Please see sign in/sign out for up to date coverage information    Risk Factors Present on Admission               ______________________________________________________________________    Interval History   Nursing notes reviewed. No acute events overnight. Afebrile. Continued to have frequent BMs, waking up every couple hours to use the bathroom.  Able to tolerate broth last night. No further emesis.    4 point ROS otherwise negative    Data reviewed today: I reviewed all medications, new labs and imaging results over the last 24 hours. I personally reviewed no images or EKG's today.    Physical Exam   Vital Signs: Temp: 96.9  F (36.1  C) Temp src: Oral BP: 139/69 Pulse: 98   Resp: 16 SpO2: 98 % O2 Device: None (Room air)    Weight: 159 lbs 6.28 oz  General Appearance: Well appearing resting in bed in no acute distress   Respiratory: Breathing comfortably on room air, lungs clear to auscultation bilaterally without wheezes or crackles  Cardiovascular: RRR, no murmurs  GI: soft, mild suprapubic tenderness, no rebound or guarding  Skin: no rashes or lesions noted on visualized skin    Data   Recent Labs   Lab 07/24/21  0651 07/24/21  0217 07/23/21  2108 07/23/21  0208   WBC 18.0*  --   --  31.0*   HGB 12.0  --   --  12.9   MCV 84  --   --  82     --   --  501*     --   --  129*  129*   POTASSIUM 3.6  --   --  3.8  3.8   CHLORIDE 104  --   --  96  95   CO2 24  --   --  23  23   BUN 13  --   --  21  22   CR 1.05*  --   --  1.43*  1.41*   ANIONGAP 8  --   --  10  11   JAN 8.1*  --   --  9.0  8.8   * 201* 214* 285*  298*   ALBUMIN  --   --   --  3.0*   PROTTOTAL  --   --   --  7.4   BILITOTAL  --   --   --  0.5   ALKPHOS  --   --   --  108   ALT  --   --   --  26   AST  --   --   --  12   LIPASE  --   --   --  71*     No results found for this  or any previous visit (from the past 24 hour(s)).  Medications     lactated ringers         insulin aspart  1-7 Units Subcutaneous TID AC     insulin aspart  1-5 Units Subcutaneous At Bedtime     lactobacillus rhamnosus (GG)  1 capsule Oral BID     losartan-hydrochlorothiazide  1 tablet Oral Daily     simvastatin  10 mg Oral At Bedtime     sodium chloride (PF)  3 mL Intracatheter Q8H     sulfamethoxazole-trimethoprim  1 tablet Oral BID     tamsulosin  0.4 mg Oral Daily     tolterodine ER  4 mg Oral Daily     vancomycin  125 mg Oral 4x Daily

## 2021-07-25 LAB
ALBUMIN UR-MCNC: 50 MG/DL
ANION GAP SERPL CALCULATED.3IONS-SCNC: 9 MMOL/L (ref 3–14)
APPEARANCE UR: ABNORMAL
BASOPHILS # BLD AUTO: 0 10E3/UL (ref 0–0.2)
BASOPHILS # BLD AUTO: 0 10E3/UL (ref 0–0.2)
BASOPHILS NFR BLD AUTO: 0 %
BASOPHILS NFR BLD AUTO: 0 %
BILIRUB UR QL STRIP: NEGATIVE
BUN SERPL-MCNC: 8 MG/DL (ref 7–30)
CALCIUM SERPL-MCNC: 8 MG/DL (ref 8.5–10.1)
CHLORIDE BLD-SCNC: 101 MMOL/L (ref 94–109)
CO2 SERPL-SCNC: 21 MMOL/L (ref 20–32)
COLOR UR AUTO: ABNORMAL
CREAT SERPL-MCNC: 0.93 MG/DL (ref 0.52–1.04)
CRP SERPL-MCNC: 130 MG/L (ref 0–8)
EOSINOPHIL # BLD AUTO: 0.1 10E3/UL (ref 0–0.7)
EOSINOPHIL # BLD AUTO: 0.1 10E3/UL (ref 0–0.7)
EOSINOPHIL NFR BLD AUTO: 0 %
EOSINOPHIL NFR BLD AUTO: 1 %
ERYTHROCYTE [DISTWIDTH] IN BLOOD BY AUTOMATED COUNT: 13.1 % (ref 10–15)
ERYTHROCYTE [DISTWIDTH] IN BLOOD BY AUTOMATED COUNT: 13.1 % (ref 10–15)
GFR SERPL CREATININE-BSD FRML MDRD: 58 ML/MIN/1.73M2
GLUCOSE BLD-MCNC: 265 MG/DL (ref 70–99)
GLUCOSE BLDC GLUCOMTR-MCNC: 202 MG/DL (ref 70–99)
GLUCOSE BLDC GLUCOMTR-MCNC: 202 MG/DL (ref 70–99)
GLUCOSE BLDC GLUCOMTR-MCNC: 225 MG/DL (ref 70–99)
GLUCOSE BLDC GLUCOMTR-MCNC: 266 MG/DL (ref 70–99)
GLUCOSE UR STRIP-MCNC: 100 MG/DL
HCT VFR BLD AUTO: 34.6 % (ref 35–47)
HCT VFR BLD AUTO: 36.9 % (ref 35–47)
HGB BLD-MCNC: 11.4 G/DL (ref 11.7–15.7)
HGB BLD-MCNC: 12.3 G/DL (ref 11.7–15.7)
HGB UR QL STRIP: ABNORMAL
HYALINE CASTS: 10 /LPF
IMM GRANULOCYTES # BLD: 0.1 10E3/UL
IMM GRANULOCYTES # BLD: 0.1 10E3/UL
IMM GRANULOCYTES NFR BLD: 0 %
IMM GRANULOCYTES NFR BLD: 1 %
KETONES UR STRIP-MCNC: 10 MG/DL
LEUKOCYTE ESTERASE UR QL STRIP: ABNORMAL
LYMPHOCYTES # BLD AUTO: 0.8 10E3/UL (ref 0.8–5.3)
LYMPHOCYTES # BLD AUTO: 0.9 10E3/UL (ref 0.8–5.3)
LYMPHOCYTES NFR BLD AUTO: 5 %
LYMPHOCYTES NFR BLD AUTO: 6 %
MAGNESIUM SERPL-MCNC: 1.8 MG/DL (ref 1.6–2.3)
MCH RBC QN AUTO: 27.2 PG (ref 26.5–33)
MCH RBC QN AUTO: 27.3 PG (ref 26.5–33)
MCHC RBC AUTO-ENTMCNC: 32.9 G/DL (ref 31.5–36.5)
MCHC RBC AUTO-ENTMCNC: 33.3 G/DL (ref 31.5–36.5)
MCV RBC AUTO: 82 FL (ref 78–100)
MCV RBC AUTO: 83 FL (ref 78–100)
MONOCYTES # BLD AUTO: 1.1 10E3/UL (ref 0–1.3)
MONOCYTES # BLD AUTO: 1.2 10E3/UL (ref 0–1.3)
MONOCYTES NFR BLD AUTO: 8 %
MONOCYTES NFR BLD AUTO: 8 %
MUCOUS THREADS #/AREA URNS LPF: PRESENT /LPF
NEUTROPHILS # BLD AUTO: 11.9 10E3/UL (ref 1.6–8.3)
NEUTROPHILS # BLD AUTO: 14.2 10E3/UL (ref 1.6–8.3)
NEUTROPHILS NFR BLD AUTO: 84 %
NEUTROPHILS NFR BLD AUTO: 87 %
NITRATE UR QL: NEGATIVE
NRBC # BLD AUTO: 0 10E3/UL
NRBC # BLD AUTO: 0 10E3/UL
NRBC BLD AUTO-RTO: 0 /100
NRBC BLD AUTO-RTO: 0 /100
PH UR STRIP: 6 [PH] (ref 5–7)
PLATELET # BLD AUTO: 388 10E3/UL (ref 150–450)
PLATELET # BLD AUTO: 400 10E3/UL (ref 150–450)
POTASSIUM BLD-SCNC: 3 MMOL/L (ref 3.4–5.3)
POTASSIUM BLD-SCNC: 3.4 MMOL/L (ref 3.4–5.3)
RBC # BLD AUTO: 4.18 10E6/UL (ref 3.8–5.2)
RBC # BLD AUTO: 4.52 10E6/UL (ref 3.8–5.2)
RBC URINE: 159 /HPF
SODIUM SERPL-SCNC: 131 MMOL/L (ref 133–144)
SP GR UR STRIP: 1.01 (ref 1–1.03)
SQUAMOUS EPITHELIAL: 2 /HPF
TRANSITIONAL EPI: 2 /HPF
UROBILINOGEN UR STRIP-MCNC: NORMAL MG/DL
WBC # BLD AUTO: 14 10E3/UL (ref 4–11)
WBC # BLD AUTO: 16.4 10E3/UL (ref 4–11)
WBC URINE: >182 /HPF
YEAST #/AREA URNS HPF: ABNORMAL /HPF

## 2021-07-25 PROCEDURE — 250N000013 HC RX MED GY IP 250 OP 250 PS 637: Performed by: EMERGENCY MEDICINE

## 2021-07-25 PROCEDURE — 258N000003 HC RX IP 258 OP 636: Performed by: INTERNAL MEDICINE

## 2021-07-25 PROCEDURE — 36415 COLL VENOUS BLD VENIPUNCTURE: CPT | Performed by: STUDENT IN AN ORGANIZED HEALTH CARE EDUCATION/TRAINING PROGRAM

## 2021-07-25 PROCEDURE — 80048 BASIC METABOLIC PNL TOTAL CA: CPT | Performed by: INTERNAL MEDICINE

## 2021-07-25 PROCEDURE — 250N000013 HC RX MED GY IP 250 OP 250 PS 637: Performed by: STUDENT IN AN ORGANIZED HEALTH CARE EDUCATION/TRAINING PROGRAM

## 2021-07-25 PROCEDURE — 120N000002 HC R&B MED SURG/OB UMMC

## 2021-07-25 PROCEDURE — 84132 ASSAY OF SERUM POTASSIUM: CPT | Performed by: STUDENT IN AN ORGANIZED HEALTH CARE EDUCATION/TRAINING PROGRAM

## 2021-07-25 PROCEDURE — 250N000013 HC RX MED GY IP 250 OP 250 PS 637: Performed by: INTERNAL MEDICINE

## 2021-07-25 PROCEDURE — 99233 SBSQ HOSP IP/OBS HIGH 50: CPT | Performed by: INTERNAL MEDICINE

## 2021-07-25 PROCEDURE — 81001 URINALYSIS AUTO W/SCOPE: CPT | Performed by: INTERNAL MEDICINE

## 2021-07-25 PROCEDURE — 83735 ASSAY OF MAGNESIUM: CPT | Performed by: INTERNAL MEDICINE

## 2021-07-25 PROCEDURE — 36415 COLL VENOUS BLD VENIPUNCTURE: CPT | Performed by: INTERNAL MEDICINE

## 2021-07-25 PROCEDURE — 85004 AUTOMATED DIFF WBC COUNT: CPT | Performed by: INTERNAL MEDICINE

## 2021-07-25 PROCEDURE — 84132 ASSAY OF SERUM POTASSIUM: CPT | Performed by: INTERNAL MEDICINE

## 2021-07-25 PROCEDURE — 86140 C-REACTIVE PROTEIN: CPT | Performed by: INTERNAL MEDICINE

## 2021-07-25 PROCEDURE — 250N000011 HC RX IP 250 OP 636: Performed by: EMERGENCY MEDICINE

## 2021-07-25 PROCEDURE — 87086 URINE CULTURE/COLONY COUNT: CPT | Performed by: INTERNAL MEDICINE

## 2021-07-25 PROCEDURE — 250N000011 HC RX IP 250 OP 636: Performed by: INTERNAL MEDICINE

## 2021-07-25 RX ORDER — POTASSIUM CHLORIDE 750 MG/1
40 TABLET, EXTENDED RELEASE ORAL ONCE
Status: COMPLETED | OUTPATIENT
Start: 2021-07-25 | End: 2021-07-25

## 2021-07-25 RX ORDER — POTASSIUM CHLORIDE 750 MG/1
20 TABLET, EXTENDED RELEASE ORAL ONCE
Status: COMPLETED | OUTPATIENT
Start: 2021-07-25 | End: 2021-07-25

## 2021-07-25 RX ORDER — PIPERACILLIN SODIUM, TAZOBACTAM SODIUM 3; .375 G/15ML; G/15ML
3.38 INJECTION, POWDER, LYOPHILIZED, FOR SOLUTION INTRAVENOUS EVERY 6 HOURS
Status: DISCONTINUED | OUTPATIENT
Start: 2021-07-25 | End: 2021-07-27

## 2021-07-25 RX ORDER — POTASSIUM CHLORIDE 20MEQ/15ML
40 LIQUID (ML) ORAL ONCE
Status: DISCONTINUED | OUTPATIENT
Start: 2021-07-25 | End: 2021-07-25

## 2021-07-25 RX ORDER — POTASSIUM CHLORIDE 20MEQ/15ML
20 LIQUID (ML) ORAL ONCE
Status: DISCONTINUED | OUTPATIENT
Start: 2021-07-25 | End: 2021-07-25

## 2021-07-25 RX ADMIN — PIPERACILLIN AND TAZOBACTAM 3.38 G: 3; .375 INJECTION, POWDER, LYOPHILIZED, FOR SOLUTION INTRAVENOUS at 20:27

## 2021-07-25 RX ADMIN — VANCOMYCIN HYDROCHLORIDE 125 MG: 125 CAPSULE ORAL at 12:18

## 2021-07-25 RX ADMIN — POTASSIUM CHLORIDE 20 MEQ: 750 TABLET, EXTENDED RELEASE ORAL at 14:20

## 2021-07-25 RX ADMIN — SULFAMETHOXAZOLE AND TRIMETHOPRIM 1 TABLET: 400; 80 TABLET ORAL at 08:06

## 2021-07-25 RX ADMIN — LOSARTAN POTASSIUM AND HYDROCHLOROTHIAZIDE 1 TABLET: 50; 12.5 TABLET, FILM COATED ORAL at 08:06

## 2021-07-25 RX ADMIN — SODIUM CHLORIDE, POTASSIUM CHLORIDE, SODIUM LACTATE AND CALCIUM CHLORIDE 1000 ML: 600; 310; 30; 20 INJECTION, SOLUTION INTRAVENOUS at 10:11

## 2021-07-25 RX ADMIN — TAMSULOSIN HYDROCHLORIDE 0.4 MG: 0.4 CAPSULE ORAL at 08:07

## 2021-07-25 RX ADMIN — VANCOMYCIN HYDROCHLORIDE 125 MG: 125 CAPSULE ORAL at 20:27

## 2021-07-25 RX ADMIN — POTASSIUM CHLORIDE 40 MEQ: 750 TABLET, EXTENDED RELEASE ORAL at 12:28

## 2021-07-25 RX ADMIN — PIPERACILLIN AND TAZOBACTAM 3.38 G: 3; .375 INJECTION, POWDER, LYOPHILIZED, FOR SOLUTION INTRAVENOUS at 15:09

## 2021-07-25 RX ADMIN — POTASSIUM CHLORIDE 40 MEQ: 750 TABLET, EXTENDED RELEASE ORAL at 18:48

## 2021-07-25 RX ADMIN — SIMVASTATIN 10 MG: 10 TABLET, FILM COATED ORAL at 20:26

## 2021-07-25 RX ADMIN — VANCOMYCIN HYDROCHLORIDE 125 MG: 125 CAPSULE ORAL at 08:06

## 2021-07-25 RX ADMIN — Medication 1 CAPSULE: at 08:07

## 2021-07-25 RX ADMIN — INSULIN ASPART 2 UNITS: 100 INJECTION, SOLUTION INTRAVENOUS; SUBCUTANEOUS at 08:07

## 2021-07-25 RX ADMIN — ONDANSETRON 4 MG: 2 INJECTION INTRAMUSCULAR; INTRAVENOUS at 04:19

## 2021-07-25 RX ADMIN — Medication 1 CAPSULE: at 20:27

## 2021-07-25 RX ADMIN — INSULIN ASPART 2 UNITS: 100 INJECTION, SOLUTION INTRAVENOUS; SUBCUTANEOUS at 12:18

## 2021-07-25 RX ADMIN — VANCOMYCIN HYDROCHLORIDE 125 MG: 125 CAPSULE ORAL at 16:20

## 2021-07-25 RX ADMIN — INSULIN ASPART 2 UNITS: 100 INJECTION, SOLUTION INTRAVENOUS; SUBCUTANEOUS at 18:47

## 2021-07-25 ASSESSMENT — ACTIVITIES OF DAILY LIVING (ADL)
ADLS_ACUITY_SCORE: 15

## 2021-07-25 NOTE — PROGRESS NOTES
Diarrhea continues. Abdominal pain and nausea improved from yesterday. Potassium 3.0 and replaced, recheck 3.4 - will give another 40mEq. Tolerating PO intake. Up independently in room, watching Twins on TV and reading. Urine sent for culture and Zosyn IV started. Continue with cares.

## 2021-07-25 NOTE — PLAN OF CARE
Assumed cares [id number]. VSS on RA ex febrile. Given Tylenol x1 and has remained afebrile since. Up to commode independently. Endorsed abdominal pain when having bowel movements but declined any interventions. Voiding spontaneously and pt had multiple loose, watery BMs. One episode of emesis and given Zofran with relief. RPIV SL. BG at HS was 210 and given Novolog per sliding scale. Pt declined 0200 BG check. Sleeping in between cares. Will continue POC.

## 2021-07-25 NOTE — PROVIDER NOTIFICATION
5B. 5226. L.M. Temp of 100.8. Pt not feeling well, Tylenol last given at 1425. Thanks, Marianne #[id number]    Addendum: Provider came to assess patient at bedside.

## 2021-07-25 NOTE — PROVIDER NOTIFICATION
5B. 5226. L.M. FYI: Pt requesting fluids be stopped. LR currently not running. Thanks, Marianne #[id number]    Addendum: Provider discontinued LR continuous fluids.

## 2021-07-25 NOTE — PROGRESS NOTES
[facility]    Progress Note - Regi Fields Service        Date of Admission:  7/23/2021    Assessment & Plan      Kelly Barnes is a 81 year old female admitted on 7/23/2021. She was recently admitted 7/13-7/16 for pyelnephritis, infected stone and ureter stent placement with urology service. Presenting with sepsis 2/2 c. Diff.     #Sepsis due to C.diff, resolving:  #Diarrhea 2/2 c.diff   #Nausea,vomiting, improving  Diagnosis based on Temp > 38 C, HR > 90 bpm and WBC > 12 due to C. Diff.  Fever to 100.8 again on 7/24. WBC count continues to improve and abdominal exam reassuring. Continues to have frequent BMs.  - Antiemetic panel   - Continue oral vanc, will require at least 10 days (7/23-8/1) but would likely benefit from continuing until 2-3 days after completion of the course of her antibiotics.   - Trend CBC until WBC normalizes    #pyelonephritis with recent stent placement   Recent admission 7/13 - 7/16 for pyelonephritis. Stent placed by urology. Urine culture had grown e col resistant to amp/sulbactam and fluroquinolones, sensitive to cephalosporins and bactrim. Had been taking keflex after recent discharge and then recently transitioned to bactrim for ppx until stent removal. Briefly received IV ceftriaxone in the ER, transitioned back to PO bactrim.   - Repeat UA 7/25 with fever yesterday. May have to straight cath due to inability to get clean catch with frequent stools  - Continue bactrim for now. Would consider reescalating antibiotics if fevers persist, UA with increasing inflammation.   - Plan for outpatient urology follow up for stent removal     #Acute kidney injury, resolved   Likely prerenal in the setting of infection and frequent losses due to diarrhea. Repeat creatinine 1.05--baseline.   - Trend Cr with ongoing stool losses  - Repeat LR bolus 7/25 (1L over 10 hours)     #hypovolemic hyponatremia, resolved    #hypokalemia  Related to GI losses  -  K protocol ordered    #DM2  - hold oral anti-hyperglycemics   - POC glucose AC/bedtime + MSSI   - hypoglycemia protocol      #Home meds   - home aspirin on hold from prev admission, likely because of fresh stent.   - continue Losartan/hctz   - continue statin      #HCM   - has received COVID 19 vaccine      Diet: Combination Diet Regular Diet Adult    DVT Prophylaxis: Low Risk/Ambulatory with no VTE prophylaxis indicated  Blanchard Catheter: Not present  Fluids: PO ad linda  Central Lines: None  Code Status: Full Code      Disposition Plan   Expected discharge: 07/26/2021 recommended to prior living arrangement once able to maintain adequate PO .     The patient's care was discussed with the Bedside Nurse and Patient.    Wild Hernandez MD  [address]  Securely message with the Vocera Web Console (learn more here)  Text page via [employer] Paging/Directory  Please see sign in/sign out for up to date coverage information    ______________________________________________________________________    Interval History   Nursing notes reviewed. No acute events overnight. Fever to 100.8 last night and was not feeling well at the time. Still with frequent loose stools and intermittent nausea. She is concerned that pyelonephritis is returning.     4 point ROS otherwise negative    Data reviewed today: I reviewed all medications, new labs and imaging results over the last 24 hours. I personally reviewed no images or EKG's today.    Physical Exam   Vital Signs: Temp: 98.6  F (37  C) Temp src: Oral BP: 119/63 Pulse: 88   Resp: 16 SpO2: 95 % O2 Device: None (Room air)    Weight: 156 lbs 14.4 oz  General Appearance: Well appearing resting in bed in no acute distress   Respiratory: Breathing comfortably on room air, lungs clear to auscultation bilaterally without wheezes or crackles  Cardiovascular: RRR, no murmurs  GI: soft, nontender, no rebound or guarding  Skin: no rashes  or lesions noted on visualized skin    Data   Recent Labs   Lab 07/24/21  2106 07/24/21  1642 07/24/21  1134 07/24/21  0651 07/23/21  0208   WBC  --   --   --  18.0* 31.0*   HGB  --   --   --  12.0 12.9   MCV  --   --   --  84 82   PLT  --   --   --  395 501*   NA  --   --   --  136 129*  129*   POTASSIUM  --   --   --  3.6 3.8  3.8   CHLORIDE  --   --   --  104 96  95   CO2  --   --   --  24 23  23   BUN  --   --   --  13 21  22   CR  --   --   --  1.05* 1.43*  1.41*   ANIONGAP  --   --   --  8 10  11   JAN  --   --   --  8.1* 9.0  8.8   * 160* 232* 219* 285*  298*   ALBUMIN  --   --   --   --  3.0*   PROTTOTAL  --   --   --   --  7.4   BILITOTAL  --   --   --   --  0.5   ALKPHOS  --   --   --   --  108   ALT  --   --   --   --  26   AST  --   --   --   --  12   LIPASE  --   --   --   --  71*     No results found for this or any previous visit (from the past 24 hour(s)).  Medications       insulin aspart  1-7 Units Subcutaneous TID AC     insulin aspart  1-5 Units Subcutaneous At Bedtime     lactobacillus rhamnosus (GG)  1 capsule Oral BID     losartan-hydrochlorothiazide  1 tablet Oral Daily     simvastatin  10 mg Oral At Bedtime     sodium chloride (PF)  3 mL Intracatheter Q8H     sulfamethoxazole-trimethoprim  1 tablet Oral BID     tamsulosin  0.4 mg Oral Daily     tolterodine ER  4 mg Oral Daily     vancomycin  125 mg Oral 4x Daily

## 2021-07-26 LAB
ANION GAP SERPL CALCULATED.3IONS-SCNC: 8 MMOL/L (ref 3–14)
BACTERIA UR CULT: NORMAL
BUN SERPL-MCNC: 10 MG/DL (ref 7–30)
CALCIUM SERPL-MCNC: 8.5 MG/DL (ref 8.5–10.1)
CHLORIDE BLD-SCNC: 104 MMOL/L (ref 94–109)
CO2 SERPL-SCNC: 22 MMOL/L (ref 20–32)
CREAT SERPL-MCNC: 1.01 MG/DL (ref 0.52–1.04)
ERYTHROCYTE [DISTWIDTH] IN BLOOD BY AUTOMATED COUNT: 13.1 % (ref 10–15)
GFR SERPL CREATININE-BSD FRML MDRD: 53 ML/MIN/1.73M2
GLUCOSE BLD-MCNC: 213 MG/DL (ref 70–99)
GLUCOSE BLDC GLUCOMTR-MCNC: 205 MG/DL (ref 70–99)
GLUCOSE BLDC GLUCOMTR-MCNC: 207 MG/DL (ref 70–99)
GLUCOSE BLDC GLUCOMTR-MCNC: 213 MG/DL (ref 70–99)
GLUCOSE BLDC GLUCOMTR-MCNC: 269 MG/DL (ref 70–99)
HCT VFR BLD AUTO: 37.4 % (ref 35–47)
HGB BLD-MCNC: 12.2 G/DL (ref 11.7–15.7)
HOLD SPECIMEN: NORMAL
MCH RBC QN AUTO: 27.4 PG (ref 26.5–33)
MCHC RBC AUTO-ENTMCNC: 32.6 G/DL (ref 31.5–36.5)
MCV RBC AUTO: 84 FL (ref 78–100)
PLATELET # BLD AUTO: 457 10E3/UL (ref 150–450)
POTASSIUM BLD-SCNC: 3.6 MMOL/L (ref 3.4–5.3)
POTASSIUM BLD-SCNC: 3.6 MMOL/L (ref 3.4–5.3)
RBC # BLD AUTO: 4.46 10E6/UL (ref 3.8–5.2)
SODIUM SERPL-SCNC: 134 MMOL/L (ref 133–144)
WBC # BLD AUTO: 10 10E3/UL (ref 4–11)

## 2021-07-26 PROCEDURE — 85027 COMPLETE CBC AUTOMATED: CPT | Performed by: INTERNAL MEDICINE

## 2021-07-26 PROCEDURE — 250N000011 HC RX IP 250 OP 636: Performed by: INTERNAL MEDICINE

## 2021-07-26 PROCEDURE — 99221 1ST HOSP IP/OBS SF/LOW 40: CPT | Performed by: PHYSICIAN ASSISTANT

## 2021-07-26 PROCEDURE — 258N000003 HC RX IP 258 OP 636: Performed by: STUDENT IN AN ORGANIZED HEALTH CARE EDUCATION/TRAINING PROGRAM

## 2021-07-26 PROCEDURE — 36415 COLL VENOUS BLD VENIPUNCTURE: CPT | Performed by: INTERNAL MEDICINE

## 2021-07-26 PROCEDURE — 99233 SBSQ HOSP IP/OBS HIGH 50: CPT | Mod: GC | Performed by: INTERNAL MEDICINE

## 2021-07-26 PROCEDURE — 99222 1ST HOSP IP/OBS MODERATE 55: CPT | Performed by: PHYSICIAN ASSISTANT

## 2021-07-26 PROCEDURE — 120N000002 HC R&B MED SURG/OB UMMC

## 2021-07-26 PROCEDURE — 250N000013 HC RX MED GY IP 250 OP 250 PS 637: Performed by: EMERGENCY MEDICINE

## 2021-07-26 PROCEDURE — 250N000013 HC RX MED GY IP 250 OP 250 PS 637: Performed by: STUDENT IN AN ORGANIZED HEALTH CARE EDUCATION/TRAINING PROGRAM

## 2021-07-26 PROCEDURE — 80048 BASIC METABOLIC PNL TOTAL CA: CPT | Performed by: INTERNAL MEDICINE

## 2021-07-26 PROCEDURE — 250N000013 HC RX MED GY IP 250 OP 250 PS 637: Performed by: INTERNAL MEDICINE

## 2021-07-26 RX ADMIN — PIPERACILLIN AND TAZOBACTAM 3.38 G: 3; .375 INJECTION, POWDER, LYOPHILIZED, FOR SOLUTION INTRAVENOUS at 08:48

## 2021-07-26 RX ADMIN — PIPERACILLIN AND TAZOBACTAM 3.38 G: 3; .375 INJECTION, POWDER, LYOPHILIZED, FOR SOLUTION INTRAVENOUS at 14:40

## 2021-07-26 RX ADMIN — SODIUM CHLORIDE, POTASSIUM CHLORIDE, SODIUM LACTATE AND CALCIUM CHLORIDE 500 ML: 600; 310; 30; 20 INJECTION, SOLUTION INTRAVENOUS at 16:21

## 2021-07-26 RX ADMIN — VANCOMYCIN HYDROCHLORIDE 125 MG: 125 CAPSULE ORAL at 12:30

## 2021-07-26 RX ADMIN — VANCOMYCIN HYDROCHLORIDE 125 MG: 125 CAPSULE ORAL at 08:47

## 2021-07-26 RX ADMIN — PIPERACILLIN AND TAZOBACTAM 3.38 G: 3; .375 INJECTION, POWDER, LYOPHILIZED, FOR SOLUTION INTRAVENOUS at 19:56

## 2021-07-26 RX ADMIN — VANCOMYCIN HYDROCHLORIDE 125 MG: 125 CAPSULE ORAL at 19:55

## 2021-07-26 RX ADMIN — VANCOMYCIN HYDROCHLORIDE 125 MG: 125 CAPSULE ORAL at 16:22

## 2021-07-26 RX ADMIN — Medication 1 CAPSULE: at 19:55

## 2021-07-26 RX ADMIN — TAMSULOSIN HYDROCHLORIDE 0.4 MG: 0.4 CAPSULE ORAL at 08:47

## 2021-07-26 RX ADMIN — INSULIN ASPART 2 UNITS: 100 INJECTION, SOLUTION INTRAVENOUS; SUBCUTANEOUS at 17:55

## 2021-07-26 RX ADMIN — TOLTERODINE 4 MG: 4 CAPSULE, EXTENDED RELEASE ORAL at 08:47

## 2021-07-26 RX ADMIN — INSULIN ASPART 2 UNITS: 100 INJECTION, SOLUTION INTRAVENOUS; SUBCUTANEOUS at 12:29

## 2021-07-26 RX ADMIN — INSULIN ASPART 2 UNITS: 100 INJECTION, SOLUTION INTRAVENOUS; SUBCUTANEOUS at 08:54

## 2021-07-26 RX ADMIN — PIPERACILLIN AND TAZOBACTAM 3.38 G: 3; .375 INJECTION, POWDER, LYOPHILIZED, FOR SOLUTION INTRAVENOUS at 02:30

## 2021-07-26 RX ADMIN — SIMVASTATIN 10 MG: 10 TABLET, FILM COATED ORAL at 22:02

## 2021-07-26 RX ADMIN — Medication 1 CAPSULE: at 08:47

## 2021-07-26 ASSESSMENT — ACTIVITIES OF DAILY LIVING (ADL)
ADLS_ACUITY_SCORE: 15

## 2021-07-26 NOTE — PROGRESS NOTES
Patient is alert and orient, up independently in the room, had watery BM X 2 today and MD is aware of it, Urology and ID team saw patient, still on IV and oral antibiotics, appetite is fair,  and 213. No nausea, rash on the upper back that is getting better now, had shower this afternoon and felt very good.  Continue to monitor.

## 2021-07-26 NOTE — CONSULTS
[facility] CONSULTATION     Patient:  Kelly Barnes   YOB: 1940  Date of Visit:  07/26/2021  Date of Admission: 7/23/2021  Consult Requester:Wild Hernandez MD          Assessment and Recommendations:   ID Problem List:  1. C diff colitis, first episode  2. GERSON, improved  3. Recent E coli pyelonephritis r/t multiple nonobstructing right kidney stones s/p R ureteral stent 7/15; future PCNL planned in next 3-4 weeks per Urology  4. DMT2    Recommendations:  1. Continue oral Vancomycin 125mg QID for treatment of C diff  2. Stop Zosyn  3. Start cefpodoxime 100mg BID for renal stone prophylaxis (previously tolerated this cephalosporin and no side-chain cross-reactivity with cephalexin)      Discussion:  Kelly Barnes is a 81 year old female with PMH significant for HTN, HLD, DM2, CKDIII, and recent admission 7/13-7/16 for E coli pyelonephritis in setting of multiple non-obstructing kidney stones s/p R ureteral stent 7/15 who was admitted on 7/23 and found to have C diff colitis. UC on admission grew only urogenital erum, but with fever of 100.8 on 7/24 a second UA was collected and had higher WBC and RBC counts. UC from that sample is pending. WBC and RBC seen on UA is most consistent with known right ureteral stent given lack of urinary symptoms and lack of CT AP findings to support pyelonephritis. Overall clinical picture seems most consistent with C diff colitis rather than a recurrent or ongoing pyelonephritis. Temp of 100.8 developed less than 48 hours after starting C diff treatment and is within timeframe where fever from C diff would still be expected. She is now afebrile for >24 hours and diarrhea is beginning to improve although still high frequency and very watery. Leukocytosis has also improved dramatically from 31 on admission to 10 today. As such would continue oral Vancomycin for C diff treatment, and transition Zosyn to renal prophylaxis until  lithotripsy per Urology. Would use cefpodoxime, which she has previously tolerated, rather than Bactrim given GERSON on presentation.     Thank you for the consult. ID will continue to follow with you.     Lanny Moreno PA-C   Pronouns: she/her/hers  Infectious Diseases  Pager: [phone]  07/26/2021  ________________________________________________________________    Consult Question: Recurrent pyelonephritis after stent placement, complicated by c diff, consult regarding duration of treatment  Admission Diagnosis: C. difficile colitis [A04.72]  Acute kidney injury (H) [N17.9]  Fever in adult [R50.9]  Diarrhea, unspecified type [R19.7]  Encounter for screening laboratory testing for severe acute respiratory syndrome coronavirus 2 (SARS-CoV-2) [Z20.822]         History of Present Illness:   History obtained from review of chart and discussion with patient.     Kelly Barnes is a 81 year old female with PMH significant for HTN, HLD, DM2, CKDIII, and recent admission 7/13-7/16 for E coli  pyelonephritis in setting of multiple non-obstructing kidney stones s/p R ureteral stent 7/15 who presented to the ED on 7/23 with 3 days of loose, nonbloody diarrhea, intermittent fever, and 1 days of vomiting.    During her previous admission she presented with fever and abdominal discomfort 7/13. CT AP revealed multiple large nonobstructing right renal stones without hydronephrosis. UC 7/13 grew >100K colonies E coli (R to FQLs, amp, amp/sulbactam). She had right ureteral stent placed by urology on 7/15. UC at that time again grew E coli, 10-50K colonies. She was treated with ceftriaxone while in the hospital and then transitioned to Keflex QID for one week, then BID until PCNL could be completed by Urology. She was seen by her PCP on 7/21 and apparently developed a rash that was not typical of a drug rash per clinic note. Antibiotics switched to Bactrim at that point.     She then presented to ED on 7/23 due to 3 days of loose,  nonbloody diarrhea, intermittent fever, and 1 days of vomiting. CT AP revealed stable right ureteral stent, but findings concerning for infectious colitis. Enteric panel negative; C diff positive. UA revealed large blood and LE, 50 WBC and 10 RBC with few bacteria. Cx grew <10K colonies of urogenital erum. 2 sets of BC from admission with NGTD. She was started on oral Vancomycin. Received a dose of ceftriaxone in the ED, but continued on Bactrim on admission. She developed fever up to 100.8 on 7/24. Bactrim switched to Zosyn on 7/25. Repeat UA collected again with large blood and LE, >182 WBC and 150 RBC, no bacteria, but many yeast and 10 hyaline casts. UC is pending.     Today Kelly reports she feels slightly better at the moment. She reports BM about every 2 hours. Often with great urgency. She reports initially stools were watery and green. Now there is some solid material present. She is planning to try to eat a bit for lunch, hard boiled egg and yogurt. She had applesauce and crackers earlier. She denies any current urinary symptoms. She thinks in hindsight during her last admission she may have had some very mild urinary symptoms. She currently denies kidney pain. She endorses fevers at home prior to both of the recent admissions. She took Bactrim for a few doses at home prior to current admission and reports she didn't feel any better.             Review of Systems:   10 point review of systems was negative except for noted as above in HPI.            Past Medical History:     Past Medical History:   Diagnosis Date     Acute kidney injury (H) 12/19/2020     Allergic rhinitis, cause unspecified      Aortic stenosis 2/29/2016    With new murmur noted 2/2016, normal echo, repeat echo 2/2017.     Atypical chest pain 7/24/2015     cuboid     left foot     Hyperlipidemia LDL goal <100 11/1/2012     Hypertension goal BP (blood pressure) < 140/90      Musculoskeletal chest pain 11/25/2016     Obesity, Class I, BMI  30-34.9 11/11/2015     Pneumonia 3/16     Sepsis, due to unspecified organism, unspecified whether acute organ dysfunction present (H) 12/19/2020     Type 2 diabetes, HbA1c goal < 7% (H)             Past Surgical History:     Past Surgical History:   Procedure Laterality Date     CYSTOSCOPY, RETROGRADES, INSERT STENT URETER(S), COMBINED Right 7/15/2021    Procedure: CYSTOURETEROSCOPY, WITH RETROGRADE PYELOGRAM,  AND STENT INSERTION RIGHT;  Surgeon: Kirk Law MD;  Location: [facility] OR     C NONSPECIFIC PROCEDURE      none            Family History:   Reviewed and non-contributory.   Family History   Problem Relation Age of Onset     Hypertension Mother      Diabetes No family hx of      Cerebrovascular Disease No family hx of      Breast Cancer No family hx of      Cancer - colorectal No family hx of      Prostate Cancer No family hx of             Social History:     Social History     Tobacco Use     Smoking status: Never Smoker     Smokeless tobacco: Never Used   Substance Use Topics     Alcohol use: Yes     Alcohol/week: 10.0 standard drinks     Comment: 1-2 drinks per week     History   Sexual Activity     Sexual activity: Yes     Partners: Male            Current Medications:       insulin aspart  1-7 Units Subcutaneous TID AC     insulin aspart  1-5 Units Subcutaneous At Bedtime     lactobacillus rhamnosus (GG)  1 capsule Oral BID     losartan-hydrochlorothiazide  1 tablet Oral Daily     piperacillin-tazobactam  3.375 g Intravenous Q6H     simvastatin  10 mg Oral At Bedtime     sodium chloride (PF)  3 mL Intracatheter Q8H     tamsulosin  0.4 mg Oral Daily     tolterodine ER  4 mg Oral Daily     vancomycin  125 mg Oral 4x Daily            Allergies:     Allergies   Allergen Reactions     Ibuprofen Other (See Comments)     numbness in hands and feet     Keflex [Cephalexin] Rash            Physical Exam:   Vitals were reviewed  Temp:  [96.9  F (36.1  C)-98.1  F (36.7  C)] 98.1  F (36.7  C)  Pulse:   [] 69  Resp:  [16-20] 20  BP: (110-125)/(64-69) 110/66  SpO2:  [92 %-100 %] 92 %    Vitals:    07/23/21 0047 07/23/21 2109 07/24/21 1548 07/25/21 1911   Weight: 70.2 kg (154 lb 12.8 oz) 72.3 kg (159 lb 6.3 oz) 71.2 kg (156 lb 14.4 oz) 66.5 kg (146 lb 9.6 oz)       Constitutional: Adult female seen sitting on EOB, in NAD. Awake, alert, interactive.  HEENT: NC/AT, EOMI, sclera clear, conjunctiva normal, OP with MMM  Respiratory: No increased work of breathing, CTAB, no crackles or wheezing.  Cardiovascular: RRR, no murmur noted. 2+ nonpitting peripheral edema.  GI: +bowel sounds, soft, non-distended and non-tender.  Skin: Warm, dry, well-perfused. No bruising, bleeding, rashes, or lesions on limited exam.  Musculoskeletal: Extremities grossly normal, non-tender. Good strength and ROM in bed.   Neurologic: A&O. Answers questions appropriately, speech normal. Moves all extremities spontaneously.  Neuropsychiatric: Calm. Affect appropriate to situation.           Laboratory Data:     Microbiology:  Culture   Date Value Ref Range Status   07/23/2021 No growth after 3 days  Preliminary   07/23/2021 No growth after 3 days  Preliminary   07/23/2021 <10,000 CFU/mL Urogenital erum  Final   07/15/2021 Culture in progress  Final   07/15/2021 10,000-50,000 CFU/mL Escherichia coli (A)  Final   07/15/2021 <1,000 CFU/mL Urogenital erum  Final   07/13/2021 No Growth  Final   07/13/2021 No Growth  Final   07/13/2021 >100,000 CFU/mL Escherichia coli (A)  Final     Culture Micro   Date Value Ref Range Status   12/19/2020 No growth  Final   12/19/2020 No growth  Final   12/19/2020   Final    10,000 to 50,000 colonies/mL  mixed urogenital erum  Susceptibility testing not routinely done     12/19/2020 >100,000 colonies/mL  Escherichia coli   (A)  Final   07/03/2019 >100,000 colonies/mL  Escherichia coli   (A)  Final   07/03/2019   Final    10,000 to 50,000 colonies/mL  mixed urogenital erum  Susceptibility testing not routinely  done     03/02/2016 No growth  Final   03/02/2016 No growth  Final   03/02/2016 No growth  Final   03/02/2016   Final    50,000 to 100,000 colonies/mL mixed urogenital erum Susceptibility testing not   routinely done     02/29/2016   Final    >100,000 colonies/mL mixed urogenital erum Susceptibility testing not routinely   done     02/26/2013   Final    50 to 100,000 colonies/mL Mixed gram positive erum  Multiple species present, probable perineal contamination.  Susceptibility testing not routinely done       Inflammatory Markers    Recent Labs   Lab Test 07/25/21  0839 12/21/20  0705 12/19/20  2307 03/02/16  0837 02/29/16  0828   SED  --   --  30 15 15   .0* 63.0* 140.0* 110.0* 36.0*       Metabolic Studies       Recent Labs   Lab Test 07/26/21  0815 07/26/21  0754 07/25/21  2336 07/25/21  2146 07/25/21  1825 07/25/21  1614 07/25/21  1142 07/25/21  0839 07/24/21  1801 07/24/21  0651 07/23/21  0208 07/16/21  0736 07/15/21  0839 07/13/21  0724 07/13/21  0721   NA  --  134  --   --   --   --   --  131*  --  136 129*  129* 136 138   < > 133   POTASSIUM  --  3.6 3.6  --   --  3.4  --  3.0*  --  3.6 3.8  3.8 4.0 4.2   < > 3.8   CHLORIDE  --  104  --   --   --   --   --  101  --  104 96  95 107 107   < > 100   CO2  --  22  --   --   --   --   --  21  --  24 23  23 23 23   < > 25   ANIONGAP  --  8  --   --   --   --   --  9  --  8 10  11 6 8   < > 8   BUN  --  10  --   --   --   --   --  8  --  13 21  22 16 16   < > 20   CR  --  1.01  --   --   --   --   --  0.93  --  1.05* 1.43*  1.41* 1.01 1.03   < > 1.14*   GFRESTIMATED  --  53*  --   --   --   --   --  58*  --  50* 35*  35* 53* 51*   < > 46*   * 213*  --  225* 202*  --  202* 265*  --  219* 285*  298* 200* 166*   < > 205*   A1C  --   --   --   --   --   --   --   --   --   --   --   --   --   --  7.4*   JAN  --  8.5  --   --   --   --   --  8.0*  --  8.1* 9.0  8.8 8.2* 8.5   < > 8.4*   MAG  --   --   --   --   --   --   --  1.8  --   --   --    --  2.4*   < >  --    LACT  --   --   --   --   --   --   --   --  1.5  --  1.3  --   --   --   --     < > = values in this interval not displayed.       Hepatic Studies    Recent Labs   Lab Test 07/23/21  0208 07/13/21  0721 12/19/20  2307 10/07/20  0831 10/15/19  0920 10/25/18  0920   BILITOTAL 0.5 0.8 0.5 0.3 0.4 0.4   ALKPHOS 108 84 87 91 86 86   ALBUMIN 3.0* 3.5 3.1* 3.5 3.8 3.8   AST 12 18 15 15 15 16   ALT 26 20 19 20 21 18       Pancreatitis testing    Recent Labs   Lab Test 07/23/21  0208 07/13/21  0723 05/11/21  0724 10/07/20  0831 10/15/19  0920 10/25/18  0920 03/28/17  0941 10/27/16  0825   LIPASE 71* 76  --   --   --   --   --   --    TRIG  --   --  68 90 90 76 83 93       Hematology Studies      Recent Labs   Lab Test 07/25/21  1614 07/25/21  0839 07/24/21  0651 07/23/21  0208 07/16/21  0736 07/15/21  0839 12/21/20  0705 12/20/20  0654 12/19/20  2307 12/19/20  1110 07/03/19  1734 03/28/17  0941 03/04/16  0930   WBC 14.0* 16.4* 18.0* 31.0* 8.0 7.7 9.2   < > 17.7* 18.2* 11.3* 7.1 12.8*   ANEU  --   --   --   --   --   --  7.1  --  16.1* 16.0* 9.6* 4.9 10.8*   ALYM  --   --   --   --   --   --  1.1  --  0.5* 1.1 0.8 1.6 0.8   GINETTE  --   --   --   --   --   --  0.9  --  1.0 1.0 0.8 0.5 1.2   AEOS  --   --   --   --   --   --  0.1  --  0.0 0.0 0.1 0.1 0.0   HGB 12.3 11.4* 12.0 12.9 11.7 12.2 11.3*   < > 12.8 14.9 13.2 14.7 14.1   HCT 36.9 34.6* 37.0 38.9 35.6 38.3 34.8*   < > 38.7 44.5 39.3 43.8 40.7    400 395 501* 221 212 287   < > 330 364 288 347 332    < > = values in this interval not displayed.       Urine Studies     Recent Labs   Lab Test 07/25/21  1230 07/23/21  0140 07/13/21  0724 12/19/20  2242 12/19/20  1109   URINEPH 6.0 5.0 5.5 5.5 5.5   NITRITE Negative Negative Positive* Positive* Positive*   LEUKEST Large* Large* Large* Large* Large*   WBCU >182* 50* >100* >182* *       Last check of C difficile  C Difficile Toxin B by PCR   Date Value Ref Range Status   07/23/2021 Positive  (A) Negative Final     Comment:     Detection of C. difficile nucleic acid in stools confirms the presence of these organisms in diarrheal patients but may not indicate that C. difficile are the etiologic agents of the diarrhea. Results from the Xpert C. difficile assay should be interpreted in conjunction with other laboratory and clinical data available to the clinician.              Imaging:   CT AP 7/23  IMPRESSION:   1.  Appropriately positioned right nephroureteral stent. Large right staghorn calculus is unchanged.  2.  Development of wall thickening of long segment of the transverse, descending and to a greater extent sigmoid colon, likely an infectious colitis. Diffuse colonic diverticulosis but no specific diverticula are inflamed.

## 2021-07-26 NOTE — PLAN OF CARE
Assumed cares [id number]. VSS on RA. Up independently in room. Endorsed abdominal pain when having bowel movements but otherwise denies pain. Voiding spontaneously and continues to have multiple loose, watery BMs. Intermittent nausea but declined Zofran. RPIV SL. BG at HS was 225 and given Novolog per sliding scale. Pt declined 0200 BG check. Sleeping in between cares. Pt reports that overall she is feeling improved. Will continue POC.

## 2021-07-26 NOTE — CONSULTS
Urology Consult History and Physical    Name: Kelly Barnes    MRN: [id number]   YOB: 1940       We were asked to see Kelly Barnes at the request of Dr. Benjamin for evaluation and treatment of the following chief complaint.          Chief Complaint:   - Recurrent pyelonephritis, s/p ureteral stent placement, c diff  History is obtained from patient and review of records          History of Present Illness:   Kelly Barnes is a 81 year old female with PMH significant for HTN, DM II who was admitted 7/13/21 with fevers and found to have a large right staghorn calculus in the setting of pyelonephritis.  IR felt PNT drainage would be suboptimal.  A ureteral stent was thus placed by Dr. Law on 7/15/21.  With stent placement, hydronephrotic drip was noted and a urine collection from the right renal pelvis grew E coli (resistant to amp,ampsul, quinolones) but susceptible to cephalosporins and bactrim.   In the hospital the was receiving ceftriaxone, but was discharged on 7/16 with cephalexin 500mg qid x 7 days then bid til planned PCNL (given 2 weeks of abx).  The Keflex was changed to Bactrim when she developed a rash.      Meanwhile, on 7/23/21 she was admitted through the ED with sepsis due to C diff, now on oral vancomycin.  A CT confirmed infectious colitis - the right ureteral stent was properly positioned in the kidney.  A urine culture on 7/23/21 grew <10K mixed , nonetheless she has been continued on Bactrim for UTI prevention/treatment.  There is a repeat culture from yesterday pending. There are no pending urology appointments and the patient tells me she has had a little trouble connecting with urology team.     Today she is finally feeling better.  Diarrhea is slowing and becoming less urgent. Still having some mixed stool and urine accidents.  Typically has good urinary continence.  No dysuria, hematuria.  No flank pain and she rarely feels the stent at all.            Past Medical  History:     Past Medical History:   Diagnosis Date     Acute kidney injury (H) 12/19/2020     Allergic rhinitis, cause unspecified      Aortic stenosis 2/29/2016    With new murmur noted 2/2016, normal echo, repeat echo 2/2017.     Atypical chest pain 7/24/2015     cuboid     left foot     Hyperlipidemia LDL goal <100 11/1/2012     Hypertension goal BP (blood pressure) < 140/90      Musculoskeletal chest pain 11/25/2016     Obesity, Class I, BMI 30-34.9 11/11/2015     Pneumonia 3/16     Sepsis, due to unspecified organism, unspecified whether acute organ dysfunction present (H) 12/19/2020     Type 2 diabetes, HbA1c goal < 7% (H)             Past Surgical History:     Past Surgical History:   Procedure Laterality Date     CYSTOSCOPY, RETROGRADES, INSERT STENT URETER(S), COMBINED Right 7/15/2021    Procedure: CYSTOURETEROSCOPY, WITH RETROGRADE PYELOGRAM,  AND STENT INSERTION RIGHT;  Surgeon: Kirk Law MD;  Location: [facility] OR     [facility] NONSPECIFIC PROCEDURE      none            Social History:     Social History     Tobacco Use     Smoking status: Never Smoker     Smokeless tobacco: Never Used   Substance Use Topics     Alcohol use: Yes     Alcohol/week: 10.0 standard drinks     Comment: 1-2 drinks per week            Family History:     Family History   Problem Relation Age of Onset     Hypertension Mother      Diabetes No family hx of      Cerebrovascular Disease No family hx of      Breast Cancer No family hx of      Cancer - colorectal No family hx of      Prostate Cancer No family hx of             Allergies:     Allergies   Allergen Reactions     Ibuprofen Other (See Comments)     numbness in hands and feet     Keflex [Cephalexin] Rash            Medications:     Current Facility-Administered Medications   Medication     acetaminophen (TYLENOL) tablet 650 mg     glucose gel 15-30 g    Or     dextrose 50 % injection 25-50 mL    Or     glucagon injection 1 mg     insulin aspart (NovoLOG) injection (RAPID  ACTING)     insulin aspart (NovoLOG) injection (RAPID ACTING)     lactobacillus rhamnosus (GG) (CULTURELL) capsule 1 capsule     lidocaine (LMX4) cream     lidocaine 1 % 0.1-1 mL     losartan-hydrochlorothiazide (HYZAAR) 50-12.5 MG per tablet 1 tablet     melatonin tablet 1 mg     piperacillin-tazobactam (ZOSYN) 3.375 g vial to attach to  mL bag     simvastatin (ZOCOR) tablet 10 mg     sodium chloride (PF) 0.9% PF flush 3 mL     sodium chloride (PF) 0.9% PF flush 3 mL     tamsulosin (FLOMAX) capsule 0.4 mg     tolterodine ER (DETROL LA) 24 hr capsule 4 mg     vancomycin (VANCOCIN) capsule 125 mg             Review of Systems:    ROS: See HPI for pertinent details.  Remainder of 10-point ROS negative.          Physical Exam:   VS:  T: 98.1    HR: 69    BP: 110/66    RR: 20   GEN:  AOx3.  NAD.  Pleasant.  HEENT:  Sclerae anicteric.  Conjunctivae pink.  Moist mucous membranes  LUNGS: Non-labored breathing.  EXT:  Warm, well perfused.    SKIN:  Warm.  Dry.  No rashes.  NEURO:  CN grossly intact.         Data:   All laboratory data reviewed:    Recent Labs   Lab 07/26/21  0758 07/25/21  1614 07/25/21  0839 07/24/21  0651   WBC 10.0 14.0* 16.4* 18.0*   HGB 12.2 12.3 11.4* 12.0   * 388 400 395     Recent Labs   Lab 07/26/21  0815 07/26/21  0754 07/25/21  2336 07/25/21  2146 07/25/21  1825 07/25/21  1614 07/25/21  0839 07/24/21  0651 07/23/21  0208   NA  --  134  --   --   --   --  131* 136 129*  129*   POTASSIUM  --  3.6 3.6  --   --  3.4 3.0* 3.6 3.8  3.8   CHLORIDE  --  104  --   --   --   --  101 104 96  95   CO2  --  22  --   --   --   --  21 24 23  23   BUN  --  10  --   --   --   --  8 13 21  22   CR  --  1.01  --   --   --   --  0.93 1.05* 1.43*  1.41*   * 213*  --  225* 202*  --  265* 219* 285*  298*   JAN  --  8.5  --   --   --   --  8.0* 8.1* 9.0  8.8   MAG  --   --   --   --   --   --  1.8  --   --      Recent Labs   Lab 07/25/21  1230   COLOR Light Yellow   APPEARANCE Slightly  Cloudy*   URINEGLC 100 *   URINEBILI Negative   URINEKETONE 10 *   SG 1.011   URINEPH 6.0   PROTEIN 50 *   NITRITE Negative   LEUKEST Large*   RBCU 159*   WBCU >182*     Results for orders placed or performed during the hospital encounter of 07/23/21   Urine Culture    Specimen: Urine, Clean Catch   Result Value Ref Range    Culture Culture in progress    Urine Culture    Specimen: Urine, Midstream   Result Value Ref Range    Culture <10,000 CFU/mL Urogenital erum        All pertinent imaging reviewed:  EXAM: CT ABDOMEN PELVIS W/O CONTRAST  LOCATION: [facility]  DATE/TIME: 7/23/2021 3:47 AM     INDICATION: Abdominal pain, fever  COMPARISON: CT of the abdomen and pelvis 07/13/2021  TECHNIQUE: CT scan of the abdomen and pelvis was performed without IV contrast. Multiplanar reformats were obtained. Dose reduction techniques were used.  CONTRAST: None.     FINDINGS:   LOWER CHEST: Linear bands of atelectasis in the medial and posterior lower lobes associated with cylindrical bronchiectasis. Small hiatal hernia.     HEPATOBILIARY: Nondistended gallbladder. No calcified gallstones. No bile duct enlargement. Liver is not enlarged. Smooth liver border. No liver lesions.     PANCREAS: Normal.     SPLEEN: Normal.     ADRENAL GLANDS: Normal.     KIDNEYS/BLADDER: Proximal portion of right nephroureteral stent resides in an upper pole calyx and distal portion appropriately in the right posterior bladder. Large right staghorn calculus which occupies most of the renal pelvis and extends into   multiple upper and lower pole calyces, unchanged. Left kidney is normal in size without nephrolithiasis. Urinary bladder contains mild to moderate amount of urine and has a smooth uniform wall. Tiny focus of air in the nondependent bladder from   instrumentation.     BOWEL: Decompressed stomach. Normal caliber small bowel. There are numerous diverticula throughout all segments of the colon.  There are no specific diverticula which are enlarged/inflamed and there is no localized inflammatory stranding in the adjacent   mesenteric fat. Transverse and descending colon are decompressed however the wall wall appears slightly thicker than 10 days earlier. While the sigmoid colon is also decompressed wall thickening is more convincing.     LYMPH NODES: Normal.     VASCULATURE: Mild aortoiliac atheromatous calcifications.     PELVIC ORGANS: The uterus is normal in size. No mass in the adnexa or pelvic free fluid.     MUSCULOSKELETAL: Lower lumbar facet arthropathy. Minimal anterolisthesis of L4 on L5. Small lower thoracic degenerative osteophytes. No new bone or body wall abnormalities.                                                                      IMPRESSION:      1.  Appropriately positioned right nephroureteral stent. Large right staghorn calculus is unchanged.  2.  Development of wall thickening of long segment of the transverse, descending and to a greater extent sigmoid colon, likely an infectious colitis. Diffuse colonic diverticulosis but no specific diverticula are inflamed.         Impression and Plan:   Impression / Plan:   Kelly Barnes is a 81 year old female with a right staghorn calculus that lead to pyelonephritis requiring hospitalization and right ureteral stent placement on 7/15/21 with Dr. Law.     She now is readmitted with sepsis due to C diff.        - Fortunately, she is tolerating the right stent without difficulty.  The UA was dirty, but this is common in the setting of a stent.  UCx from 7/23 was negative.  A CT scan shows a properly positioned stent without hydronephrosis.      - We will coordinate percutaneous nephrolithotomy for her in the next 3-4 weeks.  Dr. Law is working on this. From Urologic standpoint we would prefer she remain on prophylactic antibiotics, but would recommend consulting ID to determine best regimen given recent pyelo then C diff.       Urology will sign off  Will place urology contact info in the d/c navigator, but we should be contacting her.      Discussed with Dr. Law    Thank you for the opportunity to participate in the care of Kelly Barnes.     BONITA NevarezC  Urology Physician Assistant  Personal Pager: [phone]    Please call Job Code:   [phone] to reach the Urology resident or PA on call - Weekdays  [phone] to reach the Urology resident or PA on call - Weeknights and weekends

## 2021-07-26 NOTE — PROGRESS NOTES
[facility]    Progress Note - [facility]        Date of Admission:  7/23/2021    Assessment & Plan      Kelly Barnes is a 81 year old female admitted on 7/23/2021. She was recently admitted 7/13-7/16 for pyelnephritis, infected stone and ureter stent placement with urology service. Presenting with sepsis 2/2 c. Diff.     #Sepsis due to C.diff, resolving:  #Diarrhea 2/2 c.diff   #Nausea,vomiting, improving  Diagnosis based on Temp > 38 C, HR > 90 bpm and WBC > 12 due to C. Diff.  Fever to 100.8 again on 7/24. WBC count continues to improve and abdominal exam reassuring. Continues to have frequent BMs.  - Antiemetic panel   - Continue oral vanc, will require at least 10 days (7/23-8/1) but would likely benefit from continuing until 2-3 days after completion of the course of her antibiotics.   - Trend CBC until WBC normalizes    #pyelonephritis with recent stent placement   Recent admission 7/13 - 7/16 for pyelonephritis. Stent placed by urology. Urine culture had grown e col resistant to amp/sulbactam and fluroquinolones, sensitive to cephalosporins and bactrim. Had been taking keflex after recent discharge and then recently transitioned to bactrim for ppx until stent removal. Briefly received IV ceftriaxone in the ER, transitioned back to PO bactrim. UA 7/26 after fever with increased WBC, culture pending, broadened to zosyn  - Continue Zosyn, pending culture  - Urology and Infectious disease consulted, appreciate recs  - Plan for outpatient urology follow up for stent removal     #Acute kidney injury, resolved   Likely prerenal in the setting of infection and frequent losses due to diarrhea. Repeat creatinine 1.05--baseline.   - Trend Cr with ongoing stool losses  - Repeat LR bolus 7/25 (1L over 10 hours)     #hypovolemic hyponatremia, resolved    #hypokalemia  Related to GI losses  - K protocol ordered    #DM2  - hold oral anti-hyperglycemics   - POC  glucose AC/bedtime + MSSI   - hypoglycemia protocol      #Home meds   - home aspirin on hold from prev admission, likely because of fresh stent.   - continue Losartan/hctz   - continue statin      #HCM   - has received COVID 19 vaccine      Diet: Combination Diet Regular Diet Adult    DVT Prophylaxis: Low Risk/Ambulatory with no VTE prophylaxis indicated  Blanchard Catheter: Not present  Fluids: PO ad linda  Central Lines: None  Code Status: Full Code      Disposition Plan   Expected discharge: 07/26/2021 recommended to prior living arrangement once able to maintain adequate PO .     The patient's care was discussed with the Bedside Nurse and Patient.    Elias Benjamin MD  [place] [id number] Service  [facility]  Securely message with the Vocera Web Console (learn more here)  Text page via [employer] Paging/Directory  Please see sign in/sign out for up to date coverage information    ______________________________________________________________________    Interval History   Nursing notes reviewed. No acute events overnight. Afebrile overnight, patient feels well other than persistent watery diarrhea.     4 point ROS otherwise negative    Data reviewed today: I reviewed all medications, new labs and imaging results over the last 24 hours. I personally reviewed no images or EKG's today.    Physical Exam   Vital Signs: Temp: 96.9  F (36.1  C) Temp src: Oral BP: 115/69 Pulse: 88   Resp: 18 SpO2: 93 % O2 Device: None (Room air)    Weight: 146 lbs 9.6 oz  General Appearance: Well appearing resting in bed in no acute distress   Respiratory: Breathing comfortably on room air, lungs clear to auscultation bilaterally without wheezes or crackles  Cardiovascular: RRR, no murmurs  GI: soft, nontender, no rebound or guarding  Skin: no rashes or lesions noted on visualized skin    Data   Recent Labs   Lab 07/25/21  2336 07/25/21  2146 07/25/21  1825 07/25/21  1614 07/25/21  1142 07/25/21  0839  07/24/21  0651 07/23/21  0208   WBC  --   --   --  14.0*  --  16.4* 18.0* 31.0*   HGB  --   --   --  12.3  --  11.4* 12.0 12.9   MCV  --   --   --  82  --  83 84 82   PLT  --   --   --  388  --  400 395 501*   NA  --   --   --   --   --  131* 136 129*  129*   POTASSIUM 3.6  --   --  3.4  --  3.0* 3.6 3.8  3.8   CHLORIDE  --   --   --   --   --  101 104 96  95   CO2  --   --   --   --   --  21 24 23  23   BUN  --   --   --   --   --  8 13 21  22   CR  --   --   --   --   --  0.93 1.05* 1.43*  1.41*   ANIONGAP  --   --   --   --   --  9 8 10  11   JAN  --   --   --   --   --  8.0* 8.1* 9.0  8.8   GLC  --  225* 202*  --  202* 265* 219* 285*  298*   ALBUMIN  --   --   --   --   --   --   --  3.0*   PROTTOTAL  --   --   --   --   --   --   --  7.4   BILITOTAL  --   --   --   --   --   --   --  0.5   ALKPHOS  --   --   --   --   --   --   --  108   ALT  --   --   --   --   --   --   --  26   AST  --   --   --   --   --   --   --  12   LIPASE  --   --   --   --   --   --   --  71*     No results found for this or any previous visit (from the past 24 hour(s)).  Medications       insulin aspart  1-7 Units Subcutaneous TID AC     insulin aspart  1-5 Units Subcutaneous At Bedtime     lactobacillus rhamnosus (GG)  1 capsule Oral BID     losartan-hydrochlorothiazide  1 tablet Oral Daily     piperacillin-tazobactam  3.375 g Intravenous Q6H     simvastatin  10 mg Oral At Bedtime     sodium chloride (PF)  3 mL Intracatheter Q8H     tamsulosin  0.4 mg Oral Daily     tolterodine ER  4 mg Oral Daily     vancomycin  125 mg Oral 4x Daily

## 2021-07-27 ENCOUNTER — PATIENT OUTREACH (OUTPATIENT)
Dept: CARE COORDINATION | Facility: CLINIC | Age: 81
End: 2021-07-27

## 2021-07-27 VITALS
SYSTOLIC BLOOD PRESSURE: 129 MMHG | TEMPERATURE: 98.4 F | BODY MASS INDEX: 28.15 KG/M2 | DIASTOLIC BLOOD PRESSURE: 69 MMHG | WEIGHT: 156.4 LBS | HEART RATE: 80 BPM | RESPIRATION RATE: 18 BRPM | OXYGEN SATURATION: 98 %

## 2021-07-27 LAB
ANION GAP SERPL CALCULATED.3IONS-SCNC: 8 MMOL/L (ref 3–14)
BUN SERPL-MCNC: 9 MG/DL (ref 7–30)
CALCIUM SERPL-MCNC: 8.2 MG/DL (ref 8.5–10.1)
CHLORIDE BLD-SCNC: 106 MMOL/L (ref 94–109)
CO2 SERPL-SCNC: 23 MMOL/L (ref 20–32)
CREAT SERPL-MCNC: 1 MG/DL (ref 0.52–1.04)
ERYTHROCYTE [DISTWIDTH] IN BLOOD BY AUTOMATED COUNT: 13.2 % (ref 10–15)
GFR SERPL CREATININE-BSD FRML MDRD: 53 ML/MIN/1.73M2
GLUCOSE BLD-MCNC: 194 MG/DL (ref 70–99)
GLUCOSE BLDC GLUCOMTR-MCNC: 183 MG/DL (ref 70–99)
GLUCOSE BLDC GLUCOMTR-MCNC: 216 MG/DL (ref 70–99)
GLUCOSE BLDC GLUCOMTR-MCNC: 241 MG/DL (ref 70–99)
HCT VFR BLD AUTO: 33.8 % (ref 35–47)
HGB BLD-MCNC: 11.1 G/DL (ref 11.7–15.7)
MCH RBC QN AUTO: 27.3 PG (ref 26.5–33)
MCHC RBC AUTO-ENTMCNC: 32.8 G/DL (ref 31.5–36.5)
MCV RBC AUTO: 83 FL (ref 78–100)
PLATELET # BLD AUTO: 426 10E3/UL (ref 150–450)
POTASSIUM BLD-SCNC: 3.4 MMOL/L (ref 3.4–5.3)
POTASSIUM BLD-SCNC: 3.7 MMOL/L (ref 3.4–5.3)
RBC # BLD AUTO: 4.06 10E6/UL (ref 3.8–5.2)
SODIUM SERPL-SCNC: 137 MMOL/L (ref 133–144)
WBC # BLD AUTO: 8.1 10E3/UL (ref 4–11)

## 2021-07-27 PROCEDURE — 80048 BASIC METABOLIC PNL TOTAL CA: CPT | Performed by: STUDENT IN AN ORGANIZED HEALTH CARE EDUCATION/TRAINING PROGRAM

## 2021-07-27 PROCEDURE — 250N000013 HC RX MED GY IP 250 OP 250 PS 637: Performed by: INTERNAL MEDICINE

## 2021-07-27 PROCEDURE — 36415 COLL VENOUS BLD VENIPUNCTURE: CPT | Performed by: INTERNAL MEDICINE

## 2021-07-27 PROCEDURE — 36415 COLL VENOUS BLD VENIPUNCTURE: CPT | Performed by: STUDENT IN AN ORGANIZED HEALTH CARE EDUCATION/TRAINING PROGRAM

## 2021-07-27 PROCEDURE — 250N000013 HC RX MED GY IP 250 OP 250 PS 637: Performed by: STUDENT IN AN ORGANIZED HEALTH CARE EDUCATION/TRAINING PROGRAM

## 2021-07-27 PROCEDURE — 84132 ASSAY OF SERUM POTASSIUM: CPT | Performed by: INTERNAL MEDICINE

## 2021-07-27 PROCEDURE — 99232 SBSQ HOSP IP/OBS MODERATE 35: CPT | Performed by: PHYSICIAN ASSISTANT

## 2021-07-27 PROCEDURE — 250N000011 HC RX IP 250 OP 636: Performed by: INTERNAL MEDICINE

## 2021-07-27 PROCEDURE — 250N000013 HC RX MED GY IP 250 OP 250 PS 637: Performed by: EMERGENCY MEDICINE

## 2021-07-27 PROCEDURE — 99238 HOSP IP/OBS DSCHRG MGMT 30/<: CPT | Mod: GC | Performed by: INTERNAL MEDICINE

## 2021-07-27 PROCEDURE — 85027 COMPLETE CBC AUTOMATED: CPT | Performed by: STUDENT IN AN ORGANIZED HEALTH CARE EDUCATION/TRAINING PROGRAM

## 2021-07-27 RX ORDER — CEFPODOXIME PROXETIL 100 MG/1
100 TABLET, FILM COATED ORAL 2 TIMES DAILY
Status: DISCONTINUED | OUTPATIENT
Start: 2021-07-27 | End: 2021-07-27 | Stop reason: HOSPADM

## 2021-07-27 RX ORDER — CEFPODOXIME PROXETIL 100 MG/1
100 TABLET, FILM COATED ORAL 2 TIMES DAILY
Qty: 60 TABLET | Refills: 0 | Status: ON HOLD | OUTPATIENT
Start: 2021-07-27 | End: 2021-08-14

## 2021-07-27 RX ORDER — VANCOMYCIN HYDROCHLORIDE 125 MG/1
CAPSULE ORAL
Qty: 84 CAPSULE | Refills: 0 | Status: ON HOLD | OUTPATIENT
Start: 2021-07-27 | End: 2021-08-14

## 2021-07-27 RX ORDER — POTASSIUM CHLORIDE 750 MG/1
40 TABLET, EXTENDED RELEASE ORAL ONCE
Status: COMPLETED | OUTPATIENT
Start: 2021-07-27 | End: 2021-07-27

## 2021-07-27 RX ORDER — LACTOBACILLUS RHAMNOSUS GG 10B CELL
1 CAPSULE ORAL 2 TIMES DAILY
Qty: 60 CAPSULE | Refills: 0 | Status: SHIPPED | OUTPATIENT
Start: 2021-07-27 | End: 2021-08-26

## 2021-07-27 RX ADMIN — TAMSULOSIN HYDROCHLORIDE 0.4 MG: 0.4 CAPSULE ORAL at 08:12

## 2021-07-27 RX ADMIN — LOSARTAN POTASSIUM AND HYDROCHLOROTHIAZIDE 1 TABLET: 50; 12.5 TABLET, FILM COATED ORAL at 08:12

## 2021-07-27 RX ADMIN — Medication 1 CAPSULE: at 08:12

## 2021-07-27 RX ADMIN — POTASSIUM CHLORIDE 40 MEQ: 750 TABLET, EXTENDED RELEASE ORAL at 10:03

## 2021-07-27 RX ADMIN — INSULIN ASPART 1 UNITS: 100 INJECTION, SOLUTION INTRAVENOUS; SUBCUTANEOUS at 08:12

## 2021-07-27 RX ADMIN — TOLTERODINE 4 MG: 4 CAPSULE, EXTENDED RELEASE ORAL at 08:12

## 2021-07-27 RX ADMIN — PIPERACILLIN AND TAZOBACTAM 3.38 G: 3; .375 INJECTION, POWDER, LYOPHILIZED, FOR SOLUTION INTRAVENOUS at 02:29

## 2021-07-27 RX ADMIN — INSULIN ASPART 3 UNITS: 100 INJECTION, SOLUTION INTRAVENOUS; SUBCUTANEOUS at 12:23

## 2021-07-27 RX ADMIN — VANCOMYCIN HYDROCHLORIDE 125 MG: 125 CAPSULE ORAL at 08:12

## 2021-07-27 RX ADMIN — PIPERACILLIN AND TAZOBACTAM 3.38 G: 3; .375 INJECTION, POWDER, LYOPHILIZED, FOR SOLUTION INTRAVENOUS at 08:12

## 2021-07-27 RX ADMIN — VANCOMYCIN HYDROCHLORIDE 125 MG: 125 CAPSULE ORAL at 12:23

## 2021-07-27 ASSESSMENT — ACTIVITIES OF DAILY LIVING (ADL)
ADLS_ACUITY_SCORE: 15
ADLS_ACUITY_SCORE: 15
DEPENDENT_IADLS:: INDEPENDENT
ADLS_ACUITY_SCORE: 15

## 2021-07-27 NOTE — PLAN OF CARE
Discharged to: Home   Transportation: via son  Time: 1530  Prescriptions: Picked up at discharge pharmacy  Belongings: Sent home with patient   PIV/Access: Deaccessed  Care Plan and Education discontinued: Completed  Paperwork: Signed and filed

## 2021-07-27 NOTE — PLAN OF CARE
BP (!) 144/79 (BP Location: Left arm)   Pulse 63   Temp 97.4  F (36.3  C) (Oral)   Resp 18   Wt 70.9 kg (156 lb 6.4 oz)   SpO2 96%   BMI 28.15 kg/m       Assumed Cares: [id number]  Neuro: A&O x 4  Respiratory: Denies SOB; vitally stable on RA  Cardiac: Denies chest pain  GI/: Continues having loose stools but denies them being watery; 1 BM over night  Skin: No new skin issues  Lines: R PIV SL  Pain: Intermittent abdominal cramping; denied pain medication   Diet: Regular diet  Activity Level: Up independently  Significant Events: None noted  Plan: Continue with POC; notify provider with changes

## 2021-07-27 NOTE — PROGRESS NOTES
[facility] PROGRESS NOTE     Patient:  Kelly Barnes   YOB: 1940, MRN: [id number]  Date of Visit: 07/27/2021  Date of Admission: 7/23/2021  Consult Requester: Terrie Tidwell DO          Assessment and Recommendations:   ID Problem List:  1. C diff colitis, first episode  2. GERSON, improved  3. Recent E coli pyelonephritis r/t multiple nonobstructing right kidney stones s/p R ureteral stent 7/15; future PCNL planned in next 3-4 weeks per Urology  4. DMT2     Recommendations:   1. Continue oral Vancomycin 125mg QID for 10 days (7/32-8/1) for treatment of C diff  2. After 10 days of C diff treatment is completed decrease frequency of oral vancomycin to 125mg BID to continue through urologic procedure (date TBD, likely 3-4 weeks)  3. Start cefpodoxime 100mg BID for renal stone prophylaxis (previously tolerated this cephalosporin and no side-chain cross-reactivity with cephalexin). Continue until urologic procedure is completed and then stop.     [facility] ID will sign off. Please do not hesitate to contact with additional questions or changes in patient's clinical status.       Discussion:  Kelly Barnes is a 81 year old female with PMH significant for HTN, HLD, DM2, CKDIII, and recent admission 7/13-7/16 for E coli pyelonephritis in setting of multiple non-obstructing kidney stones s/p R ureteral stent 7/15 who was admitted on 7/23 and found to have C diff colitis. UC on admission grew only urogenital erum, but with fever of 100.8 on 7/24 a second UA was collected and had higher WBC and RBC counts. UC from that sample also grew urogenital erum. WBC and RBC seen on UA is most consistent with known right ureteral stent given lack of urinary symptoms and lack of CT AP findings to support pyelonephritis. Overall clinical picture seems most consistent with C diff colitis rather than a recurrent or ongoing pyelonephritis. Temp of 100.8 developed less than 48 hours after  starting C diff treatment and is within timeframe where fever from C diff would still be expected. She is now afebrile for >48 hours and diarrhea is improving. Leukocytosis has also improved dramatically from 31 on admission to [facility] for the past 2 days. With low suspicion for UTI/pyelonephritis, transition back to urologic prophylaxis. Can use cefpodoxime rather than Bactrim or cephalexin. She has previously tolerated cefpodoxime. Plan to treat C diff for 10 days and then transition to BID dosing for prophylaxis given cefpododxime use. Continue oral Vanco and cefpodoxime until lithotripsy per Urology in 3-4 weeks.     Thank you for the consult. ID will continue to follow with you.    Lanny Moreno PA-C   Pronouns: she/her/hers  Infectious Diseases  Pager: [phone]  07/27/2021         Interval History and Events:   Afebrile. Feels much better today overall. Reports urgency is much improved. Continues to have loose stools but reports they are not watery. Ate a good breakfast this morning.          Antimicrobial Treatment:   Current  - PO Vancomycin 7/23  - Zosyn 7/25    Past  - ceftriaxone 7/23  - Bactrim 7/24-7/25            Review of Systems:   Targeted 4 point ROS was completed with pertinent positives and negatives are detailed above.         HPI:   Adopted from initial consult note on 7/26:    Kelly Barnes is a 81 year old female with PMH significant for HTN, HLD, DM2, CKDIII, and recent admission 7/13-7/16 for E coli  pyelonephritis in setting of multiple non-obstructing kidney stones s/p R ureteral stent 7/15 who presented to the ED on 7/23 with 3 days of loose, nonbloody diarrhea, intermittent fever, and 1 days of vomiting.     During her previous admission she presented with fever and abdominal discomfort 7/13. CT AP revealed multiple large nonobstructing right renal stones without hydronephrosis. UC 7/13 grew >100K colonies E coli (R to FQLs, amp, amp/sulbactam). She had right ureteral stent placed by  urology on 7/15. UC at that time again grew E coli, 10-50K colonies. She was treated with ceftriaxone while in the hospital and then transitioned to Keflex QID for one week, then BID until PCNL could be completed by Urology. She was seen by her PCP on 7/21 and apparently developed a rash that was not typical of a drug rash per clinic note. Antibiotics switched to Bactrim at that point.      She then presented to ED on 7/23 due to 3 days of loose, nonbloody diarrhea, intermittent fever, and 1 days of vomiting. CT AP revealed stable right ureteral stent, but findings concerning for infectious colitis. Enteric panel negative; C diff positive. UA revealed large blood and LE, 50 WBC and 10 RBC with few bacteria. Cx grew <10K colonies of urogenital erum. 2 sets of BC from admission with NGTD. She was started on oral Vancomycin. Received a dose of ceftriaxone in the ED, but continued on Bactrim on admission. She developed fever up to 100.8 on 7/24. Bactrim switched to Zosyn on 7/25. Repeat UA collected again with large blood and LE, >182 WBC and 150 RBC, no bacteria, but many yeast and 10 hyaline casts. UC is pending.      Today Kelly reports she feels slightly better at the moment. She reports BM about every 2 hours. Often with great urgency. She reports initially stools were watery and green. Now there is some solid material present. She is planning to try to eat a bit for lunch, hard boiled egg and yogurt. She had applesauce and crackers earlier. She denies any current urinary symptoms. She thinks in hindsight during her last admission she may have had some very mild urinary symptoms. She currently denies kidney pain. She endorses fevers at home prior to both of the recent admissions. She took Bactrim for a few doses at home prior to current admission and reports she didn't feel any better.            Physical Examination:   Temp:  [97.4  F (36.3  C)-98.1  F (36.7  C)] 97.4  F (36.3  C)  Pulse:  [63-87] 63  Resp:   "[18-20] 18  BP: (110-144)/(64-79) 144/79  SpO2:  [92 %-96 %] 96 %    I/O last 3 completed shifts:  In: 480 [P.O.:480]  Out: 300 [Stool:300]    Vitals:    07/23/21 0047 07/23/21 2109 07/24/21 1548 07/25/21 1911   Weight: 70.2 kg (154 lb 12.8 oz) 72.3 kg (159 lb 6.3 oz) 71.2 kg (156 lb 14.4 oz) 66.5 kg (146 lb 9.6 oz)    07/26/21 1929   Weight: 70.9 kg (156 lb 6.4 oz)       Constitutional: Adult female seen lying in bed, in NAD. Awake, alert, interactive.  HEENT: NC/AT, EOMI, sclera clear, conjunctiva normal, OP with MMM  Respiratory: No increased work of breathing  Cardiovascular: Stable, 2+ non-pitting edema of LE  GI: +bowel sounds, soft, non-distended and non-tender.  Skin: Warm, dry, well-perfused. No bruising, bleeding, rashes, or lesions on limited exam.   Neurologic: A&O. Answers questions appropriately, speech normal. Moves all extremities spontaneously.  Neuropsychiatric: Calm. Affect appropriate to situation.         Medications:       insulin aspart  1-7 Units Subcutaneous TID AC     insulin aspart  1-5 Units Subcutaneous At Bedtime     lactobacillus rhamnosus (GG)  1 capsule Oral BID     losartan-hydrochlorothiazide  1 tablet Oral Daily     piperacillin-tazobactam  3.375 g Intravenous Q6H     simvastatin  10 mg Oral At Bedtime     sodium chloride (PF)  3 mL Intracatheter Q8H     tamsulosin  0.4 mg Oral Daily     tolterodine ER  4 mg Oral Daily     vancomycin  125 mg Oral 4x Daily       Antiinfectives:  Anti-infectives (From now, onward)    Start     Dose/Rate Route Frequency Ordered Stop    07/25/21 1430  piperacillin-tazobactam (ZOSYN) 3.375 g vial to attach to  mL bag     Note to Pharmacy: For [facility], [facility] and [facility]: For Zosyn-naive patients, use the \"Zosyn initial dose + extended infusion\" order panel.    3.375 g  over 30 Minutes Intravenous EVERY 6 HOURS 07/25/21 1415      07/23/21 0620  vancomycin (VANCOCIN) capsule 125 mg      125 mg Oral 4 TIMES DAILY 07/23/21 0616                   Laboratory " Data:     Microbiology:  Culture   Date Value Ref Range Status   07/25/2021 10,000-50,000 CFU/mL Mixture of urogenital erum  Final   07/23/2021 No growth after 4 days  Preliminary   07/23/2021 No growth after 4 days  Preliminary   07/23/2021 <10,000 CFU/mL Urogenital erum  Final   07/15/2021 Culture in progress  Final   07/15/2021 10,000-50,000 CFU/mL Escherichia coli (A)  Final   07/15/2021 <1,000 CFU/mL Urogenital erum  Final   07/13/2021 No Growth  Final   07/13/2021 No Growth  Final   07/13/2021 >100,000 CFU/mL Escherichia coli (A)  Final       Inflammatory Markers    Recent Labs   Lab Test 07/25/21  0839 12/21/20  0705 12/19/20  2307 03/02/16  0837 02/29/16  0828   SED  --   --  30 15 15   .0* 63.0* 140.0* 110.0* 36.0*       Immune Globulin Studies  No lab results found.    Metabolic Studies       Recent Labs   Lab Test 07/27/21  0735 07/27/21  0628 07/27/21  0153 07/26/21  2125 07/26/21  1702 07/26/21  1151 07/26/21  0815 07/26/21  0754 07/25/21  2336 07/25/21  1614 07/25/21  0839 07/25/21  0839 07/24/21  1801 07/24/21  0651 07/23/21  0208 07/16/21  0736 07/15/21  0839 07/13/21  0724 07/13/21  0721   NA  --  137  --   --   --   --   --  134  --   --   --  131*  --  136 129*  129* 136 138   < > 133   POTASSIUM  --  3.4  --   --   --   --   --  3.6 3.6 3.4  --  3.0*  --  3.6 3.8  3.8 4.0 4.2   < > 3.8   CHLORIDE  --  106  --   --   --   --   --  104  --   --   --  101  --  104 96  95 107 107   < > 100   CO2  --   --   --   --   --   --   --  22  --   --   --  21  --  24 23  23 23 23   < > 25   ANIONGAP  --   --   --   --   --   --   --  8  --   --   --  9  --  8 10  11 6 8   < > 8   BUN  --   --   --   --   --   --   --  10  --   --   --  8  --  13 21  22 16 16   < > 20   CR  --   --   --   --   --   --   --  1.01  --   --   --  0.93  --  1.05* 1.43*  1.41* 1.01 1.03   < > 1.14*   GFRESTIMATED  --   --   --   --   --   --   --  53*  --   --   --  58*  --  50* 35*  35* 53* 51*   < > 46*   GLC  183*  --  216* 269* 205* 213* 207* 213*  --   --    < > 265*  --  219* 285*  298* 200* 166*   < > 205*   A1C  --   --   --   --   --   --   --   --   --   --   --   --   --   --   --   --   --   --  7.4*   JAN  --   --   --   --   --   --   --  8.5  --   --   --  8.0*  --  8.1* 9.0  8.8 8.2* 8.5   < > 8.4*   MAG  --   --   --   --   --   --   --   --   --   --   --  1.8  --   --   --   --  2.4*   < >  --    LACT  --   --   --   --   --   --   --   --   --   --   --   --  1.5  --  1.3  --   --   --   --     < > = values in this interval not displayed.       Hepatic Studies    Recent Labs   Lab Test 07/23/21 0208 07/13/21 0721 12/19/20  2307 10/07/20  0831 10/15/19  0920 10/25/18  0920   BILITOTAL 0.5 0.8 0.5 0.3 0.4 0.4   ALKPHOS 108 84 87 91 86 86   ALBUMIN 3.0* 3.5 3.1* 3.5 3.8 3.8   AST 12 18 15 15 15 16   ALT 26 20 19 20 21 18       Pancreatitis testing    Recent Labs   Lab Test 07/23/21  0208 07/13/21  0723 05/11/21  0724 10/07/20  0831 10/15/19  0920 10/25/18  0920 03/28/17  0941 10/27/16  0825   LIPASE 71* 76  --   --   --   --   --   --    TRIG  --   --  68 90 90 76 83 93       Hematology Studies      Recent Labs   Lab Test 07/27/21  0628 07/26/21  0758 07/25/21  1614 07/25/21  0839 07/24/21  0651 07/23/21  0208 12/21/20  0705 12/20/20  0654 12/19/20  2307 12/19/20  1110 07/03/19  1734 03/28/17  0941 03/04/16  0930   WBC 8.1 10.0 14.0* 16.4* 18.0* 31.0* 9.2   < > 17.7* 18.2* 11.3* 7.1 12.8*   ANEU  --   --   --   --   --   --  7.1  --  16.1* 16.0* 9.6* 4.9 10.8*   ALYM  --   --   --   --   --   --  1.1  --  0.5* 1.1 0.8 1.6 0.8   GINETTE  --   --   --   --   --   --  0.9  --  1.0 1.0 0.8 0.5 1.2   AEOS  --   --   --   --   --   --  0.1  --  0.0 0.0 0.1 0.1 0.0   HGB 11.1* 12.2 12.3 11.4* 12.0 12.9 11.3*   < > 12.8 14.9 13.2 14.7 14.1   HCT 33.8* 37.4 36.9 34.6* 37.0 38.9 34.8*   < > 38.7 44.5 39.3 43.8 40.7    457* 388 400 395 501* 287   < > 330 364 288 347 332    < > = values in this interval not  displayed.       Urine Studies     Recent Labs   Lab Test 07/25/21  1230 07/23/21  0140 07/13/21  0724 12/19/20  2242 12/19/20  1109   URINEPH 6.0 5.0 5.5 5.5 5.5   NITRITE Negative Negative Positive* Positive* Positive*   LEUKEST Large* Large* Large* Large* Large*   WBCU >182* 50* >100* >182* *       Stool Studies  Recent Labs   Lab Test 07/23/21  0137   EPCAMP Not Detected   EPSALM Not Detected   EPSHGL Not Detected   EPVIB Not Detected   EPROTA Not Detected   EPNORO Not Detected   EPYER Not Detected       Last check of C difficile  C Difficile Toxin B by PCR   Date Value Ref Range Status   07/23/2021 Positive (A) Negative Final     Comment:     Detection of C. difficile nucleic acid in stools confirms the presence of these organisms in diarrheal patients but may not indicate that C. difficile are the etiologic agents of the diarrhea. Results from the Xpert C. difficile assay should be interpreted in conjunction with other laboratory and clinical data available to the clinician.       Respiratory Virus Testing    Recent Labs   Lab Test 12/19/20 2245 03/02/16  1947   AFLU  --  Negative   INFZA Negative  --    BFLU  --  Negative   Test results must be correlated with clinical data. If necessary, results   should be confirmed by a molecular assay or viral culture.     INFZB Negative  --        COVID-19 Testing  Recent Labs   Lab Test 12/19/20 2245 12/08/20  1010 07/22/20  1110   SARSCOVRES NEGATIVE  --   --    [id number]  --  Nasopharyngeal Nasopharyngeal   [id number]  --  Not Detected Not Detected            Imaging:   CT AP 7/23  IMPRESSION:   1.  Appropriately positioned right nephroureteral stent. Large right staghorn calculus is unchanged.  2.  Development of wall thickening of long segment of the transverse, descending and to a greater extent sigmoid colon, likely an infectious colitis. Diffuse colonic diverticulosis but no specific diverticula are inflamed.

## 2021-07-27 NOTE — DISCHARGE SUMMARY
"[facility]  Discharge Summary - Medicine & Pediatrics       Date of Admission:  7/23/2021  Date of Discharge:  7/27/2021  3:40 PM  Discharging Provider: Dr. Terrie Tidwell  Discharge Service: Regi 5    Discharge Diagnoses   Sepsis  Clostridioides difficile infection  Pyelonephritis status post ureteral stent placement  Acute kidney injury    Follow-ups Needed After Discharge   Follow-up Appointments     Adult [facility] Follow-up and recommended labs and tests      Follow-Up:   - Urology should be contacting you in the next week to schedule surgery to   remove your right kidney stone.  Please call Urology at the number below   if you are not contacted or if you have questions.   - Call or return sooner than your regularly scheduled visit if you develop   any of the following:  Fever (greater than 101.3F), uncontrolled pain,   uncontrolled nausea or vomiting, concerns about bowel function, as well as   increased redness, swelling, drainage from your wound or any concerns   about urinating or urinary catheter drainage.      Here are some phone numbers where you can reach us:  - Monday through Friday, 8am to 4:30pm - as long as it is not a holiday,   IT IS BEST to call the Urology Clinic Triage Line at: [phone] with   any non-emergent urology concerns.    - If it is a night, weekend, or holiday call the [facility] number   at [phone] and ask the  to page the \"urology resident on   call\".  Typically, the on-call provider should return your call within 30   minutes.  Please page the on-call provider again if you haven't been   contacted as expected.  Rarely, the on-call provider will be unable to   promptly return a call due to a hospital emergency.  If you have paged   twice and are still not contacted, ask the hospital  to page the   \"urology CHIEF-RESIDENT on call\".  - FOR EMERGENCIES, always call 911 or go to the Emergency " Department      Appointments on [place] and/or [facility] (with [facility] or [facility]   provider or service). Call [phone] if you haven't heard regarding   these appointments within 7 days of discharge.         Adult [facility] Follow-up and recommended labs and tests      Follow up with primary care provider, Lea Lopez, once you   have scheduled your visit with urology to ensure you have enough   antibiotics to last you to your procedure and monitor improvement of   diarrhea    Appointments on [place] and/or [facility] (with [facility] or [facility]   provider or service). Call [phone] if you haven't heard regarding   these appointments within 7 days of discharge.             Unresulted Labs Ordered in the Past 30 Days of this Admission     Date and Time Order Name Status Description    7/23/2021  2:46 AM Blood Culture Peripheral Blood Preliminary     7/23/2021  2:46 AM Blood Culture Peripheral Blood Preliminary           Discharge Disposition   Discharged to home  Condition at discharge: Stable    Hospital Course   Kelly Barnes was admitted on 7/23/2021 for sepsis secondary to Clostridioides difficile infection in the setting of recent IV antibiotics for pyelonephritis.  The following problems were addressed during her hospitalization:    Sepsis secondary to Clostridioides difficile infection  Acute kidney injury, resolved  Pyelonephritis status post stent placement during previous hospitalization  Patient presented with frequent watery diarrhea, fever, acute kidney injury, and an elevated white blood cell count. Stool sampling revealed C diff infection. Patient was treated with IV fluid resuscitation, with resolution of her GERSON. She was started on oral vancomycin for her C diff infection. Infectious disease was consulted given her requirement for ongoing requirement for prophylactic antibiotics while ureteral stent is in place. Urology was consulted, plan for follow up as an outpatient for stent  removal. During her stay, Kelly became febrile and was started on Zosyn for concern of recurrent pyelonephritis. Urine culture revealed mixed urogenital erum, and fever resolved. Fever was attributed to C diff infection. Kelly will need to continue prophylactic Cefpodoxime until her stent is removed. She will need at least 10 days of vancomycin treatment, and will continue to take vancomycin until prophylactic antibiotics are stopped. Take 125 mg QID oral vancomycin for 10 days, followed by BID until stent is removed. At the time of discharge, diarrhea had improved and patient was able to maintain hydration orally.     Hypovolemic hyponatremia  Hypokalemia  Secondary to GI losses. Resolved with fluid resuscitation and improvement of diarrhea.     Consultations This Hospital Stay   VASCULAR ACCESS CARE ADULT IP CONSULT  UROLOGY IP CONSULT  INFECTIOUS DISEASE GENERAL ADULT IP CONSULT    Code Status   Full Code       The patient was discussed with Dr. Yaw Benjamin MD  [address] [address]  Phone: [phone]  ______________________________________________________________________    Physical Exam   Vital Signs: Temp: 98.4  F (36.9  C) Temp src: Oral BP: 129/69 Pulse: 80   Resp: 18 SpO2: 98 % O2 Device: None (Room air)    Weight: 156 lbs 6.4 oz  General Appearance:  Well appearing resting in bed in no acute distress   Respiratory: Breathing comfortably on room air, lungs clear to auscultation bilaterally without wheezes or crackles  Cardiovascular: RRR, no murmurs  GI: soft, nontender, no rebound or guarding  Skin: no rashes or lesions noted on visualized skin      Primary Care Physician   Lea Lopez    Discharge Orders      Care Coordination Referral      Adult [facility] Follow-up and recommended labs and tests    Follow-Up:   - Urology should be contacting you in the next week to schedule surgery to remove your right  "kidney stone.  Please call Urology at the number below if you are not contacted or if you have questions.   - Call or return sooner than your regularly scheduled visit if you develop any of the following:  Fever (greater than 101.3F), uncontrolled pain, uncontrolled nausea or vomiting, concerns about bowel function, as well as increased redness, swelling, drainage from your wound or any concerns about urinating or urinary catheter drainage.      Here are some phone numbers where you can reach us:  - Monday through Friday, 8am to 4:30pm - as long as it is not a holiday, IT IS BEST to call the Urology Clinic Triage Line at: [phone] with any non-emergent urology concerns.    - If it is a night, weekend, or holiday call the [facility] number at [phone] and ask the  to page the \"urology resident on call\".  Typically, the on-call provider should return your call within 30 minutes.  Please page the on-call provider again if you haven't been contacted as expected.  Rarely, the on-call provider will be unable to promptly return a call due to a hospital emergency.  If you have paged twice and are still not contacted, ask the hospital  to page the \"urology CHIEF-RESIDENT on call\".  - FOR EMERGENCIES, always call 911 or go to the Emergency Department      Appointments on [place] and/or [facility] (with [facility] or [facility] provider or service). Call [phone] if you haven't heard regarding these appointments within 7 days of discharge.     Reason for your hospital stay    Dear Kelly Barnes    Your were hospitalized at [facility] with C diffy infection and treated with oral vancomycin.  Over your hospitalization your diarrhea improved and today you are ready to be discharged.  If you continue your vancomycin therapy you should continue to improve but if you develop fever, abdominal pain or worsening diarrhea please seek medical attention.    We are suggesting the " following medication changes:  Stop taking Bactrim  Start taking Cefpodoxime two times daily until your urologic procedure  Take Vancomycin 4 times daily for the next 6 days, then take 2 times daily until you stop taking Cefpodoxime    Please set up an appointment with:  Urology, you will be contacted by their , call the number provided if you do not hear from them this week  Primary care once you have scheduled your urology appointment, to ensure you have enough of your antibiotics to last to your procedure    It was a pleasure meeting with you today. Thank you for allowing me and my team the privilege of caring for you today. You are the reason we are here, and I truly hope we provided you with the excellent service you deserve. Please let us know if there is anything else we can do for you so that we can be sure you are leaving completely satisfied with your care experience.    Your hospital unit at the time of discharge is 5B so if you have any questions please call the hospital at [phone] and ask to talk to a nurse on 5B.    Be well,     NERISSA Benjamin MD  Internal Medicine-Pediatrics, MP-4     Activity    Your activity upon discharge: activity as tolerated     Adult [facility] Follow-up and recommended labs and tests    Follow up with primary care provider, Lea Lopez, once you have scheduled your visit with urology to ensure you have enough antibiotics to last you to your procedure and monitor improvement of diarrhea    Appointments on [place] and/or [facility] (with [facility] or [facility] provider or service). Call [phone] if you haven't heard regarding these appointments within 7 days of discharge.     Diet    Follow this diet upon discharge: Orders Placed This Encounter      Combination Diet Regular Diet Adult       Significant Results and Procedures   Most Recent 3 CBC's:Recent Labs   Lab Test 07/27/21  0628 07/26/21  0758 07/25/21  1614   WBC 8.1 10.0 14.0*   HGB 11.1* 12.2 12.3    MCV 83 84 82    457* 388     Most Recent 3 BMP's:Recent Labs   Lab Test 07/27/21  1413 07/27/21  1145 07/27/21  0735 07/27/21  0628 07/26/21  0754 07/25/21  1142 07/25/21  0839   NA  --   --   --  137 134  --  131*   POTASSIUM 3.7  --   --  3.4 3.6   < > 3.0*   CHLORIDE  --   --   --  106 104  --  101   CO2  --   --   --  23 22  --  21   BUN  --   --   --  9 10  --  8   CR  --   --   --  1.00 1.01  --  0.93   ANIONGAP  --   --   --  8 8  --  9   JAN  --   --   --  8.2* 8.5  --  8.0*   GLC  --  241* 183* 194* 213*  --  265*    < > = values in this interval not displayed.     Most Recent 2 LFT's:Recent Labs   Lab Test 07/23/21  0208 07/13/21  0721   AST 12 18   ALT 26 20   ALKPHOS 108 84   BILITOTAL 0.5 0.8   ,   Results for orders placed or performed during the hospital encounter of 07/23/21   CT Abdomen Pelvis w/o Contrast    Narrative    EXAM: CT ABDOMEN PELVIS W/O CONTRAST  LOCATION: [facility]  DATE/TIME: 7/23/2021 3:47 AM    INDICATION: Abdominal pain, fever  COMPARISON: CT of the abdomen and pelvis 07/13/2021  TECHNIQUE: CT scan of the abdomen and pelvis was performed without IV contrast. Multiplanar reformats were obtained. Dose reduction techniques were used.  CONTRAST: None.    FINDINGS:   LOWER CHEST: Linear bands of atelectasis in the medial and posterior lower lobes associated with cylindrical bronchiectasis. Small hiatal hernia.    HEPATOBILIARY: Nondistended gallbladder. No calcified gallstones. No bile duct enlargement. Liver is not enlarged. Smooth liver border. No liver lesions.    PANCREAS: Normal.    SPLEEN: Normal.    ADRENAL GLANDS: Normal.    KIDNEYS/BLADDER: Proximal portion of right nephroureteral stent resides in an upper pole calyx and distal portion appropriately in the right posterior bladder. Large right staghorn calculus which occupies most of the renal pelvis and extends into   multiple upper and lower pole calyces, unchanged. Left  kidney is normal in size without nephrolithiasis. Urinary bladder contains mild to moderate amount of urine and has a smooth uniform wall. Tiny focus of air in the nondependent bladder from   instrumentation.    BOWEL: Decompressed stomach. Normal caliber small bowel. There are numerous diverticula throughout all segments of the colon. There are no specific diverticula which are enlarged/inflamed and there is no localized inflammatory stranding in the adjacent   mesenteric fat. Transverse and descending colon are decompressed however the wall wall appears slightly thicker than 10 days earlier. While the sigmoid colon is also decompressed wall thickening is more convincing.    LYMPH NODES: Normal.    VASCULATURE: Mild aortoiliac atheromatous calcifications.    PELVIC ORGANS: The uterus is normal in size. No mass in the adnexa or pelvic free fluid.    MUSCULOSKELETAL: Lower lumbar facet arthropathy. Minimal anterolisthesis of L4 on L5. Small lower thoracic degenerative osteophytes. No new bone or body wall abnormalities.      Impression    IMPRESSION:     1.  Appropriately positioned right nephroureteral stent. Large right staghorn calculus is unchanged.  2.  Development of wall thickening of long segment of the transverse, descending and to a greater extent sigmoid colon, likely an infectious colitis. Diffuse colonic diverticulosis but no specific diverticula are inflamed.           Discharge Medications   Current Discharge Medication List      START taking these medications    Details   cefpodoxime (VANTIN) 100 MG tablet Take 1 tablet (100 mg) by mouth 2 times daily  Qty: 60 tablet, Refills: 0    Associated Diagnoses: Kidney stone      lactobacillus rhamnosus, GG, (CULTURELL) capsule Take 1 capsule by mouth 2 times daily  Qty: 60 capsule, Refills: 0    Associated Diagnoses: C. difficile colitis      vancomycin (VANCOCIN) 125 MG capsule Take 1 capsule (125 mg) by mouth 4 times daily for 6 days, THEN 1 capsule (125  mg) 2 times daily.  Qty: 84 capsule, Refills: 0    Associated Diagnoses: C. difficile colitis         CONTINUE these medications which have NOT CHANGED    Details   acetaminophen (TYLENOL) 325 MG tablet Take 2 tablets (650 mg) by mouth every 6 hours as needed for mild pain or fever  Qty: 112 tablet, Refills: 0    Associated Diagnoses: Kidney stone; Acute pyelonephritis      glimepiride (AMARYL) 2 MG tablet Take 1 tablet (2 mg) by mouth 2 times daily (with meals) She will also take separate 2 mg dose with breakfast.  Qty: 180 tablet, Refills: 3    Associated Diagnoses: Type 2 diabetes mellitus without complication, without long-term current use of insulin (H)      losartan-hydrochlorothiazide (HYZAAR) 50-12.5 MG tablet Take 1 tablet by mouth daily  Qty: 90 tablet, Refills: 3    Associated Diagnoses: Hypertension goal BP (blood pressure) < 140/90      simvastatin (ZOCOR) 10 MG tablet TAKE ONE TABLET BY MOUTH AT BEDTIME  Qty: 90 tablet, Refills: 3    Associated Diagnoses: Hyperlipidemia LDL goal <100      tamsulosin (FLOMAX) 0.4 MG capsule Take 1 capsule (0.4 mg) by mouth daily  Qty: 30 capsule, Refills: 0    Associated Diagnoses: Kidney stone      tolterodine ER (DETROL LA) 4 MG 24 hr capsule Take 1 capsule (4 mg) by mouth daily  Qty: 30 capsule, Refills: 0    Associated Diagnoses: Kidney stone      blood glucose (ONETOUCH ULTRA) test strip Use to test blood sugar twice daily. Dispense 1 box of 200 test strips, #3 refills.  Qty: 100 strip, Refills: 3    Associated Diagnoses: Type 2 diabetes mellitus without complication, without long-term current use of insulin (H)      blood glucose monitoring (ONE TOUCH ULTRA 2) meter device kit Use to test blood sugar 3 times daily  Qty: 1 kit, Refills: 0    Associated Diagnoses: DM type 2, goal A1C below 8.0      OneTouch Delica Lancets 33G MISC 1 Device by In Vitro route 2 times daily  Qty: 100 each, Refills: 11    Associated Diagnoses: Type 2 diabetes mellitus without  complication, without long-term current use of insulin (H)         STOP taking these medications       sulfamethoxazole-trimethoprim (BACTRIM DS) 800-160 MG tablet Comments:   Reason for Stopping:             Allergies   Allergies   Allergen Reactions     Ibuprofen Other (See Comments)     numbness in hands and feet     Keflex [Cephalexin] Rash

## 2021-07-27 NOTE — PLAN OF CARE
Assumed cares [id number]. Pt was checked hourly on shift     Pt A&O x4. VSS on RA. Pt denies SOB and chest pain. C/O intermittent cramping in ABD; denies pain medication. 500mL LR bolus given; PIV infusing TKO with intermittent ABX. Reg diet, ate 75%. BM x2 on shift; adequate urine output. Up IND. Appears to be resting.     Plan of Care: Continuing monitoring patient and notify MD of any changes.    Dianne Byrd RN on 7/26/2021 at 10:07 PM

## 2021-07-27 NOTE — PROGRESS NOTES
Clinic Care Coordination Contact    Situation: Patient chart reviewed by care coordinator.    Background: Patient is due for RN care coordinator outreach.    Assessment: Per chart review, patient is admitted to [facility] since 7/23/2021.    Plan/Recommendations:RNCC will follow admission and reach out to patient when she discharges home.     Zaida Romano, RN Care Coordinator     [facility] Ambulatory Care Management  [facility] and   [email or web address]    Office: [phone]

## 2021-07-28 ENCOUNTER — PATIENT OUTREACH (OUTPATIENT)
Dept: CARE COORDINATION | Facility: CLINIC | Age: 81
End: 2021-07-28

## 2021-07-28 LAB
BACTERIA BLD CULT: NO GROWTH
BACTERIA BLD CULT: NO GROWTH

## 2021-07-28 ASSESSMENT — ACTIVITIES OF DAILY LIVING (ADL): DEPENDENT_IADLS:: INDEPENDENT

## 2021-07-28 NOTE — PROGRESS NOTES
Clinic Care Coordination Contact  [facility]/Voicemail    Referral Source: IP Handoff    Clinical Data: Care Coordinator Outreach     Outreach attempted x 1.  Unable to leave voicemail.     Plan: Care Coordinator will try to reach patient again in 1-2 business days.    Zaida Romano, RN Care Coordinator     [facility] Ambulatory Care Management  [facility] and   [email or web address]    Office: [phone]

## 2021-07-29 ENCOUNTER — PATIENT OUTREACH (OUTPATIENT)
Dept: NURSING | Facility: CLINIC | Age: 81
End: 2021-07-29
Payer: COMMERCIAL

## 2021-07-29 ASSESSMENT — ACTIVITIES OF DAILY LIVING (ADL): DEPENDENT_IADLS:: INDEPENDENT

## 2021-07-29 NOTE — PROGRESS NOTES
Clinic Care Coordination Contact  OUTREACH    Referral Information:  Referral Source: IP Handoff    [facility]  Discharge Summary - Medicine & Pediatrics       Date of Admission:  7/23/2021  Date of Discharge:  7/27/2021  3:40 PM    Discharge Diagnoses:  Sepsis  Clostridioides difficile infection  Pyelonephritis status post ureteral stent placement  Acute kidney injury  Primary Diagnosis: Genitourinary Disorders    Chief Complaint   Patient presents with     Clinic Care Coordination - Post Hospital     Clinic Care coordination hospital follow up         Universal Utilization:  Clinic Utilization  Difficulty keeping appointments:: No  Compliance Concerns: No  No-Show Concerns: No  No PCP office visit in Past Year: No  Utilization    Hospital Admissions  3             ED Visits  3             No Show Count (past year)  0                Current as of: 7/28/2021  9:39 AM              Clinical Concerns:  Current Medical Concerns:    Patient Active Problem List   Diagnosis     Osteopenia     Hypertension goal BP (blood pressure) < 140/90     Hyperlipidemia LDL goal <100     Leg edema, left     Type 2 diabetes mellitus without complication, without long-term current use of insulin (H)     Family history of breast cancer in sister     Hepatitis A immune     Left leg swelling     Allergic dermatitis     CKD (chronic kidney disease) stage 3, GFR 30-59 ml/min     Hypokalemia     Emphysematous pyelonephritis     Kidney stone     Acute kidney injury (H)     Fever in adult     Diarrhea, unspecified type       Current Behavioral Concerns:None identified    Education Provided to patient:     Education provided on signs and symptoms to report to care team.    Education provided on importance of fluids and healthy diet.    Education provided on role of care coordination.     RNCC confirmed patient has contact information for care team to report any concerning symptoms.     Discharge  instructions were reviewed with patient.     Pain  Pain (GOAL):: No  Health Maintenance Reviewed: Due/Overdue, Patient will address wtih PCP  Clinical Pathway: None    Medication Management:  Medication review status: Medications reviewed.  Changes noted per patient report. Patient denies questions or concerns at this time regarding her medications and verbalizes understanding of medication regimen.     Functional Status:  Dependent ADLs:: Independent  Dependent IADLs:: Independent  Bed or wheelchair confined:: No  Mobility Status: Independent  Fallen 2 or more times in the past year?: No  Any fall with injury in the past year?: No    Living Situation:  Current living arrangement:: I live in a private home with family  Type of residence:: Private home - no stairs    Lifestyle & Psychosocial Needs:    Social Determinants of Health     Tobacco Use: Low Risk      Smoking Tobacco Use: Never Smoker     Smokeless Tobacco Use: Never Used   Alcohol Use:      Frequency of Alcohol Consumption:      Average Number of Drinks:      Frequency of Binge Drinking:    Financial Resource Strain:      Difficulty of Paying Living Expenses:    Food Insecurity:      Worried About Running Out of Food in the Last Year:      Ran Out of Food in the Last Year:    Transportation Needs:      Lack of Transportation (Medical):      Lack of Transportation (Non-Medical):    Physical Activity:      Days of Exercise per Week:      Minutes of Exercise per Session:    Stress:      Feeling of Stress :    Social Connections:      Frequency of Communication with Friends and Family:      Frequency of Social Gatherings with Friends and Family:      Attends Gnosticism Services:      Active Member of Clubs or Organizations:      Attends Club or Organization Meetings:      Marital Status:    Intimate Partner Violence:      Fear of Current or Ex-Partner:      Emotionally Abused:      Physically Abused:      Sexually Abused:    Depression: Not at risk     PHQ-2  Score: 0   Housing Stability:      Unable to Pay for Housing in the Last Year:      Number of Places Lived in the Last Year:      Unstable Housing in the Last Year:      Inadequate nutrition (GOAL):: No  Tube Feeding: No  Inadequate activity/exercise (GOAL):: No  Significant changes in sleep pattern (GOAL): No  Transportation means:: Family, Regular car     Taoism or spiritual beliefs that impact treatment:: No  Mental health DX:: No  Mental health management concern (GOAL):: No  Informal Support system:: Children      Resources and Interventions:  Current Resources:      Community Resources: None  Supplies used at home:: None  Equipment Currently Used at Home: none     Advance Care Plan/Directive  Advanced Care Plans/Directives on file:: Yes  Type Advanced Care Plans/Directives: Advanced Directive - On File    Referrals Placed: None     Goals: No goals addressed this visit. Patient declines further care coordination outreaches.     Patient/Caregiver understanding: Patient verbalizes understanding of discharge instructions and medication regimen.        Future Appointments              In 3 weeks Mona Adams MD [facility]          Plan:     Patient declines further outreaches from care coordination and will be dis-enrolled at this time.     RNCC will remain open to care coordination referrals in the future.     RNCC provided patient with writer's contact information for any questions or concerns she may have.     Zaida Romano RN Care Coordinator     [facility] Ambulatory Care Management  [facility]  [email or web address]    Office: [phone]

## 2021-08-02 ENCOUNTER — TELEPHONE (OUTPATIENT)
Dept: UROLOGY | Facility: CLINIC | Age: 81
End: 2021-08-02

## 2021-08-02 DIAGNOSIS — Z11.59 ENCOUNTER FOR SCREENING FOR OTHER VIRAL DISEASES: ICD-10-CM

## 2021-08-02 NOTE — TELEPHONE ENCOUNTER
Called patient to schedule procedure with Dr. Law on Thursday 08/05/21, there was no answer.  Left message with my direct line [phone] and sent patient a Tushkyhart message as well. Cindy HUNT Deya-Op Coordinator for General and Urology Surgery

## 2021-08-02 NOTE — TELEPHONE ENCOUNTER
Patient is scheduled for surgery with Dr. Law    Spoke with: did not speak to patient. Sent patient a Kiio message.     Date of Surgery: Thursday 08/05/21     Location: [facility]     Informed patient they will need an adult  Yes    Pre op with Provider edouard    H&P: Scheduled with PAC in person visit Wednesday 08/04/21 @ 8:00am     Pre-procedure COVID-19 Test: Wednesday 08/04/21 @ 9:15am [facility] lab     Additional imaging/appointments: na    Surgery packet: optifreight to patient 08/02/21, verified address on file is current and correct.      Additional comments: na

## 2021-08-03 ENCOUNTER — PATIENT OUTREACH (OUTPATIENT)
Dept: UROLOGY | Facility: CLINIC | Age: 81
End: 2021-08-03

## 2021-08-03 NOTE — TELEPHONE ENCOUNTER
Spoke to patient and verified surgery details for 08/05/21. Patient is aware of tentative arrival time and that she needs an adult  day of surgery. Patient made aware that surgery packet has been mailed and location of surgery.

## 2021-08-03 NOTE — TELEPHONE ENCOUNTER
FUTURE VISIT INFORMATION      SURGERY INFORMATION:    Date: 21    Location: [facility]    Surgeon:  Kirk Law MD    Anesthesia Type:  General    Procedure: NEPHROLITHOTOMY, PERCUTANEOUS, USING HOLMIUM LASER RIGHT    RECORDS REQUESTED FROM:       Primary Care Provider: Lea Lopez MD- [facility]    Pertinent Medical History: hypertension     Most recent EKG+ Tracin20    Most recent ECHO: 5/3/17

## 2021-08-04 ENCOUNTER — OFFICE VISIT (OUTPATIENT)
Dept: SURGERY | Facility: CLINIC | Age: 81
End: 2021-08-04
Payer: COMMERCIAL

## 2021-08-04 ENCOUNTER — PRE VISIT (OUTPATIENT)
Dept: SURGERY | Facility: CLINIC | Age: 81
End: 2021-08-04

## 2021-08-04 ENCOUNTER — LAB (OUTPATIENT)
Dept: LAB | Facility: CLINIC | Age: 81
End: 2021-08-04
Attending: UROLOGY
Payer: COMMERCIAL

## 2021-08-04 ENCOUNTER — ANESTHESIA EVENT (OUTPATIENT)
Dept: SURGERY | Facility: CLINIC | Age: 81
DRG: 982 | End: 2021-08-04
Payer: COMMERCIAL

## 2021-08-04 VITALS
WEIGHT: 154.3 LBS | SYSTOLIC BLOOD PRESSURE: 136 MMHG | DIASTOLIC BLOOD PRESSURE: 86 MMHG | HEIGHT: 63 IN | TEMPERATURE: 97.8 F | BODY MASS INDEX: 27.34 KG/M2 | HEART RATE: 83 BPM | OXYGEN SATURATION: 98 % | RESPIRATION RATE: 16 BRPM

## 2021-08-04 DIAGNOSIS — Z01.818 PRE-OP EXAMINATION: Primary | ICD-10-CM

## 2021-08-04 DIAGNOSIS — Z11.59 ENCOUNTER FOR SCREENING FOR OTHER VIRAL DISEASES: ICD-10-CM

## 2021-08-04 LAB — SARS-COV-2 RNA RESP QL NAA+PROBE: NEGATIVE

## 2021-08-04 PROCEDURE — U0003 INFECTIOUS AGENT DETECTION BY NUCLEIC ACID (DNA OR RNA); SEVERE ACUTE RESPIRATORY SYNDROME CORONAVIRUS 2 (SARS-COV-2) (CORONAVIRUS DISEASE [COVID-19]), AMPLIFIED PROBE TECHNIQUE, MAKING USE OF HIGH THROUGHPUT TECHNOLOGIES AS DESCRIBED BY CMS-2020-01-R: HCPCS | Performed by: UROLOGY

## 2021-08-04 PROCEDURE — 99203 OFFICE O/P NEW LOW 30 MIN: CPT | Performed by: PHYSICIAN ASSISTANT

## 2021-08-04 ASSESSMENT — PAIN SCALES - GENERAL: PAINLEVEL: NO PAIN (0)

## 2021-08-04 ASSESSMENT — ENCOUNTER SYMPTOMS: SEIZURES: 0

## 2021-08-04 ASSESSMENT — LIFESTYLE VARIABLES: TOBACCO_USE: 0

## 2021-08-04 ASSESSMENT — MIFFLIN-ST. JEOR: SCORE: 1131.09

## 2021-08-04 NOTE — H&P (VIEW-ONLY)
Pre-Operative H & P     CC:  Preoperative exam to assess for increased cardiopulmonary risk while undergoing surgery and anesthesia.    Date of Encounter: 8/4/2021  Primary Care Physician:  Lea Lopez     Reason for visit: pre operative examination, Kidney stone    HPI  Kelly Barnes is a 81 year old female who presents for pre-operative H & P in preparation for NEPHROLITHOTOMY, PERCUTANEOUS, USING HOLMIUM LASER RIGHT with Dr. Law on 8/5/21 at [facility].     The patient is an 81 year old woman who has a past medical history significant for HTN, HLD, anemia and type 2 diabetes. She was recently admitted and underwent cystoscopy and stent placement for pyelonephritis from 7/13-7/16. She then was re-admitted for a C diff infection and GERSON secondary to dehydration. She was started on treatment with antibiotics for her C diff and infected stent and discharged home. She is now scheduled for the procedure as above.     History is obtained from the patient and chart review      Hx of abnormal bleeding or anti-platelet use: none    Menstrual history: No LMP recorded. Patient is postmenopausal.:     Prior to Admission Medications  Current Outpatient Medications   Medication Sig Dispense Refill     acetaminophen (TYLENOL) 325 MG tablet Take 2 tablets (650 mg) by mouth every 6 hours as needed for mild pain or fever (Patient taking differently: Take 650 mg by mouth every 6 hours as needed for mild pain or fever ) 112 tablet 0     cefpodoxime (VANTIN) 100 MG tablet Take 1 tablet (100 mg) by mouth 2 times daily 60 tablet 0     glimepiride (AMARYL) 2 MG tablet Take 1 tablet (2 mg) by mouth 2 times daily (with meals) She will also take separate 2 mg dose with breakfast. (Patient taking differently: Take 2 mg by mouth 2 times daily (with meals) ) 180 tablet 3     lactobacillus rhamnosus, GG, (CULTURELL) capsule Take 1 capsule by mouth 2 times daily 60 capsule 0      losartan-hydrochlorothiazide (HYZAAR) 50-12.5 MG tablet Take 1 tablet by mouth daily (Patient taking differently: Take 1 tablet by mouth every morning ) 90 tablet 3     simvastatin (ZOCOR) 10 MG tablet TAKE ONE TABLET BY MOUTH AT BEDTIME (Patient taking differently: At Bedtime TAKE ONE TABLET BY MOUTH AT BEDTIME) 90 tablet 3     tamsulosin (FLOMAX) 0.4 MG capsule Take 1 capsule (0.4 mg) by mouth daily (Patient taking differently: Take 0.4 mg by mouth every morning ) 30 capsule 0     tolterodine ER (DETROL LA) 4 MG 24 hr capsule Take 1 capsule (4 mg) by mouth daily (Patient taking differently: Take 4 mg by mouth every morning ) 30 capsule 0     vancomycin (VANCOCIN) 125 MG capsule Take 1 capsule (125 mg) by mouth 4 times daily for 6 days, THEN 1 capsule (125 mg) 2 times daily. 84 capsule 0     blood glucose (ONETOUCH ULTRA) test strip Use to test blood sugar twice daily. Dispense 1 box of 200 test strips, #3 refills. 100 strip 3     blood glucose monitoring (ONE TOUCH ULTRA 2) meter device kit Use to test blood sugar 3 times daily 1 kit 0     OneTouch Delica Lancets 33G MISC 1 Device by In Vitro route 2 times daily 100 each 11       Family History  Family History   Problem Relation Age of Onset     Hypertension Mother      Diabetes No family hx of      Cerebrovascular Disease No family hx of      Breast Cancer No family hx of      Cancer - colorectal No family hx of      Prostate Cancer No family hx of      Anesthesia Reaction No family hx of      Bleeding Disorder No family hx of      Clotting Disorder No family hx of        The complete review of systems is negative other than noted in the HPI or here.     Preprocedure Note     Last edited 2149 by Regina Rodriguez PA-C  Date of Service 21 075  Creation Time 21 075  Status: Addendum             Anesthesia Pre-Procedure Evaluation    Patient: Kelly Barnes   MRN: [id number] : 1940        Preoperative Diagnosis: Kidney stone  [N20.0]   Procedure : Procedure(s):  NEPHROLITHOTOMY, PERCUTANEOUS, USING HOLMIUM LASER RIGHT     Past Medical History:   Diagnosis Date     Acute kidney injury (H) 12/19/2020     Allergic rhinitis, cause unspecified      Aortic stenosis 2/29/2016    With new murmur noted 2/2016, normal echo, repeat echo 2/2017.     Atypical chest pain 7/24/2015     cuboid     left foot     Hyperlipidemia LDL goal <100 11/1/2012     Hypertension goal BP (blood pressure) < 140/90      Musculoskeletal chest pain 11/25/2016     Obesity, Class I, BMI 30-34.9 11/11/2015     Pneumonia 3/16     Sepsis, due to unspecified organism, unspecified whether acute organ dysfunction present (H) 12/19/2020     Type 2 diabetes, HbA1c goal < 7% (H)       Past Surgical History:   Procedure Laterality Date     CYSTOSCOPY, RETROGRADES, INSERT STENT URETER(S), COMBINED Right 7/15/2021    Procedure: CYSTOURETEROSCOPY, WITH RETROGRADE PYELOGRAM,  AND STENT INSERTION RIGHT;  Surgeon: Kirk Law MD;  Location:  OR     [facility] NONSPECIFIC PROCEDURE      none      Allergies   Allergen Reactions     Ibuprofen Other (See Comments)     numbness in hands and feet     Keflex [Cephalexin] Rash      Social History     Tobacco Use     Smoking status: Never Smoker     Smokeless tobacco: Never Used   Substance Use Topics     Alcohol use: Yes     Alcohol/week: 10.0 standard drinks     Comment: 1-2 drinks per week      Wt Readings from Last 1 Encounters:   07/26/21 70.9 kg (156 lb 6.4 oz)        Anesthesia Evaluation   Pt has had prior anesthetic. Type: MAC.    No history of anesthetic complications       ROS/MED HX  ENT/Pulmonary:    (-) tobacco use   Neurologic:  - neg neurologic ROS  (-) no seizures, no CVA and no TIA   Cardiovascular: Comment: Lower extremity edema. Left >Right chronically.     (+) Dyslipidemia hypertension-----valvular problems/murmurs aortic sclerosis . Previous cardiac testing   Echo: Date: 2017 Results:  Interpretation Summary     The  visual ejection fraction is estimated at 55-60%.  Regional wall motion abnormalities cannot be excluded due to limited  visualization.  The apex and distal anterior wall were not well seen. Contrast would improve  endocardial definition.  The ascending aorta is Mildly dilated.  The study was technically difficult.    Stress Test: Date: Results:    ECG Reviewed: Date: 12/19/20 Results:  Sinus rhythm, possible left atrial enlargement, cannot rule out inferior infarct age undetermined  Cath: Date: Results:      METS/Exercise Tolerance: 4 - Raking leaves, gardening    Hematologic:     (+) anemia,  (-) history of blood clots and history of blood transfusion   Musculoskeletal:  - neg musculoskeletal ROS     GI/Hepatic:  - neg GI/hepatic ROS  (-) GERD   Renal/Genitourinary: Comment: Recently treated for pyelonephritis    (+) renal disease, type: CRI,     Endo:     (+) type II DM, Last HgA1c: 7.4, date: 7/13/21, Not using insulin, - not using insulin pump.     Psychiatric/Substance Use:  - neg psychiatric ROS     Infectious Disease: Comment: C diff infection on 7/23/21 on oral vancomycin    Pyelonephritis on antibiotics      Malignancy:  - neg malignancy ROS     Other:  - neg other ROS        LABS:  CBC:   Lab Results   Component Value Date    WBC 8.1 07/27/2021    WBC 10.0 07/26/2021    HGB 11.1 (L) 07/27/2021    HGB 12.2 07/26/2021    HCT 33.8 (L) 07/27/2021    HCT 37.4 07/26/2021     07/27/2021     (H) 07/26/2021     BMP:   Lab Results   Component Value Date     07/27/2021     07/26/2021    POTASSIUM 3.7 07/27/2021    POTASSIUM 3.4 07/27/2021    CHLORIDE 106 07/27/2021    CHLORIDE 104 07/26/2021    CO2 23 07/27/2021    CO2 22 07/26/2021    BUN 9 07/27/2021    BUN 10 07/26/2021    CR 1.00 07/27/2021    CR 1.01 07/26/2021     (H) 07/27/2021     (H) 07/27/2021     COAGS:   Lab Results   Component Value Date    INR 1.10 01/27/2005     POC:   Lab Results   Component Value Date    BGM  "147 (H) 2020     HEPATIC:   Lab Results   Component Value Date    ALBUMIN 3.0 (L) 2021    PROTTOTAL 7.4 2021    ALT 26 2021    AST 12 2021    ALKPHOS 108 2021    BILITOTAL 0.5 2021     OTHER:   Lab Results   Component Value Date    LACT 1.5 2021    A1C 7.4 (H) 2021    JAN 8.2 (L) 2021    MAG 1.8 2021    LIPASE 71 (L) 2021    TSH 1.11 10/15/2019    .0 (H) 2021    SED 30 2020        154 lbs 4.8 oz  5' 2.5\"   Body mass index is 27.77 kg/m .       Physical Exam  Constitutional: Awake, alert, cooperative, no apparent distress, and appears stated age.  Eyes: Pupils equal, round and reactive to light, extra ocular muscles intact, sclera clear, conjunctiva normal.  HENT: Normocephalic, oral pharynx with moist mucus membranes, good dentition. No goiter appreciated.   Respiratory: Clear to auscultation bilaterally, no crackles or wheezing.  Cardiovascular: Regular rate and rhythm, normal S1 and S2, and no murmur noted.  Carotids +2, no bruits. No edema. Palpable pulses to radial  DP and PT arteries.   GI: Normal bowel sounds, soft, non-distended, non-tender, no masses palpated, no hepatosplenomegaly.    Lymph/Hematologic: No cervical lymphadenopathy and no supraclavicular lymphadenopathy.  Genitourinary:  defer  Skin: Warm and dry.  No rashes at anticipated surgical site.   Musculoskeletal: Full ROM of neck. There is no redness, warmth, or swelling of the joints. Gross motor strength is normal.    Neurologic: Awake, alert, oriented to name, place and time. Cranial nerves II-XII are grossly intact. Gait is normal.   Neuropsychiatric: Calm, cooperative. Normal affect.     EK20  Sinus rhythm, possible left atrial enlargement, cannot rule out inferior infarct age undetermined    Echo 2017  Interpretation Summary     The visual ejection fraction is estimated at 55-60%.  Regional wall motion abnormalities cannot be excluded due to " limited  visualization.  The apex and distal anterior wall were not well seen. Contrast would improve  endocardial definition.  The ascending aorta is Mildly dilated.  The study was technically difficult.      The patient's records and results personally reviewed by this provider.     Outside records reviewed from: care everywhere    ASSESSMENT and PLAN  Kelly is an 81 year old woman who is scheduled for NEPHROLITHOTOMY, PERCUTANEOUS, USING HOLMIUM LASER RIGHT on 8/5/21 by Dr. Law in treatment of Kidney stone.  PAC referral for risk assessment and optimization for anesthesia with comorbid conditions of HTN, HLD, anemia and type 2 diabetes:      Pre-operative considerations:   1.  Cardiac:  Functional status- METS 4, the patient is able to garden and walk without issues. She denies any cardiac symptoms.  Low risk surgery with 0.9% (RCRI #) risk of major adverse cardiac event.  The patient had EKG in 2020 and echo in 2017. She has moderate aortic sclerosis without stenosis. No further testing indicated.   ~ HTN - hold hyzaar DOS  ~ HLD - continue zocor.   ~ Lower extremity edema left chronically more then right - the patient reports this is chronic for her and unchanged.     2.  Pulm:  Airway feasible.  SHANNON risk: Low    3. Heme:   Anemia - hgb was 11.1 on 7/27/21. Low bleeding risk procedure    4. Endo: Type 2 diabetes - blood sugars are normally in the 150s but have been slightly more elevated since her hospitalizations. Hold glimepiride DOS    5. GI:  Risk of PONV score = 3.  If > 2, anti-emetic intervention recommended.     6. :  pyelonephritis s/p cysto and stent placement. She will continue Detrol and Flomax. Procedure as above. Have reached out to surgical team if urinalysis is needed. Have not ordered this for the patient given she remains on antibiotics and her procedure is tomorrow.     7. ID: C diff - patient reports resolution of diarrhea, fevers and abdominal pain. Continue oral vancomycin. Contact  precautions if indicated. Continue cefpodoxime for recurrent pyelonephritis.      VTE risk: 0.5%    The patient is optimized for their procedure. AVS with information on surgery time/arrival time, meds and NPO status given by nursing staff.          On the day of service:     Prep time: 7 minutes  Visit time: 13 minutes  Documentation time: 11 minutes  ------------------------------------------  Total time: 31 minutes      Regina Rodriguez PA-C  Preoperative Assessment Center  [facility]  Phone: [phone]  Fax: [phone]

## 2021-08-04 NOTE — ANESTHESIA PREPROCEDURE EVALUATION
Anesthesia Pre-Procedure Evaluation    Patient: Kelly Barnes   MRN: [id number] : 1940        Preoperative Diagnosis: Kidney stone [N20.0]   Procedure : Procedure(s):  NEPHROLITHOTOMY, PERCUTANEOUS, USING HOLMIUM LASER RIGHT     Past Medical History:   Diagnosis Date     Acute kidney injury (H) 2020     Allergic rhinitis, cause unspecified      Aortic stenosis 2016    With new murmur noted 2016, normal echo, repeat echo 2017.     Atypical chest pain 2015     cuboid     left foot     Hyperlipidemia LDL goal <100 2012     Hypertension goal BP (blood pressure) < 140/90      Musculoskeletal chest pain 2016     Obesity, Class I, BMI 30-34.9 2015     Pneumonia 3/16     Sepsis, due to unspecified organism, unspecified whether acute organ dysfunction present (H) 2020     Type 2 diabetes, HbA1c goal < 7% (H)       Past Surgical History:   Procedure Laterality Date     CYSTOSCOPY, RETROGRADES, INSERT STENT URETER(S), COMBINED Right 7/15/2021    Procedure: CYSTOURETEROSCOPY, WITH RETROGRADE PYELOGRAM,  AND STENT INSERTION RIGHT;  Surgeon: Kirk Law MD;  Location:  OR     [facility] NONSPECIFIC PROCEDURE      none      Allergies   Allergen Reactions     Ibuprofen Other (See Comments)     numbness in hands and feet     Keflex [Cephalexin] Rash      Social History     Tobacco Use     Smoking status: Never Smoker     Smokeless tobacco: Never Used   Substance Use Topics     Alcohol use: Yes     Alcohol/week: 10.0 standard drinks     Comment: 1-2 drinks per week      Wt Readings from Last 1 Encounters:   21 70.9 kg (156 lb 6.4 oz)        Anesthesia Evaluation   Pt has had prior anesthetic. Type: MAC.    No history of anesthetic complications       ROS/MED HX  ENT/Pulmonary:    (-) tobacco use   Neurologic:  - neg neurologic ROS  (-) no seizures, no CVA and no TIA   Cardiovascular: Comment: Lower extremity edema. Left >Right chronically.     (+) Dyslipidemia  hypertension-----valvular problems/murmurs aortic sclerosis . Previous cardiac testing   Echo: Date: 2017 Results:  Interpretation Summary     The visual ejection fraction is estimated at 55-60%.  Regional wall motion abnormalities cannot be excluded due to limited  visualization.  The apex and distal anterior wall were not well seen. Contrast would improve  endocardial definition.  The ascending aorta is Mildly dilated.  The study was technically difficult.    Stress Test: Date: Results:    ECG Reviewed: Date: 12/19/20 Results:  Sinus rhythm, possible left atrial enlargement, cannot rule out inferior infarct age undetermined  Cath: Date: Results:      METS/Exercise Tolerance: 4 - Raking leaves, gardening    Hematologic:     (+) anemia,  (-) history of blood clots and history of blood transfusion   Musculoskeletal:  - neg musculoskeletal ROS     GI/Hepatic:  - neg GI/hepatic ROS  (-) GERD   Renal/Genitourinary: Comment: Recently treated for pyelonephritis    (+) renal disease (recently 2/2 to C diff infection and dehydration. Creatinine normalized by discharge on 7/27/21), type: ARF,     Endo:     (+) type II DM, Last HgA1c: 7.4, date: 7/13/21, Not using insulin, - not using insulin pump.     Psychiatric/Substance Use:  - neg psychiatric ROS     Infectious Disease: Comment: C diff infection on 7/23/21 on oral vancomycin    Pyelonephritis on antibiotics      Malignancy:  - neg malignancy ROS     Other:  - neg other ROS          Physical Exam    Airway        Mallampati: II   TM distance: > 3 FB   Neck ROM: full   Mouth opening: > 3 cm    Respiratory Devices and Support         Dental       (+) caps      Cardiovascular   cardiovascular exam normal       Rhythm and rate: regular and normal   (+) murmur       Pulmonary   pulmonary exam normal                OUTSIDE LABS:  CBC:   Lab Results   Component Value Date    WBC 8.1 07/27/2021    WBC 10.0 07/26/2021    HGB 11.1 (L) 07/27/2021    HGB 12.2 07/26/2021    HCT 33.8  (L) 07/27/2021    HCT 37.4 07/26/2021     07/27/2021     (H) 07/26/2021     BMP:   Lab Results   Component Value Date     07/27/2021     07/26/2021    POTASSIUM 3.7 07/27/2021    POTASSIUM 3.4 07/27/2021    CHLORIDE 106 07/27/2021    CHLORIDE 104 07/26/2021    CO2 23 07/27/2021    CO2 22 07/26/2021    BUN 9 07/27/2021    BUN 10 07/26/2021    CR 1.00 07/27/2021    CR 1.01 07/26/2021     (H) 07/27/2021     (H) 07/27/2021     COAGS:   Lab Results   Component Value Date    INR 1.10 01/27/2005     POC:   Lab Results   Component Value Date     (H) 12/22/2020     HEPATIC:   Lab Results   Component Value Date    ALBUMIN 3.0 (L) 07/23/2021    PROTTOTAL 7.4 07/23/2021    ALT 26 07/23/2021    AST 12 07/23/2021    ALKPHOS 108 07/23/2021    BILITOTAL 0.5 07/23/2021     OTHER:   Lab Results   Component Value Date    LACT 1.5 07/24/2021    A1C 7.4 (H) 07/13/2021    JAN 8.2 (L) 07/27/2021    MAG 1.8 07/25/2021    LIPASE 71 (L) 07/23/2021    TSH 1.11 10/15/2019    .0 (H) 07/25/2021    SED 30 12/19/2020       Anesthesia Plan    ASA Status:  3   NPO Status:  NPO Appropriate    Anesthesia Type: General.     - Airway: ETT   Induction: Intravenous.   Maintenance: Balanced.        Consents    Anesthesia Plan(s) and associated risks, benefits, and realistic alternatives discussed. Questions answered and patient/representative(s) expressed understanding.     - Discussed with:  Patient      - Specific Concerns: PONV, dental damage, sore throat.     - Extended Intubation/Ventilatory Support Discussed: No.      - Patient is DNR/DNI Status: No    Use of blood products discussed: No .     Postoperative Care    Pain management: IV analgesics, Oral pain medications.   PONV prophylaxis: Ondansetron (or other 5HT-3), Droperidol or Haldol     Comments:    Discussed plan for general anesthetic with oral ETT. Preop  but in the normal range for patient recently, and only IV insulin available  perioperatively so will continue to monitor while in OR and start insulin drip if continues to rise.     Patient voiced understanding of anesthetic plan and risks, and had no further questions.           PAC Discussion and Assessment    ASA Classification: 3  Case is suitable for: [facility]  Anesthetic techniques and relevant risks discussed: GA                  PAC Resident/NP Anesthesia Assessment: Kelly is an 81 year old woman who is scheduled for NEPHROLITHOTOMY, PERCUTANEOUS, USING HOLMIUM LASER RIGHT on 8/5/21 by Dr. Law in treatment of Kidney stone.  PAC referral for risk assessment and optimization for anesthesia with comorbid conditions of HTN, HLD, anemia and type 2 diabetes:      Pre-operative considerations:   1.  Cardiac:  Functional status- METS 4, the patient is able to garden and walk without issues. She denies any cardiac symptoms.  Low risk surgery with 0.9% (RCRI #) risk of major adverse cardiac event.  The patient had EKG in 2020 and echo in 2017. She has moderate aortic sclerosis without stenosis. No further testing indicated.   ~ HTN - hold hyzaar DOS  ~ HLD - continue zocor.   ~ Lower extremity edema left chronically more then right - the patient reports this is chronic for her and unchanged.     2.  Pulm:  Airway feasible.  SHANNON risk: Low    3. Heme:   Anemia - hgb was 11.1 on 7/27/21. Low bleeding risk procedure    4. Endo: Type 2 diabetes - blood sugars are normally in the 150s but have been slightly more elevated since her hospitalizations. Hold glimepiride DOS    5. GI:  Risk of PONV score = 3.  If > 2, anti-emetic intervention recommended.     6. :  pyelonephritis s/p cysto and stent placement. She will continue Detrol and Flomax. Procedure as above. Have reached out to surgical team if urinalysis is needed. Have not ordered this for the patient given she remains on antibiotics and her procedure is tomorrow.     7. ID: C diff - patient reports resolution of diarrhea, fevers and  abdominal pain. Continue oral vancomycin. Contact precautions if indicated. Continue cefpodoxime for recurrent pyelonephritis.      VTE risk: 0.5%     Patient is optimized and is acceptable candidate for the proposed procedure.  No further diagnostic evaluation is needed.      For further details of assessment, testing, and physical exam please see H and P completed on same date     Regina Rodriguez PA-C       Mid-Level Provider/Resident: Regina Rodriguez PA-C  Date: 8/4/21                                 Regina Rodriguez PA-C

## 2021-08-04 NOTE — PATIENT INSTRUCTIONS
Preparing for Your Surgery      Name:  Kelly Barnes   MRN:  [id number]   :  1940   Today's Date:  2021       Arriving for surgery:  Surgery date:  21  Arrival time:  10AM    Restrictions due to COVID 19:       One visitor is allowed in the Pre Op area. When you go into surgery, one visitor is allowed to wait in the Surgery Waiting Room       (provided there is enough space to social distance).   After surgery- Two visitors are allowed at a time if you have a private room and one visitor is allowed for those in a semi-private room.   Every 4 days the visitor(s) can rotate. During the 4 day period, the visitor(s) must be consistent. No visitors under the age of 18 years old.   Visiting Hours: 8 am - 8:30 pm   No ill visitors.   All visitors must wear face mask.    [employer] parking is available for anyone with mobility limitations or disabilities.  ([place]  24 hours/ 7 days a week; [facility]  7 am- 3:30 pm, Mon- Fri)    Please come to:     [facility] [address]  [address] [place], [place] [address]  [address]    -[employer] parking is available in front of [facility] from 7:00AM to 3:30PM. If you prefer, park your car in the Green Lot.    -Proceed to the 3rd floor, check in at the Adult Surgery Waiting Lounge. [phone]    If an escort is needed stop at the Information Desk in the lobby. Inform the information person that you are here for surgery. An escort to the Adult Surgery Waiting Lounge will be provided.        What can I eat or drink?  -  You may eat and drink normally for up to 8 hours before your surgery. (Until 21, 4AM)  -  You may have clear liquids until 2 hours before surgery. (Until 21, 10AM)    Examples of clear liquids:  Water  Clear broth  Juices (apple, white grape, white cranberry  and cider) without pulp  Noncarbonated, powder based beverages  (lemonade and Mariano-Aid)  Sodas (Sprite, 7-Up, ginger ale and  chhaya)  Coffee or tea (without milk or cream)  Gatorade    -  No Alcohol for at least 24 hours before surgery     Which medicines can I take?    Hold Aspirin for 7 days before surgery.   Hold Multivitamins for 7 days before surgery.  Hold Supplements for 7 days before surgery.  Hold Ibuprofen (Advil, Motrin) for 1 day before surgery--unless otherwise directed by surgeon.  Hold Naproxen (Aleve) for 4 days before surgery.    -  DO NOT take these medications the day of surgery:    Glimepiride(Amaryl)   Lactobacillus(Culturell)    Losartan-hydrochlorothiazide      -  PLEASE TAKE these medications the day of surgery:    Acetaminophen(Tylenol) as needed    Cefpodoxime(Vantin)   Tamsulosin(Flomax)    Tolterodine(Detrol)   Vancomycin(Vancocin)    How do I prepare myself?  - Please take 2 showers before surgery using Scrubcare or Hibiclens soap.    Use this soap only from the neck to your toes.     Leave the soap on your skin for one minute--then rinse thoroughly.      You may use your own shampoo and conditioner; no other hair products.   - Please remove all jewelry and body piercings.  - No lotions, deodorants or fragrance.  - No makeup or fingernail polish.   - Bring your ID and insurance card.    -If you have a Deep Brain Stimulator, Spinal Cord Stimulator or any neuro stimulator device---you must bring the remote control to the hospital     - All patients are required to have a Covid-19 test within 4 days of surgery/procedure.      -Patients will be contacted by the [facility] scheduling team within 1 week of surgery to make an appointment.      - Patients may call the Scheduling team at [phone] if they have not been scheduled within 4 days of  surgery.      ALL PATIENTS GOING HOME THE SAME DAY OF SURGERY ARE REQUIRED TO HAVE A RESPONSIBLE ADULT TO DRIVE AND BE IN ATTENDANCE WITH THEM FOR 24 HOURS FOLLOWING SURGERY.      Questions or Concerns:    - For any questions regarding the day of surgery or your  hospital stay, please contact the Pre Admission Nursing Office at [phone].       - If you have health changes between today and your surgery please call your surgeon.       For questions after surgery please call your surgeons office.

## 2021-08-05 ENCOUNTER — HOSPITAL ENCOUNTER (OUTPATIENT)
Facility: CLINIC | Age: 81
Discharge: HOME | DRG: 982 | End: 2021-08-06
Attending: UROLOGY | Admitting: UROLOGY
Payer: COMMERCIAL

## 2021-08-05 ENCOUNTER — ANESTHESIA (OUTPATIENT)
Dept: SURGERY | Facility: CLINIC | Age: 81
DRG: 982 | End: 2021-08-05
Payer: COMMERCIAL

## 2021-08-05 ENCOUNTER — APPOINTMENT (OUTPATIENT)
Dept: GENERAL RADIOLOGY | Facility: CLINIC | Age: 81
DRG: 982 | End: 2021-08-05
Attending: UROLOGY
Payer: COMMERCIAL

## 2021-08-05 DIAGNOSIS — N20.0 KIDNEY STONE: ICD-10-CM

## 2021-08-05 LAB
ABO/RH(D): NORMAL
ANTIBODY SCREEN: NEGATIVE
GLUCOSE BLDC GLUCOMTR-MCNC: 182 MG/DL (ref 70–99)
GLUCOSE BLDC GLUCOMTR-MCNC: 216 MG/DL (ref 70–99)
GLUCOSE BLDC GLUCOMTR-MCNC: 220 MG/DL (ref 70–99)
GLUCOSE BLDC GLUCOMTR-MCNC: 235 MG/DL (ref 70–99)
GLUCOSE BLDC GLUCOMTR-MCNC: 247 MG/DL (ref 70–99)
HOLD SPECIMEN: NORMAL
SPECIMEN EXPIRATION DATE: NORMAL

## 2021-08-05 PROCEDURE — 250N000024 HC ISOFLURANE, PER MIN: Performed by: UROLOGY

## 2021-08-05 PROCEDURE — C1729 CATH, DRAINAGE: HCPCS | Performed by: UROLOGY

## 2021-08-05 PROCEDURE — C1769 GUIDE WIRE: HCPCS | Performed by: UROLOGY

## 2021-08-05 PROCEDURE — 250N000013 HC RX MED GY IP 250 OP 250 PS 637: Performed by: STUDENT IN AN ORGANIZED HEALTH CARE EDUCATION/TRAINING PROGRAM

## 2021-08-05 PROCEDURE — 87106 FUNGI IDENTIFICATION YEAST: CPT | Performed by: UROLOGY

## 2021-08-05 PROCEDURE — 255N000002 HC RX 255 OP 636: Performed by: UROLOGY

## 2021-08-05 PROCEDURE — 250N000011 HC RX IP 250 OP 636: Performed by: NURSE ANESTHETIST, CERTIFIED REGISTERED

## 2021-08-05 PROCEDURE — 250N000012 HC RX MED GY IP 250 OP 636 PS 637: Performed by: NURSE ANESTHETIST, CERTIFIED REGISTERED

## 2021-08-05 PROCEDURE — 250N000012 HC RX MED GY IP 250 OP 636 PS 637: Performed by: ANESTHESIOLOGY

## 2021-08-05 PROCEDURE — C1887 CATHETER, GUIDING: HCPCS | Performed by: UROLOGY

## 2021-08-05 PROCEDURE — C1725 CATH, TRANSLUMIN NON-LASER: HCPCS | Performed by: UROLOGY

## 2021-08-05 PROCEDURE — 370N000017 HC ANESTHESIA TECHNICAL FEE, PER MIN: Performed by: UROLOGY

## 2021-08-05 PROCEDURE — 999N000141 HC STATISTIC PRE-PROCEDURE NURSING ASSESSMENT: Performed by: UROLOGY

## 2021-08-05 PROCEDURE — 74420 UROGRAPHY RTRGR +-KUB: CPT | Mod: 26 | Performed by: UROLOGY

## 2021-08-05 PROCEDURE — 250N000009 HC RX 250: Performed by: NURSE ANESTHETIST, CERTIFIED REGISTERED

## 2021-08-05 PROCEDURE — 50432 PLMT NEPHROSTOMY CATHETER: CPT | Mod: RT | Performed by: UROLOGY

## 2021-08-05 PROCEDURE — 86901 BLOOD TYPING SEROLOGIC RH(D): CPT | Performed by: UROLOGY

## 2021-08-05 PROCEDURE — 999N000180 XR SURGERY CARM FLUORO LESS THAN 5 MIN: Mod: TC

## 2021-08-05 PROCEDURE — 82365 CALCULUS SPECTROSCOPY: CPT | Performed by: UROLOGY

## 2021-08-05 PROCEDURE — 250N000011 HC RX IP 250 OP 636: Performed by: ANESTHESIOLOGY

## 2021-08-05 PROCEDURE — 258N000001 HC RX 258: Performed by: UROLOGY

## 2021-08-05 PROCEDURE — 52005 CYSTO W/URTRL CATHJ: CPT | Mod: GC | Performed by: UROLOGY

## 2021-08-05 PROCEDURE — 250N000025 HC SEVOFLURANE, PER MIN: Performed by: UROLOGY

## 2021-08-05 PROCEDURE — 250N000009 HC RX 250: Performed by: UROLOGY

## 2021-08-05 PROCEDURE — 258N000003 HC RX IP 258 OP 636: Performed by: NURSE ANESTHETIST, CERTIFIED REGISTERED

## 2021-08-05 PROCEDURE — 360N000085 HC SURGERY LEVEL 5 W/ FLUORO, PER MIN: Performed by: UROLOGY

## 2021-08-05 PROCEDURE — 258N000003 HC RX IP 258 OP 636: Performed by: STUDENT IN AN ORGANIZED HEALTH CARE EDUCATION/TRAINING PROGRAM

## 2021-08-05 PROCEDURE — 250N000011 HC RX IP 250 OP 636: Performed by: UROLOGY

## 2021-08-05 PROCEDURE — 710N000010 HC RECOVERY PHASE 1, LEVEL 2, PER MIN: Performed by: UROLOGY

## 2021-08-05 PROCEDURE — 50080 PERQ NL/PL LITHOTRP SMPL<2CM: CPT | Mod: RT | Performed by: UROLOGY

## 2021-08-05 PROCEDURE — C1894 INTRO/SHEATH, NON-LASER: HCPCS | Performed by: UROLOGY

## 2021-08-05 PROCEDURE — 36415 COLL VENOUS BLD VENIPUNCTURE: CPT | Performed by: UROLOGY

## 2021-08-05 PROCEDURE — 272N000001 HC OR GENERAL SUPPLY STERILE: Performed by: UROLOGY

## 2021-08-05 RX ORDER — DEXTROSE MONOHYDRATE 25 G/50ML
25-50 INJECTION, SOLUTION INTRAVENOUS
Status: DISCONTINUED | OUTPATIENT
Start: 2021-08-05 | End: 2021-08-05

## 2021-08-05 RX ORDER — SODIUM CHLORIDE 9 MG/ML
INJECTION, SOLUTION INTRAVENOUS CONTINUOUS
Status: DISCONTINUED | OUTPATIENT
Start: 2021-08-05 | End: 2021-08-06 | Stop reason: HOSPADM

## 2021-08-05 RX ORDER — NALOXONE HYDROCHLORIDE 0.4 MG/ML
0.4 INJECTION, SOLUTION INTRAMUSCULAR; INTRAVENOUS; SUBCUTANEOUS
Status: DISCONTINUED | OUTPATIENT
Start: 2021-08-05 | End: 2021-08-06 | Stop reason: HOSPADM

## 2021-08-05 RX ORDER — DEXTROSE MONOHYDRATE 25 G/50ML
25-50 INJECTION, SOLUTION INTRAVENOUS
Status: DISCONTINUED | OUTPATIENT
Start: 2021-08-05 | End: 2021-08-06 | Stop reason: HOSPADM

## 2021-08-05 RX ORDER — OXYCODONE HYDROCHLORIDE 5 MG/1
5 TABLET ORAL EVERY 4 HOURS PRN
Status: DISCONTINUED | OUTPATIENT
Start: 2021-08-05 | End: 2021-08-05

## 2021-08-05 RX ORDER — NICOTINE POLACRILEX 4 MG
15-30 LOZENGE BUCCAL
Status: DISCONTINUED | OUTPATIENT
Start: 2021-08-05 | End: 2021-08-05

## 2021-08-05 RX ORDER — MAGNESIUM HYDROXIDE 1200 MG/15ML
LIQUID ORAL PRN
Status: DISCONTINUED | OUTPATIENT
Start: 2021-08-05 | End: 2021-08-05 | Stop reason: HOSPADM

## 2021-08-05 RX ORDER — ONDANSETRON 2 MG/ML
4 INJECTION INTRAMUSCULAR; INTRAVENOUS EVERY 30 MIN PRN
Status: DISCONTINUED | OUTPATIENT
Start: 2021-08-05 | End: 2021-08-05 | Stop reason: HOSPADM

## 2021-08-05 RX ORDER — OXYCODONE HYDROCHLORIDE 5 MG/1
5 TABLET ORAL EVERY 6 HOURS PRN
Qty: 12 TABLET | Refills: 0 | Status: ON HOLD | OUTPATIENT
Start: 2021-08-05 | End: 2021-08-14

## 2021-08-05 RX ORDER — ACETAMINOPHEN 325 MG/1
975 TABLET ORAL 3 TIMES DAILY
Status: DISCONTINUED | OUTPATIENT
Start: 2021-08-05 | End: 2021-08-06 | Stop reason: HOSPADM

## 2021-08-05 RX ORDER — HYDROMORPHONE HCL IN WATER/PF 6 MG/30 ML
0.2 PATIENT CONTROLLED ANALGESIA SYRINGE INTRAVENOUS
Status: DISCONTINUED | OUTPATIENT
Start: 2021-08-05 | End: 2021-08-06 | Stop reason: HOSPADM

## 2021-08-05 RX ORDER — NALOXONE HYDROCHLORIDE 0.4 MG/ML
0.2 INJECTION, SOLUTION INTRAMUSCULAR; INTRAVENOUS; SUBCUTANEOUS
Status: DISCONTINUED | OUTPATIENT
Start: 2021-08-05 | End: 2021-08-06 | Stop reason: HOSPADM

## 2021-08-05 RX ORDER — TAMSULOSIN HYDROCHLORIDE 0.4 MG/1
0.4 CAPSULE ORAL DAILY
Status: DISCONTINUED | OUTPATIENT
Start: 2021-08-06 | End: 2021-08-06 | Stop reason: HOSPADM

## 2021-08-05 RX ORDER — SODIUM CHLORIDE, SODIUM LACTATE, POTASSIUM CHLORIDE, CALCIUM CHLORIDE 600; 310; 30; 20 MG/100ML; MG/100ML; MG/100ML; MG/100ML
INJECTION, SOLUTION INTRAVENOUS CONTINUOUS PRN
Status: DISCONTINUED | OUTPATIENT
Start: 2021-08-05 | End: 2021-08-05

## 2021-08-05 RX ORDER — OXYCODONE HYDROCHLORIDE 5 MG/1
5 TABLET ORAL EVERY 4 HOURS PRN
Status: DISCONTINUED | OUTPATIENT
Start: 2021-08-05 | End: 2021-08-06 | Stop reason: HOSPADM

## 2021-08-05 RX ORDER — ONDANSETRON 4 MG/1
4 TABLET, ORALLY DISINTEGRATING ORAL EVERY 6 HOURS PRN
Status: DISCONTINUED | OUTPATIENT
Start: 2021-08-05 | End: 2021-08-06 | Stop reason: HOSPADM

## 2021-08-05 RX ORDER — ONDANSETRON 4 MG/1
4 TABLET, ORALLY DISINTEGRATING ORAL EVERY 30 MIN PRN
Status: DISCONTINUED | OUTPATIENT
Start: 2021-08-05 | End: 2021-08-05 | Stop reason: HOSPADM

## 2021-08-05 RX ORDER — LIDOCAINE HYDROCHLORIDE 20 MG/ML
INJECTION, SOLUTION INFILTRATION; PERINEURAL PRN
Status: DISCONTINUED | OUTPATIENT
Start: 2021-08-05 | End: 2021-08-05

## 2021-08-05 RX ORDER — PROPOFOL 10 MG/ML
INJECTION, EMULSION INTRAVENOUS PRN
Status: DISCONTINUED | OUTPATIENT
Start: 2021-08-05 | End: 2021-08-05

## 2021-08-05 RX ORDER — DEXAMETHASONE SODIUM PHOSPHATE 4 MG/ML
INJECTION, SOLUTION INTRA-ARTICULAR; INTRALESIONAL; INTRAMUSCULAR; INTRAVENOUS; SOFT TISSUE PRN
Status: DISCONTINUED | OUTPATIENT
Start: 2021-08-05 | End: 2021-08-05

## 2021-08-05 RX ORDER — LABETALOL HYDROCHLORIDE 5 MG/ML
10 INJECTION, SOLUTION INTRAVENOUS
Status: DISCONTINUED | OUTPATIENT
Start: 2021-08-05 | End: 2021-08-05 | Stop reason: HOSPADM

## 2021-08-05 RX ORDER — SODIUM CHLORIDE, SODIUM LACTATE, POTASSIUM CHLORIDE, CALCIUM CHLORIDE 600; 310; 30; 20 MG/100ML; MG/100ML; MG/100ML; MG/100ML
INJECTION, SOLUTION INTRAVENOUS CONTINUOUS
Status: DISCONTINUED | OUTPATIENT
Start: 2021-08-05 | End: 2021-08-05 | Stop reason: HOSPADM

## 2021-08-05 RX ORDER — FENTANYL CITRATE 50 UG/ML
25 INJECTION, SOLUTION INTRAMUSCULAR; INTRAVENOUS EVERY 5 MIN PRN
Status: DISCONTINUED | OUTPATIENT
Start: 2021-08-05 | End: 2021-08-05 | Stop reason: HOSPADM

## 2021-08-05 RX ORDER — TOLTERODINE 4 MG/1
4 CAPSULE, EXTENDED RELEASE ORAL DAILY
Status: DISCONTINUED | OUTPATIENT
Start: 2021-08-06 | End: 2021-08-06 | Stop reason: HOSPADM

## 2021-08-05 RX ORDER — ONDANSETRON 2 MG/ML
INJECTION INTRAMUSCULAR; INTRAVENOUS PRN
Status: DISCONTINUED | OUTPATIENT
Start: 2021-08-05 | End: 2021-08-05

## 2021-08-05 RX ORDER — HALOPERIDOL 5 MG/ML
1 INJECTION INTRAMUSCULAR
Status: DISCONTINUED | OUTPATIENT
Start: 2021-08-05 | End: 2021-08-05 | Stop reason: HOSPADM

## 2021-08-05 RX ORDER — HYDRALAZINE HYDROCHLORIDE 20 MG/ML
2.5-5 INJECTION INTRAMUSCULAR; INTRAVENOUS EVERY 10 MIN PRN
Status: DISCONTINUED | OUTPATIENT
Start: 2021-08-05 | End: 2021-08-05 | Stop reason: HOSPADM

## 2021-08-05 RX ORDER — LIDOCAINE 40 MG/G
CREAM TOPICAL
Status: DISCONTINUED | OUTPATIENT
Start: 2021-08-05 | End: 2021-08-06 | Stop reason: HOSPADM

## 2021-08-05 RX ORDER — NICOTINE POLACRILEX 4 MG
15-30 LOZENGE BUCCAL
Status: DISCONTINUED | OUTPATIENT
Start: 2021-08-05 | End: 2021-08-06 | Stop reason: HOSPADM

## 2021-08-05 RX ORDER — FENTANYL CITRATE 50 UG/ML
INJECTION, SOLUTION INTRAMUSCULAR; INTRAVENOUS PRN
Status: DISCONTINUED | OUTPATIENT
Start: 2021-08-05 | End: 2021-08-05

## 2021-08-05 RX ORDER — CEFTRIAXONE 1 G/1
1 INJECTION, POWDER, FOR SOLUTION INTRAMUSCULAR; INTRAVENOUS EVERY 24 HOURS
Status: DISCONTINUED | OUTPATIENT
Start: 2021-08-06 | End: 2021-08-06 | Stop reason: HOSPADM

## 2021-08-05 RX ORDER — METOPROLOL TARTRATE 1 MG/ML
1-2 INJECTION, SOLUTION INTRAVENOUS EVERY 5 MIN PRN
Status: DISCONTINUED | OUTPATIENT
Start: 2021-08-05 | End: 2021-08-05 | Stop reason: HOSPADM

## 2021-08-05 RX ORDER — CEFTRIAXONE 1 G/1
1 INJECTION, POWDER, FOR SOLUTION INTRAMUSCULAR; INTRAVENOUS EVERY 24 HOURS
Status: DISCONTINUED | OUTPATIENT
Start: 2021-08-05 | End: 2021-08-05 | Stop reason: HOSPADM

## 2021-08-05 RX ORDER — ONDANSETRON 2 MG/ML
4 INJECTION INTRAMUSCULAR; INTRAVENOUS EVERY 6 HOURS PRN
Status: DISCONTINUED | OUTPATIENT
Start: 2021-08-05 | End: 2021-08-06 | Stop reason: HOSPADM

## 2021-08-05 RX ORDER — EPHEDRINE SULFATE 50 MG/ML
INJECTION, SOLUTION INTRAMUSCULAR; INTRAVENOUS; SUBCUTANEOUS PRN
Status: DISCONTINUED | OUTPATIENT
Start: 2021-08-05 | End: 2021-08-05

## 2021-08-05 RX ORDER — VANCOMYCIN HYDROCHLORIDE 125 MG/1
125 CAPSULE ORAL 2 TIMES DAILY
Status: DISCONTINUED | OUTPATIENT
Start: 2021-08-05 | End: 2021-08-06 | Stop reason: HOSPADM

## 2021-08-05 RX ORDER — HYDROMORPHONE HYDROCHLORIDE 1 MG/ML
0.2 INJECTION, SOLUTION INTRAMUSCULAR; INTRAVENOUS; SUBCUTANEOUS EVERY 5 MIN PRN
Status: DISCONTINUED | OUTPATIENT
Start: 2021-08-05 | End: 2021-08-05 | Stop reason: HOSPADM

## 2021-08-05 RX ADMIN — SODIUM CHLORIDE: 9 INJECTION, SOLUTION INTRAVENOUS at 19:30

## 2021-08-05 RX ADMIN — ROCURONIUM BROMIDE 20 MG: 10 INJECTION INTRAVENOUS at 13:28

## 2021-08-05 RX ADMIN — VANCOMYCIN HYDROCHLORIDE 125 MG: 125 CAPSULE ORAL at 22:36

## 2021-08-05 RX ADMIN — HYDROMORPHONE HYDROCHLORIDE 0.5 MG: 1 INJECTION, SOLUTION INTRAMUSCULAR; INTRAVENOUS; SUBCUTANEOUS at 15:04

## 2021-08-05 RX ADMIN — LIDOCAINE HYDROCHLORIDE 80 MG: 20 INJECTION, SOLUTION INFILTRATION; PERINEURAL at 12:35

## 2021-08-05 RX ADMIN — PROPOFOL 100 MG: 10 INJECTION, EMULSION INTRAVENOUS at 12:35

## 2021-08-05 RX ADMIN — PHENYLEPHRINE HYDROCHLORIDE 0.2 MCG/KG/MIN: 10 INJECTION INTRAVENOUS at 13:44

## 2021-08-05 RX ADMIN — PHENYLEPHRINE HYDROCHLORIDE 100 MCG: 10 INJECTION INTRAVENOUS at 13:36

## 2021-08-05 RX ADMIN — FENTANYL CITRATE 25 MCG: 50 INJECTION, SOLUTION INTRAMUSCULAR; INTRAVENOUS at 14:35

## 2021-08-05 RX ADMIN — FENTANYL CITRATE 25 MCG: 50 INJECTION, SOLUTION INTRAMUSCULAR; INTRAVENOUS at 17:04

## 2021-08-05 RX ADMIN — CEFTRIAXONE 1 G: 1 INJECTION, POWDER, FOR SOLUTION INTRAMUSCULAR; INTRAVENOUS at 13:10

## 2021-08-05 RX ADMIN — OXYCODONE HYDROCHLORIDE 5 MG: 5 TABLET ORAL at 18:48

## 2021-08-05 RX ADMIN — ONDANSETRON 4 MG: 2 INJECTION INTRAMUSCULAR; INTRAVENOUS at 16:02

## 2021-08-05 RX ADMIN — ACETAMINOPHEN 975 MG: 325 TABLET, FILM COATED ORAL at 18:15

## 2021-08-05 RX ADMIN — ROCURONIUM BROMIDE 40 MG: 10 INJECTION INTRAVENOUS at 12:35

## 2021-08-05 RX ADMIN — FENTANYL CITRATE 25 MCG: 50 INJECTION, SOLUTION INTRAMUSCULAR; INTRAVENOUS at 14:46

## 2021-08-05 RX ADMIN — FENTANYL CITRATE 50 MCG: 50 INJECTION, SOLUTION INTRAMUSCULAR; INTRAVENOUS at 13:24

## 2021-08-05 RX ADMIN — ROCURONIUM BROMIDE 30 MG: 10 INJECTION INTRAVENOUS at 13:22

## 2021-08-05 RX ADMIN — FENTANYL CITRATE 25 MCG: 50 INJECTION, SOLUTION INTRAMUSCULAR; INTRAVENOUS at 17:11

## 2021-08-05 RX ADMIN — SODIUM CHLORIDE 500 ML: 9 INJECTION, SOLUTION INTRAVENOUS at 22:20

## 2021-08-05 RX ADMIN — HUMAN INSULIN 2 UNITS: 100 INJECTION, SOLUTION SUBCUTANEOUS at 17:58

## 2021-08-05 RX ADMIN — LIDOCAINE HYDROCHLORIDE 30 MG: 20 INJECTION, SOLUTION INFILTRATION; PERINEURAL at 13:10

## 2021-08-05 RX ADMIN — FENTANYL CITRATE 100 MCG: 50 INJECTION, SOLUTION INTRAMUSCULAR; INTRAVENOUS at 12:35

## 2021-08-05 RX ADMIN — SODIUM CHLORIDE, POTASSIUM CHLORIDE, SODIUM LACTATE AND CALCIUM CHLORIDE: 600; 310; 30; 20 INJECTION, SOLUTION INTRAVENOUS at 14:58

## 2021-08-05 RX ADMIN — INSULIN HUMAN 3 UNITS: 100 INJECTION, SOLUTION PARENTERAL at 15:20

## 2021-08-05 RX ADMIN — DEXAMETHASONE SODIUM PHOSPHATE 8 MG: 4 INJECTION, SOLUTION INTRAMUSCULAR; INTRAVENOUS at 13:24

## 2021-08-05 RX ADMIN — Medication 10 MG: at 13:19

## 2021-08-05 RX ADMIN — SODIUM CHLORIDE, POTASSIUM CHLORIDE, SODIUM LACTATE AND CALCIUM CHLORIDE: 600; 310; 30; 20 INJECTION, SOLUTION INTRAVENOUS at 12:28

## 2021-08-05 RX ADMIN — PHENYLEPHRINE HYDROCHLORIDE 150 MCG: 10 INJECTION INTRAVENOUS at 13:40

## 2021-08-05 RX ADMIN — SUGAMMADEX 170 MG: 100 INJECTION, SOLUTION INTRAVENOUS at 16:08

## 2021-08-05 ASSESSMENT — ACTIVITIES OF DAILY LIVING (ADL)
DIFFICULTY_COMMUNICATING: NO
WALKING_OR_CLIMBING_STAIRS_DIFFICULTY: NO
CONCENTRATING,_REMEMBERING_OR_MAKING_DECISIONS_DIFFICULTY: NO
DRESSING/BATHING_DIFFICULTY: NO
FALL_HISTORY_WITHIN_LAST_SIX_MONTHS: NO
WEAR_GLASSES_OR_BLIND: YES
DOING_ERRANDS_INDEPENDENTLY_DIFFICULTY: NO
DIFFICULTY_EATING/SWALLOWING: NO
TOILETING_ISSUES: NO
HEARING_DIFFICULTY_OR_DEAF: NO
VISION_MANAGEMENT: GLASSES

## 2021-08-05 ASSESSMENT — MIFFLIN-ST. JEOR: SCORE: 1111.25

## 2021-08-05 NOTE — OR NURSING
Report from Amy Jacobs RN. During report patient went into bigeminy- pt alert- no complaints of chest pain or shortness of breath. BP stable- 156/79. Pt back to sinus rhythm approximately 10 seconds later. Anesthesia notified and aware. No new orders at this time.

## 2021-08-05 NOTE — ANESTHESIA PROCEDURE NOTES
Airway       Patient location during procedure: OR       Procedure Start/Stop Times: 8/5/2021 12:41 PM  Staff -        Anesthesiologist:  David Garcia MD       CRNA: Gregorio Trejo APRN CRNA       Performed By: anesthesiologistIndications and Patient Condition       Indications for airway management: demetris-procedural       Induction type:intravenous       Mask difficulty assessment: 2 - vent by mask + OA or adjuvant +/- NMBA    Final Airway Details       Final airway type: endotracheal airway       Successful airway: Oral and ETT - single  Endotracheal Airway Details        ETT size (mm): 7.0       Cuffed: yes       Successful intubation technique: video laryngoscopy       VL Blade Size: MAC 3 (on 3rd attempt per MANDY Garcia MD.  )       Grade View of Cords: 3       Adjucts: stylet       Position: Right       Measured from: lips       Secured at (cm): 21       Bite block used: None    Post intubation assessment        Placement verified by: capnometry, equal breath sounds and chest rise        Number of attempts at approach: 3 (1st att:Per CP CRNA with CMAC 3: unable to visualize vocal cords due to inability to get blade deep enough to lift epiglottis/2nd att: per CP CRNA with Moran 2 blade. Unable to visualize vocal cords. 3rd att: per MD LUZMA with CMAC 3)       Number of other approaches attempted: 1       Secured with: silk tape       Ease of procedure: difficult       Dentition: Intact and Unchanged    Additional Comments       Patient was intubated on 3rd attempt per MANDY Garcia MD with CMAC 3 blade.  Vocal cords not visualized, but very low part of arytenoids visualized.  ETTube passed through cords without vocal cord visualization.  ETCO2/BBS present.  Would consider use of CMAC 4 blade in the future.

## 2021-08-05 NOTE — OP NOTE
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 Kirk Law MD present and participated for the entirety of the procedure  RESIDENT SURGEON: Silva Miramontes MD      ESTIMATED BLOOD LOSS: 20 mL.     SPECIMENS: Right renal stones for analysis.   DRAINS:  Right 10fr nephrostomy tube, right EVERETT-1 catheter (to distal ureter), 16fr torres catheter.  COMPLICATIONS: None    SIGNIFICANT FINDINGS:     Visually stone free at the end of the case    OPERATIVE INDICATIONS:  Kelly Barnes who is a 81 year old female here for percutaneous nephrolithotomy. She has a large right staghorn calculus and recurrent pyelonephritis, and was counseled regarding available treatment options and associated risks.  She elected to proceed with percutaneous nephrolithotomy.  She is aware of the risks of surgery.    OPERATIVE DETAILS: After informed consent was obtained, the patient was taken back to the operating room. Anesthesia was induced and the patient was intubated. IV culture directed antibiotics were given and bilateral sequential compression devices were placed. Prep and drape was done in the usual fashion. A timeout was taken to ensure proper patient, placement and procedure.      The patient was then carefully placed in the prone position onto the operating room table ensuring padding of shoulders and all pressure points. Prone cystoscopy was started.  The urethra was open.  The bladder mucosa showed no evidence of any lesions or trabeculation.  There were no stones.  A  guidewire was then placed up the right ureter and a torres catheter was placed.   imaging did not reveal stone as expected, as the stone is likely uric acid. A retrograde pyelogram was performed, no hydronephrosis was noted, and several calyces were identified.   Percutaneous access to the upper pole was first attempted with the bullseye method, though after several attempts, due to the proximity of the upper pole and lowest rib, access was unable to be obtained. Decision was made to obtain lower pole access. This was undertaken using the triangulation technique.  A hydrophilic glidewire was passed through the obturator of the 18g access needle and was navigated down the ureter using an angle tipped catheter.  The glidewire was then exchnged for a superstiff wire. An 8/10 coaxial dilator was passed over the wire after a skin incision was made 1cm along the wire. A sensor wire was passed to use as a safety wire.  With 2 wires in place we then used a 24F balloon dilator over the Super Stiff guidewire and used this to dilate our percutaneous tract. This was monitored with fluoroscopy. The sheath was advanced into the calyx of access. At this point we deflated the balloon and removed the dilator. We then inserted the rigid nephroscope into the dilated tract and removed all accessible stones using the ultrasonic lithotrite.   The upper pole calyx was not accessible with the rigid nephroscope, so flexible cystoscope and 200 micron laser fiber at setting of 1J and 10-20Hz was used to break up the upper pole portion of the staghorn and bring it into the pelvis. The stone fragments were then cleared with the rigid nephroscope.  There was an adjacent upper (to mid) pole calyx that was inaccessible with even the flexible cystoscope, therefore ureteroscopy from below was used to break the stone into fragments and basket them into the pelvis. This also required flexible cystoscopy through the percutaneous tract. This was  challenging and required back and forth effort from above and below, but eventually all visible calyceals were cleared.  We performed antegrade ureteroscopy to the mid ureter to remove stone fragments from the ureter ensured that the proximal visible portion of the ureter was patent with no evidence of residual ureteral calculi or ureteral trauma.  Of note the ureter was wide open.  Next, we mapped out the kidney meticulously using the endoscope as well as fluoroscopy.  We ensured that there were no residual radiopaque calculi and that we had visualized all accessible calyces.  We placed a 10 Northern Irish cope loop nephrostomy tube into the kidney which we ensured had a full curl.  We performed an antegrade nephrostogram confirming the tube was in the appropriate position and that there was no extravasation.  We then placed an antegrade 5 Northern Irish catheter for potential second stage procedure identified on formal postop imaging.    We secured the tubes to the skin and injected local anesthetic into both the wound as well as the intercostal nerve.  Our access was subcostal so we did NOT perform a chest x ray ensuring no hydro or pneumothorax.  We placed a clean dressing over the wound and held several minutes or pressure ensuring there was no significant bleeding.  At this point, the patient was carefully placed back into the supine position, awakened from anesthesia and extubated. She was transferred to the PACU in stable condition. She tolerated the procedure well without complication.      PLAN:      -Admit overnight for observation.  -Continue perioperative antibiotics.  -CT scan in the AM.    Silva Miramontes MD  PGY-3 Urology    I, Kirk Law, was present and participatory for the entirety of the procedure

## 2021-08-05 NOTE — OR NURSING
PACU to Inpatient Nursing Handoff    Patient Kelly Barnes is a 81 year old female who speaks English.   Procedure Procedure(s):  NEPHROLITHOTOMY, PERCUTANEOUS, USING HOLMIUM LASER RIGHT   Surgeon(s) Primary: Kirk Law MD  Resident - Assisting: Silva Miramontes MD     Allergies   Allergen Reactions     Ibuprofen Other (See Comments)     numbness in hands and feet     Keflex [Cephalexin] Rash       Isolation: Contact- C-diff    Past Medical History   has a past medical history of Acute kidney injury (H) (12/19/2020), Allergic rhinitis, cause unspecified, Anemia, Aortic stenosis (02/29/2016), Aortic valve sclerosis, Atypical chest pain (07/24/2015), cuboid, History of Clostridium difficile colitis, Hyperlipidemia LDL goal <100 (11/01/2012), Hypertension goal BP (blood pressure) < 140/90, Musculoskeletal chest pain (11/25/2016), Obesity, Class I, BMI 30-34.9 (11/11/2015), Pneumonia (03/2016), Pyelonephritis, Sepsis, due to unspecified organism, unspecified whether acute organ dysfunction present (H) (12/19/2020), and Type 2 diabetes, HbA1c goal < 7% (H).    Anesthesia General   Dermatome Level     Preop Meds Not applicable   Nerve block Not applicable   Intraop Meds dexamethasone (Decadron)  fentanyl (Sublimaze): 200 mcg total  hydromorphone (Dilaudid): 0.5 mg total  ondansetron (Zofran): last given at 1602  Insulin, 3 u at 1520   Local Meds No   Antibiotics Roecphin - last given at 1310     Pain Patient Currently in Pain: sleeping, patient not able to self report   PACU meds  acetaminophen (Tylenol): 975 mg (total dose) last given at 1815 2 units regular insulin subcutaneous at 1758   PCA / epidural No   Capnography Respiratory Monitoring (EtCO2): 35 mmHg  Integrated Pulmonary Index (IPI): 8-9   Telemetry ECG Rhythm: Sinus rhythm   Inpatient Telemetry Monitor Ordered? No        Labs Glucose Lab Results   Component Value Date     08/05/2021     07/27/2021     05/11/2021       Hgb  Lab Results   Component Value Date    HGB 11.1 07/27/2021    HGB 11.3 12/21/2020       INR Lab Results   Component Value Date    INR 1.10 01/27/2005      PACU Imaging Not applicable     Wound/Incision Incision/Surgical Site 08/05/21 Right;Lower Back (Active)   Incision Assessment [facility] 08/05/21 1629   Closure ИРИНА 08/05/21 1629   Dressing Intervention Clean, dry, intact 08/05/21 1629   Number of days: 0       Incision/Surgical Site 08/05/21 Back (Active)   Incision Assessment UT 08/05/21 1629   Deya-Incision Assessment UT 08/05/21 1629   Closure ИРИНА 08/05/21 1629   Dressing Intervention Clean, dry, intact 08/05/21 1629   Number of days: 0      CMS  Intact, 2-3+ edema BLE      Equipment Not applicable   Other LDA       IV Access Peripheral IV 08/05/21 Distal;Right;Dorsal Wrist (Active)   Site Assessment WDL 08/05/21 1629   Line Status Infusing 08/05/21 1629   Dressing Intervention New dressing  08/05/21 1121   Phlebitis Scale 0-->no symptoms 08/05/21 1629   Infiltration Scale 0 08/05/21 1121   Number of days: 0      Blood Products Not applicable EBL 50 mL   Intake/Output Date 08/05/21 0700 - 08/06/21 0659   Shift 0077-9551 4725-6668 3783-1156 24 Hour Total   INTAKE   I.V. 1000 300  1300   Shift Total(mL/kg) 1000(14.54) 300(4.36)  1300(18.9)   OUTPUT   Blood  50  50   Shift Total(mL/kg)  50(0.73)  50(0.73)   Weight (kg) 68.8 68.8 68.8 68.8      Drains / Blanchard Nephrostomy 1 Right (Active)   Site Description [facility] 08/05/21 1629   Dressing Status Normal: Clean, Dry & Intact 08/05/21 1629   Number of days: 0       Urethral Catheter Latex;Straight-tip;Double-lumen 16 fr (Active)   Tube Description [facility] 08/05/21 1730   Catheter Care Done 08/05/21 1730   Collection Container Standard 08/05/21 1730   Securement Method Securing device (Describe) 08/05/21 1730   Rationale for Continued Use /GI/GYN Pelvic Procedure 08/05/21 1629   Number of days: 0      Time of void PreOp Void Prior to Procedure: 1020 (08/05/21 1040)    PostOp       Diapered? No   Bladder Scan     PO 90 mL (ice water) (08/05/21 1700)  tolerating sips     Vitals    B/P: (!) 148/75  T: 97.9  F (36.6  C)    Temp src: Oral  P:  Pulse: 91 (08/05/21 1800)          R: 12  O2:  SpO2: 97 %    O2 Device: Nasal cannula (08/05/21 1745)    Oxygen Delivery: 1 LPM (08/05/21 1745)         Family/support present NA   Patient belongings     Patient transported on cart   DC meds/scripts (obs/outpt) Not applicable   Inpatient Pain Meds Released? Yes       Special needs/considerations None   Tasks needing completion None       Kamala Fraser, RN  ASCOM [id number]     Patient information on fall and injury prevention

## 2021-08-05 NOTE — PROGRESS NOTES
Pt received insulin in OR for elevated BS. BS was 216 at 4:40 pm. Notified MD Mena, no new orders for insulin coverage, check again in 1 hour (5:40 pm). Will continue to monitor.

## 2021-08-06 ENCOUNTER — APPOINTMENT (OUTPATIENT)
Dept: CT IMAGING | Facility: CLINIC | Age: 81
DRG: 982 | End: 2021-08-06
Attending: UROLOGY
Payer: COMMERCIAL

## 2021-08-06 ENCOUNTER — TELEPHONE (OUTPATIENT)
Dept: UROLOGY | Facility: CLINIC | Age: 81
End: 2021-08-06

## 2021-08-06 ENCOUNTER — PATIENT OUTREACH (OUTPATIENT)
Dept: UROLOGY | Facility: CLINIC | Age: 81
End: 2021-08-06

## 2021-08-06 ENCOUNTER — HOSPITAL ENCOUNTER (OUTPATIENT)
Facility: CLINIC | Age: 81
End: 2021-08-06
Attending: UROLOGY | Admitting: UROLOGY
Payer: COMMERCIAL

## 2021-08-06 VITALS
SYSTOLIC BLOOD PRESSURE: 123 MMHG | DIASTOLIC BLOOD PRESSURE: 73 MMHG | BODY MASS INDEX: 27.91 KG/M2 | TEMPERATURE: 99.4 F | WEIGHT: 151.68 LBS | RESPIRATION RATE: 16 BRPM | HEIGHT: 62 IN | HEART RATE: 79 BPM | OXYGEN SATURATION: 97 %

## 2021-08-06 DIAGNOSIS — Z11.59 ENCOUNTER FOR SCREENING FOR OTHER VIRAL DISEASES: ICD-10-CM

## 2021-08-06 DIAGNOSIS — N20.0 KIDNEY STONE: Primary | ICD-10-CM

## 2021-08-06 LAB
ANION GAP SERPL CALCULATED.3IONS-SCNC: 7 MMOL/L (ref 3–14)
BUN SERPL-MCNC: 23 MG/DL (ref 7–30)
CALCIUM SERPL-MCNC: 7.9 MG/DL (ref 8.5–10.1)
CHLORIDE BLD-SCNC: 105 MMOL/L (ref 94–109)
CO2 SERPL-SCNC: 22 MMOL/L (ref 20–32)
CREAT SERPL-MCNC: 0.99 MG/DL (ref 0.52–1.04)
ERYTHROCYTE [DISTWIDTH] IN BLOOD BY AUTOMATED COUNT: 13.5 % (ref 10–15)
GFR SERPL CREATININE-BSD FRML MDRD: 54 ML/MIN/1.73M2
GLUCOSE BLD-MCNC: 289 MG/DL (ref 70–99)
GLUCOSE BLDC GLUCOMTR-MCNC: 196 MG/DL (ref 70–99)
HCT VFR BLD AUTO: 32.9 % (ref 35–47)
HGB BLD-MCNC: 10.9 G/DL (ref 11.7–15.7)
MCH RBC QN AUTO: 27.7 PG (ref 26.5–33)
MCHC RBC AUTO-ENTMCNC: 33.1 G/DL (ref 31.5–36.5)
MCV RBC AUTO: 84 FL (ref 78–100)
PLATELET # BLD AUTO: 262 10E3/UL (ref 150–450)
POTASSIUM BLD-SCNC: 4.2 MMOL/L (ref 3.4–5.3)
RBC # BLD AUTO: 3.94 10E6/UL (ref 3.8–5.2)
SODIUM SERPL-SCNC: 134 MMOL/L (ref 133–144)
WBC # BLD AUTO: 16.5 10E3/UL (ref 4–11)

## 2021-08-06 PROCEDURE — 74176 CT ABD & PELVIS W/O CONTRAST: CPT | Mod: 26 | Performed by: RADIOLOGY

## 2021-08-06 PROCEDURE — 250N000012 HC RX MED GY IP 250 OP 636 PS 637: Performed by: STUDENT IN AN ORGANIZED HEALTH CARE EDUCATION/TRAINING PROGRAM

## 2021-08-06 PROCEDURE — 85027 COMPLETE CBC AUTOMATED: CPT | Performed by: STUDENT IN AN ORGANIZED HEALTH CARE EDUCATION/TRAINING PROGRAM

## 2021-08-06 PROCEDURE — 250N000013 HC RX MED GY IP 250 OP 250 PS 637: Performed by: STUDENT IN AN ORGANIZED HEALTH CARE EDUCATION/TRAINING PROGRAM

## 2021-08-06 PROCEDURE — 74176 CT ABD & PELVIS W/O CONTRAST: CPT

## 2021-08-06 PROCEDURE — 96372 THER/PROPH/DIAG INJ SC/IM: CPT | Performed by: STUDENT IN AN ORGANIZED HEALTH CARE EDUCATION/TRAINING PROGRAM

## 2021-08-06 PROCEDURE — 250N000013 HC RX MED GY IP 250 OP 250 PS 637: Performed by: ANESTHESIOLOGY

## 2021-08-06 PROCEDURE — 80048 BASIC METABOLIC PNL TOTAL CA: CPT | Performed by: STUDENT IN AN ORGANIZED HEALTH CARE EDUCATION/TRAINING PROGRAM

## 2021-08-06 PROCEDURE — 36415 COLL VENOUS BLD VENIPUNCTURE: CPT | Performed by: STUDENT IN AN ORGANIZED HEALTH CARE EDUCATION/TRAINING PROGRAM

## 2021-08-06 PROCEDURE — 258N000003 HC RX IP 258 OP 636: Performed by: STUDENT IN AN ORGANIZED HEALTH CARE EDUCATION/TRAINING PROGRAM

## 2021-08-06 PROCEDURE — 250N000011 HC RX IP 250 OP 636: Performed by: STUDENT IN AN ORGANIZED HEALTH CARE EDUCATION/TRAINING PROGRAM

## 2021-08-06 RX ORDER — CEFPODOXIME PROXETIL 100 MG/1
100 TABLET, FILM COATED ORAL 2 TIMES DAILY
Qty: 20 TABLET | Refills: 0 | Status: ON HOLD | OUTPATIENT
Start: 2021-08-06 | End: 2021-08-12

## 2021-08-06 RX ORDER — VANCOMYCIN HYDROCHLORIDE 125 MG/1
125 CAPSULE ORAL 2 TIMES DAILY
Status: DISCONTINUED | OUTPATIENT
Start: 2021-08-06 | End: 2021-08-06

## 2021-08-06 RX ADMIN — INSULIN ASPART 2 UNITS: 100 INJECTION, SOLUTION INTRAVENOUS; SUBCUTANEOUS at 10:25

## 2021-08-06 RX ADMIN — OXYCODONE HYDROCHLORIDE 5 MG: 5 TABLET ORAL at 00:31

## 2021-08-06 RX ADMIN — LOSARTAN POTASSIUM: 50 TABLET, FILM COATED ORAL at 08:15

## 2021-08-06 RX ADMIN — ACETAMINOPHEN 975 MG: 325 TABLET, FILM COATED ORAL at 08:15

## 2021-08-06 RX ADMIN — TAMSULOSIN HYDROCHLORIDE 0.4 MG: 0.4 CAPSULE ORAL at 08:15

## 2021-08-06 RX ADMIN — CEFTRIAXONE SODIUM 1 G: 1 INJECTION, POWDER, FOR SOLUTION INTRAMUSCULAR; INTRAVENOUS at 11:25

## 2021-08-06 RX ADMIN — SODIUM CHLORIDE: 9 INJECTION, SOLUTION INTRAVENOUS at 00:33

## 2021-08-06 RX ADMIN — VANCOMYCIN HYDROCHLORIDE 125 MG: 125 CAPSULE ORAL at 08:16

## 2021-08-06 RX ADMIN — TOLTERODINE 4 MG: 4 CAPSULE, EXTENDED RELEASE ORAL at 08:15

## 2021-08-06 NOTE — TELEPHONE ENCOUNTER
Patient returned call and I verified all surgery details and appointments with her. Patient acknowledges and agrees with dates/time's/locations.

## 2021-08-06 NOTE — PROVIDER NOTIFICATION
"Paged urology regarding:    \"RM# 812 L.M.  Pt requesting to discharge home with oxycodone as needed. Can we get this ordered to be filled here?  Also, would you like pt to track output from neph tube? Thank you. \"  "

## 2021-08-06 NOTE — PLAN OF CARE
AVS printed and given to patient. Discharge instructions, medications, and follow-up appt reviewed with patient, all questions answered, pt verbalized understanding. Discharge home via son providing transport, all belongings sent with patient.

## 2021-08-06 NOTE — PROGRESS NOTES
"      VS:   Blood pressure (!) 167/91, pulse 96, temperature 97.9  F (36.6  C), temperature source Oral, resp. rate 17, height 1.575 m (5' 2\"), weight 68.8 kg (151 lb 10.8 oz), SpO2 96 %, not currently breastfeeding.     Output:   Blanchard catheter and nephrostomy tube, low output and had bolus with some improvement   Activity:   Not OOB yet   Skin: CDI   Pain:   C/o right abd pain, sharp with breathing, then feels some cramping, no prns avail, urology paged and ordered prn dilaudid and then pt declined interventions   Neuro/CMS:   WDL, intact,    Dressing(s):   Gauze dressing to nephrostomy tube has serosanguinous drainage on left half   Diet:   Reg diet, diabetic, requested boxed lunch, bowel sounds present   LDA:   Right PIV   Equipment:   Pt removed capno and declined PCDs due to bilateral edema    Plan:   Observation overnight   Additional Info:   c diff being treated enteric precautions maintained       "

## 2021-08-06 NOTE — PLAN OF CARE
"      VS: /81 (BP Location: Left arm)   Pulse 93   Temp 97.9  F (36.6  C) (Oral)   Resp 16   Ht 1.575 m (5' 2\")   Wt 68.8 kg (151 lb 10.8 oz)   SpO2 97%   BMI 27.74 kg/m       Output: Blanchard- adequate drainage  R Neph tube - adequate drainage  LBM: 8/5   Lungs: Clear bilaterally, on RA. Denies chest pain or SOB.    Activity: Not OOB yet.   Skin: WDL ex R neph tube/incision. BLE edema   Pain:   Oxy 5 mg given x1.    Neuro/CMS:   A & O x4. CMS intact. Denies N/t.    Dressing(s):   R neph drsg - copious drainage   Diet:   Regular   LDA:   R PIV - infusing NS @ 75 mL/hr  Blanchard, R neph tube   Equipment:   IV pole   Plan:   Monitor urine output. CT scan today and Blanchard out. Possible discharge home. Continue w/ POC.    Additional Info:   Urology aware of low urine output, not concerned.   Pt declined BS check at 0200.   Observation Goals:   ~ Patient vital signs are at baseline - MET    ~ Patient able to ambulate as they were prior to admission or with assist devices provided by therapies during their stay - NOT MET (not OOB yet)     ~ Patient able to tolerate oral intake to maintain hydration status - MET    ~ Patient has adequate pain control using Oral analgesics - MET    ~ Hypercapnia, hypoventilation or hypoxia resolved for at least 2 hours without supplemental oxygen - MET     ]~ Deficits in sensation, mobility or coordination have resolved if spinal or regional anesthesia was used - MET     ~ Patient has returned to baseline mental status- MET    ~ Patient AM labs stable - PENDING  "

## 2021-08-06 NOTE — PHARMACY-ADMISSION MEDICATION HISTORY
Admission Medication History Completed by Pharmacy    See [facility] Admission Navigator for allergy information, preferred outpatient pharmacy, prior to admission medications and immunization status.     Medication History Sources:     Patient, surescripts fill history     Changes made to PTA medication list (reason):    Added: None    Deleted: None    Changed: glimepiride form BID with meals to TID with meals (recently increased by PCP)    Additional Information:    Several antibiotics recently prescribed but patient confirmed that she was on cefpodoxime + PO vancomycin before coming in. PO vanco Rx started out QID with directions to decrease to BID. Patient confirmed she is currently on PO vanco BID.     Prior to Admission medications    Medication Sig Last Dose Taking? Auth Provider   acetaminophen (TYLENOL) 325 MG tablet Take 2 tablets (650 mg) by mouth every 6 hours as needed for mild pain or fever Past Week at Unknown time Yes Brianna Amos MD   cefpodoxime (VANTIN) 100 MG tablet Take 1 tablet (100 mg) by mouth 2 times daily 8/5/2021 at 0800 Yes Elias Benjamin MD   glimepiride (AMARYL) 2 MG tablet Take 1 tablet (2 mg) by mouth 2 times daily (with meals) She will also take separate 2 mg dose with breakfast.  Patient taking differently: Take 2 mg by mouth 3 times daily (with meals)  8/4/2021 at 2000 Yes Lea Lopez MD   lactobacillus rhamnosus, GG, (CULTURELL) capsule Take 1 capsule by mouth 2 times daily 8/4/2021 at 0800 Yes Elias Benjamin MD   losartan-hydrochlorothiazide (HYZAAR) 50-12.5 MG tablet Take 1 tablet by mouth daily  Patient taking differently: Take 1 tablet by mouth every morning  8/4/2021 at 0800 Yes Lea Lopez MD   simvastatin (ZOCOR) 10 MG tablet TAKE ONE TABLET BY MOUTH AT BEDTIME  Patient taking differently: At Bedtime TAKE ONE TABLET BY MOUTH AT BEDTIME 8/4/2021 at 2200 Yes Lea Lopez MD   tamsulosin (FLOMAX) 0.4 MG capsule Take 1 capsule  (0.4 mg) by mouth daily  Patient taking differently: Take 0.4 mg by mouth every morning  8/5/2021 at 0800 Yes Brianna Amos MD   tolterodine ER (DETROL LA) 4 MG 24 hr capsule Take 1 capsule (4 mg) by mouth daily  Patient taking differently: Take 4 mg by mouth every morning  8/5/2021 at 0800 Yes Brianna Amos MD   vancomycin (VANCOCIN) 125 MG capsule Take 1 capsule (125 mg) by mouth 4 times daily for 6 days, THEN 1 capsule (125 mg) 2 times daily. 8/5/2021 at 0800 Yes Elias Benjamin MD       Date completed: 08/05/21    Medication history completed by: Amrit Tidwell RPH

## 2021-08-06 NOTE — ANESTHESIA POSTPROCEDURE EVALUATION
Patient: Kelly Barnes    Procedure(s):  NEPHROLITHOTOMY, PERCUTANEOUS, USING HOLMIUM LASER RIGHT    Diagnosis:Kidney stone [N20.0]  Diagnosis Additional Information: No value filed.    Anesthesia Type:  General    Note:  Disposition: Outpatient   Postop Pain Control: Uneventful            Sign Out: Well controlled pain   PONV: No   Neuro/Psych: Uneventful            Sign Out: Acceptable/Baseline neuro status   Airway/Respiratory: Uneventful            Sign Out: Acceptable/Baseline resp. status   CV/Hemodynamics: Uneventful            Sign Out: Acceptable CV status   Other NRE: NONE   DID A NON-ROUTINE EVENT OCCUR? No           Last vitals:  Vitals Value Taken Time   /81 08/05/21 1845   Temp 36.6  C (97.9  F) 08/05/21 1845   Pulse 104 08/05/21 1852   Resp 11 08/05/21 1853   SpO2 96 % 08/05/21 1848   Vitals shown include unvalidated device data.    Electronically Signed By: David Garcia MD  August 6, 2021  1:14 PM

## 2021-08-06 NOTE — PROVIDER NOTIFICATION
"Paged urology regarding:    \"RM# 814 L.M.  Pt requesting to discharge home with oxycodone as needed. Can we get this ordered to be filled here?  Also, would you like pt to track output from neph tube? Thank you. \"  "

## 2021-08-06 NOTE — TELEPHONE ENCOUNTER
Patient is scheduled for surgery with Dr. Law     Spoke with: KATHIE patient is in the hospital, scheduled surgery before discharge. Left patient a voicemail with surgery details.     Date of Surgery: Wednesday 08/11/21     Location: [facility]     Informed patient they will need an adult  did not speak to patient     Pre op with Provider kathie    H&P: Scheduled with Patient had PAC appointment 08/04/21     Pre-procedure COVID-19 Test: Tuesday 08/10/21 @ 2:30 pm      Additional imaging/appointments: kathie    Surgery packet: mychart message sent 08/06/21, optifreight surgery packet 08/06/21    Additional comments: kathie

## 2021-08-06 NOTE — OR NURSING
"1830- Insulin given as ordered- okay to transfer to floor.  Pt complaining of urge to void and abdominal cramping \"like I need to have a bowel movement\" Blanchard draining small amount of yellow urine. Bladder scanned to ensure no obstruction- 0ml noted with bladder scan. Warm pack placed on R abdomen with no relief. Tylenol and oxycodone given as ordered. Anesthesia aware of medications and patient's pain. Pt appears comfortable and intermittently resting eyes and having conversations. Per anesthesia- okay to transfer patient to floor.   "

## 2021-08-06 NOTE — PROGRESS NOTES
"Urology Daily Progress Note    24 hour events/Subjective:     - No acute events overnight   - Pain well controlled   - Tolerating PO intake    O:  Vitals: /81 (BP Location: Left arm)   Pulse 93   Temp 97.9  F (36.6  C) (Oral)   Resp 16   Ht 1.575 m (5' 2\")   Wt 68.8 kg (151 lb 10.8 oz)   SpO2 97%   BMI 27.74 kg/m      General: Alert, interactive, in NAD  Resp: Non-labored breathing on RA  Abdomen: Soft, non distended  Back: PNT dressing saturated with light pink urine  Ext: Warm and well perfused   Torres: light pink   PNT: clear yellow    I/O  R /235  Torres 75/275    Labs  Pending this AM    Heme:Recent Labs   Lab 08/06/21  0607   WBC 16.5*   HGB 10.9*        Chem:  Recent Labs   Lab 08/06/21  0607      POTASSIUM 4.2       Assessment/Plan  81 year old y/o female with DM II, status post R PCNL with R PNT and catheter down to ureter as external tubes.    Note - plan changes for the day are in BOLD  NEURO Pain controlled on oral pain meds.   CV HDS.    PULM Aggressive pulmonary toilet and I/S.   FEN/GI IVF: NS @ 75  Diet: Regular    Discontinue torres catheter. PNT/external catheter plan pending CT scan to reassess stone burden.   HEME Hgb as above.    ID Afebrile.   Antibiotics: Completed periop antibiotics    ENDO No issues. ISS for DM II.   ACTIVITY Up as tolerated  Ambulate at least TID    PPx SCDs, ambulation   HOME RX ON HOLD Hyzaar   DISPO Home pending AM labs, torres out, CT scan.     To be discussed with Dr. Law.    --    Silva Miramontes  Urology Resident      "

## 2021-08-07 LAB
APPEARANCE STONE: NORMAL
COMPN STONE: NORMAL
NUMBER STONE: 1
SIZE STONE: NORMAL MM
SPECIMEN WT: 99 MG

## 2021-08-08 ENCOUNTER — APPOINTMENT (OUTPATIENT)
Dept: GENERAL RADIOLOGY | Facility: CLINIC | Age: 81
DRG: 982 | End: 2021-08-08
Attending: INTERNAL MEDICINE
Payer: COMMERCIAL

## 2021-08-08 ENCOUNTER — HOSPITAL ENCOUNTER (INPATIENT)
Facility: CLINIC | Age: 81
LOS: 8 days | Discharge: HOME | DRG: 982 | End: 2021-08-17
Attending: INTERNAL MEDICINE | Admitting: STUDENT IN AN ORGANIZED HEALTH CARE EDUCATION/TRAINING PROGRAM
Payer: COMMERCIAL

## 2021-08-08 ENCOUNTER — APPOINTMENT (OUTPATIENT)
Dept: CT IMAGING | Facility: CLINIC | Age: 81
DRG: 982 | End: 2021-08-08
Attending: INTERNAL MEDICINE
Payer: COMMERCIAL

## 2021-08-08 DIAGNOSIS — R19.7 DIARRHEA, UNSPECIFIED TYPE: ICD-10-CM

## 2021-08-08 DIAGNOSIS — B49 FUNGEMIA: Primary | ICD-10-CM

## 2021-08-08 DIAGNOSIS — R50.9 FEVER AND CHILLS: ICD-10-CM

## 2021-08-08 DIAGNOSIS — A04.72 C. DIFFICILE COLITIS: ICD-10-CM

## 2021-08-08 DIAGNOSIS — Z11.52 ENCOUNTER FOR SCREENING LABORATORY TESTING FOR SEVERE ACUTE RESPIRATORY SYNDROME CORONAVIRUS 2 (SARS-COV-2): ICD-10-CM

## 2021-08-08 DIAGNOSIS — T83.512A URINARY TRACT INFECTION ASSOCIATED WITH NEPHROSTOMY CATHETER, INITIAL ENCOUNTER (H): ICD-10-CM

## 2021-08-08 DIAGNOSIS — N20.0 KIDNEY STONE: ICD-10-CM

## 2021-08-08 DIAGNOSIS — N39.0 URINARY TRACT INFECTION ASSOCIATED WITH NEPHROSTOMY CATHETER, INITIAL ENCOUNTER (H): ICD-10-CM

## 2021-08-08 LAB
ALBUMIN SERPL-MCNC: 2.7 G/DL (ref 3.4–5)
ALBUMIN UR-MCNC: 100 MG/DL
ALP SERPL-CCNC: 81 U/L (ref 40–150)
ALT SERPL W P-5'-P-CCNC: 20 U/L (ref 0–50)
ANION GAP SERPL CALCULATED.3IONS-SCNC: 7 MMOL/L (ref 3–14)
APPEARANCE UR: ABNORMAL
AST SERPL W P-5'-P-CCNC: 11 U/L (ref 0–45)
BACTERIA #/AREA URNS HPF: ABNORMAL /HPF
BASOPHILS # BLD AUTO: 0 10E3/UL (ref 0–0.2)
BASOPHILS NFR BLD AUTO: 0 %
BILIRUB SERPL-MCNC: 0.4 MG/DL (ref 0.2–1.3)
BILIRUB UR QL STRIP: NEGATIVE
BUN SERPL-MCNC: 14 MG/DL (ref 7–30)
CALCIUM SERPL-MCNC: 8.8 MG/DL (ref 8.5–10.1)
CHLORIDE BLD-SCNC: 101 MMOL/L (ref 94–109)
CO2 SERPL-SCNC: 25 MMOL/L (ref 20–32)
COLOR UR AUTO: YELLOW
CREAT SERPL-MCNC: 0.97 MG/DL (ref 0.52–1.04)
EOSINOPHIL # BLD AUTO: 0.2 10E3/UL (ref 0–0.7)
EOSINOPHIL NFR BLD AUTO: 1 %
ERYTHROCYTE [DISTWIDTH] IN BLOOD BY AUTOMATED COUNT: 13.9 % (ref 10–15)
GFR SERPL CREATININE-BSD FRML MDRD: 55 ML/MIN/1.73M2
GLUCOSE BLD-MCNC: 269 MG/DL (ref 70–99)
GLUCOSE UR STRIP-MCNC: 1000 MG/DL
HCO3 BLDV-SCNC: 26 MMOL/L (ref 21–28)
HCT VFR BLD AUTO: 37.6 % (ref 35–47)
HGB BLD-MCNC: 12.4 G/DL (ref 11.7–15.7)
HGB UR QL STRIP: ABNORMAL
HOLD SPECIMEN: NORMAL
IMM GRANULOCYTES # BLD: 0.1 10E3/UL
IMM GRANULOCYTES NFR BLD: 1 %
INR PPP: 0.99 (ref 0.85–1.15)
KETONES UR STRIP-MCNC: 10 MG/DL
LACTATE BLD-SCNC: 1.1 MMOL/L
LEUKOCYTE ESTERASE UR QL STRIP: ABNORMAL
LYMPHOCYTES # BLD AUTO: 0.7 10E3/UL (ref 0.8–5.3)
LYMPHOCYTES NFR BLD AUTO: 5 %
MCH RBC QN AUTO: 27.7 PG (ref 26.5–33)
MCHC RBC AUTO-ENTMCNC: 33 G/DL (ref 31.5–36.5)
MCV RBC AUTO: 84 FL (ref 78–100)
MONOCYTES # BLD AUTO: 0.7 10E3/UL (ref 0–1.3)
MONOCYTES NFR BLD AUTO: 5 %
MUCOUS THREADS #/AREA URNS LPF: PRESENT /LPF
NEUTROPHILS # BLD AUTO: 12.3 10E3/UL (ref 1.6–8.3)
NEUTROPHILS NFR BLD AUTO: 88 %
NITRATE UR QL: NEGATIVE
NRBC # BLD AUTO: 0 10E3/UL
NRBC BLD AUTO-RTO: 0 /100
PCO2 BLDV: 36 MM HG (ref 40–50)
PH BLDV: 7.47 [PH] (ref 7.32–7.43)
PH UR STRIP: 5.5 [PH] (ref 5–7)
PLATELET # BLD AUTO: 315 10E3/UL (ref 150–450)
PO2 BLDV: 31 MM HG (ref 25–47)
POTASSIUM BLD-SCNC: 3.4 MMOL/L (ref 3.4–5.3)
PROT SERPL-MCNC: 7 G/DL (ref 6.8–8.8)
RBC # BLD AUTO: 4.48 10E6/UL (ref 3.8–5.2)
RBC URINE: 77 /HPF
SAO2 % BLDV: 65 % (ref 94–100)
SODIUM SERPL-SCNC: 133 MMOL/L (ref 133–144)
SP GR UR STRIP: 1.01 (ref 1–1.03)
SQUAMOUS EPITHELIAL: 25 /HPF
TRANSITIONAL EPI: 2 /HPF
UROBILINOGEN UR STRIP-MCNC: NORMAL MG/DL
WBC # BLD AUTO: 14 10E3/UL (ref 4–11)
WBC CLUMPS #/AREA URNS HPF: PRESENT /HPF
WBC URINE: >182 /HPF
YEAST #/AREA URNS HPF: ABNORMAL /HPF

## 2021-08-08 PROCEDURE — 250N000013 HC RX MED GY IP 250 OP 250 PS 637: Performed by: INTERNAL MEDICINE

## 2021-08-08 PROCEDURE — 99285 EMERGENCY DEPT VISIT HI MDM: CPT | Mod: 25 | Performed by: INTERNAL MEDICINE

## 2021-08-08 PROCEDURE — U0005 INFEC AGEN DETEC AMPLI PROBE: HCPCS | Performed by: INTERNAL MEDICINE

## 2021-08-08 PROCEDURE — 74176 CT ABD & PELVIS W/O CONTRAST: CPT

## 2021-08-08 PROCEDURE — 87106 FUNGI IDENTIFICATION YEAST: CPT | Performed by: INTERNAL MEDICINE

## 2021-08-08 PROCEDURE — 96365 THER/PROPH/DIAG IV INF INIT: CPT | Performed by: INTERNAL MEDICINE

## 2021-08-08 PROCEDURE — 71045 X-RAY EXAM CHEST 1 VIEW: CPT

## 2021-08-08 PROCEDURE — 85610 PROTHROMBIN TIME: CPT | Performed by: INTERNAL MEDICINE

## 2021-08-08 PROCEDURE — 258N000003 HC RX IP 258 OP 636: Performed by: INTERNAL MEDICINE

## 2021-08-08 PROCEDURE — 80053 COMPREHEN METABOLIC PANEL: CPT | Performed by: INTERNAL MEDICINE

## 2021-08-08 PROCEDURE — 96366 THER/PROPH/DIAG IV INF ADDON: CPT | Performed by: INTERNAL MEDICINE

## 2021-08-08 PROCEDURE — 87086 URINE CULTURE/COLONY COUNT: CPT | Performed by: INTERNAL MEDICINE

## 2021-08-08 PROCEDURE — G0378 HOSPITAL OBSERVATION PER HR: HCPCS

## 2021-08-08 PROCEDURE — 71045 X-RAY EXAM CHEST 1 VIEW: CPT | Mod: 26 | Performed by: RADIOLOGY

## 2021-08-08 PROCEDURE — 99285 EMERGENCY DEPT VISIT HI MDM: CPT | Performed by: INTERNAL MEDICINE

## 2021-08-08 PROCEDURE — 74176 CT ABD & PELVIS W/O CONTRAST: CPT | Mod: 26 | Performed by: RADIOLOGY

## 2021-08-08 PROCEDURE — 82803 BLOOD GASES ANY COMBINATION: CPT

## 2021-08-08 PROCEDURE — 81001 URINALYSIS AUTO W/SCOPE: CPT | Performed by: INTERNAL MEDICINE

## 2021-08-08 PROCEDURE — C9803 HOPD COVID-19 SPEC COLLECT: HCPCS | Performed by: INTERNAL MEDICINE

## 2021-08-08 PROCEDURE — 36415 COLL VENOUS BLD VENIPUNCTURE: CPT | Performed by: INTERNAL MEDICINE

## 2021-08-08 PROCEDURE — 85004 AUTOMATED DIFF WBC COUNT: CPT | Performed by: INTERNAL MEDICINE

## 2021-08-08 PROCEDURE — 96361 HYDRATE IV INFUSION ADD-ON: CPT | Performed by: INTERNAL MEDICINE

## 2021-08-08 PROCEDURE — 250N000011 HC RX IP 250 OP 636: Performed by: INTERNAL MEDICINE

## 2021-08-08 RX ORDER — CEFTRIAXONE 1 G/1
1 INJECTION, POWDER, FOR SOLUTION INTRAMUSCULAR; INTRAVENOUS ONCE
Status: COMPLETED | OUTPATIENT
Start: 2021-08-08 | End: 2021-08-09

## 2021-08-08 RX ORDER — SODIUM CHLORIDE 9 MG/ML
INJECTION, SOLUTION INTRAVENOUS CONTINUOUS
Status: DISCONTINUED | OUTPATIENT
Start: 2021-08-08 | End: 2021-08-10

## 2021-08-08 RX ORDER — ACETAMINOPHEN 325 MG/1
650 TABLET ORAL EVERY 4 HOURS PRN
Status: DISCONTINUED | OUTPATIENT
Start: 2021-08-08 | End: 2021-08-17 | Stop reason: HOSPADM

## 2021-08-08 RX ADMIN — SODIUM CHLORIDE: 9 INJECTION, SOLUTION INTRAVENOUS at 23:57

## 2021-08-08 RX ADMIN — ACETAMINOPHEN 650 MG: 325 TABLET, FILM COATED ORAL at 22:16

## 2021-08-08 RX ADMIN — SODIUM CHLORIDE 1000 ML: 9 INJECTION, SOLUTION INTRAVENOUS at 20:31

## 2021-08-08 RX ADMIN — CEFTRIAXONE 1 G: 1 INJECTION, POWDER, FOR SOLUTION INTRAMUSCULAR; INTRAVENOUS at 23:59

## 2021-08-08 ASSESSMENT — ENCOUNTER SYMPTOMS
SORE THROAT: 0
CHILLS: 1
SHORTNESS OF BREATH: 0
PALPITATIONS: 0
DYSURIA: 0
WHEEZING: 0
ABDOMINAL PAIN: 1
CONFUSION: 0
DIARRHEA: 1
WEAKNESS: 0
FLANK PAIN: 0
LIGHT-HEADEDNESS: 0
TROUBLE SWALLOWING: 0
VOMITING: 0
DIFFICULTY URINATING: 1
COUGH: 0
FEVER: 1
NAUSEA: 0
HEADACHES: 0
NUMBNESS: 0
ADENOPATHY: 0
BACK PAIN: 0

## 2021-08-09 LAB
BACTERIA UR CULT: NO GROWTH
BACTERIA UR CULT: NO GROWTH
CRP SERPL-MCNC: 100 MG/L (ref 0–8)
GLUCOSE BLDC GLUCOMTR-MCNC: 171 MG/DL (ref 70–99)
GLUCOSE BLDC GLUCOMTR-MCNC: 189 MG/DL (ref 70–99)
GLUCOSE BLDC GLUCOMTR-MCNC: 214 MG/DL (ref 70–99)
GLUCOSE BLDC GLUCOMTR-MCNC: 215 MG/DL (ref 70–99)
GLUCOSE BLDC GLUCOMTR-MCNC: 220 MG/DL (ref 70–99)
GLUCOSE BLDC GLUCOMTR-MCNC: 254 MG/DL (ref 70–99)
LACTATE SERPL-SCNC: 1 MMOL/L (ref 0.7–2)
SARS-COV-2 RNA RESP QL NAA+PROBE: NEGATIVE

## 2021-08-09 PROCEDURE — 250N000013 HC RX MED GY IP 250 OP 250 PS 637: Performed by: PHYSICIAN ASSISTANT

## 2021-08-09 PROCEDURE — 250N000013 HC RX MED GY IP 250 OP 250 PS 637: Performed by: NURSE PRACTITIONER

## 2021-08-09 PROCEDURE — 36415 COLL VENOUS BLD VENIPUNCTURE: CPT | Performed by: PHYSICIAN ASSISTANT

## 2021-08-09 PROCEDURE — 120N000002 HC R&B MED SURG/OB UMMC

## 2021-08-09 PROCEDURE — 99233 SBSQ HOSP IP/OBS HIGH 50: CPT | Mod: GC | Performed by: STUDENT IN AN ORGANIZED HEALTH CARE EDUCATION/TRAINING PROGRAM

## 2021-08-09 PROCEDURE — 83605 ASSAY OF LACTIC ACID: CPT | Performed by: PHYSICIAN ASSISTANT

## 2021-08-09 PROCEDURE — 86140 C-REACTIVE PROTEIN: CPT | Performed by: NURSE PRACTITIONER

## 2021-08-09 PROCEDURE — 250N000012 HC RX MED GY IP 250 OP 636 PS 637: Performed by: NURSE PRACTITIONER

## 2021-08-09 PROCEDURE — 99024 POSTOP FOLLOW-UP VISIT: CPT | Mod: GC | Performed by: UROLOGY

## 2021-08-09 PROCEDURE — G0378 HOSPITAL OBSERVATION PER HR: HCPCS

## 2021-08-09 PROCEDURE — 87106 FUNGI IDENTIFICATION YEAST: CPT | Performed by: PHYSICIAN ASSISTANT

## 2021-08-09 PROCEDURE — 999N000127 HC STATISTIC PERIPHERAL IV START W US GUIDANCE

## 2021-08-09 PROCEDURE — 36415 COLL VENOUS BLD VENIPUNCTURE: CPT | Performed by: NURSE PRACTITIONER

## 2021-08-09 PROCEDURE — 99222 1ST HOSP IP/OBS MODERATE 55: CPT | Mod: 24 | Performed by: INTERNAL MEDICINE

## 2021-08-09 PROCEDURE — 250N000011 HC RX IP 250 OP 636: Performed by: PHYSICIAN ASSISTANT

## 2021-08-09 PROCEDURE — 96372 THER/PROPH/DIAG INJ SC/IM: CPT | Performed by: NURSE PRACTITIONER

## 2021-08-09 PROCEDURE — 250N000013 HC RX MED GY IP 250 OP 250 PS 637: Performed by: INTERNAL MEDICINE

## 2021-08-09 PROCEDURE — 258N000003 HC RX IP 258 OP 636: Performed by: INTERNAL MEDICINE

## 2021-08-09 RX ORDER — FLUCONAZOLE 200 MG/1
400 TABLET ORAL DAILY
Status: DISCONTINUED | OUTPATIENT
Start: 2021-08-09 | End: 2021-08-10

## 2021-08-09 RX ORDER — SIMVASTATIN 10 MG
10 TABLET ORAL AT BEDTIME
Status: DISCONTINUED | OUTPATIENT
Start: 2021-08-09 | End: 2021-08-17 | Stop reason: HOSPADM

## 2021-08-09 RX ORDER — DEXTROSE MONOHYDRATE 25 G/50ML
25-50 INJECTION, SOLUTION INTRAVENOUS
Status: DISCONTINUED | OUTPATIENT
Start: 2021-08-09 | End: 2021-08-09

## 2021-08-09 RX ORDER — ONDANSETRON 2 MG/ML
4 INJECTION INTRAMUSCULAR; INTRAVENOUS EVERY 6 HOURS PRN
Status: DISCONTINUED | OUTPATIENT
Start: 2021-08-09 | End: 2021-08-17 | Stop reason: HOSPADM

## 2021-08-09 RX ORDER — NICOTINE POLACRILEX 4 MG
15-30 LOZENGE BUCCAL
Status: DISCONTINUED | OUTPATIENT
Start: 2021-08-09 | End: 2021-08-09

## 2021-08-09 RX ORDER — VANCOMYCIN HYDROCHLORIDE 125 MG/1
125 CAPSULE ORAL 2 TIMES DAILY
Status: DISCONTINUED | OUTPATIENT
Start: 2021-08-09 | End: 2021-08-17 | Stop reason: HOSPADM

## 2021-08-09 RX ORDER — ACETAMINOPHEN 325 MG/1
650 TABLET ORAL EVERY 6 HOURS PRN
Status: DISCONTINUED | OUTPATIENT
Start: 2021-08-09 | End: 2021-08-09

## 2021-08-09 RX ORDER — PROCHLORPERAZINE 25 MG
12.5 SUPPOSITORY, RECTAL RECTAL EVERY 12 HOURS PRN
Status: DISCONTINUED | OUTPATIENT
Start: 2021-08-09 | End: 2021-08-17 | Stop reason: HOSPADM

## 2021-08-09 RX ORDER — TOLTERODINE 4 MG/1
4 CAPSULE, EXTENDED RELEASE ORAL EVERY MORNING
Status: DISCONTINUED | OUTPATIENT
Start: 2021-08-09 | End: 2021-08-17 | Stop reason: HOSPADM

## 2021-08-09 RX ORDER — CEFPODOXIME PROXETIL 100 MG/1
100 TABLET, FILM COATED ORAL 2 TIMES DAILY
Status: DISCONTINUED | OUTPATIENT
Start: 2021-08-09 | End: 2021-08-09

## 2021-08-09 RX ORDER — TAMSULOSIN HYDROCHLORIDE 0.4 MG/1
0.4 CAPSULE ORAL EVERY MORNING
Status: DISCONTINUED | OUTPATIENT
Start: 2021-08-09 | End: 2021-08-17 | Stop reason: HOSPADM

## 2021-08-09 RX ORDER — PROCHLORPERAZINE MALEATE 5 MG
5 TABLET ORAL EVERY 6 HOURS PRN
Status: DISCONTINUED | OUTPATIENT
Start: 2021-08-09 | End: 2021-08-17 | Stop reason: HOSPADM

## 2021-08-09 RX ORDER — CEFTRIAXONE 1 G/1
1 INJECTION, POWDER, FOR SOLUTION INTRAMUSCULAR; INTRAVENOUS EVERY 24 HOURS
Status: DISCONTINUED | OUTPATIENT
Start: 2021-08-09 | End: 2021-08-11

## 2021-08-09 RX ORDER — NALOXONE HYDROCHLORIDE 0.4 MG/ML
0.4 INJECTION, SOLUTION INTRAMUSCULAR; INTRAVENOUS; SUBCUTANEOUS
Status: DISCONTINUED | OUTPATIENT
Start: 2021-08-09 | End: 2021-08-17 | Stop reason: HOSPADM

## 2021-08-09 RX ORDER — DEXTROSE MONOHYDRATE 25 G/50ML
25-50 INJECTION, SOLUTION INTRAVENOUS
Status: DISCONTINUED | OUTPATIENT
Start: 2021-08-09 | End: 2021-08-17 | Stop reason: HOSPADM

## 2021-08-09 RX ORDER — OXYCODONE HYDROCHLORIDE 5 MG/1
5 TABLET ORAL EVERY 6 HOURS PRN
Status: DISCONTINUED | OUTPATIENT
Start: 2021-08-09 | End: 2021-08-15

## 2021-08-09 RX ORDER — ONDANSETRON 4 MG/1
4 TABLET, ORALLY DISINTEGRATING ORAL EVERY 6 HOURS PRN
Status: DISCONTINUED | OUTPATIENT
Start: 2021-08-09 | End: 2021-08-17 | Stop reason: HOSPADM

## 2021-08-09 RX ORDER — NALOXONE HYDROCHLORIDE 0.4 MG/ML
0.2 INJECTION, SOLUTION INTRAMUSCULAR; INTRAVENOUS; SUBCUTANEOUS
Status: DISCONTINUED | OUTPATIENT
Start: 2021-08-09 | End: 2021-08-17 | Stop reason: HOSPADM

## 2021-08-09 RX ORDER — LOSARTAN POTASSIUM AND HYDROCHLOROTHIAZIDE 12.5; 5 MG/1; MG/1
1 TABLET ORAL EVERY MORNING
Status: DISCONTINUED | OUTPATIENT
Start: 2021-08-09 | End: 2021-08-14

## 2021-08-09 RX ORDER — NICOTINE POLACRILEX 4 MG
15-30 LOZENGE BUCCAL
Status: DISCONTINUED | OUTPATIENT
Start: 2021-08-09 | End: 2021-08-17 | Stop reason: HOSPADM

## 2021-08-09 RX ADMIN — TOLTERODINE 4 MG: 4 CAPSULE, EXTENDED RELEASE ORAL at 07:51

## 2021-08-09 RX ADMIN — OXYCODONE HYDROCHLORIDE 5 MG: 5 TABLET ORAL at 00:36

## 2021-08-09 RX ADMIN — LOSARTAN POTASSIUM AND HYDROCHLOROTHIAZIDE 1 TABLET: 50; 12.5 TABLET, FILM COATED ORAL at 07:50

## 2021-08-09 RX ADMIN — VANCOMYCIN HYDROCHLORIDE 125 MG: 125 CAPSULE ORAL at 07:51

## 2021-08-09 RX ADMIN — TAMSULOSIN HYDROCHLORIDE 0.4 MG: 0.4 CAPSULE ORAL at 07:51

## 2021-08-09 RX ADMIN — SODIUM CHLORIDE: 9 INJECTION, SOLUTION INTRAVENOUS at 08:26

## 2021-08-09 RX ADMIN — CEFTRIAXONE 1 G: 1 INJECTION, POWDER, FOR SOLUTION INTRAMUSCULAR; INTRAVENOUS at 22:50

## 2021-08-09 RX ADMIN — INSULIN ASPART 1 UNITS: 100 INJECTION, SOLUTION INTRAVENOUS; SUBCUTANEOUS at 03:09

## 2021-08-09 RX ADMIN — ACETAMINOPHEN 650 MG: 325 TABLET, FILM COATED ORAL at 20:02

## 2021-08-09 RX ADMIN — ACETAMINOPHEN 650 MG: 325 TABLET, FILM COATED ORAL at 15:35

## 2021-08-09 RX ADMIN — VANCOMYCIN HYDROCHLORIDE 125 MG: 125 CAPSULE ORAL at 20:02

## 2021-08-09 RX ADMIN — ACETAMINOPHEN 650 MG: 325 TABLET, FILM COATED ORAL at 07:59

## 2021-08-09 ASSESSMENT — ACTIVITIES OF DAILY LIVING (ADL)
WEAR_GLASSES_OR_BLIND: YES
CONCENTRATING,_REMEMBERING_OR_MAKING_DECISIONS_DIFFICULTY: NO
WALKING_OR_CLIMBING_STAIRS_DIFFICULTY: NO
DIFFICULTY_COMMUNICATING: NO
HEARING_DIFFICULTY_OR_DEAF: NO
ADLS_ACUITY_SCORE: 15
PATIENT_/_FAMILY_COMMUNICATION_STYLE: SPOKEN LANGUAGE (ENGLISH OR BILINGUAL)
DRESSING/BATHING_DIFFICULTY: NO
DOING_ERRANDS_INDEPENDENTLY_DIFFICULTY: NO
FALL_HISTORY_WITHIN_LAST_SIX_MONTHS: NO
TOILETING_ISSUES: NO
DIFFICULTY_EATING/SWALLOWING: NO

## 2021-08-09 ASSESSMENT — MIFFLIN-ST. JEOR: SCORE: 1119.52

## 2021-08-09 NOTE — CONSULTS
[facility]SULT[facility]     Patient:  Kelly Barnes   Date of birth 1940, Medical record number [id number]  Date of Visit:  08/09/2021  Date of Admission: 8/8/2021  Consult Requester:No att. providers found          Assessment and Recommendations:   ID Problem List:   1. Fevers, leukocytosis   2. Recurrent renal calculi, R staghorn calculus    -s/p urologic procedure 8/5, culture w/ C glabrata   3. H/o recurrent pyelonephritis   -S/p R PNT and ureteral stent placement  4. Recent C diff colitis 7/23, s/p treatment   5. Diabetes mellitus type II    RECOMMENDATION:  1. Draw BCx with fever to 101.4 this afternoon.  2. Continue IV Ceftriaxone 2g q24hrs while in hospital, can likely transition back to PO Cefpodoxime 100 mg BID at discharge.   3. Continue PO Vancomycin at ppx dosing 125 mg BID until antibiotic discontinued.   4. Start PO fluconazole 400 mg daily to provide coverage for C glabrata isolated from renal stone culture 8/5.  5. Would plan to continue all 3 above antimicrobials through 7/13 as this would be 2 days after urologic procedure planned 8/11. Likely does not need further ppx coverage after that time.   6. If further urologic procedures planned in near future (within 1 week), may need to continue above regimen until completed.   7. Will follow pending UCxs.     ASSESSMENT:  Kelly Barnes is a 81 year old female with PMHx significant for HTN, DMII, recurrent pyelonephritis, R staghorn claculus, and h/o C difficile colitis. She had a recent hospitalization 7/13-16/2021 for pyelonephritis with nonobstructing kidney stones s/p cystoscopy and ureteral stent placement (7/15) with discharge home on PO abx; this was followed by repeat admission 7/23 for concern of infected ureteral stent and C diff colitis and discharged home on PO Vanco+PO Cefpodoxoime. She had a recent urologic procedure (percutaenous nephroscopy w/ laser, stone removal with ureteroscopy and PNT placement)  "8/5. Presented to the ED 8/8 with reported low grade fevers, chills, suprapubic and RLQ pain.     Afebrile on admission, tachycardic to 120s initially now improved. BP and O2 sats wnl. Continued leukocytosis to 14 (previously 16 on 8/6) and CRP elevated to 100. BCx 8/8 NGTD, UA (midstream) with elevated leuk esterase, >185 WBC, WBC clumping and 25 squamous cells (possibly c/w contamination), UCx midstream and R PNT pending. COVID swab negative.   CXR unremarkable. CT AP w/o contrast with R non-obstructing nephrolithiasis which is stable, stable R hydro, unchanged R PNT.     Started on Ceftriaxone, continued on PO vanco.     Thank you for this consult. ID will continue to follow.     Patient was discussed with Dr. Onofre.     Caroline Braga PA-C  Infectious Diseases  Pager #[phone]          History of Present Illness:   Kelly Barnes is a 81 year old female with PMHx significant for HTN, DMII, recurrent pyelonephritis, R staghorn claculus, and h/o C difficile colitis. She had a recent hospitalization 7/13-16/2021 for pyelonephritis with non obstructing kidney stones s/p cystoscopy and ureteral stent placement (7/15) with discharge home on PO abx; this was followed by repeat admission 7/23 for concern of infected ureteral stent and C diff colitis and discharged home on PO Vanco+PO Cefpodoxoime. She had a recent urologic procedure (percutaenous nephroscopy w/ laser, stone removal with ureteroscopy and PNT placement) 8/5. Presented to the ED 8/8 with reported low grade fevers, chills, suprapubic and RLQ pain.     States she felt \"feverish\" on discharge from urologic procedure 8/6 but did not have a measured fever. Felt \"unwell\" up until 8/8 when she presented to the ED after having a measured temp at home of 100.8. She endorsed continued suprapubic and RLQ cramping pain since urologic prodedure 8/5 and leaking of urine from around PNT tube insertion site. Denies URI sx, cough, SOB, chest pain, n/v, dysuria, " frequency/urgency. Diarrhea resolved before previous hospital discharge, having normal BMs now. Notes that tylenol helps RLQ/suprapubic abdominal cramping for about 4 hrs at a time. Feels better since admission today although was unsure if she felt at her baseline and voiced some uneasiness in discharging home to early.     [place]. Has been taking Cefpodoxime 100 mg BID and PO Vanco 125 mg BID at home PTA.     Recent previous Cx hx:   -Calculus R kidney growth of Candida glabrata  -UCx 7/23 and 25 mixed UGF   -UCx 7/13 & 15 E coli  (R Amp/sulb, Amp, Cipro, levo, Susceptible to cephalosporins, macrobid, Zosyn, Bactrim).   -UCx 12/2020 E coli (same sens as above)         Review of Systems:   CONSTITUTIONAL: Positive for fever, chills. Negative for sweats.   EYES: negative for icterus, redness, or purulent drainage.   ENT:  negative for nasal congestion, rhinorrhea, or sore throat.  RESPIRATORY:  negative for cough with sputum and dyspnea.  CARDIOVASCULAR:  negative for chest pain or palpitations.  GASTROINTESTINAL:  negative for nausea, vomiting, diarrhea and constipation. Positive for suprapubic and RLQ pain.  GENITOURINARY:  negative for dysuria, frequency, or urgency.   HEME:  No easy bruising or bleeding.  INTEGUMENT:  negative for rash and pruritus.  NEURO:  Negative for headache, vision changes, or numbness/tingling in extremities.         Past Medical History:     Past Medical History:   Diagnosis Date     Acute kidney injury (H) 12/19/2020     Allergic rhinitis, cause unspecified      Anemia      Aortic stenosis 02/29/2016    With new murmur noted 2/2016, normal echo, repeat echo 2/2017.     Aortic valve sclerosis      Atypical chest pain 07/24/2015     cuboid     left foot     History of Clostridium difficile colitis      Hyperlipidemia LDL goal <100 11/01/2012     Hypertension goal BP (blood pressure) < 140/90      Musculoskeletal chest pain 11/25/2016     Obesity, Class I, BMI 30-34.9  11/11/2015     Pneumonia 03/2016     Pyelonephritis      Sepsis, due to unspecified organism, unspecified whether acute organ dysfunction present (H) 12/19/2020     Type 2 diabetes, HbA1c goal < 7% (H)             Past Surgical History:     Past Surgical History:   Procedure Laterality Date     CATARACT EXTRACTION       CYSTOSCOPY, RETROGRADES, INSERT STENT URETER(S), COMBINED Right 07/15/2021    Procedure: CYSTOURETEROSCOPY, WITH RETROGRADE PYELOGRAM,  AND STENT INSERTION RIGHT;  Surgeon: Kirk Law MD;  Location: [facility]     LASER HOLMIUM NEPHROLITHOTOMY VIA PERCUTANEOUS NEPHROSTOMY Right 8/5/2021    Procedure: NEPHROLITHOTOMY, PERCUTANEOUS, USING HOLMIUM LASER RIGHT;  Surgeon: Kirk Law MD;  Location: [facility] OR            Family History:     Family History   Problem Relation Age of Onset     Hypertension Mother      Diabetes No family hx of      Cerebrovascular Disease No family hx of      Breast Cancer No family hx of      Cancer - colorectal No family hx of      Prostate Cancer No family hx of      Anesthesia Reaction No family hx of      Bleeding Disorder No family hx of      Clotting Disorder No family hx of             Social History:     Social History     Tobacco Use     Smoking status: Never Smoker     Smokeless tobacco: Never Used   Substance Use Topics     Alcohol use: Yes     Alcohol/week: 10.0 standard drinks     Comment: 1-2 drinks per week     History   Sexual Activity     Sexual activity: Yes     Partners: Male            Current Medications:       cefTRIAXone  1 g Intravenous Q24H     insulin aspart  1-6 Units Subcutaneous Q4H     losartan-hydrochlorothiazide  1 tablet Oral QAM     simvastatin  10 mg Oral At Bedtime     tamsulosin  0.4 mg Oral QAM     tolterodine ER  4 mg Oral QAM     vancomycin  125 mg Oral BID            Allergies:     Allergies   Allergen Reactions     Ibuprofen Other (See Comments)     numbness in hands and feet     Keflex [Cephalexin] Rash            Physical  "Exam:   Vitals were reviewed  Patient Vitals for the past 24 hrs:   BP Temp Temp src Pulse Resp SpO2 Height Weight   08/09/21 0900 -- 98.5  F (36.9  C) Oral -- -- -- -- --   08/09/21 0745 127/72 99.8  F (37.7  C) Oral 87 16 96 % -- --   08/09/21 0552 (!) 145/75 98.9  F (37.2  C) Oral 87 18 99 % -- --   08/09/21 0202 137/80 98.6  F (37  C) Oral 96 18 99 % 1.575 m (5' 2\") 69.6 kg (153 lb 8 oz)   08/09/21 0130 106/61 -- -- 78 -- 95 % -- --   08/09/21 0100 104/63 -- -- -- -- 96 % -- --   08/09/21 0030 114/64 -- -- 89 -- 95 % -- --   08/09/21 0000 113/67 -- -- 91 -- 97 % -- --   08/08/21 2330 110/87 -- -- 98 -- 96 % -- --   08/08/21 2300 119/71 -- -- 97 -- 95 % -- --   08/08/21 2230 (!) 140/79 -- -- 101 -- 96 % -- --   08/08/21 2030 139/72 -- -- 107 -- 96 % -- --   08/08/21 2010 139/78 -- -- -- -- 96 % -- --   08/08/21 1944 -- 100.1  F (37.8  C) Oral -- -- -- -- --   08/08/21 1908 (!) 168/92 98.4  F (36.9  C) Oral 120 20 98 % -- --       Physical Examination:  Constitutional: Pleasant female seen sitting up in bed, in NAD. Alert and interactive.   HEENT: NCAT, anicteric sclerae, conjunctiva clear. Moist mucous membranes.  Respiratory: Non-labored breathing on RA. Lungs are clear to auscultation bilaterally, without focal crackles.  Cardiovascular: Regular rate and rhythm with no murmur, rub or gallop.  GI: Normoactive BS. Abdomen is soft, non-distended, and mildly uncomfortable to palpation of RLQ and suprapubic areas.  Skin: Warm and dry. No rashes or lesions on exposed surfaces.  Musculoskeletal: Extremities grossly normal. LLE>RLE peripheral edema, stable at baseline per pt.  Neurologic: A &O x3, speech normal, answering questions appropriately. Moves all extremities spontaneously. Grossly non-focal.  Neuropsychiatric: Mentation and affect normal/appropriate.         Laboratory Data:     Inflammatory Markers    Recent Labs   Lab Test 08/09/21  0558 07/25/21  0839 12/21/20  0705 12/19/20  2307 03/02/16  0837 " 02/29/16  0828   SED  --   --   --  30 15 15   .0* 130.0* 63.0* 140.0* 110.0* 36.0*       Hematology Studies    Recent Labs   Lab Test 08/08/21 2024 08/06/21  0607 07/27/21  0628 07/26/21  0758 07/25/21  1614 07/25/21  0839 12/21/20  0705 12/20/20  0654 12/19/20  2307 12/19/20  1110 07/03/19  1734 03/28/17  0941 03/04/16  0930   WBC 14.0* 16.5* 8.1 10.0 14.0* 16.4* 9.2   < > 17.7* 18.2* 11.3* 7.1 12.8*   ANEU  --   --   --   --   --   --  7.1  --  16.1* 16.0* 9.6* 4.9 10.8*   AEOS  --   --   --   --   --   --  0.1  --  0.0 0.0 0.1 0.1 0.0   HGB 12.4 10.9* 11.1* 12.2 12.3 11.4* 11.3*   < > 12.8 14.9 13.2 14.7 14.1   MCV 84 84 83 84 82 83 84   < > 83 83 85 84 82    262 426 457* 388 400 287   < > 330 364 288 347 332    < > = values in this interval not displayed.       Metabolic Studies     Recent Labs   Lab Test 08/08/21 2023 08/06/21 0607 07/27/21  1413 07/27/21  0628 07/26/21  0754 07/25/21  0839    134  --  137 134 131*   POTASSIUM 3.4 4.2 3.7 3.4 3.6 3.0*   CHLORIDE 101 105  --  106 104 101   CO2 25 22  --  23 22 21   BUN 14 23  --  9 10 8   CR 0.97 0.99  --  1.00 1.01 0.93   GFRESTIMATED 55* 54*  --  53* 53* 58*       Hepatic Studies    Recent Labs   Lab Test 08/08/21 2023 07/23/21  0208 07/13/21  0721 12/19/20  2307 10/07/20  0831 10/15/19  0920   BILITOTAL 0.4 0.5 0.8 0.5 0.3 0.4   ALKPHOS 81 108 84 87 91 86   ALBUMIN 2.7* 3.0* 3.5 3.1* 3.5 3.8   AST 11 12 18 15 15 15   ALT 20 26 20 19 20 21       Microbiology:  Culture   Date Value Ref Range Status   08/08/2021 No growth after 12 hours  Preliminary   08/05/2021 1+ Candida glabrata complex (A)  Final     Comment:     Susceptibilities not routinely done   07/25/2021 10,000-50,000 CFU/mL Mixture of urogenital erum  Final   07/23/2021 No Growth  Final   07/23/2021 No Growth  Final   07/23/2021 <10,000 CFU/mL Urogenital erum  Final   07/15/2021 Culture in progress  Final   07/15/2021 10,000-50,000 CFU/mL Escherichia coli (A)  Final    07/15/2021 <1,000 CFU/mL Urogenital erum  Final   07/13/2021 No Growth  Final   07/13/2021 No Growth  Final   07/13/2021 >100,000 CFU/mL Escherichia coli (A)  Final       Urine Studies    Recent Labs   Lab Test 08/08/21 2028 07/25/21  1230 07/23/21  0140 07/13/21  0724 12/19/20  2242   LEUKEST Large* Large* Large* Large* Large*   WBCU >182* >182* 50* >100* >182*       Vancomycin Levels  No lab results found.    Invalid input(s): VANCO    Hepatitis B Testing No lab results found.  Hepatitis C Testing   No results found for: HCVAB, HQTG, HCGENO, HCPCR, HQTRNA, HEPRNA  Respiratory Virus Testing    No results found for: RS, FLUAG    IMAGING    8/8 CT AP w/o contrast   IMPRESSION:   1. Unchanged position of the right percutaneous nephrostomy tube.   2. Right nephroureteral 5Fr catheter terminates in the distal right  ureter, short of the urinary bladder.  Stable mild right  hydroureteronephrosis.  2. Unchanged renal calculi in the inferior pole of the right kidney.  No new renal calculi.

## 2021-08-09 NOTE — UTILIZATION REVIEW
"    Concurrent stay review; Secondary Review Determination     [facility]          Under the authority of the Utilization Management Committee, the utilization review process indicated a secondary review on the above patient.  The review outcome is based on review of the medical records, discussions with staff, and applying clinical experience noted on the date of the review.          (x) Observation Status Appropriate - Concurrent stay review    RATIONALE FOR DETERMINATION     \"81 year old female admitted on 8/8/2021. She The patient is an 81 year old woman who has a past medical history significant for HTN, HLD, anemia and type 2 diabetes. She was recently admitted and underwent cystoscopy and stent placement for pyelonephritis from 7/13-7/16. She then was re-admitted for a C diff infection and GERSON secondary to dehydration. She was started on treatment with antibiotics for her C diff and infected stent and discharged home. She then underwent a percutaneous nephroscopy with laser treatment and stone removal with ureteroscopy and percutaneous nephrostomy on 8/5/21. Over the past 3 days she has continued to have intermittent low grade fever and chills. She spiked a fever to 100.8 this afternoon.\"    Pt has been evaluated by urology who feel there is no acute issue that warrants intervention. ID has been consulted for antibiotic recommendations. The patient is on IVF's and IV rocephin and awaiting ID input for antibiotics for discharge. For now observation status is appropriate.      The severity of illness, intensity of service provided, expected LOS and risk for adverse outcome make the care appropriate for further observation.        The information on this document is developed by the utilization review team in order for the business office to ensure compliance.  This only denotes the appropriateness of proper admission status and does not reflect the quality of care rendered.         The definitions " of Inpatient Status and Observation Status used in making the determination above are those provided in the CMS Coverage Manual, Chapter 1 and Chapter 6, section 70.4.      Sincerely,     MARYBEL SUÁREZ MD    Physician Advisor  Utilization Management  [facility].

## 2021-08-09 NOTE — PROGRESS NOTES
"Observation Goals:   -diagnostic tests and consults completed and resulted: not met, urology consult to be completed.     -vital signs normal or at patient baseline: met, /72 (BP Location: Right arm)   Pulse 87   Temp 99.8  F (37.7  C) (Oral)   Resp 16   Ht 1.575 m (5' 2\")   Wt 69.6 kg (153 lb 8 oz)   SpO2 96%   BMI 28.08 kg/m      -tolerating oral intake to maintain hydration:Met, Provider here and die order changed. Continues with ivf    -adequate pain control on oral analgesics: met, denies pain   Tylenol administered for low grade fever    -tolerating oral antibiotics or has plans for home infusion setup: iMet, tolerating oral abx.   Pt ordering breakfast  "

## 2021-08-09 NOTE — ED PROVIDER NOTES
ED Provider Note  [facility]      History     Chief Complaint   Patient presents with     Abdominal Pain     HPI  Kelly Barnes is a 81 year old female who has a history of recurrent pyelonephritis, right staghorn calculus, and c diff colitis. She had issues with sepsis, infected ureteral stent and c diff colitis and was hospitalized for this at the end of July. She subsequently had percutaneous nephroscopy with laser treatment and stone removal with ureteroscopy and percutaneous nephrostomy 4 days ago. She stayed in the hospital overnight after the procedure. She states that she had low grade fever when she left the hospital. Over the past 3 days she has continued to have intermittent low grade fever and chills. She spiked a fever to 100.8 this afternoon. She has been having suprapubic and RLQ abdominal pain. She denies URI symptoms, cough, shortness of breath, chest pain, nausea or vomiting. She continues to have non formed stool. She is still on antibiotics for C diff. She has no dysuria. She has chronic ankle edema unchanged from baseline.    She is scheduled to have an additional surgical procedure for management of the stone on Wednesday.    Past Medical History:   Diagnosis Date     Acute kidney injury (H) 12/19/2020     Allergic rhinitis, cause unspecified      Anemia      Aortic stenosis 02/29/2016    With new murmur noted 2/2016, normal echo, repeat echo 2/2017.     Aortic valve sclerosis      Atypical chest pain 07/24/2015     cuboid     left foot     History of Clostridium difficile colitis      Hyperlipidemia LDL goal <100 11/01/2012     Hypertension goal BP (blood pressure) < 140/90      Musculoskeletal chest pain 11/25/2016     Obesity, Class I, BMI 30-34.9 11/11/2015     Pneumonia 03/2016     Pyelonephritis      Sepsis, due to unspecified organism, unspecified whether acute organ dysfunction present (H) 12/19/2020     Type 2 diabetes, HbA1c goal < 7% (H)        Past  Surgical History:   Procedure Laterality Date     CATARACT EXTRACTION       CYSTOSCOPY, RETROGRADES, INSERT STENT URETER(S), COMBINED Right 07/15/2021    Procedure: CYSTOURETEROSCOPY, WITH RETROGRADE PYELOGRAM,  AND STENT INSERTION RIGHT;  Surgeon: Kirk Law MD;  Location: [facility] OR       Family History   Problem Relation Age of Onset     Hypertension Mother      Diabetes No family hx of      Cerebrovascular Disease No family hx of      Breast Cancer No family hx of      Cancer - colorectal No family hx of      Prostate Cancer No family hx of      Anesthesia Reaction No family hx of      Bleeding Disorder No family hx of      Clotting Disorder No family hx of        Social History     Tobacco Use     Smoking status: Never Smoker     Smokeless tobacco: Never Used   Substance Use Topics     Alcohol use: Yes     Alcohol/week: 10.0 standard drinks     Comment: 1-2 drinks per week          Review of Systems   Constitutional: Positive for chills and fever.   HENT: Negative for congestion, sore throat and trouble swallowing.    Eyes: Negative for visual disturbance.   Respiratory: Negative for cough, shortness of breath and wheezing.    Cardiovascular: Positive for leg swelling. Negative for chest pain and palpitations.   Gastrointestinal: Positive for abdominal pain and diarrhea. Negative for nausea and vomiting.   Genitourinary: Positive for difficulty urinating. Negative for dysuria and flank pain.   Musculoskeletal: Negative for back pain.   Skin: Negative for rash.   Neurological: Negative for weakness, light-headedness, numbness and headaches.   Hematological: Negative for adenopathy.   Psychiatric/Behavioral: Negative for confusion.         Physical Exam   BP: (!) 168/92  Pulse: 120  Temp: 98.4  F (36.9  C)  Resp: 20  SpO2: 98 %  Physical Exam  Vitals and nursing note reviewed.   Constitutional:       General: She is not in acute distress.     Appearance: She is well-developed.   HENT:      Head:  Normocephalic and atraumatic.      Right Ear: External ear normal.      Left Ear: External ear normal.      Nose: Nose normal.      Mouth/Throat:      Mouth: Mucous membranes are moist.   Eyes:      General: No scleral icterus.     Extraocular Movements: Extraocular movements intact.      Pupils: Pupils are equal, round, and reactive to light.   Cardiovascular:      Rate and Rhythm: Normal rate and regular rhythm.      Heart sounds: No murmur heard.     Pulmonary:      Effort: Pulmonary effort is normal.      Breath sounds: Normal breath sounds. No wheezing or rales.   Abdominal:      General: Abdomen is flat.      Palpations: Abdomen is soft.      Tenderness: There is no abdominal tenderness. There is no guarding.      Comments: Nephrostomy site right flank clean, draining clear yellow urine, not erythematous   Musculoskeletal:      Cervical back: Normal range of motion and neck supple.      Right lower leg: Edema present.      Left lower leg: Edema present.   Lymphadenopathy:      Cervical: No cervical adenopathy.   Skin:     General: Skin is warm and dry.   Neurological:      General: No focal deficit present.      Mental Status: She is alert and oriented to person, place, and time.   Psychiatric:         Mood and Affect: Mood normal.         Behavior: Behavior normal.         ED Course      Procedures              Results for orders placed or performed during the hospital encounter of 08/08/21   CT Abdomen Pelvis w/o Contrast     Status: None    Narrative    EXAMINATION: CT ABDOMEN PELVIS W/O CONTRAST, 8/8/2021 9:34 PM    TECHNIQUE:  Helical CT images from the lung bases through the pubic  symphysis were obtained  without IV contrast.     COMPARISON: CT Stone protocol 8/6/2021.    HISTORY: Abdominal pain and fever with recent stone removal a  nephrostomy placement    FINDINGS:    Abdomen and pelvis: The liver, gallbladder, biliary system, spleen,  pancreas, and adrenal glands are within normal limits.  Stable  positioning of the right percutaneous nephrostomy tube with the  pigtail coiled in the renal pelvis. External lead placed  nephroureteral catheter is unchanged in position with the distal and  in the distal right ureter, short of the urinary bladder. No  significant change in the renal calculi in the inferior pole of the  right kidney measuring 12 mm and 10 mm, respectively. No new renal  calculi. Mild right-sided hydroureteronephrosis is unchanged. No left  renal calculi or hydronephrosis. Urinary bladder is within normal  limits. Reproductive organs are within normal limits.    Small sliding hiatal hernia. Colonic diverticulosis without  diverticulitis. Appendix is unremarkable. No free fluid free air in  the abdomen or pelvis. Normal caliber abdominal aorta. No suspicious  abdominal or pelvic lymphadenopathy.    Lung bases:  Cardiac size is normal. No pericardial effusion. Lung  bases are clear.    Bones and soft tissues: Degenerative changes of the lumbar spine with  grade 1 anterolisthesis of L4 on L5. Degenerative changes of the  bilateral hips. No acute or suspicious osseous lesions.      Impression    IMPRESSION:   1. Unchanged position of the right percutaneous nephrostomy tube.   2. Right nephroureteral 5Fr catheter terminates in the distal right  ureter, short of the urinary bladder.  Stable mild right  hydroureteronephrosis.  2. Unchanged renal calculi in the inferior pole of the right kidney.  No new renal calculi.    I have personally reviewed the examination and initial interpretation  and I agree with the findings.    DAVID SAMSON MD         SYSTEM ID:  [id number]   XR Chest Port 1 View     Status: None    Narrative    EXAM: XR CHEST PORT 1 VIEW  8/8/2021 8:54 PM     HISTORY:  fever       COMPARISON:  Chest x-ray 12/19/2020    FINDINGS:   Portable upright view of the chest. Trachea is midline. Cardiac  silhouette is within normal limits. No airspace opacities. No pleural  effusion or  pneumothorax. Osseous structures are within normal limits.      Impression    IMPRESSION:   No acute cardiopulmonary findings.    I have personally reviewed the examination and initial interpretation  and I agree with the findings.    DAVID SAMSON MD         SYSTEM ID:  [id number]   CBC with platelets differential     Status: Abnormal    Narrative    The following orders were created for panel order CBC with platelets differential.  Procedure                               Abnormality         Status                     ---------                               -----------         ------                     CBC with platelets and d...[[id number]]  Abnormal            Final result                 Please view results for these tests on the individual orders.   INR     Status: Normal   Result Value Ref Range    INR 0.99 0.85 - 1.15   Comprehensive metabolic panel     Status: Abnormal   Result Value Ref Range    Sodium 133 133 - 144 mmol/L    Potassium 3.4 3.4 - 5.3 mmol/L    Chloride 101 94 - 109 mmol/L    Carbon Dioxide (CO2) 25 20 - 32 mmol/L    Anion Gap 7 3 - 14 mmol/L    Urea Nitrogen 14 7 - 30 mg/dL    Creatinine 0.97 0.52 - 1.04 mg/dL    Calcium 8.8 8.5 - 10.1 mg/dL    Glucose 269 (H) 70 - 99 mg/dL    Alkaline Phosphatase 81 40 - 150 U/L    AST 11 0 - 45 U/L    ALT 20 0 - 50 U/L    Protein Total 7.0 6.8 - 8.8 g/dL    Albumin 2.7 (L) 3.4 - 5.0 g/dL    Bilirubin Total 0.4 0.2 - 1.3 mg/dL    GFR Estimate 55 (L) >60 mL/min/1.73m2   UA with Microscopic reflex to Culture     Status: Abnormal    Specimen: Urine, Midstream   Result Value Ref Range    Color Urine Yellow Colorless, Straw, Light Yellow, Yellow    Appearance Urine Slightly Cloudy (A) Clear    Glucose Urine 1000  (A) Negative mg/dL    Bilirubin Urine Negative Negative    Ketones Urine 10  (A) Negative mg/dL    Specific Gravity Urine 1.015 1.003 - 1.035    Blood Urine Moderate (A) Negative    pH Urine 5.5 5.0 - 7.0    Protein Albumin Urine 100  (A) Negative  mg/dL    Urobilinogen Urine Normal Normal, 2.0 mg/dL    Nitrite Urine Negative Negative    Leukocyte Esterase Urine Large (A) Negative    Bacteria Urine Few (A) None Seen /HPF    WBC Clumps Urine Present (A) None Seen /HPF    Budding Yeast Urine Few (A) None Seen /HPF    Mucus Urine Present (A) None Seen /LPF    RBC Urine 77 (H) <=2 /HPF    WBC Urine >182 (H) <=5 /HPF    Squamous Epithelials Urine 25 (H) <=1 /HPF    Transitional Epithelials Urine 2 (H) <=1 /HPF    Narrative    Urine Culture ordered based on laboratory criteria   Extra Blue Top Tube     Status: None   Result Value Ref Range    Hold Specimen JIC    Extra Red Top Tube     Status: None   Result Value Ref Range    Hold Specimen JIC    Extra Purple Top Tube     Status: None   Result Value Ref Range    Hold Specimen JIC    Extra Green Top (Lithium Heparin) ON ICE     Status: None   Result Value Ref Range    Hold Specimen JIC    CBC with platelets and differential     Status: Abnormal   Result Value Ref Range    WBC Count 14.0 (H) 4.0 - 11.0 10e3/uL    RBC Count 4.48 3.80 - 5.20 10e6/uL    Hemoglobin 12.4 11.7 - 15.7 g/dL    Hematocrit 37.6 35.0 - 47.0 %    MCV 84 78 - 100 fL    MCH 27.7 26.5 - 33.0 pg    MCHC 33.0 31.5 - 36.5 g/dL    RDW 13.9 10.0 - 15.0 %    Platelet Count 315 150 - 450 10e3/uL    % Neutrophils 88 %    % Lymphocytes 5 %    % Monocytes 5 %    % Eosinophils 1 %    % Basophils 0 %    % Immature Granulocytes 1 %    NRBCs per 100 WBC 0 <1 /100    Absolute Neutrophils 12.3 (H) 1.6 - 8.3 10e3/uL    Absolute Lymphocytes 0.7 (L) 0.8 - 5.3 10e3/uL    Absolute Monocytes 0.7 0.0 - 1.3 10e3/uL    Absolute Eosinophils 0.2 0.0 - 0.7 10e3/uL    Absolute Basophils 0.0 0.0 - 0.2 10e3/uL    Absolute Immature Granulocytes 0.1 (H) <=0.0 10e3/uL    Absolute NRBCs 0.0 10e3/uL   iStat Gases (lactate) venous, POCT     Status: Abnormal   Result Value Ref Range    Lactic Acid POCT 1.1 <=2.0 mmol/L    Bicarbonate Venous POCT 26 21 - 28 mmol/L    O2 Sat, Venous POCT  65 (L) 94 - 100 %    pCO2V Venous POCT 36 (L) 40 - 50 mm Hg    pH Venous POCT 7.47 (H) 7.32 - 7.43    pO2 Venous POCT 31 25 - 47 mm Hg   [place] Draw     Status: None    Narrative    The following orders were created for panel order [place] Draw.  Procedure                               Abnormality         Status                     ---------                               -----------         ------                     Extra Blue Top Tube[[id number]]                              Final result               Extra Red Top Tube[[id number]]                               Final result               Extra Purple Top Tube[[id number]]                            Final result               Extra Green Top (Lithium...[[id number]]                      Final result                 Please view results for these tests on the individual orders.     Medications   0.9% sodium chloride BOLUS (1,000 mLs Intravenous New Bag 8/8/21 2031)     Followed by   sodium chloride 0.9% infusion (has no administration in time range)   acetaminophen (TYLENOL) tablet 650 mg (650 mg Oral Given 8/8/21 2216)   cefTRIAXone (ROCEPHIN) 1 g vial to attach to  mL bag for ADULTS or NS 50 mL bag for PEDS (has no administration in time range)        Assessments & Plan (with Medical Decision Making)   Impression:  Elderly woman with a history of recurrent pyelonephritis and sepsis associated with a staghorn calculus in the right kidney. She had an initial nephrostomy, laser therapy and ureteral stenting a few days ago. She has been running low grade fevers since the procedure. She had a temperature of 100.8 this afternoon. Her right nephrostomy sit appears healthy. She has pyuria and hematuria of uncertain significant in setting of recent extensive urinary tract instrumentation. She has some suprapubic pain and mild tenderness on exam. Abdominal CT has no evidence of abscess or inflammatory process. The kidney was unremarkable and the ureteral stent is in  position. She has no evidence of urine leak. She has no evidence of severe  WBC is elevated at 14,000, but this is lower than her WBC 2 days ago. Lactate is normal. LFTs, electrolytes and creatinine are normal. Blood glucose is moderately elevated. She does not appear severely ill or septic. Past UCs have grown e coli sensitive to cephalosporins.     At this point, I will admit her to ED observation. She was treated with IV ceftriaxone. Given her recent c diff, it would be ideal to avoid more broad spectrum antibiotics such as zosyn. We will monitor the fever, recheck cbc and CRP in the AM and request Urology consultation for the AM. Blood and urine cultures are pending.    I have reviewed the nursing notes. I have reviewed the findings, diagnosis, plan and need for follow up with the patient.    New Prescriptions    No medications on file       Final diagnoses:   Urinary tract infection associated with nephrostomy catheter, initial encounter (H)   Fever and chills       --  Yosef Serrano  [facility] EMERGENCY DEPARTMENT  8/8/2021     Yosef Serrano MD  08/08/21 [id number]

## 2021-08-09 NOTE — PROGRESS NOTES
"Emergency Medicine Observation Attending note    The patient was independently seen and examined by me. The chart, vital signs, and labs were reviewed. The patient's findings were discussed with the PRAVEEN on the observation unit, and I agree with the findings of the note and the plan.    81-year-old female with past medical history of recurrent pyelonephritis, right staghorn calculus, C. difficile colitis, admitted to ED observation after presenting to the ED with complaint of low-grade fevers and chills for 3 days.  Of note, she had percutaneous nephrostomy with laser treatment and stone removal with ureteroscopy and percutaneous nephrostomy 4 days prior to presentation.  She reports that she spiked a fever to 100.8 the afternoon of presentation, and also has been complaining of suprapubic and right lower quadrant abdominal pain.  No cough, shortness of breath, chest pain, nausea, vomiting, URI symptoms, diarrhea.  She is currently being treated for C. difficile.  No dysuria.  Abdominal CT done in the ED showed no evidence of abscess or inflammatory process.  The kidney was unremarkable and the ureteral stent was in position.  WBC is elevated at 14, though improved from previous, and lactate was normal.  She was admitted to ED observation and treated with IV ceftriaxone for suspected infection in light of her recent extensive instrumentation and fever.  This morning she states that she is still feeling some pressure in the suprapubic region, and feeling feverish.     /72 (BP Location: Right arm)   Pulse 87   Temp 99.8  F (37.7  C) (Oral)   Resp 16   Ht 1.575 m (5' 2\")   Wt 69.6 kg (153 lb 8 oz)   SpO2 96%   BMI 28.08 kg/m      Exam:  General: awake, alert, NAD  HEENT: NC/AT  Neck: supple  Lungs: CTA-B  Heart: RRR, no M/R/G  Abd: soft, ND, mild suprapubic tenderness with palpation - no guarding.  Ext: non-tender, no edema.   Back - minimal tenderness surrounding PNT without " erythema        Assessment/plan:  1. Low grade fever - likely related to recent kidney procedure. On ceftriaxone. Also on po vanco for c.diff infection. Grew candida glabrata from stone culture. Will d/w ID.

## 2021-08-09 NOTE — PLAN OF CARE
"Observation Goals:    -diagnostic tests and consults completed and resulted: not met, urology consult to be completed   -vital signs normal or at patient baseline: met  -tolerating oral intake to maintain hydration: not met, NPO, receiving IVF  -adequate pain control on oral analgesics: met, denies pain  -tolerating oral antibiotics or has plans for home infusion setup: in progress    /80   Pulse 96   Temp 98.6  F (37  C) (Oral)   Resp 18   Ht 1.575 m (5' 2\")   Wt 69.6 kg (153 lb 8 oz)   SpO2 99%   BMI 28.08 kg/m      "

## 2021-08-09 NOTE — PROGRESS NOTES
"Observation Goals:   -diagnostic tests and consults completed and resulted: met, No acute urologic intervention at this time.      -vital signs normal or at patient baseline: met  /66 (BP Location: Right arm)   Pulse 81   Temp (P) 99.6  F (37.6  C) (Oral)   Resp 20   Ht 1.575 m (5' 2\")   Wt 69.6 kg (153 lb 8 oz)   SpO2 96%   BMI 28.08 kg/m       -tolerating oral intake to maintain hydration:Met, tolerated Regular diet    -adequate pain control on oral analgesics: met, denies pain    -tolerating oral antibiotics or has plans for home infusion setup: Met, tolerating oral abx.     "

## 2021-08-09 NOTE — H&P
[facility]    History and Physical - Emergency Department Observation Unit       Date of Admission:  8/8/2021    Assessment & Plan      Kelly Barnes is a 81 year old female admitted on 8/8/2021. She The patient is an 81 year old woman who has a past medical history significant for HTN, HLD, anemia and type 2 diabetes. She was recently admitted and underwent cystoscopy and stent placement for pyelonephritis from 7/13-7/16. She then was re-admitted for a C diff infection and GERSON secondary to dehydration. She was started on treatment with antibiotics for her C diff and infected stent and discharged home. She then underwent a percutaneous nephroscopy with laser treatment and stone removal with ureteroscopy and percutaneous nephrostomy on 8/5/21. Over the past 3 days she has continued to have intermittent low grade fever and chills. She spiked a fever to 100.8 this afternoon. Reports suprapubic and RLQ abdominal pain.    ## UTI  ## CKDIII  ## Hx of E. Coli UTIs  ## RLQ and suprapubic abdominal pain  ## percutaneous nephroscopy with laser treatment and stone removal with ureteroscopy and percutaneous nephrostomy on 8/5/21  Patient reports having low grade fevers and chills since being discharged after percutaneous nephroscopy with laser treatment and stone removal with ureteroscopy and percutaneous nephrostomy on 8/5/21. She continues to have suprapubic and RLQ abdominal pain. She had a fever of 100.8.  Urine culture 7/21/21 had grown e coli resistant to amp/sulbactam and fluroquinolones, sensitive to cephalosporins and bactrim. Had been taking keflex after recent discharge and then recently transitioned to bactrim for ppx until stent removal. 7/27/21 UC grew Normal erum. Patient was seen by ID: Recommended treat with Vancomycin and Continue Cefpodoxime for renal stone prophylaxis. Fevers were thought to be caused by Cdiff.In the ED: Vitals:BP:139/72 Pulse: 107 Temp: 100.1  Resp: 20 SP02:96 % Labs:Na 133, K 3.4, Cr 0.97, GFR 55, LFTS normal,, PCO2 36, O2, 31, Bicarb 26, WBC 14.0, Hgb 12.4, Plt 315, INR 0.99, UA: Slightly cloudy, , 10 Ketones, 100 Protein albumin, negative nitrite, Moderate blood, Large LE, WBC >182, 77 RBC, 25 SE, Mucous present, WBC clumps present., few bacteria, UC pending, BC pending  Medications: 1 Liter bolus x 1, Ceftriaxone 1 g IV x 1 Imaging: Chest xray negative, CT abdomen/pelvis: Unchanged position of the right percutaneous nephrostomy tube.  Right nephroureteral 5Fr catheter terminates in the distal rightureter, short of the urinary bladder.  Stable mild right hydroureteronephrosis.Unchanged renal calculi in the inferior pole of the right kidney. No new renal calculi.Consults: Urology  Plan: Admit to ED observation for monitor fever, recheck cbc and CRP in the AM and request Urology consultation for the AM.   - VS q 4 hours  - Monitor fever curve  - Continue Ceftriaxone q 24 hours  - Continuous IVF  - Antiemetics  - Follow Blood and urine cultures  - Urology consult  - Repeat CBC, CMP, CRP in am  - Hold Cefpodoxime while on Ceftriaxone (discuss with ID antibiotic recommendations)     ## Diarrhea 2/2 c.diff   Patient is on oral vancstarted on 7/23. She reports she will complete her therapy on Wednesday. Will need to discuss with ID both the duration of Vancomycin and antibiotic recommendations.   - Continue Vancomycin BID.   - Discuss with ID regarding duration of Vancomycin.   ## DM2  - hold oral anti-hyperglycemics   - POC glucose AC/bedtime + sliding scale insulin  - hypoglycemia protocol      ## HTN  ## HLD  - home aspirin  - continue Losartan/hctz   - continue statin               Diet:  Regular/ NPO at midnight  DVT Prophylaxis: Ambulate every shift  Blanchard Catheter: Not present  Central Lines: None  Code Status:   Full                    Disposition Plan   Expected discharge: Tomorrow recommended to prior living arrangement once antibiotic  "plan established.     The patient's care was discussed with the ED physician, Dr. Serrano, Bedside Nurse and Patient.    APRIL [facility]        ______________________________________________________________________    Chief Complaint   RLQ abdominal pain  Suprapubic pain  Fevers    History is obtained from the patient    History of Present Illness   Per ED note, \" Kelly Barnes is a 81 year old female who has a history of recurrent pyelonephritis, right staghorn calculus, and c diff colitis. She had issues with sepsis, infected ureteral stent and c diff colitis and was hospitalized for this at the end of July. She subsequently had percutaneous nephroscopy with laser treatment and stone removal with ureteroscopy and percutaneous nephrostomy 4 days ago. She stayed in the hospital overnight after the procedure. She states that she had low grade fever when she left the hospital. Over the past 3 days she has continued to have intermittent low grade fever and chills. She spiked a fever to 100.8 this afternoon. She has been having suprapubic and RLQ abdominal pain. She denies URI symptoms, cough, shortness of breath, chest pain, nausea or vomiting. She continues to have non formed stool. She is still on antibiotics for C diff. She has no dysuria. She has chronic ankle edema unchanged from baseline.     She is scheduled to have an additional surgical procedure for management of the stone on Wednesday.\"        Review of Systems    RLQ and suprapubic pain  Fevers    Past Medical History    I have reviewed this patient's medical history and updated it with pertinent information if needed.   Past Medical History:   Diagnosis Date     Acute kidney injury (H) 12/19/2020     Allergic rhinitis, cause unspecified      Anemia      Aortic stenosis 02/29/2016    With new murmur noted 2/2016, normal echo, repeat echo 2/2017.     Aortic valve sclerosis      " Atypical chest pain 2015     cuboid     left foot     History of Clostridium difficile colitis      Hyperlipidemia LDL goal <100 2012     Hypertension goal BP (blood pressure) < 140/90      Musculoskeletal chest pain 2016     Obesity, Class I, BMI 30-34.9 2015     Pneumonia 2016     Pyelonephritis      Sepsis, due to unspecified organism, unspecified whether acute organ dysfunction present (H) 2020     Type 2 diabetes, HbA1c goal < 7% (H)        Past Surgical History   I have reviewed this patient's surgical history and updated it with pertinent information if needed.  Past Surgical History:   Procedure Laterality Date     CATARACT EXTRACTION       CYSTOSCOPY, RETROGRADES, INSERT STENT URETER(S), COMBINED Right 07/15/2021    Procedure: CYSTOURETEROSCOPY, WITH RETROGRADE PYELOGRAM,  AND STENT INSERTION RIGHT;  Surgeon: Kirk Law MD;  Location: [facility] OR       Social History   I have reviewed this patient's social history and updated it with pertinent information if needed.  Social History     Tobacco Use     Smoking status: Never Smoker     Smokeless tobacco: Never Used   Substance Use Topics     Alcohol use: Yes     Alcohol/week: 10.0 standard drinks     Comment: 1-2 drinks per week     Drug use: No       Family History   I have reviewed this patient's family history and updated it with pertinent information if needed.  Family History   Problem Relation Age of Onset     Hypertension Mother      Diabetes No family hx of      Cerebrovascular Disease No family hx of      Breast Cancer No family hx of      Cancer - colorectal No family hx of      Prostate Cancer No family hx of      Anesthesia Reaction No family hx of      Bleeding Disorder No family hx of      Clotting Disorder No family hx of        Prior to Admission Medications   Prior to Admission Medications   Prescriptions Last Dose Informant Patient Reported? Taking?   OneTouch Delica Lancets 33G MISC   No No   Si  Device by In Vitro route 2 times daily   acetaminophen (TYLENOL) 325 MG tablet   No No   Sig: Take 2 tablets (650 mg) by mouth every 6 hours as needed for mild pain or fever   blood glucose (ONETOUCH ULTRA) test strip   No No   Sig: Use to test blood sugar twice daily. Dispense 1 box of 200 test strips, #3 refills.   blood glucose monitoring (ONE TOUCH ULTRA 2) meter device kit   No No   Sig: Use to test blood sugar 3 times daily   cefpodoxime (VANTIN) 100 MG tablet   No No   Sig: Take 1 tablet (100 mg) by mouth 2 times daily   cefpodoxime (VANTIN) 100 MG tablet   No No   Sig: Take 1 tablet (100 mg) by mouth 2 times daily   glimepiride (AMARYL) 2 MG tablet   No No   Sig: Take 1 tablet (2 mg) by mouth 2 times daily (with meals) She will also take separate 2 mg dose with breakfast.   Patient taking differently: Take 2 mg by mouth 3 times daily (with meals)    lactobacillus rhamnosus, GG, (CULTURELL) capsule   No No   Sig: Take 1 capsule by mouth 2 times daily   losartan-hydrochlorothiazide (HYZAAR) 50-12.5 MG tablet   No No   Sig: Take 1 tablet by mouth daily   Patient taking differently: Take 1 tablet by mouth every morning    oxyCODONE (ROXICODONE) 5 MG tablet   No No   Sig: Take 1 tablet (5 mg) by mouth every 6 hours as needed for pain   simvastatin (ZOCOR) 10 MG tablet   No No   Sig: TAKE ONE TABLET BY MOUTH AT BEDTIME   Patient taking differently: At Bedtime TAKE ONE TABLET BY MOUTH AT BEDTIME   tamsulosin (FLOMAX) 0.4 MG capsule   No No   Sig: Take 1 capsule (0.4 mg) by mouth daily   Patient taking differently: Take 0.4 mg by mouth every morning    tolterodine ER (DETROL LA) 4 MG 24 hr capsule   No No   Sig: Take 1 capsule (4 mg) by mouth daily   Patient taking differently: Take 4 mg by mouth every morning    vancomycin (VANCOCIN) 125 MG capsule   No No   Sig: Take 1 capsule (125 mg) by mouth 4 times daily for 6 days, THEN 1 capsule (125 mg) 2 times daily.      Facility-Administered Medications: None      Allergies   Allergies   Allergen Reactions     Ibuprofen Other (See Comments)     numbness in hands and feet     Keflex [Cephalexin] Rash       Physical Exam   Vital Signs: Temp: 100.1  F (37.8  C) Temp src: Oral BP: 139/72 Pulse: 107   Resp: 20 SpO2: 96 % O2 Device: None (Room air)    Weight: 0 lbs 0 oz    Constitutional: healthy, alert and no distress   Head: Normocephalic. No masses, lesions, tenderness or abnormalities   Neck: Neck supple. No adenopathy. Thyroid symmetric, normal size,, Carotids without bruits.   ENT: ENT exam normal, no neck nodes or sinus tenderness   Cardiovascular: RRR. No murmurs, clicks gallops or rub, Bilateral LLE  Respiratory: . Good diaphragmatic excursion. Lungs clear   Gastrointestinal: Abdomen soft, non tender. BS normal. No masses, organomegaly. Nephrostomy in place, draining light yellow urine, site CDI.   : Deferred   Musculoskeletal: extremities normal- no gross deformities noted, gait normal and normal muscle tone   Skin: no suspicious lesions or rashes   Neurologic: Gait normal. Reflexes normal and symmetric. Sensation grossly WNL.   Psychiatric: mentation appears normal and affect normal/bright   Hematologic/Lymphatic/Immunologic: normal ant/post cervical, axillary, supraclavicular and inguinal     Data   Data reviewed today: I reviewed all medications, new labs and imaging results over the last 24 hours.    Recent Labs   Lab 08/08/21 2024 08/08/21 2023 08/06/21  1012 08/06/21  0607   WBC 14.0*  --   --  16.5*   HGB 12.4  --   --  10.9*   MCV 84  --   --  84     --   --  262   INR  --  0.99  --   --    NA  --  133  --  134   POTASSIUM  --  3.4  --  4.2   CHLORIDE  --  101  --  105   CO2  --  25  --  22   BUN  --  14  --  23   CR  --  0.97  --  0.99   ANIONGAP  --  7  --  7   JAN  --  8.8  --  7.9*   GLC  --  269* 196* 289*   ALBUMIN  --  2.7*  --   --    PROTTOTAL  --  7.0  --   --    BILITOTAL  --  0.4  --   --    ALKPHOS  --  81  --   --    ALT  --  20  --    --    AST  --  11  --   --      Most Recent 3 CBC's:Recent Labs   Lab Test 08/08/21 2024 08/06/21  0607 07/27/21  0628   WBC 14.0* 16.5* 8.1   HGB 12.4 10.9* 11.1*   MCV 84 84 83    262 426     Most Recent 3 BMP's:Recent Labs   Lab Test 08/08/21 2023 08/06/21  1012 08/06/21  0607 07/27/21  1413 07/27/21  0628     --  134  --  137   POTASSIUM 3.4  --  4.2 3.7 3.4   CHLORIDE 101  --  105  --  106   CO2 25  --  22  --  23   BUN 14  --  23  --  9   CR 0.97  --  0.99  --  1.00   ANIONGAP 7  --  7  --  8   JAN 8.8  --  7.9*  --  8.2*   * 196* 289*  --  194*     Most Recent 2 LFT's:Recent Labs   Lab Test 08/08/21 2023 07/23/21  0208   AST 11 12   ALT 20 26   ALKPHOS 81 108   BILITOTAL 0.4 0.5     Recent Results (from the past 24 hour(s))   XR Chest Port 1 View    Narrative    EXAM: XR CHEST PORT 1 VIEW  8/8/2021 8:54 PM     HISTORY:  fever       COMPARISON:  Chest x-ray 12/19/2020    FINDINGS:   Portable upright view of the chest. Trachea is midline. Cardiac  silhouette is within normal limits. No airspace opacities. No pleural  effusion or pneumothorax. Osseous structures are within normal limits.      Impression    IMPRESSION:   No acute cardiopulmonary findings.    I have personally reviewed the examination and initial interpretation  and I agree with the findings.    DAVID SAMSON MD         SYSTEM ID:  [id number]   CT Abdomen Pelvis w/o Contrast    Narrative    EXAMINATION: CT ABDOMEN PELVIS W/O CONTRAST, 8/8/2021 9:34 PM    TECHNIQUE:  Helical CT images from the lung bases through the pubic  symphysis were obtained  without IV contrast.     COMPARISON: CT Stone protocol 8/6/2021.    HISTORY: Abdominal pain and fever with recent stone removal a  nephrostomy placement    FINDINGS:    Abdomen and pelvis: The liver, gallbladder, biliary system, spleen,  pancreas, and adrenal glands are within normal limits. Stable  positioning of the right percutaneous nephrostomy tube with the  pigtail coiled  in the renal pelvis. External lead placed  nephroureteral catheter is unchanged in position with the distal and  in the distal right ureter, short of the urinary bladder. No  significant change in the renal calculi in the inferior pole of the  right kidney measuring 12 mm and 10 mm, respectively. No new renal  calculi. Mild right-sided hydroureteronephrosis is unchanged. No left  renal calculi or hydronephrosis. Urinary bladder is within normal  limits. Reproductive organs are within normal limits.    Small sliding hiatal hernia. Colonic diverticulosis without  diverticulitis. Appendix is unremarkable. No free fluid free air in  the abdomen or pelvis. Normal caliber abdominal aorta. No suspicious  abdominal or pelvic lymphadenopathy.    Lung bases:  Cardiac size is normal. No pericardial effusion. Lung  bases are clear.    Bones and soft tissues: Degenerative changes of the lumbar spine with  grade 1 anterolisthesis of L4 on L5. Degenerative changes of the  bilateral hips. No acute or suspicious osseous lesions.      Impression    IMPRESSION:   1. Unchanged position of the right percutaneous nephrostomy tube.   2. Right nephroureteral 5Fr catheter terminates in the distal right  ureter, short of the urinary bladder.  Stable mild right  hydroureteronephrosis.  2. Unchanged renal calculi in the inferior pole of the right kidney.  No new renal calculi.    I have personally reviewed the examination and initial interpretation  and I agree with the findings.    DAVID SAMSON MD         SYSTEM ID:  [id number]

## 2021-08-09 NOTE — ED TRIAGE NOTES
Pt arrives to ED with c/o abdominal pain that started Friday after being discharged from the hospital after having kidney stone; nephrostomy in place. Pt reports a Tmax 100.8 at home this morning; has been taking APAP for fever and pain. Urology told pt to come in for work up.

## 2021-08-09 NOTE — CONSULTS
Urology Consult    Name: Kelly Barnes    MRN: [id number]   YOB: 1940               Chief Complaint:   Felt feverish, general feeling of malaise    History is obtained from the patient and chart review          History of Present Illness:   Kelly Barnes is a 81 year old female with C. Diff. on oral vancomycin, status post R PCNL on 8/5/21. Went home on Friday, began feeling a little warm, and generally felt not well. Then Sunday took temperature at home and it was 100.4, took half hour later and it was 100.8, then called and came into ED on [employer]. Also has some mild RLQ abdominal pain.    Had 2 BMs yesterday. Zachary nausea yesterday, now resolved. No vomiting. Does not feel very hungry right now and has not been eating very much over past few days. Urinating with issue. PNT has been draining without issue.          Past Medical History:     Past Medical History:   Diagnosis Date     Acute kidney injury (H) 12/19/2020     Allergic rhinitis, cause unspecified      Anemia      Aortic stenosis 02/29/2016    With new murmur noted 2/2016, normal echo, repeat echo 2/2017.     Aortic valve sclerosis      Atypical chest pain 07/24/2015     cuboid     left foot     History of Clostridium difficile colitis      Hyperlipidemia LDL goal <100 11/01/2012     Hypertension goal BP (blood pressure) < 140/90      Musculoskeletal chest pain 11/25/2016     Obesity, Class I, BMI 30-34.9 11/11/2015     Pneumonia 03/2016     Pyelonephritis      Sepsis, due to unspecified organism, unspecified whether acute organ dysfunction present (H) 12/19/2020     Type 2 diabetes, HbA1c goal < 7% (H)             Past Surgical History:     Past Surgical History:   Procedure Laterality Date     CATARACT EXTRACTION       CYSTOSCOPY, RETROGRADES, INSERT STENT URETER(S), COMBINED Right 07/15/2021    Procedure: CYSTOURETEROSCOPY, WITH RETROGRADE PYELOGRAM,  AND STENT INSERTION RIGHT;  Surgeon: Kirk Law MD;  Location: [facility] OR             Social History:     Social History     Tobacco Use     Smoking status: Never Smoker     Smokeless tobacco: Never Used   Substance Use Topics     Alcohol use: Yes     Alcohol/week: 10.0 standard drinks     Comment: 1-2 drinks per week            Family History:     Family History   Problem Relation Age of Onset     Hypertension Mother      Diabetes No family hx of      Cerebrovascular Disease No family hx of      Breast Cancer No family hx of      Cancer - colorectal No family hx of      Prostate Cancer No family hx of      Anesthesia Reaction No family hx of      Bleeding Disorder No family hx of      Clotting Disorder No family hx of             Allergies:     Allergies   Allergen Reactions     Ibuprofen Other (See Comments)     numbness in hands and feet     Keflex [Cephalexin] Rash            Medications:     Current Facility-Administered Medications   Medication     acetaminophen (TYLENOL) tablet 650 mg     cefTRIAXone (ROCEPHIN) 1 g vial to attach to  mL bag for ADULTS or NS 50 mL bag for PEDS     glucose gel 15-30 g    Or     dextrose 50 % injection 25-50 mL    Or     glucagon injection 1 mg     insulin aspart (NovoLOG) injection (RAPID ACTING)     insulin aspart (NovoLOG) injection (RAPID ACTING)     losartan-hydrochlorothiazide (HYZAAR) 50-12.5 MG per tablet 1 tablet     melatonin tablet 1 mg     ondansetron (ZOFRAN-ODT) ODT tab 4 mg    Or     ondansetron (ZOFRAN) injection 4 mg     oxyCODONE (ROXICODONE) tablet 5 mg     prochlorperazine (COMPAZINE) injection 5 mg    Or     prochlorperazine (COMPAZINE) tablet 5 mg    Or     prochlorperazine (COMPAZINE) suppository 12.5 mg     simvastatin (ZOCOR) tablet 10 mg     sodium chloride 0.9% infusion     tamsulosin (FLOMAX) capsule 0.4 mg     tolterodine ER (DETROL LA) 24 hr capsule 4 mg     vancomycin (VANCOCIN) capsule 125 mg             Review of Systems:    ROS: 10 point ROS neg other than the symptoms noted above in the HPI           Physical  Exam:   VS:  T: 98.9    HR: 87    BP: 145/75    RR: 18   GEN:  AOx3.  NAD.    CV:  Non cyanotic.  LUNGS: Non-labored breathing.   BACK:  Ostomy appliance over PNT and EVERETT-1 catheter, both tubes appear in good position, clear yellow urine.  ABD:  Soft.  NT.  ND. No rebound or guarding.  :  Deferred.  EXT:  Warm, well perfused. Mild edema.  SKIN:  Warm.  Dry.  No rashes.  NEURO:  CN grossly intact.            Data:   All laboratory data reviewed:    Recent Labs   Lab 08/08/21 2024 08/06/21  0607   WBC 14.0* 16.5*   HGB 12.4 10.9*    262     Recent Labs   Lab 08/09/21  0038 08/08/21 2023 08/06/21  1012 08/06/21  0607   NA  --  133  --  134   POTASSIUM  --  3.4  --  4.2   CHLORIDE  --  101  --  105   CO2  --  25  --  22   BUN  --  14  --  23   CR  --  0.97  --  0.99   * 269* 196* 289*   JAN  --  8.8  --  7.9*     Recent Labs   Lab 08/08/21 2028   COLOR Yellow   APPEARANCE Slightly Cloudy*   URINEGLC 1000 *   URINEBILI Negative   URINEKETONE 10 *   SG 1.015   URINEPH 5.5   PROTEIN 100 *   NITRITE Negative   LEUKEST Large*   RBCU 77*   WBCU >182*       All pertinent imaging reviewed:    CT scan of the abdomen:   8/8/21  IMPRESSION:   1. Unchanged position of the right percutaneous nephrostomy tube.   2. Right nephroureteral 5Fr catheter terminates in the distal right  ureter, short of the urinary bladder.  Stable mild right  hydroureteronephrosis.  2. Unchanged renal calculi in the inferior pole of the right kidney.  No new renal calculi.          Impression and Plan:   Impression:   Kelly Barnes is a 81 year old female with C. Diff, on oral vancomycin, status post right PCNL on 8/5/21, with PNT and EVERETT-1 external tubes on right side. Growing Candida glabrata complex from stone culture.    Admitted to ED observation given infectious symptoms. Scheduled for repeat right PCNL on 8/11/21.      Plan:     - No acute urologic intervention at this time. Okay for diet.    - Please curbside ID regarding  discharge antibiotics given Candida glabrata growing from stone culture, known c.diff (on oral vancomycin), and prior E. Coli (on vantin) culture, to ensure adequate coverage for repeat procedure this coming Wednesday.    - Once adequate antibiotic discharge plan clarified, okay to discharge from urologic perspective.    - Urology will sign off. Please contact resident/PA on call with any questions or concerns.     This patient's exam findings, labs, and imaging discussed with urology staff surgeon Dr. Law, who developed the treatment plan.    Silva Miramontes MD  Urology Resident

## 2021-08-09 NOTE — PROGRESS NOTES
Patient is scheduled for a procedure on 8/11/21 and has an appointment for a COVID test on 8/10/21. Patient asked  provider if she still needed to get the COVID test due to having one here today, 8/9/21. Spoke to Urology Clinic and patient does not need to get another test done tomorrow. They will cancel appointment for patient. Notified provider who will notify patient.     Chelsie Baires RNCC

## 2021-08-10 ENCOUNTER — APPOINTMENT (OUTPATIENT)
Dept: CARDIOLOGY | Facility: CLINIC | Age: 81
DRG: 982 | End: 2021-08-10
Attending: PHYSICIAN ASSISTANT
Payer: COMMERCIAL

## 2021-08-10 ENCOUNTER — ANESTHESIA EVENT (OUTPATIENT)
Dept: MEDSURG UNIT | Facility: CLINIC | Age: 81
End: 2021-08-10

## 2021-08-10 LAB
ALBUMIN SERPL-MCNC: 2 G/DL (ref 3.4–5)
ALP SERPL-CCNC: 63 U/L (ref 40–150)
ALT SERPL W P-5'-P-CCNC: 14 U/L (ref 0–50)
ANION GAP SERPL CALCULATED.3IONS-SCNC: 7 MMOL/L (ref 3–14)
AST SERPL W P-5'-P-CCNC: 12 U/L (ref 0–45)
BASOPHILS # BLD AUTO: 0 10E3/UL (ref 0–0.2)
BASOPHILS NFR BLD AUTO: 0 %
BILIRUB SERPL-MCNC: 0.4 MG/DL (ref 0.2–1.3)
BUN SERPL-MCNC: 7 MG/DL (ref 7–30)
CALCIUM SERPL-MCNC: 7.8 MG/DL (ref 8.5–10.1)
CHLORIDE BLD-SCNC: 107 MMOL/L (ref 94–109)
CO2 SERPL-SCNC: 23 MMOL/L (ref 20–32)
CREAT SERPL-MCNC: 0.86 MG/DL (ref 0.52–1.04)
CRP SERPL-MCNC: 130 MG/L (ref 0–8)
EOSINOPHIL # BLD AUTO: 0.2 10E3/UL (ref 0–0.7)
EOSINOPHIL NFR BLD AUTO: 2 %
ERYTHROCYTE [DISTWIDTH] IN BLOOD BY AUTOMATED COUNT: 13.7 % (ref 10–15)
GFR SERPL CREATININE-BSD FRML MDRD: 64 ML/MIN/1.73M2
GLUCOSE BLD-MCNC: 186 MG/DL (ref 70–99)
GLUCOSE BLDC GLUCOMTR-MCNC: 177 MG/DL (ref 70–99)
GLUCOSE BLDC GLUCOMTR-MCNC: 189 MG/DL (ref 70–99)
GLUCOSE BLDC GLUCOMTR-MCNC: 206 MG/DL (ref 70–99)
GLUCOSE BLDC GLUCOMTR-MCNC: 218 MG/DL (ref 70–99)
GLUCOSE BLDC GLUCOMTR-MCNC: 238 MG/DL (ref 70–99)
GLUCOSE BLDC GLUCOMTR-MCNC: 252 MG/DL (ref 70–99)
HCT VFR BLD AUTO: 31.4 % (ref 35–47)
HGB BLD-MCNC: 10.3 G/DL (ref 11.7–15.7)
IMM GRANULOCYTES # BLD: 0.1 10E3/UL
IMM GRANULOCYTES NFR BLD: 1 %
LVEF ECHO: NORMAL
LYMPHOCYTES # BLD AUTO: 0.7 10E3/UL (ref 0.8–5.3)
LYMPHOCYTES NFR BLD AUTO: 6 %
MCH RBC QN AUTO: 27.7 PG (ref 26.5–33)
MCHC RBC AUTO-ENTMCNC: 32.8 G/DL (ref 31.5–36.5)
MCV RBC AUTO: 84 FL (ref 78–100)
MONOCYTES # BLD AUTO: 0.9 10E3/UL (ref 0–1.3)
MONOCYTES NFR BLD AUTO: 8 %
NEUTROPHILS # BLD AUTO: 10.2 10E3/UL (ref 1.6–8.3)
NEUTROPHILS NFR BLD AUTO: 83 %
NRBC # BLD AUTO: 0 10E3/UL
NRBC BLD AUTO-RTO: 0 /100
PLATELET # BLD AUTO: 279 10E3/UL (ref 150–450)
POTASSIUM BLD-SCNC: 3.1 MMOL/L (ref 3.4–5.3)
POTASSIUM BLD-SCNC: 3.6 MMOL/L (ref 3.4–5.3)
PROT SERPL-MCNC: 5.9 G/DL (ref 6.8–8.8)
RBC # BLD AUTO: 3.72 10E6/UL (ref 3.8–5.2)
SODIUM SERPL-SCNC: 137 MMOL/L (ref 133–144)
WBC # BLD AUTO: 12.1 10E3/UL (ref 4–11)

## 2021-08-10 PROCEDURE — 250N000013 HC RX MED GY IP 250 OP 250 PS 637: Performed by: PHYSICIAN ASSISTANT

## 2021-08-10 PROCEDURE — 258N000003 HC RX IP 258 OP 636: Performed by: PHYSICIAN ASSISTANT

## 2021-08-10 PROCEDURE — 120N000002 HC R&B MED SURG/OB UMMC

## 2021-08-10 PROCEDURE — 84132 ASSAY OF SERUM POTASSIUM: CPT

## 2021-08-10 PROCEDURE — 36415 COLL VENOUS BLD VENIPUNCTURE: CPT

## 2021-08-10 PROCEDURE — 99207 PR CDG-MDM COMPONENT: MEETS MODERATE - DOWN CODED: CPT | Performed by: STUDENT IN AN ORGANIZED HEALTH CARE EDUCATION/TRAINING PROGRAM

## 2021-08-10 PROCEDURE — 85025 COMPLETE CBC W/AUTO DIFF WBC: CPT

## 2021-08-10 PROCEDURE — 93306 TTE W/DOPPLER COMPLETE: CPT

## 2021-08-10 PROCEDURE — 250N000013 HC RX MED GY IP 250 OP 250 PS 637

## 2021-08-10 PROCEDURE — 87040 BLOOD CULTURE FOR BACTERIA: CPT | Performed by: PHYSICIAN ASSISTANT

## 2021-08-10 PROCEDURE — 36415 COLL VENOUS BLD VENIPUNCTURE: CPT | Performed by: PHYSICIAN ASSISTANT

## 2021-08-10 PROCEDURE — 82040 ASSAY OF SERUM ALBUMIN: CPT

## 2021-08-10 PROCEDURE — 86140 C-REACTIVE PROTEIN: CPT

## 2021-08-10 PROCEDURE — 99232 SBSQ HOSP IP/OBS MODERATE 35: CPT | Performed by: STUDENT IN AN ORGANIZED HEALTH CARE EDUCATION/TRAINING PROGRAM

## 2021-08-10 PROCEDURE — 99233 SBSQ HOSP IP/OBS HIGH 50: CPT | Mod: 24 | Performed by: INTERNAL MEDICINE

## 2021-08-10 PROCEDURE — 250N000011 HC RX IP 250 OP 636: Performed by: PHYSICIAN ASSISTANT

## 2021-08-10 PROCEDURE — 93306 TTE W/DOPPLER COMPLETE: CPT | Mod: 26 | Performed by: STUDENT IN AN ORGANIZED HEALTH CARE EDUCATION/TRAINING PROGRAM

## 2021-08-10 PROCEDURE — 99207 PR APP CREDIT; MD BILLING SHARED VISIT: CPT | Performed by: PHYSICIAN ASSISTANT

## 2021-08-10 RX ORDER — POTASSIUM CHLORIDE 750 MG/1
40 TABLET, EXTENDED RELEASE ORAL ONCE
Status: COMPLETED | OUTPATIENT
Start: 2021-08-10 | End: 2021-08-10

## 2021-08-10 RX ADMIN — ACETAMINOPHEN 650 MG: 325 TABLET, FILM COATED ORAL at 04:29

## 2021-08-10 RX ADMIN — SIMVASTATIN 10 MG: 10 TABLET, FILM COATED ORAL at 22:19

## 2021-08-10 RX ADMIN — CEFTRIAXONE 1 G: 1 INJECTION, POWDER, FOR SOLUTION INTRAMUSCULAR; INTRAVENOUS at 22:19

## 2021-08-10 RX ADMIN — VANCOMYCIN HYDROCHLORIDE 125 MG: 125 CAPSULE ORAL at 19:35

## 2021-08-10 RX ADMIN — ACETAMINOPHEN 650 MG: 325 TABLET, FILM COATED ORAL at 22:58

## 2021-08-10 RX ADMIN — FLUCONAZOLE 400 MG: 200 TABLET ORAL at 00:10

## 2021-08-10 RX ADMIN — ACETAMINOPHEN 650 MG: 325 TABLET, FILM COATED ORAL at 15:50

## 2021-08-10 RX ADMIN — SIMVASTATIN 10 MG: 10 TABLET, FILM COATED ORAL at 00:10

## 2021-08-10 RX ADMIN — TOLTERODINE 4 MG: 4 CAPSULE, EXTENDED RELEASE ORAL at 07:46

## 2021-08-10 RX ADMIN — LOSARTAN POTASSIUM AND HYDROCHLOROTHIAZIDE 1 TABLET: 50; 12.5 TABLET, FILM COATED ORAL at 07:46

## 2021-08-10 RX ADMIN — TAMSULOSIN HYDROCHLORIDE 0.4 MG: 0.4 CAPSULE ORAL at 07:46

## 2021-08-10 RX ADMIN — ACETAMINOPHEN 650 MG: 325 TABLET, FILM COATED ORAL at 00:11

## 2021-08-10 RX ADMIN — OXYCODONE HYDROCHLORIDE 5 MG: 5 TABLET ORAL at 19:35

## 2021-08-10 RX ADMIN — ACETAMINOPHEN 650 MG: 325 TABLET, FILM COATED ORAL at 11:37

## 2021-08-10 RX ADMIN — POTASSIUM CHLORIDE 40 MEQ: 750 TABLET, EXTENDED RELEASE ORAL at 12:05

## 2021-08-10 RX ADMIN — MICAFUNGIN SODIUM 100 MG: 50 INJECTION, POWDER, LYOPHILIZED, FOR SOLUTION INTRAVENOUS at 11:46

## 2021-08-10 RX ADMIN — FLUCONAZOLE 400 MG: 200 TABLET ORAL at 07:46

## 2021-08-10 RX ADMIN — VANCOMYCIN HYDROCHLORIDE 125 MG: 125 CAPSULE ORAL at 07:46

## 2021-08-10 RX ADMIN — SODIUM CHLORIDE: 9 INJECTION, SOLUTION INTRAVENOUS at 04:29

## 2021-08-10 ASSESSMENT — ACTIVITIES OF DAILY LIVING (ADL)
ADLS_ACUITY_SCORE: 15
ADLS_ACUITY_SCORE: 15
DEPENDENT_IADLS:: INDEPENDENT
ADLS_ACUITY_SCORE: 15

## 2021-08-10 NOTE — PROGRESS NOTES
Critical Lab received; Positive BC .   BC collected on 8/8/21 positive for Yeast.  Pt on oral Diflucan 400 mg daily.Lea KRUEGER here and updated.

## 2021-08-10 NOTE — PLAN OF CARE
"Shift [id number]    VS: T-max 99.8. Pt received tylenol   Pain: pt reports no pain  Neuro: A&Ox4. Intermittently anxious.   Cardiac: Denies chest pain. HR in the 90's  Respiratory:  Regular rate and rhythm, no report of SOB  GI/Diet/Appetite: Pt denies abdominal pain. Tolerating diet. 1 BM on shift.   : nephrostomy tube in place, output adequate  LDA's: R PIV infusing NS @ 125 ml/hr  Skin: No new changes  Activity: SBA    /71 (BP Location: Right arm)   Pulse 80   Temp 98.9  F (37.2  C) (Oral)   Resp 18   Ht 1.575 m (5' 2\")   Wt 69.6 kg (153 lb 8 oz)   SpO2 96%   BMI 28.08 kg/m      "

## 2021-08-10 NOTE — PROGRESS NOTES
BRIEF UROLOGY NOTE    We spoke to infectious disease today regarding Ms. Barnes. Briefly, she is an 81 year-old female s/p Right PCNL, re-admitted on 8/9 with fevers and found to have 1/2 blood cultures + for candida glabrata (also consistent with intraoperative stone cultures). I spoke with ID this afternoon who feel the patient is actively fungemic (opposed to a contaminated culture specimen). She has only received 1 dose of Micafungin thus far.    In conversation with urology staff, Dr. Law as well as ID, we feel the patient is not safe to undergo second-look PCNL tomorrow (8/11) as she is currently fungemic. She would be more appropriate to undergo PCNL after a few more days of Micafungin, until her blood cultures are persistently negative (it can take 2-3 days for fungus to grow on culture media). ID would prefer to keep the patient on IV antifungals in the hospital and perform the procedure during this hospitalization if possible.    Urology will coordinate procedure re-scheduling with the OR and update our note once a new operative date has been established. This was communicated to the patient this afternoon.    Karlie Pretty MD  PGY-4 Urology  Pager [phone]

## 2021-08-10 NOTE — PROGRESS NOTES
[facility]    Medicine Progress Note - Hospitalist Service, Gold 6       Date of Admission:  8/8/2021    Assessment & Plan              Kelly Barnes is a 81 year old female with PMHx of HTN, HLD, anemia, DM2, recurrent pyelonephritis, R staghorn calculus, recently hospitalized 7/13-7/16 due to pyelonephritis and nonobstructive kidney stone s/p cytoscopy and ureteral stent placement 7/15 with subsequent PNT placement on 8/5, and hospitalized again from 7/23 - 7/27 with C Diff infection (on oral vancomycin currently), and GERSON, who presented with fevers and abdominal pain admitted on 8/8/2021 found to have fungemia.       # Fungemia   # Hx recurrent pyelonephritis s/p R PNT and ureteral stent placement  Recently hospitalized 7/13-7/16 with pyelonephritis and non-obstructive R kidney stone s/p cystoscopy and ureteral stent placement on 7/15, discharged on vantin, then then subsequently underwent percutaneous nephrolithotomy, ureteroscopy s/p R PNT on 8/5 with cultures from stone growing Candida glabrata complex. Presenting with fevers, leukocytosis and grossly positive UA. Lactic flat. CT A/P showed no evidence of abscess; kidney was unremarkable and ureteral stent was in place. Blood cultures returned positive for yeast.   -ID and urology consulted, appreciate recs   - Continue IV ceftriaxone 1g q24hrs  - Start IV micafungin 100 mg Q24Hrs  - Daily blood cultures, until cleared >48 hours  - TTE  - Ophthalmology consult   - Follow-up urine cultures, blood cultures   - Pain control, Tylenol PRN and oxycodone 5 mg q6h PRN  - continue tamsulosin 0.4 mg qAM  - Scheduled for repeat right PCNL on 8/11/21, will need to be rescheduled    # Recent C difficile infection  Dx on 7/23 C diff colitis, s/p Vancomycin for 10 days.   - ID following; appreciate recs:   - continue PO vancomycin 125 mg BID until antibiotics are discontinued    # DM2 - Hemoglobin A1c 7.4 on 7/13/21. PTA  glimepiride 2 mg BID.   - Hold Glimepiride   - sliding scale insulin TIDAC  - hypoglycemia protocol    # Overactive Bladder - continue PTA tolterodine ER 4 mg Daily   # HTN - continue PTA Losartan/HCTZ 50-12.5 Daily   # HLD - continue home Simvastatin 10 mg at bedtime. Holding PTA ASA       Diet: Regular Diet Adult    DVT Prophylaxis: Enoxaparin (Lovenox) SQ  Blanchard Catheter: Not present  Central Lines: None  Code Status: Full Code      Disposition Plan   Expected discharge: 3-4 days recommended to prior living arrangement once antibiotic plan established.     The patient's care was discussed with the Attending Physician, Dr. Saud Dockery, Bedside Nurse, Patient and ID and Urology Consultant.    Lea Hunt PA-C  Hospitalist Service, [address]  Securely message with the [employer]oc[employer] Web Console (learn more here)  Text page via [facility] Paging/Directory  Please see sign in/sign out for up to date coverage information    Clinically Significant Risk Factors Present on Admission                   ______________________________________________________________________    Interval History   Patient feeling better, not having to use tylenol for the last several hours. States she was febrile last night. Denies any CP, SOB, N/V, flank pain, dysuria, or hematuria. States she has been urinating frequently. Endorses RLQ abdominal pain which has been present since she was diagnosed with stone. Had 2 episodes of diarrhea yesterday.     Data reviewed today: I reviewed all medications, new labs and imaging results over the last 24 hours.    Physical Exam   Vital Signs: Temp: 98.3  F (36.8  C) Temp src: Axillary BP: 133/73 Pulse: 83   Resp: 16 SpO2: 98 % O2 Device: None (Room air)    Weight: 153 lbs 8 oz  GENERAL: Alert and oriented x 3. NAD. Pleasant and conversational   HEENT: AT/NC. Anicteric sclera.   CARDIOVASCULAR: RRR. S1, S2. No murmurs, rubs, or gallops.   RESPIRATORY: Effort  normal on RA. Clear to auscultation bilaterally, no rales, rhonchi or wheezes, no use of accessory muscles, no retractions, respirations unlabored   BACK: R PNT tube, draining clear yellow urine. No CVAT.   GI: Abdomen soft, mild tenderness in RLQ abdomen without rebound or guarding,  normoactive bowel sounds present  EXTREMITIES: No peripheral edema. No calf asymmetry, erythema, or tenderness.   NEUROLOGICAL:  CN II-XII grossly intact. Moving all extremities symmetrically.   SKIN: Intact. Warm and dry.  No jaundice.       Data   Recent Labs   Lab 08/10/21  0651 08/10/21  0533 08/10/21  0247 08/08/21 2024 08/08/21 2023 08/06/21  0607   WBC  --  12.1*  --  14.0*  --  16.5*   HGB  --  10.3*  --  12.4  --  10.9*   MCV  --  84  --  84  --  84   PLT  --  279  --  315  --  262   INR  --   --   --   --  0.99  --    NA  --  137  --   --  133 134   POTASSIUM  --  3.1*  --   --  3.4 4.2   CHLORIDE  --  107  --   --  101 105   CO2  --  23  --   --  25 22   BUN  --  7  --   --  14 23   CR  --  0.86  --   --  0.97 0.99   ANIONGAP  --  7  --   --  7 7   JAN  --  7.8*  --   --  8.8 7.9*   * 186* 189*  --  269* 289*   ALBUMIN  --  2.0*  --   --  2.7*  --    PROTTOTAL  --  5.9*  --   --  7.0  --    BILITOTAL  --  0.4  --   --  0.4  --    ALKPHOS  --  63  --   --  81  --    ALT  --  14  --   --  20  --    AST  --  12  --   --  11  --      No results found for this or any previous visit (from the past 24 hour(s)).  Medications       cefTRIAXone  1 g Intravenous Q24H     insulin aspart  1-6 Units Subcutaneous Q4H     losartan-hydrochlorothiazide  1 tablet Oral QAM     micafungin  100 mg Intravenous Q24H     simvastatin  10 mg Oral At Bedtime     tamsulosin  0.4 mg Oral QAM     tolterodine ER  4 mg Oral QAM     vancomycin  125 mg Oral BID

## 2021-08-10 NOTE — CONSULTS
Care Management Initial Consult    General Information  Assessment completed with: Patient,    Type of CM/SW Visit: Initial Assessment    Primary Care Provider verified and updated as needed: Yes   Readmission within the last 30 days:           Advance Care Planning:            Communication Assessment  Patient's communication style: spoken language (English or Bilingual)    Hearing Difficulty or Deaf: no   Wear Glasses or Blind: yes    Cognitive  Cognitive/Neuro/Behavioral: WDL                      Living Environment:   People in home: child(vanda), adult (Son)     Current living Arrangements: house      Able to return to prior arrangements: yes  Living Arrangement Comments: 2 level home. Able to live on main level    Family/Social Support:  Care provided by:  Self  Provides care for:  No one     Description of Support System:   Son       Current Resources:   Patient receiving home care services: No     Community Resources: None  Equipment currently used at home: none  Supplies currently used at home: None    Employment/Financial:  Employment Status:          Financial Concerns: No concerns identified           Lifestyle & Psychosocial Needs:  Social Determinants of Health     Tobacco Use: Low Risk      Smoking Tobacco Use: Never Smoker     Smokeless Tobacco Use: Never Used   Alcohol Use:      Frequency of Alcohol Consumption:      Average Number of Drinks:      Frequency of Binge Drinking:    Financial Resource Strain:      Difficulty of Paying Living Expenses:    Food Insecurity:      Worried About Running Out of Food in the Last Year:      Ran Out of Food in the Last Year:    Transportation Needs:      Lack of Transportation (Medical):      Lack of Transportation (Non-Medical):    Physical Activity:      Days of Exercise per Week:      Minutes of Exercise per Session:    Stress:      Feeling of Stress :    Social Connections:      Frequency of Communication with Friends and Family:      Frequency of Social  Gatherings with Friends and Family:      Attends Confucianist Services:      Active Member of Clubs or Organizations:      Attends Club or Organization Meetings:      Marital Status:    Intimate Partner Violence:      Fear of Current or Ex-Partner:      Emotionally Abused:      Physically Abused:      Sexually Abused:    Depression: Not at risk     PHQ-2 Score: 0   Housing Stability:      Unable to Pay for Housing in the Last Year:      Number of Places Lived in the Last Year:      Unstable Housing in the Last Year:        Functional Status:  Prior to admission patient needed assistance:   Dependent ADLs:: Independent  Dependent IADLs:: Independent         Additional Information:  Called patient to introduce self/role and complete initial assessment. Patient lives in a split level home with her son and is independent at baseline. She is able to live on the main floor. She denied having any DME or services. Explained potential need for home IV abx. Explained process and that [facility] will continue to follow IV abx plan. Sent request to [facility] to do an insurance benefit check.     Chelsie Baires, RNCC  [phone]

## 2021-08-10 NOTE — UTILIZATION REVIEW
Admission Status; Secondary Review Determination       Under the authority of the Utilization Management Committee, the utilization review process indicated a secondary review on the above patient. The review outcome is based on review of the medical records, discussions with staff, and applying clinical experience noted on the date of the review.     (x) Inpatient Status Appropriate - This patient's medical care is consistent with medical management for inpatient care and reasonable inpatient medical practice.     RATIONALE FOR DETERMINATION   Please refer to previous review, very complex patient with multiple comorbidities initially on observation clearly requiring ongoing hospital care for intravenous antibiotic, plan nephrolithotomy, advanced to inpatient.  The expected length of stay at the time of admission was more than 2 nights because of the severity of illness, intensity of service provided, and risk for adverse outcome. Inpatient admission is appropriate.     This document was produced using voice recognition software       The information on this document is developed by the utilization review team in order for the business office to ensure compliance. This only denotes the appropriateness of proper admission status and does not reflect the quality of care rendered.   The definitions of Inpatient Status and Observation Status used in making the determination above are those provided in the CMS Coverage Manual, Chapter 1 and Chapter 6, section 70.4.   Sincerely,   HUSSEIN BALDERAS MD   System Medical Director   Utilization Management   [facility].

## 2021-08-10 NOTE — PROGRESS NOTES
[facility]    HISTORY AND PHYSICAL - [facility]        Date of Admission:  8/8/2021    Assessment & Plan              Kelly Barnes is a 81 year old female admitted on 8/8/2021. She has a history of HTN, HLD, anemia, and T2DM, recurrent pyelonephritis, R staghorn calculus, C Diff infection (on oral vancomycin currently). Per chart, the patient was recently hospitalized 7/13-7/16 due to pyelonephritis and nonobstructive kidney stone s/p cytoscopy and ureteral stent placement 7/15. She was discharged home on oral antibiotics. She was again admitted 7/23-7/27 due to C diff infection and GERSON, started on oral vancomycin and discharged on prophylactic cefpodoxime until stent removal w/ Urology. She underwent Urology procedure on 8/5 w/ percutaneous nephrolithotomy, ureteroscopy, s/p R PNT 8/5. She was admitted 8/8 for fevers and abdominal pain. The patient is scheduled for repeat PCNL on 8/11. Stone culture grew Candida glabrata, so ID was consulted. The patient was transferred to medicine service given need for further antibiotic management and care prior to upcoming Urologic procedure.      # Fevers s/p percutaneous nephroscopy with laser treatment and stone removal with ureteroscopy and percutaneous nephrostomy on 8/5/21  # Hx recurrent pyelonephritis s/p R PNT and ureteral stent placement  Pt presented w/ low-grade fevers and chills x3 days. UA on admit w/ large LE, mod blood, >182 WBC's. Prior urine culture 7/15 grew E. Coli resistant to Unasyn and fluoroquinolones, sensitive to cephalosporins and Bactrim. The patient was taking Keflex prior to admit. On presentation, CT A/P showed no evidence of abscess; kidney was unremarkable and ureteral stent was in place. WBC elevated to 14, although improved from prior. Lactate WNL. Started on IV ceftriaxone upon admission. Previously discharged from admission 7/23-7/27 on prophylactic cefpodoxime until stent  removal per Urology. Blood and urine cultures 8/8 w/ NGTD.    Febrile to 101.4 this afternoon, repeat blood cultures drawn. Lactate 1.0. Continued leukocytosis (14) and elevated CRP to 100.    - s/p 1L IVF bolus 8/8, continue mIVF at 125 ml/hr  - Tylenol q4h PRN for fever, mild pain  - ID following; appreciate reccs  - Continue IV ceftriaxone 2g q24hrs while inpatient and transition back to cefpodoxime 100 mg BID PO when discharged  - Per ID reccs, start PO fluconazole 400 mg daily (8/9) to treat C glabrata isolated from renal stone culture 8/5  - Scheduled for repeat right PCNL on 8/11/21  - Per ID, continue all antibiotics thru 7/13 (2 days after urologic procedure); may need to extend coverage if further urologic procedures are planned w/in the next week  - Follow-up urine cultures 8/8  - Pain control   - Tylenol PRN   - oxycodone 5 mg q6h PRN  - continue tamsulosin 0.4 mg qAM  - Repeat CMP, CBC, CRP tomorrow AM    # Diarrhea 2/2 C difficile infection  Patient was admitted 7/23-7/27, Temp > 38 C, HR > 90 bpm and WBC > 12 and found to have C diff colitis. Started on oral vancomycin 7/23. Diarrhea volume is now decreased.  - ID following; appreciate recs:   - continue PO vancomycin 125 mg BID until antibiotics are discontinued    # DM2  - PTA anti-hyperglycemics HELD  - sliding scale insulin  - hypoglycemia protocol    # Overactive Bladder  - continue tolterodine ER 4 mg qAM    # HTN - continue PTA Losartan/HCTZ 50-12.5 qAM  # HLD - continue home statin  - Holding PTA ASA       Diet: Regular Diet Adult    DVT Prophylaxis: Pneumatic Compression Devices  Blanchard Catheter: Not present  Fluids: mIVF @125 ml/hr  Central Lines: None  Code Status: Full Code     Disposition Plan   Expected discharge: 2-5 days recommended to prior living arrangement once antibiotic plan established and Urology procedure completed, patient stable .     The patient's care was discussed with the Attending Physician, Dr. Maren Trevino .    Ivonne  MD Dian  [facility]  Securely message with the [employer] Web Console (learn more here)  Text page via [facility] Paging/Directory  Please see sign in/sign out for up to date coverage information    Clinically Significant Risk Factors Present on Admission                    ______________________________________________________________________    Interval History   Patient transferred to medicine service this evening for further evaluation and management.    The patient reports that she is still feeling somewhat feverish and has mild chills. She reports continued pressure-like pain sensation to the suprapubic area. She just had a BM which she reports was somewhat formed, not watery as with her previous C. Difficile diarrhea. No N/V. No back pain. Right PNT is draining clear yellow fluid appropriately.    Data reviewed today: I reviewed all medications, new labs and imaging results over the last 24 hours.    Physical Exam   Vital Signs: Temp: 98.7  F (37.1  C) Temp src: Oral BP: (!) 142/70 Pulse: 100   Resp: 18 SpO2: 99 % O2 Device: None (Room air)    Weight: 153 lbs 8 oz  General Appearance: Mildly uncomfortable, NAD  Respiratory: CTA bilaterally, no increased work of breathing  Cardiovascular: RRR in S1 and S2, radial pulses 2+ bilaterally, warm extremities  GI: soft, nontender to palpation throughout, nondistended  : R PNT appropriately draining clear yellow fluid  Musculoskeletal: Mild BLE swelling, but no pitting edema.  Skin: no rash  Neuro: A&Ox3, no focal neural deficits noted    Data   CT Abd/Pelvis 8/8/2021:  IMPRESSION:   1. Unchanged position of the right percutaneous nephrostomy tube.   2. Right nephroureteral 5Fr catheter terminates in the distal right ureter, short of the urinary bladder.  Stable mild right  hydroureteronephrosis.  2. Unchanged renal calculi in the inferior pole of the right kidney. No new renal calculi.

## 2021-08-10 NOTE — PROGRESS NOTES
[facility] PROGRESS NOTE     Patient:  Kelly Barnes   Date of birth 1940, Medical record number [id number]  Date of Visit:  08/10/2021  Date of Admission: 8/8/2021  Consult Requester:Maren Trevino MD          Assessment and Plan:   ID Problem List:   1.  Fungemia with yeast, isolated on BCx 8/8  2. Fevers, leukocytosis   3. Recurrent renal calculi, R staghorn calculus                -s/p urologic procedure 8/5, culture w/ C glabrata   4. H/o recurrent pyelonephritis               -S/p R PNT and ureteral stent placement  5. Recent C diff colitis 7/23, s/p treatment   6. Diabetes mellitus type II     RECOMMENDATION:  1. Draw repeat BCx today and tomorrow to follow for clearance of fungemia.   2. Discontinue fluconazole. Start Micafungin 100 mg q24 hrs for first line treatment of yeast fungemia, ID pending.   3. Continue IV Ceftriaxone 2g q24hrs and BID PO vancomycin at this time.    4. Consult ophthalmology to rule out yeast related eye infection/ophthalmitis.  5. Pursue TTE to rule out endocarditis given yeast in blood stream. Can hold on HELEN unless there are findings c/f endocarditis on TTE or if blood cultures do not clear quickly with adequate treatment.     ASSESSMENT:  Kelly Barnes is a 81 year old female with PMHx significant for HTN, DMII, recurrent pyelonephritis, R staghorn claculus, and h/o C difficile colitis. She had a recent hospitalization 7/13-16/2021 for pyelonephritis with nonobstructing kidney stones s/p cystoscopy and ureteral stent placement (7/15) with discharge home on PO abx; this was followed by repeat admission 7/23 for concern of infected ureteral stent and C diff colitis and discharged home on PO Vanco+PO Cefpodoxoime. She had a recent urologic procedure (percutaenous nephroscopy w/ laser, stone removal with ureteroscopy and PNT placement) 8/5. Presented to the ED 8/8 with reported low grade fevers, chills, suprapubic and RLQ pain.      Afebrile on  admission, tachycardic to 120s initially now improved. BP and O2 sats wnl. Continued leukocytosis to 14 (previously 16 on 8/6) and CRP elevated to 100. UA (midstream) with elevated leuk esterase, >185 WBC, WBC clumping and 25 squamous cells (possibly c/w contamination), UCx midstream and R PNT no growth. COVID swab negative. BCx 8/8 (1/2) positive for yeast on day2, ID pending. BCx 8/9 NGTD.   CXR unremarkable. CT AP w/o contrast with R non-obstructing nephrolithiasis which is stable, stable R hydro, unchanged R PNT.      Started on Ceftriaxone, continued on PO vanco. Spiked fever to 101.4 on 8/9 in the afternoon, BCx collected. New positive BCx as above resulted in AM 8/10, discontinued PO fluconazole and started on IV micafungin. Initiated work up for fungemia as above.     Thank you for this consult. ID will continue to follow.      Patient was discussed with Dr. Onofre.      Caroline Braga PA-C  Infectious Diseases  Pager #[phone]          Interim History and Events:     Is feeling okay this morning. States she felt terrible yesterday afternoon with fever, chills and rigors. Less RLQ/suprapubic abd pain today, has not taken tylenol in >4 hrs. No n/v/d. No cough. Discussed positive BCxs and questions around treatment and progression of course of fungemia with patient.          HPI:   Kelly Barnes is a 81 year old female with PMHx significant for HTN, DMII, recurrent pyelonephritis, R staghorn claculus, and h/o C difficile colitis. She had a recent hospitalization 7/13-16/2021 for pyelonephritis with non obstructing kidney stones s/p cystoscopy and ureteral stent placement (7/15) with discharge home on PO abx; this was followed by repeat admission 7/23 for concern of infected ureteral stent and C diff colitis and discharged home on PO Vanco+PO Cefpodoxoime. She had a recent urologic procedure (percutaenous nephroscopy w/ laser, stone removal with ureteroscopy and PNT placement) 8/5. Presented to the ED  "8/8 with reported low grade fevers, chills, suprapubic and RLQ pain.      States she felt \"feverish\" on discharge from urologic procedure 8/6 but did not have a measured fever. Felt \"unwell\" up until 8/8 when she presented to the ED after having a measured temp at home of 100.8. She endorsed continued suprapubic and RLQ cramping pain since urologic prodedure 8/5 and leaking of urine from around PNT tube insertion site. Denies URI sx, cough, SOB, chest pain, n/v, dysuria, frequency/urgency. Diarrhea resolved before previous hospital discharge, having normal BMs now. Notes that tylenol helps RLQ/suprapubic abdominal cramping for about 4 hrs at a time. Feels better since admission today although was unsure if she felt at her baseline and voiced some uneasiness in discharging home to early.      [place]. Has been taking Cefpodoxime 100 mg BID and PO Vanco 125 mg BID at home PTA.      Recent previous Cx hx:   -Calculus R kidney growth of Candida glabrata  -UCx 7/23 and 25 mixed UGF   -UCx 7/13 & 15 E coli  (R Amp/sulb, Amp, Cipro, levo, Susceptible to cephalosporins, macrobid, Zosyn, Bactrim).   -UCx 12/2020 E coli (same sens as above)         ROS:   -Focused 5 point ROS completed, pertinent positives and negatives listed above.    Physical Examination:  Temp: 99.2  F (37.3  C) (Tylenol) Temp src: Axillary BP: 133/73 Pulse: 83   Resp: 16 SpO2: 98 % O2 Device: None (Room air)      Vitals:    08/09/21 0202   Weight: 69.6 kg (153 lb 8 oz)       Physical Examination:  Constitutional: Pleasant female seen sitting up in bed, in NAD. Alert and interactive.   HEENT: NCAT, anicteric sclerae, conjunctiva clear. Moist mucous membranes.  Respiratory: Non-labored breathing on RA.  GI:  Abdomen is soft, non-distended, and nontender today.  Skin: Warm and dry. No rashes or lesions on exposed surfaces.  Musculoskeletal: Extremities grossly normal. LLE>RLE peripheral edema, stable at baseline per pt.  Neurologic: A &O " x3, speech normal, answering questions appropriately. Moves all extremities spontaneously. Grossly non-focal.  Neuropsychiatric: Mentation and affect normal/appropriate.    Medications:    cefTRIAXone  1 g Intravenous Q24H     enoxaparin ANTICOAGULANT  40 mg Subcutaneous Q24H     insulin aspart  1-6 Units Subcutaneous Q4H     losartan-hydrochlorothiazide  1 tablet Oral QAM     micafungin  100 mg Intravenous Q24H     simvastatin  10 mg Oral At Bedtime     tamsulosin  0.4 mg Oral QAM     tolterodine ER  4 mg Oral QAM     vancomycin  125 mg Oral BID       Infusions/Drips:      Laboratory Data:   No results found for: ACD4    Inflammatory Markers    Recent Labs   Lab Test 08/10/21  0533 08/09/21  0558 07/25/21  0839 12/21/20  0705 12/19/20  2307 03/02/16  0837 02/29/16  0828   SED  --   --   --   --  30 15 15   .0* 100.0* 130.0* 63.0* 140.0* 110.0* 36.0*       Metabolic Studies       Recent Labs   Lab Test 08/10/21  1308 08/10/21  0651 08/10/21  0533 08/10/21  0247 08/09/21  2209 08/09/21  1829 08/09/21  1711 08/08/21  2025 08/08/21  2023 08/06/21  0607 07/27/21  1413 07/27/21  0628 07/26/21  0754 07/25/21  1142 07/25/21  0839 07/15/21  1342 07/15/21  0839 07/13/21  0724 07/13/21  0721   NA  --   --  137  --   --   --   --   --  133 134  --  137 134  --  131*   < > 138   < > 133   POTASSIUM  --   --  3.1*  --   --   --   --   --  3.4 4.2 3.7 3.4 3.6   < > 3.0*   < > 4.2   < > 3.8   CHLORIDE  --   --  107  --   --   --   --   --  101 105  --  106 104  --  101   < > 107   < > 100   CO2  --   --  23  --   --   --   --   --  25 22  --  23 22  --  21   < > 23   < > 25   ANIONGAP  --   --  7  --   --   --   --   --  7 7  --  8 8  --  9   < > 8   < > 8   BUN  --   --  7  --   --   --   --   --  14 23  --  9 10  --  8   < > 16   < > 20   CR  --   --  0.86  --   --   --   --   --  0.97 0.99  --  1.00 1.01  --  0.93   < > 1.03   < > 1.14*   GFRESTIMATED  --   --  64  --   --   --   --   --  55* 54*  --  53* 53*  --  58*    < > 51*   < > 46*   * 177* 186* 189* 254* 214*  --   --  269* 289*  --  194* 213*  --  265*   < > 166*   < > 205*   A1C  --   --   --   --   --   --   --   --   --   --   --   --   --   --   --   --   --   --  7.4*   JAN  --   --  7.8*  --   --   --   --   --  8.8 7.9*  --  8.2* 8.5  --  8.0*   < > 8.5   < > 8.4*   MAG  --   --   --   --   --   --   --   --   --   --   --   --   --   --  1.8  --  2.4*   < >  --    LACT  --   --   --   --   --   --  1.0 1.1  --   --   --   --   --   --   --    < >  --   --   --     < > = values in this interval not displayed.       Hepatic Studies    Recent Labs   Lab Test 08/10/21  0533 08/08/21  2023 07/23/21  0208 07/13/21  0721 12/19/20  2307 10/07/20  0831   BILITOTAL 0.4 0.4 0.5 0.8 0.5 0.3   ALKPHOS 63 81 108 84 87 91   ALBUMIN 2.0* 2.7* 3.0* 3.5 3.1* 3.5   AST 12 11 12 18 15 15   ALT 14 20 26 20 19 20       Pancreatitis testing    Recent Labs   Lab Test 07/23/21 0208 07/13/21  0723 05/11/21  0724 10/07/20  0831 10/15/19  0920 10/25/18  0920 03/28/17  0941 10/27/16  0825   LIPASE 71* 76  --   --   --   --   --   --    TRIG  --   --  68 90 90 76 83 93       Hematology Studies      Recent Labs   Lab Test 08/10/21  0533 08/08/21  2024 08/06/21  0607 07/27/21  0628 07/26/21  0758 07/25/21  1614 12/21/20  0705 12/20/20  0654 12/19/20  2307 12/19/20  1110 07/03/19  1734 03/28/17  0941 03/04/16  0930   WBC 12.1* 14.0* 16.5* 8.1 10.0 14.0* 9.2   < > 17.7* 18.2* 11.3* 7.1 12.8*   ANEU  --   --   --   --   --   --  7.1  --  16.1* 16.0* 9.6* 4.9 10.8*   ALYM  --   --   --   --   --   --  1.1  --  0.5* 1.1 0.8 1.6 0.8   GINETTE  --   --   --   --   --   --  0.9  --  1.0 1.0 0.8 0.5 1.2   AEOS  --   --   --   --   --   --  0.1  --  0.0 0.0 0.1 0.1 0.0   HGB 10.3* 12.4 10.9* 11.1* 12.2 12.3 11.3*   < > 12.8 14.9 13.2 14.7 14.1   HCT 31.4* 37.6 32.9* 33.8* 37.4 36.9 34.8*   < > 38.7 44.5 39.3 43.8 40.7    315 262 426 457* 388 287   < > 330 364 288 347 332    < > = values in this  interval not displayed.       Arterial Blood Gas Testing    Recent Labs   Lab Test 08/08/21 2025   PH 7.47*        Urine Studies     Recent Labs   Lab Test 08/08/21 2028 07/25/21  1230 07/23/21  0140 07/13/21  0724 12/19/20  2242   URINEPH 5.5 6.0 5.0 5.5 5.5   NITRITE Negative Negative Negative Positive* Positive*   LEUKEST Large* Large* Large* Large* Large*   WBCU >182* >182* 50* >100* >182*       Microbiology:  Culture   Date Value Ref Range Status   08/09/2021 No growth after 12 hours  Preliminary   08/09/2021 No growth after 12 hours  Preliminary   08/08/2021 Positive on the 2nd day of incubation (A)  Preliminary   08/08/2021 Yeast (A)  Preliminary     Comment:     1 of 2 bottles   08/08/2021 No growth, less than 1 day  Preliminary   08/08/2021 No Growth  Final   08/08/2021 No Growth  Final   08/08/2021 No growth after 1 day  Preliminary   08/05/2021 1+ Candida glabrata complex (A)  Final     Comment:     Susceptibilities not routinely done   07/25/2021 10,000-50,000 CFU/mL Mixture of urogenital erum  Final   07/23/2021 No Growth  Final   07/23/2021 No Growth  Final   07/23/2021 <10,000 CFU/mL Urogenital erum  Final   07/15/2021 Culture in progress  Final   07/15/2021 10,000-50,000 CFU/mL Escherichia coli (A)  Final   07/15/2021 <1,000 CFU/mL Urogenital erum  Final   07/13/2021 No Growth  Final   07/13/2021 No Growth  Final   07/13/2021 >100,000 CFU/mL Escherichia coli (A)  Final       Last check of C difficile  C Difficile Toxin B by PCR   Date Value Ref Range Status   07/23/2021 Positive (A) Negative Final     Comment:     Detection of C. difficile nucleic acid in stools confirms the presence of these organisms in diarrheal patients but may not indicate that C. difficile are the etiologic agents of the diarrhea. Results from the Xpert C. difficile assay should be interpreted in conjunction with other laboratory and clinical data available to the clinician.       IMAGING     8/8 CT AP w/o contrast    IMPRESSION:   1. Unchanged position of the right percutaneous nephrostomy tube.   2. Right nephroureteral 5Fr catheter terminates in the distal right  ureter, short of the urinary bladder.  Stable mild right  hydroureteronephrosis.  2. Unchanged renal calculi in the inferior pole of the right kidney.  No new renal calculi.    ECHO:   8/10   Interpretation Summary  No vegetation visualized, however inferctive endocarditis cannot be excluded.  Left ventricular function is normal.The ejection fraction is 60-65%. Grade II  or moderate diastolic dysfunction.  Right ventricular function, chamber size, wall motion, and thickness are  normal.  Estimated RA pressure of 3 mmHg.     Compared to prior study on 5/3/2017 no significant change.

## 2021-08-10 NOTE — PLAN OF CARE
"-diagnostic tests and consults completed and resulted: Not met, patient admitted to inpatient.    -vital signs normal or at patient baseline: /64 (BP Location: Right arm)   Pulse 91   Temp 99.8  F (37.7  C) (Oral)   Resp 18   Ht 1.575 m (5' 2\")   Wt 69.6 kg (153 lb 8 oz)   SpO2 98%   BMI 28.08 kg/m      -tolerating oral intake to maintain hydration: met    -adequate pain control on oral analgesics: met, tylenol given with a result of decrease in pain    -tolerating oral antibiotics or has plans for home infusion setup: not met  "

## 2021-08-10 NOTE — PROGRESS NOTES
"SHIFT:0906-7921  The Pt was dry heaving and reported of nausea. She refused Zofran offered. No emesis. Lots of spit in emesis bag. Low grade temp. Temp max 99.5. . Potassium was replaced. Potassium lab was scheduled at 1600. Writer called lab re:potassium lab to be drawn since 1600. Per lab someone is on the way.  Blood sugar at 177 and 206.  Pt up in bed watching TV. She refused to walk the unit.  /75 (BP Location: Right arm)   Pulse 86   Temp 98.9  F (37.2  C) (Oral)   Resp 17   Ht 1.575 m (5' 2\")   Wt 69.6 kg (153 lb 8 oz)   SpO2 98%   BMI 28.08 kg/m      "

## 2021-08-10 NOTE — PROGRESS NOTES
"SHIFT:0966-7151  Continues to be alert and oriented x 4 and independent with cares. Pt Potassium was 3.1 today and replaced and recheck will be at 1600. Per Pt she had 1 loose BM today. She continues with iv Ceftriaxone and remains on oral Vanco. She was also started on iv Micafungin. Low grade temp 99 today, relieved with Tylenol. Refused Lovenox today. Ambulation encouraged.independent from room to bathroom.Appropriate with call light. No concerns today./75 (BP Location: Right arm)   Pulse 86   Temp 98.9  F (37.2  C) (Oral)   Resp 17   Ht 1.575 m (5' 2\")   Wt 69.6 kg (153 lb 8 oz)   SpO2 98%   BMI 28.08 kg/m        "

## 2021-08-11 ENCOUNTER — HOME INFUSION (PRE-WILLOW HOME INFUSION) (OUTPATIENT)
Dept: PHARMACY | Facility: CLINIC | Age: 81
End: 2021-08-11

## 2021-08-11 ENCOUNTER — ANESTHESIA (OUTPATIENT)
Dept: MEDSURG UNIT | Facility: CLINIC | Age: 81
End: 2021-08-11

## 2021-08-11 DIAGNOSIS — N20.0 KIDNEY STONE: Primary | ICD-10-CM

## 2021-08-11 LAB
ALBUMIN SERPL-MCNC: 2.2 G/DL (ref 3.4–5)
ALP SERPL-CCNC: 77 U/L (ref 40–150)
ALT SERPL W P-5'-P-CCNC: 17 U/L (ref 0–50)
ANION GAP SERPL CALCULATED.3IONS-SCNC: 11 MMOL/L (ref 3–14)
AST SERPL W P-5'-P-CCNC: 13 U/L (ref 0–45)
BACTERIA SPEC CULT: ABNORMAL
BACTERIA UR CULT: ABNORMAL
BILIRUB SERPL-MCNC: 0.3 MG/DL (ref 0.2–1.3)
BUN SERPL-MCNC: 7 MG/DL (ref 7–30)
CALCIUM SERPL-MCNC: 8.5 MG/DL (ref 8.5–10.1)
CHLORIDE BLD-SCNC: 101 MMOL/L (ref 94–109)
CO2 SERPL-SCNC: 21 MMOL/L (ref 20–32)
CREAT SERPL-MCNC: 0.93 MG/DL (ref 0.52–1.04)
ERYTHROCYTE [DISTWIDTH] IN BLOOD BY AUTOMATED COUNT: 14 % (ref 10–15)
GFR SERPL CREATININE-BSD FRML MDRD: 58 ML/MIN/1.73M2
GLUCOSE BLD-MCNC: 184 MG/DL (ref 70–99)
GLUCOSE BLDC GLUCOMTR-MCNC: 199 MG/DL (ref 70–99)
GLUCOSE BLDC GLUCOMTR-MCNC: 236 MG/DL (ref 70–99)
GLUCOSE BLDC GLUCOMTR-MCNC: 263 MG/DL (ref 70–99)
GLUCOSE BLDC GLUCOMTR-MCNC: 267 MG/DL (ref 70–99)
HCT VFR BLD AUTO: 34.5 % (ref 35–47)
HGB BLD-MCNC: 11.3 G/DL (ref 11.7–15.7)
MCH RBC QN AUTO: 27.7 PG (ref 26.5–33)
MCHC RBC AUTO-ENTMCNC: 32.8 G/DL (ref 31.5–36.5)
MCV RBC AUTO: 85 FL (ref 78–100)
PLATELET # BLD AUTO: 293 10E3/UL (ref 150–450)
POTASSIUM BLD-SCNC: 3.5 MMOL/L (ref 3.4–5.3)
PROT SERPL-MCNC: 6.7 G/DL (ref 6.8–8.8)
RBC # BLD AUTO: 4.08 10E6/UL (ref 3.8–5.2)
SODIUM SERPL-SCNC: 133 MMOL/L (ref 133–144)
WBC # BLD AUTO: 14.1 10E3/UL (ref 4–11)

## 2021-08-11 PROCEDURE — 87106 FUNGI IDENTIFICATION YEAST: CPT | Performed by: PHYSICIAN ASSISTANT

## 2021-08-11 PROCEDURE — 250N000013 HC RX MED GY IP 250 OP 250 PS 637: Performed by: PHYSICIAN ASSISTANT

## 2021-08-11 PROCEDURE — 99207 PR CDG-MDM COMPONENT: MEETS MODERATE - DOWN CODED: CPT | Performed by: STUDENT IN AN ORGANIZED HEALTH CARE EDUCATION/TRAINING PROGRAM

## 2021-08-11 PROCEDURE — 120N000002 HC R&B MED SURG/OB UMMC

## 2021-08-11 PROCEDURE — 99233 SBSQ HOSP IP/OBS HIGH 50: CPT | Mod: 24 | Performed by: INTERNAL MEDICINE

## 2021-08-11 PROCEDURE — 258N000003 HC RX IP 258 OP 636: Performed by: PHYSICIAN ASSISTANT

## 2021-08-11 PROCEDURE — 85027 COMPLETE CBC AUTOMATED: CPT | Performed by: PHYSICIAN ASSISTANT

## 2021-08-11 PROCEDURE — 99232 SBSQ HOSP IP/OBS MODERATE 35: CPT | Performed by: STUDENT IN AN ORGANIZED HEALTH CARE EDUCATION/TRAINING PROGRAM

## 2021-08-11 PROCEDURE — 99207 PR APP CREDIT; MD BILLING SHARED VISIT: CPT | Performed by: PHYSICIAN ASSISTANT

## 2021-08-11 PROCEDURE — 36415 COLL VENOUS BLD VENIPUNCTURE: CPT | Performed by: PHYSICIAN ASSISTANT

## 2021-08-11 PROCEDURE — 82040 ASSAY OF SERUM ALBUMIN: CPT | Performed by: PHYSICIAN ASSISTANT

## 2021-08-11 PROCEDURE — 250N000011 HC RX IP 250 OP 636: Performed by: PHYSICIAN ASSISTANT

## 2021-08-11 RX ORDER — CEFTRIAXONE 2 G/1
2 INJECTION, POWDER, FOR SOLUTION INTRAMUSCULAR; INTRAVENOUS EVERY 24 HOURS
Status: DISCONTINUED | OUTPATIENT
Start: 2021-08-11 | End: 2021-08-12

## 2021-08-11 RX ADMIN — LOSARTAN POTASSIUM AND HYDROCHLOROTHIAZIDE 1 TABLET: 50; 12.5 TABLET, FILM COATED ORAL at 08:34

## 2021-08-11 RX ADMIN — TOLTERODINE 4 MG: 4 CAPSULE, EXTENDED RELEASE ORAL at 08:34

## 2021-08-11 RX ADMIN — VANCOMYCIN HYDROCHLORIDE 125 MG: 125 CAPSULE ORAL at 08:34

## 2021-08-11 RX ADMIN — VANCOMYCIN HYDROCHLORIDE 125 MG: 125 CAPSULE ORAL at 19:50

## 2021-08-11 RX ADMIN — ACETAMINOPHEN 650 MG: 325 TABLET, FILM COATED ORAL at 06:54

## 2021-08-11 RX ADMIN — ACETAMINOPHEN 650 MG: 325 TABLET, FILM COATED ORAL at 15:32

## 2021-08-11 RX ADMIN — CEFTRIAXONE SODIUM 2 G: 2 INJECTION, POWDER, FOR SOLUTION INTRAMUSCULAR; INTRAVENOUS at 22:42

## 2021-08-11 RX ADMIN — TAMSULOSIN HYDROCHLORIDE 0.4 MG: 0.4 CAPSULE ORAL at 08:34

## 2021-08-11 RX ADMIN — SIMVASTATIN 10 MG: 10 TABLET, FILM COATED ORAL at 22:42

## 2021-08-11 RX ADMIN — ACETAMINOPHEN 650 MG: 325 TABLET, FILM COATED ORAL at 22:42

## 2021-08-11 RX ADMIN — MICAFUNGIN SODIUM 100 MG: 50 INJECTION, POWDER, LYOPHILIZED, FOR SOLUTION INTRAVENOUS at 11:04

## 2021-08-11 ASSESSMENT — ACTIVITIES OF DAILY LIVING (ADL)
ADLS_ACUITY_SCORE: 15

## 2021-08-11 NOTE — PROGRESS NOTES
"Urology  Progress Note    Feels better compared to yesterday, although has chills currently  Pain is controlled except for some abdominal pain  Denies N/V, tolerating PO intake    Exam  BP (!) 147/71 (BP Location: Right arm)   Pulse 90   Temp 99.7  F (37.6  C) (Oral)   Resp 20   Ht 1.575 m (5' 2\")   Wt 69.6 kg (153 lb 8 oz)   SpO2 99%   BMI 28.08 kg/m    No acute distress  Unlabored breathing  Abdomen soft, nontender, non distended,    R PNT w/ clear yellow urine    UOP voided x2 , UOP not measured  BM x2 stool    Labs  WBC 14,1  Hgb 11.3  Cr 0.93    Assessment/Plan     Kelly Barnes is a 81 year old female with PMHx of HTN, HLD, anemia, DM2, recurrent pyelonephritis, R staghorn calculus, recently hospitalized 7/13-7/16 due to pyelonephritis and nonobstructive kidney stone s/p cytoscopy and ureteral stent placement 7/15 with subsequent R PCNL 8/5, who presented with fevers and abdominal pain admitted on 8/8/2021 found to have fungemia. Originally scheduled for second look PCNL earlier this week but canceled due to fungemia. Rescheduled for this Friday. Patient continues to feel better, recent BCs have been negative.       -continue excellent cares per primary team  -urology has scheduled patient for surgery Friday  -surgery will need to be postponed again if patient clinically decompensated or cultures are positive within 48 hours of surgery    Seen and examined with the chief resident. Will discuss with Dr. Law.    Oumar Hamlin MD, PGY-2  Urology Resident     Contacting the Urology Team     Please use the following job codes to reach the Urology Team. Note that you must use an in house phone and that job codes cannot receive text pages.     On weekdays, dial [phone] (or star-star-star [phone] on the new [employer] telephones) then [phone] to reach the Adult Urology resident or PA on call    On weekdays, dial [phone] (or star-star-star [phone] on the new [employer] telephones) then [phone] to reach the Pediatric Urology " resident    On weeknights and weekends, dial [phone] (or star-star-star [phone] on the new [employer] telephones) then [phone] to reach the Urology resident on call (for both Adult and Pediatrics)

## 2021-08-11 NOTE — PLAN OF CARE
"Shift: 1900-0730    Neuro: A&Ox4, able to make needs known. Slightly forgetful this shift. Low grade fever, improved with tylenol  Cardiac: WNL, denies chest pain.   Respiratory: WNL, denies SOB  GI/: reports abdominal discomfort, given oxy 1x for abdominal pain with relief  Diet/appetite: regular diet, ate 50% of dinner  Activity: up ad linda  Pain: reports abdominal pain, improved with oxycodone  Skin: no new deficits noted  Lines: PIV R forearm SL    Vitals: /78 (BP Location: Right arm)   Pulse 85   Temp (!) 100.5  F (38.1  C) (Oral)   Resp 17   Ht 1.575 m (5' 2\")   Wt 69.6 kg (153 lb 8 oz)   SpO2 95%   BMI 28.08 kg/m         "

## 2021-08-11 NOTE — PROVIDER NOTIFICATION
Paged gold cross cover RE: OBS 2, PALOMO Barnes I see in the medicine progress note for pt to receive insulin TID, but it is still ordered as q4h. Pt is requesting for vitals and BG check to be skipped at 2AM. Thanks, Nadia [phone]

## 2021-08-11 NOTE — PROGRESS NOTES
"Shift: 3268-1114     Neuro: A&Ox4, able to make needs known. . Low grade fever Temp max 100.8 today. Tylenol administered with relief.  Cardiac: WNL, denies chest pain.   Respiratory: WNL, denies SOB. Ambulated the unit twiice today. Refused Lovenox. Sitting up in bed most of the day.  GI/: No report of abdominal discomfort this shift.  Diet/appetite: regular diet, ate 50% of  Breakfast and lunch  Activity: up ad linda. Ambulated the unit with 2 laps twice today.Denies sob and MARTINEZ. Remains on RA  Pain: Denies  Skin: no new deficits noted  Lines: PIV R forearm SL, but continues with iv Micafungin every 24 hours.   Vitals: BP (!) 143/74 (BP Location: Right arm)   Pulse 88   Temp 98.5  F (36.9  C) (Oral)   Resp 20   Ht 1.575 m (5' 2\")   Wt 69.6 kg (153 lb 8 oz)   SpO2 99%   BMI 28.08 kg/m      "

## 2021-08-11 NOTE — PROGRESS NOTES
Therapy: IV ABX   Insurance: [employer] MEDICARE    Pt has a Medicare replacement plan (which follows Medicare guidelines), which does not cover IV ABX in the home. (Pt would have coverage for short term TCU or IC). Below is what pt would be responsible for if pt wanted to go w [facility]      Pt would have to self-pay for the drug dispensed & per-kena (daily)    If not homebound, nursing would also be self-pay ($90.00 per visit)    Cost for Ceftriaxone 2gm Q24 is $32.73 per day for drug and supplies    Please contact Intake with any questions, [phone] or In Basket pool, [facility] ([phone]).

## 2021-08-11 NOTE — PROGRESS NOTES
[facility]    Medicine Progress Note - Hospitalist Service, Gold 6       Date of Admission:  8/8/2021    Assessment & Plan            Kelly Barnes is a 81 year old female with PMHx of HTN, HLD, anemia, DM2, recurrent pyelonephritis, R staghorn calculus, recently hospitalized 7/13-7/16 due to pyelonephritis and nonobstructive kidney stone s/p cytoscopy and ureteral stent placement 7/15 with subsequent PNT placement on 8/5, and hospitalized again from 7/23 - 7/27 with C Diff infection (on oral vancomycin currently), and GERSON, who presented with fevers and abdominal pain admitted on 8/8/2021 found to have fungemia.       # Fungemia   # Hx recurrent pyelonephritis s/p R PNT and ureteral stent placement  Recently hospitalized 7/13-7/16 with pyelonephritis and non-obstructive R kidney stone s/p cystoscopy and ureteral stent placement on 7/15, discharged on vantin, then then subsequently underwent percutaneous nephrolithotomy, ureteroscopy s/p R PNT on 8/5 with cultures from stone growing Candida glabrata complex. Presenting with fevers, leukocytosis and grossly positive UA. Lactic flat. CT A/P showed no evidence of abscess; kidney was unremarkable and ureteral stent was in place. 1/2 Blood cultures returned positive for yeast.   - ID and urology consulted, appreciate recs   - Continue IV ceftriaxone 2g q24hrs  - Continue IV micafungin 100 mg Q24Hrs  - Daily blood cultures, until cleared >48 hours  - TTE without any vegetations   - Ophthalmology consult   - Follow-up urine cultures, repeat blood cultures and sensitivities    - Pain control, Tylenol PRN and oxycodone 5 mg q6h PRN  - continue tamsulosin 0.4 mg qAM  - Urology plans for PCNL this hospitalization, once blood cultures cleared    # Recent C difficile infection  Dx on 7/23 C diff colitis, s/p Vancomycin for 10 days.   - ID following; appreciate recs:   - continue PO vancomycin 125 mg BID until antibiotics are  discontinued    # DM2 - Hemoglobin A1c 7.4 on 7/13/21. PTA glimepiride 2 mg BID.   - Hold Glimepiride   - sliding scale insulin TIDAC  - hypoglycemia protocol    # Overactive Bladder - continue PTA tolterodine ER 4 mg Daily   # HTN - continue PTA Losartan/HCTZ 50-12.5 Daily   # HLD - continue home Simvastatin 10 mg at bedtime. Holding PTA ASA         Diet: Regular Diet Adult    DVT Prophylaxis: Enoxaparin (Lovenox) SQ  Blanchard Catheter: Not present  Central Lines: None  Code Status: Full Code      Disposition Plan   Expected discharge: 08/16/2021 recommended to prior living arrangement once adequate pain management/ tolerating PO medications and antibiotic plan established.     The patient's care was discussed with the Attending Physician, Dr. Saud Dockery, Bedside Nurse, Care Coordinator/ and Patient.    Lea Hunt PA-C  Hospitalist Service, [address]  Securely message with the Vocera Web Console (learn more here)  Text page via [facility] Paging/Directory  Please see sign in/sign out for up to date coverage information    Clinically Significant Risk Factors Present on Admission               ______________________________________________________________________    Interval History   Reported an episode of N/V yesterday evening and abdominal pain which improved after a dose of oxycodone. Febrile to 100.5F this morning. Denies any CP, SOB, urinary symptoms, or current abdominal. Feels constipated, though had 2 bowel movements overnight.     Data reviewed today: I reviewed all medications, new labs and imaging results over the last 24 hours.    Physical Exam   Vital Signs: Temp: 98.5  F (36.9  C) Temp src: Oral BP: (!) 143/74 Pulse: 88   Resp: 20 SpO2: 99 % O2 Device: None (Room air)    Weight: 153 lbs 8 oz  GENERAL: Alert and awake. NAD, sitting in bed eating breakfast. Pleasant and conversational.   HEENT: AT/NC. Anicteric sclera. Mucous membranes  moist   CARDIOVASCULAR: RRR. S1, S2. No murmurs, rubs, or gallops.   RESPIRATORY: Effort normal on RA. Clear to auscultation bilaterally, no rales, rhonchi or wheezes,   GI: Abdomen soft, non-tender abdomen without rebound or guarding, normoactive bowel sounds present  Back: R PNT in place, draining clear yellow urine.   EXTREMITIES: Non-pitting edema bilaterally.  NEUROLOGICAL:  CN II-XII grossly intact. Moving all extremities symmetrically.   SKIN: Intact. Warm and dry.  No jaundice.     Data   Recent Labs   Lab 08/11/21  0839 08/11/21  0534 08/10/21  2218 08/10/21  1751 08/10/21  0533 08/08/21 2024 08/08/21 2023 08/06/21  0607   WBC  --  14.1*  --   --  12.1* 14.0*  --   --    HGB  --  11.3*  --   --  10.3* 12.4  --   --    MCV  --  85  --   --  84 84  --   --    PLT  --  293  --   --  279 315  --   --    INR  --   --   --   --   --   --  0.99  --    NA  --  133  --   --  137  --  133  --    POTASSIUM  --  3.5  --  3.6 3.1*  --  3.4   < >   CHLORIDE  --  101  --   --  107  --  101  --    CO2  --  21  --   --  23  --  25  --    BUN  --  7  --   --  7  --  14  --    CR  --  0.93  --   --  0.86  --  0.97  --    ANIONGAP  --  11  --   --  7  --  7  --    JAN  --  8.5  --   --  7.8*  --  8.8  --    * 184* 238*  --  186*  --  269*   < >   ALBUMIN  --  2.2*  --   --  2.0*  --  2.7*   < >   PROTTOTAL  --  6.7*  --   --  5.9*  --  7.0   < >   BILITOTAL  --  0.3  --   --  0.4  --  0.4   < >   ALKPHOS  --  77  --   --  63  --  81   < >   ALT  --  17  --   --  14  --  20   < >   AST  --  13  --   --  12  --  11   < >    < > = values in this interval not displayed.     No results found for this or any previous visit (from the past 24 hour(s)).  Medications       cefTRIAXone  2 g Intravenous Q24H     enoxaparin ANTICOAGULANT  40 mg Subcutaneous Q24H     insulin aspart  1-7 Units Subcutaneous TID AC     insulin aspart  1-5 Units Subcutaneous At Bedtime     losartan-hydrochlorothiazide  1 tablet Oral QAM     micafungin   100 mg Intravenous Q24H     simvastatin  10 mg Oral At Bedtime     tamsulosin  0.4 mg Oral QAM     tolterodine ER  4 mg Oral QAM     vancomycin  125 mg Oral BID

## 2021-08-11 NOTE — CONSULTS
OPHTHALMOLOGY CONSULT NOTE  08/11/21    Patient: Kelly Barnes  Consulted by: Lea Hunt PA-C  Reason for Consult: Fungemia, rule out eye involvement    HISTORY OF PRESENTING ILLNESS:     Kelly Barnes is a 81 year old female with history of HTN, HLD, anemia, T2DM, and recurrent pyelonephritis, who is currently hospitalized with c. Diff infection and darrell, recently found to have fungemia with blood cultures positive for yeast. Ophthalmology was consulted to rule out eye involvement.     Kelly has not noticed any changes in her vision lately. No flashes of light, new floaters, diplopia, eye pain, eye redness, eye discharge, sudden losses of vision, headaches, or dark curtains in the vision. No other concerns with her eyes.     Review of systems were otherwise negative except for that which has been stated above.      OCULAR/MEDICAL/SURGICAL HISTORIES:     Past Ocular History:  - S/p cataract surgery in both eyes 2016. States she had myopic correction with this so that she could read up close without her glasses.   - States she possibly has early macular degeneration, but is not sure exactly.    - History of T2DM, was told that she does not have problems in the eyes associated with this.     Last eye exam: Within 1 year (States she has upcoming appointment with eye doctor on 8/16/21)  Prior eye surgery/laser: No lasers. Only cataract surgery.   Contact lens wear: None  Glasses: Yes  Eyedrops: None    Family History:  - No glaucoma.   - Aunt with AMD when she was over age 100.     Social History:  - Lives in [facility].   - Does not smoke cigarettes.     Past Medical History:   Diagnosis Date     Acute kidney injury (H) 12/19/2020     Allergic rhinitis, cause unspecified      Anemia      Aortic stenosis 02/29/2016    With new murmur noted 2/2016, normal echo, repeat echo 2/2017.     Aortic valve sclerosis      Atypical chest pain 07/24/2015     cuboid     left foot     History of Clostridium difficile  colitis      Hyperlipidemia LDL goal <100 11/01/2012     Hypertension goal BP (blood pressure) < 140/90      Musculoskeletal chest pain 11/25/2016     Obesity, Class I, BMI 30-34.9 11/11/2015     Pneumonia 03/2016     Pyelonephritis      Sepsis, due to unspecified organism, unspecified whether acute organ dysfunction present (H) 12/19/2020     Type 2 diabetes, HbA1c goal < 7% (H)        Past Surgical History:   Procedure Laterality Date     CATARACT EXTRACTION       CYSTOSCOPY, RETROGRADES, INSERT STENT URETER(S), COMBINED Right 07/15/2021    Procedure: CYSTOURETEROSCOPY, WITH RETROGRADE PYELOGRAM,  AND STENT INSERTION RIGHT;  Surgeon: Kirk Law MD;  Location: [facility] OR     LASER HOLMIUM NEPHROLITHOTOMY VIA PERCUTANEOUS NEPHROSTOMY Right 8/5/2021    Procedure: NEPHROLITHOTOMY, PERCUTANEOUS, USING HOLMIUM LASER RIGHT;  Surgeon: Kirk Law MD;  Location: [facility] OR       EXAMINATION:     Base Eye Exam     Visual Acuity       Right Left    Near sc 20/20 20/30-2          Tonometry (Tonopen, 3:19 PM)       Right Left    Pressure 15 16          Pupils       Shape React APD    Right Round Brisk None    Left Round Brisk None          Visual Fields       Left Right     Full Full          Extraocular Movement       Right Left     Full Full          Neuro/Psych     Oriented x3: Yes          Dilation     Both eyes: 1.0% Mydriacyl, 2.5% Js Synephrine @ 3:20 PM            Slit Lamp and Fundus Exam     External Exam       Right Left    External Normal Normal          Slit Lamp Exam       Right Left    Lids/Lashes MGD MGD    Conjunctiva/Sclera White and quiet White and quiet    Cornea Clear Clear    Anterior Chamber Deep and quiet Deep and quiet    Iris Round and reactive Round and reactive    Lens PCIOL PCIOL    Vitreous Normal, no cell, flare, or haze Normal, no cell, flare, or haze          Fundus Exam       Right Left    Disc Normal Normal    C/D Ratio 0.2 0.2    Macula Pigmentary changes. No hemorrhages or  lesions.  Pigmentary changes. No hemorrhages or lesions.     Vessels Normal Normal    Periphery Normal, blonde fundus Normal, blonde fundus                Labs/Studies/Imaging Performed:  None     ASSESSMENT/PLAN:     Kelly Barnes is a 81 year old female who presents with:    # Fungemia without evidence of eye involvement   #T2DM without evidence of diabetic retinopathy  Patient without any recent vision changes, flashes, or floaters. Visual acuity of 20/20 in the right eye, 20/30 in the left eye. Exam shows no vitreitis or chorioretinitis, no evidence of endophthalmitis or fungal eye involvement. No evidence of diabetic retinopathy on exam today, recommend follow-up with outside ophthalmologist as patient has established ophthalmology care.   Recommendations:  - Please instruct patient to follow-up with home ophthalmologist or ophthalmology at the [facility] within 1 month for a repeat eye exam. If patient prefers to follow-up here, please contact ophthalmology prior to discharge so we can help arrange this.  - Recommend continued close glucose monitoring with PCP.     It is our pleasure to participate in this patient's care and treatment. Ophthalmology will sign off. Please contact us with any further questions or concerns.    Patient discussed with Dr. Castillo.      Liang Morillo MD  Ophthalmology Resident, PGY-2  Pager: [phone]    Teaching Statement  I did not examine the patient, but was available to see the patient if needed. I have discussed the case with the resident and the assessment and plan as documented seems reasonable to me.    Yane Jackson MD  Comprehensive Ophthalmology & Ocular Pathology  Department of Ophthalmology and Visual Neurosciences  [email or web address]  Pager [phone]

## 2021-08-11 NOTE — PROGRESS NOTES
Care Management Follow Up    Length of Stay (days): 2    Expected Discharge Date: 08/16/2021     Concerns to be Addressed: discharge planning     Patient plan of care discussed at interdisciplinary rounds: Yes    Additional Information:  Per [facility], patient does not have IV abx coverage with her [employer] Medicare plan, she is self-pay. Cost for Ceftriaxone 2gm Q24 is $32.73 per day for drug and supplies, nursing is only covered if she is medically homebound if not [facility] charges $90.00. They were unable to obtain the co-pay amount for the Myca since it requires an auth.     Antibiotic plan is still TBD.     Chelsie Baires RNCC

## 2021-08-11 NOTE — PROGRESS NOTES
[facility] PROGRESS NOTE     Patient:  Kelly Barnes   Date of birth 1940, Medical record number [id number]  Date of Visit:  08/11/2021  Date of Admission: 8/8/2021  Consult Requester:Maren Trevino MD          Assessment and Plan:   ID Problem List:   1.  Fungemia with yeast, isolated on BCx 8/8  2. Fevers, leukocytosis   3. Recurrent renal calculi, R staghorn calculus                -s/p urologic procedure 8/5, culture w/ C glabrata   4. H/o recurrent pyelonephritis               -S/p R PNT and ureteral stent placement  5. Recent C diff colitis 7/23, s/p treatment   6. Diabetes mellitus type II     RECOMMENDATION:    1. Continue Micafungin 100 mg q24 hrs for first line treatment of Candida glabrata fungemia.  2. Okay to discontinue IV Ceftriaxone and switch back to PO Cefpodoxime 100 mg BID for ppx while awaiting urology procedure.   3. Consult ophthalmology to rule out yeast related eye infection/ophthalmitis.  4. Can hold on HELEN at this time while blood cultures pending, may need to pursue if continued positivity.  5. Would prefer that urologic procedure be completed while in the hospital on IV micafungin so patient would have coverage for any further mobilization of yeast into blood stream during procedure (presumed point of entry for current fungemia). Discussed this with urology, hoping for 8/13 reschedule.   6. Continue PO Vancomycin BID for C diff ppx while on antibiotics.   7. HOLD on any central line placement. We have not yet established clearance of BCx and there is a possibility for PO treatment pending senses.     ASSESSMENT:  Kelly Barnes is a 81 year old female with PMHx significant for HTN, DMII, recurrent pyelonephritis, R staghorn claculus, and h/o C difficile colitis. She had a recent hospitalization 7/13-16/2021 for pyelonephritis with nonobstructing kidney stones s/p cystoscopy and ureteral stent placement (7/15) with discharge home on PO abx; this was  followed by repeat admission 7/23 for concern of infected ureteral stent and C diff colitis and discharged home on PO Vanco+PO Cefpodoxoime. She had a recent urologic procedure (percutaenous nephroscopy w/ laser, stone removal with ureteroscopy and PNT placement) 8/5. Presented to the ED 8/8 with reported low grade fevers, chills, suprapubic and RLQ pain.      Afebrile on admission, tachycardic to 120s initially now improved. BP and O2 sats wnl. Continued leukocytosis to 14 (previously 16 on 8/6) and CRP elevated to 100. UA (midstream) with elevated leuk esterase, >185 WBC, WBC clumping and 25 squamous cells (possibly c/w contamination), UCx midstream and R PNT no growth. COVID swab negative. BCx 8/8 (1/2) positive for Candida glabrata on day2. BCx 8/9-10 NGTD.   CXR unremarkable. CT AP w/o contrast with R non-obstructing nephrolithiasis which is stable, stable R hydro, unchanged R PNT.      Started on Ceftriaxone, continued on PO vanco. Spiked fever to 101.4 on 8/9 in the afternoon, BCx collected. New positive BCx as above resulted in AM 8/10, discontinued PO fluconazole and started on IV micafungin. Initiated work up for fungemia as above. Echo without vegetations on heart valves, but cannot r/o endocarditis. Awaiting ophtho eval and BCx clearance.     Thank you for this consult. ID will continue to follow.      Patient was discussed with Dr. Onofre.      Caroline Braga PA-C  Infectious Diseases  Pager #[phone]          Interim History and Events:     Feeling well today. Walking in halls a bit. Reading her book which she did not feel like doing the past few days due to not feeling well. Awaiting urology procedure. Denies diarrhea or other new sx.         HPI:   Kelly Barnes is a 81 year old female with PMHx significant for HTN, DMII, recurrent pyelonephritis, R staghorn claculus, and h/o C difficile colitis. She had a recent hospitalization 7/13-16/2021 for pyelonephritis with non obstructing kidney  "stones s/p cystoscopy and ureteral stent placement (7/15) with discharge home on PO abx; this was followed by repeat admission 7/23 for concern of infected ureteral stent and C diff colitis and discharged home on PO Vanco+PO Cefpodoxoime. She had a recent urologic procedure (percutaenous nephroscopy w/ laser, stone removal with ureteroscopy and PNT placement) 8/5. Presented to the ED 8/8 with reported low grade fevers, chills, suprapubic and RLQ pain.      States she felt \"feverish\" on discharge from urologic procedure 8/6 but did not have a measured fever. Felt \"unwell\" up until 8/8 when she presented to the ED after having a measured temp at home of 100.8. She endorsed continued suprapubic and RLQ cramping pain since urologic prodedure 8/5 and leaking of urine from around PNT tube insertion site. Denies URI sx, cough, SOB, chest pain, n/v, dysuria, frequency/urgency. Diarrhea resolved before previous hospital discharge, having normal BMs now. Notes that tylenol helps RLQ/suprapubic abdominal cramping for about 4 hrs at a time. Feels better since admission today although was unsure if she felt at her baseline and voiced some uneasiness in discharging home to early.      [place]. Has been taking Cefpodoxime 100 mg BID and PO Vanco 125 mg BID at home PTA.      Recent previous Cx hx:   -Calculus R kidney growth of Candida glabrata  -UCx 7/23 and 25 mixed UGF   -UCx 7/13 & 15 E coli  (R Amp/sulb, Amp, Cipro, levo, Susceptible to cephalosporins, macrobid, Zosyn, Bactrim).   -UCx 12/2020 E coli (same sens as above)         ROS:   -Focused 5 point ROS completed, pertinent positives and negatives listed above.    Physical Examination:  Temp: 99.7  F (37.6  C) Temp src: Oral BP: (!) 147/71 Pulse: 90   Resp: 20 SpO2: 99 % O2 Device: None (Room air)      Vitals:    08/09/21 0202   Weight: 69.6 kg (153 lb 8 oz)       Physical Examination:  Constitutional: Pleasant female seen sitting up in bed, in NAD. Alert " and interactive. Did ambulate in room without assistance.  HEENT: NCAT, anicteric sclerae, conjunctiva clear. Moist mucous membranes.  Respiratory: Non-labored breathing on RA.  GI:  Abdomen is soft, non-distended.  Skin: Warm and dry. No rashes or lesions on exposed surfaces.  Musculoskeletal: Extremities grossly normal. LLE>RLE peripheral edema, stable at baseline per pt.  Neurologic: A &O x3, speech normal, answering questions appropriately. Moves all extremities spontaneously. Grossly non-focal.  Neuropsychiatric: Mentation and affect normal/appropriate.  VAD: PIV    Medications:    cefTRIAXone  2 g Intravenous Q24H     enoxaparin ANTICOAGULANT  40 mg Subcutaneous Q24H     insulin aspart  1-7 Units Subcutaneous TID AC     insulin aspart  1-5 Units Subcutaneous At Bedtime     losartan-hydrochlorothiazide  1 tablet Oral QAM     micafungin  100 mg Intravenous Q24H     simvastatin  10 mg Oral At Bedtime     tamsulosin  0.4 mg Oral QAM     tolterodine ER  4 mg Oral QAM     vancomycin  125 mg Oral BID       Infusions/Drips:      Laboratory Data:   No results found for: ACD4    Inflammatory Markers    Recent Labs   Lab Test 08/10/21  0533 08/09/21  0558 07/25/21  0839 12/21/20  0705 12/19/20  2307 03/02/16  0837 02/29/16  0828   SED  --   --   --   --  30 15 15   .0* 100.0* 130.0* 63.0* 140.0* 110.0* 36.0*       Metabolic Studies       Recent Labs   Lab Test 08/11/21  1317 08/11/21  0839 08/11/21  0534 08/10/21  2218 08/10/21  2205 08/10/21  1809 08/10/21  1751 08/10/21  0533 08/09/21  1711 08/08/21  2025 08/08/21  2023 08/06/21  0607 07/27/21  1413 07/27/21  0628 07/26/21  0754 07/25/21  1142 07/25/21  0839 07/15/21  1342 07/15/21  0839 07/13/21  0724 07/13/21  0721   NA  --   --  133  --   --   --   --  137  --   --  133 134  --  137 134  --  131*   < > 138   < > 133   POTASSIUM  --   --  3.5  --   --   --  3.6 3.1*  --   --  3.4 4.2 3.7 3.4 3.6   < > 3.0*   < > 4.2   < > 3.8   CHLORIDE  --   --  101  --   --    --   --  107  --   --  101 105  --  106 104  --  101   < > 107   < > 100   CO2  --   --  21  --   --   --   --  23  --   --  25 22  --  23 22  --  21   < > 23   < > 25   ANIONGAP  --   --  11  --   --   --   --  7  --   --  7 7  --  8 8  --  9   < > 8   < > 8   BUN  --   --  7  --   --   --   --  7  --   --  14 23  --  9 10  --  8   < > 16   < > 20   CR  --   --  0.93  --   --   --   --  0.86  --   --  0.97 0.99  --  1.00 1.01  --  0.93   < > 1.03   < > 1.14*   GFRESTIMATED  --   --  58*  --   --   --   --  64  --   --  55* 54*  --  53* 53*  --  58*   < > 51*   < > 46*   * 199* 184* 238* 252* 218*  --  186*  --   --  269* 289*  --  194* 213*  --  265*   < > 166*   < > 205*   A1C  --   --   --   --   --   --   --   --   --   --   --   --   --   --   --   --   --   --   --   --  7.4*   JAN  --   --  8.5  --   --   --   --  7.8*  --   --  8.8 7.9*  --  8.2* 8.5  --  8.0*   < > 8.5   < > 8.4*   MAG  --   --   --   --   --   --   --   --   --   --   --   --   --   --   --   --  1.8  --  2.4*   < >  --    LACT  --   --   --   --   --   --   --   --  1.0 1.1  --   --   --   --   --   --   --    < >  --   --   --     < > = values in this interval not displayed.       Hepatic Studies    Recent Labs   Lab Test 08/11/21  0534 08/10/21  0533 08/08/21 2023 07/23/21 0208 07/13/21 0721 12/19/20  2307   BILITOTAL 0.3 0.4 0.4 0.5 0.8 0.5   ALKPHOS 77 63 81 108 84 87   ALBUMIN 2.2* 2.0* 2.7* 3.0* 3.5 3.1*   AST 13 12 11 12 18 15   ALT 17 14 20 26 20 19       Pancreatitis testing    Recent Labs   Lab Test 07/23/21  0208 07/13/21  0723 05/11/21  0724 10/07/20  0831 10/15/19  0920 10/25/18  0920 03/28/17  0941 10/27/16  0825   LIPASE 71* 76  --   --   --   --   --   --    TRIG  --   --  68 90 90 76 83 93       Hematology Studies      Recent Labs   Lab Test 08/11/21  0534 08/10/21  0533 08/08/21  2024 08/06/21  0607 07/27/21  0628 07/26/21  0758 12/21/20  0705 12/20/20  0654 12/19/20  2307 12/19/20  1110 07/03/19  1734  03/28/17  0941 03/04/16  0930   WBC 14.1* 12.1* 14.0* 16.5* 8.1 10.0 9.2   < > 17.7* 18.2* 11.3* 7.1 12.8*   ANEU  --   --   --   --   --   --  7.1  --  16.1* 16.0* 9.6* 4.9 10.8*   ALYM  --   --   --   --   --   --  1.1  --  0.5* 1.1 0.8 1.6 0.8   GINETTE  --   --   --   --   --   --  0.9  --  1.0 1.0 0.8 0.5 1.2   AEOS  --   --   --   --   --   --  0.1  --  0.0 0.0 0.1 0.1 0.0   HGB 11.3* 10.3* 12.4 10.9* 11.1* 12.2 11.3*   < > 12.8 14.9 13.2 14.7 14.1   HCT 34.5* 31.4* 37.6 32.9* 33.8* 37.4 34.8*   < > 38.7 44.5 39.3 43.8 40.7    279 315 262 426 457* 287   < > 330 364 288 347 332    < > = values in this interval not displayed.       Arterial Blood Gas Testing    Recent Labs   Lab Test 08/08/21 2025   PH 7.47*        Urine Studies     Recent Labs   Lab Test 08/08/21 2028 07/25/21  1230 07/23/21  0140 07/13/21  0724 12/19/20  2242   URINEPH 5.5 6.0 5.0 5.5 5.5   NITRITE Negative Negative Negative Positive* Positive*   LEUKEST Large* Large* Large* Large* Large*   WBCU >182* >182* 50* >100* >182*       Microbiology:  Culture   Date Value Ref Range Status   08/10/2021 No growth after 12 hours  Preliminary   08/10/2021 No growth after 12 hours  Preliminary   08/09/2021 No growth after 1 day  Preliminary   08/09/2021 No growth after 1 day  Preliminary   08/08/2021 Positive on the 2nd day of incubation (A)  Preliminary   08/08/2021 Candida glabrata complex (A)  Preliminary     Comment:     1 of 2 bottles   08/08/2021 10,000-50,000 CFU/mL Candida glabrata complex (A)  Final     Comment:     Susceptibilities not routinely done   08/08/2021 No Growth  Final   08/08/2021 No Growth  Final   08/08/2021 No growth after 2 days  Preliminary   08/05/2021 1+ Candida glabrata complex (A)  Corrected     Comment:     Susceptibilities not routinely done  Susceptibility testing requested by Caroline Braga pager [phone] for a standard panel. Spoke with Caroline, susceptibilities will be performed from blood culture instead    07/25/2021 10,000-50,000 CFU/mL Mixture of urogenital erum  Final   07/23/2021 No Growth  Final   07/23/2021 No Growth  Final   07/23/2021 <10,000 CFU/mL Urogenital erum  Final   07/15/2021 Culture in progress  Final   07/15/2021 10,000-50,000 CFU/mL Escherichia coli (A)  Final   07/15/2021 <1,000 CFU/mL Urogenital erum  Final   07/13/2021 No Growth  Final   07/13/2021 No Growth  Final   07/13/2021 >100,000 CFU/mL Escherichia coli (A)  Final       Last check of C difficile  C Difficile Toxin B by PCR   Date Value Ref Range Status   07/23/2021 Positive (A) Negative Final     Comment:     Detection of C. difficile nucleic acid in stools confirms the presence of these organisms in diarrheal patients but may not indicate that C. difficile are the etiologic agents of the diarrhea. Results from the Xpert C. difficile assay should be interpreted in conjunction with other laboratory and clinical data available to the clinician.       IMAGING     8/8 CT AP w/o contrast   IMPRESSION:   1. Unchanged position of the right percutaneous nephrostomy tube.   2. Right nephroureteral 5Fr catheter terminates in the distal right  ureter, short of the urinary bladder.  Stable mild right  hydroureteronephrosis.  2. Unchanged renal calculi in the inferior pole of the right kidney.  No new renal calculi.    ECHO:   8/10   Interpretation Summary  No vegetation visualized, however inferctive endocarditis cannot be excluded.  Left ventricular function is normal.The ejection fraction is 60-65%. Grade II  or moderate diastolic dysfunction.  Right ventricular function, chamber size, wall motion, and thickness are  normal.  Estimated RA pressure of 3 mmHg.     Compared to prior study on 5/3/2017 no significant change.

## 2021-08-12 ENCOUNTER — ANESTHESIA EVENT (OUTPATIENT)
Dept: SURGERY | Facility: CLINIC | Age: 81
DRG: 982 | End: 2021-08-12
Payer: COMMERCIAL

## 2021-08-12 LAB
ANION GAP SERPL CALCULATED.3IONS-SCNC: 8 MMOL/L (ref 3–14)
BACTERIA BLD CULT: ABNORMAL
BACTERIA BLD CULT: ABNORMAL
BUN SERPL-MCNC: 7 MG/DL (ref 7–30)
CALCIUM SERPL-MCNC: 8.7 MG/DL (ref 8.5–10.1)
CHLORIDE BLD-SCNC: 100 MMOL/L (ref 94–109)
CO2 SERPL-SCNC: 28 MMOL/L (ref 20–32)
CREAT SERPL-MCNC: 0.94 MG/DL (ref 0.52–1.04)
ERYTHROCYTE [DISTWIDTH] IN BLOOD BY AUTOMATED COUNT: 13.9 % (ref 10–15)
GFR SERPL CREATININE-BSD FRML MDRD: 57 ML/MIN/1.73M2
GLUCOSE BLD-MCNC: 209 MG/DL (ref 70–99)
GLUCOSE BLDC GLUCOMTR-MCNC: 189 MG/DL (ref 70–99)
GLUCOSE BLDC GLUCOMTR-MCNC: 259 MG/DL (ref 70–99)
GLUCOSE BLDC GLUCOMTR-MCNC: 291 MG/DL (ref 70–99)
GLUCOSE BLDC GLUCOMTR-MCNC: 295 MG/DL (ref 70–99)
HCT VFR BLD AUTO: 36.1 % (ref 35–47)
HGB BLD-MCNC: 11.8 G/DL (ref 11.7–15.7)
MCH RBC QN AUTO: 27.5 PG (ref 26.5–33)
MCHC RBC AUTO-ENTMCNC: 32.7 G/DL (ref 31.5–36.5)
MCV RBC AUTO: 84 FL (ref 78–100)
PLATELET # BLD AUTO: 319 10E3/UL (ref 150–450)
POTASSIUM BLD-SCNC: 3.5 MMOL/L (ref 3.4–5.3)
RBC # BLD AUTO: 4.29 10E6/UL (ref 3.8–5.2)
SODIUM SERPL-SCNC: 136 MMOL/L (ref 133–144)
WBC # BLD AUTO: 11.4 10E3/UL (ref 4–11)

## 2021-08-12 PROCEDURE — 250N000013 HC RX MED GY IP 250 OP 250 PS 637: Performed by: PHYSICIAN ASSISTANT

## 2021-08-12 PROCEDURE — 99207 PR CDG-MDM COMPONENT: MEETS MODERATE - DOWN CODED: CPT | Performed by: STUDENT IN AN ORGANIZED HEALTH CARE EDUCATION/TRAINING PROGRAM

## 2021-08-12 PROCEDURE — 87040 BLOOD CULTURE FOR BACTERIA: CPT | Performed by: PHYSICIAN ASSISTANT

## 2021-08-12 PROCEDURE — 99233 SBSQ HOSP IP/OBS HIGH 50: CPT | Mod: 24 | Performed by: INTERNAL MEDICINE

## 2021-08-12 PROCEDURE — 99207 PR APP CREDIT; MD BILLING SHARED VISIT: CPT | Performed by: PHYSICIAN ASSISTANT

## 2021-08-12 PROCEDURE — 36415 COLL VENOUS BLD VENIPUNCTURE: CPT | Performed by: PHYSICIAN ASSISTANT

## 2021-08-12 PROCEDURE — 258N000003 HC RX IP 258 OP 636: Performed by: PHYSICIAN ASSISTANT

## 2021-08-12 PROCEDURE — 80048 BASIC METABOLIC PNL TOTAL CA: CPT | Performed by: PHYSICIAN ASSISTANT

## 2021-08-12 PROCEDURE — 85014 HEMATOCRIT: CPT | Performed by: PHYSICIAN ASSISTANT

## 2021-08-12 PROCEDURE — 120N000002 HC R&B MED SURG/OB UMMC

## 2021-08-12 PROCEDURE — 99232 SBSQ HOSP IP/OBS MODERATE 35: CPT | Performed by: STUDENT IN AN ORGANIZED HEALTH CARE EDUCATION/TRAINING PROGRAM

## 2021-08-12 PROCEDURE — 250N000011 HC RX IP 250 OP 636: Performed by: PHYSICIAN ASSISTANT

## 2021-08-12 RX ORDER — CEFPODOXIME PROXETIL 100 MG/1
100 TABLET, FILM COATED ORAL 2 TIMES DAILY
Status: DISCONTINUED | OUTPATIENT
Start: 2021-08-12 | End: 2021-08-16

## 2021-08-12 RX ADMIN — LOSARTAN POTASSIUM AND HYDROCHLOROTHIAZIDE 1 TABLET: 50; 12.5 TABLET, FILM COATED ORAL at 08:23

## 2021-08-12 RX ADMIN — CEFPODOXIME PROXETIL 100 MG: 100 TABLET, FILM COATED ORAL at 19:28

## 2021-08-12 RX ADMIN — CEFPODOXIME PROXETIL 100 MG: 100 TABLET, FILM COATED ORAL at 09:58

## 2021-08-12 RX ADMIN — TOLTERODINE 4 MG: 4 CAPSULE, EXTENDED RELEASE ORAL at 08:22

## 2021-08-12 RX ADMIN — SIMVASTATIN 10 MG: 10 TABLET, FILM COATED ORAL at 21:44

## 2021-08-12 RX ADMIN — VANCOMYCIN HYDROCHLORIDE 125 MG: 125 CAPSULE ORAL at 19:28

## 2021-08-12 RX ADMIN — VANCOMYCIN HYDROCHLORIDE 125 MG: 125 CAPSULE ORAL at 08:22

## 2021-08-12 RX ADMIN — TAMSULOSIN HYDROCHLORIDE 0.4 MG: 0.4 CAPSULE ORAL at 08:22

## 2021-08-12 RX ADMIN — MICAFUNGIN SODIUM 100 MG: 50 INJECTION, POWDER, LYOPHILIZED, FOR SOLUTION INTRAVENOUS at 10:31

## 2021-08-12 ASSESSMENT — ACTIVITIES OF DAILY LIVING (ADL)
ADLS_ACUITY_SCORE: 15

## 2021-08-12 NOTE — PROGRESS NOTES
"Urology  Progress Note    Feeling better today  Pain minimal    Exam  BP (!) 145/73 (BP Location: Right arm)   Pulse 90   Temp 99.8  F (37.7  C) (Oral)   Resp 17   Ht 1.575 m (5' 2\")   Wt 69.6 kg (153 lb 8 oz)   SpO2 98%   BMI 28.08 kg/m    No acute distress  Unlabored breathing  Abdomen soft, nontender, non distended,    R PNT w/ clear yellow urine  EVERETT-1 in place, capped    Voiding spontaneously  PNT output NR    Labs  WBC (14.1)  Hgb (11.3)  Cr (0.93)    Assessment/Plan     Kelly Barnes is a 81 year old female with PMHx of HTN, HLD, anemia, DM2, recurrent pyelonephritis, R staghorn calculus, recently hospitalized 7/13-7/16 due to pyelonephritis and nonobstructive kidney stone s/p cytoscopy and ureteral stent placement 7/15 with subsequent R PCNL 8/5, who presented with fevers and abdominal pain admitted on 8/8/2021 found to have fungemia. Originally scheduled for second look PCNL earlier this week but canceled due to fungemia. Rescheduled for this Friday. Patient continues to feel better, recent BCs have been negative.       -plan for surgery tomorrow after discussion with ID. Npo at midnight. Hold lovenox. Orders placed  -continue excellent cares per primary team  -urology has scheduled patient for surgery Friday  -surgery will need to be postponed again if patient clinically decompensated or cultures are positive within 48 hours of surgery, last culture positive currently 8/9    Seen and examined with the chief resident. Will discuss with Dr. Law.    Silva Miramontes MD  Urology Resident     Contacting the Urology Team     Please use the following job codes to reach the Urology Team. Note that you must use an in house phone and that job codes cannot receive text pages.     On weekdays, dial [phone] (or star-star-star [phone] on the new [employer] telephones) then [phone] to reach the Adult Urology resident or PA on call    On weekdays, dial [phone] (or star-star-star [phone] on the new [employer] telephones) then [phone] to " reach the Pediatric Urology resident    On weeknights and weekends, dial [phone] (or star-star-star [phone] on the new [employer] telephones) then [phone] to reach the Urology resident on call (for both Adult and Pediatrics)

## 2021-08-12 NOTE — PLAN OF CARE
"Shift: [id number]    Neuro: A&Ox4, able to make needs known. Low grade fever, given tylenol  Cardiac: WNL, denies chest pain  Respiratory: WNL, denies SOB  GI/: R nephrostomy tube intact, no complaints of abdominal discomfort this shift  Diet/appetite: regular diet  Activity: up ad linda, ambulated around unit 1x this shift  Pain: denies  Skin: no new deficits noted  Lines: PIV L forearm SL. R nephrostomy tube, yellow output    Vitals: BP (!) 145/73 (BP Location: Right arm)   Pulse 90   Temp 99.8  F (37.7  C) (Oral)   Resp 17   Ht 1.575 m (5' 2\")   Wt 69.6 kg (153 lb 8 oz)   SpO2 98%   BMI 28.08 kg/m      BC collected 8/9 resulted positive for yeast.    Pt refused vitals @0300 to sleep.     "

## 2021-08-12 NOTE — PLAN OF CARE
"Patient alert and oriented, afebrile, PNT in place, denies pain, is receiving iv antibiotics. BP (!) 131/90 (BP Location: Right arm)   Pulse 96   Temp 99.4  F (37.4  C) (Oral)   Resp 16   Ht 1.575 m (5' 2\")   Wt 69.6 kg (153 lb 8 oz)   SpO2 96%   BMI 28.08 kg/m      "

## 2021-08-12 NOTE — PROVIDER NOTIFICATION
[employer] cross cover paged RE: OBS 2, PALOMO Barnes RN received call from [facility], BC collected 8/9 positive for yeast,  Thanks, Nadia RAMIREZ [phone]

## 2021-08-12 NOTE — ANESTHESIA PREPROCEDURE EVALUATION
Anesthesia Pre-Procedure Evaluation    Patient: Kelly Barnes   MRN: [id number] : 1940        Preoperative Diagnosis: Kidney stone [N20.0]   Procedure : Procedure(s):  Ureteroscopic assisted secondary right percutaneous nephrolihtotomy, possible Holmium laser lithotriipsy     Past Medical History:   Diagnosis Date     Acute kidney injury (H) 2020     Allergic rhinitis, cause unspecified      Anemia      Aortic stenosis 2016    With new murmur noted 2016, normal echo, repeat echo 2017.     Aortic valve sclerosis      Atypical chest pain 2015     cuboid     left foot     History of Clostridium difficile colitis      Hyperlipidemia LDL goal <100 2012     Hypertension goal BP (blood pressure) < 140/90      Musculoskeletal chest pain 2016     Obesity, Class I, BMI 30-34.9 2015     Pneumonia 2016     Pyelonephritis      Sepsis, due to unspecified organism, unspecified whether acute organ dysfunction present (H) 2020     Type 2 diabetes, HbA1c goal < 7% (H)       Past Surgical History:   Procedure Laterality Date     CATARACT EXTRACTION       CYSTOSCOPY, RETROGRADES, INSERT STENT URETER(S), COMBINED Right 07/15/2021    Procedure: CYSTOURETEROSCOPY, WITH RETROGRADE PYELOGRAM,  AND STENT INSERTION RIGHT;  Surgeon: Kirk Law MD;  Location: [facility] OR     LASER HOLMIUM NEPHROLITHOTOMY VIA PERCUTANEOUS NEPHROSTOMY Right 2021    Procedure: NEPHROLITHOTOMY, PERCUTANEOUS, USING HOLMIUM LASER RIGHT;  Surgeon: Kirk Law MD;  Location: [facility] OR      Allergies   Allergen Reactions     Ibuprofen Other (See Comments)     numbness in hands and feet     Keflex [Cephalexin] Rash      Social History     Tobacco Use     Smoking status: Never Smoker     Smokeless tobacco: Never Used   Substance Use Topics     Alcohol use: Yes     Alcohol/week: 10.0 standard drinks     Comment: 1-2 drinks per week      Wt Readings from Last 1 Encounters:   21 69.6 kg (153 lb 8  oz)        Anesthesia Evaluation            ROS/MED HX  ENT/Pulmonary:  - neg pulmonary ROS     Neurologic:  - neg neurologic ROS     Cardiovascular:     (+) Dyslipidemia hypertension-----    METS/Exercise Tolerance:     Hematologic:     (+) anemia,     Musculoskeletal:       GI/Hepatic: Comment: C. Diff, recenty completed course of PO vanc      Renal/Genitourinary:     (+) renal disease, Pt does not require dialysis,     Endo:     (+) type II DM,     Psychiatric/Substance Use:       Infectious Disease: Comment: Recent tx for pyelonephritis and c. Diff. COVID neg 8/9.      Malignancy:  - neg malignancy ROS     Other:               OUTSIDE LABS:  CBC:   Lab Results   Component Value Date    WBC 11.4 (H) 08/12/2021    WBC 14.1 (H) 08/11/2021    HGB 11.8 08/12/2021    HGB 11.3 (L) 08/11/2021    HCT 36.1 08/12/2021    HCT 34.5 (L) 08/11/2021     08/12/2021     08/11/2021     BMP:   Lab Results   Component Value Date     08/12/2021     08/11/2021    POTASSIUM 3.5 08/12/2021    POTASSIUM 3.5 08/11/2021    CHLORIDE 100 08/12/2021    CHLORIDE 101 08/11/2021    CO2 28 08/12/2021    CO2 21 08/11/2021    BUN 7 08/12/2021    BUN 7 08/11/2021    CR 0.94 08/12/2021    CR 0.93 08/11/2021     (H) 08/12/2021     (H) 08/12/2021     COAGS:   Lab Results   Component Value Date    INR 0.99 08/08/2021     POC:   Lab Results   Component Value Date     (H) 12/22/2020     HEPATIC:   Lab Results   Component Value Date    ALBUMIN 2.2 (L) 08/11/2021    PROTTOTAL 6.7 (L) 08/11/2021    ALT 17 08/11/2021    AST 13 08/11/2021    ALKPHOS 77 08/11/2021    BILITOTAL 0.3 08/11/2021     OTHER:   Lab Results   Component Value Date    PH 7.47 (H) 08/08/2021    LACT 1.0 08/09/2021    A1C 7.4 (H) 07/13/2021    JAN 8.7 08/12/2021    MAG 1.8 07/25/2021    LIPASE 71 (L) 07/23/2021    TSH 1.11 10/15/2019    .0 (H) 08/10/2021    SED 30 12/19/2020       Anesthesia Plan    ASA Status:  3      Anesthesia Type:  General.     - Airway: ETT   Induction: Intravenous.   Maintenance: Inhalation.        Consents            Postoperative Care    Pain management: Oral pain medications.   PONV prophylaxis: Ondansetron (or other 5HT-3), Dexamethasone or Solumedrol     Comments:                Radha Sinha

## 2021-08-12 NOTE — PROGRESS NOTES
[facility]    Medicine Progress Note - Hospitalist Service, Gold 6       Date of Admission:  8/8/2021    Assessment & Plan            Kelly Barnes is a 81 year old female with PMHx of HTN, HLD, anemia, DM2, recurrent pyelonephritis, R staghorn calculus, recently hospitalized 7/13-7/16 due to pyelonephritis and nonobstructive kidney stone s/p cytoscopy and ureteral stent placement 7/15 with subsequent PNT placement on 8/5, and hospitalized again from 7/23 - 7/27 with C Diff infection (on oral vancomycin currently), and GERSON, who presented with fevers and abdominal pain admitted on 8/8/2021 found to have fungemia.     # Fungemia with Candida glabrata complex   # Hx recurrent pyelonephritis s/p R PNT and ureteral stent placement  Recently hospitalized 7/13-7/16 with pyelonephritis and non-obstructive R kidney stone s/p cystoscopy and ureteral stent placement on 7/15, discharged on vantin, then then subsequently underwent percutaneous nephrolithotomy, ureteroscopy s/p R PNT on 8/5 with cultures from stone growing Candida glabrata complex. Presenting with fevers, leukocytosis and grossly positive UA. Lactic flat. CT A/P showed no evidence of abscess; kidney was unremarkable and ureteral stent was in place. Blood cultures on 8/8 and 8/9 and urine Cx on 8/8 growing candida glabrata. TTE without any vegetations.   - ID and urology consulted, appreciate recs   - Discontinue ceftriaxone, and resume cefpodoxime 100 mg BID for ppx until procedure  - Continue IV micafungin 100 mg Q24Hrs  - Daily blood cultures, until cleared >48 hours  - Ophthalmology consulted, no evident of fungal eye involvement, but recommend outpatient follow up in 1 month with repeat eye exam   - Follow-up urine cultures, repeat blood cultures and sensitivities    - Pain control, Tylenol PRN and oxycodone 5 mg q6h PRN  - continue tamsulosin 0.4 mg qAM  - Urology plans for PCNL this hospitalization, once blood  cultures cleared >48 hours.    - NPO after midnight for tentative plan for OR on 8/13     # Recent C difficile infection  Dx on 7/23 C diff colitis, s/p Vancomycin for 10 days.   - ID following; appreciate recs:   - continue PO vancomycin 125 mg BID until antibiotics are discontinued    # DM2 - Hemoglobin A1c 7.4 on 7/13/21. PTA glimepiride 2 mg BID.   - Hold Glimepiride   - sliding scale insulin TIDAC  - hypoglycemia protocol    # Overactive Bladder - continue PTA tolterodine ER 4 mg Daily   # HTN - continue PTA Losartan/HCTZ 50-12.5 Daily, will hold tomorrow mornings dose    # HLD - continue home Simvastatin 10 mg at bedtime. Holding PTA ASA       Diet: Regular Diet Adult  NPO per Anesthesia Guidelines for Procedure/Surgery Except for: Meds    DVT Prophylaxis: Enoxaparin (Lovenox) subcutaneous on hold for procedure   Blanchard Catheter: Not present  Central Lines: None  Code Status: Full Code      Disposition Plan   Expected discharge: 08/16/2021 recommended to prior living arrangement once adequate pain management/ tolerating PO medications and antibiotic plan established.     The patient's care was discussed with the Attending Physician, Dr. Saud Dockery, Bedside Nurse, Patient, Patient's Family and ID and Urology Consultant.    Lea Hunt PA-C  Hospitalist Service, [address]  Securely message with the Vocera Web Console (learn more here)  Text page via [facility] Paging/Directory  Please see sign in/sign out for up to date coverage information    Clinically Significant Risk Factors Present on Admission               ______________________________________________________________________    Interval History   Patient states she is feeling better today. Denies any fevers overnight. States her abdominal pain has improved. Denies any N/V. Reports that her PNT is leaking.     Data reviewed today: I reviewed all medications, new labs and imaging results over the last  24 hours.     Physical Exam   Vital Signs: Temp: 99.4  F (37.4  C) Temp src: Oral BP: (!) 131/90 Pulse: 96   Resp: 16 SpO2: 96 % O2 Device: None (Room air)    Weight: 153 lbs 8 oz  GENERAL: Alert and awake. NAD. Pleasant and conversational  HEENT: AT/NC. Anicteric sclera. Mucous membranes moist   CARDIOVASCULAR: RRR. S1, S2. No murmurs, rubs, or gallops.   RESPIRATORY: Effort normal on RA. Clear to auscultation bilaterally, no rales, rhonchi or wheezes, respirations unlabored   Back: R PNT in place draining yellow urine.   GI: Abdomen soft, non-tender abdomen without rebound or guarding, normoactive bowel sounds present  EXTREMITIES: + non-pitting peripheral edema.   NEUROLOGICAL: CN II-XII grossly intact. Moving all extremities symmetrically.   SKIN: Intact. Warm and dry.   No jaundice.     Data   Recent Labs   Lab 08/12/21  0819 08/12/21  0639 08/11/21  2212 08/11/21  0534 08/10/21  1751 08/10/21  0533 08/08/21 2024 08/08/21 2023   WBC  --  11.4*  --  14.1*  --  12.1*  --   --    HGB  --  11.8  --  11.3*  --  10.3*  --   --    MCV  --  84  --  85  --  84  --   --    PLT  --  319  --  293  --  279  --   --    INR  --   --   --   --   --   --   --  0.99   NA  --  136  --  133  --  137  --  133   POTASSIUM  --  3.5  --  3.5 3.6 3.1*  --  3.4   CHLORIDE  --  100  --  101  --  107  --  101   CO2  --  28  --  21  --  23  --  25   BUN  --  7  --  7  --  7  --  14   CR  --  0.94  --  0.93  --  0.86  --  0.97   ANIONGAP  --  8  --  11  --  7  --  7   JAN  --  8.7  --  8.5  --  7.8*  --  8.8   * 209* 263* 184*  --  186*   < > 269*   ALBUMIN  --   --   --  2.2*  --  2.0*  --  2.7*   PROTTOTAL  --   --   --  6.7*  --  5.9*  --  7.0   BILITOTAL  --   --   --  0.3  --  0.4  --  0.4   ALKPHOS  --   --   --  77  --  63  --  81   ALT  --   --   --  17  --  14  --  20   AST  --   --   --  13  --  12  --  11    < > = values in this interval not displayed.     No results found for this or any previous visit (from the past 24  hour(s)).  Medications       cefpodoxime  100 mg Oral BID     [Held by provider] enoxaparin ANTICOAGULANT  40 mg Subcutaneous Q24H     insulin aspart  1-7 Units Subcutaneous TID AC     insulin aspart  1-5 Units Subcutaneous At Bedtime     losartan-hydrochlorothiazide  1 tablet Oral QAM     micafungin  100 mg Intravenous Q24H     simvastatin  10 mg Oral At Bedtime     tamsulosin  0.4 mg Oral QAM     tolterodine ER  4 mg Oral QAM     vancomycin  125 mg Oral BID

## 2021-08-12 NOTE — PLAN OF CARE
"-diagnostic tests and consults completed and resulted: in progress, urology following.    -vital signs normal or at patient baseline: BP (!) 147/71 (BP Location: Right arm)   Pulse 90   Temp 99.7  F (37.6  C) (Oral)   Resp 20   Ht 1.575 m (5' 2\")   Wt 69.6 kg (153 lb 8 oz)   SpO2 99%   BMI 28.08 kg/m      -tolerating oral intake to maintain hydration: met    -adequate pain control on oral analgesics: denied pain at start of shift but developed lower abd pain at the end of shift.    -tolerating oral antibiotics or has plans for home infusion setup: N/A  "

## 2021-08-12 NOTE — PLAN OF CARE
"Patient alert and oriented, last temp was 99. PNT draining. Patient ambulating with SBA, Had a shower this am. /82 (BP Location: Right arm)   Pulse 100   Temp 99  F (37.2  C) (Oral)   Resp 18   Ht 1.575 m (5' 2\")   Wt 69.6 kg (153 lb 8 oz)   SpO2 100%   BMI 28.08 kg/m      "

## 2021-08-12 NOTE — PROGRESS NOTES
[facility] PROGRESS NOTE     Patient:  Kelly Barnes   Date of birth 1940, Medical record number [id number]  Date of Visit:  08/12/2021  Date of Admission: 8/8/2021  Consult Requester:Maren Trevino MD          Assessment and Plan:   ID Problem List:   1.  Fungemia with yeast, isolated on BCx 8/8, 8/9  2. Fevers, leukocytosis   3. Recurrent renal calculi, R staghorn calculus                -s/p urologic procedure 8/5, culture w/ C glabrata   4. H/o recurrent pyelonephritis               -S/p R PNT and ureteral stent placement  5. Recent C diff colitis 7/23, s/p treatment   6. Diabetes mellitus type II     RECOMMENDATION:    1. Continue Micafungin 100 mg q24 hrs for first line treatment of Candida glabrata fungemia.   2. HOLD on any central line placement. We have not yet established clearance of BCx and there is a possibility for PO treatment pending senses.   3. Plan to draw paired set of blood cultures tomorrow POST urologic procedure. No need for AM BCx.  4. Continue PO Cefpodoxime 100 mg BID for 2 days post urologic procedure (through 8/15) then can discontinue.   5. Continue PO Vancomycin BID for C diff ppx while on antibiotics, plan for course through 8/18 then can discontinue (this will be 3 days of coverage post discontinuation of PO antibiotics).   6. Can hold on HELEN at this time while blood cultures pending, may need to pursue if continued positivity.  7. Appreciate ophthalmology consultation and recommendations.    ASSESSMENT:  Kelly Barnes is a 81 year old female with PMHx significant for HTN, DMII, recurrent pyelonephritis, R staghorn claculus, and h/o C difficile colitis. She had a recent hospitalization 7/13-16/2021 for pyelonephritis with nonobstructing kidney stones s/p cystoscopy and ureteral stent placement (7/15) with discharge home on PO abx; this was followed by repeat admission 7/23 for concern of infected ureteral stent and C diff colitis and discharged home  on PO Vanco+PO Cefpodoxoime. She had a recent urologic procedure (percutaenous nephroscopy w/ laser, stone removal with ureteroscopy and PNT placement) 8/5. Presented to the ED 8/8 with reported low grade fevers, chills, suprapubic and RLQ pain.      Afebrile on admission, tachycardic to 120s initially now improved. BP and O2 sats wnl. Continued leukocytosis to 14 (previously 16 on 8/6) and CRP elevated to 100. UA (midstream) with elevated leuk esterase, >185 WBC, WBC clumping and 25 squamous cells (possibly c/w contamination), UCx midstream and R PNT no growth. COVID swab negative. BCx 8/8 (1/2) positive for Candida glabrata on day2. BCx 8/9 (1/2) positive for yeast (not supprised as these were drawn prior to initiation of antifungal tx). BCx 8/10-12 NGTD.  CXR unremarkable. CT AP w/o contrast with R non-obstructing nephrolithiasis which is stable, stable R hydro, unchanged R PNT.      Started on Ceftriaxone, continued on PO vanco. Spiked fever to 101.4 on 8/9 in the afternoon, BCx collected. New positive BCx as above resulted in AM 8/10, discontinued PO fluconazole and started on IV micafungin. Initiated work up for fungemia as above. Echo without vegetations on heart valves, but cannot r/o endocarditis. Ophtho exam perfromed 8/11 without evidence of fungal eye involvement. Will need 1 month ophto follow up. Awaiting clearance of blood cultures. Overall patient feeling better daily.     Thank you for this consult. ID will continue to follow.      Patient was discussed with Dr. Onofre.      Caroline Braga PA-C  Infectious Diseases  Pager #[phone]          Interim History and Events:     Feeling well today. Afebrile in past 24 hrs. Denies n/v/d. Intermittent RLQ pain managed with tylenol prn. Ambulating in halls. No uri sx.          HPI:   Kelly Barnes is a 81 year old female with PMHx significant for HTN, DMII, recurrent pyelonephritis, R staghorn claculus, and h/o C difficile colitis. She had a recent  "hospitalization 7/13-16/2021 for pyelonephritis with non obstructing kidney stones s/p cystoscopy and ureteral stent placement (7/15) with discharge home on PO abx; this was followed by repeat admission 7/23 for concern of infected ureteral stent and C diff colitis and discharged home on PO Vanco+PO Cefpodoxoime. She had a recent urologic procedure (percutaenous nephroscopy w/ laser, stone removal with ureteroscopy and PNT placement) 8/5. Presented to the ED 8/8 with reported low grade fevers, chills, suprapubic and RLQ pain.      States she felt \"feverish\" on discharge from urologic procedure 8/6 but did not have a measured fever. Felt \"unwell\" up until 8/8 when she presented to the ED after having a measured temp at home of 100.8. She endorsed continued suprapubic and RLQ cramping pain since urologic prodedure 8/5 and leaking of urine from around PNT tube insertion site. Denies URI sx, cough, SOB, chest pain, n/v, dysuria, frequency/urgency. Diarrhea resolved before previous hospital discharge, having normal BMs now. Notes that tylenol helps RLQ/suprapubic abdominal cramping for about 4 hrs at a time. Feels better since admission today although was unsure if she felt at her baseline and voiced some uneasiness in discharging home to early.      [place]. Has been taking Cefpodoxime 100 mg BID and PO Vanco 125 mg BID at home PTA.      Recent previous Cx hx:   -Calculus R kidney growth of Candida glabrata  -UCx 7/23 and 25 mixed UGF   -UCx 7/13 & 15 E coli  (R Amp/sulb, Amp, Cipro, levo, Susceptible to cephalosporins, macrobid, Zosyn, Bactrim).   -UCx 12/2020 E coli (same sens as above)         ROS:   -Focused 5 point ROS completed, pertinent positives and negatives listed above.    Physical Examination:  Temp: 99  F (37.2  C) Temp src: Oral BP: 125/82 Pulse: 100   Resp: 18 SpO2: 100 % O2 Device: None (Room air)      Vitals:    08/09/21 0202   Weight: 69.6 kg (153 lb 8 oz)       Physical " Examination:  Constitutional: Pleasant female seen sitting up in bed, in NAD. Alert and interactive. Did ambulate in room without assistance.  HEENT: NCAT, anicteric sclerae, conjunctiva clear. Moist mucous membranes.  Respiratory: Non-labored breathing on RA.  GI:  Abdomen is soft, non-distended.  Skin: Warm and dry. No rashes or lesions on exposed surfaces.  Musculoskeletal: Extremities grossly normal. LLE>RLE peripheral edema, stable at baseline per pt.  Neurologic: A &O x3, speech normal, answering questions appropriately. Moves all extremities spontaneously. Grossly non-focal.  Neuropsychiatric: Mentation and affect normal/appropriate.  VAD: PIV    Medications:    cefpodoxime  100 mg Oral BID     [Held by provider] enoxaparin ANTICOAGULANT  40 mg Subcutaneous Q24H     insulin aspart  1-7 Units Subcutaneous TID AC     insulin aspart  1-5 Units Subcutaneous At Bedtime     [Held by provider] losartan-hydrochlorothiazide  1 tablet Oral QAM     micafungin  100 mg Intravenous Q24H     simvastatin  10 mg Oral At Bedtime     tamsulosin  0.4 mg Oral QAM     tolterodine ER  4 mg Oral QAM     vancomycin  125 mg Oral BID       Infusions/Drips:      Laboratory Data:   No results found for: ACD4    Inflammatory Markers    Recent Labs   Lab Test 08/10/21  0533 08/09/21  0558 07/25/21  0839 12/21/20  0705 12/19/20  2307 03/02/16  0837 02/29/16  0828   SED  --   --   --   --  30 15 15   .0* 100.0* 130.0* 63.0* 140.0* 110.0* 36.0*       Metabolic Studies       Recent Labs   Lab Test 08/12/21  1332 08/12/21  0819 08/12/21  0639 08/11/21  2212 08/11/21  1719 08/11/21  1317 08/11/21  0534 08/10/21  1751 08/10/21  0533 08/09/21  1711 08/08/21  2025 08/08/21  2023 08/06/21  0607 07/27/21  0735 07/27/21  0628 07/25/21  1142 07/25/21  0839 07/15/21  1342 07/15/21  0839 07/13/21  0724 07/13/21  0721   NA  --   --  136  --   --   --  133  --  137  --   --  133 134  --  137   < > 131*   < > 138   < > 133   POTASSIUM  --   --  3.5   --   --   --  3.5 3.6 3.1*  --   --  3.4 4.2   < > 3.4   < > 3.0*   < > 4.2   < > 3.8   CHLORIDE  --   --  100  --   --   --  101  --  107  --   --  101 105  --  106   < > 101   < > 107   < > 100   CO2  --   --  28  --   --   --  21  --  23  --   --  25 22  --  23   < > 21   < > 23   < > 25   ANIONGAP  --   --  8  --   --   --  11  --  7  --   --  7 7  --  8   < > 9   < > 8   < > 8   BUN  --   --  7  --   --   --  7  --  7  --   --  14 23  --  9   < > 8   < > 16   < > 20   CR  --   --  0.94  --   --   --  0.93  --  0.86  --   --  0.97 0.99  --  1.00   < > 0.93   < > 1.03   < > 1.14*   GFRESTIMATED  --   --  57*  --   --   --  58*  --  64  --   --  55* 54*  --  53*   < > 58*   < > 51*   < > 46*   * 189* 209* 263* 236* 267* 184*  --  186*  --   --  269* 289*  --  194*   < > 265*   < > 166*   < > 205*   A1C  --   --   --   --   --   --   --   --   --   --   --   --   --   --   --   --   --   --   --   --  7.4*   JAN  --   --  8.7  --   --   --  8.5  --  7.8*  --   --  8.8 7.9*  --  8.2*   < > 8.0*   < > 8.5   < > 8.4*   MAG  --   --   --   --   --   --   --   --   --   --   --   --   --   --   --   --  1.8  --  2.4*   < >  --    LACT  --   --   --   --   --   --   --   --   --  1.0 1.1  --   --   --   --   --   --    < >  --   --   --     < > = values in this interval not displayed.       Hepatic Studies    Recent Labs   Lab Test 08/11/21  0534 08/10/21  0533 08/08/21 2023 07/23/21  0208 07/13/21 0721 12/19/20  2307   BILITOTAL 0.3 0.4 0.4 0.5 0.8 0.5   ALKPHOS 77 63 81 108 84 87   ALBUMIN 2.2* 2.0* 2.7* 3.0* 3.5 3.1*   AST 13 12 11 12 18 15   ALT 17 14 20 26 20 19       Pancreatitis testing    Recent Labs   Lab Test 07/23/21  0208 07/13/21  0723 05/11/21  0724 10/07/20  0831 10/15/19  0920 10/25/18  0920 03/28/17  0941 10/27/16  0825   LIPASE 71* 76  --   --   --   --   --   --    TRIG  --   --  68 90 90 76 83 93       Hematology Studies      Recent Labs   Lab Test 08/12/21  0639 08/11/21  0534 08/10/21  0533  08/08/21 2024 08/06/21  0607 07/27/21  0628 12/21/20  0705 12/20/20  0654 12/19/20  2307 12/19/20  1110 07/03/19  1734 03/28/17  0941 03/04/16  0930   WBC 11.4* 14.1* 12.1* 14.0* 16.5* 8.1 9.2   < > 17.7* 18.2* 11.3* 7.1 12.8*   ANEU  --   --   --   --   --   --  7.1  --  16.1* 16.0* 9.6* 4.9 10.8*   ALYM  --   --   --   --   --   --  1.1  --  0.5* 1.1 0.8 1.6 0.8   GINETTE  --   --   --   --   --   --  0.9  --  1.0 1.0 0.8 0.5 1.2   AEOS  --   --   --   --   --   --  0.1  --  0.0 0.0 0.1 0.1 0.0   HGB 11.8 11.3* 10.3* 12.4 10.9* 11.1* 11.3*   < > 12.8 14.9 13.2 14.7 14.1   HCT 36.1 34.5* 31.4* 37.6 32.9* 33.8* 34.8*   < > 38.7 44.5 39.3 43.8 40.7    293 279 315 262 426 287   < > 330 364 288 347 332    < > = values in this interval not displayed.       Arterial Blood Gas Testing    Recent Labs   Lab Test 08/08/21 2025   PH 7.47*        Urine Studies     Recent Labs   Lab Test 08/08/21 2028 07/25/21  1230 07/23/21  0140 07/13/21  0724 12/19/20  2242   URINEPH 5.5 6.0 5.0 5.5 5.5   NITRITE Negative Negative Negative Positive* Positive*   LEUKEST Large* Large* Large* Large* Large*   WBCU >182* >182* 50* >100* >182*       Microbiology:  Culture   Date Value Ref Range Status   08/11/2021 No growth after 1 day  Preliminary   08/11/2021 No growth after 1 day  Preliminary   08/10/2021 No growth after 1 day  Preliminary   08/10/2021 No growth after 1 day  Preliminary   08/09/2021 No growth after 2 days  Preliminary   08/09/2021 Positive on the 3rd day of incubation (A)  Preliminary   08/09/2021 Yeast (A)  Preliminary     Comment:     1 of 2 bottles   08/08/2021 Positive on the 2nd day of incubation (A)  Final   08/08/2021 Candida glabrata complex (A)  Final     Comment:     1 of 2 bottles   08/08/2021 10,000-50,000 CFU/mL Candida glabrata complex (A)  Final     Comment:     Susceptibilities not routinely done   08/08/2021 No Growth  Final   08/08/2021 No Growth  Final   08/08/2021 No growth after 3 days  Preliminary    08/05/2021 1+ Candida glabrata complex (A)  Corrected     Comment:     Susceptibilities not routinely done  Susceptibility testing requested by Caroline Braga pager [phone] for a standard panel. Spoke with Caroline, susceptibilities will be performed from blood culture instead   07/25/2021 10,000-50,000 CFU/mL Mixture of urogenital erum  Final   07/23/2021 No Growth  Final   07/23/2021 No Growth  Final   07/23/2021 <10,000 CFU/mL Urogenital erum  Final   07/15/2021 Culture in progress  Final   07/15/2021 10,000-50,000 CFU/mL Escherichia coli (A)  Final   07/15/2021 <1,000 CFU/mL Urogenital erum  Final   07/13/2021 No Growth  Final   07/13/2021 No Growth  Final   07/13/2021 >100,000 CFU/mL Escherichia coli (A)  Final       Last check of C difficile  C Difficile Toxin B by PCR   Date Value Ref Range Status   07/23/2021 Positive (A) Negative Final     Comment:     Detection of C. difficile nucleic acid in stools confirms the presence of these organisms in diarrheal patients but may not indicate that C. difficile are the etiologic agents of the diarrhea. Results from the Xpert C. difficile assay should be interpreted in conjunction with other laboratory and clinical data available to the clinician.       IMAGING     8/8 CT AP w/o contrast   IMPRESSION:   1. Unchanged position of the right percutaneous nephrostomy tube.   2. Right nephroureteral 5Fr catheter terminates in the distal right  ureter, short of the urinary bladder.  Stable mild right  hydroureteronephrosis.  2. Unchanged renal calculi in the inferior pole of the right kidney.  No new renal calculi.    ECHO:   8/10   Interpretation Summary  No vegetation visualized, however inferctive endocarditis cannot be excluded.  Left ventricular function is normal.The ejection fraction is 60-65%. Grade II  or moderate diastolic dysfunction.  Right ventricular function, chamber size, wall motion, and thickness are  normal.  Estimated RA pressure of 3 mmHg.      Compared to prior study on 5/3/2017 no significant change.

## 2021-08-12 NOTE — PROGRESS NOTES
Care Management Follow Up    Length of Stay (days): 3    Expected Discharge Date: 8/15     Concerns to be Addressed: IV AB  Patient plan of care discussed at interdisciplinary rounds: Yes    Anticipated Discharge Disposition: Home  Anticipated Discharge Services:  IV AB  Anticipated Discharge DME:        Additional Information:  Per team patient will be ready for discharge on José Luis 8/15 with daily IV Micafungin. Per [facility] patient will have to pay OOP and it is $241/day. They will check with other companies to see if cheaper.     Spoke with patient by phone to review costs. She prefers not to pay, but was also told that she has an 80% chance of going home on PO AB. If IV AB is the final decision she would like to [place] [facility] [place] a ride.     2PM Updated Gold team - they will speak with ID to discuss. RNCC requested a therapy plan be placed at least by Friday so that apts can be scheduled starting Monday 8/16. This can be canceled, if PO is final plan.     [facility] will still get quotes from [facility] [employer] just in case one of them covers 100%, but doubtful.       Nhung Trejo, RN, [place]  Float Care Coordinator  Covering 6D RNCC   Pager: [phone]

## 2021-08-12 NOTE — PHARMACY-ADMISSION MEDICATION HISTORY
Admission Medication History Completed by Pharmacy    See [facility] Admission Navigator for allergy information, preferred outpatient pharmacy, prior to admission medications and immunization status.     Medication History Sources:     Patient, dispense report    Changes made to PTA medication list (reason):    Added: None    Deleted: None    Changed: oxycodone 5 mg q6h PRN (recent discharge rx) > 5 mg daily PRN severe pain (pt reports using this very sparingly; has taken only 1 tab since discharge on 7/27)    Additional Information:    Pt manages her own meds and is an excellent historian    Prior to Admission medications    Medication Sig Last Dose Taking? Auth Provider   acetaminophen (TYLENOL) 325 MG tablet Take 2 tablets (650 mg) by mouth every 6 hours as needed for mild pain or fever Past Month at Unknown time Yes Brianna Amos MD   cefpodoxime (VANTIN) 100 MG tablet Take 1 tablet (100 mg) by mouth 2 times daily Past Week at Unknown time Yes Elias Benjamin MD   glimepiride (AMARYL) 2 MG tablet Take 1 tablet (2 mg) by mouth 2 times daily (with meals) She will also take separate 2 mg dose with breakfast.  Patient taking differently: Take 2 mg by mouth 3 times daily (with meals)  Past Week at Unknown time Yes Lea Lopez MD   lactobacillus rhamnosus, GG, (CULTURELL) capsule Take 1 capsule by mouth 2 times daily Past Week at Unknown time Yes Elias Benjamin MD   losartan-hydrochlorothiazide (HYZAAR) 50-12.5 MG tablet Take 1 tablet by mouth daily  Patient taking differently: Take 1 tablet by mouth every morning  Past Week at Unknown time Yes Lea Lopez MD   oxyCODONE (ROXICODONE) 5 MG tablet Take 1 tablet (5 mg) by mouth every 6 hours as needed for pain  Patient taking differently: Take 5 mg by mouth daily as needed for severe pain  Past Month at Unknown time Yes Kirk Law MD   simvastatin (ZOCOR) 10 MG tablet TAKE ONE TABLET BY MOUTH AT BEDTIME  Patient taking  differently: At Bedtime TAKE ONE TABLET BY MOUTH AT BEDTIME Past Week at Unknown time Yes Lea Lopez MD   tamsulosin (FLOMAX) 0.4 MG capsule Take 1 capsule (0.4 mg) by mouth daily  Patient taking differently: Take 0.4 mg by mouth every morning  Past Week at Unknown time Yes Brianna Amos MD   tolterodine ER (DETROL LA) 4 MG 24 hr capsule Take 1 capsule (4 mg) by mouth daily  Patient taking differently: Take 4 mg by mouth every morning  Past Week at Unknown time Yes Brianna Amos MD   vancomycin (VANCOCIN) 125 MG capsule Take 1 capsule (125 mg) by mouth 4 times daily for 6 days, THEN 1 capsule (125 mg) 2 times daily. Past Week at Unknown time Yes Elias Benjamin MD   blood glucose (ONETOUCH ULTRA) test strip Use to test blood sugar twice daily. Dispense 1 box of 200 test strips, #3 refills.   Lea Lopez MD   blood glucose monitoring (ONE TOUCH ULTRA 2) meter device kit Use to test blood sugar 3 times daily   Lea Lopez MD   OneTouch Delica Lancets 33G MISC 1 Device by In Vitro route 2 times daily   Lea Lopez MD       Date completed: 08/12/21    Medication history completed by:   Ricardo Snow, DenizD, BCPS

## 2021-08-13 ENCOUNTER — ANESTHESIA (OUTPATIENT)
Dept: SURGERY | Facility: CLINIC | Age: 81
DRG: 982 | End: 2021-08-13
Payer: COMMERCIAL

## 2021-08-13 ENCOUNTER — APPOINTMENT (OUTPATIENT)
Dept: GENERAL RADIOLOGY | Facility: CLINIC | Age: 81
DRG: 982 | End: 2021-08-13
Attending: UROLOGY
Payer: COMMERCIAL

## 2021-08-13 LAB
ANION GAP SERPL CALCULATED.3IONS-SCNC: 7 MMOL/L (ref 3–14)
BACTERIA BLD CULT: NO GROWTH
BUN SERPL-MCNC: 9 MG/DL (ref 7–30)
CALCIUM SERPL-MCNC: 8.5 MG/DL (ref 8.5–10.1)
CHLORIDE BLD-SCNC: 101 MMOL/L (ref 94–109)
CO2 SERPL-SCNC: 28 MMOL/L (ref 20–32)
CREAT SERPL-MCNC: 1.01 MG/DL (ref 0.52–1.04)
ERYTHROCYTE [DISTWIDTH] IN BLOOD BY AUTOMATED COUNT: 13.8 % (ref 10–15)
GFR SERPL CREATININE-BSD FRML MDRD: 53 ML/MIN/1.73M2
GLUCOSE BLD-MCNC: 228 MG/DL (ref 70–99)
GLUCOSE BLDC GLUCOMTR-MCNC: 163 MG/DL (ref 70–99)
GLUCOSE BLDC GLUCOMTR-MCNC: 173 MG/DL (ref 70–99)
GLUCOSE BLDC GLUCOMTR-MCNC: 229 MG/DL (ref 70–99)
GLUCOSE BLDC GLUCOMTR-MCNC: 256 MG/DL (ref 70–99)
HCT VFR BLD AUTO: 32.1 % (ref 35–47)
HGB BLD-MCNC: 10.4 G/DL (ref 11.7–15.7)
MCH RBC QN AUTO: 27.1 PG (ref 26.5–33)
MCHC RBC AUTO-ENTMCNC: 32.4 G/DL (ref 31.5–36.5)
MCV RBC AUTO: 84 FL (ref 78–100)
PLATELET # BLD AUTO: 339 10E3/UL (ref 150–450)
POTASSIUM BLD-SCNC: 3 MMOL/L (ref 3.4–5.3)
POTASSIUM BLD-SCNC: 3.1 MMOL/L (ref 3.4–5.3)
RBC # BLD AUTO: 3.84 10E6/UL (ref 3.8–5.2)
SODIUM SERPL-SCNC: 136 MMOL/L (ref 133–144)
WBC # BLD AUTO: 9 10E3/UL (ref 4–11)

## 2021-08-13 PROCEDURE — 710N000009 HC RECOVERY PHASE 1, LEVEL 1, PER MIN: Performed by: UROLOGY

## 2021-08-13 PROCEDURE — 50081 PERQ NL/PL LITHOTRP CPLX>2CM: CPT | Mod: 58 | Performed by: UROLOGY

## 2021-08-13 PROCEDURE — 250N000009 HC RX 250: Performed by: NURSE ANESTHETIST, CERTIFIED REGISTERED

## 2021-08-13 PROCEDURE — 0TC08ZZ EXTIRPATION OF MATTER FROM RIGHT KIDNEY, VIA NATURAL OR ARTIFICIAL OPENING ENDOSCOPIC: ICD-10-PCS | Performed by: UROLOGY

## 2021-08-13 PROCEDURE — 36415 COLL VENOUS BLD VENIPUNCTURE: CPT | Performed by: PHYSICIAN ASSISTANT

## 2021-08-13 PROCEDURE — 360N000085 HC SURGERY LEVEL 5 W/ FLUORO, PER MIN: Performed by: UROLOGY

## 2021-08-13 PROCEDURE — 87106 FUNGI IDENTIFICATION YEAST: CPT | Performed by: UROLOGY

## 2021-08-13 PROCEDURE — 52005 CYSTO W/URTRL CATHJ: CPT | Mod: 58 | Performed by: UROLOGY

## 2021-08-13 PROCEDURE — 370N000017 HC ANESTHESIA TECHNICAL FEE, PER MIN: Performed by: UROLOGY

## 2021-08-13 PROCEDURE — 999N000141 HC STATISTIC PRE-PROCEDURE NURSING ASSESSMENT: Performed by: UROLOGY

## 2021-08-13 PROCEDURE — 99233 SBSQ HOSP IP/OBS HIGH 50: CPT | Mod: 24 | Performed by: INTERNAL MEDICINE

## 2021-08-13 PROCEDURE — 50435 EXCHANGE NEPHROSTOMY CATH: CPT | Mod: 58 | Performed by: UROLOGY

## 2021-08-13 PROCEDURE — 250N000011 HC RX IP 250 OP 636: Performed by: NURSE ANESTHETIST, CERTIFIED REGISTERED

## 2021-08-13 PROCEDURE — 258N000003 HC RX IP 258 OP 636: Performed by: NURSE ANESTHETIST, CERTIFIED REGISTERED

## 2021-08-13 PROCEDURE — 999N000179 XR SURGERY CARM FLUORO LESS THAN 5 MIN W STILLS: Mod: TC

## 2021-08-13 PROCEDURE — 250N000011 HC RX IP 250 OP 636: Performed by: PHYSICIAN ASSISTANT

## 2021-08-13 PROCEDURE — 258N000003 HC RX IP 258 OP 636: Performed by: ANESTHESIOLOGY

## 2021-08-13 PROCEDURE — 87075 CULTR BACTERIA EXCEPT BLOOD: CPT | Performed by: UROLOGY

## 2021-08-13 PROCEDURE — 250N000013 HC RX MED GY IP 250 OP 250 PS 637: Performed by: PHYSICIAN ASSISTANT

## 2021-08-13 PROCEDURE — C1729 CATH, DRAINAGE: HCPCS | Performed by: UROLOGY

## 2021-08-13 PROCEDURE — 87070 CULTURE OTHR SPECIMN AEROBIC: CPT | Performed by: UROLOGY

## 2021-08-13 PROCEDURE — 255N000002 HC RX 255 OP 636: Performed by: UROLOGY

## 2021-08-13 PROCEDURE — 87040 BLOOD CULTURE FOR BACTERIA: CPT | Performed by: PHYSICIAN ASSISTANT

## 2021-08-13 PROCEDURE — C1769 GUIDE WIRE: HCPCS | Performed by: UROLOGY

## 2021-08-13 PROCEDURE — 272N000001 HC OR GENERAL SUPPLY STERILE: Performed by: UROLOGY

## 2021-08-13 PROCEDURE — 93010 ELECTROCARDIOGRAM REPORT: CPT | Performed by: INTERNAL MEDICINE

## 2021-08-13 PROCEDURE — 80048 BASIC METABOLIC PNL TOTAL CA: CPT | Performed by: PHYSICIAN ASSISTANT

## 2021-08-13 PROCEDURE — 250N000025 HC SEVOFLURANE, PER MIN: Performed by: UROLOGY

## 2021-08-13 PROCEDURE — 120N000002 HC R&B MED SURG/OB UMMC

## 2021-08-13 PROCEDURE — 258N000003 HC RX IP 258 OP 636: Performed by: STUDENT IN AN ORGANIZED HEALTH CARE EDUCATION/TRAINING PROGRAM

## 2021-08-13 PROCEDURE — 84132 ASSAY OF SERUM POTASSIUM: CPT | Performed by: INTERNAL MEDICINE

## 2021-08-13 PROCEDURE — 36415 COLL VENOUS BLD VENIPUNCTURE: CPT | Performed by: INTERNAL MEDICINE

## 2021-08-13 PROCEDURE — 87102 FUNGUS ISOLATION CULTURE: CPT | Performed by: UROLOGY

## 2021-08-13 PROCEDURE — 93005 ELECTROCARDIOGRAM TRACING: CPT

## 2021-08-13 PROCEDURE — 85027 COMPLETE CBC AUTOMATED: CPT | Performed by: PHYSICIAN ASSISTANT

## 2021-08-13 PROCEDURE — 99233 SBSQ HOSP IP/OBS HIGH 50: CPT | Performed by: STUDENT IN AN ORGANIZED HEALTH CARE EDUCATION/TRAINING PROGRAM

## 2021-08-13 PROCEDURE — 99207 PR APP CREDIT; MD BILLING SHARED VISIT: CPT | Performed by: PHYSICIAN ASSISTANT

## 2021-08-13 PROCEDURE — 0T25X0Z CHANGE DRAINAGE DEVICE IN KIDNEY, EXTERNAL APPROACH: ICD-10-PCS | Performed by: UROLOGY

## 2021-08-13 PROCEDURE — 999N000128 HC STATISTIC PERIPHERAL IV START W/O US GUIDANCE

## 2021-08-13 PROCEDURE — 74420 UROGRAPHY RTRGR +-KUB: CPT | Mod: 26 | Performed by: UROLOGY

## 2021-08-13 PROCEDURE — 258N000003 HC RX IP 258 OP 636: Performed by: PHYSICIAN ASSISTANT

## 2021-08-13 RX ORDER — OXYCODONE HYDROCHLORIDE 5 MG/1
5 TABLET ORAL EVERY 4 HOURS PRN
Status: DISCONTINUED | OUTPATIENT
Start: 2021-08-13 | End: 2021-08-17 | Stop reason: HOSPADM

## 2021-08-13 RX ORDER — CEFTRIAXONE 1 G/1
INJECTION, POWDER, FOR SOLUTION INTRAMUSCULAR; INTRAVENOUS PRN
Status: DISCONTINUED | OUTPATIENT
Start: 2021-08-13 | End: 2021-08-13

## 2021-08-13 RX ORDER — FENTANYL CITRATE 50 UG/ML
25-50 INJECTION, SOLUTION INTRAMUSCULAR; INTRAVENOUS EVERY 5 MIN PRN
Status: DISCONTINUED | OUTPATIENT
Start: 2021-08-13 | End: 2021-08-13 | Stop reason: HOSPADM

## 2021-08-13 RX ORDER — FENTANYL CITRATE 50 UG/ML
25-50 INJECTION, SOLUTION INTRAMUSCULAR; INTRAVENOUS
Status: DISCONTINUED | OUTPATIENT
Start: 2021-08-13 | End: 2021-08-13 | Stop reason: HOSPADM

## 2021-08-13 RX ORDER — LIDOCAINE 40 MG/G
CREAM TOPICAL
Status: DISCONTINUED | OUTPATIENT
Start: 2021-08-13 | End: 2021-08-13 | Stop reason: HOSPADM

## 2021-08-13 RX ORDER — PROPOFOL 10 MG/ML
INJECTION, EMULSION INTRAVENOUS PRN
Status: DISCONTINUED | OUTPATIENT
Start: 2021-08-13 | End: 2021-08-13

## 2021-08-13 RX ORDER — LIDOCAINE HYDROCHLORIDE 20 MG/ML
INJECTION, SOLUTION INFILTRATION; PERINEURAL PRN
Status: DISCONTINUED | OUTPATIENT
Start: 2021-08-13 | End: 2021-08-13

## 2021-08-13 RX ORDER — SODIUM CHLORIDE, SODIUM LACTATE, POTASSIUM CHLORIDE, CALCIUM CHLORIDE 600; 310; 30; 20 MG/100ML; MG/100ML; MG/100ML; MG/100ML
INJECTION, SOLUTION INTRAVENOUS CONTINUOUS
Status: DISCONTINUED | OUTPATIENT
Start: 2021-08-13 | End: 2021-08-13 | Stop reason: HOSPADM

## 2021-08-13 RX ORDER — ONDANSETRON 2 MG/ML
4 INJECTION INTRAMUSCULAR; INTRAVENOUS EVERY 30 MIN PRN
Status: DISCONTINUED | OUTPATIENT
Start: 2021-08-13 | End: 2021-08-13 | Stop reason: HOSPADM

## 2021-08-13 RX ORDER — HYDRALAZINE HYDROCHLORIDE 20 MG/ML
2.5-5 INJECTION INTRAMUSCULAR; INTRAVENOUS EVERY 10 MIN PRN
Status: DISCONTINUED | OUTPATIENT
Start: 2021-08-13 | End: 2021-08-13 | Stop reason: HOSPADM

## 2021-08-13 RX ORDER — ONDANSETRON 4 MG/1
4 TABLET, ORALLY DISINTEGRATING ORAL EVERY 30 MIN PRN
Status: DISCONTINUED | OUTPATIENT
Start: 2021-08-13 | End: 2021-08-13 | Stop reason: HOSPADM

## 2021-08-13 RX ORDER — HYDROMORPHONE HCL IN WATER/PF 6 MG/30 ML
.2-.4 PATIENT CONTROLLED ANALGESIA SYRINGE INTRAVENOUS EVERY 5 MIN PRN
Status: DISCONTINUED | OUTPATIENT
Start: 2021-08-13 | End: 2021-08-13 | Stop reason: HOSPADM

## 2021-08-13 RX ORDER — FENTANYL CITRATE 50 UG/ML
INJECTION, SOLUTION INTRAMUSCULAR; INTRAVENOUS PRN
Status: DISCONTINUED | OUTPATIENT
Start: 2021-08-13 | End: 2021-08-13

## 2021-08-13 RX ORDER — LABETALOL HYDROCHLORIDE 5 MG/ML
5-10 INJECTION, SOLUTION INTRAVENOUS
Status: DISCONTINUED | OUTPATIENT
Start: 2021-08-13 | End: 2021-08-13 | Stop reason: HOSPADM

## 2021-08-13 RX ORDER — ONDANSETRON 2 MG/ML
INJECTION INTRAMUSCULAR; INTRAVENOUS PRN
Status: DISCONTINUED | OUTPATIENT
Start: 2021-08-13 | End: 2021-08-13

## 2021-08-13 RX ORDER — MEPERIDINE HYDROCHLORIDE 25 MG/ML
12.5 INJECTION INTRAMUSCULAR; INTRAVENOUS; SUBCUTANEOUS
Status: DISCONTINUED | OUTPATIENT
Start: 2021-08-13 | End: 2021-08-13 | Stop reason: HOSPADM

## 2021-08-13 RX ADMIN — FENTANYL CITRATE 50 MCG: 50 INJECTION, SOLUTION INTRAMUSCULAR; INTRAVENOUS at 17:12

## 2021-08-13 RX ADMIN — FENTANYL CITRATE 100 MCG: 50 INJECTION, SOLUTION INTRAMUSCULAR; INTRAVENOUS at 15:32

## 2021-08-13 RX ADMIN — LIDOCAINE HYDROCHLORIDE 100 MG: 20 INJECTION, SOLUTION INFILTRATION; PERINEURAL at 15:32

## 2021-08-13 RX ADMIN — ROCURONIUM BROMIDE 10 MG: 10 INJECTION INTRAVENOUS at 16:17

## 2021-08-13 RX ADMIN — PHENYLEPHRINE HYDROCHLORIDE 200 MCG: 10 INJECTION INTRAVENOUS at 15:49

## 2021-08-13 RX ADMIN — PROPOFOL 50 MG: 10 INJECTION, EMULSION INTRAVENOUS at 17:42

## 2021-08-13 RX ADMIN — SODIUM CHLORIDE, POTASSIUM CHLORIDE, SODIUM LACTATE AND CALCIUM CHLORIDE: 600; 310; 30; 20 INJECTION, SOLUTION INTRAVENOUS at 15:21

## 2021-08-13 RX ADMIN — FENTANYL CITRATE 50 MCG: 50 INJECTION, SOLUTION INTRAMUSCULAR; INTRAVENOUS at 15:23

## 2021-08-13 RX ADMIN — MICAFUNGIN SODIUM 100 MG: 50 INJECTION, POWDER, LYOPHILIZED, FOR SOLUTION INTRAVENOUS at 10:06

## 2021-08-13 RX ADMIN — PROPOFOL 30 MG: 10 INJECTION, EMULSION INTRAVENOUS at 17:32

## 2021-08-13 RX ADMIN — SODIUM CHLORIDE, POTASSIUM CHLORIDE, SODIUM LACTATE AND CALCIUM CHLORIDE: 600; 310; 30; 20 INJECTION, SOLUTION INTRAVENOUS at 17:47

## 2021-08-13 RX ADMIN — PROPOFOL 120 MG: 10 INJECTION, EMULSION INTRAVENOUS at 15:32

## 2021-08-13 RX ADMIN — VANCOMYCIN HYDROCHLORIDE 125 MG: 125 CAPSULE ORAL at 07:26

## 2021-08-13 RX ADMIN — CEFPODOXIME PROXETIL 100 MG: 100 TABLET, FILM COATED ORAL at 20:55

## 2021-08-13 RX ADMIN — PHENYLEPHRINE HYDROCHLORIDE 100 MCG: 10 INJECTION INTRAVENOUS at 16:17

## 2021-08-13 RX ADMIN — TOLTERODINE 4 MG: 4 CAPSULE, EXTENDED RELEASE ORAL at 07:26

## 2021-08-13 RX ADMIN — ROCURONIUM BROMIDE 60 MG: 10 INJECTION INTRAVENOUS at 15:32

## 2021-08-13 RX ADMIN — CEFTRIAXONE 1 G: 1 INJECTION, POWDER, FOR SOLUTION INTRAMUSCULAR; INTRAVENOUS at 16:25

## 2021-08-13 RX ADMIN — CEFPODOXIME PROXETIL 100 MG: 100 TABLET, FILM COATED ORAL at 07:26

## 2021-08-13 RX ADMIN — FENTANYL CITRATE 50 MCG: 50 INJECTION, SOLUTION INTRAMUSCULAR; INTRAVENOUS at 16:31

## 2021-08-13 RX ADMIN — ROCURONIUM BROMIDE 10 MG: 10 INJECTION INTRAVENOUS at 16:47

## 2021-08-13 RX ADMIN — ONDANSETRON 4 MG: 2 INJECTION INTRAMUSCULAR; INTRAVENOUS at 17:22

## 2021-08-13 RX ADMIN — OXYCODONE HYDROCHLORIDE 5 MG: 5 TABLET ORAL at 18:34

## 2021-08-13 RX ADMIN — VANCOMYCIN HYDROCHLORIDE 125 MG: 125 CAPSULE ORAL at 20:55

## 2021-08-13 RX ADMIN — TAMSULOSIN HYDROCHLORIDE 0.4 MG: 0.4 CAPSULE ORAL at 07:26

## 2021-08-13 ASSESSMENT — ACTIVITIES OF DAILY LIVING (ADL)
ADLS_ACUITY_SCORE: 15
ADLS_ACUITY_SCORE: 17
ADLS_ACUITY_SCORE: 15

## 2021-08-13 NOTE — PROVIDER NOTIFICATION
Gold cross cover paged RE: OBS 2, EMILIANO Barnes. Pt is requesting to speak with someone from medicine team about her surgery tomorrow, also expresses concern about PNT site leaking. Thanks!   JAMES Zuniga [phone]

## 2021-08-13 NOTE — PROGRESS NOTES
[facility]EAS[facility] PROGRESS NOTE     Patient:  Kelly Barnes   Date of birth 1940, Medical record number [id number]  Date of Visit:  08/13/2021  Date of Admission: 8/8/2021  Consult Requester:Maren Trevino MD          Assessment and Plan:   ID Problem List:   1.  Fungemia with yeast, isolated on BCx 8/8, 8/9  2. Fevers, leukocytosis   3. Recurrent renal calculi, R staghorn calculus                -s/p urologic procedure 8/5, culture w/ C glabrata   4. H/o recurrent pyelonephritis               -S/p R PNT and ureteral stent placement  5. Recent C diff colitis 7/23, s/p treatment   6. Diabetes mellitus type II     RECOMMENDATION:    1. Please exchange or remove PNT during urologic procedure this afternoon. If not removed, exchange necessary as yeast likely seeded tubing.  2. Continue Micafungin 100 mg q24 hrs to treat Candida glabrata fungemia. At discharge she can be transitioned to PO Fluconazole 800 mg daily to complete her treatment course. Would leave on IV surrounding and after procedure in case further yeast mobilized into blood stream.   3. Will plan for a 14 day total course of antifungals from first negative blood culture. Currently her first negative is 8/10 which would end her course after 8/24. However this may need to be adjusted if any further BCx turn positive.   4. Please plan to draw a paired set of blood cultures POST urologic procedure. No need for further AM BCx.  5. Continue PO Cefpodoxime 100 mg BID for 2 days post urologic procedure (through 8/15) then can discontinue.   6. Continue PO Vancomycin BID for C diff ppx while on antibiotics, plan for course through 8/18 then can discontinue (this will be 3 days of coverage post discontinuation of PO antibiotics).   7. Can hold on HELEN at this time given no e/o vegetation on TTE and blood culture clearance.     Infectious diseases will follow peripherally over the weekend. Dr. Garibay (Dr. Dean-Fellow) will be covering the  General ID services over the weekend. Dr. Pablo will take over the Green ID service on Monday.    ASSESSMENT:  Kelly Barnes is a 81 year old female with PMHx significant for HTN, DMII, recurrent pyelonephritis, R staghorn claculus, and h/o C difficile colitis. She had a recent hospitalization 7/13-16/2021 for pyelonephritis with nonobstructing kidney stones s/p cystoscopy and ureteral stent placement (7/15) with discharge home on PO abx; this was followed by repeat admission 7/23 for concern of infected ureteral stent and C diff colitis and discharged home on PO Vanco+PO Cefpodoxoime. She had a recent urologic procedure (percutaenous nephroscopy w/ laser, stone removal with ureteroscopy and PNT placement) 8/5. Presented to the ED 8/8 with reported low grade fevers, chills, suprapubic and RLQ pain.      Afebrile on admission, tachycardic to 120s initially now improved. BP and O2 sats wnl. Continued leukocytosis to 14 (previously 16 on 8/6) and CRP elevated to 100. UA (midstream) with elevated leuk esterase, >185 WBC, WBC clumping and 25 squamous cells (possibly c/w contamination), UCx midstream and R PNT no growth. COVID swab negative. BCx 8/8 (1/2) positive for Candida glabrata on day2. BCx 8/9 (1/2) positive for yeast (not supprised as these were drawn prior to initiation of antifungal tx). BCx 8/10-13 NGTD.  CXR unremarkable. CT AP w/o contrast with R non-obstructing nephrolithiasis which is stable, stable R hydro, unchanged R PNT.      Started on Ceftriaxone, continued on PO vanco. Spiked fever to 101.4 on 8/9 in the afternoon, BCx collected. New positive BCx as above resulted in AM 8/10, discontinued PO fluconazole and started on IV micafungin. Initiated work up for fungemia as above. Echo without vegetations on heart valves, but cannot r/o endocarditis. Ophtho exam perfromed 8/11 without evidence of fungal eye involvement. Will need 1 month ophto follow up. Awaiting clearance of blood cultures. Overall  "patient feeling better daily.     Thank you for this consult. ID will continue to follow.      Patient was discussed with Dr. Onofre.      Caroline Braga PA-C  Infectious Diseases  Pager #[phone]          Interim History and Events:     Feeling well today. Remains afebrile. Ambulating and eating well. Asks good questions about fungemia treatment. No new complaints.         HPI:   Kelly Barnes is a 81 year old female with PMHx significant for HTN, DMII, recurrent pyelonephritis, R staghorn claculus, and h/o C difficile colitis. She had a recent hospitalization 7/13-16/2021 for pyelonephritis with non obstructing kidney stones s/p cystoscopy and ureteral stent placement (7/15) with discharge home on PO abx; this was followed by repeat admission 7/23 for concern of infected ureteral stent and C diff colitis and discharged home on PO Vanco+PO Cefpodoxoime. She had a recent urologic procedure (percutaenous nephroscopy w/ laser, stone removal with ureteroscopy and PNT placement) 8/5. Presented to the ED 8/8 with reported low grade fevers, chills, suprapubic and RLQ pain.      States she felt \"feverish\" on discharge from urologic procedure 8/6 but did not have a measured fever. Felt \"unwell\" up until 8/8 when she presented to the ED after having a measured temp at home of 100.8. She endorsed continued suprapubic and RLQ cramping pain since urologic prodedure 8/5 and leaking of urine from around PNT tube insertion site. Denies URI sx, cough, SOB, chest pain, n/v, dysuria, frequency/urgency. Diarrhea resolved before previous hospital discharge, having normal BMs now. Notes that tylenol helps RLQ/suprapubic abdominal cramping for about 4 hrs at a time. Feels better since admission today although was unsure if she felt at her baseline and voiced some uneasiness in discharging home to early.      [place] Has been taking Cefpodoxime 100 mg BID and PO Vanco 125 mg BID at home PTA.      Recent previous " Cx hx:   -Calculus R kidney growth of Candida glabrata  -UCx 7/23 and 25 mixed UGF   -UCx 7/13 & 15 E coli  (R Amp/sulb, Amp, Cipro, levo, Susceptible to cephalosporins, macrobid, Zosyn, Bactrim).   -UCx 12/2020 E coli (same sens as above)         ROS:   -Focused 5 point ROS completed, pertinent positives and negatives listed above.    Physical Examination:  Temp: 98.8  F (37.1  C) Temp src: Oral BP: (!) 130/105 Pulse: 83   Resp: 16 SpO2: 99 % O2 Device: None (Room air)      Vitals:    08/09/21 0202   Weight: 69.6 kg (153 lb 8 oz)       Physical Examination:  Constitutional: Pleasant female seen sitting up in bed, in NAD. Alert and interactive.   HEENT: NCAT, anicteric sclerae, conjunctiva clear. Moist mucous membranes.  Respiratory: Non-labored breathing on RA.  GI:  Abdomen is soft, non-distended.  Skin: Warm and dry. No rashes or lesions on exposed surfaces.  Musculoskeletal: Extremities grossly normal. LLE>RLE peripheral edema, stable at baseline per pt.  Neurologic: A &O x3, speech normal, answering questions appropriately. Moves all extremities spontaneously. Grossly non-focal.  Neuropsychiatric: Mentation and affect normal/appropriate.  VAD: PIV    Medications:    [Auto Hold] cefpodoxime  100 mg Oral BID     [Held by provider] enoxaparin ANTICOAGULANT  40 mg Subcutaneous Q24H     [Auto Hold] insulin aspart  1-7 Units Subcutaneous TID AC     [Auto Hold] insulin aspart  1-5 Units Subcutaneous At Bedtime     [Held by provider] losartan-hydrochlorothiazide  1 tablet Oral QAM     [Auto Hold] micafungin  100 mg Intravenous Q24H     [Auto Hold] simvastatin  10 mg Oral At Bedtime     sodium chloride (PF)  3 mL Intracatheter Q8H     [Auto Hold] tamsulosin  0.4 mg Oral QAM     [Auto Hold] tolterodine ER  4 mg Oral QAM     [Auto Hold] vancomycin  125 mg Oral BID       Infusions/Drips:    lactated ringers         Laboratory Data:   No results found for: ACD4    Inflammatory Markers    Recent Labs   Lab Test  08/10/21  0533 08/09/21  0558 07/25/21  0839 12/21/20  0705 12/19/20  2307 03/02/16  0837 02/29/16  0828   SED  --   --   --   --  30 15 15   .0* 100.0* 130.0* 63.0* 140.0* 110.0* 36.0*       Metabolic Studies       Recent Labs   Lab Test 08/13/21  1107 08/13/21  1035 08/13/21  0730 08/12/21  2143 08/12/21  1748 08/12/21  1332 08/12/21  0639 08/11/21  0534 08/10/21  1751 08/10/21  0533 08/09/21  1711 08/08/21  2025 08/08/21  2023 08/06/21  0607 07/25/21  1142 07/25/21  0839 07/15/21  1342 07/15/21  0839 07/13/21  0724 07/13/21  0721   NA  --  136  --   --   --   --  136 133  --  137  --   --  133 134   < > 131*   < > 138   < > 133   POTASSIUM  --  3.0*  --   --   --   --  3.5 3.5 3.6 3.1*  --   --  3.4 4.2   < > 3.0*   < > 4.2   < > 3.8   CHLORIDE  --  101  --   --   --   --  100 101  --  107  --   --  101 105   < > 101   < > 107   < > 100   CO2  --  28  --   --   --   --  28 21  --  23  --   --  25 22   < > 21   < > 23   < > 25   ANIONGAP  --  7  --   --   --   --  8 11  --  7  --   --  7 7   < > 9   < > 8   < > 8   BUN  --  9  --   --   --   --  7 7  --  7  --   --  14 23   < > 8   < > 16   < > 20   CR  --  1.01  --   --   --   --  0.94 0.93  --  0.86  --   --  0.97 0.99   < > 0.93   < > 1.03   < > 1.14*   GFRESTIMATED  --  53*  --   --   --   --  57* 58*  --  64  --   --  55* 54*   < > 58*   < > 51*   < > 46*   * 228* 256* 291* 295* 259* 209* 184*  --  186*  --   --  269* 289*   < > 265*   < > 166*   < > 205*   A1C  --   --   --   --   --   --   --   --   --   --   --   --   --   --   --   --   --   --   --  7.4*   JAN  --  8.5  --   --   --   --  8.7 8.5  --  7.8*  --   --  8.8 7.9*   < > 8.0*   < > 8.5   < > 8.4*   MAG  --   --   --   --   --   --   --   --   --   --   --   --   --   --   --  1.8  --  2.4*   < >  --    LACT  --   --   --   --   --   --   --   --   --   --  1.0 1.1  --   --   --   --    < >  --   --   --     < > = values in this interval not displayed.       Hepatic Studies     Recent Labs   Lab Test 08/11/21  0534 08/10/21  0533 08/08/21 2023 07/23/21  0208 07/13/21  0721 12/19/20  2307   BILITOTAL 0.3 0.4 0.4 0.5 0.8 0.5   ALKPHOS 77 63 81 108 84 87   ALBUMIN 2.2* 2.0* 2.7* 3.0* 3.5 3.1*   AST 13 12 11 12 18 15   ALT 17 14 20 26 20 19       Pancreatitis testing    Recent Labs   Lab Test 07/23/21  0208 07/13/21  0723 05/11/21  0724 10/07/20  0831 10/15/19  0920 10/25/18  0920 03/28/17  0941 10/27/16  0825   LIPASE 71* 76  --   --   --   --   --   --    TRIG  --   --  68 90 90 76 83 93       Hematology Studies      Recent Labs   Lab Test 08/13/21  1035 08/12/21  0639 08/11/21  0534 08/10/21  0533 08/08/21 2024 08/06/21  0607 12/21/20  0705 12/20/20  0654 12/19/20  2307 12/19/20  1110 07/03/19  1734 03/28/17  0941 03/04/16  0930   WBC 9.0 11.4* 14.1* 12.1* 14.0* 16.5* 9.2   < > 17.7* 18.2* 11.3* 7.1 12.8*   ANEU  --   --   --   --   --   --  7.1  --  16.1* 16.0* 9.6* 4.9 10.8*   ALYM  --   --   --   --   --   --  1.1  --  0.5* 1.1 0.8 1.6 0.8   GINETTE  --   --   --   --   --   --  0.9  --  1.0 1.0 0.8 0.5 1.2   AEOS  --   --   --   --   --   --  0.1  --  0.0 0.0 0.1 0.1 0.0   HGB 10.4* 11.8 11.3* 10.3* 12.4 10.9* 11.3*   < > 12.8 14.9 13.2 14.7 14.1   HCT 32.1* 36.1 34.5* 31.4* 37.6 32.9* 34.8*   < > 38.7 44.5 39.3 43.8 40.7    319 293 279 315 262 287   < > 330 364 288 347 332    < > = values in this interval not displayed.       Arterial Blood Gas Testing    Recent Labs   Lab Test 08/08/21 2025   PH 7.47*        Urine Studies     Recent Labs   Lab Test 08/08/21 2028 07/25/21  1230 07/23/21  0140 07/13/21  0724 12/19/20  2242   URINEPH 5.5 6.0 5.0 5.5 5.5   NITRITE Negative Negative Negative Positive* Positive*   LEUKEST Large* Large* Large* Large* Large*   WBCU >182* >182* 50* >100* >182*       Microbiology:  Culture   Date Value Ref Range Status   08/12/2021 No growth after 1 day  Preliminary   08/12/2021 No growth after 1 day  Preliminary   08/11/2021 No growth after 2 days   Preliminary   08/11/2021 No growth after 2 days  Preliminary   08/10/2021 No growth after 2 days  Preliminary   08/10/2021 No growth after 2 days  Preliminary   08/09/2021 No growth after 3 days  Preliminary   08/09/2021 Positive on the 3rd day of incubation (A)  Preliminary   08/09/2021 Candida glabrata complex (A)  Preliminary     Comment:     1 of 2 bottles   08/08/2021 Positive on the 2nd day of incubation (A)  Final   08/08/2021 Candida glabrata complex (A)  Final     Comment:     1 of 2 bottles   08/08/2021 10,000-50,000 CFU/mL Candida glabrata complex (A)  Final     Comment:     Susceptibilities not routinely done   08/08/2021 No Growth  Final   08/08/2021 No Growth  Final   08/08/2021 No growth after 4 days  Preliminary   08/05/2021 1+ Candida glabrata complex (A)  Corrected     Comment:     Susceptibilities not routinely done  Susceptibility testing requested by Caroline Braga pager [phone] for a standard panel. Spoke with Caroline, susceptibilities will be performed from blood culture instead   07/25/2021 10,000-50,000 CFU/mL Mixture of urogenital erum  Final   07/23/2021 No Growth  Final   07/23/2021 No Growth  Final   07/23/2021 <10,000 CFU/mL Urogenital erum  Final   07/15/2021 Culture in progress  Final   07/15/2021 10,000-50,000 CFU/mL Escherichia coli (A)  Final   07/15/2021 <1,000 CFU/mL Urogenital erum  Final   07/13/2021 No Growth  Final   07/13/2021 No Growth  Final   07/13/2021 >100,000 CFU/mL Escherichia coli (A)  Final       Last check of C difficile  C Difficile Toxin B by PCR   Date Value Ref Range Status   07/23/2021 Positive (A) Negative Final     Comment:     Detection of C. difficile nucleic acid in stools confirms the presence of these organisms in diarrheal patients but may not indicate that C. difficile are the etiologic agents of the diarrhea. Results from the Xpert C. difficile assay should be interpreted in conjunction with other laboratory and clinical data available to the  clinician.       IMAGING     8/8 CT AP w/o contrast   IMPRESSION:   1. Unchanged position of the right percutaneous nephrostomy tube.   2. Right nephroureteral 5Fr catheter terminates in the distal right  ureter, short of the urinary bladder.  Stable mild right  hydroureteronephrosis.  2. Unchanged renal calculi in the inferior pole of the right kidney.  No new renal calculi.    ECHO:   8/10   Interpretation Summary  No vegetation visualized, however inferctive endocarditis cannot be excluded.  Left ventricular function is normal.The ejection fraction is 60-65%. Grade II  or moderate diastolic dysfunction.  Right ventricular function, chamber size, wall motion, and thickness are  normal.  Estimated RA pressure of 3 mmHg.     Compared to prior study on 5/3/2017 no significant change.

## 2021-08-13 NOTE — OP NOTE
OPERATIVE REPORT=    PREOPERATIVE DIAGNOSIS:    Large right renal calculus (>2cm total, stones of 11 and 14 mm )        POSTOPERATIVE DIAGNOSIS: Same    PROCEDURES:    Cystoscopy    Removal of right ureteral catheter    Right retrograde pyelogram    Right percutaneous access tract dilation    Right ureteroscopy with holmium laser lithotripsy of stone    Right percutaneous nephrolithotomy for stone > 2cm     Exchange of right nephrostomy tube    Antegrade right nephrostogram    SURGEON: Dr. Kirk Law MD present and participated for the entirety of the procedure  RESIDENT SURGEON: Silva Miramontes MD      ESTIMATED BLOOD LOSS: 10 mL.     SPECIMENS: None  DRAINS:  Right 10fr nephrostomy tube, 16fr torres catheter.  COMPLICATIONS: None    SIGNIFICANT FINDINGS:     Visually stone free at the end of the case    OPERATIVE INDICATIONS:  Kelly Barnes who is a 81 year old female here for second look percutaneous nephrolithotomy, recently underwent first PCNL 8/5/21. Post operative CT scan showed some residual stone fragments cumulatively about 2 cm in lower pole missed calyces. She elected to proceed with percutaneous nephrolithotomy.  She is aware of the risks of surgery.    OPERATIVE DETAILS: After informed consent was obtained, the patient was taken back to the operating room. Anesthesia was induced and the patient was intubated. IV culture directed antibiotics were given and bilateral sequential compression devices were placed. Prep and drape was done in the usual fashion. A timeout was taken to ensure proper patient, placement and procedure.     The patient was then carefully placed in the split leg prone position onto the operating room table ensuring padding of shoulders and all pressure points. The indwelling EVERETT-1 catheter was cannulated with a superstiff wire which passed easily down to the bladder. 8/10 coaxial dilator used to establish access with a second sensor wire. Sheath was advanced into the prior  tract with the assistance of amplatz dilators. Superstiff wire was clamped; two wires were now down to the bladder, one inside the sheath and one outside. This was all done under fluoroscopy.    Flexible cystoscopy was inserted into the sheath and pyeloscopy was performed, though residual stone fragments in two calyces noted on CT unable to be visualized.     Prone cystoscopy was started.  The urethra was open.  The bladder mucosa showed no evidence of any lesions or trabeculation.  There were no stones.  The sensorwire was grasped and pulled out the urethra to establish through and through access. The ureteroscope was advanced over the wire and pyeloscopy from below was performed. Two adjacent calcyes with significant stone burden were noted, consistent with CT findings.    The stones were unable to be grasped and brought into the pelvis due to narrow infundibuli; therefore holmium laser 200 micron fiber was used to dust each stone at setting of 1J 10Hz until they were small enough to be brought into the pelvis.     The cystoscope was inserted from above through the sheath and all remaining stone fragments were grasped with a basket and removed through the sheath.     Systematic pyeloscopy was performed with no stones noted >2mm. Pullback ureteroscopy was performed with no evidence of ureteral injury or residual stone >2mm within the ureter.  We placed a 10 Taiwanese cope loop nephrostomy tube into the kidney which we ensured had a full curl in upper pole.  We performed an antegrade nephrostogram confirming the tube was in the appropriate position and that there was no extravasation. Blanchard catheter was placed with 10 ml instilled in balloon.  We secured the tube to the skin. We placed a clean dressing over the wound and held several minutes or pressure ensuring there was no significant bleeding.  At this point, the patient was carefully placed back into the supine position, awakened from anesthesia and extubated. She  was transferred to the PACU in stable condition. She tolerated the procedure well without complication.      PLAN:      -Transfer back to medicine.  -No need for further imaging. Pending patient does clinically well overnight with AM labs WNL, okay to perform methylene blue challenge in AM and remove torres. Urology will perform.  -Discharge on antibiotics per ID.    Silva Miramontes MD  Urology Resident    I, Kirk Law, was present and participatory for the entirety of the procedure

## 2021-08-13 NOTE — PROGRESS NOTES
[facility]    Medicine Progress Note - Hospitalist Service       Date of Admission:  8/8/2021    Assessment & Plan            Kelly Barens is a 81 year old female with PMHx of HTN, HLD, anemia, DM2, recurrent pyelonephritis, R staghorn calculus, recently hospitalized 7/13-7/16 due to pyelonephritis and nonobstructive kidney stone s/p cytoscopy and ureteral stent placement 7/15 with subsequent PNT placement on 8/5, and hospitalized again from 7/23 - 7/27 with C Diff infection (on oral vancomycin currently), and GERSON, who presented with fevers and abdominal pain admitted on 8/8/2021 found to have fungemia.     # Fungemia with Candida glabrata complex   # Hx recurrent pyelonephritis s/p R PNT and ureteral stent placement  Recently hospitalized 7/13-7/16 with pyelonephritis and non-obstructive R kidney stone s/p cystoscopy and ureteral stent placement on 7/15, discharged on vantin, then then subsequently underwent percutaneous nephrolithotomy, ureteroscopy s/p R PNT on 8/5 with cultures from stone growing Candida glabrata complex. Presenting with fevers, leukocytosis and grossly positive UA. Lactic flat. CT A/P showed no evidence of abscess; kidney was unremarkable and ureteral stent was in place. Blood cultures on 8/8 and 8/9 and urine Cx on 8/8 growing candida glabrata. TTE without any vegetations.   - ID and urology consulted, appreciate recs   - Continue cefpodoxime 100 mg BID for ppx until procedure  - Continue IV micafungin 100 mg Q24Hrs  - Ophthalmology consulted, no evident of fungal eye involvement, but recommend outpatient follow up in 1 month with repeat eye exam   - Follow-up urine cultures, repeat blood cultures and sensitivities    - Pain control, Tylenol PRN and oxycodone 5 mg q6h PRN  - continue tamsulosin 0.4 mg qAM  - Urology taking patient to OR for PCNL today     # Hypokalemia - Potassium 3.0 this AM. Replete per sliding scale nursing protocol.     #  Recent C difficile infection  Dx on 7/23 C diff colitis, s/p Vancomycin for 10 days.   - ID following; appreciate recs:   - continue PO vancomycin 125 mg BID until antibiotics are discontinued    # DM2 - Hemoglobin A1c 7.4 on 7/13/21. PTA glimepiride 2 mg BID.   - Hold Glimepiride   - sliding scale insulin TIDAC  - hypoglycemia protocol    # Overactive Bladder - continue PTA tolterodine ER 4 mg Daily   # HTN - Hold PTA Losartan/HCTZ 50-12.5 mg daily today   # HLD - continue home Simvastatin 10 mg at bedtime. Holding PTA ASA         Diet: NPO per Anesthesia Guidelines for Procedure/Surgery Except for: Meds    DVT Prophylaxis: Enoxaparin (Lovenox) subcutaneous on hold for procedure   Blanchard Catheter: Not present  Central Lines: None  Code Status: Full Code      Disposition Plan   Expected discharge: 08/16/2021 recommended to prior living arrangement once adequate pain management/ tolerating PO medications and antibiotic plan established.     The patient's care was discussed with the Attending Physician, Dr. Saud Dockery, Bedside Nurse, Care Coordinator/ and Patient.    Lea Hunt PA-C  Hospitalist Service  [facility]  Securely message with the Vocera Web Console (learn more here)  Text page via [email or web address] Paging/Directory  Please see sign in/sign out for up to date coverage information    Clinically Significant Risk Factors Present on Admission               ______________________________________________________________________    Interval History   Patient reports feeling well today. Denies any F/C, CP, SOB, N/V, or abdominal pain. Had 2 soft BM overnight.     Data reviewed today: I reviewed all medications, new labs and imaging results over the last 24 hours.    Physical Exam   Vital Signs: Temp: 98.6  F (37  C) Temp src: Oral BP: 126/70 Pulse: 83   Resp: 16 SpO2: 99 % O2 Device: None (Room air)    Weight: 153 lbs 8 oz  GENERAL: Alert and oriented x 3. NAD.  Pleasant and conversational  HEENT: AT/NC. Anicteric sclera. Mucous membranes moist   CARDIOVASCULAR: RRR. S1, S2. No murmurs, rubs, or gallops.   RESPIRATORY: Effort normal on RA. Clear to auscultation bilaterally, no rales, rhonchi or wheezes, respirations unlabored   GI: Abdomen soft, non-tender abdomen without rebound or guarding, normoactive bowel sounds present  Back: R PNT in place draining clear urine.   EXTREMITIES: Non-pitting peripheral edema.  NEUROLOGICAL: CN II-XII grossly intact. Moving all extremities symmetrically.   SKIN: Intact. Warm and dry. No jaundice.    Data   Recent Labs   Lab 08/13/21  1107 08/13/21  1035 08/13/21  0730 08/12/21  0639 08/11/21  0534 08/10/21  0651 08/10/21  0533 08/08/21 2024 08/08/21 2023   WBC  --  9.0  --  11.4* 14.1*  --  12.1*  --   --    HGB  --  10.4*  --  11.8 11.3*  --  10.3*  --   --    MCV  --  84  --  84 85  --  84  --   --    PLT  --  339  --  319 293  --  279  --   --    INR  --   --   --   --   --   --   --   --  0.99   NA  --  136  --  136 133  --  137  --  133   POTASSIUM  --  3.0*  --  3.5 3.5   < > 3.1*  --  3.4   CHLORIDE  --  101  --  100 101  --  107  --  101   CO2  --  28  --  28 21  --  23  --  25   BUN  --  9  --  7 7  --  7  --  14   CR  --  1.01  --  0.94 0.93  --  0.86  --  0.97   ANIONGAP  --  7  --  8 11  --  7  --  7   JAN  --  8.5  --  8.7 8.5  --  7.8*  --  8.8   * 228* 256* 209* 184*  --  186*   < > 269*   ALBUMIN  --   --   --   --  2.2*  --  2.0*  --  2.7*   PROTTOTAL  --   --   --   --  6.7*  --  5.9*  --  7.0   BILITOTAL  --   --   --   --  0.3  --  0.4  --  0.4   ALKPHOS  --   --   --   --  77  --  63  --  81   ALT  --   --   --   --  17  --  14  --  20   AST  --   --   --   --  13  --  12  --  11    < > = values in this interval not displayed.     No results found for this or any previous visit (from the past 24 hour(s)).  Medications     lactated ringers         [Auto Hold] cefpodoxime  100 mg Oral BID     [Held by provider]  enoxaparin ANTICOAGULANT  40 mg Subcutaneous Q24H     [Auto Hold] insulin aspart  1-7 Units Subcutaneous TID AC     [Auto Hold] insulin aspart  1-5 Units Subcutaneous At Bedtime     [Held by provider] losartan-hydrochlorothiazide  1 tablet Oral QAM     [Auto Hold] micafungin  100 mg Intravenous Q24H     [Auto Hold] simvastatin  10 mg Oral At Bedtime     sodium chloride (PF)  3 mL Intracatheter Q8H     [Auto Hold] tamsulosin  0.4 mg Oral QAM     [Auto Hold] tolterodine ER  4 mg Oral QAM     [Auto Hold] vancomycin  125 mg Oral BID

## 2021-08-13 NOTE — PROGRESS NOTES
Care Management Follow Up    Inpatient Status    Length of Stay (days): 4    Expected Discharge Date: 08/16/2021? Pending medical stability.      Concerns to be Addressed: discharge planning     Patient plan of care discussed at interdisciplinary rounds: Yes    Anticipated Discharge Disposition: Home     Anticipated Discharge Services:  IV antibiotics?   Anticipated Discharge DME:  None    Referrals Placed by CM/SW:  [facility]    Additional Information:  Pt was referred to [facility] for IV Micafungin; [facility] cost is $241.00 per day.   Today, informed by JAMES Wylie [facility] Liaison, they contacted [facility] for their costs. (I did not update pt with this information).   [facility] 69.71/day  [facility] 58.87/day  _____________________________________________    Spoke with pt's nurse this AM; pt is ambulatory, emptying PNT on her own. Plan is surgery this afternoon.   This afternoon Infectious Disease recommending changing to oral Fluconazole at discharge.         Terra Hsu RN Care Coordinator  [address], [facility]

## 2021-08-13 NOTE — PROVIDER NOTIFICATION
08/13/21 1747   Arrived From   Arrived From OR   Mode of Transport Cart     Patient accompanied by OR RN and CRNA.  Patient's identity verified, bedside report received..

## 2021-08-13 NOTE — PLAN OF CARE
Report given to preop RN @ 2545.  Surgical prep performed by writer.  Pt transported via hospital transport and wheelchair to .

## 2021-08-13 NOTE — ANESTHESIA PROCEDURE NOTES
Airway       Patient location during procedure: OR       Procedure Start/Stop Times: 8/13/2021 3:34 PM  Staff -        CRNA: Scotty Lopez APRN CRNA       Performed By: CRNA  Consent for Airway        Urgency: elective  Indications and Patient Condition       Indications for airway management: demetris-procedural       Induction type:intravenous       Mask difficulty assessment: 1 - vent by mask    Final Airway Details       Final airway type: endotracheal airway       Successful airway: ETT - single  Endotracheal Airway Details        Successful intubation technique: direct laryngoscopy       DL Blade Type: MAC 3       Grade View of Cords: 2       Adjucts: stylet    Post intubation assessment        Placement verified by: capnometry and equal breath sounds        Number of attempts at approach: 1       Secured with: silk tape       Ease of procedure: easy       Dentition: Intact

## 2021-08-13 NOTE — PLAN OF CARE
"Shift: 9761-2034    Neuro: A&Ox4, able to make needs known. T-max this shift, 99.4.   Cardiac: WNL, denies chest pain  Respiratory: WNL, denies SOB  GI/: R nephrostomy tube, bag was leaking, changed.   Diet/appetite: NPO since midnight  Activity: up ad linda  Pain: denies  Skin: no new deficits noted  Lines: R nephrostomy, PIV R forearm    Vitals: BP (!) 140/72 (BP Location: Right arm)   Pulse 79   Temp 98.8  F (37.1  C) (Oral)   Resp 18   Ht 1.575 m (5' 2\")   Wt 69.6 kg (153 lb 8 oz)   SpO2 96%   BMI 28.08 kg/m      Pt was frustrated that surgery team had not come to talk to her yesterday to confirm surgery today and answer questions. Nurse paged gold cross cover, busy and would need to page urology for questions. Pt said it would be fine to speak with team this morning instead.     Pt refused 0300 vitals and BG check    Plan:  - PCNL procedure today, scheduled for 1:15PM, possible replacement of nephrostomy tube.   - Pt has been NPO since midnight    Nephrostomy site dressing: [place] 9823 fecal collection bag (order #[id number])  "

## 2021-08-13 NOTE — ANESTHESIA CARE TRANSFER NOTE
Patient: Kelly Barnes    Procedure(s):  Ureteroscopic assisted secondary right percutaneous nephrolihtotomy, Holmium laser lithotriipsy    Diagnosis: Kidney stone [N20.0]  Diagnosis Additional Information: No value filed.    Anesthesia Type:   General     Note:    Oropharynx: oropharynx clear of all foreign objects  Level of Consciousness: awake  Oxygen Supplementation: face mask  Level of Supplemental Oxygen (L/min / FiO2): 6  Independent Airway: airway patency satisfactory and stable  Dentition: dentition unchanged  Vital Signs Stable: post-procedure vital signs reviewed and stable  Report to RN Given: handoff report given  Patient transferred to: PACU    Handoff Report: Identifed the Patient, Identified the Reponsible Provider, Reviewed the pertinent medical history, Discussed the surgical course, Reviewed Intra-OP anesthesia mangement and issues during anesthesia, Set expectations for post-procedure period and Allowed opportunity for questions and acknowledgement of understanding      Vitals:  Vitals Value Taken Time   /69 08/13/21 1747   Temp     Pulse 84 08/13/21 1757   Resp 12 08/13/21 1757   SpO2 97 % 08/13/21 1757   Vitals shown include unvalidated device data.    Electronically Signed By: APRIL Prakash CRNA  August 13, 2021  5:58 PM

## 2021-08-13 NOTE — PLAN OF CARE
SHIFT 0700 - 1930    Patient A&Ox4, no deficits noted.  VSS on RA.  Denies pain, SOB, CP, or N/V.  BGs >200's.  Insulin administered.  PNT in place, CDI, draining yellow color.  Mild +2 edema present in LE bilaterally.  Pt up ad linda, ambulated halls as tolerated prior to surgery.  Pt off floor since 1330 to OR.

## 2021-08-14 ENCOUNTER — APPOINTMENT (OUTPATIENT)
Dept: GENERAL RADIOLOGY | Facility: CLINIC | Age: 81
DRG: 982 | End: 2021-08-14
Attending: PHYSICIAN ASSISTANT
Payer: COMMERCIAL

## 2021-08-14 LAB
ANION GAP SERPL CALCULATED.3IONS-SCNC: 7 MMOL/L (ref 3–14)
BACTERIA BLD CULT: NO GROWTH
BUN SERPL-MCNC: 9 MG/DL (ref 7–30)
CALCIUM SERPL-MCNC: 8.3 MG/DL (ref 8.5–10.1)
CHLORIDE BLD-SCNC: 99 MMOL/L (ref 94–109)
CO2 SERPL-SCNC: 27 MMOL/L (ref 20–32)
CREAT SERPL-MCNC: 1.03 MG/DL (ref 0.52–1.04)
ERYTHROCYTE [DISTWIDTH] IN BLOOD BY AUTOMATED COUNT: 14.1 % (ref 10–15)
GFR SERPL CREATININE-BSD FRML MDRD: 51 ML/MIN/1.73M2
GLUCOSE BLD-MCNC: 239 MG/DL (ref 70–99)
GLUCOSE BLDC GLUCOMTR-MCNC: 193 MG/DL (ref 70–99)
GLUCOSE BLDC GLUCOMTR-MCNC: 220 MG/DL (ref 70–99)
GLUCOSE BLDC GLUCOMTR-MCNC: 222 MG/DL (ref 70–99)
GLUCOSE BLDC GLUCOMTR-MCNC: 223 MG/DL (ref 70–99)
GLUCOSE BLDC GLUCOMTR-MCNC: 234 MG/DL (ref 70–99)
GLUCOSE BLDC GLUCOMTR-MCNC: 277 MG/DL (ref 70–99)
GLUCOSE BLDC GLUCOMTR-MCNC: 310 MG/DL (ref 70–99)
HCT VFR BLD AUTO: 34.1 % (ref 35–47)
HGB BLD-MCNC: 10.9 G/DL (ref 11.7–15.7)
HOLD SPECIMEN: NORMAL
MCH RBC QN AUTO: 26.8 PG (ref 26.5–33)
MCHC RBC AUTO-ENTMCNC: 32 G/DL (ref 31.5–36.5)
MCV RBC AUTO: 84 FL (ref 78–100)
PLATELET # BLD AUTO: 394 10E3/UL (ref 150–450)
POTASSIUM BLD-SCNC: 3.6 MMOL/L (ref 3.4–5.3)
POTASSIUM BLD-SCNC: 3.6 MMOL/L (ref 3.4–5.3)
RBC # BLD AUTO: 4.06 10E6/UL (ref 3.8–5.2)
SODIUM SERPL-SCNC: 133 MMOL/L (ref 133–144)
WBC # BLD AUTO: 9.5 10E3/UL (ref 4–11)

## 2021-08-14 PROCEDURE — 74018 RADEX ABDOMEN 1 VIEW: CPT

## 2021-08-14 PROCEDURE — 74018 RADEX ABDOMEN 1 VIEW: CPT | Mod: 26 | Performed by: RADIOLOGY

## 2021-08-14 PROCEDURE — 36415 COLL VENOUS BLD VENIPUNCTURE: CPT | Performed by: STUDENT IN AN ORGANIZED HEALTH CARE EDUCATION/TRAINING PROGRAM

## 2021-08-14 PROCEDURE — 258N000003 HC RX IP 258 OP 636: Performed by: PHYSICIAN ASSISTANT

## 2021-08-14 PROCEDURE — 250N000013 HC RX MED GY IP 250 OP 250 PS 637: Performed by: PHYSICIAN ASSISTANT

## 2021-08-14 PROCEDURE — 80048 BASIC METABOLIC PNL TOTAL CA: CPT | Performed by: INTERNAL MEDICINE

## 2021-08-14 PROCEDURE — 120N000002 HC R&B MED SURG/OB UMMC

## 2021-08-14 PROCEDURE — 99207 PR APP CREDIT; MD BILLING SHARED VISIT: CPT | Performed by: PHYSICIAN ASSISTANT

## 2021-08-14 PROCEDURE — 999N000128 HC STATISTIC PERIPHERAL IV START W/O US GUIDANCE

## 2021-08-14 PROCEDURE — 84132 ASSAY OF SERUM POTASSIUM: CPT | Performed by: STUDENT IN AN ORGANIZED HEALTH CARE EDUCATION/TRAINING PROGRAM

## 2021-08-14 PROCEDURE — 99232 SBSQ HOSP IP/OBS MODERATE 35: CPT | Performed by: STUDENT IN AN ORGANIZED HEALTH CARE EDUCATION/TRAINING PROGRAM

## 2021-08-14 PROCEDURE — 250N000013 HC RX MED GY IP 250 OP 250 PS 637: Performed by: INTERNAL MEDICINE

## 2021-08-14 PROCEDURE — 85027 COMPLETE CBC AUTOMATED: CPT | Performed by: STUDENT IN AN ORGANIZED HEALTH CARE EDUCATION/TRAINING PROGRAM

## 2021-08-14 PROCEDURE — 250N000011 HC RX IP 250 OP 636: Performed by: PHYSICIAN ASSISTANT

## 2021-08-14 PROCEDURE — 250N000011 HC RX IP 250 OP 636: Performed by: STUDENT IN AN ORGANIZED HEALTH CARE EDUCATION/TRAINING PROGRAM

## 2021-08-14 PROCEDURE — 36415 COLL VENOUS BLD VENIPUNCTURE: CPT | Performed by: INTERNAL MEDICINE

## 2021-08-14 PROCEDURE — 99207 PR CDG-MDM COMPONENT: MEETS MODERATE - DOWN CODED: CPT | Performed by: STUDENT IN AN ORGANIZED HEALTH CARE EDUCATION/TRAINING PROGRAM

## 2021-08-14 RX ORDER — VANCOMYCIN HYDROCHLORIDE 125 MG/1
125 CAPSULE ORAL 2 TIMES DAILY
Qty: 6 CAPSULE | Refills: 0
Start: 2021-08-15 | End: 2021-08-16

## 2021-08-14 RX ORDER — GLIMEPIRIDE 2 MG/1
2 TABLET ORAL 2 TIMES DAILY
Status: DISCONTINUED | OUTPATIENT
Start: 2021-08-14 | End: 2021-08-17 | Stop reason: HOSPADM

## 2021-08-14 RX ORDER — FLUCONAZOLE 200 MG/1
800 TABLET ORAL DAILY
Qty: 40 TABLET | Refills: 0 | Status: SHIPPED | OUTPATIENT
Start: 2021-08-15 | End: 2021-08-16

## 2021-08-14 RX ORDER — LOSARTAN POTASSIUM AND HYDROCHLOROTHIAZIDE 12.5; 5 MG/1; MG/1
1 TABLET ORAL EVERY MORNING
Status: DISCONTINUED | OUTPATIENT
Start: 2021-08-14 | End: 2021-08-17 | Stop reason: HOSPADM

## 2021-08-14 RX ORDER — POTASSIUM CHLORIDE 750 MG/1
40 TABLET, EXTENDED RELEASE ORAL ONCE
Status: COMPLETED | OUTPATIENT
Start: 2021-08-14 | End: 2021-08-14

## 2021-08-14 RX ORDER — CEFPODOXIME PROXETIL 100 MG/1
100 TABLET, FILM COATED ORAL 2 TIMES DAILY
Refills: 0
Start: 2021-08-14 | End: 2021-08-16

## 2021-08-14 RX ADMIN — MICAFUNGIN SODIUM 100 MG: 50 INJECTION, POWDER, LYOPHILIZED, FOR SOLUTION INTRAVENOUS at 10:38

## 2021-08-14 RX ADMIN — TOLTERODINE 4 MG: 4 CAPSULE, EXTENDED RELEASE ORAL at 07:49

## 2021-08-14 RX ADMIN — VANCOMYCIN HYDROCHLORIDE 125 MG: 125 CAPSULE ORAL at 20:52

## 2021-08-14 RX ADMIN — METHYLENE BLUE 10 ML: 5 INJECTION INTRAVENOUS at 10:20

## 2021-08-14 RX ADMIN — VANCOMYCIN HYDROCHLORIDE 125 MG: 125 CAPSULE ORAL at 07:49

## 2021-08-14 RX ADMIN — GLIMEPIRIDE 2 MG: 2 TABLET ORAL at 20:50

## 2021-08-14 RX ADMIN — POTASSIUM CHLORIDE 40 MEQ: 750 TABLET, EXTENDED RELEASE ORAL at 02:04

## 2021-08-14 RX ADMIN — GLIMEPIRIDE 2 MG: 2 TABLET ORAL at 11:28

## 2021-08-14 RX ADMIN — TAMSULOSIN HYDROCHLORIDE 0.4 MG: 0.4 CAPSULE ORAL at 07:49

## 2021-08-14 RX ADMIN — LOSARTAN POTASSIUM AND HYDROCHLOROTHIAZIDE 1 TABLET: 50; 12.5 TABLET, FILM COATED ORAL at 10:38

## 2021-08-14 RX ADMIN — CEFPODOXIME PROXETIL 100 MG: 100 TABLET, FILM COATED ORAL at 07:49

## 2021-08-14 RX ADMIN — CEFPODOXIME PROXETIL 100 MG: 100 TABLET, FILM COATED ORAL at 20:49

## 2021-08-14 ASSESSMENT — ACTIVITIES OF DAILY LIVING (ADL)
ADLS_ACUITY_SCORE: 16
ADLS_ACUITY_SCORE: 17
ADLS_ACUITY_SCORE: 17
ADLS_ACUITY_SCORE: 16

## 2021-08-14 NOTE — PROGRESS NOTES
Brief urology note:  Patient failed methylene blue trial. Will attempt clamp trial. PNT is clamped, if patient develops flank pain will unclamp. If no flank develops overnight, likely will remove in the morning.    Oumar Hamlin  PGY-2  [phone]

## 2021-08-14 NOTE — PLAN OF CARE
"SHIFT 0700 - 1930    Patient A&Ox4.  VSS on RA.  Pt is tolerating regular diet.  BG levels this shift >200's.  Started on glimepiride and tolerated first dose well.  Up ad linda independently.  Methylene blue challenge completed by urology ~1030.  Pt voided yellow color 3x, no evident of blue-colored urine.  Pt reported copious amounts of blue-colored drainage from site.  Dressing changed with gauze applied and tegaderm placed.  Pt reported \"feeling a fever coming on.\"  Temp at 99.3.  Tylenol offered.  Pt decided to wait before taking tylenol.  Pt also reported having lower abdominal cramping above perineal area.  Declined oxycodone at this time.  Urology text paged.  XR revealed PNT is in place.  Pt's dressing removed and switched to ostomy bag to collect drainage from site.  Ostomy bag collected 150 mL of clear-yellow drainage from site.  Pt also stated \"feeling worse than before.\"  Pt reported that she lives with her son and did not have anyone to assist with her dressing changes if needed when she returns home.  Pt appears anxious and expresses frustration \"not knowing what is going on,\" but is agreeable to the plan.  Writer provided active listening and emotional support to pt.      /79 (BP Location: Left arm)   Pulse 93   Temp 98.6  F (37  C) (Oral)   Resp 18   Ht 1.575 m (5' 2\")   Wt 69.6 kg (153 lb 8 oz)   SpO2 98%   BMI 28.08 kg/m        "

## 2021-08-14 NOTE — PROGRESS NOTES
Page out to provider    Re: Kelly Barnes CIARAN 1940  Patients nephrostomy dressing is supersaturated with serosanguinous drainage. She still has output in the nephrostomy bag. I'm jut concerned about the leaking/bypass? VSS, no pain.  RN [id number]

## 2021-08-14 NOTE — PROGRESS NOTES
"Urology  Progress Note    ADIEL  Feels well today  Denies N/V, ambulating, pain controlled  Denies fevers, chills    Exam  /76 (BP Location: Right arm)   Pulse 83   Temp 98.6  F (37  C) (Oral)   Resp 16   Ht 1.575 m (5' 2\")   Wt 69.6 kg (153 lb 8 oz)   SpO2 96%   BMI 28.08 kg/m    No acute distress  Unlabored breathing  Abdomen soft, nontender, non distended,    R PNT w/ clear yellow urine    Voiding spontaneously  R /150    Labs  WBC (9.0)  Hgb (10.4)  Cr (1.0)    Assessment/Plan     Kelly Barnes is a 81 year old female with PMHx of HTN, HLD, anemia, DM2, recurrent pyelonephritis, R staghorn calculus, recently hospitalized 7/13-7/16 due to pyelonephritis and nonobstructive kidney stone s/p cytoscopy and ureteral stent placement 7/15 with subsequent R PCNL 8/5, who presented with fevers and abdominal pain admitted on 8/8/2021 found to have fungemia. Originally scheduled for second look PCNL but canceled due to fungemia. S/p R PCNL second look 8/13. Patient continues to feel better, recent BCs have been negative.       -continue excellent cares per primary team  -methylene blue challenge today, if able to void blue will remove PNT, if not, will leave PNT in place  -will discuss with Dr. Jaramillo follow-up plan with urology today  -appreciate ID antibiotic recommendations  -discussed with primary team plan      Seen and examined with the chief resident. Will discuss with Dr. Law.    Oumar Hamlin  PGY-2  [phone]       Contacting the Urology Team     Please use the following job codes to reach the Urology Team. Note that you must use an in house phone and that job codes cannot receive text pages.     On weekdays, dial [phone] (or star-star-star 777 on the new [email or web address] telephones) then [phone] to reach the Adult Urology resident or PA on call    On weekdays, dial [phone] (or star-star-star 777 on the new [email or web address] telephones) then [phone] to reach the Pediatric Urology resident    On weeknights and weekends, " dial [phone] (or star-star-star 777 on the new [employer] telephones) then [phone] to reach the Urology resident on call (for both Adult and Pediatrics)

## 2021-08-14 NOTE — PROGRESS NOTES
"Reason for admission: fevers and abdominal pain.    Vitals:   /72 (BP Location: Right arm)   Pulse 107   Temp 99.3  F (37.4  C) (Oral)   Resp 16   Ht 1.575 m (5' 2\")   Wt 69.6 kg (153 lb 8 oz)   SpO2 97%   BMI 28.08 kg/m      Activity: up in room independently. Refusing SCD    Pain: Denies pain currently    Neuro: Alert and oriented, non focal    Cardiac: Denies chest pain, peripheral edema in BLE noted, no JVD appreciated.     Respiratory: Regular respiratory rate and effort, denies feeling SOB    GI/: Tells me she hasn't had a BM yet tonight. Nephrostomy tube in place on the right side of the patients back/flank. Dressing was saturated with serosanguinous drainage. Cross cover notified. Nephrostomy bag is making outputs.    Diet: tolerating PO.    Lines: peripheral IV in left lower arm saline locked.    Wounds/Incisions: see     Labs/imaging/Consults: Lab called to update that gram stain on renal stones was canceled because they do not gram stain renal stones. Im told they will still obtain a culture. Potassium was replaced per protocol with plans to recheck in AM    Discharge Plan: pending          "

## 2021-08-14 NOTE — PROGRESS NOTES
[facility]    Medicine Progress Note - Hospitalist Service, Gold 6       Date of Admission:  8/8/2021    Assessment & Plan            Kelly Barnes is a 81 year old female with PMHx of HTN, HLD, anemia, DM2, recurrent pyelonephritis, R staghorn calculus, recently hospitalized 7/13-7/16 due to pyelonephritis and nonobstructive kidney stone s/p cytoscopy and ureteral stent placement 7/15 with subsequent PNT placement on 8/5, and hospitalized again from 7/23 - 7/27 with C. Diff infection (on oral vancomycin currently), and GERSON, who presented with fevers and abdominal pain admitted on 8/8/2021 found to have fungemia with candida glabrata due to infected stone.     # Fungemia with Candida glabrata complex   # Hx recurrent pyelonephritis s/p R PNT and ureteral stent placement  Recently hospitalized 7/13-7/16 with pyelonephritis and non-obstructive R kidney stone s/p cystoscopy and ureteral stent placement on 7/15, discharged on vantin, then then subsequently underwent percutaneous nephrolithotomy, ureteroscopy s/p R PNT on 8/5 with cultures from stone growing Candida glabrata complex. Presenting with fevers, leukocytosis and grossly positive UA. Lactic flat. CT A/P showed no evidence of abscess; kidney was unremarkable and ureteral stent was in place. Blood cultures on 8/8 and 8/9 and urine Cx on 8/8 growing candida glabrata. TTE without any vegetations. Patient underwent PCNL on 8/13 in the OR.   - ID and urology consulted, appreciate recs   - Continue cefpodoxime 100 mg BID until 2 days after PCNL (through 8/15)  - Continue IV micafungin 100 mg Q24Hrs for 24 hours after procedure, plan to discharge with fluconazole 800 mg daily (QTc 452), for 2 weeks from first negative culture (8/10-8/24)   - Ophthalmology consulted, no evident of fungal eye involvement, but recommend outpatient follow up in 1 month with repeat eye exam   - Follow-up Repeat blood cultures and  sensitivities    - Pain control, Tylenol PRN and oxycodone 5 mg q6h PRN  - continue tamsulosin 0.4 mg qAM  - Urology performing methyl blue test today     # Hypokalemia, resolved - Potassium 3.0 on 8/13. Repleted per sliding scale nursing protocol.     # Recent C difficile infection- Dx on 7/23 C diff colitis, s/p Vancomycin for 10 days.   - ID following; appreciate recs:   - continue PO vancomycin 125 mg BID for 3 days after antibiotics are discontinued (tenative 8/18)     # DM2 - Hemoglobin A1c 7.4 on 7/13/21. PTA glimepiride 2 mg TID.   - Resume Glimepiride 2 mg BID   - sliding scale insulin TIDAC  - hypoglycemia protocol    # Overactive Bladder - continue PTA tolterodine ER 4 mg Daily   # HTN - Resume PTA Losartan/HCTZ 50-12.5 mg daily   # HLD - continue home Simvastatin 10 mg at bedtime. Holding PTA ASA       Diet: Regular Diet Adult    DVT Prophylaxis: Enoxaparin (Lovenox) SQ  Blanchard Catheter: Not present  Central Lines: None  Code Status: Full Code      Disposition Plan   Expected discharge: 08/16/2021 recommended to prior living arrangement once adequate pain management/ tolerating PO medications, antibiotic plan established and completed methyl blue test.     The patient's care was discussed with the Attending Physician, Dr. Saud Dockery, Bedside Nurse, Patient and Urology Consultant.    Lea Hunt PA-C  Hospitalist Service, [address]  Securely message with the Vocera Web Console (learn more here)  Text page via [employer] Paging/Directory  Please see sign in/sign out for up to date coverage information    Clinically Significant Risk Factors Present on Admission                ______________________________________________________________________    Interval History   Patient states she is overall feeling well, just frustrated with leaking around PNT tube that was exchanged yesterday. Denies any F/C, CP, SOB, N/V/D, or abdominal pain. Had two small  bowel movements overnight.     Data reviewed today: I reviewed all medications, new labs and imaging results over the last 24 hours. EKG reviewed, NSR at 92 bpm. QTc 452. No ST elevations.     Physical Exam   Vital Signs: Temp: 98.6  F (37  C) Temp src: Oral BP: 116/68 Pulse: 82   Resp: 18 SpO2: 96 % O2 Device: None (Room air)    Weight: 153 lbs 8 oz  GENERAL: Alert and oriented x 3. NAD. Pleasant and conversational   HEENT: AT/NC. Anicteric sclera.  Mucous membranes moist   CARDIOVASCULAR: RRR. S1, S2. No murmurs, rubs, or gallops.   RESPIRATORY: Effort normal on RA. Clear to auscultation bilaterally, no rales, rhonchi or wheezes,   GI: Abdomen soft, non-tender abdomen without rebound or guarding, normoactive bowel sounds present  Back: R PNT with leaking around dressing   EXTREMITIES: Non-pitting LE edema.   NEUROLOGICAL: CN II-XII grossly intact. Moving all extremities symmetrically.   SKIN: Intact. Warm and dry. No jaundice.     Data   Recent Labs   Lab 08/14/21  1127 08/14/21  0839 08/14/21  0813 08/14/21  0748 08/14/21  0624 08/13/21  2127 08/13/21  1035 08/12/21  0639 08/11/21  0534 08/10/21  0651 08/10/21  0533 08/08/21 2024 08/08/21 2023   WBC  --   --  9.5  --   --   --  9.0 11.4* 14.1*  --  12.1*  --   --    HGB  --   --  10.9*  --   --   --  10.4* 11.8 11.3*  --  10.3*  --   --    MCV  --   --  84  --   --   --  84 84 85  --  84  --   --    PLT  --   --  394  --   --   --  339 319 293  --  279  --   --    INR  --   --   --   --   --   --   --   --   --   --   --   --  0.99   NA  --  133  --   --   --   --  136 136 133  --  137  --  133   POTASSIUM  --  3.6  --   --  3.6 3.1* 3.0* 3.5 3.5   < > 3.1*  --  3.4   CHLORIDE  --  99  --   --   --   --  101 100 101  --  107  --  101   CO2  --  27  --   --   --   --  28 28 21  --  23  --  25   BUN  --  9  --   --   --   --  9 7 7  --  7  --  14   CR  --  1.03  --   --   --   --  1.01 0.94 0.93  --  0.86  --  0.97   ANIONGAP  --  7  --   --   --   --  7 8 11  --   7  --  7   JAN  --  8.3*  --   --   --   --  8.5 8.7 8.5  --  7.8*  --  8.8   * 239*  --  234*  --   --  228* 209* 184*  --  186*   < > 269*   ALBUMIN  --   --   --   --   --   --   --   --  2.2*  --  2.0*  --  2.7*   PROTTOTAL  --   --   --   --   --   --   --   --  6.7*  --  5.9*  --  7.0   BILITOTAL  --   --   --   --   --   --   --   --  0.3  --  0.4  --  0.4   ALKPHOS  --   --   --   --   --   --   --   --  77  --  63  --  81   ALT  --   --   --   --   --   --   --   --  17  --  14  --  20   AST  --   --   --   --   --   --   --   --  13  --  12  --  11    < > = values in this interval not displayed.     Recent Results (from the past 24 hour(s))   XR Surgery NELSON Fluoro L/T 5 Min w Stills    Narrative    This exam was marked as non-reportable because it will not be read by a   radiologist or a [place] non-radiologist provider.           Medications       cefpodoxime  100 mg Oral BID     [Held by provider] enoxaparin ANTICOAGULANT  40 mg Subcutaneous Q24H     glimepiride  2 mg Oral BID     insulin aspart  1-7 Units Subcutaneous TID AC     insulin aspart  1-5 Units Subcutaneous At Bedtime     losartan-hydrochlorothiazide  1 tablet Oral QAM     micafungin  100 mg Intravenous Q24H     simvastatin  10 mg Oral At Bedtime     tamsulosin  0.4 mg Oral QAM     tolterodine ER  4 mg Oral QAM     vancomycin  125 mg Oral BID

## 2021-08-14 NOTE — PROGRESS NOTES
Care Management Discharge Note    Discharge Date: 08/16/2021       Discharge Disposition: Home    Discharge Services:  None    Discharge DME:  None    Discharge Transportation: family or friend will provide (Son)    Private pay costs discussed: Not applicable    PAS Confirmation Code:  NA  Patient/family educated on Medicare website which has current facility and service quality ratings:  Yes (previously)    Education Provided on the Discharge Plan:  No  Persons Notified of Discharge Plans: RN  Patient/Family in Agreement with the Plan: yes, per RN    Handoff Referral Completed: Yes    Additional Information: Per previous RN CC assessment and discharge planning, there was a possibility of discharge with a PICC and IV antifungal medication.  I confirmed today with bedside RN that patient will discharge home today or tomorrow with oral antifungals.  She is at functional baseline; no other complex discharge planning needs noted.      Marleni Choe, RN, PHN  RN Care Coordinator   [address] [address]   [email or web address]   Casual Employee - Weekend Coverage only  To contact weekend RNCC, dial [phone] and enter pager number [phone] at prompt OR use [employer] to text page.  This pager can not be contacted by outside line.

## 2021-08-15 LAB
ANION GAP SERPL CALCULATED.3IONS-SCNC: 8 MMOL/L (ref 3–14)
BACTERIA BLD CULT: NO GROWTH
BACTERIA BLD CULT: NO GROWTH
BACTERIA SPEC CULT: ABNORMAL
BUN SERPL-MCNC: 10 MG/DL (ref 7–30)
CALCIUM SERPL-MCNC: 8.6 MG/DL (ref 8.5–10.1)
CHLORIDE BLD-SCNC: 98 MMOL/L (ref 94–109)
CO2 SERPL-SCNC: 30 MMOL/L (ref 20–32)
CREAT SERPL-MCNC: 1.02 MG/DL (ref 0.52–1.04)
GFR SERPL CREATININE-BSD FRML MDRD: 52 ML/MIN/1.73M2
GLUCOSE BLD-MCNC: 191 MG/DL (ref 70–99)
GLUCOSE BLDC GLUCOMTR-MCNC: 166 MG/DL (ref 70–99)
GLUCOSE BLDC GLUCOMTR-MCNC: 177 MG/DL (ref 70–99)
GLUCOSE BLDC GLUCOMTR-MCNC: 179 MG/DL (ref 70–99)
GLUCOSE BLDC GLUCOMTR-MCNC: 200 MG/DL (ref 70–99)
GLUCOSE BLDC GLUCOMTR-MCNC: 201 MG/DL (ref 70–99)
POTASSIUM BLD-SCNC: 3.6 MMOL/L (ref 3.4–5.3)
SODIUM SERPL-SCNC: 136 MMOL/L (ref 133–144)

## 2021-08-15 PROCEDURE — 250N000011 HC RX IP 250 OP 636: Performed by: PHYSICIAN ASSISTANT

## 2021-08-15 PROCEDURE — 87040 BLOOD CULTURE FOR BACTERIA: CPT | Performed by: PHYSICIAN ASSISTANT

## 2021-08-15 PROCEDURE — 258N000003 HC RX IP 258 OP 636: Performed by: PHYSICIAN ASSISTANT

## 2021-08-15 PROCEDURE — 36415 COLL VENOUS BLD VENIPUNCTURE: CPT | Performed by: PHYSICIAN ASSISTANT

## 2021-08-15 PROCEDURE — 250N000013 HC RX MED GY IP 250 OP 250 PS 637: Performed by: PHYSICIAN ASSISTANT

## 2021-08-15 PROCEDURE — 80048 BASIC METABOLIC PNL TOTAL CA: CPT | Performed by: PHYSICIAN ASSISTANT

## 2021-08-15 PROCEDURE — 99207 PR APP CREDIT; MD BILLING SHARED VISIT: CPT | Performed by: PHYSICIAN ASSISTANT

## 2021-08-15 PROCEDURE — 120N000002 HC R&B MED SURG/OB UMMC

## 2021-08-15 PROCEDURE — 99233 SBSQ HOSP IP/OBS HIGH 50: CPT | Performed by: STUDENT IN AN ORGANIZED HEALTH CARE EDUCATION/TRAINING PROGRAM

## 2021-08-15 RX ORDER — POLYETHYLENE GLYCOL 3350 17 G/17G
17 POWDER, FOR SOLUTION ORAL DAILY PRN
Status: DISCONTINUED | OUTPATIENT
Start: 2021-08-15 | End: 2021-08-17 | Stop reason: HOSPADM

## 2021-08-15 RX ORDER — FLUCONAZOLE 200 MG/1
800 TABLET ORAL DAILY
Status: DISCONTINUED | OUTPATIENT
Start: 2021-08-15 | End: 2021-08-17 | Stop reason: HOSPADM

## 2021-08-15 RX ADMIN — TAMSULOSIN HYDROCHLORIDE 0.4 MG: 0.4 CAPSULE ORAL at 08:40

## 2021-08-15 RX ADMIN — GLIMEPIRIDE 2 MG: 2 TABLET ORAL at 19:49

## 2021-08-15 RX ADMIN — CEFPODOXIME PROXETIL 100 MG: 100 TABLET, FILM COATED ORAL at 08:41

## 2021-08-15 RX ADMIN — CEFPODOXIME PROXETIL 100 MG: 100 TABLET, FILM COATED ORAL at 19:49

## 2021-08-15 RX ADMIN — SIMVASTATIN 10 MG: 10 TABLET, FILM COATED ORAL at 21:55

## 2021-08-15 RX ADMIN — VANCOMYCIN HYDROCHLORIDE 125 MG: 125 CAPSULE ORAL at 19:49

## 2021-08-15 RX ADMIN — MICAFUNGIN SODIUM 100 MG: 50 INJECTION, POWDER, LYOPHILIZED, FOR SOLUTION INTRAVENOUS at 08:53

## 2021-08-15 RX ADMIN — LOSARTAN POTASSIUM AND HYDROCHLOROTHIAZIDE 1 TABLET: 50; 12.5 TABLET, FILM COATED ORAL at 08:42

## 2021-08-15 RX ADMIN — POLYETHYLENE GLYCOL 3350 17 G: 17 POWDER, FOR SOLUTION ORAL at 10:37

## 2021-08-15 RX ADMIN — TOLTERODINE 4 MG: 4 CAPSULE, EXTENDED RELEASE ORAL at 08:41

## 2021-08-15 RX ADMIN — VANCOMYCIN HYDROCHLORIDE 125 MG: 125 CAPSULE ORAL at 08:41

## 2021-08-15 RX ADMIN — GLIMEPIRIDE 2 MG: 2 TABLET ORAL at 08:40

## 2021-08-15 RX ADMIN — FLUCONAZOLE 800 MG: 200 TABLET ORAL at 10:37

## 2021-08-15 ASSESSMENT — ACTIVITIES OF DAILY LIVING (ADL)
ADLS_ACUITY_SCORE: 16

## 2021-08-15 NOTE — PROGRESS NOTES
"Urology  Progress Note    ADIEL  Feels well today  Denies N/V, ambulating, pain controlled  Denies fevers, chills  Clamp trial without any flank pain or discomfort     Exam  /73 (BP Location: Left arm)   Pulse 75   Temp 98.9  F (37.2  C) (Oral)   Resp 16   Ht 1.575 m (5' 2\")   Wt 69.6 kg (153 lb 8 oz)   SpO2 95%   BMI 28.08 kg/m    No acute distress, well appearing   Unlabored breathing on room air   R PNT clamped    Voiding spontaneously  R /80    Labs  WBC (9.5)  Hgb (10.9)  Cr (1.0)    Assessment/Plan     Kelly Barnes is a 81 year old female with PMHx of HTN, HLD, anemia, DM2, recurrent pyelonephritis, R staghorn calculus, recently hospitalized 7/13-7/16 due to pyelonephritis and nonobstructive kidney stone s/p cytoscopy and ureteral stent placement 7/15 with subsequent R PCNL 8/5, who presented with fevers and abdominal pain admitted on 8/8/2021 found to have fungemia. Originally scheduled for second look PCNL but canceled due to fungemia. S/p R PCNL second look 8/13. Patient continues to feel better, recent BCs have been negative.       -continue excellent cares per primary team  -Failed methylene blue challenge yesterday, clamp trial overnight without any discomfort. PNT removed at bedside and re-dressed with Urostomy bag.   - If not leaking by one day, can remove and replace with bandage   -appreciate ID antibiotic recommendations    Seen and examined with the chief resident. Will discuss with Dr. Law.         Contacting the Urology Team     Please use the following job codes to reach the Urology Team. Note that you must use an in house phone and that job codes cannot receive text pages.     On weekdays, dial [phone] (or star-star-star [phone] on the new [employer] telephones) then [phone] to reach the Adult Urology resident or PA on call    On weekdays, dial [phone] (or star-star-star [phone] on the new [employer] telephones) then [phone] to reach the Pediatric Urology resident    On weeknights and weekends, " dial [phone] (or star-star-star [phone] on the new [employer] telephones) then [phone] to reach the Urology resident on call (for both Adult and Pediatrics)

## 2021-08-15 NOTE — PLAN OF CARE
"/67 (BP Location: Left arm)   Pulse 72   Temp 98.5  F (36.9  C) (Oral)   Resp 16   Ht 1.575 m (5' 2\")   Wt 69.6 kg (153 lb 8 oz)   SpO2 98%   BMI 28.08 kg/m      Urology MD removed PNT at bedside and re-dressed with Urostomy bag. Had 50ml clear yellow urine out. Pt also voided 300ml this morning.  "

## 2021-08-15 NOTE — PLAN OF CARE
"SHIFT 1100 - 1930    /76 (BP Location: Right arm)   Pulse 77   Temp 98.4  F (36.9  C) (Oral)   Resp 18   Ht 1.575 m (5' 2\")   Wt 69.6 kg (153 lb 8 oz)   SpO2 96%   BMI 28.08 kg/m      Patient A&Ox4.  VSS on RA.  Pt's temperature is 99.5, which is previously high for her and she has reported \"feeling a fever coming on.\"  However, pt reported she feels \"good\" and is \"doing okay.\"Denies pain.  Urostomy bag intact and draining clear yellow.  PNT site slightly reddened; pt denies discomfort at area.  Pt voiding spontaneously.  BG in high 100's.  Insulin administered.   Tolerating regular diet.  Pt's son and sister at bedside.  Last BM was 8/13/2021.  Miralax administered in the AM by previous RN.Up ad linda; ambulates reyes occasionally.  "

## 2021-08-15 NOTE — PLAN OF CARE
"Reason for admission: fevers and abdominal pain.     Vitals:   Blood pressure 137/66, pulse 82, temperature 98.2  F (36.8  C), temperature source Oral, resp. rate 16, height 1.575 m (5' 2\"), weight 69.6 kg (153 lb 8 oz), SpO2 95 %, not currently breastfeeding.       Activity: up in room independently.      Pain: Denies pain. Was having super pubic pain at start of shift but that resolved. Continued to deny pain throughout the rest of the night.     Neuro: Alert and oriented, non focal     Cardiac: Denies chest pain, peripheral edema in BLE noted, no JVD appreciated. S1S2, no murmur appreciated     Respiratory: Regular respiratory rate and effort, denies feeling SOB. CTAB     GI/: Tells me she her last BM was yesterday Nephrostomy tube in place and clamped. Urostomy bag placed around Nephrostomy tube insertion continuous to put put drainage.      Diet: tolerating PO.     Lines: peripheral IV in left lower arm saline locked.     Wounds/Incisions: see      Labs/imaging/Consults: Potassium lab in AM     Discharge Plan: pending  "

## 2021-08-15 NOTE — PROGRESS NOTES
Infectious Diseases Brief Note:     8/11 blood culture returned positive on 8/14 for yeast.     In total - she has had positive blood cultures on 8/8, 8/9, and 8/11.   Blood cultures from 8/12, 8/13 remain negative.     She was taken to the OR on 8/13 for:    Removal of right ureteral catheter    Right retrograde pyelogram    Right percutaneous access tract dilation    Right ureteroscopy with holmium laser lithotripsy of stone    Right percutaneous nephrolithotomy for stone > 2cm     Exchange of right nephrostomy tube    Antegrade right nephrostogram    Per operative report - Systematic pyeloscopy was performed with no stones noted >2mm. Pullback ureteroscopy was performed with no evidence of ureteral injury or residual stone >2mm within the ureter.     The stone is growing yeast on broth only.     She has been on Micafungin since 8/10. Would recommend the addition of fluconazole 800 mg po daily (candida glabrata Fluconazole JUAN  32 - dose dependent susceptible) to the IV Micafungin as Micafungin achieves poor urinary levels. And given that the source is the kidney stones would like to add fluconazole which does achieve good urinary levels in case there is any residual small stones (<2mm) left.     Given the JUAN would push for 800 mg po daily (even with current creatinine clearance ~34). Can recheck LFTs within a couple of days with current high to make sure that there is no concern for hepatotoxicity - this is a rare side effect with fluconazole. Last QTC was  452 on 8/13. QTC prolongation is also a very rare side effect, but can repeat ECG while on therapy to make sure there is no prolongation.     TTE done on 8/10 did not show any evidence of vegetation.     [facility]   Infectious Diseases   [phone]

## 2021-08-15 NOTE — PROGRESS NOTES
[facility]    Medicine Progress Note - Hospitalist Service, Gold 6       Date of Admission:  8/8/2021    Assessment & Plan            Kelly Barnes is a 81 year old female with PMHx of HTN, HLD, anemia, DM2, recurrent pyelonephritis, R staghorn calculus, recently hospitalized 7/13-7/16 due to pyelonephritis and nonobstructive kidney stone s/p cytoscopy and ureteral stent placement 7/15 with subsequent PNT placement on 8/5, and hospitalized again from 7/23 - 7/27 with C. Diff infection (on oral vancomycin currently), and GERSON, who presented with fevers and abdominal pain admitted on 8/8/2021 found to have fungemia with candida glabrata due to infected and obstructed nephrolithiasis.     # Fungemia with Candida glabrata complex   # Hx recurrent pyelonephritis s/p R PNT and ureteral stent placement  Recently hospitalized 7/13-7/16 with pyelonephritis and non-obstructive R kidney stone s/p cystoscopy and ureteral stent placement on 7/15, discharged on vantin, then then subsequently underwent percutaneous nephrolithotomy, ureteroscopy s/p R PNT on 8/5 with cultures from stone growing Candida glabrata complex. Presenting with fevers, leukocytosis and grossly positive UA. Lactic flat. CT A/P showed no evidence of abscess; kidney was unremarkable and ureteral stent was in place. Blood cultures on 8/8 and 8/9, and now on 8/11 and urine Cx on 8/8 growing candida glabrata. TTE without any vegetations. Patient underwent PCNL on 8/13 in the OR without any remaining stone. Failed methyl blue test, but tolerated capped PNT, so PNT was removed today by Urology. Plan to monitor for another 24-48 hours to ensure repeat blood cultures are not positive, and if so, will need to evaluated for underlying endocarditis with HELEN. Discussed with ID who recommended start oral fluconazole in addition to IV micafungin today.      - ID and urology consulted, appreciate recs   - Continue cefpodoxime  100 mg BID until 2 days after PCNL (through 8/15)  - Continue IV micafungin 100 mg Q24Hrs  - Start fluconazole 800 mg daily  (will need to continue for 2 weeks from first negative culture (8/12-8/26)   - Repeat EKG for QTc monitoring and LFTs in AM  - Repeat blood cultures today x2  - Ophthalmology consulted, no evident of fungal eye involvement, but recommend outpatient follow up in 1 month with repeat eye exam   - Follow-up Repeat blood cultures and sensitivities    - Pain control, Tylenol PRN and oxycodone 5 mg q6h PRN  - continue tamsulosin 0.4 mg qAM    # Hypokalemia, resolved - Potassium 3.0 on 8/13. Repleted per sliding scale nursing protocol.     # Recent C difficile infection- Dx on 7/23 C diff colitis, s/p Vancomycin for 10 days.   - ID following; appreciate recs:   - continue PO vancomycin 125 mg BID for 3 days after antibiotics are discontinued (tenative 8/18)     # DM2 - Hemoglobin A1c 7.4 on 7/13/21. PTA glimepiride 2 mg TID.   - Continue Glimepiride 2 mg BID   - sliding scale insulin TIDAC  - hypoglycemia protocol, monitor especially while on fluconazole as can cause hypoglycemia     # Overactive Bladder - continue PTA tolterodine ER 4 mg Daily   # HTN - Continue PTA Losartan/HCTZ 50-12.5 mg daily   # HLD - continue home Simvastatin 10 mg at bedtime. Holding PTA ASA         Diet: Regular Diet Adult  Diet    DVT Prophylaxis: Enoxaparin (Lovenox) SQ  Blanchard Catheter: Not present  Central Lines: None  Code Status: Full Code      Disposition Plan   Expected discharge: 08/16/2021 recommended to prior living arrangement once antibiotic plan established.     The patient's care was discussed with the Attending Physician, Dr. Saud Dockery, Bedside Nurse, Patient and ID and urology Consultant.    Lea Hunt PA-C  Hospitalist Service, [address]  Securely message with the Vocera Web Console (learn more here)  Text page via [facility] Paging/Directory  Please  see sign in/sign out for up to date coverage information    Clinically Significant Risk Factors Present on Admission                ______________________________________________________________________    Interval History   Patient is very worried about going home with leaking PNT tube as she is unable to manage and change the bag on her own. Denies any F/C, abdominal pain, N/V/D, flank pain or urinary symptoms. States she is starting to feel constipation, asking for PRN bowel medications.     Data reviewed today: I reviewed all medications, new labs and imaging results over the last 24 hours.     Physical Exam   Vital Signs: Temp: 98.5  F (36.9  C) Temp src: Oral BP: 127/67 Pulse: 72   Resp: 16 SpO2: 98 % O2 Device: None (Room air)    Weight: 153 lbs 8 oz   GENERAL: Alert and oriented x 3. NAD. Pleasant and conversational  HEENT: AT/NC. Anicteric sclera. Mucous membranes moist   CARDIOVASCULAR: RRR. S1, S2. No murmurs, rubs, or gallops.   RESPIRATORY: Effort normal on RA. Clear to auscultation bilaterally, no rales, rhonchi or wheezes, respirations unlabored   GI: Abdomen soft, non-tender abdomen without rebound or guarding, normoactive bowel sounds present  Back: R PNT clamped, covered by ostomy bag with clear yellow urine   EXTREMITIES: Non-pitting peripheral edema.  NEUROLOGICAL: CN II-XII grossly intact. Moving all extremities symmetrically.   SKIN: Intact. Warm and dry. No jaundice.    Data   Recent Labs   Lab 08/15/21  0838 08/15/21  0152 08/14/21  2246 08/14/21  0839 08/14/21  0813 08/14/21  0624 08/13/21  2127 08/13/21  1035 08/13/21  1035 08/12/21  0639 08/11/21  0534 08/10/21  0651 08/10/21  0533 08/08/21 2024 08/08/21 2023   WBC  --   --   --   --  9.5  --   --   --  9.0 11.4* 14.1*  --  12.1*  --   --    HGB  --   --   --   --  10.9*  --   --   --  10.4* 11.8 11.3*  --  10.3*  --   --    MCV  --   --   --   --  84  --   --   --  84 84 85  --  84  --   --    PLT  --   --   --   --  394  --   --   --   339 319 293  --  279  --   --    INR  --   --   --   --   --   --   --   --   --   --   --   --   --   --  0.99   NA  --   --   --  133  --   --   --   --  136 136 133  --  137  --  133   POTASSIUM  --   --   --  3.6  --  3.6 3.1*  --  3.0* 3.5 3.5   < > 3.1*  --  3.4   CHLORIDE  --   --   --  99  --   --   --   --  101 100 101  --  107  --  101   CO2  --   --   --  27  --   --   --   --  28 28 21  --  23  --  25   BUN  --   --   --  9  --   --   --   --  9 7 7  --  7  --  14   CR  --   --   --  1.03  --   --   --   --  1.01 0.94 0.93  --  0.86  --  0.97   ANIONGAP  --   --   --  7  --   --   --   --  7 8 11  --  7  --  7   JAN  --   --   --  8.3*  --   --   --   --  8.5 8.7 8.5  --  7.8*  --  8.8   * 201* 222* 239*  --   --   --    < > 228* 209* 184*  --  186*   < > 269*   ALBUMIN  --   --   --   --   --   --   --   --   --   --  2.2*  --  2.0*  --  2.7*   PROTTOTAL  --   --   --   --   --   --   --   --   --   --  6.7*  --  5.9*  --  7.0   BILITOTAL  --   --   --   --   --   --   --   --   --   --  0.3  --  0.4  --  0.4   ALKPHOS  --   --   --   --   --   --   --   --   --   --  77  --  63  --  81   ALT  --   --   --   --   --   --   --   --   --   --  17  --  14  --  20   AST  --   --   --   --   --   --   --   --   --   --  13  --  12  --  11    < > = values in this interval not displayed.     Recent Results (from the past 24 hour(s))   XR Abdomen Port 1 View    Narrative    Exam: XR ABDOMEN PORT 1 VIEWS, 8/14/2021 2:09 PM    Indication: assess PNT placement    Comparison: CT 8/8/2021    Findings:   Portable supine AP radiograph of the abdomen. The tip of the pigtail  percutaneous nephrostomy tube projects over the right kidney, at the  level of L2. Nonobstructive bowel gas pattern. No visualized  pneumatosis or portal venous gas. Degenerative changes of the spine.      Impression    Impression:   1. The tip of the percutaneous nephrostomy tube projects over the  right kidney.  2. Nonobstructive bowel gas  pattern.    I have personally reviewed the examination and initial interpretation  and I agree with the findings.    VIJI PEREA MD         SYSTEM ID:  [id number]     Medications       cefpodoxime  100 mg Oral BID     [Held by provider] enoxaparin ANTICOAGULANT  40 mg Subcutaneous Q24H     fluconazole  800 mg Oral Daily     glimepiride  2 mg Oral BID     insulin aspart  1-7 Units Subcutaneous TID AC     insulin aspart  1-5 Units Subcutaneous At Bedtime     losartan-hydrochlorothiazide  1 tablet Oral QAM     micafungin  100 mg Intravenous Q24H     simvastatin  10 mg Oral At Bedtime     tamsulosin  0.4 mg Oral QAM     tolterodine ER  4 mg Oral QAM     vancomycin  125 mg Oral BID

## 2021-08-16 PROBLEM — B49 FUNGEMIA: Status: ACTIVE | Noted: 2021-08-16

## 2021-08-16 LAB
ALBUMIN SERPL-MCNC: 2.1 G/DL (ref 3.4–5)
ALP SERPL-CCNC: 80 U/L (ref 40–150)
ALT SERPL W P-5'-P-CCNC: 25 U/L (ref 0–50)
ANION GAP SERPL CALCULATED.3IONS-SCNC: 5 MMOL/L (ref 3–14)
AST SERPL W P-5'-P-CCNC: 22 U/L (ref 0–45)
ATRIAL RATE - MUSE: 78 BPM
BACTERIA BLD CULT: ABNORMAL
BACTERIA BLD CULT: NO GROWTH
BILIRUB SERPL-MCNC: 0.4 MG/DL (ref 0.2–1.3)
BUN SERPL-MCNC: 12 MG/DL (ref 7–30)
CALCIUM SERPL-MCNC: 8.7 MG/DL (ref 8.5–10.1)
CHLORIDE BLD-SCNC: 102 MMOL/L (ref 94–109)
CO2 SERPL-SCNC: 29 MMOL/L (ref 20–32)
CREAT SERPL-MCNC: 0.97 MG/DL (ref 0.52–1.04)
CRP SERPL-MCNC: 52 MG/L (ref 0–8)
DIASTOLIC BLOOD PRESSURE - MUSE: NORMAL MMHG
ERYTHROCYTE [DISTWIDTH] IN BLOOD BY AUTOMATED COUNT: 13.9 % (ref 10–15)
GFR SERPL CREATININE-BSD FRML MDRD: 55 ML/MIN/1.73M2
GLUCOSE BLD-MCNC: 153 MG/DL (ref 70–99)
GLUCOSE BLDC GLUCOMTR-MCNC: 163 MG/DL (ref 70–99)
GLUCOSE BLDC GLUCOMTR-MCNC: 176 MG/DL (ref 70–99)
GLUCOSE BLDC GLUCOMTR-MCNC: 196 MG/DL (ref 70–99)
HCT VFR BLD AUTO: 31.6 % (ref 35–47)
HGB BLD-MCNC: 10.2 G/DL (ref 11.7–15.7)
INTERPRETATION ECG - MUSE: NORMAL
MCH RBC QN AUTO: 27.1 PG (ref 26.5–33)
MCHC RBC AUTO-ENTMCNC: 32.3 G/DL (ref 31.5–36.5)
MCV RBC AUTO: 84 FL (ref 78–100)
P AXIS - MUSE: 27 DEGREES
PLATELET # BLD AUTO: 422 10E3/UL (ref 150–450)
POTASSIUM BLD-SCNC: 3.2 MMOL/L (ref 3.4–5.3)
POTASSIUM BLD-SCNC: 3.4 MMOL/L (ref 3.4–5.3)
PR INTERVAL - MUSE: 156 MS
PROT SERPL-MCNC: 6.5 G/DL (ref 6.8–8.8)
QRS DURATION - MUSE: 84 MS
QT - MUSE: 410 MS
QTC - MUSE: 467 MS
R AXIS - MUSE: 0 DEGREES
RBC # BLD AUTO: 3.76 10E6/UL (ref 3.8–5.2)
SODIUM SERPL-SCNC: 136 MMOL/L (ref 133–144)
SYSTOLIC BLOOD PRESSURE - MUSE: NORMAL MMHG
T AXIS - MUSE: 22 DEGREES
VENTRICULAR RATE- MUSE: 78 BPM
WBC # BLD AUTO: 9.1 10E3/UL (ref 4–11)

## 2021-08-16 PROCEDURE — 250N000011 HC RX IP 250 OP 636: Performed by: PHYSICIAN ASSISTANT

## 2021-08-16 PROCEDURE — 85027 COMPLETE CBC AUTOMATED: CPT | Performed by: PHYSICIAN ASSISTANT

## 2021-08-16 PROCEDURE — 36415 COLL VENOUS BLD VENIPUNCTURE: CPT | Performed by: PHYSICIAN ASSISTANT

## 2021-08-16 PROCEDURE — 250N000013 HC RX MED GY IP 250 OP 250 PS 637: Performed by: STUDENT IN AN ORGANIZED HEALTH CARE EDUCATION/TRAINING PROGRAM

## 2021-08-16 PROCEDURE — 250N000013 HC RX MED GY IP 250 OP 250 PS 637: Performed by: PHYSICIAN ASSISTANT

## 2021-08-16 PROCEDURE — 86140 C-REACTIVE PROTEIN: CPT | Performed by: PHYSICIAN ASSISTANT

## 2021-08-16 PROCEDURE — 82040 ASSAY OF SERUM ALBUMIN: CPT | Performed by: PHYSICIAN ASSISTANT

## 2021-08-16 PROCEDURE — 99233 SBSQ HOSP IP/OBS HIGH 50: CPT | Performed by: STUDENT IN AN ORGANIZED HEALTH CARE EDUCATION/TRAINING PROGRAM

## 2021-08-16 PROCEDURE — 99207 PR APP CREDIT; MD BILLING SHARED VISIT: CPT | Performed by: PHYSICIAN ASSISTANT

## 2021-08-16 PROCEDURE — 999N000054 HC STATISTIC EKG NON-CHARGEABLE

## 2021-08-16 PROCEDURE — 93005 ELECTROCARDIOGRAM TRACING: CPT

## 2021-08-16 PROCEDURE — 258N000003 HC RX IP 258 OP 636: Performed by: PHYSICIAN ASSISTANT

## 2021-08-16 PROCEDURE — 99207 PR CDG-MDM COMPONENT: MEETS HIGH - UP CODED: CPT | Performed by: STUDENT IN AN ORGANIZED HEALTH CARE EDUCATION/TRAINING PROGRAM

## 2021-08-16 PROCEDURE — 99233 SBSQ HOSP IP/OBS HIGH 50: CPT | Mod: 24 | Performed by: STUDENT IN AN ORGANIZED HEALTH CARE EDUCATION/TRAINING PROGRAM

## 2021-08-16 PROCEDURE — 84132 ASSAY OF SERUM POTASSIUM: CPT | Performed by: STUDENT IN AN ORGANIZED HEALTH CARE EDUCATION/TRAINING PROGRAM

## 2021-08-16 PROCEDURE — 120N000002 HC R&B MED SURG/OB UMMC

## 2021-08-16 PROCEDURE — 36415 COLL VENOUS BLD VENIPUNCTURE: CPT | Performed by: STUDENT IN AN ORGANIZED HEALTH CARE EDUCATION/TRAINING PROGRAM

## 2021-08-16 PROCEDURE — 999N000128 HC STATISTIC PERIPHERAL IV START W/O US GUIDANCE

## 2021-08-16 RX ORDER — ALBUTEROL SULFATE 0.83 MG/ML
2.5 SOLUTION RESPIRATORY (INHALATION)
Status: CANCELLED | OUTPATIENT
Start: 2021-08-18

## 2021-08-16 RX ORDER — METHYLPREDNISOLONE SODIUM SUCCINATE 125 MG/2ML
125 INJECTION, POWDER, LYOPHILIZED, FOR SOLUTION INTRAMUSCULAR; INTRAVENOUS
Status: CANCELLED
Start: 2021-08-18

## 2021-08-16 RX ORDER — FLUCONAZOLE 200 MG/1
800 TABLET ORAL DAILY
Qty: 40 TABLET | Refills: 0 | Status: SHIPPED | OUTPATIENT
Start: 2021-08-16 | End: 2021-08-17

## 2021-08-16 RX ORDER — DIPHENHYDRAMINE HYDROCHLORIDE 50 MG/ML
50 INJECTION INTRAMUSCULAR; INTRAVENOUS
Status: CANCELLED
Start: 2021-08-18

## 2021-08-16 RX ORDER — POTASSIUM CHLORIDE 750 MG/1
40 TABLET, EXTENDED RELEASE ORAL ONCE
Status: COMPLETED | OUTPATIENT
Start: 2021-08-16 | End: 2021-08-16

## 2021-08-16 RX ORDER — ALBUTEROL SULFATE 90 UG/1
1-2 AEROSOL, METERED RESPIRATORY (INHALATION)
Status: CANCELLED
Start: 2021-08-18

## 2021-08-16 RX ORDER — NALOXONE HYDROCHLORIDE 0.4 MG/ML
0.2 INJECTION, SOLUTION INTRAMUSCULAR; INTRAVENOUS; SUBCUTANEOUS
Status: CANCELLED | OUTPATIENT
Start: 2021-08-18

## 2021-08-16 RX ORDER — HEPARIN SODIUM (PORCINE) LOCK FLUSH IV SOLN 100 UNIT/ML 100 UNIT/ML
5 SOLUTION INTRAVENOUS
Status: CANCELLED | OUTPATIENT
Start: 2021-08-18

## 2021-08-16 RX ORDER — LIDOCAINE 40 MG/G
CREAM TOPICAL
Status: DISCONTINUED | OUTPATIENT
Start: 2021-08-16 | End: 2021-08-17 | Stop reason: HOSPADM

## 2021-08-16 RX ORDER — VANCOMYCIN HYDROCHLORIDE 125 MG/1
125 CAPSULE ORAL 2 TIMES DAILY
Qty: 4 CAPSULE | Refills: 0 | Status: SHIPPED | OUTPATIENT
Start: 2021-08-16 | End: 2021-08-23

## 2021-08-16 RX ORDER — HEPARIN SODIUM,PORCINE 10 UNIT/ML
5 VIAL (ML) INTRAVENOUS
Status: CANCELLED | OUTPATIENT
Start: 2021-08-18

## 2021-08-16 RX ORDER — EPINEPHRINE 1 MG/ML
0.3 INJECTION, SOLUTION, CONCENTRATE INTRAVENOUS EVERY 5 MIN PRN
Status: CANCELLED | OUTPATIENT
Start: 2021-08-18

## 2021-08-16 RX ORDER — MEPERIDINE HYDROCHLORIDE 25 MG/ML
25 INJECTION INTRAMUSCULAR; INTRAVENOUS; SUBCUTANEOUS EVERY 30 MIN PRN
Status: CANCELLED | OUTPATIENT
Start: 2021-08-18

## 2021-08-16 RX ADMIN — GLIMEPIRIDE 2 MG: 2 TABLET ORAL at 17:18

## 2021-08-16 RX ADMIN — VANCOMYCIN HYDROCHLORIDE 125 MG: 125 CAPSULE ORAL at 08:49

## 2021-08-16 RX ADMIN — TAMSULOSIN HYDROCHLORIDE 0.4 MG: 0.4 CAPSULE ORAL at 08:49

## 2021-08-16 RX ADMIN — FLUCONAZOLE 800 MG: 200 TABLET ORAL at 08:49

## 2021-08-16 RX ADMIN — GLIMEPIRIDE 2 MG: 2 TABLET ORAL at 08:49

## 2021-08-16 RX ADMIN — VANCOMYCIN HYDROCHLORIDE 125 MG: 125 CAPSULE ORAL at 20:18

## 2021-08-16 RX ADMIN — LOSARTAN POTASSIUM AND HYDROCHLOROTHIAZIDE 1 TABLET: 50; 12.5 TABLET, FILM COATED ORAL at 08:49

## 2021-08-16 RX ADMIN — TOLTERODINE 4 MG: 4 CAPSULE, EXTENDED RELEASE ORAL at 08:49

## 2021-08-16 RX ADMIN — MICAFUNGIN SODIUM 100 MG: 50 INJECTION, POWDER, LYOPHILIZED, FOR SOLUTION INTRAVENOUS at 09:10

## 2021-08-16 RX ADMIN — SIMVASTATIN 10 MG: 10 TABLET, FILM COATED ORAL at 22:28

## 2021-08-16 RX ADMIN — POTASSIUM CHLORIDE 40 MEQ: 750 TABLET, EXTENDED RELEASE ORAL at 08:57

## 2021-08-16 ASSESSMENT — ACTIVITIES OF DAILY LIVING (ADL)
ADLS_ACUITY_SCORE: 16

## 2021-08-16 ASSESSMENT — MIFFLIN-ST. JEOR: SCORE: 1089.58

## 2021-08-16 NOTE — ANESTHESIA POSTPROCEDURE EVALUATION
Patient: Kelly Barnes    Procedure(s):  Ureteroscopic assisted secondary right percutaneous nephrolihtotomy, Holmium laser lithotriipsy    Diagnosis:Kidney stone [N20.0]  Diagnosis Additional Information: No value filed.    Anesthesia Type:  General    Note:  Disposition: Admission   Postop Pain Control: Uneventful            Sign Out: Well controlled pain   PONV: No   Neuro/Psych: Uneventful            Sign Out: Acceptable/Baseline neuro status   Airway/Respiratory: Uneventful            Sign Out: Acceptable/Baseline resp. status   CV/Hemodynamics: Uneventful            Sign Out: Acceptable CV status; No obvious hypovolemia; No obvious fluid overload   Other NRE: NONE   DID A NON-ROUTINE EVENT OCCUR? No           Last vitals:  Vitals Value Taken Time   /88 08/13/21 1830   Temp 36.5  C (97.7  F) 08/13/21 1750   Pulse 101 08/13/21 1843   Resp 15 08/13/21 1842   SpO2 95 % 08/13/21 1845   Vitals shown include unvalidated device data.    Electronically Signed By: Sha Olea MD  August 16, 2021  7:43 AM

## 2021-08-16 NOTE — PROGRESS NOTES
Vascular called stating they that PICC line insertion was going to be deferred until tomorrow morning, 8:30a - 9:00a.

## 2021-08-16 NOTE — PROGRESS NOTES
Care Management Follow Up    Length of Stay (days): 7    Expected Discharge Date: 8/17/21, pending      Concerns to be Addressed: discharge planning     Patient plan of care discussed at interdisciplinary rounds: Yes    Anticipated Discharge Disposition: Home     Anticipated Discharge Services: TBD  Anticipated Discharge DME:  None    Patient/family educated on Medicare website which has current facility and service quality ratings:  No  Education Provided on the Discharge Plan: yes  Patient/Family in Agreement with the Plan: yes    Referrals Placed by CM/SW: Home Infusion   Private pay costs discussed: Will discuss if patient needs IV abx.     Additional Information:  Patient was previously referred to [facility] for IV Micafungin, [facility] cost is $241.00 per day. [facility] made referral to [place] and [facility] for their cost. [place]: 69.71/day. [facility]: 58.87/day. Spoke to Kylie with [facility] and she is aware that we are waiting for final abx plan. Previous RNCC spoke to patient and she would be open to going to [facility] for outpatient infusion. Per team, patient will likely discharge on PO abx.     2:04 PM  Spoke with [facility] team and final anti-fungal regimen plan is still pending.     Spoke to patient and notified her of [facility] cost if she needs home infusion. She would be open to going through [facility] if the anti-fungal frequency was more than daily. Otherwise, she still prefers to go to the [facility]. Patient will need wound care at discharge (either ostomy bag or compressive dressing). She denied need for home care and stated that her son will be able to assist. Instructed patient to ask for a care coordinator if she has any questions or concerns.     3:37 PM  Spoke to [facility] and plan is for IV micafungin at discharge. They are aware that a treatment plan needs to be entered.     Chelsie Baires, RNCC   [phone]

## 2021-08-16 NOTE — PROGRESS NOTES
[facility] PROGRESS NOTE     Patient:  Kelly Barnes   Date of birth 1940, Medical record number [id number]  Date of Visit:  08/16/2021  Date of Admission: 8/8/2021  Consult Requester:Maren Trevino MD          Assessment and Plan:   ID Problem List:   1.  Fungemia with yeast, isolated on BCx 8/8, 8/9, 8/11  2. Fevers, leukocytosis-resolved  3. Recurrent renal calculi, R staghorn calculus                -s/p urologic procedure 8/5, culture w/ C glabrata   4. H/o recurrent pyelonephritis               -S/p R PNT and ureteral stent placement  5. Recent C diff colitis 7/23, s/p treatment   6. Diabetes mellitus type II     RECOMMENDATION:    1. Continue Micafungin 100 mg q24 hrs and PO fluconazole 800 mg daily to treat Candida glabrata fungemia and infected urinary stones.   2. Will plan for a 14 day total course of antifungals from first negative blood culture. Currently her first negative is 8/12 which would end her course after 8/26.   3. Will need PICC to complete IV micafungin course as outlined above, can place anytime. This will need to be removed after completion of antifungal course.   4. Continue PO Vancomycin BID for C diff ppx through 8/18 then can discontinue (this will be 3 days of coverage post discontinuation of PO antibiotics).   5. Can hold on HELEN at this time given no e/o vegetation on TTE and blood culture clearance.     ASSESSMENT:  Kelly Barnes is a 81 year old female with PMHx significant for HTN, DMII, recurrent pyelonephritis, R staghorn claculus, and h/o C difficile colitis. She had a recent hospitalization 7/13-16/2021 for pyelonephritis with nonobstructing kidney stones s/p cystoscopy and ureteral stent placement (7/15) with discharge home on PO abx; this was followed by repeat admission 7/23 for concern of infected ureteral stent and C diff colitis and discharged home on PO Vanco+PO Cefpodoxoime. She had a recent urologic procedure (percutaenous nephroscopy  w/ laser, stone removal with ureteroscopy and PNT placement) 8/5. Presented to the ED 8/8 with reported low grade fevers, chills, suprapubic and RLQ pain.      Afebrile on admission, tachycardic to 120s initially now improved. BP and O2 sats wnl. Continued leukocytosis to 14 (previously 16 on 8/6) and CRP elevated to 100. UA (midstream) with elevated leuk esterase, >185 WBC, WBC clumping and 25 squamous cells (possibly c/w contamination), UCx midstream and R PNT no growth. COVID swab negative. BCx 8/8 (1/2) positive for Candida glabrata on day2. BCx 8/9 (1/2) positive for yeast (not supprised as these were drawn prior to initiation of antifungal tx). BCx 8/10-13 NGTD.  CXR unremarkable. CT AP w/o contrast with R non-obstructing nephrolithiasis which is stable, stable R hydro, unchanged R PNT.      Started on Ceftriaxone, continued on PO vanco. Spiked fever to 101.4 on 8/9 in the afternoon, BCx collected. New positive BCx as above resulted in AM 8/10, discontinued PO fluconazole and started on IV micafungin. Initiated work up for fungemia as above. Echo without vegetations on heart valves, but cannot r/o endocarditis. Ophtho exam perfromed 8/11 without evidence of fungal eye involvement. Will need 1 month ophto follow up. Patient feeling much better than admission. No further fevers.      Thank you for this consult. ID will continue to follow.      Patient was discussed with Dr. Pablo.     Caroline Braga PA-C  Infectious Diseases  Pager #[phone]          Interim History and Events:     Feeling well today. Offers no complaints. Abdominal pain resolved. Afebrile. Minimal urine leaking from old PNT site. Eating well and ambulating stably.          HPI:   Kelly Barnes is a 81 year old female with PMHx significant for HTN, DMII, recurrent pyelonephritis, R staghorn claculus, and h/o C difficile colitis. She had a recent hospitalization 7/13-16/2021 for pyelonephritis with non obstructing kidney stones s/p  "cystoscopy and ureteral stent placement (7/15) with discharge home on PO abx; this was followed by repeat admission 7/23 for concern of infected ureteral stent and C diff colitis and discharged home on PO Vanco+PO Cefpodoxoime. She had a recent urologic procedure (percutaenous nephroscopy w/ laser, stone removal with ureteroscopy and PNT placement) 8/5. Presented to the ED 8/8 with reported low grade fevers, chills, suprapubic and RLQ pain.      States she felt \"feverish\" on discharge from urologic procedure 8/6 but did not have a measured fever. Felt \"unwell\" up until 8/8 when she presented to the ED after having a measured temp at home of 100.8. She endorsed continued suprapubic and RLQ cramping pain since urologic prodedure 8/5 and leaking of urine from around PNT tube insertion site. Denies URI sx, cough, SOB, chest pain, n/v, dysuria, frequency/urgency. Diarrhea resolved before previous hospital discharge, having normal BMs now. Notes that tylenol helps RLQ/suprapubic abdominal cramping for about 4 hrs at a time. Feels better since admission today although was unsure if she felt at her baseline and voiced some uneasiness in discharging home to early.      [place] the [place]. Has been taking Cefpodoxime 100 mg BID and PO Vanco 125 mg BID at home PTA.      Recent previous Cx hx:   -Calculus R kidney growth of Candida glabrata  -UCx 7/23 and 25 mixed UGF   -UCx 7/13 & 15 E coli  (R Amp/sulb, Amp, Cipro, levo, Susceptible to cephalosporins, macrobid, Zosyn, Bactrim).   -UCx 12/2020 E coli (same sens as above)         ROS:   -Focused 5 point ROS completed, pertinent positives and negatives listed above.    Physical Examination:  Temp: 98.7  F (37.1  C) Temp src: Oral BP: (!) 147/74 Pulse: 77   Resp: 20 SpO2: 93 % O2 Device: None (Room air)      Vitals:    08/09/21 0202 08/16/21 0806   Weight: 69.6 kg (153 lb 8 oz) 66.6 kg (146 lb 14.4 oz)       Physical Examination:  Constitutional: Pleasant female seen " sitting up in bed, in NAD. Alert and interactive.   HEENT: NCAT, anicteric sclerae, conjunctiva clear. Moist mucous membranes.  Respiratory: Non-labored breathing on RA. Lungs CTAB.  CV: RRR.  GI:  Abdomen is soft, non-distended. Normoactive BS.  Skin: Warm and dry. No rashes or lesions on exposed surfaces.  Musculoskeletal: Extremities grossly normal. Peripheral edema stable.   Neurologic: A &O x3, speech normal, answering questions appropriately. Moves all extremities spontaneously. Grossly non-focal.  Neuropsychiatric: Mentation and affect normal/appropriate.  VAD: PIV    Medications:    enoxaparin ANTICOAGULANT  40 mg Subcutaneous Q24H     fluconazole  800 mg Oral Daily     glimepiride  2 mg Oral BID     insulin aspart  1-7 Units Subcutaneous TID AC     insulin aspart  1-5 Units Subcutaneous At Bedtime     losartan-hydrochlorothiazide  1 tablet Oral QAM     micafungin  100 mg Intravenous Q24H     simvastatin  10 mg Oral At Bedtime     tamsulosin  0.4 mg Oral QAM     tolterodine ER  4 mg Oral QAM     vancomycin  125 mg Oral BID       Infusions/Drips:      Laboratory Data:   No results found for: ACD4    Inflammatory Markers    Recent Labs   Lab Test 08/16/21  0526 08/10/21  0533 08/09/21  0558 07/25/21  0839 12/21/20  0705 12/19/20  2307 03/02/16  0837 02/29/16  0828   SED  --   --   --   --   --  30 15 15   CRP 52.0* 130.0* 100.0* 130.0* 63.0* 140.0* 110.0* 36.0*       Metabolic Studies       Recent Labs   Lab Test 08/16/21  0951 08/16/21  0526 08/15/21  2154 08/15/21  1820 08/15/21  1144 08/15/21  1127 08/14/21  0839 08/14/21  0624 08/13/21  2127 08/13/21  1035 08/12/21  0639 08/11/21  0534 08/09/21  1829 08/09/21  1711 08/08/21  2025 07/25/21  1142 07/25/21  0839 07/15/21  1342 07/15/21  0839 07/13/21  0724 07/13/21  0721   NA  --  136  --   --   --  136 133  --   --  136 136 133   < >  --   --    < > 131*   < > 138   < > 133   POTASSIUM  --  3.2*  --   --   --  3.6 3.6 3.6 3.1* 3.0* 3.5 3.5   < >  --   --     < > 3.0*   < > 4.2   < > 3.8   CHLORIDE  --  102  --   --   --  98 99  --   --  101 100 101   < >  --   --    < > 101   < > 107   < > 100   CO2  --  29  --   --   --  30 27  --   --  28 28 21   < >  --   --    < > 21   < > 23   < > 25   ANIONGAP  --  5  --   --   --  8 7  --   --  7 8 11   < >  --   --    < > 9   < > 8   < > 8   BUN  --  12  --   --   --  10 9  --   --  9 7 7   < >  --   --    < > 8   < > 16   < > 20   CR  --  0.97  --   --   --  1.02 1.03  --   --  1.01 0.94 0.93   < >  --   --    < > 0.93   < > 1.03   < > 1.14*   GFRESTIMATED  --  55*  --   --   --  52* 51*  --   --  53* 57* 58*   < >  --   --    < > 58*   < > 51*   < > 46*   * 153* 200* 166* 179* 191* 239*  --   --  228* 209* 184*   < >  --   --    < > 265*   < > 166*   < > 205*   A1C  --   --   --   --   --   --   --   --   --   --   --   --   --   --   --   --   --   --   --   --  7.4*   JAN  --  8.7  --   --   --  8.6 8.3*  --   --  8.5 8.7 8.5   < >  --   --    < > 8.0*   < > 8.5   < > 8.4*   MAG  --   --   --   --   --   --   --   --   --   --   --   --   --   --   --   --  1.8  --  2.4*   < >  --    LACT  --   --   --   --   --   --   --   --   --   --   --   --   --  1.0 1.1  --   --    < >  --   --   --     < > = values in this interval not displayed.       Hepatic Studies    Recent Labs   Lab Test 08/16/21  0526 08/11/21  0534 08/10/21  0533 08/08/21  2023 07/23/21  0208 07/13/21  0721   BILITOTAL 0.4 0.3 0.4 0.4 0.5 0.8   ALKPHOS 80 77 63 81 108 84   ALBUMIN 2.1* 2.2* 2.0* 2.7* 3.0* 3.5   AST 22 13 12 11 12 18   ALT 25 17 14 20 26 20       Pancreatitis testing    Recent Labs   Lab Test 07/23/21  0208 07/13/21  0723 05/11/21  0724 10/07/20  0831 10/15/19  0920 10/25/18  0920 03/28/17  0941 10/27/16  0825   LIPASE 71* 76  --   --   --   --   --   --    TRIG  --   --  68 90 90 76 83 93       Hematology Studies      Recent Labs   Lab Test 08/16/21  0526 08/14/21  0813 08/13/21  1035 08/12/21  0639 08/11/21  0534 08/10/21  0533  12/21/20  0705 12/20/20  0654 12/19/20  2307 12/19/20  1110 07/03/19  1734 03/28/17  0941 03/04/16  0930   WBC 9.1 9.5 9.0 11.4* 14.1* 12.1* 9.2   < > 17.7* 18.2* 11.3* 7.1 12.8*   ANEU  --   --   --   --   --   --  7.1  --  16.1* 16.0* 9.6* 4.9 10.8*   ALYM  --   --   --   --   --   --  1.1  --  0.5* 1.1 0.8 1.6 0.8   GINETTE  --   --   --   --   --   --  0.9  --  1.0 1.0 0.8 0.5 1.2   AEOS  --   --   --   --   --   --  0.1  --  0.0 0.0 0.1 0.1 0.0   HGB 10.2* 10.9* 10.4* 11.8 11.3* 10.3* 11.3*   < > 12.8 14.9 13.2 14.7 14.1   HCT 31.6* 34.1* 32.1* 36.1 34.5* 31.4* 34.8*   < > 38.7 44.5 39.3 43.8 40.7    394 339 319 293 279 287   < > 330 364 288 347 332    < > = values in this interval not displayed.       Arterial Blood Gas Testing    Recent Labs   Lab Test 08/08/21 2025   PH 7.47*        Urine Studies     Recent Labs   Lab Test 08/08/21 2028 07/25/21  1230 07/23/21  0140 07/13/21  0724 12/19/20  2242   URINEPH 5.5 6.0 5.0 5.5 5.5   NITRITE Negative Negative Negative Positive* Positive*   LEUKEST Large* Large* Large* Large* Large*   WBCU >182* >182* 50* >100* >182*       Microbiology:  Culture   Date Value Ref Range Status   08/15/2021 No growth after 12 hours  Preliminary   08/15/2021 No growth after 12 hours  Preliminary   08/13/2021 No growth after 2 days  Preliminary   08/13/2021 No growth after 2 days  Preliminary   08/13/2021 No anaerobic organisms isolated after 2 days  Preliminary   08/13/2021 Yeast (A)  Preliminary   08/13/2021 1+ Candida glabrata complex (A)  Final     Comment:     Susceptibilities not routinely done   08/13/2021 No growth after 3 days  Preliminary   08/13/2021 No growth after 3 days  Preliminary   08/12/2021 No growth after 4 days  Preliminary   08/12/2021 No growth after 4 days  Preliminary   08/11/2021 No Growth  Final   08/11/2021 Positive on the 3rd day of incubation (A)  Final   08/11/2021 Candida glabrata complex (A)  Final     Comment:     1 of 2 bottles  Susceptibilities  done on previous cultures     08/10/2021 No Growth  Final   08/10/2021 No Growth  Final   08/09/2021 No Growth  Final   08/09/2021 Positive on the 3rd day of incubation (A)  Final   08/09/2021 Candida glabrata complex (A)  Final     Comment:     1 of 2 bottles  Susceptibilities done on previous cultures   08/08/2021 Positive on the 2nd day of incubation (A)  Final   08/08/2021 Candida glabrata complex (A)  Final     Comment:     1 of 2 bottles   08/08/2021 10,000-50,000 CFU/mL Candida glabrata complex (A)  Final     Comment:     Susceptibilities not routinely done   08/08/2021 No Growth  Final   08/08/2021 No Growth  Final   08/08/2021 No Growth  Final   08/05/2021 1+ Candida glabrata complex (A)  Corrected     Comment:     Susceptibilities not routinely done  Susceptibility testing requested by Caroline Braga pager [phone] for a standard panel. Spoke with Caroline, susceptibilities will be performed from blood culture instead   07/25/2021 10,000-50,000 CFU/mL Mixture of urogenital erum  Final   07/23/2021 No Growth  Final   07/23/2021 No Growth  Final   07/23/2021 <10,000 CFU/mL Urogenital erum  Final   07/15/2021 Culture in progress  Final   07/15/2021 10,000-50,000 CFU/mL Escherichia coli (A)  Final   07/15/2021 <1,000 CFU/mL Urogenital erum  Final   07/13/2021 No Growth  Final   07/13/2021 No Growth  Final   07/13/2021 >100,000 CFU/mL Escherichia coli (A)  Final       Last check of C difficile  C Difficile Toxin B by PCR   Date Value Ref Range Status   07/23/2021 Positive (A) Negative Final     Comment:     Detection of C. difficile nucleic acid in stools confirms the presence of these organisms in diarrheal patients but may not indicate that C. difficile are the etiologic agents of the diarrhea. Results from the Xpert C. difficile assay should be interpreted in conjunction with other laboratory and clinical data available to the clinician.       IMAGING     8/8 CT AP w/o contrast   IMPRESSION:   1.  Unchanged position of the right percutaneous nephrostomy tube.   2. Right nephroureteral 5Fr catheter terminates in the distal right  ureter, short of the urinary bladder.  Stable mild right  hydroureteronephrosis.  2. Unchanged renal calculi in the inferior pole of the right kidney.  No new renal calculi.    ECHO:   8/10   Interpretation Summary  No vegetation visualized, however inferctive endocarditis cannot be excluded.  Left ventricular function is normal.The ejection fraction is 60-65%. Grade II  or moderate diastolic dysfunction.  Right ventricular function, chamber size, wall motion, and thickness are  normal.  Estimated RA pressure of 3 mmHg.     Compared to prior study on 5/3/2017 no significant change.

## 2021-08-16 NOTE — PLAN OF CARE
Time 4176-9175     Reason for admission: kidney stone  Vitals:   Temp: 98.6  F (37  C) Temp src: Oral BP: (!) 141/82 Pulse: 85   Resp: 20 SpO2: 97 % O2 Device: None (Room air)   Activity: up ad linda   Pain: denies   Neuro: A&Ox4. neuros intact  Cardiac: WDL  Respiratory: WDL  GI/: Voiding spontaneously. Nephrostomy w/ minimal output. No BM reported this shift  Diet: Regular  Lines: No access at this time.   Skin: No new concerns noted   Labs/imaging: BG monitoring       New changes this shift: Pt had no major changes this shift. BG was 200 and pt required 1 unit of insulin at bedtime. Pt is voiding appropriately and has minimal output in her bag that is light yellow colored. Pt is calm and cooperative and able to use call light appropriately to make needs known.          Continue to monitor and follow POC

## 2021-08-16 NOTE — PLAN OF CARE
"Shift 3015-7231    Neuro: WDL, A&Ox4  Cardiac: WDL, denies chest pain  Respiratory: WDL, lung sounds clear bilaterally, no SOB reported.   GI/: Voiding spontaneously and without difficulty, ostomy bag positioned over previous neph tube site with minimal output  Diet/appetite: Tolerating a regular diet  Activity: Ambulating independently  Pain: Denies pain  Skin: No new open areas  Lines: New IV placed today in R forearm, possible plan for PICC this evening or tomorrow    BP (!) 137/92 (BP Location: Right arm)   Pulse 92   Temp 99  F (37.2  C) (Oral)   Resp 20   Ht 1.575 m (5' 2\")   Wt 66.6 kg (146 lb 14.4 oz)   SpO2 98%   BMI 26.87 kg/m      "

## 2021-08-16 NOTE — PROGRESS NOTES
[facility]    Medicine Progress Note - Hospitalist Service, Gold 6       Date of Admission:  8/8/2021    Assessment & Plan         Kelly Barnes is a 81 year old female with PMHx of HTN, HLD, anemia, DM2, recurrent pyelonephritis, R staghorn calculus, recently hospitalized 7/13-7/16 due to pyelonephritis and nonobstructive kidney stone s/p cytoscopy and ureteral stent placement 7/15 with subsequent PNT placement on 8/5, and hospitalized again from 7/23 - 7/27 with C. Diff infection (on oral vancomycin currently), and GERSON, who presented with fevers and abdominal pain admitted on 8/8/2021 found to have fungemia with candida glabrata due to infected and obstructed nephrolithiasis.     # Fungemia with Candida glabrata complex   # Hx recurrent pyelonephritis s/p R PNT and ureteral stent placement  Recently hospitalized 7/13-7/16 with pyelonephritis and non-obstructive R kidney stone s/p cystoscopy and ureteral stent placement on 7/15, discharged on vantin, then then subsequently underwent percutaneous nephrolithotomy, ureteroscopy s/p R PNT on 8/5 with cultures from stone growing Candida glabrata complex. Presenting with fevers, leukocytosis and grossly positive UA. Lactic flat. CT A/P showed no evidence of abscess; kidney was unremarkable and ureteral stent was in place. Blood cultures on 8/8 and 8/9, and now on 8/11 and urine Cx on 8/8 growing candida glabrata. TTE without any vegetations. Patient underwent PCNL on 8/13 in the OR without any remaining stone. Failed methyl blue test, but tolerated capped PNT, so PNT was removed on 8/15.   - ID and urology consulted, appreciate recs   - Completed cefpodoxime on 8/15.   - Continue IV micafungin 100 mg Q24Hrs and PO fluconazole 800 mg daily     -continue for 2 weeks from first negative culture (8/12-8/26)    -EKG with QTc 462  - PICC placement. RNCC consulted for IV abx on discharge via infusion center  - Ophthalmology  consulted, no evident of fungal eye involvement, but recommend outpatient follow up in 1 month with repeat eye exam   - Follow-up Repeat blood cultures and sensitivities    - Pain control, Tylenol PRN and oxycodone 5 mg q6h PRN  - continue tamsulosin 0.4 mg qAM  - Continue ostomy bag over prior PNT site, if <20 ml drainage in 24 hours, can remove and place compressive dressing.     # Hypokalemia, - Potassium 3.2 on 8/16. Repleted per sliding scale nursing protocol.     # Recent C difficile infection- Dx on 7/23 C diff colitis, s/p Vancomycin for 10 days.   - ID following; appreciate recs:   - continue PO vancomycin 125 mg BID for 3 days after antibiotics are discontinued (end date 8/18). Per patient, needs refill for last few days.    # DM2 - Hemoglobin A1c 7.4 on 7/13/21. PTA glimepiride 2 mg TID.   - Continue Glimepiride 2 mg at BID   - sliding scale insulin TIDAC  - hypoglycemia protocol, monitor especially while on fluconazole as can cause hypoglycemia     # Overactive Bladder - continue PTA tolterodine ER 4 mg Daily   # HTN - Continue PTA Losartan/HCTZ 50-12.5 mg daily   # HLD - continue home Simvastatin 10 mg at bedtime. Holding PTA ASA       Diet: Regular Diet Adult  Diet    DVT Prophylaxis: Enoxaparin (Lovenox) SQ  Blanchard Catheter: Not present  Central Lines: None  Code Status: Full Code      Disposition Plan   Expected discharge: 08/17/2021 recommended to prior living arrangement once antibiotic plan established.     The patient's care was discussed with the Attending Physician, Dr. Saud Dockery, Bedside Nurse, Care Coordinator/, Patient and ID Consultant.    Lea Hunt PA-C  Hospitalist Service, [address]  Securely message with the Vocera Web Console (learn more here)  Text page via [employer] Paging/Directory  Please see sign in/sign out for up to date coverage information    Clinically Significant Risk Factors Present on Admission                 ______________________________________________________________________    Interval History   Patient states she is feeling well. Denies any F/C, CP, SOB, N/V/D, abdominal pain, flank pain, or urinary symptoms.     Data reviewed today: I reviewed all medications, new labs and imaging results over the last 24 hours.     Physical Exam   Vital Signs: Temp: 98.7  F (37.1  C) Temp src: Oral BP: (!) 147/74 Pulse: 77   Resp: 20 SpO2: 93 % O2 Device: None (Room air)    Weight: 146 lbs 14.4 oz   GENERAL: Alert and oriented x 3. NAD. Pleasant and conversational   HEENT: AT/NC. Anicteric sclera. Mucous membranes moist   CARDIOVASCULAR: RRR. S1, S2. No murmurs, rubs, or gallops.   RESPIRATORY: Effort normal on RA. Clear to auscultation bilaterally, no rales, rhonchi or wheezes,   GI: Abdomen soft, non-tender abdomen without rebound or guarding, normoactive bowel sounds present  Back: Ostomy bag over prior PNT site with 20cc clear urine.   EXTREMITIES: Non-pitting peripheral edema.   NEUROLOGICAL: CN II-XII grossly intact. Moving all extremities symmetrically.   SKIN: Intact. Warm and dry. No jaundice.     Data   Recent Labs   Lab 08/16/21  0951 08/16/21  0526 08/15/21  2154 08/15/21  1127 08/14/21  0839 08/14/21  0813 08/13/21  1107 08/13/21  1035 08/13/21  1035 08/11/21  0839 08/11/21  0534   WBC  --  9.1  --   --   --  9.5  --   --  9.0   < > 14.1*   HGB  --  10.2*  --   --   --  10.9*  --   --  10.4*   < > 11.3*   MCV  --  84  --   --   --  84  --   --  84   < > 85   PLT  --  422  --   --   --  394  --   --  339   < > 293   NA  --  136  --  136 133  --   --   --  136   < > 133   POTASSIUM  --  3.2*  --  3.6 3.6  --    < >   < > 3.0*   < > 3.5   CHLORIDE  --  102  --  98 99  --   --   --  101   < > 101   CO2  --  29  --  30 27  --   --   --  28   < > 21   BUN  --  12  --  10 9  --   --   --  9   < > 7   CR  --  0.97  --  1.02 1.03  --   --   --  1.01   < > 0.93   ANIONGAP  --  5  --  8 7  --   --   --  7   < > 11   JAN   --  8.7  --  8.6 8.3*  --   --   --  8.5   < > 8.5   * 153* 200* 191* 239*  --   --    < > 228*   < > 184*   ALBUMIN  --  2.1*  --   --   --   --   --   --   --   --  2.2*   PROTTOTAL  --  6.5*  --   --   --   --   --   --   --   --  6.7*   BILITOTAL  --  0.4  --   --   --   --   --   --   --   --  0.3   ALKPHOS  --  80  --   --   --   --   --   --   --   --  77   ALT  --  25  --   --   --   --   --   --   --   --  17   AST  --  22  --   --   --   --   --   --   --   --  13    < > = values in this interval not displayed.     No results found for this or any previous visit (from the past 24 hour(s)).  Medications       enoxaparin ANTICOAGULANT  40 mg Subcutaneous Q24H     fluconazole  800 mg Oral Daily     glimepiride  2 mg Oral BID     insulin aspart  1-7 Units Subcutaneous TID AC     insulin aspart  1-5 Units Subcutaneous At Bedtime     losartan-hydrochlorothiazide  1 tablet Oral QAM     micafungin  100 mg Intravenous Q24H     simvastatin  10 mg Oral At Bedtime     tamsulosin  0.4 mg Oral QAM     tolterodine ER  4 mg Oral QAM     vancomycin  125 mg Oral BID

## 2021-08-16 NOTE — PROGRESS NOTES
CLINICAL NUTRITION SERVICES    Reviewed nutrition risk factors due to LOS. Pt is tolerating diet, eating well per nursing documentation. No nutrition issues identified at this time.     RD will follow per protocol at this time, unless consulted.    Katina Pillai MS, RD, LD  Pager [phone]

## 2021-08-16 NOTE — PLAN OF CARE
"Shift: 23:00 - 7:30a  VS: BP (!) 145/88 (BP Location: Right arm)   Pulse 79   Temp 98.3  F (36.8  C) (Oral)   Resp 20   Ht 1.575 m (5' 2\")   Wt 69.6 kg (153 lb 8 oz)   SpO2 98%   BMI 28.08 kg/m    Pain: Patient denies pain and nausea at this time.  Neuro: AOx4, neuros intact.  Cardiac: WDL, no CP noted.  Respiratory: Clear bilaterally, no SoB noted.  Diet/Appetite:  Regular diet  /GI: Voiding spontaneously. Nephrostomy intact - minimal output.  LDA's: No access.  Skin: No new deficits noted.  Activity: Indepedent   Pertinent Labs/Lab Collection: BC resulted - no growth after 12 hours  "

## 2021-08-17 VITALS
DIASTOLIC BLOOD PRESSURE: 85 MMHG | SYSTOLIC BLOOD PRESSURE: 142 MMHG | WEIGHT: 146.9 LBS | RESPIRATION RATE: 16 BRPM | HEIGHT: 62 IN | BODY MASS INDEX: 27.03 KG/M2 | OXYGEN SATURATION: 97 % | TEMPERATURE: 98.3 F | HEART RATE: 77 BPM

## 2021-08-17 LAB
ATRIAL RATE - MUSE: 92 BPM
BACTERIA BLD CULT: NO GROWTH
BACTERIA BLD CULT: NO GROWTH
DIASTOLIC BLOOD PRESSURE - MUSE: NORMAL MMHG
GLUCOSE BLDC GLUCOMTR-MCNC: 161 MG/DL (ref 70–99)
INTERPRETATION ECG - MUSE: NORMAL
P AXIS - MUSE: 23 DEGREES
PR INTERVAL - MUSE: 154 MS
QRS DURATION - MUSE: 78 MS
QT - MUSE: 366 MS
QTC - MUSE: 452 MS
R AXIS - MUSE: 5 DEGREES
SYSTOLIC BLOOD PRESSURE - MUSE: NORMAL MMHG
T AXIS - MUSE: 22 DEGREES
VENTRICULAR RATE- MUSE: 92 BPM

## 2021-08-17 PROCEDURE — 272N000201 ZZ HC ADHESIVE SKIN CLOSURE, DERMABOND

## 2021-08-17 PROCEDURE — 250N000013 HC RX MED GY IP 250 OP 250 PS 637: Performed by: PHYSICIAN ASSISTANT

## 2021-08-17 PROCEDURE — 99207 PR APP CREDIT; MD BILLING SHARED VISIT: CPT | Performed by: PHYSICIAN ASSISTANT

## 2021-08-17 PROCEDURE — 250N000011 HC RX IP 250 OP 636: Performed by: PHYSICIAN ASSISTANT

## 2021-08-17 PROCEDURE — 99207 PR CDG-CODE CATEGORY CHANGED: CPT | Performed by: INTERNAL MEDICINE

## 2021-08-17 PROCEDURE — 272N000456 ZZ HC KIT, 4 FR SL BIOFLO OPEN ENDED PICC

## 2021-08-17 PROCEDURE — 36569 INSJ PICC 5 YR+ W/O IMAGING: CPT

## 2021-08-17 PROCEDURE — 99233 SBSQ HOSP IP/OBS HIGH 50: CPT | Mod: 24 | Performed by: STUDENT IN AN ORGANIZED HEALTH CARE EDUCATION/TRAINING PROGRAM

## 2021-08-17 PROCEDURE — 258N000003 HC RX IP 258 OP 636: Performed by: PHYSICIAN ASSISTANT

## 2021-08-17 PROCEDURE — 99239 HOSP IP/OBS DSCHRG MGMT >30: CPT | Performed by: INTERNAL MEDICINE

## 2021-08-17 RX ORDER — HEPARIN SODIUM,PORCINE 10 UNIT/ML
5-20 VIAL (ML) INTRAVENOUS EVERY 24 HOURS
Status: DISCONTINUED | OUTPATIENT
Start: 2021-08-17 | End: 2021-08-17 | Stop reason: HOSPADM

## 2021-08-17 RX ORDER — HEPARIN SODIUM,PORCINE 10 UNIT/ML
5-20 VIAL (ML) INTRAVENOUS
Status: DISCONTINUED | OUTPATIENT
Start: 2021-08-17 | End: 2021-08-17 | Stop reason: HOSPADM

## 2021-08-17 RX ORDER — FLUCONAZOLE 200 MG/1
800 TABLET ORAL DAILY
Qty: 40 TABLET | Refills: 0 | Status: SHIPPED | OUTPATIENT
Start: 2021-08-17 | End: 2021-09-07

## 2021-08-17 RX ADMIN — GLIMEPIRIDE 2 MG: 2 TABLET ORAL at 08:00

## 2021-08-17 RX ADMIN — Medication 5 ML: at 15:29

## 2021-08-17 RX ADMIN — FLUCONAZOLE 800 MG: 200 TABLET ORAL at 08:01

## 2021-08-17 RX ADMIN — LOSARTAN POTASSIUM AND HYDROCHLOROTHIAZIDE 1 TABLET: 50; 12.5 TABLET, FILM COATED ORAL at 08:01

## 2021-08-17 RX ADMIN — MICAFUNGIN SODIUM 100 MG: 50 INJECTION, POWDER, LYOPHILIZED, FOR SOLUTION INTRAVENOUS at 10:41

## 2021-08-17 RX ADMIN — TAMSULOSIN HYDROCHLORIDE 0.4 MG: 0.4 CAPSULE ORAL at 08:00

## 2021-08-17 RX ADMIN — TOLTERODINE 4 MG: 4 CAPSULE, EXTENDED RELEASE ORAL at 08:01

## 2021-08-17 RX ADMIN — VANCOMYCIN HYDROCHLORIDE 125 MG: 125 CAPSULE ORAL at 08:01

## 2021-08-17 ASSESSMENT — ACTIVITIES OF DAILY LIVING (ADL)
ADLS_ACUITY_SCORE: 16
ADLS_ACUITY_SCORE: 16
ADLS_ACUITY_SCORE: 15
ADLS_ACUITY_SCORE: 15
ADLS_ACUITY_SCORE: 16

## 2021-08-17 NOTE — PROGRESS NOTES
[facility] PROGRESS NOTE-Sign Off     Patient:  Kelly Barnes   Date of birth 1940, Medical record number [id number]  Date of Visit:  08/17/2021  Date of Admission: 8/8/2021  Consult Requester:Maren Trevino MD          Assessment and Plan:   ID Problem List:   1.  Fungemia with yeast, isolated on BCx 8/8, 8/9, 8/11  2. Fevers, leukocytosis-resolved  3. Recurrent renal calculi, R staghorn calculus                -s/p urologic procedure 8/5, 8/13, stone cultures w/ C glabrata   4. H/o recurrent pyelonephritis               -S/p R PNT and ureteral stent placement, s/p removal of PNT 8/14  5. Recent C diff colitis 7/23, s/p treatment   6. Diabetes mellitus type II     RECOMMENDATION:    1. Continue Micafungin 100 mg q24 hrs and PO fluconazole 800 mg daily to treat Candida glabrata fungemia and infected urinary stones.   2. Plan for 14 day total course of antifungals from first negative blood culture (8/12-8/26).   3. PICC placed for IV micafungin dosing at [facility], please remove after completion of IV antifungal.   4. Continue PO Vancomycin BID for C diff ppx through 8/18 then can discontinue   5. Please draw CBC w differential, CMP and CRP next week while on IV micafungin and PO fluconazole. Can forward results via [facility] to Dr. Pablo.  6. No ID clinic follow up needed at this time.    Thank you for the consult. Infectious disease will sign off at this time. Do not hesitate to contact us with additional questions or change in patient's clinical status.      ASSESSMENT:  Kelly Barnes is a 81 year old female with PMHx significant for HTN, DMII, recurrent pyelonephritis, R staghorn claculus, and h/o C difficile colitis. She had a recent hospitalization 7/13-16/2021 for pyelonephritis with nonobstructing kidney stones s/p cystoscopy and ureteral stent placement (7/15) with discharge home on PO abx; this was followed by repeat admission 7/23 for concern of infected ureteral stent and  C diff colitis and discharged home on PO Vanco+PO Cefpodoxoime. She had a recent urologic procedure (percutaenous nephroscopy w/ laser, stone removal with ureteroscopy and PNT placement) 8/5. Presented to the ED 8/8 with reported low grade fevers, chills, suprapubic and RLQ pain.      Afebrile on admission, tachycardic to 120s initially now improved. BP and O2 sats wnl. Continued leukocytosis to 14 (previously 16 on 8/6) and CRP elevated to 100. UA (midstream) with elevated leuk esterase, >185 WBC, WBC clumping and 25 squamous cells (possibly c/w contamination), UCx midstream and R PNT no growth. COVID swab negative. BCx 8/8 (1/2) positive for Candida glabrata on day2. BCx 8/9 (1/2) positive for yeast (not supprised as these were drawn prior to initiation of antifungal tx). BCx 8/10-13 NGTD.  CXR unremarkable. CT AP w/o contrast with R non-obstructing nephrolithiasis which is stable, stable R hydro, unchanged R PNT.      Started on Ceftriaxone, continued on PO vanco. Spiked fever to 101.4 on 8/9 in the afternoon, BCx collected. New positive BCx as above resulted in AM 8/10, discontinued PO fluconazole and started on IV micafungin. Initiated work up for fungemia as above. Echo without vegetations on heart valves, but cannot r/o endocarditis. Ophtho exam perfromed 8/11 without evidence of fungal eye involvement. Will need 1 month ophto follow up. Patient feeling much better than admission. No further fevers. Can hold on HELEN at this time given no e/o vegetation on TTE and blood culture clearance.      Patient was discussed with Dr. Pablo.     Caroline Braga PA-C  Infectious Diseases  Pager #[phone]          Interim History and Events:     Feeling well today although notes a bit of dizziness after PICC procedure. Denies feeling feverish or chilled. Good appetite. No new sx. Minimal drainage from old PNT site. Asks good questions about infection course.          HPI:   Kelly Barnes is a 81 year old female with  "PMHx significant for HTN, DMII, recurrent pyelonephritis, R staghorn claculus, and h/o C difficile colitis. She had a recent hospitalization 7/13-16/2021 for pyelonephritis with non obstructing kidney stones s/p cystoscopy and ureteral stent placement (7/15) with discharge home on PO abx; this was followed by repeat admission 7/23 for concern of infected ureteral stent and C diff colitis and discharged home on PO Vanco+PO Cefpodoxoime. She had a recent urologic procedure (percutaenous nephroscopy w/ laser, stone removal with ureteroscopy and PNT placement) 8/5. Presented to the ED 8/8 with reported low grade fevers, chills, suprapubic and RLQ pain.      States she felt \"feverish\" on discharge from urologic procedure 8/6 but did not have a measured fever. Felt \"unwell\" up until 8/8 when she presented to the ED after having a measured temp at home of 100.8. She endorsed continued suprapubic and RLQ cramping pain since urologic prodedure 8/5 and leaking of urine from around PNT tube insertion site. Denies URI sx, cough, SOB, chest pain, n/v, dysuria, frequency/urgency. Diarrhea resolved before previous hospital discharge, having normal BMs now. Notes that tylenol helps RLQ/suprapubic abdominal cramping for about 4 hrs at a time. Feels better since admission today although was unsure if she felt at her baseline and voiced some uneasiness in discharging home to early.      [place] Has been taking Cefpodoxime 100 mg BID and PO Vanco 125 mg BID at home PTA.      Recent previous Cx hx:   -Calculus R kidney growth of Candida glabrata  -UCx 7/23 and 25 mixed UGF   -UCx 7/13 & 15 E coli  (R Amp/sulb, Amp, Cipro, levo, Susceptible to cephalosporins, macrobid, Zosyn, Bactrim).   -UCx 12/2020 E coli (same sens as above)         ROS:   -Focused 5 point ROS completed, pertinent positives and negatives listed above.    Physical Examination:  Temp: 98.3  F (36.8  C) Temp src: Oral BP: (!) 142/85 Pulse: 77   Resp: 16 " SpO2: 97 % O2 Device: None (Room air)      Vitals:    08/09/21 0202 08/16/21 0806   Weight: 69.6 kg (153 lb 8 oz) 66.6 kg (146 lb 14.4 oz)       Physical Examination:  Constitutional: Pleasant female seen sitting up in bed, in NAD. Alert and interactive.   HEENT: NCAT, anicteric sclerae, conjunctiva clear. Moist mucous membranes.  Respiratory: Non-labored breathing on RA.  GI:  Abdomen is soft, non-distended.   Skin: Warm and dry. No rashes or lesions on exposed surfaces.  Musculoskeletal: Extremities grossly normal. Peripheral edema stable.   Neurologic: A &O x3, speech normal, answering questions appropriately. Moves all extremities spontaneously. Grossly non-focal.  Neuropsychiatric: Mentation and affect normal/appropriate.  VAD: PICC c/d/i    Medications:    enoxaparin ANTICOAGULANT  40 mg Subcutaneous Q24H     fluconazole  800 mg Oral Daily     glimepiride  2 mg Oral BID     heparin lock flush  5-20 mL Intracatheter Q24H     insulin aspart  1-7 Units Subcutaneous TID AC     insulin aspart  1-5 Units Subcutaneous At Bedtime     losartan-hydrochlorothiazide  1 tablet Oral QAM     micafungin  100 mg Intravenous Q24H     simvastatin  10 mg Oral At Bedtime     tamsulosin  0.4 mg Oral QAM     tolterodine ER  4 mg Oral QAM     vancomycin  125 mg Oral BID       Infusions/Drips:      Laboratory Data:   No results found for: ACD4    Inflammatory Markers    Recent Labs   Lab Test 08/16/21  0526 08/10/21  0533 08/09/21  0558 07/25/21  0839 12/21/20  0705 12/19/20  2307 03/02/16  0837 02/29/16  0828   SED  --   --   --   --   --  30 15 15   CRP 52.0* 130.0* 100.0* 130.0* 63.0* 140.0* 110.0* 36.0*       Metabolic Studies       Recent Labs   Lab Test 08/17/21  1025 08/16/21  2145 08/16/21  1741 08/16/21  1711 08/16/21  0951 08/16/21  0526 08/15/21  2154 08/15/21  1127 08/14/21  0839 08/14/21  0624 08/13/21  2127 08/13/21  1035 08/12/21  0639 08/11/21  0534 08/09/21  1829 08/09/21  1711 08/08/21  2025 07/25/21  1142  07/25/21  0839 07/15/21  1342 07/15/21  0839 07/13/21  0724 07/13/21  0721   NA  --   --   --   --   --  136  --  136 133  --   --  136 136 133   < >  --   --    < > 131*   < > 138   < > 133   POTASSIUM  --   --  3.4  --   --  3.2*  --  3.6 3.6 3.6 3.1* 3.0* 3.5 3.5   < >  --   --    < > 3.0*   < > 4.2   < > 3.8   CHLORIDE  --   --   --   --   --  102  --  98 99  --   --  101 100 101   < >  --   --    < > 101   < > 107   < > 100   CO2  --   --   --   --   --  29  --  30 27  --   --  28 28 21   < >  --   --    < > 21   < > 23   < > 25   ANIONGAP  --   --   --   --   --  5  --  8 7  --   --  7 8 11   < >  --   --    < > 9   < > 8   < > 8   BUN  --   --   --   --   --  12  --  10 9  --   --  9 7 7   < >  --   --    < > 8   < > 16   < > 20   CR  --   --   --   --   --  0.97  --  1.02 1.03  --   --  1.01 0.94 0.93   < >  --   --    < > 0.93   < > 1.03   < > 1.14*   GFRESTIMATED  --   --   --   --   --  55*  --  52* 51*  --   --  53* 57* 58*   < >  --   --    < > 58*   < > 51*   < > 46*   * 196*  --  163* 176* 153* 200* 191* 239*  --   --  228* 209* 184*   < >  --   --    < > 265*   < > 166*   < > 205*   A1C  --   --   --   --   --   --   --   --   --   --   --   --   --   --   --   --   --   --   --   --   --   --  7.4*   JAN  --   --   --   --   --  8.7  --  8.6 8.3*  --   --  8.5 8.7 8.5   < >  --   --    < > 8.0*   < > 8.5   < > 8.4*   MAG  --   --   --   --   --   --   --   --   --   --   --   --   --   --   --   --   --   --  1.8  --  2.4*   < >  --    LACT  --   --   --   --   --   --   --   --   --   --   --   --   --   --   --  1.0 1.1  --   --    < >  --   --   --     < > = values in this interval not displayed.       Hepatic Studies    Recent Labs   Lab Test 08/16/21  0526 08/11/21  0534 08/10/21  0533 08/08/21 2023 07/23/21  0208 07/13/21  0721   BILITOTAL 0.4 0.3 0.4 0.4 0.5 0.8   ALKPHOS 80 77 63 81 108 84   ALBUMIN 2.1* 2.2* 2.0* 2.7* 3.0* 3.5   AST 22 13 12 11 12 18   ALT 25 17 14 20 26 20        Pancreatitis testing    Recent Labs   Lab Test 07/23/21  0208 07/13/21  0723 05/11/21  0724 10/07/20  0831 10/15/19  0920 10/25/18  0920 03/28/17  0941 10/27/16  0825   LIPASE 71* 76  --   --   --   --   --   --    TRIG  --   --  68 90 90 76 83 93       Hematology Studies      Recent Labs   Lab Test 08/16/21  0526 08/14/21  0813 08/13/21  1035 08/12/21  0639 08/11/21  0534 08/10/21  0533 12/21/20  0705 12/20/20  0654 12/19/20  2307 12/19/20  1110 07/03/19  1734 03/28/17  0941 03/04/16  0930   WBC 9.1 9.5 9.0 11.4* 14.1* 12.1* 9.2   < > 17.7* 18.2* 11.3* 7.1 12.8*   ANEU  --   --   --   --   --   --  7.1  --  16.1* 16.0* 9.6* 4.9 10.8*   ALYM  --   --   --   --   --   --  1.1  --  0.5* 1.1 0.8 1.6 0.8   GINETTE  --   --   --   --   --   --  0.9  --  1.0 1.0 0.8 0.5 1.2   AEOS  --   --   --   --   --   --  0.1  --  0.0 0.0 0.1 0.1 0.0   HGB 10.2* 10.9* 10.4* 11.8 11.3* 10.3* 11.3*   < > 12.8 14.9 13.2 14.7 14.1   HCT 31.6* 34.1* 32.1* 36.1 34.5* 31.4* 34.8*   < > 38.7 44.5 39.3 43.8 40.7    394 339 319 293 279 287   < > 330 364 288 347 332    < > = values in this interval not displayed.       Arterial Blood Gas Testing    Recent Labs   Lab Test 08/08/21 2025   PH 7.47*        Urine Studies     Recent Labs   Lab Test 08/08/21 2028 07/25/21  1230 07/23/21  0140 07/13/21  0724 12/19/20  2242   URINEPH 5.5 6.0 5.0 5.5 5.5   NITRITE Negative Negative Negative Positive* Positive*   LEUKEST Large* Large* Large* Large* Large*   WBCU >182* >182* 50* >100* >182*       Microbiology:  Culture   Date Value Ref Range Status   08/15/2021 No growth after 1 day  Preliminary   08/15/2021 No growth after 1 day  Preliminary   08/13/2021 No growth after 3 days  Preliminary   08/13/2021 No growth after 3 days  Preliminary   08/13/2021 No anaerobic organisms isolated after 3 days  Preliminary   08/13/2021 Yeast (A)  Preliminary   08/13/2021 1+ Candida glabrata complex (A)  Final     Comment:     Susceptibilities not routinely  done   08/13/2021 No growth after 3 days  Preliminary   08/13/2021 No growth after 3 days  Preliminary   08/12/2021 No Growth  Final   08/12/2021 No Growth  Final   08/11/2021 No Growth  Final   08/11/2021 Positive on the 3rd day of incubation (A)  Final   08/11/2021 Candida glabrata complex (A)  Final     Comment:     1 of 2 bottles  Susceptibilities done on previous cultures     08/10/2021 No Growth  Final   08/10/2021 No Growth  Final   08/09/2021 No Growth  Final   08/09/2021 Positive on the 3rd day of incubation (A)  Final   08/09/2021 Candida glabrata complex (A)  Final     Comment:     1 of 2 bottles  Susceptibilities done on previous cultures   08/08/2021 Positive on the 2nd day of incubation (A)  Final   08/08/2021 Candida glabrata complex (A)  Final     Comment:     1 of 2 bottles   08/08/2021 10,000-50,000 CFU/mL Candida glabrata complex (A)  Final     Comment:     Susceptibilities not routinely done   08/08/2021 No Growth  Final   08/08/2021 No Growth  Final   08/08/2021 No Growth  Final   08/05/2021 1+ Candida glabrata complex (A)  Corrected     Comment:     Susceptibilities not routinely done  Susceptibility testing requested by Caroline Braga pager [phone] for a standard panel. Spoke with Caroline, susceptibilities will be performed from blood culture instead   07/25/2021 10,000-50,000 CFU/mL Mixture of urogenital erum  Final   07/23/2021 No Growth  Final   07/23/2021 No Growth  Final   07/23/2021 <10,000 CFU/mL Urogenital erum  Final   07/15/2021 Culture in progress  Final   07/15/2021 10,000-50,000 CFU/mL Escherichia coli (A)  Final   07/15/2021 <1,000 CFU/mL Urogenital erum  Final   07/13/2021 No Growth  Final   07/13/2021 No Growth  Final   07/13/2021 >100,000 CFU/mL Escherichia coli (A)  Final       Last check of C difficile  C Difficile Toxin B by PCR   Date Value Ref Range Status   07/23/2021 Positive (A) Negative Final     Comment:     Detection of C. difficile nucleic acid in stools  confirms the presence of these organisms in diarrheal patients but may not indicate that C. difficile are the etiologic agents of the diarrhea. Results from the Xpert C. difficile assay should be interpreted in conjunction with other laboratory and clinical data available to the clinician.       IMAGING     8/8 CT AP w/o contrast   IMPRESSION:   1. Unchanged position of the right percutaneous nephrostomy tube.   2. Right nephroureteral 5Fr catheter terminates in the distal right  ureter, short of the urinary bladder.  Stable mild right  hydroureteronephrosis.  2. Unchanged renal calculi in the inferior pole of the right kidney.  No new renal calculi.    ECHO:   8/10   Interpretation Summary  No vegetation visualized, however inferctive endocarditis cannot be excluded.  Left ventricular function is normal.The ejection fraction is 60-65%. Grade II  or moderate diastolic dysfunction.  Right ventricular function, chamber size, wall motion, and thickness are  normal.  Estimated RA pressure of 3 mmHg.     Compared to prior study on 5/3/2017 no significant change.      Prolonged Parenteral/Oral Antibiotic Recommendations and ID Follow up  This template provides final ID recommendations as of this date. If there are clinical changes or questions please call the ID team.     Infectious Diseases Diagnosis/es: Candida glabrata fungemia and infected renal stones    IV antibiotics: Yes    Antibiotic Information  Name of Antibiotic Dose of Antibiotic1 Pharmacy to assist with dosing Y/N Anticipated duration Effective start date2 End date   Micafungin  100 mg daily  N  2 weeks total  8/12/21 8/26/21   PO Fluconazole 800 mg daily (dose dependant susceptibility)  N 2 weeks total  8/15/21 8/26/21           1.Dose of antibiotic will need to be renally adjusted if creatinine clearance changes  2.Effective start date is the date of therapy with appropriate spectrum    Method of antibiotic delivery:PICC line. At the end of therapy should  the line be removed? Yes.     Tentative plans for disposition: Home    Weekly labs required: CBC with diff, CMP and CRP. Dr. Pablo will follow labs at discharge until ID follow up. Please have labs forwarded through [email or web address].    Appointment to be scheduled: No appointment required. Type of ID Clinic Appointment NONE     Imaging for ID follow up: ID Imaging: Follow up imaging for ID purposes not recommended at the time of this documentation.     ID provider to route this note to the appropriate [facility] persons and pools: [facility] infectious disease adult CSC, clinic Coordinators-[facility], Philippe PIZARRO ([facility] [place]), Darron Fischer (if patient has diagnosis of HIV)    [facility] Emergency Department/ID Clinic Information:  [address]  [place]  [address]  Phone: [phone]  Fax: [phone]

## 2021-08-17 NOTE — PROCEDURES
[facility]    Single Lumen PICC Placement    Date/Time: 8/17/2021 9:05 AM  Performed by: Lea Hunt PA-C  Authorized by: Cornelio Dockery DO   Indications: Antibiotic.    UNIVERSAL PROTOCOL   Site Marked: Yes  Prior Images Obtained and Reviewed:  Yes  Required items: Required blood products, implants, devices and special equipment available    Patient identity confirmed:  Verbally with patient, arm band, hospital-assigned identification number and provided demographic data  NA - No sedation, light sedation, or local anesthesia  Confirmation Checklist:  Patient's identity using two indicators, relevant allergies, procedure was appropriate and matched the consent or emergent situation and correct equipment/implants were available  Time out: Immediately prior to the procedure a time out was called    Universal Protocol: the Joint Commission Universal Protocol was followed    Preparation: Patient was prepped and draped in usual sterile fashion           ANESTHESIA    Anesthesia: See MAR for details  Local Anesthetic:  Lidocaine 1% without epinephrine  Anesthetic Total (mL):  1      SEDATION    Patient Sedated: No        Preparation: skin prepped with ChloraPrep  Skin prep agent: skin prep agent completely dried prior to procedure  Sterile barriers: maximum sterile barriers were used: cap, mask, sterile gown, sterile gloves, and large sterile sheet  Hand hygiene: hand hygiene performed prior to central venous catheter insertion  Type of line used: Power PICC  Catheter type: single lumen  Lumen type: non-valved  Catheter size: 4 Fr  Brand: Bard  Lot number: [id number]  Placement method: venipuncture, MST, ultrasound and tip confirmation system  Number of attempts: 1  Successful placement: yes  Orientation: left  Location: brachial vein (medial) (vein diameter - 0.50 cm)  Site rationale: Pt preference  Arm circumference: adults 10 cm  Extremity circumference: 27  Visible  catheter length: 2  Total catheter length: 41  Dressing and securement: chlorhexidine disc applied, glue, sterile dressing applied, statlock and site cleaned  Post procedure assessment: blood return through all ports and free fluid flow (placement verified by [facility] 3[facility])  PROCEDURE   Patient Tolerance:  Patient tolerated the procedure well with no immediate complications  Describe Procedure: PICC tip is in satisfactory location as verified by [employer] [facility] 3[facility] Tip Confirmation System. PICC is OK to use.

## 2021-08-17 NOTE — PLAN OF CARE
"Shift [id number]    Neuro: WDL, A&Ox4  Cardiac: WDL, denies CP  Respiratory: WDL, lung sounds clear and equal bilaterally  GI/: Pt voiding spontaneously and without difficulty  Diet/appetite: tolerating a regular diet  Activity: Up independently  Pain: Denies pain  Skin: Skin WDL except neph tube site is slightly reddened  Lines: New PICC placed in L brachial, heparin locked. Pt to discharge with PICC in place for outpt infusions after she leaves    BP (!) 142/85 (BP Location: Right arm)   Pulse 77   Temp 98.3  F (36.8  C) (Oral)   Resp 16   Ht 1.575 m (5' 2\")   Wt 66.6 kg (146 lb 14.4 oz)   SpO2 97%   BMI 26.87 kg/m      "

## 2021-08-17 NOTE — PLAN OF CARE
"Shift: 15:00 - 07:30 A  VS: /80 (BP Location: Right arm)   Pulse 75   Temp 98.9  F (37.2  C) (Oral)   Resp 16   Ht 1.575 m (5' 2\")   Wt 66.6 kg (146 lb 14.4 oz)   SpO2 98%   BMI 26.87 kg/m      Pain: Patient denies pain and nausea at this time.  Neuro: AOx4, neuros intact  Cardiac: WDL, no CP noted.  Respiratory: Posterior lungs clear bilaterally. No SoB noted.  Diet/Appetite:  Regular diet, ate 80% of meal this evening.  /GI: Patient voiding normally. Minimal drainage from ostomy bag this shift. >20 ml. No BM this shift.  LDA's: No access at this time.  Skin: No new deficits noted.  Activity: Patient independent to bathroom.  Tests/Procedures: Awaiting PICC line insertion.    "

## 2021-08-17 NOTE — PROGRESS NOTES
Discharge instruction reviewed.  Patient verbalized understanding. PIV removed, dressing at previous PNT site placed, education about wound care done, supplies given to bring home. Education about PICC line done,  patient ambulated to the main lobby. Family awaiting infront of hospital. Patient discharged

## 2021-08-17 NOTE — PROVIDER NOTIFICATION
08/17/21 0905   PICC Single Lumen 08/17/21 Left Brachial vein medial   Placement Date/Time: 08/17/21 (c) 0905   Catheter Brand: Bard  Size (Fr): 4 Fr  Lot #: [id number]  Full barrier precautions done: Yes, hand hygiene, sterile gown, sterile gloves, mask, cap, full body drape, chlorhexidine scrub  Consent Signed: Yes  Time...   Site Assessment WDL   Line Status Blood return noted;Saline locked   External Cath Length (cm) 2 cm   Extremity Circumference (cm) 27 cm   Extravasation? No   Dressing Intervention Chlorhexidine patch;Transparent;Securing device;New dressing   Dressing Change Due 08/24/21   Lumen A - Cap Change Due 08/21/21   PICC Comment PICC inserted   Line Necessity Yes, meets criteria

## 2021-08-17 NOTE — DISCHARGE SUMMARY
[facility]  Hospitalist Discharge Summary      Date of Admission:  8/8/2021  Date of Discharge:  8/17/2021  Discharging Provider: Blanca Hurst PA-C  Discharge Team: Hospitalist Service, Gold 6    Discharge Diagnoses   # Fungemia with Candida glabrata complex   # Hx recurrent pyelonephritis s/p right PCNL and ureteral stent removal, torres placement   #torres and PCN removed prior to discharge  #Hypokalemia (resolved)  # Hx C. Diff infection  #DM2  #OAB  #HHTN  #HLD    Follow-ups Needed After Discharge   Follow-up Appointments     Adult [facility] Follow-up and recommended labs and tests      - Follow up with your PCP in 1 week  - Follow up with urology in 2 weeks  - Follow up with your eye doctor in 1 month for repeat eye exam     Appointments on [place] and/or [place] campus (with [facility] or [facility]   provider or service). Call [phone] if you haven't heard regarding   these appointments within 7 days of discharge.         At a [place] laboratory or with your PCP you will need repeat blood tests in 1 week    Unresulted Labs Ordered in the Past 30 Days of this Admission     Date and Time Order Name Status Description    8/15/2021  8:46 AM Blood Culture Hand, Left Preliminary     8/15/2021  8:46 AM Blood Culture Hand, Right Preliminary     8/13/2021  5:23 PM Fungal or Yeast Culture Routine Preliminary     8/13/2021  5:23 PM Anaerobic Bacterial Culture Routine Preliminary     8/13/2021  3:00 PM Blood Culture Hand, Left Preliminary     8/13/2021  3:00 PM Blood Culture Peripheral Blood Preliminary     8/13/2021 12:01 AM Blood Culture Hand, Right Preliminary     8/13/2021 12:01 AM Blood Culture Arm, Right Preliminary     8/9/2021  6:44 PM 1,3 Beta D glucan fungitell In process       These results will be followed up by our team and your PCP    Discharge Disposition   Discharged to home  Condition at discharge: Stable      Hospital Course      In brief:      Kelly Barnes  is a 81F with PMH of HTN, HLD, anemia, DM2, recurrent pyelonephritis, R staghorn calculus, recently hospitalized 7/13-7/16 due to pyelonephritis and nonobstructive kidney stone s/p cytoscopy and ureteral stent placement 7/15 with subsequent PNT placement on 8/5, and hospitalized again from 7/23 - 7/27 with C. Diff infection (on oral vancomycin currently), and GERSON, who presented with fevers and abdominal pain admitted on 8/8/2021 found to have fungemia with candida glabrata due to infected and obstructed nephrolithiasis. She underwent PCNL and JJ stent removal with Urology 8/13/21. She failed a methyene blue challenge but passed a capping trial and urology removed nephrostomy tube and initially placed ostomy drainage bag for urine drainage that improved >24h prior to discharge and a pressure dressing was placed on discharge.  She was seen by ID who assisted with antimicrobial regiment.  SW assisted with infusion center follow up.  She was able to discharge to home 8/17 with her medications and plan to follow up with labs in 1 week and with PCP.     -------------------------------------------------------------  Extended, problem-based course:      # Fungemia with Candida glabrata complex   # Hx recurrent pyelonephritis s/p right PCNL and ureteral stent removal, torres placement  Recently hospitalized 7/13-7/16 with pyelonephritis and non-obstructive R kidney stone s/p cystoscopy and ureteral stent placement on 7/15, discharged on vantin, then then subsequently underwent percutaneous nephrolithotomy, ureteroscopy s/p R PNT on 8/5 with cultures from stone growing Candida glabrata complex. Presenting with fevers, leukocytosis and grossly positive UA. Lactic flat. CT A/P showed no evidence of abscess; kidney was unremarkable and ureteral stent was in place. Blood cultures on 8/8 and 8/9, and now on 8/11 and urine Cx on 8/8 growing candida glabrata. TTE without any vegetations. Patient underwent PCNL on 8/13 in the OR  without any remaining stone. Failed methyl blue test, but tolerated capped nephrostomy and therefore it was removed on 8/15. She completed cefpodoxime on 8/15. Her pain was controlled on oral APAP and occasional oxycodone and she did not take oxycodone several days prior to discharge. She tolerated tamsulosin inpatient.  Ostomy bag over her PCN removal site was CDI on discharge date and was changed to a pressure dressing per nursing.   - Continue IV micafungin 100 mg Q24Hrs and PO fluconazole 800 mg daily until 8/26 (total 14 days from 1st negative cultures)  - PICC placement on 8/17, will need to remove when antimicrobials completed  - Ophthalmology consulted, no evident of fungal eye involvement, but recommend outpatient follow up in 1 month with repeat eye exam   - repeat CBC, CMP, CRP in 1 week with results CC'd to Dr. Pablo  - 8/15 Repeat blood cultures and sensitivities are NGTD   - Pain control, Tylenol PRN    # Hypokalemia, - Potassium 3.2 on 8/16 was replaced     # Recent C difficile infection- Dx on 7/23 C diff colitis, s/p Vancomycin for 10 days.  ID followed during this hospitalization and we appreciate their recs:               - continue PO vancomycin 125 mg BID for 2 days after antibiotics are discontinued (end date 8/18).     # DM2 - Hemoglobin A1c 7.4 on 7/13/21. PTA glimepiride 2 mg TID. She was kept on a medium ISS.  Glucoses trended in the high 100s.   - Continue reduced dose Glimepiride 2 mg at BID      # Overactive Bladder - continue PTA tolterodine ER 4 mg Daily   # HTN - Continue PTA Losartan/HCTZ 50-12.5 mg daily   # HLD - continue home Simvastatin 10 mg at bedtime. Holding PTA ASA        Consultations This Hospital Stay   UROLOGY IP CONSULT  INFECTIOUS DISEASE GENERAL ADULT IP CONSULT  VASCULAR ACCESS CARE ADULT IP CONSULT  OPHTHALMOLOGY IP CONSULT  CARE MANAGEMENT / SOCIAL WORK IP CONSULT  UROLOGY IP CONSULT  WOUND OSTOMY CONTINENCE NURSE  IP CONSULT  VASCULAR ACCESS CARE ADULT IP  CONSULT  VASCULAR ACCESS CARE ADULT IP CONSULT  VASCULAR ACCESS CARE ADULT IP CONSULT  VASCULAR ACCESS CARE ADULT IP CONSULT  VASCULAR ACCESS CARE ADULT IP CONSULT  VASCULAR ACCESS FOR PICC PLACEMENT ADULT IP CONSULT    Code Status   Full Code    Time Spent on this Encounter   I, Blanca Hurst PA-C, personally saw the patient today and spent greater than 30 minutes discharging this patient.       Blanca Hurst PA-C  [facility] [facility]  [address] [place] [id number]  Phone: [phone]  Fax: [phone]  ______________________________________________________________________    Physical Exam   Vital Signs: Temp: 98.3  F (36.8  C) Temp src: Oral BP: (!) 142/85 Pulse: 77   Resp: 16 SpO2: 97 % O2 Device: None (Room air)    Weight: 146 lbs 14.4 oz  GENERAL: Alert and oriented x 3. NAD. Sitting upright in bed. Cooperative.   HEENT: Anicteric sclera. Mucous membranes moist. NC. AT. Pupils equal and round  CV: RRR. S1, S2. No murmurs appreciated.   RESPIRATORY: Effort normal on RA. Lungs CTAB with no wheezing, rales, rhonchi.   GI: Abdomen soft and non distended with NABS, nontender  NEUROLOGICAL: No focal deficits. Moves all extremities.    EXTREMITIES: trace LE peripheral edema.   SKIN: No jaundice. No rashes.  BACK: right flank tube removal site is CDI with no drainage, including in the ostomy bag, no edema         Primary Care Physician   Lea Lopez    Discharge Orders      WOUND CARE SUPPLIES    Please supply 4x4 guaze and tegarderms. Apply prn when dressing is overly saturated. Cover PNT site with guaze and then tegarderm.     Care Coordination Referral      Activity    Your activity upon discharge: activity as tolerated     Adult [facility] Follow-up and recommended labs and tests    - Follow up with your PCP in 1 week  - Follow up with urology in 2 weeks  - Follow up with your eye doctor in 1 month for repeat eye exam     Appointments on [place]  and/or [facility] (with [facility] or [facility] provider or service). Call [phone] if you haven't heard regarding these appointments within 7 days of discharge.     When to contact your care team    Call your PCP or return to ED for temperatures > 100.4 degrees, worsening or changing pain, uncontrolled vomiting or inability to tolerate oral intake, new or worsening diarrhea, new or worsening shortness of breath, decreased urine output, yellowing of the eyes or skin, confusion, weakness, blood in urine or stools, or any other concerning symptoms.     Tubes and drains    You are going home with the following tubes or drains: percutaneous nephrostomy tube.  Tube cares per hospital or home care instructions     Reason for your hospital stay    Dear Kelly Barnes    Your were hospitalized at [facility] with fungal infection in your blood stream from an infected kidney stone and treated with anti-fungal medications and removal of the kidney stone.  Over your hospitalization your infection improved and today you are ready to be discharged home.  If you continue anti-fungal therapy you should continue to improve but if you develop fever, shortness of breath, light headedness, chest pain or any other concerning symptoms please seek medical attention.    We are suggesting the following medication changes:    - Continue Fluconazole 800 mg daily through 8/26/21, then stop  - Continue Micafungin  mg daily through 8/26/21, then stop  - Continue Vancomycin 125 mg twice daily through 8/18/21, then stop    Please get the following tests done:   N/A    Please set up an appointment with:  - Follow up with your PCP in 1 week  - Follow up with urology in 2 weeks  - Follow up with your eye doctor in 1 month for repeat eye exam     It was a pleasure meeting with you today. Thank you for allowing me and my team the privilege of caring for you today. You are the reason we are here, and I truly hope we  provided you with the excellent service you deserve. Please let us know if there is anything else we can do for you so that we can be sure you are leaving completely satisfied with your care experience.      Take care!  Lea Hunt PA-C  Hospitalist Service     Diet    Follow this diet upon discharge: Orders Placed This Encounter      Regular Diet Adult       Significant Results and Procedures   Most Recent 3 CBC's:Recent Labs   Lab Test 08/16/21  0526 08/14/21  0813 08/13/21  1035   WBC 9.1 9.5 9.0   HGB 10.2* 10.9* 10.4*   MCV 84 84 84    394 339     Most Recent 3 BMP's:Recent Labs   Lab Test 08/17/21  1025 08/16/21  2145 08/16/21  1741 08/16/21  1711 08/16/21  0526 08/15/21  1127 08/14/21  0839   NA  --   --   --   --  136 136 133   POTASSIUM  --   --  3.4  --  3.2* 3.6 3.6   CHLORIDE  --   --   --   --  102 98 99   CO2  --   --   --   --  29 30 27   BUN  --   --   --   --  12 10 9   CR  --   --   --   --  0.97 1.02 1.03   ANIONGAP  --   --   --   --  5 8 7   JAN  --   --   --   --  8.7 8.6 8.3*   * 196*  --  163* 153* 191* 239*     Most Recent 2 LFT's:Recent Labs   Lab Test 08/16/21  0526 08/11/21  0534   AST 22 13   ALT 25 17   ALKPHOS 80 77   BILITOTAL 0.4 0.3     Most Recent 6 Bacteria Isolates From Any Culture (See EPIC Reports for Culture Details):Recent Labs   Lab Test 12/19/20  2318 12/19/20  2307 12/19/20  2242 12/19/20  1109 07/03/19  1714 03/02/16 2024   CULT No growth No growth 10,000 to 50,000 colonies/mL  mixed urogenital erum  Susceptibility testing not routinely done   >100,000 colonies/mL  Escherichia coli  * >100,000 colonies/mL  Escherichia coli  *  10,000 to 50,000 colonies/mL  mixed urogenital erum  Susceptibility testing not routinely done   No growth     Most Recent 6 glucoses:Recent Labs   Lab Test 08/17/21  1025 08/16/21  2145 08/16/21  1711 08/16/21  0951 08/16/21  0526 08/15/21  2154   * 196* 163* 176* 153* 200*     Most Recent ESR & CRP:Recent Labs   Lab  Test 08/16/21  0526 12/21/20  0705 12/19/20  2307   SED  --   --  30   CRP 52.0*   < > 140.0*    < > = values in this interval not displayed.       Discharge Medications   Current Discharge Medication List      START taking these medications    Details   fluconazole (DIFLUCAN) 200 MG tablet Take 4 tablets (800 mg) by mouth daily for 10 days  Qty: 40 tablet, Refills: 0    Associated Diagnoses: Fungemia         CONTINUE these medications which have CHANGED    Details   vancomycin (VANCOCIN) 125 MG capsule Take 1 capsule (125 mg) by mouth 2 times daily for 2 days  Qty: 4 capsule, Refills: 0    Associated Diagnoses: C. difficile colitis         CONTINUE these medications which have NOT CHANGED    Details   acetaminophen (TYLENOL) 325 MG tablet Take 2 tablets (650 mg) by mouth every 6 hours as needed for mild pain or fever  Qty: 112 tablet, Refills: 0    Associated Diagnoses: Kidney stone; Acute pyelonephritis      glimepiride (AMARYL) 2 MG tablet Take 1 tablet (2 mg) by mouth 2 times daily (with meals) She will also take separate 2 mg dose with breakfast.  Qty: 180 tablet, Refills: 3    Associated Diagnoses: Type 2 diabetes mellitus without complication, without long-term current use of insulin (H)      lactobacillus rhamnosus, GG, (CULTURELL) capsule Take 1 capsule by mouth 2 times daily  Qty: 60 capsule, Refills: 0    Associated Diagnoses: C. difficile colitis      losartan-hydrochlorothiazide (HYZAAR) 50-12.5 MG tablet Take 1 tablet by mouth daily  Qty: 90 tablet, Refills: 3    Associated Diagnoses: Hypertension goal BP (blood pressure) < 140/90      simvastatin (ZOCOR) 10 MG tablet TAKE ONE TABLET BY MOUTH AT BEDTIME  Qty: 90 tablet, Refills: 3    Associated Diagnoses: Hyperlipidemia LDL goal <100      tamsulosin (FLOMAX) 0.4 MG capsule Take 1 capsule (0.4 mg) by mouth daily  Qty: 30 capsule, Refills: 0    Associated Diagnoses: Kidney stone      tolterodine ER (DETROL LA) 4 MG 24 hr capsule Take 1 capsule (4 mg) by  mouth daily  Qty: 30 capsule, Refills: 0    Associated Diagnoses: Kidney stone      blood glucose (ONETOUCH ULTRA) test strip Use to test blood sugar twice daily. Dispense 1 box of 200 test strips, #3 refills.  Qty: 100 strip, Refills: 3    Associated Diagnoses: Type 2 diabetes mellitus without complication, without long-term current use of insulin (H)      blood glucose monitoring (ONE TOUCH ULTRA 2) meter device kit Use to test blood sugar 3 times daily  Qty: 1 kit, Refills: 0    Associated Diagnoses: DM type 2, goal A1C below 8.0      OneTouch Delica Lancets 33G MISC 1 Device by In Vitro route 2 times daily  Qty: 100 each, Refills: 11    Associated Diagnoses: Type 2 diabetes mellitus without complication, without long-term current use of insulin (H)         STOP taking these medications       cefpodoxime (VANTIN) 100 MG tablet Comments:   Reason for Stopping:         oxyCODONE (ROXICODONE) 5 MG tablet Comments:   Reason for Stopping:             Allergies   Allergies   Allergen Reactions     Ibuprofen Other (See Comments)     numbness in hands and feet     Keflex [Cephalexin] Rash

## 2021-08-17 NOTE — PROGRESS NOTES
Care Management Follow Up    Length of Stay (days): 8    Expected Discharge Date: 08/17/2021     Concerns to be Addressed: discharge planning     Patient plan of care discussed at interdisciplinary rounds: Yes    Anticipated Discharge Disposition: Home  Anticipated Discharge Services: OP Infusion.   Anticipated Discharge DME:  None.     Patient/family educated on Medicare website which has current facility and service quality ratings:   No  Education Provided on the Discharge Plan:  Yes  Patient/Family in Agreement with the Plan: Yes    Referrals Placed by CM/SW:  OP infusion  Private pay costs discussed: Not applicable    Additional Information:  Met with patient to discuss discharge planning. PICC line has been placed, OP infusions at the [facility] discussed with patient. [facility] contacted, appointments scheduled, discharge AVS updated. Bedside nurse and provider updated. Internal handoff completed.     Sintia Rivera, RNCC, BSN    [facility]    Medicine Group  [address] [place] [id number]    [email or web address]    Office: [phone] Pager: [phone]  To contact the weekend RNCC, page [phone].

## 2021-08-18 ENCOUNTER — INFUSION THERAPY VISIT (OUTPATIENT)
Dept: INFUSION THERAPY | Facility: CLINIC | Age: 81
DRG: 982 | End: 2021-08-18
Attending: PHYSICIAN ASSISTANT
Payer: COMMERCIAL

## 2021-08-18 ENCOUNTER — PATIENT OUTREACH (OUTPATIENT)
Dept: CARE COORDINATION | Facility: CLINIC | Age: 81
End: 2021-08-18

## 2021-08-18 ENCOUNTER — APPOINTMENT (OUTPATIENT)
Dept: NURSING | Facility: CLINIC | Age: 81
End: 2021-08-18
Payer: COMMERCIAL

## 2021-08-18 ENCOUNTER — TELEPHONE (OUTPATIENT)
Dept: FAMILY MEDICINE | Facility: CLINIC | Age: 81
End: 2021-08-18

## 2021-08-18 VITALS
TEMPERATURE: 98.5 F | SYSTOLIC BLOOD PRESSURE: 137 MMHG | DIASTOLIC BLOOD PRESSURE: 84 MMHG | RESPIRATION RATE: 16 BRPM | OXYGEN SATURATION: 97 % | HEART RATE: 84 BPM

## 2021-08-18 DIAGNOSIS — B49 FUNGEMIA: Primary | ICD-10-CM

## 2021-08-18 LAB
BACTERIA BLD CULT: NO GROWTH

## 2021-08-18 PROCEDURE — 258N000003 HC RX IP 258 OP 636: Performed by: PHYSICIAN ASSISTANT

## 2021-08-18 PROCEDURE — 250N000011 HC RX IP 250 OP 636: Performed by: PHYSICIAN ASSISTANT

## 2021-08-18 RX ORDER — DIPHENHYDRAMINE HYDROCHLORIDE 50 MG/ML
50 INJECTION INTRAMUSCULAR; INTRAVENOUS
Status: CANCELLED
Start: 2021-08-19

## 2021-08-18 RX ORDER — METHYLPREDNISOLONE SODIUM SUCCINATE 125 MG/2ML
125 INJECTION, POWDER, LYOPHILIZED, FOR SOLUTION INTRAMUSCULAR; INTRAVENOUS
Status: CANCELLED
Start: 2021-08-19

## 2021-08-18 RX ORDER — EPINEPHRINE 1 MG/ML
0.3 INJECTION, SOLUTION INTRAMUSCULAR; SUBCUTANEOUS EVERY 5 MIN PRN
Status: CANCELLED | OUTPATIENT
Start: 2021-08-19

## 2021-08-18 RX ORDER — HEPARIN SODIUM (PORCINE) LOCK FLUSH IV SOLN 100 UNIT/ML 100 UNIT/ML
5 SOLUTION INTRAVENOUS
Status: CANCELLED | OUTPATIENT
Start: 2021-08-19

## 2021-08-18 RX ORDER — ALBUTEROL SULFATE 90 UG/1
1-2 AEROSOL, METERED RESPIRATORY (INHALATION)
Status: CANCELLED
Start: 2021-08-19

## 2021-08-18 RX ORDER — ALBUTEROL SULFATE 0.83 MG/ML
2.5 SOLUTION RESPIRATORY (INHALATION)
Status: CANCELLED | OUTPATIENT
Start: 2021-08-19

## 2021-08-18 RX ORDER — HEPARIN SODIUM,PORCINE 10 UNIT/ML
5 VIAL (ML) INTRAVENOUS
Status: CANCELLED | OUTPATIENT
Start: 2021-08-19

## 2021-08-18 RX ORDER — NALOXONE HYDROCHLORIDE 0.4 MG/ML
0.2 INJECTION, SOLUTION INTRAMUSCULAR; INTRAVENOUS; SUBCUTANEOUS
Status: CANCELLED | OUTPATIENT
Start: 2021-08-19

## 2021-08-18 RX ORDER — MEPERIDINE HYDROCHLORIDE 25 MG/ML
25 INJECTION INTRAMUSCULAR; INTRAVENOUS; SUBCUTANEOUS EVERY 30 MIN PRN
Status: CANCELLED | OUTPATIENT
Start: 2021-08-19

## 2021-08-18 RX ORDER — HEPARIN SODIUM,PORCINE 10 UNIT/ML
5 VIAL (ML) INTRAVENOUS
Status: DISCONTINUED | OUTPATIENT
Start: 2021-08-18 | End: 2021-08-18 | Stop reason: HOSPADM

## 2021-08-18 RX ADMIN — Medication 5 ML: at 15:11

## 2021-08-18 RX ADMIN — MICAFUNGIN SODIUM 100 MG: 20 INJECTION, POWDER, LYOPHILIZED, FOR SOLUTION INTRAVENOUS at 13:58

## 2021-08-18 NOTE — PATIENT INSTRUCTIONS
Dear Kelly Barnes    Thank you for choosing [facility] ([facility]) for your Micafungin infusion.  The following information is a summary of our appointment as well as important reminders.      Patient Education     Micafungin Sodium Solution for injection  What is this medicine?  MICAFUNGIN (GERALD singh) is an antifungal medicine. It is used to treat or prevent certain kinds of fungal or yeast infections.  This medicine may be used for other purposes; ask your health care provider or pharmacist if you have questions.  What should I tell my health care provider before I take this medicine?  They need to know if you have any of these conditions:    cancer    HIV infection or AIDS    liver disease    kidney disease    an unusual or allergic reaction to micafungin, other medicines, foods, dyes, or preservatives    pregnant or trying to get pregnant    breast-feeding  How should I use this medicine?  This medicine is for infusion into a vein. It is usually given by a health care professional in a hospital or clinic setting.  If you get this medicine at home, you will be taught how to prepare and give this medicine. Use exactly as directed. Take your medicine at regular intervals. Do not take it more often than directed.  It is important that you put your used needles and syringes in a special sharps container. Do not put them in a trash can. If you do not have a sharps container, call your pharmacist or healthcare provider to get one.  Talk to your pediatrician regarding the use of this medicine in children. While this drug may be prescribed for children as young as 4 months for selected conditions, precautions do apply.  Overdosage: If you think you have taken too much of this medicine contact a poison control center or emergency room at once.  NOTE: This medicine is only for you. Do not share this medicine with others.  What if I miss a dose?  This does  not apply.  What may interact with this medicine?    nifedipine    sirolimus  This list may not describe all possible interactions. Give your health care provider a list of all the medicines, herbs, non-prescription drugs, or dietary supplements you use. Also tell them if you smoke, drink alcohol, or use illegal drugs. Some items may interact with your medicine.  What should I watch for while using this medicine?  If you are taking this medicine to treat an infection, tell your doctor or healthcare professional if your symptoms do not start to get better or if they get worse. During your treatment, your progress will be monitored.  What side effects may I notice from receiving this medicine?  Side effects that you should report to your doctor or health care professional as soon as possible:    abdominal pain    agitation    allergic reactions like skin rash or itching, hives, swelling of the lips, mouth, tongue, or throat    breathing problems    confusion    fever or infection    redness, pain at the site of injection    trouble passing urine or change in the amount of urine    unusual bleeding or bruising    unusually weak or tired    yellowing of the eyes or skin  Side effects that usually do not require medical attention (report to your doctor or health care professional if they continue or are bothersome):    diarrhea    dizziness    headache    nausea, vomiting    stomach upset    tiredness  This list may not describe all possible side effects. Call your doctor for medical advice about side effects. You may report side effects to FDA at 4-463-FDA-8249.  Where should I keep my medicine?  Keep out of the reach of children.  If you are using this medicine at home, you will be instructed on how to store this medicine. Throw away any unused medicine after the expiration date on the label.  NOTE:This sheet is a summary. It may not cover all possible information. If you have questions about this medicine, talk to  your doctor, pharmacist, or health care provider. Copyright  2016 Gold Standard             We look forward in seeing you on your next appointment here at [facility]).  Please don t hesitate to call us at [phone] to reschedule any of your appointments or to speak with one of the [facility] registered nurses.  It was a pleasure taking care of you today.    Sincerely,    [facility]  [address] [place] [address]  [id number]  Phone:  [phone]

## 2021-08-18 NOTE — TELEPHONE ENCOUNTER
Reason for call:  Other   Patient called regarding (reason for call): call back  Additional comments: Cindy with Care Coordination called to state that patient is in need of a hospital follow up appointment in the next 7 days, as patient was discharged from the hospital on 8/17 for fungal infection in blood stream from kidney stone. Patient was treated with anti fungal, and and kidney stone removed. Patient has declined seeing a different provider. Please call with a time patient can be added to the schedule.     Phone number to reach patient:  Home number on file [phone] (home)    Best Time:  Any time    Can we leave a detailed message on this number?  YES    Travel screening: Not Applicable

## 2021-08-18 NOTE — PROGRESS NOTES
Nursing Note  Kelly Barnes [facility] for:   Chief Complaint   Patient presents with     Infusion     Micafungin     During today's [facility] appointment, orders from Lea Hunt PA-C were completed.  Frequency: daily until 8/27/21    Progress note:  Patient identification verified by name and date of birth.  Assessment completed.  Vitals recorded in Doc Flowsheets.  Patient was provided with education regarding medication/procedure and possible side effects.  Patient verbalized understanding.     present during visit today: Not Applicable.    Treatment Conditions: Non-applicable.    Premedications: were not ordered.    Drug Waste Record: No    Infusion length and rate:  infusion given over approximately 60 minutes    Labs: were not ordered for this appointment.    Vascular access: PICC accessed today.    Is the next appt scheduled? Tomorrow at 1330  Asymptomatic COVID test completed? Negative 8/8    Post Infusion Assessment:  Patient tolerated infusion without incident. Printed AVS given.    Discharge Plan:   Follow up plan of care with: ongoing infusions at [facility] primary care provider, and after visit summary given to patient  Discharge instructions were reviewed with patient.  Patient/representative verbalized understanding of discharge instructions and all questions answered.  Patient discharged from [facility] in stable condition.    Natalia Johnson RN    Administrations This Visit     heparin lock flush 10 UNIT/ML injection 5 mL     Admin Date  08/18/2021 Action  Given Dose  5 mL Route  Intracatheter Administered By  Natalia Johnson RN          micafungin (MYCAMINE) 100 mg in sodium chloride 0.9 % 100 mL intermittent infusion     Admin Date  08/18/2021 Action  New Bag Dose  100 mg Rate  100 mL/hr Route  Intravenous Administered By  Alex  JAMES Fisher                /84   Pulse 84   Temp 98.5  F (36.9  C) (Oral)   Resp 16   SpO2 97%

## 2021-08-18 NOTE — LETTER
8/18/2021         RE: Kelly Barnes  [address]  [facility] [address]        Dear Colleague,    Thank you for referring your patient, Kelly Barnes, to the [facility] TREATMENT [facility]. Please see a copy of my visit note below.    Nursing Note  Kelly Barnes [facility] for:   Chief Complaint   Patient presents with     Infusion     Micafungin     During today's [facility] appointment, orders from Lea Hunt PA-C were completed.  Frequency: daily until 8/27/21    Progress note:  Patient identification verified by name and date of birth.  Assessment completed.  Vitals recorded in Doc Flowsheets.  Patient was provided with education regarding medication/procedure and possible side effects.  Patient verbalized understanding.     present during visit today: Not Applicable.    Treatment Conditions: Non-applicable.    Premedications: were not ordered.    Drug Waste Record: No    Infusion length and rate:  infusion given over approximately 60 minutes    Labs: were not ordered for this appointment.    Vascular access: PICC accessed today.    Is the next appt scheduled? Tomorrow at 1330  Asymptomatic COVID test completed? Negative 8/8    Post Infusion Assessment:  Patient tolerated infusion without incident. Printed AVS given.    Discharge Plan:   Follow up plan of care with: ongoing infusions at [facility]., primary care provider, and after visit summary given to patient  Discharge instructions were reviewed with patient.  Patient/representative verbalized understanding of discharge instructions and all questions answered.  Patient discharged from [facility] in stable condition.    Natalia Cedillo RN    Administrations This Visit     heparin lock flush 10 UNIT/ML injection 5 mL     Admin Date  08/18/2021 Action  Given Dose  5 mL  Route  Intracatheter Administered By  Natalia Johnson, RN          micafungin (MYCAMINE) 100 mg in sodium chloride 0.9 % 100 mL intermittent infusion     Admin Date  08/18/2021 Action  New Bag Dose  100 mg Rate  100 mL/hr Route  Intravenous Administered By  Natalia Johnson, JAMES                /84   Pulse 84   Temp 98.5  F (36.9  C) (Oral)   Resp 16   SpO2 97%         Again, thank you for allowing me to participate in the care of your patient.        Sincerely,        [facility]

## 2021-08-18 NOTE — PROGRESS NOTES
Clinic Care Coordination Contact  Community Health Worker Initial Outreach  Spoke with patient    [facility]: Post-Discharge Note  SITUATION                                                      Admission:    Admission Date: 08/08/21   Reason for Admission: Fungemia with Candida glabrata complex. Hx recurrent pyelonephritis s/p right PCNL and ureteral stent removal, torres placement.  Discharge:   Discharge Date: 08/17/21  Discharge Diagnosis: Fungemia with Candida glabrata complex. Hx recurrent pyelonephritis s/p right PCNL and ureteral stent removal, torres placement.    BACKGROUND                                                      Kelly Barnes is a 81F with PMH of HTN, HLD, anemia, DM2, recurrent pyelonephritis, R staghorn calculus, recently hospitalized 7/13-7/16 due to pyelonephritis and nonobstructive kidney stone s/p cytoscopy and ureteral stent placement 7/15 with subsequent PNT placement on 8/5, and hospitalized again from 7/23 - 7/27 with C. Diff infection (on oral vancomycin currently), and GERSON, who presented with fevers and abdominal pain admitted on 8/8/2021 found to have fungemia with candida glabrata due to infected and obstructed nephrolithiasis. She underwent PCNL and JJ stent removal with Urology 8/13/21. She failed a methyene blue challenge but passed a capping trial and urology removed nephrostomy tube and initially placed ostomy drainage bag for urine drainage that improved >24h prior to discharge and a pressure dressing was placed on discharge.  She was seen by ID who assisted with antimicrobial regiment.  SW assisted with infusion center follow up.  She was able to discharge to home 8/17 with her medications and plan to follow up with labs in 1 week and with PCP.    ASSESSMENT         Discharge Assessment  How are you doing now that you are home?: Patient states thtat she's doing well, no concerns, and would prefer to see Dr. Crandall for hospital follow up.  How are your symptoms? (Red  Flag symptoms escalate to triage hotline per guidelines): Improved  Do you feel your condition is stable enough to be safe at home until your provider visit?: Yes  Does the patient have their discharge instructions? : Yes  Does the patient have questions regarding their discharge instructions? : No  Were you started on any new medications or were there changes to any of your previous medications? : Yes  Does the patient have all of their medications?: Yes  Do you have questions regarding any of your medications? : Yes (see comment) (Patient states that she might need adjustment on my diabetes medication.)  Do you have all of your needed medical supplies or equipment (DME)?  (i.e. oxygen tank, CPAP, cane, etc.): Yes  Discharge follow-up appointment scheduled within 14 calendar days? : No  Is patient agreeable to assistance with scheduling? : Yes    Post-op (CHW CTA Only)  If the patient had a surgery or procedure, do they have any questions for a nurse?: No (Patient shares that she has a follow up appointment on 9/20/21 for CT scan of kidney then virtual follow up with surgeon.)       PLAN                                                      Outpatient Plan:    Follow-up Appointments     Adult [facility] Follow-up and recommended labs and tests      - Follow up with your PCP in 1 week  - Follow up with urology in 2 weeks  - Follow up with your eye doctor in 1 month for repeat eye exam      Appointments on [place] and/or [facility] (with [facility] or [facility]   provider or service). Call [phone] if you haven't heard regarding   these appointments within 7 days of discharge.         At a [facility] laboratory or with your PCP you will need repeat blood tests in 1 week    Future Appointments   Date Time Provider Department Center   8/20/2021  9:00 AM Rn, Zackary [facility] HPNTABITHA HP   8/20/2021 12:00 PM [facility]   8/21/2021 12:00 PM [facility] INFUSION NURSE INMADELINE [facility]   8/22/2021 [facility]:00 [facility]   8/23/2021 10:30 AM Jose Sue [facility] HPMTM HP   8/23/2021  1:00 PM [facility] INFUSION NURSE [facility]   8/24/2021  8:30 AM [facility] INFUSION NURSE [facility]   8/25/2021 10:45 AM Mona Adams [facility] M[facility]   8/25/2021  1:00 PM [facility] INFUSION NURSE [facility]   8/26/2021  1:30 PM [facility] INFUSION NURSE [facility]   8/27/2021  3:00 PM [facility]   9/21/2021  9:00 AM UCSCCT2 CCT [facility]   9/28/2021 10:15 AM Kikr Law MD [facility]   11/4/2021  8:20 AM [facility] MD [facility]     Care Management   Community Health Worker Initial Outreach    CHW Initial Information Gathering:  Referral Source: IP Report  Preferred Hospital: [facility]  [phone]  Current living arrangement:: I live in a private home with family  Type of residence:: Private home - [facility]  Community Resources: None  Supplies used at home:: Diabetic Supplies  Equipment Currently Used at Home: none  Informal Support system:: Children, Family (Book club)  No PCP office visit in Past Year: No  Transportation means:: Accessible car  CHW Additional Questions  If ED/Hospital discharge, follow-up appointment scheduled as recommended?: No  Patient agreeable to assistance with scheduling?: Yes  MyChart active?: Yes  Patient sent Social Determinants of Health questionnaire?: Yes    Patient accepts CC: Yes. Patient scheduled for assessment with LUIS Monreal on 8/20/21 at 9AM. Patient noted desire to discuss CC.     CHW introduced self and role with CC. CHW inquired if patient needs any additional support or resources however patient declined and stated that she would just like a follow up with PCP. CHW review below and informed her that a message was sent to Dr. Crandall on 8/18th.     8/18/21 CHW  Patient states that Dr. Crandall is booked out into October and she would prefer to see PCP for hospital follow  up. Patient requested assistance with sending PCP a message to clarify sooner appointment.     CHW informed patient that will assist her with clarifying sooner appointment with PCP and will try calling her again tomorrow. Patient agreed.       CHW called Caty at backline scheduling [phone], informed her of patients request. Caty asked for discharged information and will relay message to Dr. Crandall.            For any urgent concerns, please contact our 24 hour nurse triage line: [phone] ([phone])         Cindy [facility] Care Coordination  [facility], [facility]'s, and [facility]    Phone: [phone]

## 2021-08-18 NOTE — PROGRESS NOTES
[facility]  Parenteral ANtibiotic Review at Departure from Acute Care [facility]     Antimicrobial Stewardship Program - A joint venture between [facility] and  Physicians to optimize antibiotic management.  NOT a formal consult - Restricted Antimicrobial Review     Patient: Kelly Barnes  MRN: [id number]  Allergies: Ibuprofen and Keflex [cephalexin]    Brief Summary: Kelly Barnes is an 81 year old female with PMH of HTN, HLD, anemia, DM 2, recurrent pyelonephritis, R staghorn calculus, and was recently hospitalized 7/13- 7/16 with pyelonephritis and non-obstructive R kidney stone s/p cytoscopy and ureteral stent placed on 7/15. Patient was readmitted on 7/23 for concern of infected ureteral stent and C diff colitis and discharged on oral vancomycin and cefpodoxime and underwent urological procedure on 8/5. She was admitted on 8/8 with reported low-grade fevers, chills, suprapubic and RLQ pain initiated on empiric ceftriaxone for UTI and continued on PTA oral vancomycin. CT AP w/o contrast with R non obstructing nephrolithiasis which was noted as stable. ID was consulted for further management and initiated fluconazole for Candida glabrata complex from prior R kidney stone culture on 8/5. UA/Ucx and blood culture from admission positive with Candida glabrata complex. Fluconazole was switched to micafungin on 8/10 for fungemia. Repeat blood cultures remain negative and R kidney stone culture from 8/13 positive with Candida glabrata complex. High dose fluconazole re-initiated on 8/15 and micafungin continued. Echo on 8/10 without vegetations on heart valves but cannot rule out endocarditis. Low concern for IE thus will hold off HELEN at this time per ID given transient fungemia and clinical stability. Ophthomology exam 8/11 without evidence of fungal eye involvement. Plan to complete 2 weeks of micafungin and fluconazole for Candida  glabrata fungemia and infected urinary stones, respectively.       Antimicrobial Dose/Route/Frequency Duration/Indication Start Date End Date    Micafungin   100 mg IV every 24 hours  14 days Candida glabrata fungemia  8/12/21 8/26/21    Fluconazole   800 mg PO daily  14 days Candida glabrata fungemia  8/12/21 8/26/21      Laboratory Tests:  CBC w/diff, CMP, CRP    Lab Monitoring Frequency:  Weekly    Therapeutic Drug Monitoring:  N/A   Therapeutic Drug Monitoring Frequency:  N/A   Miscellaneous Drug Monitoring:  None     Line Type:  PICC (Single Lumen placed on 08/17/21)    Reassess Line/Pull Line Date: 8/26/21     First Dose Received in Controlled Setting: Yes     Designated Provider: Isra Pablo MD    Follow-up: No ID clinic follow up needed at this time.     Recommendations/Additional Information:   Please send weekly lab results via [employer] to  Dr. Samy Menendez, PharmD Candidate 2022  Pager: [phone]

## 2021-08-19 ENCOUNTER — INFUSION THERAPY VISIT (OUTPATIENT)
Dept: INFUSION THERAPY | Facility: CLINIC | Age: 81
End: 2021-08-19
Attending: PHYSICIAN ASSISTANT
Payer: COMMERCIAL

## 2021-08-19 ENCOUNTER — TELEPHONE (OUTPATIENT)
Dept: UROLOGY | Facility: CLINIC | Age: 81
End: 2021-08-19

## 2021-08-19 ENCOUNTER — APPOINTMENT (OUTPATIENT)
Dept: NURSING | Facility: CLINIC | Age: 81
End: 2021-08-19
Payer: COMMERCIAL

## 2021-08-19 VITALS
RESPIRATION RATE: 16 BRPM | HEART RATE: 101 BPM | SYSTOLIC BLOOD PRESSURE: 137 MMHG | DIASTOLIC BLOOD PRESSURE: 80 MMHG | TEMPERATURE: 98.4 F | OXYGEN SATURATION: 96 %

## 2021-08-19 DIAGNOSIS — N20.0 KIDNEY STONE: Primary | ICD-10-CM

## 2021-08-19 DIAGNOSIS — B49 FUNGEMIA: Primary | ICD-10-CM

## 2021-08-19 PROCEDURE — 250N000011 HC RX IP 250 OP 636: Performed by: PHYSICIAN ASSISTANT

## 2021-08-19 PROCEDURE — 96365 THER/PROPH/DIAG IV INF INIT: CPT

## 2021-08-19 PROCEDURE — 258N000003 HC RX IP 258 OP 636: Performed by: PHYSICIAN ASSISTANT

## 2021-08-19 RX ORDER — NALOXONE HYDROCHLORIDE 0.4 MG/ML
0.2 INJECTION, SOLUTION INTRAMUSCULAR; INTRAVENOUS; SUBCUTANEOUS
Status: CANCELLED | OUTPATIENT
Start: 2021-08-20

## 2021-08-19 RX ORDER — EPINEPHRINE 1 MG/ML
0.3 INJECTION, SOLUTION INTRAMUSCULAR; SUBCUTANEOUS EVERY 5 MIN PRN
Status: CANCELLED | OUTPATIENT
Start: 2021-08-20

## 2021-08-19 RX ORDER — ALBUTEROL SULFATE 0.83 MG/ML
2.5 SOLUTION RESPIRATORY (INHALATION)
Status: CANCELLED | OUTPATIENT
Start: 2021-08-20

## 2021-08-19 RX ORDER — HEPARIN SODIUM,PORCINE 10 UNIT/ML
5 VIAL (ML) INTRAVENOUS
Status: CANCELLED | OUTPATIENT
Start: 2021-08-20

## 2021-08-19 RX ORDER — DIPHENHYDRAMINE HYDROCHLORIDE 50 MG/ML
50 INJECTION INTRAMUSCULAR; INTRAVENOUS
Status: CANCELLED
Start: 2021-08-20

## 2021-08-19 RX ORDER — ALBUTEROL SULFATE 90 UG/1
1-2 AEROSOL, METERED RESPIRATORY (INHALATION)
Status: CANCELLED
Start: 2021-08-20

## 2021-08-19 RX ORDER — HEPARIN SODIUM (PORCINE) LOCK FLUSH IV SOLN 100 UNIT/ML 100 UNIT/ML
5 SOLUTION INTRAVENOUS
Status: CANCELLED | OUTPATIENT
Start: 2021-08-20

## 2021-08-19 RX ORDER — MEPERIDINE HYDROCHLORIDE 25 MG/ML
25 INJECTION INTRAMUSCULAR; INTRAVENOUS; SUBCUTANEOUS EVERY 30 MIN PRN
Status: CANCELLED | OUTPATIENT
Start: 2021-08-20

## 2021-08-19 RX ORDER — METHYLPREDNISOLONE SODIUM SUCCINATE 125 MG/2ML
125 INJECTION, POWDER, LYOPHILIZED, FOR SOLUTION INTRAMUSCULAR; INTRAVENOUS
Status: CANCELLED
Start: 2021-08-20

## 2021-08-19 RX ORDER — HEPARIN SODIUM,PORCINE 10 UNIT/ML
5 VIAL (ML) INTRAVENOUS
Status: DISCONTINUED | OUTPATIENT
Start: 2021-08-19 | End: 2021-08-19 | Stop reason: HOSPADM

## 2021-08-19 RX ADMIN — Medication 5 ML: at 15:12

## 2021-08-19 RX ADMIN — MICAFUNGIN SODIUM 100 MG: 20 INJECTION, POWDER, LYOPHILIZED, FOR SOLUTION INTRAVENOUS at 14:02

## 2021-08-19 ASSESSMENT — PAIN SCALES - GENERAL: PAINLEVEL: NO PAIN (0)

## 2021-08-19 NOTE — PROGRESS NOTES
Called patient, reviewed video visit requirements, scheduling will call to coordinate CT prior.    - Tracy GALVAN, EMT  Urology Clinic

## 2021-08-19 NOTE — PATIENT INSTRUCTIONS
Dear Kelly Barnes    Thank you for choosing [facility] ([facility]) for your Micafungin infusion.  The following information is a summary of our appointment as well as important reminders.      Patient Education     Micafungin Sodium Solution for injection  What is this medicine?  MICAFUNGIN (GERALD singh) is an antifungal medicine. It is used to treat or prevent certain kinds of fungal or yeast infections.  This medicine may be used for other purposes; ask your health care provider or pharmacist if you have questions.  What should I tell my health care provider before I take this medicine?  They need to know if you have any of these conditions:    cancer    HIV infection or AIDS    liver disease    kidney disease    an unusual or allergic reaction to micafungin, other medicines, foods, dyes, or preservatives    pregnant or trying to get pregnant    breast-feeding  How should I use this medicine?  This medicine is for infusion into a vein. It is usually given by a health care professional in a hospital or clinic setting.  If you get this medicine at home, you will be taught how to prepare and give this medicine. Use exactly as directed. Take your medicine at regular intervals. Do not take it more often than directed.  It is important that you put your used needles and syringes in a special sharps container. Do not put them in a trash can. If you do not have a sharps container, call your pharmacist or healthcare provider to get one.  Talk to your pediatrician regarding the use of this medicine in children. While this drug may be prescribed for children as young as 4 months for selected conditions, precautions do apply.  Overdosage: If you think you have taken too much of this medicine contact a poison control center or emergency room at once.  NOTE: This medicine is only for you. Do not share this medicine with others.  What if I miss a dose?  This does  not apply.  What may interact with this medicine?    nifedipine    sirolimus  This list may not describe all possible interactions. Give your health care provider a list of all the medicines, herbs, non-prescription drugs, or dietary supplements you use. Also tell them if you smoke, drink alcohol, or use illegal drugs. Some items may interact with your medicine.  What should I watch for while using this medicine?  If you are taking this medicine to treat an infection, tell your doctor or healthcare professional if your symptoms do not start to get better or if they get worse. During your treatment, your progress will be monitored.  What side effects may I notice from receiving this medicine?  Side effects that you should report to your doctor or health care professional as soon as possible:    abdominal pain    agitation    allergic reactions like skin rash or itching, hives, swelling of the lips, mouth, tongue, or throat    breathing problems    confusion    fever or infection    redness, pain at the site of injection    trouble passing urine or change in the amount of urine    unusual bleeding or bruising    unusually weak or tired    yellowing of the eyes or skin  Side effects that usually do not require medical attention (report to your doctor or health care professional if they continue or are bothersome):    diarrhea    dizziness    headache    nausea, vomiting    stomach upset    tiredness  This list may not describe all possible side effects. Call your doctor for medical advice about side effects. You may report side effects to FDA at 6-311-FDA-3788.  Where should I keep my medicine?  Keep out of the reach of children.  If you are using this medicine at home, you will be instructed on how to store this medicine. Throw away any unused medicine after the expiration date on the label.  NOTE:This sheet is a summary. It may not cover all possible information. If you have questions about this medicine, talk to  your doctor, pharmacist, or health care provider. Copyright  2016 Gold Standard             We look forward in seeing you on your next appointment here at [facility] ([facility]).  Please don t hesitate to call us at [phone] to reschedule any of your appointments or to speak with one of the [facility] registered nurses.  It was a pleasure taking care of you today.    Sincerely,    [facility]  [facility]  [address] [place] [address]  [id number]  Phone:  [phone]

## 2021-08-19 NOTE — TELEPHONE ENCOUNTER
----- Message from Tracy Worthy EMT sent at 8/19/2021 10:42 AM CDT -----  Regarding: CT, needs scheduling  I called the patient and confirmed she can do video appointment, scheduled 9/28 at 10:15am with Dr. Law, patient aware.    Can we please coordinate a CT (ordered) prior to her appointment on 9/28? Ideally within a week of the appointment.     Thanks,  Tracy   ----- Message -----  From: Kirk Law MD  Sent: 8/18/2021  12:41 PM CDT  To: KAYLA Busch, PATI Montoya  Subject: RE: Followup?                                    Yes - Tracy can you please get her a CT and virtual visit with me in the 4-6 week timeframe     Thanks  ----- Message -----  From: Lissa Ward PA  Sent: 8/16/2021  10:30 AM CDT  To: Lea Hunt PA-C, Kirk Law MD  Subject: Followup?                                        Dear Dr. Law       Kelly Barnes is a 81 year old female with PMHx of HTN, HLD, anemia, DM2, recurrent pyelonephritis, R staghorn calculus, recently hospitalized 7/13-7/16 due to pyelonephritis and nonobstructive kidney stone s/p cytoscopy and ureteral stent placement 7/15 with subsequent R PCNL 8/5, who presented with fevers and abdominal pain admitted on 8/8/2021 found to have fungemia. Originally scheduled for second look PCNL but canceled due to fungemia. S/p R PCNL second look 8/13. Yesterday her PNT was removed after she failed a methylene blue test but passed a clamping trial.  Currently is ready for discharge (from ED Obs - primary: Lea Hunt PA-C).  Has an ostomy bag at PNT site that will convert to a dressing once drainage further declines (currently 20cc per 12 hours).    Did you want her to followup at some point with a renal ultrasound or something?    I don't see any appointments scheduled yet.     Thanks!  Keke

## 2021-08-19 NOTE — PROGRESS NOTES
Nursing Note  Kelly Barnes [facility] for:   Chief Complaint   Patient presents with     Infusion     Micafungin     During today's [facility] appointment, orders from Lea Hunt PA-C were completed.  Frequency: daily until 8/27/2021    Progress note:  Patient identification verified by name and date of birth.  Assessment completed.  Vitals recorded in Doc Flowsheets.  Patient was provided with education regarding medication/procedure and possible side effects.  Patient verbalized understanding.     present during visit today: Not Applicable.    Treatment Conditions: Non-applicable.    Premedications: were not ordered.    Drug Waste Record: No    Infusion length and rate:  infusion given over approximately 60 minutes    Labs: were not ordered for this appointment.    Vascular access: PICC accessed today.    Is the next appt scheduled? tomorrow  Asymptomatic COVID test completed? NA    Post Infusion Assessment:  Patient tolerated infusion without incident.     Discharge Plan:   Follow up plan of care with: ongoing infusions [facility]., ordering provider as scheduled. and after visit summary declined by patient  Discharge instructions were reviewed with patient.  Patient/representative verbalized understanding of discharge instructions and all questions answered.  Patient discharged from [facility] in stable condition.    Natalia Cedillo RN       Administrations This Visit     heparin lock flush 10 UNIT/ML injection 5 mL     Admin Date  08/19/2021 Action  Given Dose  5 mL Route  Intracatheter Administered By  Vero Rascon RN          micafungin (MYCAMINE) 100 mg in sodium chloride 0.9 % 100 mL intermittent infusion     Admin Date  08/19/2021 Action  New Bag Dose  100 mg Rate  100 mL/hr Route  Intravenous Administered By  Natalia Cedillo, RN                 BP (!) 143/87   Pulse 102   Temp 98.4  F (36.9  C) (Oral)   SpO2 96%

## 2021-08-19 NOTE — LETTER
8/19/2021         RE: Kelly Barnes  [address]  [facility] [address]        Dear Colleague,    Thank you for referring your patient, Kelly Barnes, to the [facility] TREATMENT [facility]. Please see a copy of my visit note below.    Nursing Note  Kelly Barnes [facility] for:   Chief Complaint   Patient presents with     Infusion     Micafungin     During [facility]'s [facility] appointment, orders from Lea Hunt PA-C were completed.  Frequency: daily until 8/27/2021    Progress note:  Patient identification verified by name and date of birth.  Assessment completed.  Vitals recorded in Doc Flowsheets.  Patient was provided with education regarding medication/procedure and possible side effects.  Patient verbalized understanding.     present during visit today: Not Applicable.    Treatment Conditions: Non-applicable.    Premedications: were not ordered.    Drug Waste Record: No    Infusion length and rate:  infusion given over approximately 60 minutes    Labs: were not ordered for this appointment.    Vascular access: PICC accessed today.    Is the next appt scheduled? tomorrow  Asymptomatic COVID test completed? NA    Post Infusion Assessment:  Patient tolerated infusion without incident.     Discharge Plan:   Follow up plan of care with: ongoing infusions at [facility]., ordering provider as scheduled. and after visit summary declined by patient  Discharge instructions were reviewed with patient.  Patient/representative verbalized understanding of discharge instructions and all questions answered.  Patient discharged from [facility] in stable condition.    Natalia Cedillo RN       Administrations This Visit     heparin lock flush 10 UNIT/ML injection 5 mL     Admin Date  08/19/2021 Action  Given Dose  5 mL Route  Intracatheter  Administered By  Vero Rascon, RN          micafungin (MYCAMINE) 100 mg in sodium chloride 0.9 % 100 mL intermittent infusion     Admin Date  08/19/2021 Action  New Bag Dose  100 mg Rate  100 mL/hr Route  Intravenous Administered By  Natalia Johnson, RN                BP (!) 143/87   Pulse 102   Temp 98.4  F (36.9  C) (Oral)   SpO2 96%         Again, thank you for allowing me to participate in the care of your patient.        Sincerely,        [facility]

## 2021-08-20 ENCOUNTER — INFUSION THERAPY VISIT (OUTPATIENT)
Dept: INFUSION THERAPY | Facility: CLINIC | Age: 81
End: 2021-08-20
Attending: PHYSICIAN ASSISTANT
Payer: COMMERCIAL

## 2021-08-20 ENCOUNTER — PATIENT OUTREACH (OUTPATIENT)
Dept: NURSING | Facility: CLINIC | Age: 81
End: 2021-08-20

## 2021-08-20 VITALS
TEMPERATURE: 98.6 F | OXYGEN SATURATION: 97 % | RESPIRATION RATE: 16 BRPM | HEART RATE: 80 BPM | DIASTOLIC BLOOD PRESSURE: 82 MMHG | SYSTOLIC BLOOD PRESSURE: 133 MMHG

## 2021-08-20 DIAGNOSIS — B49 FUNGEMIA: Primary | ICD-10-CM

## 2021-08-20 LAB
BACTERIA BLD CULT: NO GROWTH
BACTERIA BLD CULT: NO GROWTH
BACTERIA SPEC CULT: NORMAL

## 2021-08-20 PROCEDURE — 96365 THER/PROPH/DIAG IV INF INIT: CPT

## 2021-08-20 PROCEDURE — 250N000011 HC RX IP 250 OP 636: Performed by: PHYSICIAN ASSISTANT

## 2021-08-20 PROCEDURE — 258N000003 HC RX IP 258 OP 636: Performed by: PHYSICIAN ASSISTANT

## 2021-08-20 RX ORDER — DIPHENHYDRAMINE HYDROCHLORIDE 50 MG/ML
50 INJECTION INTRAMUSCULAR; INTRAVENOUS
Status: CANCELLED
Start: 2021-08-21

## 2021-08-20 RX ORDER — EPINEPHRINE 1 MG/ML
0.3 INJECTION, SOLUTION INTRAMUSCULAR; SUBCUTANEOUS EVERY 5 MIN PRN
Status: CANCELLED | OUTPATIENT
Start: 2021-08-21

## 2021-08-20 RX ORDER — HEPARIN SODIUM (PORCINE) LOCK FLUSH IV SOLN 100 UNIT/ML 100 UNIT/ML
5 SOLUTION INTRAVENOUS
Status: DISCONTINUED | OUTPATIENT
Start: 2021-08-20 | End: 2021-08-20 | Stop reason: HOSPADM

## 2021-08-20 RX ORDER — HEPARIN SODIUM,PORCINE 10 UNIT/ML
5 VIAL (ML) INTRAVENOUS
Status: CANCELLED | OUTPATIENT
Start: 2021-08-21

## 2021-08-20 RX ORDER — HEPARIN SODIUM,PORCINE 10 UNIT/ML
5 VIAL (ML) INTRAVENOUS
Status: DISCONTINUED | OUTPATIENT
Start: 2021-08-20 | End: 2021-08-20 | Stop reason: HOSPADM

## 2021-08-20 RX ORDER — ALBUTEROL SULFATE 90 UG/1
1-2 AEROSOL, METERED RESPIRATORY (INHALATION)
Status: CANCELLED
Start: 2021-08-21

## 2021-08-20 RX ORDER — ALBUTEROL SULFATE 0.83 MG/ML
2.5 SOLUTION RESPIRATORY (INHALATION)
Status: CANCELLED | OUTPATIENT
Start: 2021-08-21

## 2021-08-20 RX ORDER — HEPARIN SODIUM (PORCINE) LOCK FLUSH IV SOLN 100 UNIT/ML 100 UNIT/ML
5 SOLUTION INTRAVENOUS
Status: CANCELLED | OUTPATIENT
Start: 2021-08-21

## 2021-08-20 RX ORDER — METHYLPREDNISOLONE SODIUM SUCCINATE 125 MG/2ML
125 INJECTION, POWDER, LYOPHILIZED, FOR SOLUTION INTRAMUSCULAR; INTRAVENOUS
Status: CANCELLED
Start: 2021-08-21

## 2021-08-20 RX ORDER — MEPERIDINE HYDROCHLORIDE 25 MG/ML
25 INJECTION INTRAMUSCULAR; INTRAVENOUS; SUBCUTANEOUS EVERY 30 MIN PRN
Status: CANCELLED | OUTPATIENT
Start: 2021-08-21

## 2021-08-20 RX ORDER — NALOXONE HYDROCHLORIDE 0.4 MG/ML
0.2 INJECTION, SOLUTION INTRAMUSCULAR; INTRAVENOUS; SUBCUTANEOUS
Status: CANCELLED | OUTPATIENT
Start: 2021-08-21

## 2021-08-20 RX ADMIN — MICAFUNGIN SODIUM 100 MG: 10 INJECTION, POWDER, LYOPHILIZED, FOR SOLUTION INTRAVENOUS at 12:20

## 2021-08-20 RX ADMIN — Medication 5 ML: at 13:39

## 2021-08-20 ASSESSMENT — ACTIVITIES OF DAILY LIVING (ADL): DEPENDENT_IADLS:: TRANSPORTATION

## 2021-08-20 NOTE — PROGRESS NOTES
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 Diff infection  #DM2  #OAB  #HHTN  #HLD    Primary Diagnosis: Genitourinary Disorders    Chief Complaint   Patient presents with     Clinic Care Coordination - Initial     RNCC initial outreach        [place] Utilization:  Clinic Utilization  Difficulty keeping appointments:: No  Compliance Concerns: No  No-Show Concerns: No  No PCP office visit in Past Year: No  Utilization    Hospital Admissions  5             ED Visits  4             No Show Count (past year)  0                Current as of: 8/20/2021  8:17 AM              Clinical Concerns:  Current Medical Concerns:   Patient Active Problem List   Diagnosis     Osteopenia     Hypertension goal BP (blood pressure) < 140/90     Hyperlipidemia LDL goal <100     Leg edema, left     Type 2 diabetes mellitus without complication, without long-term current use of insulin (H)     Family history of breast cancer in sister     Hepatitis A immune     Left leg swelling     Allergic dermatitis     CKD (chronic kidney disease) stage 3, GFR 30-59 ml/min     Hypokalemia     Emphysematous pyelonephritis     Kidney stone     Acute kidney injury (H)     Fever in adult     Diarrhea, unspecified type     Fever and chills     Urinary tract infection associated with nephrostomy catheter, initial encounter (H)     Fungemia       Current Behavioral Concerns: None identified    Assessment:    Patient denies pain this visit.     Patient denies fever, chills, shortness of breath, cough, falls, constipation,  diarrhea, difficulty with urination, or signs and symptoms of infection.     Patient reports her blood sugars have been elevated recently. She usually has a fasting value of below 170 and has noticed her fasting values have been 200 or above.     Patient reports she has been feeling slightly dizzy or lightheaded since beginning her antibiotics.     Patient reports she walks frequently throughout the day.      Education Provided to patient:    Discharge instructions were reviewed with patient.     Medications were reviewed with patient. Patient questions the necessity of some of her medications. Writer recommended patient take medications as ordered and discuss with PCP at next week's (8/27/2021) PCP visit.     Education provided regarding importance of a diet high in fiber and fluids to prevent diarrhea.     Education provided importance of a healthy diet to promote healing.     Education provided regarding signs and symptoms of infection to report to care team.     Education provided regarding safe ambulation.    Writer confirmed patient is knowledgeable of upcoming appointments.     Pain  Pain (GOAL):: No  Health Maintenance Reviewed: Due/Overdue, patient will address with PCP  Clinical Pathway: None    Medication Management:  Medication review status: Medications reviewed.  Changes noted per patient report. Patient has MTM visit scheduled.        Functional Status:  Dependent ADLs:: Independent  Dependent IADLs:: Transportation  Bed or wheelchair confined:: No  Mobility Status: Independent  Fallen 2 or more times in the past year?: No  Any fall with injury in the past year?: No    Living Situation:  Current living arrangement:: I live in a private home with family  Type of residence:: Private home - stairs    Lifestyle & Psychosocial Needs:    Social Determinants of Health     Tobacco Use: Low Risk      Smoking Tobacco Use: Never Smoker     Smokeless Tobacco Use: Never Used   Alcohol Use:      Frequency of  Alcohol Consumption:      Average Number of Drinks:      Frequency of Binge Drinking:    Financial Resource Strain:      Difficulty of Paying Living Expenses:    Food Insecurity:      Worried About Running Out of Food in the Last Year:      Ran Out of Food in the Last Year:    Transportation Needs:      Lack of Transportation (Medical):      Lack of Transportation (Non-Medical):    Physical Activity:      Days of Exercise per Week:      Minutes of Exercise per Session:    Stress:      Feeling of Stress :    Social Connections:      Frequency of Communication with Friends and Family:      Frequency of Social Gatherings with Friends and Family:      Attends Scientology Services:      Active Member of Clubs or Organizations:      Attends Club or Organization Meetings:      Marital Status:    Intimate Partner Violence:      Fear of Current or Ex-Partner:      Emotionally Abused:      Physically Abused:      Sexually Abused:    Depression: Not at risk     PHQ-2 Score: 0   Housing Stability:      Unable to Pay for Housing in the Last Year:      Number of Places Lived in the Last Year:      Unstable Housing in the Last Year:      Inadequate nutrition (GOAL):: No  Tube Feeding: No  Inadequate activity/exercise (GOAL):: No  Significant changes in sleep pattern (GOAL): No  Transportation means:: Family, Regular car     Scientology or spiritual beliefs that impact treatment:: No  Mental health DX:: No  Mental health management concern (GOAL):: No  Chemical Dependency Status: No Current Concerns  Informal Support system:: Children, Family (Book club)      Resources and Interventions:  Current Resources:      Community Resources: None  Supplies used at home:: Diabetic Supplies  Equipment Currently Used at Home: none     Advance Care Plan/Directive  Advanced Care Plans/Directives on file:: Yes  Type Advanced Care Plans/Directives: Advanced Directive - On File    Referrals Placed: None     Goals:   Goals        General     Medical  (pt-stated)      Notes - Note edited  8/20/2021  9:21 AM by Zaida Romano, JAMES     Goal Statement: I will recover from my recent hospitalization, back to baseline, in the next month.     Date goal set: 8/20/2021  Measure of Success: Patient stated return to baseline    Date to Achieve By: 9/20/2021  Patient expressed understanding of goal: Yes    Action steps to achieve this goal  1. I will consume a diet rich in fiber and drink adequate fluids.  2. I will walk throughout the day, safely, as I am able.   3. I will take my medications as prescribed and attend follow up appointments as scheduled.   4. I will reach out to my care team for any concerning symptoms (fever, chills, shortness of breath, etc.) or questions related to my healthcare.               Patient/Caregiver understanding: Patient verbalizes understanding of discharge instructions and follow up plan.     Outreach Frequency: 2 weeks  Future Appointments              Today UC 49 SIPC; UC SIPC INFUSION NURSE [facility]    Tomorrow UC 48 SIPC; UC SIPC INFUSION NURSE [facility]    In 2 days UC 44 SIPC; UC SIPC INFUSION NURSE [facility][1]    In 3 days Jose Sue RPH [facility]    In 3 days UC 39 SIPC; UC SIPC INFUSION NURSE [facility]    In 4 days UC 40 SIPC; UC SIPC INFUSION NURSE [facility]    In 5 days Mona Adams MD [facility]    In 5 days UC 45 SIPC; UC SIPC INFUSION NURSE [facility][1]    In 6 days UC 40 SIPC; UC SIPC INFUSION NURSE [facility]    In 1 week Lea Lopez MD [facility]  [facility]    In 1 week  46 SIPC;  SIPC INFUSION NURSE [facility]    In 1 month [facility]2 [facility]    In 1 month Kirk Law MD [facility]    In 2 months Lea Lopez MD [facility]          Plan:    Writer will send CC intro letter and patient centered care plan via YOOWALK.     Patient will work towards goal as outlined above.     Writer will follow up in 2 weeks.     Zaida Romano, RN Care Coordinator     [facility] Ambulatory Care Management  [facility]  [email or web address]    Office: [phone]

## 2021-08-20 NOTE — PROGRESS NOTES
Nursing Note  Kelly Barnes [facility] for:   Chief Complaint   Patient presents with     Infusion     Micafungin     During [facility]'s [facility] appointment, orders from Dr. Hunt were completed.  Frequency: daily, through 8/27/21    Progress note:  Patient identification verified by name and date of birth.  Assessment completed.  Vitals recorded in Doc Flowsheets.  Patient was provided with education regarding medication/procedure and possible side effects.  Patient verbalized understanding.     present during visit today: Not Applicable.    Treatment Conditions: Non-applicable.    Premedications: were not ordered.    Drug Waste Record: No    Infusion length and rate:  infusion given over approximately 60 minutes    Labs: were not ordered for this appointment.    Vascular access: PICC accessed today.    Is the next appt scheduled? yes  Asymptomatic COVID test completed? no    Post Infusion Assessment:  Patient tolerated infusion without incident.     Discharge Plan:   Follow up plan of care with: ongoing infusions at [facility]. and ordering provider as scheduled.  Discharge instructions were reviewed with patient.  Patient/representative verbalized understanding of discharge instructions and all questions answered.  Patient discharged [facility] in stable condition.    Omaira Rahman RN       Administrations This Visit     heparin lock flush 10 UNIT/ML injection 5 mL     Admin Date  08/20/2021 Action  Given Dose  5 mL Route  Intracatheter Administered By  Marni Clements RN          micafungin (MYCAMINE) 100 mg in sodium chloride 0.9 % 100 mL intermittent infusion     Admin Date  08/20/2021 Action  New Bag Dose  100 mg Rate  100 mL/hr Route  Intravenous Administered By  Omaira Rahman RN                /81 (BP Location: Right arm)   Pulse 92   Temp 98.6   F (37  C) (Oral)   Resp 16   SpO2 97%

## 2021-08-20 NOTE — LETTER
8/20/2021         RE: Kelly Barnes  [address]  [place] [address]        Dear Colleague,    Thank you for referring your patient, Kelly Barnes, to the [facility] TREAT[facility]. Please see a copy of my visit note below.    Nursing Note  Kelly Barnes [facility] for:   Chief Complaint   Patient presents with     Infusion     Micafungin     During [facility]'s [facility] appointment, orders from Dr. Hunt were completed.  Frequency: daily, through 8/27/21    Progress note:  Patient identification verified by name and date of birth.  Assessment completed.  Vitals recorded in Doc Flowsheets.  Patient was provided with education regarding medication/procedure and possible side effects.  Patient verbalized understanding.     present during visit today: Not Applicable.    Treatment Conditions: Non-applicable.    Premedications: were not ordered.    Drug Waste Record: No    Infusion length and rate:  infusion given over approximately 60 minutes    Labs: were not ordered for this appointment.    Vascular access: PICC accessed today.    Is the next appt scheduled? yes  Asymptomatic COVID test completed? no    Post Infusion Assessment:  Patient tolerated infusion without incident.     Discharge Plan:   Follow up plan of care with: ongoing infusions [facility]. and ordering provider as scheduled.  Discharge instructions were reviewed with patient.  Patient/representative verbalized understanding of discharge instructions and all questions answered.  Patient discharged from [facility] in stable condition.    Omaira Rahman RN       Administrations This Visit     heparin lock flush 10 UNIT/ML injection 5 mL     Admin Date  08/20/2021 Action  Given Dose  5 mL Route  Intracatheter Administered By  Marni Clements RN          micafungin  (MYCAMINE) 100 mg in sodium chloride 0.9 % 100 mL intermittent infusion     Admin Date  08/20/2021 Action  New Bag Dose  100 mg Rate  100 mL/hr Route  Intravenous Administered By  Omaira Rahman, RN                /81 (BP Location: Right arm)   Pulse 92   Temp 98.6  F (37  C) (Oral)   Resp 16   SpO2 97%           Again, thank you for allowing me to participate in the care of your patient.        Sincerely,        [facility]

## 2021-08-20 NOTE — LETTER
[facility] CARE COORDINATION  [address] [place] [address]  August 20, 2021    Kelly Barnes  [address] [place] [address]      Dear Kelly,    I am a clinic care coordinator who works with Lea Lopze MD at [facility]. I wanted to thank you for spending the time to talk with me.  Below is a description of clinic care coordination and how I can further assist you.      The clinic care coordination team is made up of a registered nurse,  and community health worker who understand the health care system. The goal of clinic care coordination is to help you manage your health and improve access to the health care system in the most efficient manner. The team can assist you in meeting your health care goals by providing education, coordinating services, strengthening the communication among your providers and supporting you with any resource needs.    Please feel free to contact me at [phone] with any questions or concerns. We are focused on providing you with the highest-quality healthcare experience possible and that all starts with you.     Sincerely,     Zaida Romano, RN Care Coordinator     [facility] Ambulatory Care Management  [facility] and   [email or web address]    Office: [phone]

## 2021-08-20 NOTE — LETTER
[facility]  Patient Centered Plan of Care  About Me:        Patient Name:  Kelly Barnes    YOB: 1940  Age:         81 year old   [facility] MRN:    [id number] Telephone Information:  Home Phone [phone]   Mobile [phone]       Address:  [address] [place] [address] Email address:  [email or web address]      Emergency Contact(s)    Name Relationship Lgl Grd Work Phone Home Phone Mobile Phone   BRIDGETTE CHAN Son   [phone]           Primary language:  English     needed? No   [facility] [employer]:  [phone] op. 1  Other communication barriers: None  Preferred Method of Communication:  Mail  Current living arrangement: I live in a private home with family  Mobility Status/ Medical Equipment: Independent    My Access Plan  Medical Emergency 911   Primary Clinic Line [facility] - [phone]   24 Hour Appointment Line [phone] or  [phone]) (toll-free)   24 Hour Nurse Line [phone] (toll-free)   Preferred Urgent Care Other   Preferred Hospital [facility]  [phone]   Preferred Pharmacy [facility] [place] [address]     Behavioral Health Crisis Line The National Suicide Prevention Lifeline at 1-852.776.7242 or 911             My Care Team Members  Patient Care Team       Relationship Specialty Notifications Start End    Lea Lopez MD PCP - General Family Practice  6/9/14     Phone: [phone] Fax: [phone]         [address] [place] [address]    Lea Lopez MD Assigned PCP   10/11/20     Phone: [phone] Fax: [phone]         [facility] [address] [address]    Zaida Romano, RN Lead Care Coordinator [phone] Admissions 8/20/21             My Care Plans  Self Management and Treatment Plan  Goals   Goals         General     Medical (pt-stated)      Notes - Note edited  8/20/2021  9:21 AM by Zaida Romano RN     Goal Statement: I will recover from my recent hospitalization, back to baseline, in the next month.     Date goal set: 8/20/2021  Measure of Success: Patient stated return to baseline    Date to Achieve By: 9/20/2021  Patient expressed understanding of goal: Yes    Action steps to achieve this goal  1. I will consume a diet rich in fiber and drink adequate fluids.  2. I will walk throughout the day, safely, as I am able.   3. I will take my medications as prescribed and attend follow up appointments as scheduled.   4. I will reach out to my care team for any concerning symptoms (fever, chills, shortness of breath, etc.) or questions related to my healthcare.                Advance Care Plans/Directives Type:   Type Advanced Care Plans/Directives: Advanced Directive - On File    My Medical and Care Information  Problem List   Patient Active Problem List   Diagnosis     Osteopenia     Hypertension goal BP (blood pressure) < 140/90     Hyperlipidemia LDL goal <100     Leg edema, left     Type 2 diabetes mellitus without complication, without long-term current use of insulin (H)     Family history of breast cancer in sister     Hepatitis A immune     Left leg swelling     Allergic dermatitis     CKD (chronic kidney disease) stage 3, GFR 30-59 ml/min     Hypokalemia     Emphysematous pyelonephritis     Kidney stone     Acute kidney injury (H)     Fever in adult     Diarrhea, unspecified type     Fever and chills     Urinary tract infection associated with nephrostomy catheter, initial encounter (H)     Fungemia      Current Medications and Allergies:  See printed Medication Report.  Current Outpatient Medications   Medication     acetaminophen (TYLENOL) 325 MG tablet     blood glucose (ONETOUCH ULTRA) test strip     blood glucose monitoring (ONE TOUCH ULTRA 2) meter device kit     fluconazole (DIFLUCAN) 200 MG tablet      glimepiride (AMARYL) 2 MG tablet     lactobacillus rhamnosus, GG, (CULTURELL) capsule     losartan-hydrochlorothiazide (HYZAAR) 50-12.5 MG tablet     micafungin 100 mg     OneTouch Delica Lancets 33G MISC     simvastatin (ZOCOR) 10 MG tablet     tamsulosin (FLOMAX) 0.4 MG capsule     tolterodine ER (DETROL LA) 4 MG 24 hr capsule     No current facility-administered medications for this visit.       Care Coordination Start Date: 8/20/2021   Frequency of Care Coordination: 2 weeks   Form Last Updated: 08/20/2021

## 2021-08-21 ENCOUNTER — INFUSION THERAPY VISIT (OUTPATIENT)
Dept: INFUSION THERAPY | Facility: CLINIC | Age: 81
End: 2021-08-21
Attending: PHYSICIAN ASSISTANT
Payer: COMMERCIAL

## 2021-08-21 VITALS
TEMPERATURE: 98.5 F | RESPIRATION RATE: 16 BRPM | HEART RATE: 87 BPM | SYSTOLIC BLOOD PRESSURE: 121 MMHG | OXYGEN SATURATION: 97 % | DIASTOLIC BLOOD PRESSURE: 85 MMHG

## 2021-08-21 DIAGNOSIS — B49 FUNGEMIA: Primary | ICD-10-CM

## 2021-08-21 PROCEDURE — 250N000011 HC RX IP 250 OP 636: Performed by: PHYSICIAN ASSISTANT

## 2021-08-21 PROCEDURE — 96365 THER/PROPH/DIAG IV INF INIT: CPT

## 2021-08-21 PROCEDURE — 258N000003 HC RX IP 258 OP 636: Performed by: PHYSICIAN ASSISTANT

## 2021-08-21 RX ORDER — HEPARIN SODIUM,PORCINE 10 UNIT/ML
5 VIAL (ML) INTRAVENOUS
Status: CANCELLED | OUTPATIENT
Start: 2021-08-22

## 2021-08-21 RX ORDER — ALBUTEROL SULFATE 0.83 MG/ML
2.5 SOLUTION RESPIRATORY (INHALATION)
Status: CANCELLED | OUTPATIENT
Start: 2021-08-22

## 2021-08-21 RX ORDER — METHYLPREDNISOLONE SODIUM SUCCINATE 125 MG/2ML
125 INJECTION, POWDER, LYOPHILIZED, FOR SOLUTION INTRAMUSCULAR; INTRAVENOUS
Status: CANCELLED
Start: 2021-08-22

## 2021-08-21 RX ORDER — EPINEPHRINE 1 MG/ML
0.3 INJECTION, SOLUTION INTRAMUSCULAR; SUBCUTANEOUS EVERY 5 MIN PRN
Status: CANCELLED | OUTPATIENT
Start: 2021-08-22

## 2021-08-21 RX ORDER — ALBUTEROL SULFATE 90 UG/1
1-2 AEROSOL, METERED RESPIRATORY (INHALATION)
Status: CANCELLED
Start: 2021-08-22

## 2021-08-21 RX ORDER — DIPHENHYDRAMINE HYDROCHLORIDE 50 MG/ML
50 INJECTION INTRAMUSCULAR; INTRAVENOUS
Status: CANCELLED
Start: 2021-08-22

## 2021-08-21 RX ORDER — HEPARIN SODIUM,PORCINE 10 UNIT/ML
5 VIAL (ML) INTRAVENOUS
Status: DISCONTINUED | OUTPATIENT
Start: 2021-08-21 | End: 2021-08-21 | Stop reason: HOSPADM

## 2021-08-21 RX ORDER — NALOXONE HYDROCHLORIDE 0.4 MG/ML
0.2 INJECTION, SOLUTION INTRAMUSCULAR; INTRAVENOUS; SUBCUTANEOUS
Status: CANCELLED | OUTPATIENT
Start: 2021-08-22

## 2021-08-21 RX ORDER — HEPARIN SODIUM (PORCINE) LOCK FLUSH IV SOLN 100 UNIT/ML 100 UNIT/ML
5 SOLUTION INTRAVENOUS
Status: CANCELLED | OUTPATIENT
Start: 2021-08-22

## 2021-08-21 RX ORDER — MEPERIDINE HYDROCHLORIDE 25 MG/ML
25 INJECTION INTRAMUSCULAR; INTRAVENOUS; SUBCUTANEOUS EVERY 30 MIN PRN
Status: CANCELLED | OUTPATIENT
Start: 2021-08-22

## 2021-08-21 RX ADMIN — Medication 5 ML: at 13:23

## 2021-08-21 RX ADMIN — MICAFUNGIN SODIUM 100 MG: 20 INJECTION, POWDER, LYOPHILIZED, FOR SOLUTION INTRAVENOUS at 12:20

## 2021-08-21 NOTE — PROGRESS NOTES
"Nursing Note  Kelly Branes [facility] for:   Chief Complaint   Patient presents with     Infusion     IV micafungin     Infusion Nursing Note:  Kelly Barnes presents today for IV ertapenem daily through 8/27.    Patient seen by provider today: No   present during visit today: Not Applicable.    Note: Ertapenem given IV over 60 minutes.      Intravenous Access:  PICC.    Treatment Conditions:  Not Applicable.      Post Infusion Assessment:  Patient tolerated infusion without incident.  Blood return noted pre and post infusion.  Site patent and intact, free from redness, edema or discomfort.  No evidence of extravasations.       Discharge Plan:   AVS to patient via MYCHART.  Patient will return tomorrow for next appointment.   Patient discharged in stable condition accompanied by: self.  Departure Mode: Ambulatory.      Administrations This Visit     heparin lock flush 10 UNIT/ML injection 5 mL     Admin Date  08/21/2021 Action  Given Dose  5 mL Route  Intracatheter Administered By  Vero Rascon, JAMES          micafungin (MYCAMINE) 100 mg in sodium chloride 0.9 % 100 mL intermittent infusion     Admin Date  08/21/2021 Action  New Bag Dose  100 mg Rate  110 mL/hr Route  Intravenous Administered By  Vero Rascon, JAMES              Vital signs:  Temp: 98.5  F (36.9  C) Temp src: Oral BP: 126/88 Pulse: 96   Resp: 18 SpO2: 97 % O2 Device: None (Room air)        Estimated body mass index is 26.87 kg/m  as calculated from the following:    Height as of 8/9/21: 1.575 m (5' 2\").    Weight as of 8/16/21: 66.6 kg (146 lb 14.4 oz).                            "

## 2021-08-21 NOTE — LETTER
"    8/21/2021         RE: Kelly Barnes  [address]  [place] [address]        Dear Colleague,    Thank you for referring your patient, Kelly Barnes, to the [facility] TREATMENT [facility]. Please see a copy of my visit note below.    Nursing Note  Kelly Barnes [facility] for:   Chief Complaint   Patient presents with     Infusion     IV micafungin     Infusion Nursing Note:  Kelly Barnes presents today for IV ertapenem daily through 8/27.    Patient seen by provider today: No   present during visit today: Not Applicable.    Note: Ertapenem given IV over 60 minutes.      Intravenous Access:  PICC.    Treatment Conditions:  Not Applicable.      Post Infusion Assessment:  Patient tolerated infusion without incident.  Blood return noted pre and post infusion.  Site patent and intact, free from redness, edema or discomfort.  No evidence of extravasations.       Discharge Plan:   AVS to patient via MYCHART.  Patient will return tomorrow for next appointment.   Patient discharged in stable condition accompanied by: self.  Departure Mode: Ambulatory.      Administrations This Visit     heparin lock flush 10 UNIT/ML injection 5 mL     Admin Date  08/21/2021 Action  Given Dose  5 mL Route  Intracatheter Administered By  Vero Rascon, JAMES          micafungin (MYCAMINE) 100 mg in sodium chloride 0.9 % 100 mL intermittent infusion     Admin Date  08/21/2021 Action  New Bag Dose  100 mg Rate  110 mL/hr Route  Intravenous Administered By  Vero Rascon, RN              Vital signs:  Temp: 98.5  F (36.9  C) Temp src: Oral BP: 126/88 Pulse: 96   Resp: 18 SpO2: 97 % O2 Device: None (Room air)        Estimated body mass index is 26.87 kg/m  as calculated from the following:    Height as of 8/9/21: 1.575 m (5' 2\").    Weight as of 8/16/21: 66.6 kg (146 lb 14.4 oz).                                Again, thank you for allowing me to " participate in the care of your patient.        Sincerely,        [facility]

## 2021-08-22 ENCOUNTER — INFUSION THERAPY VISIT (OUTPATIENT)
Dept: INFUSION THERAPY | Facility: CLINIC | Age: 81
End: 2021-08-22
Attending: PHYSICIAN ASSISTANT
Payer: COMMERCIAL

## 2021-08-22 VITALS
SYSTOLIC BLOOD PRESSURE: 126 MMHG | RESPIRATION RATE: 16 BRPM | TEMPERATURE: 98.8 F | DIASTOLIC BLOOD PRESSURE: 83 MMHG | HEART RATE: 104 BPM

## 2021-08-22 DIAGNOSIS — B49 FUNGEMIA: Primary | ICD-10-CM

## 2021-08-22 PROCEDURE — 250N000011 HC RX IP 250 OP 636: Performed by: PHYSICIAN ASSISTANT

## 2021-08-22 PROCEDURE — 96365 THER/PROPH/DIAG IV INF INIT: CPT

## 2021-08-22 PROCEDURE — 258N000003 HC RX IP 258 OP 636: Performed by: PHYSICIAN ASSISTANT

## 2021-08-22 RX ORDER — METHYLPREDNISOLONE SODIUM SUCCINATE 125 MG/2ML
125 INJECTION, POWDER, LYOPHILIZED, FOR SOLUTION INTRAMUSCULAR; INTRAVENOUS
Status: CANCELLED
Start: 2021-08-23

## 2021-08-22 RX ORDER — DIPHENHYDRAMINE HYDROCHLORIDE 50 MG/ML
50 INJECTION INTRAMUSCULAR; INTRAVENOUS
Status: CANCELLED
Start: 2021-08-23

## 2021-08-22 RX ORDER — HEPARIN SODIUM (PORCINE) LOCK FLUSH IV SOLN 100 UNIT/ML 100 UNIT/ML
5 SOLUTION INTRAVENOUS
Status: CANCELLED | OUTPATIENT
Start: 2021-08-23

## 2021-08-22 RX ORDER — HEPARIN SODIUM,PORCINE 10 UNIT/ML
5 VIAL (ML) INTRAVENOUS
Status: DISCONTINUED | OUTPATIENT
Start: 2021-08-22 | End: 2021-08-22 | Stop reason: HOSPADM

## 2021-08-22 RX ORDER — MEPERIDINE HYDROCHLORIDE 25 MG/ML
25 INJECTION INTRAMUSCULAR; INTRAVENOUS; SUBCUTANEOUS EVERY 30 MIN PRN
Status: CANCELLED | OUTPATIENT
Start: 2021-08-23

## 2021-08-22 RX ORDER — HEPARIN SODIUM,PORCINE 10 UNIT/ML
5 VIAL (ML) INTRAVENOUS
Status: CANCELLED | OUTPATIENT
Start: 2021-08-23

## 2021-08-22 RX ORDER — ALBUTEROL SULFATE 0.83 MG/ML
2.5 SOLUTION RESPIRATORY (INHALATION)
Status: CANCELLED | OUTPATIENT
Start: 2021-08-23

## 2021-08-22 RX ORDER — NALOXONE HYDROCHLORIDE 0.4 MG/ML
0.2 INJECTION, SOLUTION INTRAMUSCULAR; INTRAVENOUS; SUBCUTANEOUS
Status: CANCELLED | OUTPATIENT
Start: 2021-08-23

## 2021-08-22 RX ORDER — EPINEPHRINE 1 MG/ML
0.3 INJECTION, SOLUTION INTRAMUSCULAR; SUBCUTANEOUS EVERY 5 MIN PRN
Status: CANCELLED | OUTPATIENT
Start: 2021-08-23

## 2021-08-22 RX ORDER — ALBUTEROL SULFATE 90 UG/1
1-2 AEROSOL, METERED RESPIRATORY (INHALATION)
Status: CANCELLED
Start: 2021-08-23

## 2021-08-22 RX ADMIN — MICAFUNGIN SODIUM 100 MG: 20 INJECTION, POWDER, LYOPHILIZED, FOR SOLUTION INTRAVENOUS at 12:18

## 2021-08-22 RX ADMIN — Medication 5 ML: at 13:31

## 2021-08-22 NOTE — LETTER
"    8/22/2021         RE: Kelly Barnes  [address]  [facility] [address]        Dear Colleague,    Thank you for referring your patient, Kelly Barnes, to the [facility] TREATMENT [facility]. Please see a copy of my visit note below.    Nursing Note  Kelly Barnes [facility] for:   Chief Complaint   Patient presents with     Infusion     Infusion Nursing Note:  Kelly Barnes presents today for IV ertapenem daily through 8/27.    Patient seen by provider today: No   present during visit today: Not Applicable.    Note: Ertapenem given IV over 60 minutes.      Intravenous Access:  PICC.    Treatment Conditions:  Not Applicable.      Post Infusion Assessment:  Patient tolerated infusion without incident.  Blood return noted pre and post infusion.  Site patent and intact, free from redness, edema or discomfort.  No evidence of extravasations.       Discharge Plan:   AVS to patient via MYCHART.  Patient will return tomorrow for next appointment.   Patient discharged in stable condition accompanied by: self.  Departure Mode: Ambulatory.      Administrations This Visit     heparin lock flush 10 UNIT/ML injection 5 mL     Admin Date  08/22/2021 Action  Given Dose  5 mL Route  Intracatheter Administered By  Promise Waldron, RN          micafungin (MYCAMINE) 100 mg in sodium chloride 0.9 % 100 mL intermittent infusion     Admin Date  08/22/2021 Action  New Bag Dose  100 mg Rate  110 mL/hr Route  Intravenous Administered By  Promise Waldron, RN              Vital signs:  Temp: 98.8  F (37.1  C)   BP: 126/83 Pulse: 104   Resp: 16            Estimated body mass index is 26.87 kg/m  as calculated from the following:    Height as of 8/9/21: 1.575 m (5' 2\").    Weight as of 8/16/21: 66.6 kg (146 lb 14.4 oz).                                Again, thank you for allowing me to participate in the care of your patient.  "       Sincerely,        [facility]

## 2021-08-22 NOTE — PROGRESS NOTES
"Nursing Note  Kelly Barnes presents [facility] for:   Chief Complaint   Patient presents with     Infusion     Infusion Nursing Note:  Kelly Barnes presents today for IV ertapenem daily through 8/27.    Patient seen by provider today: No   present during visit today: Not Applicable.    Note: Ertapenem given IV over 60 minutes.      Intravenous Access:  PICC.    Treatment Conditions:  Not Applicable.      Post Infusion Assessment:  Patient tolerated infusion without incident.  Blood return noted pre and post infusion.  Site patent and intact, free from redness, edema or discomfort.  No evidence of extravasations.       Discharge Plan:   AVS to patient via MYCHART.  Patient will return tomorrow for next appointment.   Patient discharged in stable condition accompanied by: self.  Departure Mode: Ambulatory.      Administrations This Visit     heparin lock flush 10 UNIT/ML injection 5 mL     Admin Date  08/22/2021 Action  Given Dose  5 mL Route  Intracatheter Administered By  Promise Waldron, RN          micafungin (MYCAMINE) 100 mg in sodium chloride 0.9 % 100 mL intermittent infusion     Admin Date  08/22/2021 Action  New Bag Dose  100 mg Rate  110 mL/hr Route  Intravenous Administered By  Promise Waldron, RN              Vital signs:  Temp: 98.8  F (37.1  C)   BP: 126/83 Pulse: 104   Resp: 16            Estimated body mass index is 26.87 kg/m  as calculated from the following:    Height as of 8/9/21: 1.575 m (5' 2\").    Weight as of 8/16/21: 66.6 kg (146 lb 14.4 oz).                            "

## 2021-08-22 NOTE — PATIENT INSTRUCTIONS
Dear Kelly Barnes    Thank you for choosing [facility] ([facility]) for your infusion.  The following information is a summary of our appointment as well as important reminders.      We look forward in seeing you on your next appointment here at [facility] ([facility]).  Please don t hesitate to call us at [phone] to reschedule any of your appointments or to speak with one of the [facility] registered nurses.  It was a pleasure taking care of you today.    Sincerely,    [facility]  [facility]  [address] [place] [address]  [id number]  Phone:  [phone]

## 2021-08-23 ENCOUNTER — VIRTUAL VISIT (OUTPATIENT)
Dept: PHARMACY | Facility: CLINIC | Age: 81
End: 2021-08-23
Attending: STUDENT IN AN ORGANIZED HEALTH CARE EDUCATION/TRAINING PROGRAM
Payer: COMMERCIAL

## 2021-08-23 ENCOUNTER — INFUSION THERAPY VISIT (OUTPATIENT)
Dept: INFUSION THERAPY | Facility: CLINIC | Age: 81
End: 2021-08-23
Attending: PHYSICIAN ASSISTANT
Payer: COMMERCIAL

## 2021-08-23 VITALS
OXYGEN SATURATION: 97 % | TEMPERATURE: 98.7 F | SYSTOLIC BLOOD PRESSURE: 105 MMHG | HEART RATE: 96 BPM | DIASTOLIC BLOOD PRESSURE: 73 MMHG | RESPIRATION RATE: 16 BRPM

## 2021-08-23 DIAGNOSIS — B49 FUNGEMIA: Primary | ICD-10-CM

## 2021-08-23 DIAGNOSIS — R19.7 DIARRHEA, UNSPECIFIED TYPE: ICD-10-CM

## 2021-08-23 PROCEDURE — 96365 THER/PROPH/DIAG IV INF INIT: CPT

## 2021-08-23 PROCEDURE — 99605 MTMS BY PHARM NP 15 MIN: CPT | Performed by: PHARMACIST

## 2021-08-23 PROCEDURE — 258N000003 HC RX IP 258 OP 636: Performed by: PHYSICIAN ASSISTANT

## 2021-08-23 PROCEDURE — 99607 MTMS BY PHARM ADDL 15 MIN: CPT | Performed by: PHARMACIST

## 2021-08-23 PROCEDURE — 250N000011 HC RX IP 250 OP 636: Performed by: PHYSICIAN ASSISTANT

## 2021-08-23 RX ORDER — ALBUTEROL SULFATE 0.83 MG/ML
2.5 SOLUTION RESPIRATORY (INHALATION)
Status: CANCELLED | OUTPATIENT
Start: 2021-08-24

## 2021-08-23 RX ORDER — METHYLPREDNISOLONE SODIUM SUCCINATE 125 MG/2ML
125 INJECTION, POWDER, LYOPHILIZED, FOR SOLUTION INTRAMUSCULAR; INTRAVENOUS
Status: CANCELLED
Start: 2021-08-24

## 2021-08-23 RX ORDER — EPINEPHRINE 1 MG/ML
0.3 INJECTION, SOLUTION INTRAMUSCULAR; SUBCUTANEOUS EVERY 5 MIN PRN
Status: CANCELLED | OUTPATIENT
Start: 2021-08-24

## 2021-08-23 RX ORDER — DIPHENHYDRAMINE HYDROCHLORIDE 50 MG/ML
50 INJECTION INTRAMUSCULAR; INTRAVENOUS
Status: CANCELLED
Start: 2021-08-24

## 2021-08-23 RX ORDER — HEPARIN SODIUM (PORCINE) LOCK FLUSH IV SOLN 100 UNIT/ML 100 UNIT/ML
5 SOLUTION INTRAVENOUS
Status: CANCELLED | OUTPATIENT
Start: 2021-08-24

## 2021-08-23 RX ORDER — ALBUTEROL SULFATE 90 UG/1
1-2 AEROSOL, METERED RESPIRATORY (INHALATION)
Status: CANCELLED
Start: 2021-08-24

## 2021-08-23 RX ORDER — HEPARIN SODIUM,PORCINE 10 UNIT/ML
5 VIAL (ML) INTRAVENOUS
Status: DISCONTINUED | OUTPATIENT
Start: 2021-08-23 | End: 2021-08-23 | Stop reason: HOSPADM

## 2021-08-23 RX ORDER — HEPARIN SODIUM,PORCINE 10 UNIT/ML
5 VIAL (ML) INTRAVENOUS
Status: CANCELLED | OUTPATIENT
Start: 2021-08-24

## 2021-08-23 RX ORDER — NALOXONE HYDROCHLORIDE 0.4 MG/ML
0.2 INJECTION, SOLUTION INTRAMUSCULAR; INTRAVENOUS; SUBCUTANEOUS
Status: CANCELLED | OUTPATIENT
Start: 2021-08-24

## 2021-08-23 RX ORDER — MEPERIDINE HYDROCHLORIDE 25 MG/ML
25 INJECTION INTRAMUSCULAR; INTRAVENOUS; SUBCUTANEOUS EVERY 30 MIN PRN
Status: CANCELLED | OUTPATIENT
Start: 2021-08-24

## 2021-08-23 RX ADMIN — MICAFUNGIN SODIUM 100 MG: 10 INJECTION, POWDER, LYOPHILIZED, FOR SOLUTION INTRAVENOUS at 13:22

## 2021-08-23 RX ADMIN — Medication 5 ML: at 14:32

## 2021-08-23 NOTE — PROGRESS NOTES
Medication Therapy Management (MTM) Encounter    ASSESSMENT:                            Medication Adherence/Access: No issues identified      # Fungemia with Candida glabrata complex :# Recent C difficile infection-  Finish all antifungals , start probiotic to avoid another round of  C.diff--see plan for details.              PLAN:                            1. FYI--Fluconazole might be the drug making you dizzy . Finish taking on 8-26-21. See if dizziness resolves.     2. FYI--Please start taking your probiotic -Culturelle--1 pill twice daily x 30 days to restore good bacteria in your gi system to avoid a recurrence of C. Diff.  After the 30 days --I would recommend you take 1 pill daily of Culturelle as a C. Diff -preventative.     3. Please finish your 30 day supply of Tamsulosin and Tolteridine and make appt. To discuss with Urology .    4. Please see your eye doctor in 30 days for exam to rule out any fungus in the eye.    5. Restart walking 30 minutes daily after all this has resolved --maybe in 1 month ?    Follow-up: With  this Friday , urology in 2 weeks and eye doctor in 4 weeks.       SUBJECTIVE/OBJECTIVE:                          Kelly Barnes is a 81 year old female called for a transitions of care visit. She was discharged from [facility] on 8-17-21 for Fungemia with candida glabrata complex.       Reason for visit:   Some dizziness -- she feels from micafungin related.     Allergies/ADRs: Reviewed in chart  Past Medical History: Reviewed in chart  Tobacco: She reports that she has never smoked. She has never used smokeless tobacco.  Alcohol: not currently using and was drinking wine 1-3 beverages / week  Activity:  Before hospital --trying to walk 30 minutes /day .   Caffeine: 4 cups coffee day     Medication Adherence/Access: no issues reported    [facility]  Hospitalist Discharge Summary       Date of Admission:  8/8/2021  Date of Discharge:   8/17/2021  Discharging Provider: Blanca Hurst PA-C  Discharge Team: Hospitalist Service, [place]         Discharge Diagnoses     # Fungemia with Candida glabrata complex   # Hx recurrent pyelonephritis s/p right PCNL and ureteral stent removal, torres placement   #torres and PCN removed prior to discharge  #Hypokalemia (resolved)  # Hx C. Diff infection  #DM2  #OAB  #HHTN  #HLD           Follow-ups Needed After Discharge     Follow-up Appointments     Adult [facility] Follow-up and recommended labs and tests      - Follow up with your PCP in 1 week  - Follow up with urology in 2 weeks  - Follow up with your eye doctor in 1 month for repeat eye exam      Appointments on [place] and/or [facility] (with [facility] or [facility]   provider or service). Call [phone] if you haven't heard regarding   these appointments within 7 days of discharge.         At a [facility] laboratory or with your PCP you will need repeat blood tests in 1 week             Unresulted Labs Ordered in the Past 30 Days of this Admission      Date and Time Order Name Status Description     8/15/2021  8:46 AM Blood Culture Hand, Left Preliminary       8/15/2021  8:46 AM Blood Culture Hand, Right Preliminary       8/13/2021  5:23 PM Fungal or Yeast Culture Routine Preliminary       8/13/2021  5:23 PM Anaerobic Bacterial Culture Routine Preliminary       8/13/2021  3:00 PM Blood Culture Hand, Left Preliminary       8/13/2021  3:00 PM Blood Culture Peripheral Blood Preliminary       8/13/2021 12:01 AM Blood Culture Hand, Right Preliminary       8/13/2021 12:01 AM Blood Culture Arm, Right Preliminary       8/9/2021  6:44 PM 1,3 Beta D glucan fungitell In process         These results will be followed up by our team and your PCP           Discharge Disposition      Discharged to home  Condition at discharge: Stable              Hospital Course         In brief:       Kelly TARAN Barnes is a 81F with PMH of HTN, HLD, anemia, DM2, recurrent pyelonephritis, R  staghorn calculus, recently hospitalized 7/13-7/16 due to pyelonephritis and nonobstructive kidney stone s/p cytoscopy and ureteral stent placement 7/15 with subsequent PNT placement on 8/5, and hospitalized again from 7/23 - 7/27 with C. Diff infection (on oral vancomycin currently), and GERSON, who presented with fevers and abdominal pain admitted on 8/8/2021 found to have fungemia with candida glabrata due to infected and obstructed nephrolithiasis. She underwent PCNL and JJ stent removal with Urology 8/13/21. She failed a methyene blue challenge but passed a capping trial and urology removed nephrostomy tube and initially placed ostomy drainage bag for urine drainage that improved >24h prior to discharge and a pressure dressing was placed on discharge.  She was seen by ID who assisted with antimicrobial regiment.  SW assisted with infusion center follow up.  She was able to discharge to home 8/17 with her medications and plan to follow up with labs in 1 week and with PCP.      -------------------------------------------------------------  Extended, problem-based course:      # Fungemia with Candida glabrata complex   # Hx recurrent pyelonephritis s/p right PCNL and ureteral stent removal, torres placement  Recently hospitalized 7/13-7/16 with pyelonephritis and non-obstructive R kidney stone s/p cystoscopy and ureteral stent placement on 7/15, discharged on vantin, then then subsequently underwent percutaneous nephrolithotomy, ureteroscopy s/p R PNT on 8/5 with cultures from stone growing Candida glabrata complex. Presenting with fevers, leukocytosis and grossly positive UA. Lactic flat. CT A/P showed no evidence of abscess; kidney was unremarkable and ureteral stent was in place. Blood cultures on 8/8 and 8/9, and now on 8/11 and urine Cx on 8/8 growing candida glabrata. TTE without any vegetations. Patient underwent PCNL on 8/13 in the OR without any remaining stone. Failed methyl blue test, but tolerated capped  nephrostomy and therefore it was removed on 8/15. She completed cefpodoxime on 8/15. Her pain was controlled on oral APAP and occasional oxycodone and she did not take oxycodone several days prior to discharge. She tolerated tamsulosin inpatient.  Ostomy bag over her PCN removal site was CDI on discharge date and was changed to a pressure dressing per nursing.   - Continue IV micafungin 100 mg Q24Hrs and PO fluconazole 800 mg daily until 8/26 (total 14 days from 1st negative cultures)  - PICC placement on 8/17, will need to remove when antimicrobials completed  - Ophthalmology consulted, no evident of fungal eye involvement, but recommend outpatient follow up in 1 month with repeat eye exam   - repeat CBC, CMP, CRP in 1 week with results CC'd to Dr. Pablo  - 8/15 Repeat blood cultures and sensitivities are NGTD   - Pain control, Tylenol PRN     # Hypokalemia, - Potassium 3.2 on 8/16 was replaced     # Recent C difficile infection- Dx on 7/23 C diff colitis, s/p Vancomycin for 10 days.  ID followed during this hospitalization and we appreciate their recs:               - continue PO vancomycin 125 mg BID for 2 days after antibiotics are discontinued (end date 8/18).     # DM2 - Hemoglobin A1c 7.4 on 7/13/21. PTA glimepiride 2 mg TID. She was kept on a medium ISS.  Glucoses trended in the high 100s.   - Continue reduced dose Glimepiride 2 mg at BID      # Overactive Bladder - continue PTA tolterodine ER 4 mg Daily   # HTN - Continue PTA Losartan/HCTZ 50-12.5 mg daily   # HLD - continue home Simvastatin 10 mg at bedtime. Holding PTA ASA               Consultations This Hospital Stay      UROLOGY IP CONSULT  INFECTIOUS DISEASE GENERAL ADULT IP CONSULT  VASCULAR ACCESS CARE ADULT IP CONSULT  OPHTHALMOLOGY IP CONSULT  CARE MANAGEMENT / SOCIAL WORK IP CONSULT  UROLOGY IP CONSULT  WOUND OSTOMY CONTINENCE NURSE  IP CONSULT  VASCULAR ACCESS CARE ADULT IP CONSULT  VASCULAR ACCESS CARE ADULT IP CONSULT  VASCULAR ACCESS CARE  ADULT IP CONSULT  VASCULAR ACCESS CARE ADULT IP CONSULT  VASCULAR ACCESS CARE ADULT IP CONSULT  VASCULAR ACCESS FOR PICC PLACEMENT ADULT IP CONSULT           Code Status      Full Code           Time Spent on this Encounter      I, Blanca Hurst PA-C, personally saw the patient today and spent greater than 30 minutes discharging this patient.     [facility] [address] [facility]  [address] [place] [id number]  Phone: [phone]  Fax: [phone]  ______________________________________________________________________           Physical Exam     Vital Signs: Temp: 98.3  F (36.8  C) Temp src: Oral BP: (!) 142/85 Pulse: 77   Resp: 16 SpO2: 97 % O2 Device: None (Room air)    Weight: 146 lbs 14.4 oz  GENERAL: Alert and oriented x 3. NAD. Sitting upright in bed. Cooperative.   HEENT: Anicteric sclera. Mucous membranes moist. NC. AT. Pupils equal and round  CV: RRR. S1, S2. No murmurs appreciated.   RESPIRATORY: Effort normal on RA. Lungs CTAB with no wheezing, rales, rhonchi.   GI: Abdomen soft and non distended with NABS, nontender  NEUROLOGICAL: No focal deficits. Moves all extremities.    EXTREMITIES: trace LE peripheral edema.   SKIN: No jaundice. No rashes.  BACK: right flank tube removal site is CDI with no drainage, including in the ostomy bag, no edema                Primary Care Physician      Lea Lopez           Discharge Orders             WOUND CARE SUPPLIES     Please supply 4x4 guaze and tegarderms. Apply prn when dressing is overly saturated. Cover PNT site with guaze and then tegarderm.          Care Coordination Referral       Activity     Your activity upon discharge: activity as tolerated          Adult [facility] Follow-up and recommended labs and tests     - Follow up with your PCP in 1 week  - Follow up with urology in 2 weeks  - Follow up with your eye doctor in 1 month for repeat eye exam      Appointments on [place]  and/or [facility] (with [facility] or [facility] provider or service). Call [phone] if you haven't heard regarding these appointments within 7 days of discharge.          When to contact your care team     Call your PCP or return to ED for temperatures > 100.4 degrees, worsening or changing pain, uncontrolled vomiting or inability to tolerate oral intake, new or worsening diarrhea, new or worsening shortness of breath, decreased urine output, yellowing of the eyes or skin, confusion, weakness, blood in urine or stools, or any other concerning symptoms.          Tubes and drains     You are going home with the following tubes or drains: percutaneous nephrostomy tube.  Tube cares per hospital or home care instructions          Reason for your hospital stay     Dear Kelly Barnes     Your were hospitalized at [facility] with fungal infection in your blood stream from an infected kidney stone and treated with anti-fungal medications and removal of the kidney stone.  Over your hospitalization your infection improved and today you are ready to be discharged home.  If you continue anti-fungal therapy you should continue to improve but if you develop fever, shortness of breath, light headedness, chest pain or any other concerning symptoms please seek medical attention.     We are suggesting the following medication changes:     - Continue Fluconazole 800 mg daily through 8/26/21, then stop  - Continue Micafungin  mg daily through 8/26/21, then stop  - Continue Vancomycin 125 mg twice daily through 8/18/21, then stop     Please get the following tests done:   N/A     Please set up an appointment with:  - Follow up with your PCP in 1 week  - Follow up with urology in 2 weeks  - Follow up with your eye doctor in 1 month for repeat eye exam      It was a pleasure meeting with you today. Thank you for allowing me and my team the privilege of caring for you today. You are the reason we are here,  and I truly hope we provided you with the excellent service you deserve. Please let us know if there is anything else we can do for you so that we can be sure you are leaving completely satisfied with your care experience.        Take care!  Lea Hunt PA-C  Hospitalist Service          Diet     Follow this diet upon discharge: Orders Placed This Encounter      Regular Diet Adult               Significant Results and Procedures           Most Recent 3 CBC's:Recent Labs   Lab Test 08/16/21  0526 08/14/21  0813 08/13/21  1035   WBC 9.1 9.5 9.0   HGB 10.2* 10.9* 10.4*   MCV 84 84 84    394 339                Most Recent 3 BMP's:Recent Labs   Lab Test 08/17/21  1025 08/16/21  2145 08/16/21  1741 08/16/21  1711 08/16/21  0526 08/15/21  1127 08/14/21  0839   NA  --   --   --   --  136 136 133   POTASSIUM  --   --  3.4  --  3.2* 3.6 3.6   CHLORIDE  --   --   --   --  102 98 99   CO2  --   --   --   --  29 30 27   BUN  --   --   --   --  12 10 9   CR  --   --   --   --  0.97 1.02 1.03   ANIONGAP  --   --   --   --  5 8 7   JAN  --   --   --   --  8.7 8.6 8.3*   * 196*  --  163* 153* 191* 239*           Most Recent 2 LFT's:Recent Labs   Lab Test 08/16/21  0526 08/11/21  0534   AST 22 13   ALT 25 17   ALKPHOS 80 77   BILITOTAL 0.4 0.3               Most Recent 6 Bacteria Isolates From Any Culture (See EPIC Reports for Culture Details):Recent Labs   Lab Test 12/19/20  2318 12/19/20  2307 12/19/20  2242 12/19/20  1109 07/03/19  1714 03/02/16 2024   CULT No growth No growth 10,000 to 50,000 colonies/mL  mixed urogenital erum  Susceptibility testing not routinely done    >100,000 colonies/mL  Escherichia coli  * >100,000 colonies/mL  Escherichia coli  *  10,000 to 50,000 colonies/mL  mixed urogenital erum  Susceptibility testing not routinely done    No growth               Most Recent 6 glucoses:Recent Labs   Lab Test 08/17/21  1025 08/16/21  2145 08/16/21  1711 08/16/21  0951 08/16/21  0526  08/15/21  2154   * 196* 163* 176* 153* 200*      Most Recent ESR & CRP:Recent Labs   Lab Test 08/16/21  0526 12/21/20  0705 12/19/20  2307   SED  --   --  30   CRP 52.0*   < > 140.0*    < > = values in this interval not displayed.               Discharge Medications           Current Discharge Medication List       START taking these medications     Details   fluconazole (DIFLUCAN) 200 MG tablet Take 4 tablets (800 mg) by mouth daily for 10 days  Qty: 40 tablet, Refills: 0     Associated Diagnoses: Fungemia                 CONTINUE these medications which have CHANGED     Details   vancomycin (VANCOCIN) 125 MG capsule Take 1 capsule (125 mg) by mouth 2 times daily for 2 days  Qty: 4 capsule, Refills: 0     Associated Diagnoses: C. difficile colitis           CONTINUE these medications which have NOT CHANGED     Details   acetaminophen (TYLENOL) 325 MG tablet Take 2 tablets (650 mg) by mouth every 6 hours as needed for mild pain or fever  Qty: 112 tablet, Refills: 0     Associated Diagnoses: Kidney stone; Acute pyelonephritis       glimepiride (AMARYL) 2 MG tablet Take 1 tablet (2 mg) by mouth 2 times daily (with meals) She will also take separate 2 mg dose with breakfast.  Qty: 180 tablet, Refills: 3     Associated Diagnoses: Type 2 diabetes mellitus without complication, without long-term current use of insulin (H)       lactobacillus rhamnosus, GG, (CULTURELL) capsule Take 1 capsule by mouth 2 times daily  Qty: 60 capsule, Refills: 0     Associated Diagnoses: C. difficile colitis       losartan-hydrochlorothiazide (HYZAAR) 50-12.5 MG tablet Take 1 tablet by mouth daily  Qty: 90 tablet, Refills: 3     Associated Diagnoses: Hypertension goal BP (blood pressure) < 140/90       simvastatin (ZOCOR) 10 MG tablet TAKE ONE TABLET BY MOUTH AT BEDTIME  Qty: 90 tablet, Refills: 3     Associated Diagnoses: Hyperlipidemia LDL goal <100       tamsulosin (FLOMAX) 0.4 MG capsule Take 1 capsule (0.4 mg) by mouth daily  Qty:  30 capsule, Refills: 0     Associated Diagnoses: Kidney stone       tolterodine ER (DETROL LA) 4 MG 24 hr capsule Take 1 capsule (4 mg) by mouth daily  Qty: 30 capsule, Refills: 0     Associated Diagnoses: Kidney stone       blood glucose (ONETOUCH ULTRA) test strip Use to test blood sugar twice daily. Dispense 1 box of 200 test strips, #3 refills.  Qty: 100 strip, Refills: 3     Associated Diagnoses: Type 2 diabetes mellitus without complication, without long-term current use of insulin (H)       blood glucose monitoring (ONE TOUCH ULTRA 2) meter device kit Use to test blood sugar 3 times daily  Qty: 1 kit, Refills: 0     Associated Diagnoses: DM type 2, goal A1C below 8.0       OneTouch Delica Lancets 33G MISC 1 Device by In Vitro route 2 times daily  Qty: 100 each, Refills: 11     Associated Diagnoses: Type 2 diabetes mellitus without complication, without long-term current use of insulin (H)                STOP taking these medications         cefpodoxime (VANTIN) 100 MG tablet Comments:   Reason for Stopping:            oxyCODONE (ROXICODONE) 5 MG tablet Comments:   Reason for Stopping:                       Allergies            Allergies   Allergen Reactions     Ibuprofen Other (See Comments)       numbness in hands and feet     Keflex [Cephalexin] Rash         Recent Labs   Lab Test 05/11/21  0724 10/07/20  0831 11/10/15  0829 07/23/15  0800   CHOL 194 223* 210* 235*   HDL 77 71 67 69   * 134* 126 147*   TRIG 68 90 86 96   CHOLHDLRATIO  --   --  3.1 3.4          BP Readings from Last 3 Encounters:   08/22/21 126/83   08/21/21 121/85   08/20/21 133/82         Lab Results   Component Value Date    A1C 7.4 07/13/2021    A1C 7.3 05/11/2021    A1C 7.2 10/07/2020    A1C 7.6 10/15/2019    A1C 6.7 10/25/2018    A1C 7.3 10/26/2017           ----------------  Post Discharge Medication Reconciliation Status: discharge medications reconciled and changed, per note/orders.    I spent 30 minutes with this patient  today (an extra 15 minutes was spent creating the Medication Action Plan). All changes were made via collaborative practice agreement with Lea Lopez MD. A copy of the visit note was provided to the patient's primary care provider.    The patient was sent via [facility] a summary of these recommendations.     [facility].  Medication Therapy Management Provider  [phone]      Telemedicine Visit Details  Type of service:  Telephone visit  Start Time: 10:40am   End Time: 11:10 AM  Originating Location (patient location): Home  Distant Location (provider location):  [facility]     Medication Therapy Recommendations  Diarrhea, unspecified type    Current Medication: lactobacillus rhamnosus, GG, (CULTURELL) capsule   Rationale: Does not understand instructions - Adherence - Adherence   Recommendation: Provide Education - CULTURELLE DIGESTIVE DAILY PO - 1 pill 2 x day x 30 days , then daily thereafter as a preventative.   Status: Accepted - no CPA Needed

## 2021-08-23 NOTE — LETTER
"        Date: 2021    Kelly Barnes  [address]  [facility] [address]    Dear Ms. Barnes,    Thank you for talking with me on 21 about your health and medications. Medicare s MTM (Medication Therapy Management) program helps you understand your medications and use them safely.      This letter includes an action plan (Medication Action Plan) and medication list (Personal Medication List). The action plan has steps you should take to help you get the best results from your medications. The medication list will help you keep track of your medications and how to use them the right way.       Have your action plan and medication list with you when you talk with your doctors, pharmacists, and other healthcare providers in your care team.     Ask your doctors, pharmacists, and other healthcare providers to update the action plan and medication list at every visit.     Take your medication list with you if you go to the hospital or emergency room.     Give a copy of the action plan and medication list to your family or caregivers.     If you want to talk about this letter or any of the papers with it, please call   [phone].We look forward to working with you, your doctors, and other healthcare providers to help you stay healthy through the [facility] MTM program.    Sincerely,  Jose Sue [facility]    Enclosed: Medication Action Plan and Personal Medication List    MEDICATION ACTION PLAN FOR Kelly Barnes,  1940     This action plan will help you get the best results from your medications if you:   1. Read \"What we talked about.\"   2. Take the steps listed in the \"What I need to do\" boxes.   3. Fill in \"What I did and when I did it.\"   4. Fill in \"My follow-up plan\" and \"Questions I want to ask.\"     Have this action plan with you when you talk with your doctors, pharmacists, and other healthcare providers in your care team. Share this with your family or caregivers too.  DATE PREPARED: " 2021  What we talked about: your dizziness issue.                                                  What I need to do: finish your antifungals medications but they could be the cause of your dizziness--no driving until the dizziness resolves.        What I did and when I did it:                                              What we talked about: your past c. Diff infection                                                  What I need to do: start twice daily culturelle x 30days to restore good bacteria in your gi system , then daily thereafter to avoid c. Diff.        What I did and when I did it:                                                 My follow-up plan:                 Questions I want to ask:              If you have any questions about your action plan, call Jose Sue [facility], Phone: [phone] , Monday-Friday 8-4:30pm.           PERSONAL MEDICATION LIST FOR Kelly BarnesCIARAN 1940     This medication list was made for you after we talked. We also used information from your doctor's chart.      Use blank rows to add new medications. Then fill in the dates you started using them.    Cross out medications when you no longer use them. Then write the date and why you stopped using them.    Ask your doctors, pharmacists, and other healthcare providers to update this list at every visit. Keep this list up-to-date with:       Prescription medications    Over the counter drugs     Herbals    Vitamins    Minerals      If you go to the hospital or emergency room, take this list with you. Share this with your family or caregivers too.     DATE PREPARED: 2021  Allergies or side effects: Ibuprofen and Keflex [cephalexin]     Medication:  ACETAMINOPHEN 325 MG PO TABS      How I use it:  Take 2 tablets (650 mg) by mouth every 6 hours as needed for mild pain or fever      Why I use it: Kidney stone; Acute pyelonephritis    Prescriber:  Brianna Amos MD      Date I started using it:       Date I  stopped using it:         Why I stopped using it:            Medication:  CULTURELLE PO CAPS      How I use it:  Take 1 capsule by mouth 2 times daily      Why I use it: C. difficile colitis    Prescriber:  Elias Benjamin MD      Date I started using it:       Date I stopped using it:         Why I stopped using it:            Medication:  FLUCONAZOLE 200 MG PO TABS      How I use it:  Take 4 tablets (800 mg) by mouth daily      Why I use it: Fungemia    Prescriber:  Blanca Hurst PA-C      Date I started using it:       Date I stopped using it:         Why I stopped using it:            Medication:  GLIMEPIRIDE 2 MG PO TABS      How I use it:  Take 1 tablet (2 mg) by mouth 2 times daily (with meals) She will also take separate 2 mg dose with breakfast.      Why I use it: Type 2 diabetes mellitus without complication, without long-term current use of insulin (H)    Prescriber:  Lea Lopez MD      Date I started using it:       Date I stopped using it:         Why I stopped using it:            Medication:  GLUCOSE BLOOD VI STRP      How I use it:  Use to test blood sugar twice daily. Dispense 1 box of 200 test strips, #3 refills.      Why I use it: Type 2 diabetes mellitus without complication, without long-term current use of insulin (H)    Prescriber:  Lea Lopez MD      Date I started using it:       Date I stopped using it:         Why I stopped using it:            Medication:  LOSARTAN POTASSIUM-HCTZ 50-12.5 MG PO TABS      How I use it:  Take 1 tablet by mouth daily      Why I use it: Hypertension goal BP (blood pressure) < 140/90    Prescriber:  Lea Lopez MD      Date I started using it:       Date I stopped using it:         Why I stopped using it:            Medication:  MICAFUNGIN IVPB      How I use it:  Inject 100 mg into the vein every 24 hours for 10 days      Why I use it: Kidney stone; Fungemia    Prescriber:  Blanca Hurst PA-C      Date I started  using it:       Date I stopped using it:         Why I stopped using it:            Medication:  ONETOUCH DELICA LANCETS 33G MISC      How I use it:  1 Device by In Vitro route 2 times daily      Why I use it: Type 2 diabetes mellitus without complication, without long-term current use of insulin (H)    Prescriber:  Lea Lopez MD      Date I started using it:       Date I stopped using it:         Why I stopped using it:            Medication:  ONETOUCH ULTRA 2 W/DEVICE KIT      How I use it:  Use to test blood sugar 3 times daily      Why I use it: DM type 2, goal A1C below 8.0    Prescriber:  Lea Lopez MD      Date I started using it:       Date I stopped using it:         Why I stopped using it:            Medication:  SIMVASTATIN 10 MG PO TABS      How I use it:  TAKE ONE TABLET BY MOUTH AT BEDTIME      Why I use it: Hyperlipidemia LDL goal <100    Prescriber:  Lea Lopez MD      Date I started using it:       Date I stopped using it:         Why I stopped using it:            Medication:  TAMSULOSIN HCL 0.4 MG PO CAPS      How I use it:  Take 1 capsule (0.4 mg) by mouth daily      Why I use it: Kidney stone    Prescriber:  Brianna Amos MD      Date I started using it:       Date I stopped using it:         Why I stopped using it:            Medication:  TOLTERODINE TARTRATE ER 4 MG PO CP24      How I use it:  Take 1 capsule (4 mg) by mouth daily      Why I use it: Kidney stone    Prescriber:  Brianna Amos MD      Date I started using it:       Date I stopped using it:         Why I stopped using it:            Medication:         How I use it:         Why I use it:      Prescriber:         Date I started using it:       Date I stopped using it:         Why I stopped using it:            Medication:         How I use it:         Why I use it:      Prescriber:         Date I started using it:       Date I stopped using it:         Why I stopped using it:             Medication:         How I use it:         Why I use it:      Prescriber:         Date I started using it:       Date I stopped using it:         Why I stopped using it:              Other Information:     If you have any questions about your medication list, call Jose Sue RPH, Phone: [phone] , Monday-Friday 8-4:30pm.    According to the Paperwork Reduction Act of 1995, no persons are required to respond to a collection of information unless it displays a valid OMB control number. The valid OMB number for this information collection is [id number]. The time required to complete this information collection is estimated to average 40 minutes per response, including the time to review instructions, searching existing data resources, gather the data needed, and complete and review the information collection. If you have any comments concerning the accuracy of the time estimate(s) or suggestions for improving this form, please write to: CMS, Attn: JODEE Reports Clearance Officer, [address] [place] [phone].

## 2021-08-23 NOTE — PATIENT INSTRUCTIONS
Recommendations from today's MTM visit:                                                    MTM (medication therapy management) is a service provided by a clinical pharmacist designed to help you get the most of out of your medicines.   Today we reviewed what your medicines are for, how to know if they are working, that your medicines are safe and how to make your medicine regimen as easy as possible.      1. FYI--Fluconazole might be the drug making you dizzy . Finish taking on 8-26-21. See if dizziness resolves.     2. FYI--Please start taking your probiotic -Culturelle--1 pill twice daily x 30 days to restore good bacteria in your gi system to avoid a recurrence of C. Diff.  After the 30 days --I would recommend you take 1 pill daily of Culturelle as a C. Diff -preventative.     3. Please finish your 30 day supply of Tamsulosin and Tolteridine and make appt. To discuss with Urology .    4. Please see your eye doctor in 30 days for exam to rule out any fungus in the eye.    5. Restart walking 30 minutes daily after all this has resolved --maybe in 1 month ?    Follow-up: With  this Friday , urology in 2 weeks and eye doctor in 4 weeks.     It was great to speak with you today.  I value your experience and would be very thankful for your time with providing feedback on our clinic survey. You may receive a survey via email or text message in the next few days.     To schedule another MTM appointment, please call the clinic directly or you may call the MTM scheduling line at [phone] or toll-free at [phone].     My Clinical Pharmacist's contact information:                                                      Please feel free to contact me with any questions or concerns you have.      Jose Sue Rph.  Medication Therapy Management Provider  [phone]

## 2021-08-23 NOTE — PROGRESS NOTES
Nursing Note  Kelly Barnes [facility]:   Chief Complaint   Patient presents with     Infusion     micafungin (MYCAMINE)         During today's [facility] appointment, orders from Lea Hunt PA-C were completed.  Frequency: daily until 8/27/2021.   Progress note:  Patient identification verified by name and date of birth.  Assessment completed.  Vitals recorded in Doc Flowsheets.  Patient was provided with education regarding medication/procedure and possible side effects.  Patient verbalized understanding.     present during visit today: Not Applicable.    Treatment Conditions: Non-applicable.    Premedications: were not ordered.    Drug Waste Record: No    Infusion length and rate:  infusion given over approximately 60 minutes    Labs: were not ordered for this appointment. Ordered for tomorrow.    Vascular access: PICC accessed today. Dressing change is due tomorrow.    Is the next appt scheduled? tomorrow  Asymptomatic COVID test completed? NA    Post Infusion Assessment:  Patient tolerated infusion without incident.     Discharge Plan:   Follow up plan of care with: ongoing infusions at [facility] ordering provider as scheduled. and after visit summary declined by patient  Discharge instructions were reviewed with patient.  Patient/representative verbalized understanding of discharge instructions and all questions answered.  Patient discharged from [facility] in stable condition.      Sintia Mata RN    Administrations This Visit     heparin lock flush 10 UNIT/ML injection 5 mL     Admin Date  08/23/2021 Action  Given Dose  5 mL Route  Intracatheter Administered By  Sintia Mata RN          micafungin (MYCAMINE) 100 mg in sodium chloride 0.9 % 100 mL intermittent infusion     Admin Date  08/23/2021 Action  New Bag Dose  100 mg Rate  110  mL/hr Route  Intravenous Administered By  Sintia Mata RN                /73   Pulse 96   Temp 98.7  F (37.1  C) (Oral)   Resp 16   SpO2 97%

## 2021-08-23 NOTE — LETTER
8/23/2021         RE: Kelly Barnes  [address]  [facility] [address]        Dear Colleague,    Thank you for referring your patient, Kelly Barnes, to the [facility]. Please see a copy of my visit note below.    Nursing Note  Kelly Barnes [facility] for:   Chief Complaint   Patient presents with     Infusion     micafungin (MYCAMINE)         During [facility]'s [facility] appointment, orders from Lea Hunt PA-C were completed.  Frequency: daily until 8/27/2021.   Progress note:  Patient identification verified by name and date of birth.  Assessment completed.  Vitals recorded in Doc Flowsheets.  Patient was provided with education regarding medication/procedure and possible side effects.  Patient verbalized understanding.     present during visit today: Not Applicable.    Treatment Conditions: Non-applicable.    Premedications: were not ordered.    Drug Waste Record: No    Infusion length and rate:  infusion given over approximately 60 minutes    Labs: were not ordered for this appointment. Ordered for tomorrow.    Vascular access: PICC accessed today. Dressing change is due tomorrow.    Is the next appt scheduled? tomorrow  Asymptomatic COVID test completed? NA    Post Infusion Assessment:  Patient tolerated infusion without incident.     Discharge Plan:   Follow up plan of care with: ongoing infusions at [facility] ordering provider as scheduled. and after visit summary declined by patient  Discharge instructions were reviewed with patient.  Patient/representative verbalized understanding of discharge instructions and all questions answered.  Patient discharged from [facility] in stable condition.      Sintia Mata RN    Administrations This Visit     heparin lock flush 10 UNIT/ML injection 5 mL      Admin Date  08/23/2021 Action  Given Dose  5 mL Route  Intracatheter Administered By  Sintia Mata RN          micafungin (MYCAMINE) 100 mg in sodium chloride 0.9 % 100 mL intermittent infusion     Admin Date  08/23/2021 Action  New Bag Dose  100 mg Rate  110 mL/hr Route  Intravenous Administered By  Sintia Mata RN                /73   Pulse 96   Temp 98.7  F (37.1  C) (Oral)   Resp 16   SpO2 97%           Again, thank you for allowing me to participate in the care of your patient.        Sincerely,        [facility]

## 2021-08-23 NOTE — Clinical Note
Dr. Lopez--abbyi--I spoke with ingrid today --recovering nicely save for daily dizziness --may be from her antifungals meds? She will be off those after 8-26-21.  One change is I did encourage her to start her culturelle probiotic to avoid c. Diff recurrence .    She will see you this Friday for f/up.    Jose Yusuf [facility].  Medication Therapy Management Provider  [phone]

## 2021-08-24 ENCOUNTER — INFUSION THERAPY VISIT (OUTPATIENT)
Dept: INFUSION THERAPY | Facility: CLINIC | Age: 81
End: 2021-08-24
Attending: PHYSICIAN ASSISTANT
Payer: COMMERCIAL

## 2021-08-24 VITALS
SYSTOLIC BLOOD PRESSURE: 131 MMHG | OXYGEN SATURATION: 98 % | RESPIRATION RATE: 18 BRPM | HEART RATE: 78 BPM | DIASTOLIC BLOOD PRESSURE: 81 MMHG | TEMPERATURE: 98.3 F

## 2021-08-24 DIAGNOSIS — B49 FUNGEMIA: Primary | ICD-10-CM

## 2021-08-24 PROCEDURE — 250N000011 HC RX IP 250 OP 636: Performed by: PHYSICIAN ASSISTANT

## 2021-08-24 PROCEDURE — 96365 THER/PROPH/DIAG IV INF INIT: CPT

## 2021-08-24 PROCEDURE — 258N000003 HC RX IP 258 OP 636: Performed by: PHYSICIAN ASSISTANT

## 2021-08-24 RX ORDER — MEPERIDINE HYDROCHLORIDE 25 MG/ML
25 INJECTION INTRAMUSCULAR; INTRAVENOUS; SUBCUTANEOUS EVERY 30 MIN PRN
Status: CANCELLED | OUTPATIENT
Start: 2021-08-25

## 2021-08-24 RX ORDER — HEPARIN SODIUM (PORCINE) LOCK FLUSH IV SOLN 100 UNIT/ML 100 UNIT/ML
5 SOLUTION INTRAVENOUS
Status: CANCELLED | OUTPATIENT
Start: 2021-08-25

## 2021-08-24 RX ORDER — DIPHENHYDRAMINE HYDROCHLORIDE 50 MG/ML
50 INJECTION INTRAMUSCULAR; INTRAVENOUS
Status: CANCELLED
Start: 2021-08-25

## 2021-08-24 RX ORDER — METHYLPREDNISOLONE SODIUM SUCCINATE 125 MG/2ML
125 INJECTION, POWDER, LYOPHILIZED, FOR SOLUTION INTRAMUSCULAR; INTRAVENOUS
Status: CANCELLED
Start: 2021-08-25

## 2021-08-24 RX ORDER — EPINEPHRINE 1 MG/ML
0.3 INJECTION, SOLUTION INTRAMUSCULAR; SUBCUTANEOUS EVERY 5 MIN PRN
Status: CANCELLED | OUTPATIENT
Start: 2021-08-25

## 2021-08-24 RX ORDER — HEPARIN SODIUM,PORCINE 10 UNIT/ML
5 VIAL (ML) INTRAVENOUS
Status: DISCONTINUED | OUTPATIENT
Start: 2021-08-24 | End: 2021-08-24 | Stop reason: HOSPADM

## 2021-08-24 RX ORDER — ALBUTEROL SULFATE 90 UG/1
1-2 AEROSOL, METERED RESPIRATORY (INHALATION)
Status: CANCELLED
Start: 2021-08-25

## 2021-08-24 RX ORDER — NALOXONE HYDROCHLORIDE 0.4 MG/ML
0.2 INJECTION, SOLUTION INTRAMUSCULAR; INTRAVENOUS; SUBCUTANEOUS
Status: CANCELLED | OUTPATIENT
Start: 2021-08-25

## 2021-08-24 RX ORDER — HEPARIN SODIUM,PORCINE 10 UNIT/ML
5 VIAL (ML) INTRAVENOUS
Status: CANCELLED | OUTPATIENT
Start: 2021-08-25

## 2021-08-24 RX ORDER — ALBUTEROL SULFATE 0.83 MG/ML
2.5 SOLUTION RESPIRATORY (INHALATION)
Status: CANCELLED | OUTPATIENT
Start: 2021-08-25

## 2021-08-24 RX ADMIN — MICAFUNGIN SODIUM 100 MG: 20 INJECTION, POWDER, LYOPHILIZED, FOR SOLUTION INTRAVENOUS at 08:50

## 2021-08-24 RX ADMIN — Medication 5 ML: at 09:59

## 2021-08-24 NOTE — PROGRESS NOTES
Nursing Note  Kelly Barnes presents [facility] for:   Chief Complaint   Patient presents with     Infusion     Micafungin     During [facility]'s [facility] appointment, orders from Lea Hunt PA-C were completed.  Frequency: daily until 8/27/2021    Progress note:  Patient identification verified by name and date of birth.  Assessment completed.  Vitals recorded in Doc Flowsheets.  Patient was provided with education regarding medication/procedure and possible side effects.  Patient verbalized understanding.     present during visit today: Not Applicable.    Treatment Conditions: Non-applicable.    Premedications: were not ordered.    Drug Waste Record: No    Infusion length and rate:  infusion given over approximately 60 minutes    Labs: were not ordered for this appointment.    Vascular access: PICC accessed today.    Is the next appt scheduled? tomorrow  Asymptomatic COVID test completed? NA    Post Infusion Assessment:  Patient tolerated infusion without incident.     Discharge Plan:   Follow up plan of care with: ongoing infusions at [facility] ordering provider as scheduled. and after visit summary declined by patient  Discharge instructions were reviewed with patient.  Patient/representative verbalized understanding of discharge instructions and all questions answered.  [facility] in stable condition.    Dianne Allen RN    Administrations This Visit     heparin lock flush 10 UNIT/ML injection 5 mL     Admin Date  08/24/2021 Action  Given Dose  5 mL Route  Intracatheter Administered By  Jo Delacruz RN          micafungin (MYCAMINE) 100 mg in sodium chloride 0.9 % 100 mL intermittent infusion     Admin Date  08/24/2021 Action  New Bag Dose  100 mg Rate  100 mL/hr Route  Intravenous Administered By  Dianne Allen RN                    /81   Pulse 78   Temp 98.3  F (36.8  C) (Oral)   Resp 18   SpO2 98%

## 2021-08-24 NOTE — LETTER
8/24/2021         RE: Kelly Barnes  [address]  [facility] [address]        Dear Colleague,    Thank you for referring your patient, Kelly Barnes, to the [facility]AT[facility]. Please see a copy of my visit note below.    Nursing Note  Kelly Barnes [facility]:   Chief Complaint   Patient presents with     Infusion     Micafungin     During [facility] appointment, orders from Lea Hunt PA-C were completed.  Frequency: daily until 8/27/2021    Progress note:  Patient identification verified by name and date of birth.  Assessment completed.  Vitals recorded in Doc Flowsheets.  Patient was provided with education regarding medication/procedure and possible side effects.  Patient verbalized understanding.     present during visit today: Not Applicable.    Treatment Conditions: Non-applicable.    Premedications: were not ordered.    Drug Waste Record: No    Infusion length and rate:  infusion given over approximately 60 minutes    Labs: were not ordered for this appointment.    Vascular access: PICC accessed today.    Is the next appt scheduled? tomorrow  Asymptomatic COVID test completed? NA    Post Infusion Assessment:  Patient tolerated infusion without incident.     Discharge Plan:   Follow up plan of care with: ongoing infusions at [facility] ordering provider as scheduled. and after visit summary declined by patient  Discharge instructions were reviewed with patient.  Patient/representative verbalized understanding of discharge instructions and all questions answered.  Patient discharged from [facility] in stable condition.    Dianne Garcia RN    Administrations This Visit     heparin lock flush 10 UNIT/ML injection 5 mL     Admin Date  08/24/2021 Action  Given Dose  5 mL Route  Intracatheter  Administered By  Jo Delacruz, RN          micafungin (MYCAMINE) 100 mg in sodium chloride 0.9 % 100 mL intermittent infusion     Admin Date  08/24/2021 Action  New Bag Dose  100 mg Rate  100 mL/hr Route  Intravenous Administered By  Dianne Allen RN                   /81   Pulse 78   Temp 98.3  F (36.8  C) (Oral)   Resp 18   SpO2 98%       Again, thank you for allowing me to participate in the care of your patient.        Sincerely,        [facility]

## 2021-08-25 ENCOUNTER — INFUSION THERAPY VISIT (OUTPATIENT)
Dept: INFUSION THERAPY | Facility: CLINIC | Age: 81
End: 2021-08-25
Attending: PHYSICIAN ASSISTANT
Payer: COMMERCIAL

## 2021-08-25 VITALS
RESPIRATION RATE: 16 BRPM | OXYGEN SATURATION: 94 % | DIASTOLIC BLOOD PRESSURE: 84 MMHG | TEMPERATURE: 98.6 F | SYSTOLIC BLOOD PRESSURE: 132 MMHG | HEART RATE: 83 BPM

## 2021-08-25 DIAGNOSIS — B49 FUNGEMIA: Primary | ICD-10-CM

## 2021-08-25 PROCEDURE — 96365 THER/PROPH/DIAG IV INF INIT: CPT

## 2021-08-25 PROCEDURE — 258N000003 HC RX IP 258 OP 636: Performed by: PHYSICIAN ASSISTANT

## 2021-08-25 PROCEDURE — 250N000011 HC RX IP 250 OP 636: Performed by: PHYSICIAN ASSISTANT

## 2021-08-25 RX ORDER — DIPHENHYDRAMINE HYDROCHLORIDE 50 MG/ML
50 INJECTION INTRAMUSCULAR; INTRAVENOUS
Status: CANCELLED
Start: 2021-08-26

## 2021-08-25 RX ORDER — HEPARIN SODIUM,PORCINE 10 UNIT/ML
5 VIAL (ML) INTRAVENOUS
Status: CANCELLED | OUTPATIENT
Start: 2021-08-26

## 2021-08-25 RX ORDER — NALOXONE HYDROCHLORIDE 0.4 MG/ML
0.2 INJECTION, SOLUTION INTRAMUSCULAR; INTRAVENOUS; SUBCUTANEOUS
Status: CANCELLED | OUTPATIENT
Start: 2021-08-26

## 2021-08-25 RX ORDER — HEPARIN SODIUM,PORCINE 10 UNIT/ML
5 VIAL (ML) INTRAVENOUS
Status: DISCONTINUED | OUTPATIENT
Start: 2021-08-25 | End: 2021-08-25 | Stop reason: HOSPADM

## 2021-08-25 RX ORDER — MEPERIDINE HYDROCHLORIDE 25 MG/ML
25 INJECTION INTRAMUSCULAR; INTRAVENOUS; SUBCUTANEOUS EVERY 30 MIN PRN
Status: CANCELLED | OUTPATIENT
Start: 2021-08-26

## 2021-08-25 RX ORDER — EPINEPHRINE 1 MG/ML
0.3 INJECTION, SOLUTION INTRAMUSCULAR; SUBCUTANEOUS EVERY 5 MIN PRN
Status: CANCELLED | OUTPATIENT
Start: 2021-08-26

## 2021-08-25 RX ORDER — ALBUTEROL SULFATE 0.83 MG/ML
2.5 SOLUTION RESPIRATORY (INHALATION)
Status: CANCELLED | OUTPATIENT
Start: 2021-08-26

## 2021-08-25 RX ORDER — METHYLPREDNISOLONE SODIUM SUCCINATE 125 MG/2ML
125 INJECTION, POWDER, LYOPHILIZED, FOR SOLUTION INTRAMUSCULAR; INTRAVENOUS
Status: CANCELLED
Start: 2021-08-26

## 2021-08-25 RX ORDER — ALBUTEROL SULFATE 90 UG/1
1-2 AEROSOL, METERED RESPIRATORY (INHALATION)
Status: CANCELLED
Start: 2021-08-26

## 2021-08-25 RX ORDER — HEPARIN SODIUM (PORCINE) LOCK FLUSH IV SOLN 100 UNIT/ML 100 UNIT/ML
5 SOLUTION INTRAVENOUS
Status: CANCELLED | OUTPATIENT
Start: 2021-08-26

## 2021-08-25 RX ADMIN — MICAFUNGIN SODIUM 100 MG: 20 INJECTION, POWDER, LYOPHILIZED, FOR SOLUTION INTRAVENOUS at 13:46

## 2021-08-25 RX ADMIN — Medication 5 ML: at 14:53

## 2021-08-25 NOTE — LETTER
8/25/2021         RE: Kelly Barnes  [address]  [facility] [address]        Dear Colleague,    Thank you for referring your patient, Kelly Barnes, to the [facility]. Please see a copy of my visit note below.    Nursing Note  Kelly Barnes [facility]:   Chief Complaint   Patient presents with     Infusion     Mycamine     During [facility] appointment, orders from Lea Hunt PA-C were completed.  Frequency: daily until 8/27/2021    Progress note:  Patient identification verified by name and date of birth.  Assessment completed.  Vitals recorded in Doc Flowsheets.  Patient was provided with education regarding medication/procedure and possible side effects.  Patient verbalized understanding.     present during visit today: Not Applicable.    Treatment Conditions: Non-applicable.    Premedications: were not ordered.    Drug Waste Record: No    Infusion length and rate:  infusion given over approximately 60 minutes    Labs: were not ordered for this appointment.    Vascular access: PICC accessed today.    Is the next appt scheduled? tomorrow  Asymptomatic COVID test completed? NA    Post Infusion Assessment:  Patient tolerated infusion without incident.     Discharge Plan:   Follow up plan of care with: ongoing infusions at [facility]., ordering provider as scheduled. and after visit summary declined by patient  Discharge instructions were reviewed with patient.  Patient/representative verbalized understanding of discharge instructions and all questions answered.  Patient discharged from [facility] in stable condition.    Dianne Garcia RN    Administrations This Visit     micafungin (MYCAMINE) 100 mg in sodium chloride 0.9 % 100 mL intermittent infusion     Admin Date  08/25/2021 Action  New  Bag Dose  100 mg Rate  110 mL/hr Route  Intravenous Administered By  Dianne Allen RN                   /85   Pulse 94   Temp 98.6  F (37  C) (Oral)   Resp 16   SpO2 94%       Again, thank you for allowing me to participate in the care of your patient.        Sincerely,        [facility]

## 2021-08-25 NOTE — PROGRESS NOTES
Nursing Note  Kelly Barnes [facility] for:   Chief Complaint   Patient presents with     Infusion     Mycamine     During [facility]'s [facility] appointment, orders from Lea Hunt PA-C were completed.  Frequency: daily until 8/27/2021    Progress note:  Patient identification verified by name and date of birth.  Assessment completed.  Vitals recorded in Doc Flowsheets.  Patient was provided with education regarding medication/procedure and possible side effects.  Patient verbalized understanding.     present during visit today: Not Applicable.    Treatment Conditions: Non-applicable.    Premedications: were not ordered.    Drug Waste Record: No    Infusion length and rate:  infusion given over approximately 60 minutes    Labs: were not ordered for this appointment.    Vascular access: PICC accessed today.    Is the next appt scheduled? tomorrow  Asymptomatic COVID test completed? NA    Post Infusion Assessment:  Patient tolerated infusion without incident.     Discharge Plan:   Follow up plan of care with: ongoing infusions at [facility]., ordering provider as scheduled. and after visit summary declined by patient  Discharge instructions were reviewed with patient.  Patient/representative verbalized understanding of discharge instructions and all questions answered.  [facility] discharged [facility] in stable condition.    Dianne Allen RN    Administrations This Visit     micafungin (MYCAMINE) 100 mg in sodium chloride 0.9 % 100 mL intermittent infusion     Admin Date  08/25/2021 Action  New Bag Dose  100 mg Rate  110 mL/hr Route  Intravenous Administered By  Dianne Allen RN                   /85   Pulse 94   Temp 98.6  F (37  C) (Oral)   Resp 16   SpO2 94%

## 2021-08-26 ENCOUNTER — INFUSION THERAPY VISIT (OUTPATIENT)
Dept: INFUSION THERAPY | Facility: CLINIC | Age: 81
End: 2021-08-26
Attending: PHYSICIAN ASSISTANT
Payer: COMMERCIAL

## 2021-08-26 VITALS
HEART RATE: 97 BPM | OXYGEN SATURATION: 97 % | TEMPERATURE: 98.1 F | DIASTOLIC BLOOD PRESSURE: 87 MMHG | RESPIRATION RATE: 16 BRPM | SYSTOLIC BLOOD PRESSURE: 132 MMHG

## 2021-08-26 DIAGNOSIS — N20.0 KIDNEY STONE: ICD-10-CM

## 2021-08-26 DIAGNOSIS — A04.72 C. DIFFICILE COLITIS: ICD-10-CM

## 2021-08-26 DIAGNOSIS — B49 FUNGEMIA: Primary | ICD-10-CM

## 2021-08-26 DIAGNOSIS — R19.7 DIARRHEA, UNSPECIFIED TYPE: ICD-10-CM

## 2021-08-26 LAB
ALBUMIN SERPL-MCNC: 3.1 G/DL (ref 3.4–5)
ALP SERPL-CCNC: 107 U/L (ref 40–150)
ALT SERPL W P-5'-P-CCNC: 18 U/L (ref 0–50)
ANION GAP SERPL CALCULATED.3IONS-SCNC: 9 MMOL/L (ref 3–14)
AST SERPL W P-5'-P-CCNC: 18 U/L (ref 0–45)
BASOPHILS # BLD AUTO: 0.1 10E3/UL (ref 0–0.2)
BASOPHILS NFR BLD AUTO: 1 %
BILIRUB SERPL-MCNC: 0.3 MG/DL (ref 0.2–1.3)
BUN SERPL-MCNC: 18 MG/DL (ref 7–30)
CALCIUM SERPL-MCNC: 9.5 MG/DL (ref 8.5–10.1)
CHLORIDE BLD-SCNC: 98 MMOL/L (ref 94–109)
CO2 SERPL-SCNC: 28 MMOL/L (ref 20–32)
CREAT SERPL-MCNC: 1.12 MG/DL (ref 0.52–1.04)
CRP SERPL-MCNC: 24.9 MG/L (ref 0–8)
EOSINOPHIL # BLD AUTO: 0.1 10E3/UL (ref 0–0.7)
EOSINOPHIL NFR BLD AUTO: 1 %
ERYTHROCYTE [DISTWIDTH] IN BLOOD BY AUTOMATED COUNT: 14.6 % (ref 10–15)
GFR SERPL CREATININE-BSD FRML MDRD: 47 ML/MIN/1.73M2
GLUCOSE BLD-MCNC: 242 MG/DL (ref 70–99)
HCT VFR BLD AUTO: 34.2 % (ref 35–47)
HGB BLD-MCNC: 11.2 G/DL (ref 11.7–15.7)
IMM GRANULOCYTES # BLD: 0 10E3/UL
IMM GRANULOCYTES NFR BLD: 0 %
LYMPHOCYTES # BLD AUTO: 1.5 10E3/UL (ref 0.8–5.3)
LYMPHOCYTES NFR BLD AUTO: 17 %
MCH RBC QN AUTO: 27.2 PG (ref 26.5–33)
MCHC RBC AUTO-ENTMCNC: 32.7 G/DL (ref 31.5–36.5)
MCV RBC AUTO: 83 FL (ref 78–100)
MONOCYTES # BLD AUTO: 0.6 10E3/UL (ref 0–1.3)
MONOCYTES NFR BLD AUTO: 7 %
NEUTROPHILS # BLD AUTO: 6.8 10E3/UL (ref 1.6–8.3)
NEUTROPHILS NFR BLD AUTO: 74 %
NRBC # BLD AUTO: 0 10E3/UL
NRBC BLD AUTO-RTO: 0 /100
PLATELET # BLD AUTO: 386 10E3/UL (ref 150–450)
POTASSIUM BLD-SCNC: 3.6 MMOL/L (ref 3.4–5.3)
PROT SERPL-MCNC: 7.4 G/DL (ref 6.8–8.8)
RBC # BLD AUTO: 4.12 10E6/UL (ref 3.8–5.2)
SODIUM SERPL-SCNC: 135 MMOL/L (ref 133–144)
WBC # BLD AUTO: 9.1 10E3/UL (ref 4–11)

## 2021-08-26 PROCEDURE — 258N000003 HC RX IP 258 OP 636: Performed by: PHYSICIAN ASSISTANT

## 2021-08-26 PROCEDURE — 36592 COLLECT BLOOD FROM PICC: CPT

## 2021-08-26 PROCEDURE — 86140 C-REACTIVE PROTEIN: CPT

## 2021-08-26 PROCEDURE — 96365 THER/PROPH/DIAG IV INF INIT: CPT

## 2021-08-26 PROCEDURE — 85025 COMPLETE CBC W/AUTO DIFF WBC: CPT

## 2021-08-26 PROCEDURE — 250N000011 HC RX IP 250 OP 636: Performed by: PHYSICIAN ASSISTANT

## 2021-08-26 PROCEDURE — 80053 COMPREHEN METABOLIC PANEL: CPT

## 2021-08-26 RX ORDER — NALOXONE HYDROCHLORIDE 0.4 MG/ML
0.2 INJECTION, SOLUTION INTRAMUSCULAR; INTRAVENOUS; SUBCUTANEOUS
Status: CANCELLED | OUTPATIENT
Start: 2021-08-27

## 2021-08-26 RX ORDER — EPINEPHRINE 1 MG/ML
0.3 INJECTION, SOLUTION INTRAMUSCULAR; SUBCUTANEOUS EVERY 5 MIN PRN
Status: CANCELLED | OUTPATIENT
Start: 2021-08-27

## 2021-08-26 RX ORDER — HEPARIN SODIUM,PORCINE 10 UNIT/ML
5 VIAL (ML) INTRAVENOUS
Status: DISCONTINUED | OUTPATIENT
Start: 2021-08-26 | End: 2021-08-26 | Stop reason: HOSPADM

## 2021-08-26 RX ORDER — ALBUTEROL SULFATE 90 UG/1
1-2 AEROSOL, METERED RESPIRATORY (INHALATION)
Status: CANCELLED
Start: 2021-08-27

## 2021-08-26 RX ORDER — DIPHENHYDRAMINE HYDROCHLORIDE 50 MG/ML
50 INJECTION INTRAMUSCULAR; INTRAVENOUS
Status: CANCELLED
Start: 2021-08-27

## 2021-08-26 RX ORDER — METHYLPREDNISOLONE SODIUM SUCCINATE 125 MG/2ML
125 INJECTION, POWDER, LYOPHILIZED, FOR SOLUTION INTRAMUSCULAR; INTRAVENOUS
Status: CANCELLED
Start: 2021-08-27

## 2021-08-26 RX ORDER — MEPERIDINE HYDROCHLORIDE 25 MG/ML
25 INJECTION INTRAMUSCULAR; INTRAVENOUS; SUBCUTANEOUS EVERY 30 MIN PRN
Status: CANCELLED | OUTPATIENT
Start: 2021-08-27

## 2021-08-26 RX ORDER — HEPARIN SODIUM (PORCINE) LOCK FLUSH IV SOLN 100 UNIT/ML 100 UNIT/ML
5 SOLUTION INTRAVENOUS
Status: CANCELLED | OUTPATIENT
Start: 2021-08-27

## 2021-08-26 RX ORDER — HEPARIN SODIUM,PORCINE 10 UNIT/ML
5 VIAL (ML) INTRAVENOUS
Status: CANCELLED | OUTPATIENT
Start: 2021-08-27

## 2021-08-26 RX ORDER — ALBUTEROL SULFATE 0.83 MG/ML
2.5 SOLUTION RESPIRATORY (INHALATION)
Status: CANCELLED | OUTPATIENT
Start: 2021-08-27

## 2021-08-26 RX ADMIN — MICAFUNGIN SODIUM 100 MG: 20 INJECTION, POWDER, LYOPHILIZED, FOR SOLUTION INTRAVENOUS at 13:49

## 2021-08-26 RX ADMIN — SODIUM CHLORIDE 50 ML: 9 INJECTION, SOLUTION INTRAVENOUS at 13:49

## 2021-08-26 RX ADMIN — Medication 5 ML: at 14:56

## 2021-08-26 NOTE — PROGRESS NOTES
Nursing Note  Kelly Barnes [facility] for:   Chief Complaint   Patient presents with     Infusion     Micafungin (mycamine)     During [facility]'s [facility] appointment, orders from Lea Hunt PA-C were completed.  Frequency: daily through 8/27/21    Progress note:  Patient identification verified by name and date of birth.  Assessment completed.  Vitals recorded in Doc Flowsheets.  Patient was provided with education regarding medication/procedure and possible side effects.  Patient verbalized understanding.    Treatment Conditions: Patient denies fever, chills, signs of infection, recent illness, antibiotics use, productive cough or elevated temperature.  Premedications: historically patient has not taken pre-medications prior to infusion.  Drug Waste Record: No  Infusion length and rate:  110 ml/hr., over approximately 1 hour  Labs: were drawn per orders.   Vascular access: PICC accessed today.  Is the next appt scheduled? Yes  Asymptomatic COVID test completed? No    Post Infusion Assessment:  Patient tolerated infusion without incident.  Site patent and intact, free from redness, edema or discomfort.  No evidence of extravasations.  Access discontinued per protocol.     Discharge Plan:   Follow up plan of care with: ongoing inf[facility].  Discharge instructions were reviewed with patient.  Patient/representative verbalized understanding of discharge instructions and all questions answered.  [facility] discharged [facility] in stable condition.    Priscila Oliva RN    Administrations This Visit     0.9% sodium chloride BOLUS     Admin Date  08/26/2021 Action  New Bag Dose  50 mL Route  Intravenous Administered By  Priscila Oliva RN          heparin lock flush 10 UNIT/ML injection 5 mL     Admin Date  08/26/2021 Action  Given Dose  5 mL  Route  Intracatheter Administered By  Priscila Oliva, JAMES          micafungin (MYCAMINE) 100 mg in sodium chloride 0.9 % 100 mL intermittent infusion     Admin Date  08/26/2021 Action  New Bag Dose  100 mg Rate  110 mL/hr Route  Intravenous Administered By  Priscila Oliva, RN          sodium chloride (PF) 0.9% PF flush 3-20 mL     Admin Date  08/26/2021 Action  Given Dose  20 mL Route  Intracatheter Administered By  Priscila Oliva, RN                /87   Pulse 97   Temp 98.1  F (36.7  C) (Oral)   Resp 16   SpO2 97%

## 2021-08-26 NOTE — LETTER
8/26/2021         RE: Kelly Barnes  [address]  [facility] [address]        Dear Colleague,    Thank you for referring your patient, Kelly Barnes, to the [facility]. Please see a copy of my visit note below.    Nursing Note  Kelly Barnes presents today to [facility] for:   Chief Complaint   Patient presents with     Infusion     Micafungin (mycamine)     During [facility] appointment, orders from Lea Hunt PA-C were completed.  Frequency: daily through 8/27/21    Progress note:  Patient identification verified by name and date of birth.  Assessment completed.  Vitals recorded in Doc Flowsheets.  Patient was provided with education regarding medication/procedure and possible side effects.  Patient verbalized understanding.    Treatment Conditions: Patient denies fever, chills, signs of infection, recent illness, antibiotics use, productive cough or elevated temperature.  Premedications: historically patient has not taken pre-medications prior to infusion.  Drug Waste Record: No  Infusion length and rate:  110 ml/hr., over approximately 1 hour  Labs: were drawn per orders.   Vascular access: PICC accessed today.  Is the next appt scheduled? Yes  Asymptomatic COVID test completed? No    Post Infusion Assessment:  Patient tolerated infusion without incident.  Site patent and intact, free from redness, edema or discomfort.  No evidence of extravasations.  Access discontinued per protocol.     Discharge Plan:   Follow up plan of care with: ongoing infusions at [facility].  Discharge instructions were reviewed with patient.  Patient/representative verbalized understanding of discharge instructions and all questions answered.  Patient discharged from [facility] in stable condition.    Priscila Oliva RN    Administrations This  Visit     0.9% sodium chloride BOLUS     Admin Date  08/26/2021 Action  New Bag Dose  50 mL Route  Intravenous Administered By  Priscila Oliva RN          heparin lock flush 10 UNIT/ML injection 5 mL     Admin Date  08/26/2021 Action  Given Dose  5 mL Route  Intracatheter Administered By  Priscila Oliva RN          micafungin (MYCAMINE) 100 mg in sodium chloride 0.9 % 100 mL intermittent infusion     Admin Date  08/26/2021 Action  New Bag Dose  100 mg Rate  110 mL/hr Route  Intravenous Administered By  Priscila Oliva RN          sodium chloride (PF) 0.9% PF flush 3-20 mL     Admin Date  08/26/2021 Action  Given Dose  20 mL Route  Intracatheter Administered By  Priscila Oliva RN                /87   Pulse 97   Temp 98.1  F (36.7  C) (Oral)   Resp 16   SpO2 97%         Again, thank you for allowing me to participate in the care of your patient.        Sincerely,        [facility]

## 2021-08-27 ENCOUNTER — INFUSION THERAPY VISIT (OUTPATIENT)
Dept: INFUSION THERAPY | Facility: CLINIC | Age: 81
End: 2021-08-27
Attending: PHYSICIAN ASSISTANT
Payer: COMMERCIAL

## 2021-08-27 ENCOUNTER — OFFICE VISIT (OUTPATIENT)
Dept: FAMILY MEDICINE | Facility: CLINIC | Age: 81
End: 2021-08-27
Payer: COMMERCIAL

## 2021-08-27 VITALS
OXYGEN SATURATION: 97 % | BODY MASS INDEX: 26.16 KG/M2 | TEMPERATURE: 98.2 F | HEART RATE: 109 BPM | DIASTOLIC BLOOD PRESSURE: 70 MMHG | SYSTOLIC BLOOD PRESSURE: 102 MMHG | RESPIRATION RATE: 16 BRPM | WEIGHT: 143 LBS

## 2021-08-27 VITALS
SYSTOLIC BLOOD PRESSURE: 122 MMHG | RESPIRATION RATE: 16 BRPM | DIASTOLIC BLOOD PRESSURE: 78 MMHG | TEMPERATURE: 99.5 F | HEART RATE: 92 BPM | OXYGEN SATURATION: 98 %

## 2021-08-27 DIAGNOSIS — N12 EMPHYSEMATOUS PYELONEPHRITIS: ICD-10-CM

## 2021-08-27 DIAGNOSIS — T83.512A URINARY TRACT INFECTION ASSOCIATED WITH NEPHROSTOMY CATHETER, INITIAL ENCOUNTER (H): ICD-10-CM

## 2021-08-27 DIAGNOSIS — N20.0 KIDNEY STONE: ICD-10-CM

## 2021-08-27 DIAGNOSIS — N18.31 STAGE 3A CHRONIC KIDNEY DISEASE (H): ICD-10-CM

## 2021-08-27 DIAGNOSIS — R42 DIZZINESS: Primary | ICD-10-CM

## 2021-08-27 DIAGNOSIS — B49 FUNGEMIA: ICD-10-CM

## 2021-08-27 DIAGNOSIS — I10 HYPERTENSION GOAL BP (BLOOD PRESSURE) < 140/90: ICD-10-CM

## 2021-08-27 DIAGNOSIS — E11.9 TYPE 2 DIABETES MELLITUS WITHOUT COMPLICATION, WITHOUT LONG-TERM CURRENT USE OF INSULIN (H): ICD-10-CM

## 2021-08-27 DIAGNOSIS — N17.9 ACUTE KIDNEY INJURY (H): ICD-10-CM

## 2021-08-27 DIAGNOSIS — B49 FUNGEMIA: Primary | ICD-10-CM

## 2021-08-27 DIAGNOSIS — E78.5 HYPERLIPIDEMIA LDL GOAL <100: ICD-10-CM

## 2021-08-27 DIAGNOSIS — N39.0 URINARY TRACT INFECTION ASSOCIATED WITH NEPHROSTOMY CATHETER, INITIAL ENCOUNTER (H): ICD-10-CM

## 2021-08-27 PROBLEM — E87.6 HYPOKALEMIA: Status: RESOLVED | Noted: 2020-12-19 | Resolved: 2021-08-27

## 2021-08-27 PROBLEM — M79.89 LEFT LEG SWELLING: Status: RESOLVED | Noted: 2019-10-15 | Resolved: 2021-08-27

## 2021-08-27 LAB — HBA1C MFR BLD: 8.1 % (ref 0–5.6)

## 2021-08-27 PROCEDURE — 250N000011 HC RX IP 250 OP 636: Performed by: PHYSICIAN ASSISTANT

## 2021-08-27 PROCEDURE — 96365 THER/PROPH/DIAG IV INF INIT: CPT

## 2021-08-27 PROCEDURE — 99207 PR FOOT EXAM NO CHARGE: CPT | Performed by: FAMILY MEDICINE

## 2021-08-27 PROCEDURE — 83036 HEMOGLOBIN GLYCOSYLATED A1C: CPT

## 2021-08-27 PROCEDURE — 258N000003 HC RX IP 258 OP 636: Performed by: PHYSICIAN ASSISTANT

## 2021-08-27 PROCEDURE — 36415 COLL VENOUS BLD VENIPUNCTURE: CPT

## 2021-08-27 PROCEDURE — 99495 TRANSJ CARE MGMT MOD F2F 14D: CPT | Performed by: FAMILY MEDICINE

## 2021-08-27 RX ORDER — MEPERIDINE HYDROCHLORIDE 25 MG/ML
25 INJECTION INTRAMUSCULAR; INTRAVENOUS; SUBCUTANEOUS EVERY 30 MIN PRN
Status: CANCELLED | OUTPATIENT
Start: 2021-08-27

## 2021-08-27 RX ORDER — EPINEPHRINE 1 MG/ML
0.3 INJECTION, SOLUTION INTRAMUSCULAR; SUBCUTANEOUS EVERY 5 MIN PRN
Status: CANCELLED | OUTPATIENT
Start: 2021-08-27

## 2021-08-27 RX ORDER — SIMVASTATIN 10 MG
10 TABLET ORAL AT BEDTIME
Start: 2021-08-27 | End: 2021-09-21

## 2021-08-27 RX ORDER — METHYLPREDNISOLONE SODIUM SUCCINATE 125 MG/2ML
125 INJECTION, POWDER, LYOPHILIZED, FOR SOLUTION INTRAMUSCULAR; INTRAVENOUS
Status: CANCELLED
Start: 2021-08-27

## 2021-08-27 RX ORDER — HEPARIN SODIUM (PORCINE) LOCK FLUSH IV SOLN 100 UNIT/ML 100 UNIT/ML
5 SOLUTION INTRAVENOUS
Status: CANCELLED | OUTPATIENT
Start: 2021-08-27

## 2021-08-27 RX ORDER — ALBUTEROL SULFATE 90 UG/1
1-2 AEROSOL, METERED RESPIRATORY (INHALATION)
Status: CANCELLED
Start: 2021-08-27

## 2021-08-27 RX ORDER — NALOXONE HYDROCHLORIDE 0.4 MG/ML
0.2 INJECTION, SOLUTION INTRAMUSCULAR; INTRAVENOUS; SUBCUTANEOUS
Status: CANCELLED | OUTPATIENT
Start: 2021-08-27

## 2021-08-27 RX ORDER — HEPARIN SODIUM (PORCINE) LOCK FLUSH IV SOLN 100 UNIT/ML 100 UNIT/ML
5 SOLUTION INTRAVENOUS
Status: DISCONTINUED | OUTPATIENT
Start: 2021-08-27 | End: 2021-08-27

## 2021-08-27 RX ORDER — LOSARTAN POTASSIUM AND HYDROCHLOROTHIAZIDE 12.5; 5 MG/1; MG/1
1 TABLET ORAL EVERY MORNING
Start: 2021-08-27 | End: 2022-05-26

## 2021-08-27 RX ORDER — HEPARIN SODIUM,PORCINE 10 UNIT/ML
5 VIAL (ML) INTRAVENOUS
Status: DISCONTINUED | OUTPATIENT
Start: 2021-08-27 | End: 2021-08-27

## 2021-08-27 RX ORDER — ALBUTEROL SULFATE 0.83 MG/ML
2.5 SOLUTION RESPIRATORY (INHALATION)
Status: CANCELLED | OUTPATIENT
Start: 2021-08-27

## 2021-08-27 RX ORDER — HEPARIN SODIUM,PORCINE 10 UNIT/ML
5 VIAL (ML) INTRAVENOUS
Status: CANCELLED | OUTPATIENT
Start: 2021-08-27

## 2021-08-27 RX ORDER — DIPHENHYDRAMINE HYDROCHLORIDE 50 MG/ML
50 INJECTION INTRAMUSCULAR; INTRAVENOUS
Status: CANCELLED
Start: 2021-08-27

## 2021-08-27 RX ADMIN — MICAFUNGIN SODIUM 100 MG: 20 INJECTION, POWDER, LYOPHILIZED, FOR SOLUTION INTRAVENOUS at 14:57

## 2021-08-27 NOTE — LETTER
[facility] [address] [place] [address]  Phone: [phone]    August 27, 2021        Kelly Barnes  [address]  [facility] [address]          To whom it may concern:    RE: Kelly Barnes    Patient was seen and treated today at our clinic. She has had unexpected medical emergencies requiring hospitalization, surgery, and ongoing IV medication. I recommend she cancel her travel plans as she is medically unfit to travel.    Please contact me for questions or concerns.      Sincerely,        Lea Lopez MD

## 2021-08-27 NOTE — PROGRESS NOTES
Nursing Note  Kelly Barnes [facility] for:   Chief Complaint   Patient presents with     Infusion     Micafungin     During [facility]'s [facility] appointment, orders from Lea Hunt were completed.  Frequency: daily, today is final dose    Progress note:  Patient identification verified by name and date of birth.  Assessment completed.  Vitals recorded in Doc Flowsheets.  Patient was provided with education regarding medication/procedure and possible side effects.  Patient verbalized understanding.     present during visit today: Not Applicable.    Treatment Conditions: Non-applicable.    Premedications: were not ordered.    Drug Waste Record: No    Infusion length and rate:  infusion given over approximately 60 minutes    Labs: were drawn per orders.     Vascular access: PICC accessed today, removed after infusion without difficulty.    Is the next appt scheduled? NA, today is final dose  Asymptomatic COVID test completed? no    Post Infusion Assessment:  Patient tolerated infusion without incident.     Discharge Plan:   Follow up plan of care with: ordering provider as scheduled.  Discharge instructions were reviewed with patient.  Patient/representative verbalized understanding of discharge instructions and all questions answered.  [facility] in stable condition.    Omaira Rahman RN    Administrations This Visit     micafungin (MYCAMINE) 100 mg in sodium chloride 0.9 % 100 mL intermittent infusion     Admin Date  08/27/2021 Action  New Bag Dose  100 mg Rate  110 mL/hr Route  Intravenous Administered By  Omaira Rahman RN                /78 (BP Location: Right arm)   Pulse 92   Temp 99.5  F (37.5  C) (Oral)   Resp 16   SpO2 98%

## 2021-08-27 NOTE — LETTER
8/27/2021         RE: Kelly Barnes  [address]  [facility] [address]        Dear Colleague,    Thank you for referring your patient, Kelly Barnes, to the [facility]. Please see a copy of my visit note below.    Nursing Note  Kelly Barnes [facility] for:   Chief Complaint   Patient presents with     Infusion     Micafungin     During [facility]'s [facility] appointment, orders from Lea Hunt were completed.  Frequency: daily, today is final dose    Progress note:  Patient identification verified by name and date of birth.  Assessment completed.  Vitals recorded in Doc Flowsheets.  Patient was provided with education regarding medication/procedure and possible side effects.  Patient verbalized understanding.     present during visit today: Not Applicable.    Treatment Conditions: Non-applicable.    Premedications: were not ordered.    Drug Waste Record: No    Infusion length and rate:  infusion given over approximately 60 minutes    Labs: were drawn per orders.     Vascular access: PICC accessed today, removed after infusion without difficulty.    Is the next appt scheduled? NA, today is final dose  Asymptomatic COVID test completed? no    Post Infusion Assessment:  Patient tolerated infusion without incident.     Discharge Plan:   Follow up plan of care with: ordering provider as scheduled.  Discharge instructions were reviewed with patient.  Patient/representative verbalized understanding of discharge instructions and all questions answered.  Patient discharged from [facility] in stable condition.    Omaira Rahman RN    Administrations This Visit     micafungin (MYCAMINE) 100 mg in sodium chloride 0.9 % 100 mL intermittent infusion     Admin Date  08/27/2021 Action  New Bag Dose  100 mg Rate  110 mL/hr Route  Intravenous Administered  By  Omaira Rahman, JAMES                /78 (BP Location: Right arm)   Pulse 92   Temp 99.5  F (37.5  C) (Oral)   Resp 16   SpO2 98%           Again, thank you for allowing me to participate in the care of your patient.        Sincerely,        [facility]

## 2021-08-27 NOTE — PATIENT INSTRUCTIONS
Stop taking tamsulosin (flomax), tolterodine (detrol).   NO fluids after 6 pm, stay hydrated by drinking plenty of water with breakfast and lunch.    BP Readings from Last 3 Encounters:   08/27/21 102/70   08/26/21 132/87   08/25/21 132/84

## 2021-08-27 NOTE — PROGRESS NOTES
"    Assessment & Plan     Dizziness  New, previously undiagnosed problem with uncertain prognosis and additional work-up planned.   Differential large and includes iatrogenic/medication effect, hypotension, electrolyte imbalance, thyroid disorder, BPV. For now will stop flomax and ditropan to see if symptoms resolve, close follow up, will do lab testing as needed if dizziness persists.    Recent hospitalization with severe illness due to:  Kidney stone  Resolved, percutaneous nephrostomy 8/13/21  With history of  Emphysematous pyelonephritis  and  Fungemia  Due to  Urinary tract infection associated with nephrostomy catheter, initial encounter (H)  Just completing IV antibiotic and antifungal treatment today.    Acute kidney injury (H)  With baseline history of  Stage 3a chronic kidney disease  Partial resolutation with GFR returning to baseline, follow labs closely.    Type 2 diabetes mellitus without complication, without long-term current use of insulin (H)  Patient reports recent elevated am FSBG, may be stress/pain/poor sleep related.  Stop urinary medication that may be causing her frequent night waking and dizziness, see if sleep improves, and if this helps normalize FSBG. If not, will consider adding or increasing current oral medications.  - Hemoglobin A1c; Future  - FOOT EXAM    Hyperlipidemia LDL goal <100  LDL Cholesterol Calculated   Date Value Ref Range Status   05/11/2021 103 (H) <100 mg/dL Final     Comment:     Above desirable:  100-129 mg/dl  Borderline High:  130-159 mg/dL  High:             160-189 mg/dL  Very high:       >189 mg/dl      at goal, continue nightly simvastatin 10 mg  - Lipid panel reflex to direct LDL Fasting; Future  :[id number]}     BMI:   Estimated body mass index is 26.16 kg/m  as calculated from the following:    Height as of 8/9/21: 1.575 m (5' 2\").    Weight as of this encounter: 64.9 kg (143 lb).   Weight management plan: return to activity as tolerated.    Return in about 11 " days (around 9/7/2021) for follow up. virtual visit scheduled    Lea Lopez MD  [facility] TRACEE Mendoza is a 81 year old who presents for the following health issues     HPI       Hospital Follow-up Visit:    Hospital/Nursing Home/IP Rehab Facility: [facility]  Date of Admission: 08/08/2021  Date of Discharge: 08/17/2021  Reason(s) for Admission: Kidney Stone      Was your hospitalization related to COVID-19? No   Problems taking medications regularly:  None  Medication changes since discharge: started taking fluconazole, micafungin iv and she was given vacomycin and finished them.  Problems adhering to non-medication therapy:  None    Summary of hospitalization:  [facility] hospital discharge summary reviewed  Diagnostic Tests/Treatments reviewed.  Follow up needed: lab work as needed  Other Healthcare Providers Involved in Patient s Care:         Specialist appointment - urology  Update since discharge: improved.   Post Discharge Medication Reconciliation: discharge medications reconciled and changed, per note/orders.  Plan of care communicated with patient            Patient states she is concerned about her blood sugars, she has been feeling dizzy a day after she was discharged from the hospital.  Diabetes Data  She's been having higher blood sugars, am fasting blood sugars have been over 200. She hasn't changed her diet, actually feels she's eating better, 178 - 244, most over 200. She's taking glimepiride twice daily as usual, sometimes three. She's not sleeping well, gets up every 2 hours to urinate. She has been recently treated for kidney stones, had stents placed 7/16/21, removed 8/13/21 during percutaneous nephrostomy. She reports normal urination without dysuria, hematuria.  She was started on tamsulosin, tolterodine since 7/17/21. She reports feeling dizzy as well.    BP Readings from Last 2 Encounters:    08/27/21 102/70   08/26/21 132/87     Hemoglobin A1C (%)   Date Value   07/13/2021 7.4 (H)   05/11/2021 7.3 (H)   10/07/2020 7.2 (H)     LDL Cholesterol Calculated (mg/dL)   Date Value   05/11/2021 103 (H)   10/07/2020 134 (H)     Lab Results   Component Value Date    MICROL 117 10/07/2020     No results found for: MICROALBUMIN     Health Maintenance Due   Topic Date Due     ZOSTER IMMUNIZATION (1 of 2) Never done     DTAP/TDAP/TD IMMUNIZATION (1 - Tdap) 02/01/2001     DEXA  05/06/2012     LIPID  08/11/2021     MEDICARE ANNUAL WELLNESS VISIT  07/30/2021     INFLUENZA VACCINE (1) 09/01/2021      GFR Estimate   Date Value Ref Range Status   08/26/2021 47 (L) >60 mL/min/1.73m2 Final     Comment:     As of July 11, 2021, eGFR is calculated by the CKD-EPI creatinine equation, without race adjustment. eGFR can be influenced by muscle mass, exercise, and diet. The reported eGFR is an estimation only and is only applicable if the renal function is stable.   08/16/2021 55 (L) >60 mL/min/1.73m2 Final     Comment:     As of July 11, 2021, eGFR is calculated by the CKD-EPI creatinine equation, without race adjustment. eGFR can be influenced by muscle mass, exercise, and diet. The reported eGFR is an estimation only and is only applicable if the renal function is stable.   08/15/2021 52 (L) >60 mL/min/1.73m2 Final     Comment:     As of July 11, 2021, eGFR is calculated by the CKD-EPI creatinine equation, without race adjustment. eGFR can be influenced by muscle mass, exercise, and diet. The reported eGFR is an estimation only and is only applicable if the renal function is stable.   05/11/2021 55 (L) >60 mL/min/[1.73_m2] Final     Comment:     Non  GFR Calc  Starting 12/18/2018, serum creatinine based estimated GFR (eGFR) will be   calculated using the Chronic Kidney Disease Epidemiology Collaboration   (CKD-EPI) equation.     12/21/2020 51 (L) >60 mL/min/[1.73_m2] Final     Comment:     Non   GFR Calc  Starting 12/18/2018, serum creatinine based estimated GFR (eGFR) will be   calculated using the Chronic Kidney Disease Epidemiology Collaboration   (CKD-EPI) equation.     12/20/2020 48 (L) >60 mL/min/[1.73_m2] Final     Comment:     Non  GFR Calc  Starting 12/18/2018, serum creatinine based estimated GFR (eGFR) will be   calculated using the Chronic Kidney Disease Epidemiology Collaboration   (CKD-EPI) equation.       Creatinine   Date Value Ref Range Status   08/26/2021 1.12 (H) 0.52 - 1.04 mg/dL Final   08/16/2021 0.97 0.52 - 1.04 mg/dL Final   08/15/2021 1.02 0.52 - 1.04 mg/dL Final   08/14/2021 1.03 0.52 - 1.04 mg/dL Final   05/11/2021 0.97 0.52 - 1.04 mg/dL Final   12/21/2020 1.04 0.52 - 1.04 mg/dL Final   12/20/2020 1.08 (H) 0.52 - 1.04 mg/dL Final   12/20/2020 1.11 (H) 0.52 - 1.04 mg/dL Final       Review of Systems   Constitutional, HEENT, cardiovascular, pulmonary, GI, , musculoskeletal, neuro, skin, endocrine and psych systems are negative, except as otherwise noted.      Objective    /70 (BP Location: Right arm, Patient Position: Sitting, Cuff Size: Adult Regular)   Pulse 109   Temp 98.2  F (36.8  C) (Temporal)   Resp 16   Wt 64.9 kg (143 lb)   SpO2 97%   BMI 26.16 kg/m    Body mass index is 26.16 kg/m .  Physical Exam   GENERAL: healthy, alert and no distress  NECK: no adenopathy, no asymmetry, masses, or scars and thyroid normal to palpation  RESP: lungs clear to auscultation - no rales, rhonchi or wheezes  CV: regular rate and rhythm, normal S1 S2, no S3 or S4, no murmur, click or rub, no peripheral edema and peripheral pulses strong  MS: no gross musculoskeletal defects noted, no edema  SKIN: no suspicious lesions or rashes  NEURO: Normal strength and tone, mentation intact and speech normal  PSYCH: mentation appears normal, affect normal/bright

## 2021-08-28 ENCOUNTER — TELEPHONE (OUTPATIENT)
Dept: NURSING | Facility: CLINIC | Age: 81
End: 2021-08-28

## 2021-08-28 DIAGNOSIS — R79.82 CRP ELEVATED: Primary | ICD-10-CM

## 2021-08-28 NOTE — TELEPHONE ENCOUNTER
Patient calling regarding an abnormal lab result. She noted her CRP inflammation was 24.9 on 8-26-21.  Patient is concerned about this lab. She doesn't know what it is or why it was drawn.  Writer will route message to PCP/care team to call patient.  Patient verbalized understanding and agrees with plan.     Sarai Bernal RN  [facility] Nurse Advisor  12:40 PM 8/28/2021  COVID 19 Nurse Triage Plan/Patient Instructions    Please be aware that novel coronavirus (COVID-19) may be circulating in the community. If you develop symptoms such as fever, cough, or SOB or if you have concerns about the presence of another infection including coronavirus (COVID-19), please contact your health care provider or visit [email or web address].     Disposition/Instructions    Home care recommended. Follow home care protocol based instructions.    Thank you for taking steps to prevent the spread of this virus.  o Limit your contact with others.  o Wear a simple mask to cover your cough.  o Wash your hands well and often.    Resources    [facility]: About COVID-19: [email or web address]    CDC: What to Do If You're Sick: www.cdc.gov/coronavirus/2019-ncov/about/steps-when-sick.html    CDC: Ending Home Isolation: www.cdc.gov/coronavirus/2019-ncov/hcp/disposition-in-home-patients.html     CDC: Caring for Someone: www.cdc.gov/coronavirus/2019-ncov/if-you-are-sick/care-for-someone.html     [facility]: Interim Guidance for Hospital Discharge to Home: www.health.[facility].[place]/diseases/coronavirus/hcp/hospdischarge.pdf    [facility] clinical trials (COVID-19 research studies): clinicalaffairs.[facility]/umn-clinical-trials     Below are the COVID-19 hotlines at the [place] [facility] ([facility]). Interpreters are available.   o For health questions: Call 388-635-5529 or 1-396.254.3652 (7 a.m. to 7 p.m.)  o For questions about schools and childcare: Call 366-102-8462 or 1-951.751.3914 (7 a.m. to 7 p.m.)

## 2021-08-30 NOTE — TELEPHONE ENCOUNTER
cRP is a sign of inflammation a non specific test  It was elevated when in the hospital but now is better so that is a good sign

## 2021-08-30 NOTE — TELEPHONE ENCOUNTER
Its not a test we necessarily recheck if clinically doing well.   She can discuss rechecking with her PCP dr mcdonald when back if indicated

## 2021-08-30 NOTE — TELEPHONE ENCOUNTER
Left message to call back and ask to speak with an available triage nurse.  MICHAEL DiggsN, RN  [facility]

## 2021-08-30 NOTE — TELEPHONE ENCOUNTER
I went over providers message and Kelly is wanting when she should have this re-tested seeing it is still abnormal. Sign off on lab if ok.    Thank you

## 2021-09-02 ENCOUNTER — NURSE TRIAGE (OUTPATIENT)
Dept: NURSING | Facility: CLINIC | Age: 81
End: 2021-09-02

## 2021-09-02 ENCOUNTER — OFFICE VISIT (OUTPATIENT)
Dept: URGENT CARE | Facility: URGENT CARE | Age: 81
End: 2021-09-02
Payer: COMMERCIAL

## 2021-09-02 VITALS
HEART RATE: 101 BPM | HEIGHT: 62 IN | OXYGEN SATURATION: 98 % | BODY MASS INDEX: 26.31 KG/M2 | DIASTOLIC BLOOD PRESSURE: 60 MMHG | RESPIRATION RATE: 16 BRPM | SYSTOLIC BLOOD PRESSURE: 100 MMHG | WEIGHT: 143 LBS | TEMPERATURE: 98 F

## 2021-09-02 DIAGNOSIS — R19.7 DIARRHEA, UNSPECIFIED TYPE: Primary | ICD-10-CM

## 2021-09-02 PROCEDURE — 99214 OFFICE O/P EST MOD 30 MIN: CPT | Performed by: STUDENT IN AN ORGANIZED HEALTH CARE EDUCATION/TRAINING PROGRAM

## 2021-09-02 ASSESSMENT — MIFFLIN-ST. JEOR: SCORE: 1071.89

## 2021-09-02 NOTE — PROGRESS NOTES
Patient presents with:  Urgent Care  Diarrhea: diarrhea for past 2 days. Has been on antibiotics for awhile now.       Clinical Decision Making:      ICD-10-CM    1. Diarrhea, unspecified type  R19.7 Clostridium difficile Toxin B PCR     Enteric Bacteria and Virus Panel by [facility] Stool     Ova and Parasite Exam Routine     Profuse diarrhea, ~ 10 times per day, she is high risk due to her age, therefore stool testing ordered. C. Diff is less likely with non-watery diarrhea and anti-fungal treatment, however will r/o. We discussed ways to stay hydrated. Follow-up in 3 days if diarrhea is still present. She does use MyChart.    Patient Instructions   Today you were seen for diarrhea, we are testing your stool for infections. At this time you are well hydrated and do not have any other signs of a severe infection or dehydration. In order to stay hydrated, continue to drink fluids as able. You can use Pedialyte to replenish lost electrolytes. Return to the clinic if you develop a fever, chills, abdominal pain, or dark/bloody stool.       HPI:  Kelly Barnes is a 81 year old female who presents today complaining of diarrhea. It is not watery but slightly formed. She has some cramping with passing stools and feeling like she still has to defecate, no abdominal pain. She has severe urgency and passed a movent before making it to the bathroom. She has a stool sample with her today. She has just finished a prolonged course of anti-fungals for a yeast-infected kidney stone. No dark/bloody stools, fever, chills, weakness, rash, n/v, or fatigue. She has been staying well hydrated.     History obtained from the patient.    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  2020-12: Hypokalemia  2020-12: Acute pyelonephritis  2020-12: Acute kidney injury (H)  2020-12: Sepsis, due to unspecified organism, unspecified  whether   acute organ dysfunction present (H)  2020-10: Stage 3a chronic kidney disease  2020-04: Allergic dermatitis  2019-10: Hepatitis A immune  2019-10: Left leg swelling  2018-10: Family history of breast cancer in sister  2016-10: Type 2 diabetes mellitus without complication, without long-  term current use of insulin (H)  2016-03: Hyponatremia  2015-11: Obesity, Class I, BMI 30-34.9  2015-10: Type 2 diabetes mellitus without complication (H)  2015-07: Leg edema, left  2014-06: DM type 2, goal A1C below 8.0  2012-11: Hyperlipidemia LDL goal <100  2010-01: Osteopenia  2009-01: Hypercholesteremia  2005-01: Essential hypertension, benign  Hypertension goal BP (blood pressure) < 140/90  Type 2 diabetes, HbA1c goal < 7% (H)      Past Medical History:   Diagnosis Date     Acute kidney injury (H) 12/19/2020     Allergic rhinitis, cause unspecified      Anemia      Aortic stenosis 02/29/2016    With new murmur noted 2/2016, normal echo, repeat echo 2/2017.     Aortic valve sclerosis      Atypical chest pain 07/24/2015     cuboid     left foot     Fungemia 8/16/2021     History of Clostridium difficile colitis      Hyperlipidemia LDL goal <100 11/01/2012     Hypertension goal BP (blood pressure) < 140/90      Musculoskeletal chest pain 11/25/2016     Obesity, Class I, BMI 30-34.9 11/11/2015     Pneumonia 03/2016     Pyelonephritis      Sepsis, due to unspecified organism, unspecified whether acute organ dysfunction present (H) 12/19/2020     Type 2 diabetes, HbA1c goal < 7% (H)      Urinary tract infection associated with nephrostomy catheter, initial encounter (H) 8/8/2021       Social History     Tobacco Use     Smoking status: Never Smoker     Smokeless tobacco: Never Used   Substance Use Topics     Alcohol use: Yes     Alcohol/week: 10.0 standard drinks     Comment: 1-2 drinks per week       Vitals:    09/02/21 1129   BP: 100/60   Pulse: 101   Resp: 16   Temp: 98  F (36.7  C)   TempSrc: Oral   SpO2: 98%   Weight:  "64.9 kg (143 lb)   Height: 1.575 m (5' 2\")       Physical Exam  Constitutional:       Appearance: Normal appearance.   HENT:      Head: Normocephalic and atraumatic.      Mouth/Throat:      Mouth: Mucous membranes are moist.      Pharynx: No oropharyngeal exudate.   Eyes:      Pupils: Pupils are equal, round, and reactive to light.   Abdominal:      General: Bowel sounds are normal. There is no distension.      Palpations: Abdomen is soft.      Tenderness: There is no abdominal tenderness.   Skin:     General: Skin is warm.      Capillary Refill: Capillary refill takes less than 2 seconds.      Findings: No rash.   Neurological:      Mental Status: She is alert.             At the end of the encounter, I discussed results, diagnosis, medications. Discussed red flags for immediate return to clinic/ER, as well as indications for follow up if no improvement. Patient understood and agreed to plan. Patient was stable for discharge.    "

## 2021-09-02 NOTE — TELEPHONE ENCOUNTER
Called home number: Left message to call back and ask to speak with an available triage nurse.    Called patient's mobile number and reviewed recommendation per Dr. Hernandez. The [facility] is open today from 10:00 AM-8:00 PM.  No appointment necessary.    Patient verbalized understanding and in agreement with plan.    KATHLEEN Diggs, RN  [facility]

## 2021-09-02 NOTE — TELEPHONE ENCOUNTER
Placed call to patient reviewed recommendation per Dr Hernandez below.    Patient verbalized understanding. Transferred to Central Scheduling. Patient agrees if no available appointments through scheduling she will go to [facility] today.    Michelle Alfaro RN  [facility]

## 2021-09-02 NOTE — TELEPHONE ENCOUNTER
"Please call patient [phone],  If no answer please try cell     2nd level triage requested. Disposition see in ED/UCC/or office with PCP approval. Please advise where you prefer patient to be seen.     Patient calling with symptoms of diarrhea starting yesterday 9/1/21.  Reporting 10 very loose stools yesterday and 1 this morning. Mild abdominal cramping. Afebrile. Nausea/dry heaving denies vomiting.  Stating she \"feels pretty good otherwise.\"     History of c-diff.    Patient completed antibiotics 8/27/21 for kidney stone surgery.    Blood sugar fasting this morning 242. Denies change in urine out put today.    Disposition Go to ED/UCC or office with PCP approval. Message routed to PCP Care Team.      Michelle Alfaro RN  [facility]            Reason for Disposition    SEVERE diarrhea (e.g., 7 or more times / day more than normal) and age > 60 years    Additional Information    Negative: Shock suspected (e.g., cold/pale/clammy skin, too weak to stand, low BP, rapid pulse)    Negative: Difficult to awaken or acting confused (e.g., disoriented, slurred speech)    Negative: Sounds like a life-threatening emergency to the triager    Negative: Vomiting also present and worse than the diarrhea    Negative: Blood in stool and without diarrhea    Negative: SEVERE abdominal pain (e.g., excruciating) and present > 1 hour    Negative: SEVERE abdominal pain and age > 60    Negative: Bloody, black, or tarry bowel movements (Exception: chronic-unchanged black-grey bowel movements and is taking iron pills or Pepto-bismol)    Protocols used: DIARRHEA-A-OH      "

## 2021-09-02 NOTE — PATIENT INSTRUCTIONS
Today you were seen for diarrhea, we are testing your stool for infections. At this time you are well hydrated and do not have any other signs of a severe infection or dehydration. In order to stay hydrated, continue to drink fluids as able. You can use Pedialyte to replenish lost electrolytes. Return to the clinic if you develop a fever, chills, abdominal pain, or dark/bloody stool.

## 2021-09-03 ENCOUNTER — LAB (OUTPATIENT)
Dept: LAB | Facility: CLINIC | Age: 81
End: 2021-09-03
Payer: COMMERCIAL

## 2021-09-03 DIAGNOSIS — R19.7 DIARRHEA, UNSPECIFIED TYPE: ICD-10-CM

## 2021-09-03 LAB
C COLI+JEJUNI+LARI FUSA STL QL NAA+PROBE: NOT DETECTED
C DIFF TOX B STL QL: POSITIVE
EC STX1 GENE STL QL NAA+PROBE: NOT DETECTED
EC STX2 GENE STL QL NAA+PROBE: NOT DETECTED
NOROV GI+II ORF1-ORF2 JNC STL QL NAA+PR: NOT DETECTED
RVA NSP5 STL QL NAA+PROBE: NOT DETECTED
SALMONELLA SP RPOD STL QL NAA+PROBE: NOT DETECTED
SHIGELLA SP+EIEC IPAH STL QL NAA+PROBE: NOT DETECTED
V CHOL+PARA RFBL+TRKH+TNAA STL QL NAA+PR: NOT DETECTED
Y ENTERO RECN STL QL NAA+PROBE: NOT DETECTED

## 2021-09-03 PROCEDURE — 87177 OVA AND PARASITES SMEARS: CPT

## 2021-09-03 PROCEDURE — 87493 C DIFF AMPLIFIED PROBE: CPT | Mod: 59

## 2021-09-03 PROCEDURE — 87506 IADNA-DNA/RNA PROBE TQ 6-11: CPT

## 2021-09-03 PROCEDURE — 87209 SMEAR COMPLEX STAIN: CPT

## 2021-09-04 ENCOUNTER — OFFICE VISIT (OUTPATIENT)
Dept: URGENT CARE | Facility: URGENT CARE | Age: 81
End: 2021-09-04
Payer: COMMERCIAL

## 2021-09-04 VITALS
SYSTOLIC BLOOD PRESSURE: 124 MMHG | HEART RATE: 99 BPM | OXYGEN SATURATION: 96 % | WEIGHT: 143 LBS | TEMPERATURE: 97.8 F | DIASTOLIC BLOOD PRESSURE: 78 MMHG | BODY MASS INDEX: 26.31 KG/M2 | HEIGHT: 62 IN

## 2021-09-04 DIAGNOSIS — A04.72 COLITIS DUE TO CLOSTRIDIUM DIFFICILE: Primary | ICD-10-CM

## 2021-09-04 PROCEDURE — 99213 OFFICE O/P EST LOW 20 MIN: CPT | Performed by: INTERNAL MEDICINE

## 2021-09-04 RX ORDER — VANCOMYCIN HYDROCHLORIDE 125 MG/1
CAPSULE ORAL
Qty: 54 CAPSULE | Refills: 0 | Status: SHIPPED | OUTPATIENT
Start: 2021-09-04 | End: 2021-09-21

## 2021-09-04 ASSESSMENT — MIFFLIN-ST. JEOR: SCORE: 1071.89

## 2021-09-04 NOTE — PATIENT INSTRUCTIONS
We will go ahead and get you treated with an alternative antibiotic for the recurrence of C diff.    Discuss follow-up plan with Dr. Lopez on Tuesday.  The most important thing to track will by how you are feeling.  Routine re-testing of the stool is not generally recommended.

## 2021-09-04 NOTE — PROGRESS NOTES
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  Her last course of oral vancomycin started with 125 mg QID for 10 days then she took 125 mg BID x 10 days with therapy completed after this 20 day course.    I considered fidaxomicin therapy initially on the basis of IDSA recommendations for recurrence after vancomycin therapy however the fidaxomicin was unavailable and cost-prohibitive.  Thus chose to do an extended pulse taper of vancomycin.    - vancomycin (VANCOCIN) 125 MG capsule; Take 1 capsule (125 mg) by mouth 4 times daily for 7 days, THEN 1 capsule (125 mg) 2 times daily for 7 days, THEN 1 capsule (125 mg) daily for 7 days, THEN 1 capsule (125 mg) every other day for 10 days.    Barrignton Torres MD  [facility]    Subjective     HPI   Patient with a complex medical history who comes in today due to positive C diff result.  She currently is again experiencing diarrhea over about the past five days. She was seen on 9/2.  Her oral intake has been ok though she has some nausea.  She has had multiple courses of antibiotics recently including some systemic antifungals for candidemia. Was treated with oral vancomycin at the end of July. Course of oral vancomycin concluded on 8/17.    Review of Systems   Constitutional, HEENT, cardiovascular, pulmonary, gi and gu systems are negative, except as otherwise noted.      Objective    /78   Pulse 99   Temp 97.8  F (36.6  C) (Oral)   Ht 1.575 m (5' 2\")   Wt 64.9 kg (143 lb)   SpO2 96%   BMI 26.16 kg/m    Body mass index is 26.16 kg/m .  Physical Exam   Exam deferred    Positive C diff from 9/3          "

## 2021-09-07 ENCOUNTER — PATIENT OUTREACH (OUTPATIENT)
Dept: CARE COORDINATION | Facility: CLINIC | Age: 81
End: 2021-09-07

## 2021-09-07 ENCOUNTER — VIRTUAL VISIT (OUTPATIENT)
Dept: FAMILY MEDICINE | Facility: CLINIC | Age: 81
End: 2021-09-07
Payer: COMMERCIAL

## 2021-09-07 DIAGNOSIS — E11.9 TYPE 2 DIABETES MELLITUS WITHOUT COMPLICATION, WITHOUT LONG-TERM CURRENT USE OF INSULIN (H): Primary | ICD-10-CM

## 2021-09-07 DIAGNOSIS — A04.72 C. DIFFICILE COLITIS: ICD-10-CM

## 2021-09-07 LAB
O+P STL MICRO: NEGATIVE
TRI STN SPEC: NORMAL

## 2021-09-07 PROCEDURE — 99443 PR PHYSICIAN TELEPHONE EVALUATION 21-30 MIN: CPT | Mod: 95 | Performed by: FAMILY MEDICINE

## 2021-09-07 RX ORDER — LIRAGLUTIDE 6 MG/ML
0.6 INJECTION SUBCUTANEOUS DAILY
Qty: 3 ML | Refills: 3 | Status: SHIPPED | OUTPATIENT
Start: 2021-09-07 | End: 2021-10-05

## 2021-09-07 ASSESSMENT — ACTIVITIES OF DAILY LIVING (ADL): DEPENDENT_IADLS:: TRANSPORTATION

## 2021-09-07 NOTE — PROGRESS NOTES
Kelly is a 81 year old who is being evaluated via a billable telephone visit.      What phone number would you like to be contacted at? [phone]   How would you like to obtain your AVS? MyChart    Assessment & Plan     Type 2 diabetes mellitus without complication, without long-term current use of insulin (H)  A1C is increasing, I don't recommend SGLP-2 given recurrent UTI, will start GLP-1 as below, she does feel she could give herself a daily injection. I do worry about cost of the medication, and I recommend MTM consult given metformin side effects, cost of mediction, etc, to figure out feasible solutation.  - liraglutide (VICTOZA) 18 MG/3ML solution; Inject 0.6 mg Subcutaneous daily  - Med Therapy Management Referral  - Albumin Random Urine Quantitative with Creat Ratio; Future    C. difficile colitis  Improving.  - CRP, inflammation; Future  - CBC with platelets and differential; Future    Return in about 2 weeks (around 9/21/2021) for follow up, Diabetes follow up.    Lea Lopez MD  [facility]   Kelly is a 81 year old who presents for the following health issues     HPI     Diabetes Follow-up    How often are you checking your blood sugar? One time daily  What time of day are you checking your blood sugars (select all that apply)?  before breakfast  Have you had any blood sugars above 200?  Yes 223 this morning, over 200 for a long time  Have you had any blood sugars below 70?  No    What symptoms do you notice when your blood sugar is low?  None    What concerns do you have today about your diabetes? Blood sugar is often over 200   Do you have any of these symptoms? (Select all that apply)  No numbness or tingling in feet.  No redness, sores or blisters on feet.  No complaints of excessive thirst.  No reports of blurry vision.  No significant changes to weight.   Other illness; recently diagnosed with c diff colitis and currently being treated with  vancomycin.    BP Readings from Last 2 Encounters:   09/04/21 124/78   09/02/21 100/60     Hemoglobin A1C (%)   Date Value   08/27/2021 8.1 (H)   07/13/2021 7.4 (H)   05/11/2021 7.3 (H)   10/07/2020 7.2 (H)     LDL Cholesterol Calculated (mg/dL)   Date Value   05/11/2021 103 (H)   10/07/2020 134 (H)     GFR Estimate   Date Value Ref Range Status   08/26/2021 47 (L) >60 mL/min/1.73m2 Final     Comment:     As of July 11, 2021, eGFR is calculated by the CKD-EPI creatinine equation, without race adjustment. eGFR can be influenced by muscle mass, exercise, and diet. The reported eGFR is an estimation only and is only applicable if the renal function is stable.   08/16/2021 55 (L) >60 mL/min/1.73m2 Final     Comment:     As of July 11, 2021, eGFR is calculated by the CKD-EPI creatinine equation, without race adjustment. eGFR can be influenced by muscle mass, exercise, and diet. The reported eGFR is an estimation only and is only applicable if the renal function is stable.   08/15/2021 52 (L) >60 mL/min/1.73m2 Final     Comment:     As of July 11, 2021, eGFR is calculated by the CKD-EPI creatinine equation, without race adjustment. eGFR can be influenced by muscle mass, exercise, and diet. The reported eGFR is an estimation only and is only applicable if the renal function is stable.   05/11/2021 55 (L) >60 mL/min/[1.73_m2] Final     Comment:     Non  GFR Calc  Starting 12/18/2018, serum creatinine based estimated GFR (eGFR) will be   calculated using the Chronic Kidney Disease Epidemiology Collaboration   (CKD-EPI) equation.     12/21/2020 51 (L) >60 mL/min/[1.73_m2] Final     Comment:     Non  GFR Calc  Starting 12/18/2018, serum creatinine based estimated GFR (eGFR) will be   calculated using the Chronic Kidney Disease Epidemiology Collaboration   (CKD-EPI) equation.     12/20/2020 48 (L) >60 mL/min/[1.73_m2] Final     Comment:     Non  GFR Calc  Starting 12/18/2018,  "serum creatinine based estimated GFR (eGFR) will be   calculated using the Chronic Kidney Disease Epidemiology Collaboration   (CKD-EPI) equation.         How many servings of fruits and vegetables do you eat daily?  2-3    On average, how many sweetened beverages do you drink each day (Examples: soda, juice, sweet tea, etc.  Do NOT count diet or artificially sweetened beverages)?   0    How many days per week do you exercise enough to make your heart beat faster? 3 or less    How many minutes a day do you exercise enough to make your heart beat faster? 10 - 19    How many days per week do you miss taking your medication? 0    C diff colitis    Patient was diagnosed with C-Diff on Friday Sept 3rd and was given Vancomycin. She would like to talk about that medication, she has no side affects but she states she was given a different antibiotic before but her insurance didn't cover it this time. Her question is should she be taking antibiotics again.  She also states 2-3 weeks her heart rate has been in the 90's. She also would like to talk about her score being 24.9 in CRP.    Wt Readings from Last 4 Encounters:   09/04/21 64.9 kg (143 lb)   09/02/21 64.9 kg (143 lb)   08/27/21 64.9 kg (143 lb)   08/16/21 66.6 kg (146 lb 14.4 oz)   Estimated body mass index is 26.16 kg/m  as calculated from the following:    Height as of 9/4/21: 1.575 m (5' 2\").    Weight as of 9/4/21: 64.9 kg (143 lb).    Review of Systems   Constitutional, HEENT, cardiovascular, pulmonary, GI, , musculoskeletal, neuro, skin, endocrine and psych systems are negative, except as otherwise noted.      Objective    Vitals - Patient Reported  Weight (Patient Reported): 64.9 kg (143 lb)  Height (Patient Reported): 157.5 cm (5' 2\")  BMI (Based on Pt Reported Ht/Wt): 26.15      Vitals:  No vitals were obtained today due to virtual visit.    Physical Exam   healthy, alert and no distress  PSYCH: Alert and oriented times 3; coherent speech, normal   rate and " volume, able to articulate logical thoughts, able   to abstract reason, no tangential thoughts, no hallucinations   or delusions  Her affect is normal  RESP: No cough, no audible wheezing, able to talk in full sentences  Remainder of exam unable to be completed due to telephone visits    Lab on 09/03/2021   Component Date Value Ref Range Status     C Difficile Toxin B by PCR 09/03/2021 Positive* Negative Final    Detection of C. difficile nucleic acid in stools confirms the presence of these organisms in diarrheal patients but may not indicate that C. difficile are the etiologic agents of the diarrhea. Results from the Xpert C. difficile assay should be interpreted in conjunction with other laboratory and clinical data available to the clinician.     Campylobacter group 09/03/2021 Not Detected  Not Detected Final     Salmonella species 09/03/2021 Not Detected  Not Detected Final     Shigella species 09/03/2021 Not Detected  Not Detected Final     Vibrio group 09/03/2021 Not Detected  Not Detected Final     Rotavirus 09/03/2021 Not Detected  Not Detected Final     Shiga toxin 1 gene 09/03/2021 Not Detected  Not Detected Final     Shiga toxin 2 gene 09/03/2021 Not Detected  Not Detected Final     Norovirus I and II 09/03/2021 Not Detected  Not Detected Final     Yersinia enterocolitica 09/03/2021 Not Detected  Not Detected Final               Phone call duration: 30 minutes

## 2021-09-07 NOTE — PROGRESS NOTES
Clinic Care Coordination Contact  [facility]/Voicemail    Referral Source: IP Report  Clinical Data: Care Coordinator Outreach  Outreach attempted x 1.  Left message on patient's voicemail with call back information and requested return call.  Plan: Care Coordinator will try to reach patient again in 3-5 business days.    Zaida Romano, RN Care Coordinator     [facility]  [facility]   [email or web address]    Office: [phone]

## 2021-09-08 NOTE — RESULT ENCOUNTER NOTE
Patient was seen today in clinic.  I discussed results in clinic, please see clinic progress note.    Lea Lopez MD 9/8/2021

## 2021-09-10 LAB — BACTERIA SPEC CULT: ABNORMAL

## 2021-09-15 ENCOUNTER — TELEPHONE (OUTPATIENT)
Dept: FAMILY MEDICINE | Facility: CLINIC | Age: 81
End: 2021-09-15

## 2021-09-15 NOTE — TELEPHONE ENCOUNTER
MTM referral from: [facility] visit (referral by provider)    MTM referral outreach attempt #2 on September 15, 2021 at 5:38 PM      Outcome: Patient not reachable after several attempts, will route to MTM Pharmacist/Provider as an FYI. Thank you for the referral.    Kendrick Harrington, MTM coordinator

## 2021-09-16 ENCOUNTER — LAB (OUTPATIENT)
Dept: LAB | Facility: CLINIC | Age: 81
End: 2021-09-16
Payer: COMMERCIAL

## 2021-09-16 DIAGNOSIS — E78.5 HYPERLIPIDEMIA LDL GOAL <100: ICD-10-CM

## 2021-09-16 DIAGNOSIS — A04.72 C. DIFFICILE COLITIS: ICD-10-CM

## 2021-09-16 DIAGNOSIS — E11.9 TYPE 2 DIABETES MELLITUS WITHOUT COMPLICATION, WITHOUT LONG-TERM CURRENT USE OF INSULIN (H): ICD-10-CM

## 2021-09-16 LAB
BASOPHILS # BLD AUTO: 0 10E3/UL (ref 0–0.2)
BASOPHILS NFR BLD AUTO: 0 %
EOSINOPHIL # BLD AUTO: 0.2 10E3/UL (ref 0–0.7)
EOSINOPHIL NFR BLD AUTO: 2 %
ERYTHROCYTE [DISTWIDTH] IN BLOOD BY AUTOMATED COUNT: 14.2 % (ref 10–15)
HCT VFR BLD AUTO: 37.1 % (ref 35–47)
HGB BLD-MCNC: 12.5 G/DL (ref 11.7–15.7)
IMM GRANULOCYTES # BLD: 0 10E3/UL
IMM GRANULOCYTES NFR BLD: 0 %
LYMPHOCYTES # BLD AUTO: 1.7 10E3/UL (ref 0.8–5.3)
LYMPHOCYTES NFR BLD AUTO: 24 %
MCH RBC QN AUTO: 27.2 PG (ref 26.5–33)
MCHC RBC AUTO-ENTMCNC: 33.7 G/DL (ref 31.5–36.5)
MCV RBC AUTO: 81 FL (ref 78–100)
MONOCYTES # BLD AUTO: 0.5 10E3/UL (ref 0–1.3)
MONOCYTES NFR BLD AUTO: 7 %
NEUTROPHILS # BLD AUTO: 4.6 10E3/UL (ref 1.6–8.3)
NEUTROPHILS NFR BLD AUTO: 67 %
PLATELET # BLD AUTO: 345 10E3/UL (ref 150–450)
RBC # BLD AUTO: 4.59 10E6/UL (ref 3.8–5.2)
WBC # BLD AUTO: 7 10E3/UL (ref 4–11)

## 2021-09-16 PROCEDURE — 80061 LIPID PANEL: CPT

## 2021-09-16 PROCEDURE — 86140 C-REACTIVE PROTEIN: CPT

## 2021-09-16 PROCEDURE — 82043 UR ALBUMIN QUANTITATIVE: CPT

## 2021-09-16 PROCEDURE — 85025 COMPLETE CBC W/AUTO DIFF WBC: CPT

## 2021-09-16 PROCEDURE — 36415 COLL VENOUS BLD VENIPUNCTURE: CPT

## 2021-09-16 NOTE — TELEPHONE ENCOUNTER
Mtm will send her referral letter thru [facility] today .    Reason for Referral:  diabetes management      Jose Sue Rph.  Medication Therapy Management Provider  [phone]

## 2021-09-17 LAB
CHOLEST SERPL-MCNC: 198 MG/DL
FASTING STATUS PATIENT QL REPORTED: YES
HDLC SERPL-MCNC: 55 MG/DL
LDLC SERPL CALC-MCNC: 123 MG/DL
NONHDLC SERPL-MCNC: 143 MG/DL
TRIGL SERPL-MCNC: 99 MG/DL

## 2021-09-18 LAB — CRP SERPL-MCNC: 5 MG/L (ref 0–8)

## 2021-09-20 ENCOUNTER — PATIENT OUTREACH (OUTPATIENT)
Dept: CARE COORDINATION | Facility: CLINIC | Age: 81
End: 2021-09-20

## 2021-09-20 ASSESSMENT — ACTIVITIES OF DAILY LIVING (ADL): DEPENDENT_IADLS:: TRANSPORTATION

## 2021-09-20 NOTE — PROGRESS NOTES
Clinic Care Coordination Contact  [facility]/Voicemail    Referral Source: IP Report  Clinical Data: Care Coordinator Outreach  Outreach attempted x 2.  Left message on patient's voicemail with call back information and requested return call.  Plan: Care Coordinator will send unable to contact letter with care coordinator contact information via [employer]. Care Coordinator will do no further outreaches at this time.    Zaida Romano, RN Care Coordinator     [facility]  [facility]

## 2021-09-21 ENCOUNTER — VIRTUAL VISIT (OUTPATIENT)
Dept: PHARMACY | Facility: CLINIC | Age: 81
End: 2021-09-21
Attending: FAMILY MEDICINE
Payer: COMMERCIAL

## 2021-09-21 ENCOUNTER — VIRTUAL VISIT (OUTPATIENT)
Dept: FAMILY MEDICINE | Facility: CLINIC | Age: 81
End: 2021-09-21
Payer: COMMERCIAL

## 2021-09-21 ENCOUNTER — ANCILLARY PROCEDURE (OUTPATIENT)
Dept: CT IMAGING | Facility: CLINIC | Age: 81
End: 2021-09-21
Attending: UROLOGY
Payer: COMMERCIAL

## 2021-09-21 DIAGNOSIS — E78.5 HYPERLIPIDEMIA LDL GOAL <100: ICD-10-CM

## 2021-09-21 DIAGNOSIS — N20.0 KIDNEY STONE: ICD-10-CM

## 2021-09-21 DIAGNOSIS — A04.72 COLITIS DUE TO CLOSTRIDIUM DIFFICILE: ICD-10-CM

## 2021-09-21 DIAGNOSIS — E11.9 TYPE 2 DIABETES MELLITUS WITHOUT COMPLICATION, WITHOUT LONG-TERM CURRENT USE OF INSULIN (H): ICD-10-CM

## 2021-09-21 DIAGNOSIS — E11.9 TYPE 2 DIABETES MELLITUS WITHOUT COMPLICATION, WITHOUT LONG-TERM CURRENT USE OF INSULIN (H): Primary | ICD-10-CM

## 2021-09-21 PROCEDURE — 99606 MTMS BY PHARM EST 15 MIN: CPT | Performed by: PHARMACIST

## 2021-09-21 PROCEDURE — 99442 PR PHYSICIAN TELEPHONE EVALUATION 11-20 MIN: CPT | Mod: 95 | Performed by: FAMILY MEDICINE

## 2021-09-21 PROCEDURE — 74176 CT ABD & PELVIS W/O CONTRAST: CPT | Performed by: RADIOLOGY

## 2021-09-21 PROCEDURE — 99607 MTMS BY PHARM ADDL 15 MIN: CPT | Performed by: PHARMACIST

## 2021-09-21 RX ORDER — SIMVASTATIN 20 MG
20 TABLET ORAL AT BEDTIME
Qty: 90 TABLET | Refills: 3 | Status: SHIPPED | OUTPATIENT
Start: 2021-09-21 | End: 2021-11-16

## 2021-09-21 RX ORDER — GLIMEPIRIDE 4 MG/1
4 TABLET ORAL 2 TIMES DAILY WITH MEALS
Qty: 180 TABLET | Refills: 1 | Status: SHIPPED | OUTPATIENT
Start: 2021-09-21 | End: 2021-11-04

## 2021-09-21 RX ORDER — VANCOMYCIN HYDROCHLORIDE 125 MG/1
125 CAPSULE ORAL EVERY OTHER DAY
Qty: 5 CAPSULE | Refills: 0 | Status: SHIPPED | OUTPATIENT
Start: 2021-09-21 | End: 2021-10-05

## 2021-09-21 RX ORDER — GLIMEPIRIDE 2 MG/1
2 TABLET ORAL 2 TIMES DAILY WITH MEALS
Qty: 180 TABLET | Refills: 3 | Status: SHIPPED | OUTPATIENT
Start: 2021-09-21 | End: 2021-09-21 | Stop reason: DRUGHIGH

## 2021-09-21 NOTE — Clinical Note
Lea--francois-- due to potential nausea SE and 90$ month cost Kelly does not want to start the Victoza today ; will hold on that and increase her Glimep dose to 4mg bid .--recheck bs's with me in 2 weeks.    Madelin.-Jose

## 2021-09-21 NOTE — PROGRESS NOTES
Medication Therapy Management (MTM) Encounter    ASSESSMENT:                            Medication Adherence/Access: No issues identified    Type 2 Diabetes: Patient is not meeting A1c goal of < 8%. Self monitoring of blood glucose is not at goal of post prandial < 180 mg/dL.   Patient would benefit from ideal SMBG: Check blood sugars pre-prandial, 2 hours post-prandial, and with symptoms of hypoglycemia and Sulfonylurea (Glimepiride) :  increase dose to 4mg. Tab twice daily.     Donot fill Victoza at this time due to potential Nausea SE and monthly 90$ cost.       PLAN:                            1. Lets increase the glimepiride dose to 4mg. Tablets --1 tab with breakfast and dinner daily.   2. Please check fasting blood sugar in AM before first meal and also check one more time during day -2 hours after any main meal.     Fyi: excellent range of blood sugars for you is keeping them all between 100-200.    Follow-up: Return in about 2 weeks (around 10/5/2021), or 10:00 AM  (Telephone call), for Blood sugar meter review, Medication Therapy Management Visit.      SUBJECTIVE/OBJECTIVE:                          Kelly Barnes is a 81 year old female called for an initial visit. She was referred to me from Lea Lopez  .      Reason for visit: Victoza question--pcp ordered it -she wants to know if this will make her sick /nauseated? Cost is 90$/month .   Might be going on trip --[place] --worried about starting a new medication.       Allergies/ADRs: Reviewed in chart  Past Medical History: Reviewed in chart  Tobacco: She reports that she has never smoked. She has never used smokeless tobacco.  Alcohol: rarely. (wine if anything).   Caffeine: 4 cups coffee /day .   Activity: was active till recent infection --now back to 30 minutes /day walking.     Medication Adherence/Access: no issues reported    Type 2 Diabetes:  Currently taking ; Glimepiride dose is 2mg . Bid and sometimes an additional 2mg. dose at  lunchtime 4 x week.   . Patient is not experiencing side effects.  Blood sugar monitorin time(s) daily. Ranges (patient reported): Fasting- 166  But coming down since fungal infection resolving.  Post-Prandial- 196.   Symptoms of low blood sugar? none  Symptoms of high blood sugar? none  Eye exam: up to date  Foot exam: up to date  Diet/Exercise: controlling carbs , walks daily.   Aspirin: Not taking due to age  Statin: Yes: Simvastatin    ACEi/ARB: Yes: Losartan.   Urine Albumin:   Lab Results   Component Value Date    UMALCR 228.07 (H) 10/07/2020      Lab Results   Component Value Date    A1C 8.1 2021    A1C 7.4 2021    A1C 7.3 2021    A1C 7.2 10/07/2020    A1C 7.6 10/15/2019    A1C 6.7 10/25/2018    A1C 7.3 10/26/2017             I spent 30 minutes with this patient today (an extra 15 minutes was spent creating the Medication Action Plan). All changes were made via collaborative practice agreement with Lea Lopez MD. A copy of the visit note was provided to the patient's primary care provider.    The patient was sent via [employer] a summary of these recommendations.     Jose Sue [facility].  Medication Therapy Management Provider  [phone]      Telemedicine Visit Details  Type of service:  Telephone visit  Start Time: 3:30pm  End Time: 4:00pm  Originating Location (patient location): [place]  Distant Location (provider location):  [facility]     Medication Therapy Recommendations  Type 2 diabetes mellitus without complication, without long-term current use of insulin (H)    Current Medication: blood glucose (ONETOUCH ULTRA) test strip   Rationale: Frequency inappropriate - Dosage too low - Effectiveness   Recommendation: Increase Frequency - blood glucose test strip - test blood sugars 2 x day -1 -fasting in am and 1- 2 hours post main meal.   Status: Accepted per CPA          Current Medication: glimepiride (AMARYL) 4 MG tablet   Rationale: Dose too  low - Dosage too low - Effectiveness   Recommendation: Increase Dose - glimepiride 4 MG tablet - 1 tab 2 x day --bfast and diner.   Status: Accepted per CPA   Note: donot fill Victoza at this time.

## 2021-09-21 NOTE — PROGRESS NOTES
Kelly is a 81 year old who is being evaluated via a billable telephone visit.      What phone number would you like to be contacted at? [phone]  How would you like to obtain your AVS? MyChart    Assessment & Plan     Type 2 diabetes mellitus without complication, without long-term current use of insulin (H)  Not quite at goal, I don't recommend 6 mg daily of glimepiride, stick with 2 mg bid, and encouraged her to start victoza, weight loss from this medication not due to nausea, just reduced appetite. She will discuss with [facility] pharmacist today as well. Repeat a1Cin 3 months.  - glimepiride (AMARYL) 2 MG tablet; Take 1 tablet (2 mg) by mouth 2 times daily (with meals)    Hyperlipidemia LDL goal <100  LDL Cholesterol Calculated   Date Value Ref Range Status   09/16/2021 123 (H) <=100 mg/dL Final   05/11/2021 103 (H) <100 mg/dL Final     Comment:     Above desirable:  100-129 mg/dl  Borderline High:  130-159 mg/dL  High:             160-189 mg/dL  Very high:       >189 mg/dl      not at goal, increased simvastatin to 20 mg today from 10 mg daily, repeat lipids in 2 months  - simvastatin (ZOCOR) 20 MG tablet; Take 1 tablet (20 mg) by mouth At Bedtime TAKE ONE TABLET BY MOUTH AT BEDTIME    Colitis due to Clostridium difficile  Doesn't have enough capsules to finish course, ordered as below to complete course of abx  - vancomycin (VANCOCIN) 125 MG capsule; Take 1 capsule (125 mg) by mouth every other day    Return in about 6 weeks (around 11/4/2021) for preventive visit (wellness/annual physical exam). as scheduled.    Lea Lopez MD  [facility]   Kelly is a 81 year old who presents for the following health issues     HPI     Hyperlipidemia Follow-Up      Are you regularly taking any medication or supplement to lower your cholesterol?   Yes- simvastatin 10 mg today    Are you having muscle aches or other side effects that you think could be caused by your  cholesterol lowering medication?  No    Diabetes Follow-up    How often are you checking your blood sugar? One time daily  What time of day are you checking your blood sugars (select all that apply)?  Before and after meals  Have you had any blood sugars above 200?  Yes   Have you had any blood sugars below 70?  No    What symptoms do you notice when your blood sugar is low?  None and Not applicable    What concerns do you have today about your diabetes? Blood sugar is sometimes over 200, fewer since starting 6 mg of glimipiride    She hasnt started victoza yet, concerned     Do you have any of these symptoms? (Select all that apply)  No numbness or tingling in feet.  No redness, sores or blisters on feet.  No complaints of excessive thirst.  No reports of blurry vision.  No significant changes to weight.      BP Readings from Last 2 Encounters:   09/04/21 124/78   09/02/21 100/60     Hemoglobin A1C (%)   Date Value   08/27/2021 8.1 (H)   07/13/2021 7.4 (H)   05/11/2021 7.3 (H)   10/07/2020 7.2 (H)     LDL Cholesterol Calculated (mg/dL)   Date Value   09/16/2021 123 (H)   05/11/2021 103 (H)   10/07/2020 134 (H)         How many servings of fruits and vegetables do you eat daily?  4 or more    On average, how many sweetened beverages do you drink each day (Examples: soda, juice, sweet tea, etc.  Do NOT count diet or artificially sweetened beverages)?   0    How many days per week do you exercise enough to make your heart beat faster? 5    How many minutes a day do you exercise enough to make your heart beat faster? 20 - 29    How many days per week do you miss taking your medication? 0    Review of Systems   Constitutional, HEENT, cardiovascular, pulmonary, GI, , musculoskeletal, neuro, skin, endocrine and psych systems are negative, except as otherwise noted.      Objective           Vitals:  No vitals were obtained today due to virtual visit.    Physical Exam   healthy, alert and no distress  PSYCH: Alert and  oriented times 3; coherent speech, normal   rate and volume, able to articulate logical thoughts, able   to abstract reason, no tangential thoughts, no hallucinations   or delusions  Her affect is normal  RESP: No cough, no audible wheezing, able to talk in full sentences  Remainder of exam unable to be completed due to telephone visits    Results for orders placed or performed in visit on 09/21/21   CT Abdomen pelvis w/o contrast     Status: None    Narrative    EXAMINATION: CT ABDOMEN PELVIS W/O CONTRAST, 9/21/2021 9:08 AM    TECHNIQUE:  Helical CT images from the lung bases through the pubic  symphysis were obtained  without IV contrast.     COMPARISON: CT abdomen 08/08/2021, 08/06/2021    HISTORY: Kidney stone    FINDINGS:    Abdomen and pelvis: Normal noncontrast appearance of the liver,  gallbladder, spleen and adrenal glands. Fatty replacement of the  pancreatic parenchyma. No peripancreatic inflammatory changes.    Since prior CT abdomen and pelvis 08/08/2021, right percutaneous  nephrostomy tube and right ureteral catheters have been removed. Mild  right pelvocaliectasis without overt right hydronephrosis. Previously  seen right renal calculi have resolved. 3 mm mineralized density seen  near the expected course of the right distal ureter, favored to be  vascular given appearance on prior exam 07/23/2021.    No left hydronephrosis or hydroureter. No obstructing left renal or  ureteral calculi identified. Normal noncontrast appearance of the  urinary bladder. Stable appearance of the uterus.    No small or large bowel dilation. Colonic diverticulosis without  surrounding inflammatory changes to suggest acute diverticulitis.  Appendix appears dilated measuring up to 10 mm although contains  intraluminal air at the appendiceal tip without wall thickening or  periappendiceal inflammatory changes present series 3, image [id number]).    Mild to moderate aortoiliac atheromatous vascular calcification  extending into  the origins of the celiac, SMA, bilateral renal  arteries. No abdominal or pelvic lymphadenopathy.    Lung bases:  No acute focal infiltrate, consolidation or pleural  effusion. Scarring at the left lung base. Tortuous course of the  descending thoracic aorta.    Bones and soft tissues: No acute or suspicious appearing osseous  abnormality. Questionable trace anterolisthesis of L4 and L5 appears  stable. Degenerative changes of the thoracolumbar spine. Ventral  abdominal wall laxity.      Impression    IMPRESSION:   1. Mild right pelvocaliectasis, improved since prior exam 08/08/2021.  No residual right renal calculi identified. Previously seen right  nephrostomy tube and right ureteral stents have been removed.  2. Colonic diverticulosis.  3. Appendix is dilated measuring up to 10 mm although without  significant periappendiceal inflammatory changes or wall thickening.    AKILAH VELAZQUEZ MD         SYSTEM ID:  [id number]               Phone call duration: 15 minutes

## 2021-09-21 NOTE — RESULT ENCOUNTER NOTE
Patient was seen today in clinic.  I discussed results in clinic, please see clinic progress note.    Lea Lopez MD 9/21/2021

## 2021-09-21 NOTE — LETTER
"        Date: 2021    Kelly Barnes  [address]  [facility] [address]    Dear Ms. Barnes,    Thank you for talking with me on 21 about your health and medications. Medicare s MTM (Medication Therapy Management) program helps you understand your medications and use them safely.      This letter includes an action plan (Medication Action Plan) and medication list (Personal Medication List). The action plan has steps you should take to help you get the best results from your medications. The medication list will help you keep track of your medications and how to use them the right way.       Have your action plan and medication list with you when you talk with your doctors, pharmacists, and other healthcare providers in your care team.     Ask your doctors, pharmacists, and other healthcare providers to update the action plan and medication list at every visit.     Take your medication list with you if you go to the hospital or emergency room.     Give a copy of the action plan and medication list to your family or caregivers.     If you want to talk about this letter or any of the papers with it, please call   [phone].We look forward to working with you, your doctors, and other healthcare providers to help you stay healthy through the [facility] MTM program.    Sincerely,  Jose Sue formerly [facility]    Enclosed: Medication Action Plan and Personal Medication List    MEDICATION ACTION PLAN FOR Kelly Barnes,  1940     This action plan will help you get the best results from your medications if you:   1. Read \"What we talked about.\"   2. Take the steps listed in the \"What I need to do\" boxes.   3. Fill in \"What I did and when I did it.\"   4. Fill in \"My follow-up plan\" and \"Questions I want to ask.\"     Have this action plan with you when you talk with your doctors, pharmacists, and other healthcare providers in your care team. Share this with your family or caregivers too.  DATE PREPARED: " 2021  What we talked about: Your Diabetes                                                  What I need to do: increase Glimepiride dose to 4mg. Tablet twice daily        What I did and when I did it:                                              What we talked about: checking your blood sugars via meter                                                   What I need to do: test twice daily now.        What I did and when I did it:                                                 My follow-up plan:                 Questions I want to ask:              If you have any questions about your action plan, call Jose Sue [facility], Phone: [phone] , Monday-Friday 8-4:30pm.           PERSONAL MEDICATION LIST FOR Kelly Barnes  1940     This medication list was made for you after we talked. We also used information from your doctor's chart.      Use blank rows to add new medications. Then fill in the dates you started using them.    Cross out medications when you no longer use them. Then write the date and why you stopped using them.    Ask your doctors, pharmacists, and other healthcare providers to update this list at every visit. Keep this list up-to-date with:       Prescription medications    Over the counter drugs     Herbals    Vitamins    Minerals      If you go to the hospital or emergency room, take this list with you. Share this with your family or caregivers too.     DATE PREPARED: 2021  Allergies or side effects: Ibuprofen, Keflex [cephalexin], and Metformin     Medication:  GLIMEPIRIDE 4 MG PO TABS      How I use it:  Take 1 tablet (4 mg) by mouth 2 times daily (with meals)      Why I use it: Type 2 diabetes mellitus without complication, without long-term current use of insulin (H)    Prescriber:  Lea Lopez MD      Date I started using it:       Date I stopped using it:         Why I stopped using it:            Medication:  GLUCOSE BLOOD VI STRP      How I use it:  Use to  test blood sugar twice daily. Dispense 1 box of 200 test strips, #3 refills.      Why I use it: Type 2 diabetes mellitus without complication, without long-term current use of insulin (H)    Prescriber:  Lea Lopez MD      Date I started using it:       Date I stopped using it:         Why I stopped using it:            Medication:  LIRAGLUTIDE 18 MG/3ML SC SOPN      How I use it:  Inject 0.6 mg Subcutaneous daily      Why I use it: Type 2 diabetes mellitus without complication, without long-term current use of insulin (H)    Prescriber:  Lea Lopez MD      Date I started using it:   on hold now    Date I stopped using it:         Why I stopped using it:            Medication:  LOSARTAN POTASSIUM-HCTZ 50-12.5 MG PO TABS      How I use it:  Take 1 tablet by mouth every morning      Why I use it: Hypertension goal BP (blood pressure) < 140/90    Prescriber:  Lea Lopez MD      Date I started using it:       Date I stopped using it:         Why I stopped using it:            Medication:  ONETOUCH DELICA LANCETS 33G MISC      How I use it:  1 Device by In Vitro route 2 times daily      Why I use it: Type 2 diabetes mellitus without complication, without long-term current use of insulin (H)    Prescriber:  Lea Lopez MD      Date I started using it:       Date I stopped using it:         Why I stopped using it:            Medication:  ONETOUCH ULTRA 2 W/DEVICE KIT      How I use it:  Use to test blood sugar 3 times daily      Why I use it: DM type 2, goal A1C below 8.0    Prescriber:  Lea Lopez MD      Date I started using it:       Date I stopped using it:         Why I stopped using it:            Medication:  SIMVASTATIN 20 MG PO TABS      How I use it:  Take 1 tablet (20 mg) by mouth At Bedtime TAKE ONE TABLET BY MOUTH AT BEDTIME      Why I use it: Hyperlipidemia LDL goal <100    Prescriber:  Lea Lopez MD      Date I started using it:        Date I stopped using it:         Why I stopped using it:            Medication:  VANCOMYCIN  MG PO CAPS      How I use it:  Take 1 capsule (125 mg) by mouth every other day      Why I use it: Colitis due to Clostridium difficile    Prescriber:  Lea Lopez MD      Date I started using it:       Date I stopped using it:         Why I stopped using it:            Medication:         How I use it:         Why I use it:      Prescriber:         Date I started using it:       Date I stopped using it:         Why I stopped using it:            Medication:         How I use it:         Why I use it:      Prescriber:         Date I started using it:       Date I stopped using it:         Why I stopped using it:            Medication:         How I use it:         Why I use it:      Prescriber:         Date I started using it:       Date I stopped using it:         Why I stopped using it:              Other Information:     If you have any questions about your medication list, call Jose Sue RPH, Phone: [phone] , Monday-Friday 8-4:30pm.    According to the Paperwork Reduction Act of 1995, no persons are required to respond to a collection of information unless it displays a valid OMB control number. The valid OMB number for this information collection is [id number]. The time required to complete this information collection is estimated to average 40 minutes per response, including the time to review instructions, searching existing data resources, gather the data needed, and complete and review the information collection. If you have any comments concerning the accuracy of the time estimate(s) or suggestions for improving this form, please write to: CMS, Attn: JODEE Reports Clearance Officer, [address] [place] [id number].

## 2021-09-22 NOTE — PROGRESS NOTES
Nurse Note      Itinerary:  [place], [place], [place], [place]    [place] and [place] DEPT Advisory Level 4 (Do Not Travel )       Departure Date: 10/14/21      Return Date: 10/29/21      Length of Trip: 2 weeks      Reason for Travel: Tourism           Urban or rural: both      Accommodations: safari tour - various accomodations        IMMUNIZATION HISTORY  Have you received any immunizations within the past 4 weeks?  No  Have you ever fainted from having your blood drawn or from an injection?  No  Have you ever had a fever reaction to vaccination?  No  Have you ever had any bad reaction or side effect from any vaccination?  No  Have you ever had hepatitis A or B vaccine?  No  Do you live (or work closely) with anyone who has AIDS, an AIDS-like condition, any other immune disorder or who is on chemotherapy for cancer?  No  Do you have a family history of immunodeficiency?  No  Have you received any injection of immune globulin or any blood products during the past 12 months?  No    Patient roomed by DARREN Grayson is a 81 year old female seen today alone for counsultation for international travel.   Patient plans to depart in  3 week(s) and  traveling with a tour group .        Patient itinerary :   [place] days > [place] > [place] > [place] > [place]  Traveling with [place] ( family) Itinerary has risk for Malaria, food borne illnesses, Typhoid and Covid 19 exposure.  Due to pandemic there will also be risks of limited qualified medical care and infection exposure     Patient's activities will include sightseeing, safari/game lovelace and camping.    Patient's country [place] birth is [place]    Special medical concerns: CDiff currently on Vancomycin (not currently symptomatic)  Recent Fungemia 08/08/2021 > infusion therapy until 08/27/2021  Kidney Stone: 07/23/2021 08/05/2021  Pyelonephritis 07/21/2021    Pre-travel questionnaire was  "completed by patient and reviewed by provider.     Vitals: /81 (Patient Position: Sitting, Cuff Size: Adult Large)   Pulse 97   Temp 98  F (36.7  C) (Tympanic)   Resp 22   Ht 1.575 m (5' 2\")   Wt 67.4 kg (148 lb 11.2 oz)   SpO2 98%   BMI 27.20 kg/m    BMI= Body mass index is 27.2 kg/m .    EXAM:  General:  Well-nourished, well-developed in no acute distress.  Appears to be stated age, interacts appropriately and expresses understanding of information given to patient.    Current Outpatient Medications   Medication Sig Dispense Refill     atovaquone-proguanil (MALARONE) 250-100 MG tablet Take 1 tablet by mouth daily Start 2 days before exposure to Malaria and continue daily till  7 days after exposure. 24 tablet 0     blood glucose (ONETOUCH ULTRA) test strip Use to test blood sugar twice daily. Dispense 1 box of 200 test strips, #3 refills. 100 strip 3     blood glucose monitoring (ONE TOUCH ULTRA 2) meter device kit Use to test blood sugar 3 times daily 1 kit 0     glimepiride (AMARYL) 4 MG tablet Take 1 tablet (4 mg) by mouth 2 times daily (with meals) 180 tablet 1     losartan-hydrochlorothiazide (HYZAAR) 50-12.5 MG tablet Take 1 tablet by mouth every morning       OneTouch Delica Lancets 33G MISC 1 Device by In Vitro route 2 times daily 100 each 11     simvastatin (ZOCOR) 20 MG tablet Take 1 tablet (20 mg) by mouth At Bedtime TAKE ONE TABLET BY MOUTH AT BEDTIME 90 tablet 3     vancomycin (VANCOCIN) 125 MG capsule Take 1 capsule (125 mg) by mouth every other day 5 capsule 0     liraglutide (VICTOZA) 18 MG/3ML solution Inject 0.6 mg Subcutaneous daily (Patient not taking: Reported on 9/21/2021) 3 mL 3     Patient Active Problem List   Diagnosis     Osteopenia     Hypertension goal BP (blood pressure) < 140/90     Hyperlipidemia LDL goal <100     Type 2 diabetes mellitus without complication, without long-term current use of insulin (H)     Family history of breast cancer in sister     Hepatitis A " immune     Allergic dermatitis     Stage 3a chronic kidney disease     Emphysematous pyelonephritis     Fungemia     Colitis due to Clostridium difficile     Allergies   Allergen Reactions     Ibuprofen Other (See Comments)     numbness in hands and feet     Keflex [Cephalexin] Rash     Metformin Rash         Immunizations discussed include:   Covid 19: Up to date  Hepatitis A:  Up to date  Hepatitis B: Up to date  Influenza: needs but will defer   Typhoid: Ordered/given today, risks, benefits and side effects reviewed  Rabies: Declined  reviewed managment of a animal bite or scratch (washing wound, seek medical care within 24 hours for post exposure prophylaxis )  Yellow Fever: Not indicated (Waiver letter given)   Japanese Encephalitis: Not indicated  Meningococcus: Not indicated  Tetanus/Diphtheria: Needs but will defer for now ( will get at pharmacy)  Measles/Mumps/Rubella: Immune by disease history per patient report  Cholera: Not needed  Polio: Not indicated  Pneumococcal: Up to date  Varicella: Immune by disease history per patient report  Shingrix: Is due, deferred  TB: low risk    ASSESSMENT/PLAN:  Kelly was seen today for travel clinic.    Diagnoses and all orders for this visit:    Travel advice encounter  -     atovaquone-proguanil (MALARONE) 250-100 MG tablet; Take 1 tablet by mouth daily Start 2 days before exposure to Malaria and continue daily till  7 days after exposure.    Encounter for laboratory testing for COVID-19 virus  -     Asymptomatic COVID-19 Virus (Coronavirus) by PCR Nose; Future    Other orders  -     TYPHOID VACCINE, IM      I advised Kelly that due to her recent medical issues, her travels are high risk.  I don't recommend travel to level 4 countries with unstable health status due to limited medical resources at destination countries during a pandemic.  I recommend she gets a 'fit for travel' statement from her PCP and Urologist prior to decision to travel.     I have reviewed  general recommendations for safe travel   including: food/water precautions, insect precautions,roadway safety. Educational materials and [employer] report provided.    Malaraia prophylaxis recommended: Malarone  Symptomatic treatment for traveler's diarrhea: azithromycin    Covid 19 PCR test ordered.  Instructions for scheduling and resulting through My Chart given to patient.     Personal protective measures reviewed including hand sanitizing and contact precautions for the prevention of viral illnesses. Cover coughs and masking  during travel and upon return.  Current COVID 19 pandemic.   Monitor / follow current CDC guidelines.    Country specific and CDC Covid 19  testing requirements and resources given to patient.      Evacuation insurance advised and resources were provided to patient.    Total visit time 45 minutes  with over 50% of time spent counseling patient as detailed above.    Alma Delaney CNP

## 2021-09-23 ENCOUNTER — OFFICE VISIT (OUTPATIENT)
Dept: FAMILY MEDICINE | Facility: CLINIC | Age: 81
End: 2021-09-23
Payer: COMMERCIAL

## 2021-09-23 VITALS
HEART RATE: 97 BPM | RESPIRATION RATE: 22 BRPM | OXYGEN SATURATION: 98 % | SYSTOLIC BLOOD PRESSURE: 127 MMHG | HEIGHT: 62 IN | TEMPERATURE: 98 F | WEIGHT: 148.7 LBS | BODY MASS INDEX: 27.36 KG/M2 | DIASTOLIC BLOOD PRESSURE: 81 MMHG

## 2021-09-23 DIAGNOSIS — Z71.84 TRAVEL ADVICE ENCOUNTER: Primary | ICD-10-CM

## 2021-09-23 DIAGNOSIS — Z20.822 ENCOUNTER FOR LABORATORY TESTING FOR COVID-19 VIRUS: ICD-10-CM

## 2021-09-23 DIAGNOSIS — A04.72 COLITIS DUE TO CLOSTRIDIUM DIFFICILE: ICD-10-CM

## 2021-09-23 PROCEDURE — 90691 TYPHOID VACCINE IM: CPT | Performed by: NURSE PRACTITIONER

## 2021-09-23 PROCEDURE — 90471 IMMUNIZATION ADMIN: CPT | Performed by: NURSE PRACTITIONER

## 2021-09-23 PROCEDURE — 99403 PREV MED CNSL INDIV APPRX 45: CPT | Mod: 25 | Performed by: NURSE PRACTITIONER

## 2021-09-23 RX ORDER — ATOVAQUONE AND PROGUANIL HYDROCHLORIDE 250; 100 MG/1; MG/1
1 TABLET, FILM COATED ORAL DAILY
Qty: 24 TABLET | Refills: 0 | Status: SHIPPED | OUTPATIENT
Start: 2021-09-23 | End: 2021-11-04

## 2021-09-23 ASSESSMENT — MIFFLIN-ST. JEOR: SCORE: 1097.75

## 2021-09-23 NOTE — PATIENT INSTRUCTIONS
Thank you for visiting the [facility] : [phone]  Today September 23, 2021 you received the    Typhoid - injectable. This vaccine is valid for two years.     You are due for Flu shot, Shingles vaccine and Tdap (tetanus/pertussis)       Follow up vaccine appointments can be made as a NURSE ONLY visit at the Travel Clinic, (BE PREPARED TO WAIT, ) or at designated [facility].    If you are receiving the Rabies vaccines series, it is important that you follow the exact schedule ordered.     Pre-travel     We recommend that you purchase Trip Evacuation Insurance prior to your departure.  Https://wwwnc.cdc.gov/travel/page/insurance    [place] your travel plans with the [place] Department of State through STEP ( Smart Traveler Enrollment Program ) https://step.state.gov.  STEP is a free service to allow [place] citizens and nationals traveling and living abroad to enroll their trip with the nearest [place] Embassy or Consulate.    Animal Exposure: Avoid all mammals even if they look healthy.  If there is a bite, scratch or even a lick, wash area immediately with soap and water for 15 minutes and seek medical care within 24 hours for evaluation of Rabies post exposure treatment.  Contact your Medical Evacuation Insurance.    COVID 19 (Sars Cov2) prevention strategies  Physical distancing: Maintain 6 foot (2m) from others.              Avoid large gatherings and public transportation.   Avoid indoor shopping malls, theaters and restaurants   Practice consistent mask wearing covering the nose, mouth and underneath the chin when unable to maintain 6 foot distance from others.  Hand washing: frequent, thorough handwashing with soap and water for 20 seconds (or using a hand  containing 60% alcohol)   Avoid touching face, nose, eyes, mouth unless you have done appropriate hand washing as above.   Clean high touch surfaces with approved disinfectant against Covid 19  (70%  Ethanol ) or a bleach solution (add 20 mL (4 teaspoons) of bleach to 1 L (1 quart) of water;)  Be careful not to breath or touch bleach.      Travel Covid 19 Testing:  updated 09/22/2021  International travelers: Pre-travel: diagnostic testing (antigen or PCR) may be required for entry:  See country specific Embassy websites or airline websites.    [place] Requirements: All air passengers coming to the [place], including [place] citizens, are required to have a negative COVID-19 test result (within 3 calendar days) even if vaccinated or documentation of recovery from COVID-19 before they board a flight to the [place].    Post travel: CDC recommends getting tested 3-5 days after your trip AND stay home and self-quarantine for 7 days. Even if you test negative, stay home and self-quarantine for the full 7 days. If you don t get tested, stay home and self-quarantine for 10 days.    COVID-19 testing scheduling number for pre-travel through [facility]  [phone] (Must have an order). Available 24 hours a day.  You can also schedule through My Chart.     Post-travel illness:  Contact your provider or [facility] if you develop a fever, rash, cough, diarrhea or other symptoms for up to 1 year after travel.  Inform your healthcare provider when and where you traveled to.    Please call the [facility] with any questions [phone]  Or send your provider a 'My Chart' note.       https://travel.state.gov/content/travel/en/traveladvisories/traveladvisories/[place]travel-advisory.html    https://za.[place]rowdlinkery.gov/covid-19-information-2/

## 2021-09-23 NOTE — LETTER
September 23, 2021      Name: Kelly Barnes  YOB: 1940  Dates of travel: 10/14/2021-10/29/2021  Countries of travel: [place]> [place] > [place]         Immunization Exemption Letter: Yellow Fever  The traveler named above is my patient and under my medical care and must be exempted from the requirement for yellow fever immunization. This exemption is valid only for the current trip (noted above).       Sincerely,          APRIL Apple CNP  9/23/2021 11:31 AM

## 2021-09-23 NOTE — NURSING NOTE
Prior to immunization administration, verified patients identity using patient s name and date of birth. Please see Immunization Activity for additional information.     Screening Questionnaire for Adult Immunization    Are you sick today?   No   Do you have allergies to medications, food, a vaccine component or latex?   No   Have you ever had a serious reaction after receiving a vaccination?   No   Do you have a long-term health problem with heart, lung, kidney, or metabolic disease (e.g., diabetes), asthma, a blood disorder, no spleen, complement component deficiency, a cochlear implant, or a spinal fluid leak?  Are you on long-term aspirin therapy?   No   Do you have cancer, leukemia, HIV/AIDS, or any other immune system problem?   No   Do you have a parent, brother, or sister with an immune system problem?   No   In the past 3 months, have you taken medications that affect  your immune system, such as prednisone, other steroids, or anticancer drugs; drugs for the treatment of rheumatoid arthritis, Crohn s disease, or psoriasis; or have you had radiation treatments?   No   Have you had a seizure, or a brain or other nervous system problem?   No   During the past year, have you received a transfusion of blood or blood    products, or been given immune (gamma) globulin or antiviral drug?   No   For women: Are you pregnant or is there a chance you could become       pregnant during the next month?   No   Have you received any vaccinations in the past 4 weeks?   No     Immunization questionnaire answers were all negative.        Per orders of APRIL Vance CNP, injection of Typhim Vi given by Renuka Lora MA. Patient instructed to remain in clinic for 15 minutes afterwards, and to report any adverse reaction to me immediately.       Screening performed by Renuka Lora MA on 9/23/2021 at 11:28 AM.  Renuka Lora MA on 9/23/2021 at 11:28 AM

## 2021-09-24 ENCOUNTER — PRE VISIT (OUTPATIENT)
Dept: UROLOGY | Facility: CLINIC | Age: 81
End: 2021-09-24

## 2021-09-24 LAB
CREAT UR-MCNC: 55 MG/DL
MICROALBUMIN UR-MCNC: 12 MG/L
MICROALBUMIN/CREAT UR: 21.82 MG/G CR (ref 0–25)

## 2021-09-24 NOTE — TELEPHONE ENCOUNTER
Reason for visit: kidney stone follow-up     Dx/Hx/Sx: kidney stones    Records/imaging/labs/orders: CystoscopyRemoval of right ureteral catheter  Right retrograde pyelogram  Right percutaneous access tract dilation  Right ureteroscopy with holmium laser lithotripsy of stone  Right percutaneous nephrolithotomy for stone > 2cm   Exchange of right nephrostomy tube  Antegrade right nephrostogram    At Rooming: video return

## 2021-09-25 NOTE — RESULT ENCOUNTER NOTE
Hello!  It was a pleasure to see you in clinic!  Thank you for getting labs done. Everything looks normal, which is good news.    Your microalbumen is normal, which is great news, and a big improvement from last year, great job! This means you do not have protein in the urine, and that your kidneys are currently functioning well. Keeping blood pressure and blood fats low is the best way to preserve your kidney function over your lifetime.     Your cbc, or complete blood count, which measures red blood cells (to check for anemia and other vitamin deficiencies) and white blood cells (to check for infection and leukemia) is normal, which is great!  Your platelets, which reflect liver function and ability to clot, are normal as well.     Your CRP , a general test of inflammation, is normal, which is good news, and means a generalized inflammatory process in unlikely.      Your cholesterol is at goal. Keep up the good work!    Sincerely,  Dr. Lea Lopez MD  9/24/2021

## 2021-09-28 ENCOUNTER — VIRTUAL VISIT (OUTPATIENT)
Dept: UROLOGY | Facility: CLINIC | Age: 81
End: 2021-09-28
Payer: COMMERCIAL

## 2021-09-28 DIAGNOSIS — N20.0 URIC ACID KIDNEY STONE: Primary | ICD-10-CM

## 2021-09-28 PROCEDURE — 99024 POSTOP FOLLOW-UP VISIT: CPT | Performed by: UROLOGY

## 2021-09-28 RX ORDER — POTASSIUM CITRATE 10 MEQ/1
20 TABLET, EXTENDED RELEASE ORAL 2 TIMES DAILY
Qty: 120 TABLET | Refills: 3 | Status: SHIPPED | OUTPATIENT
Start: 2021-09-28 | End: 2022-03-10

## 2021-09-28 ASSESSMENT — PAIN SCALES - GENERAL: PAINLEVEL: NO PAIN (0)

## 2021-09-28 NOTE — LETTER
9/28/2021       RE: Kelly Barnes  [address]  [facility] [address]     Dear Colleague,    Thank you for referring your patient, Kelly Barnes, to the [facility] [place] [facility]. Please see a copy of my visit note below.    Kelly is a 81 year old who is being evaluated via a billable video visit.      In brief, Ms. Shirley is a very pleasant 81-year-old female who was seen by me in the hospital approximately 2 months ago for a large staghorn right renal calculus.  This was treated with staged percutaneous nephrolithotomy.  She did have some perioperative complications including an episode of fungemia and C. difficile after treatment of her initial infection.    She is pleased to report that she has been feeling well of late since all tubes were removed.  She has had no right flank pain or signs of urinary tract infection.  She is off antibiotics.    Recent CT scan was performed and was personally reviewed it does not show any evidence of residual stone or hydronephrosis.    Her stone analysis was uric acid.    At this point I recommended we proceed with a metabolic evaluation which we will send her home.  We will also start her on a low-dose of potassium citrate and monitor her potassium levels after starting this medication.  She will follow up in our office in approximately 2 months time for ongoing metabolic stone prevention.      How would you like to obtain your AVS? MyChart  If the video visit is dropped, the invitation should be resent by: Send to e-mail at: [email or web address]  Will anyone else be joining your video visit? No      Video Start Time: 10:!4  Video-Visit Details    Type of service:  Video Visit    Video End Time:10:23    Originating Location (pt. Location): Home    Distant Location (provider location):  [facility]     Platform used for Video Visit: Benton    Again, thank you  for allowing me to participate in the care of your patient.      Sincerely,    Kirk Law MD

## 2021-09-28 NOTE — PROGRESS NOTES
Kelly is a 81 year old who is being evaluated via a billable video visit.      In brief, Ms. Shirley is a very pleasant 81-year-old female who was seen by me in the hospital approximately 2 months ago for a large staghorn right renal calculus.  This was treated with staged percutaneous nephrolithotomy.  She did have some perioperative complications including an episode of fungemia and C. difficile after treatment of her initial infection.    She is pleased to report that she has been feeling well of late since all tubes were removed.  She has had no right flank pain or signs of urinary tract infection.  She is off antibiotics.    Recent CT scan was performed and was personally reviewed it does not show any evidence of residual stone or hydronephrosis.    Her stone analysis was uric acid.    At this point I recommended we proceed with a metabolic evaluation which we will send her home.  We will also start her on a low-dose of potassium citrate and monitor her potassium levels after starting this medication.  She will follow up in our office in approximately 2 months time for ongoing metabolic stone prevention.      How would you like to obtain your AVS? MyChart  If the video visit is dropped, the invitation should be resent by: Send to e-mail at: [email or web address]  Will anyone else be joining your video visit? No      Video Start Time: 10:!4  Video-Visit Details    Type of service:  Video Visit    Video End Time:10:23    Originating Location (pt. Location): Home    Distant Location (provider location):  [facility]     Platform used for Video Visit: Yapta

## 2021-09-28 NOTE — NURSING NOTE
Chief Complaint   Patient presents with     RECHECK     to go over my recent CT scan     - Tracy GALVAN, EMT  Urology Clinic

## 2021-09-30 ENCOUNTER — DOCUMENTATION ONLY (OUTPATIENT)
Dept: OTHER | Facility: CLINIC | Age: 81
End: 2021-09-30

## 2021-10-04 NOTE — PROGRESS NOTES
Medication Therapy Management (MTM) Encounter    ASSESSMENT:                            Medication Adherence/Access: No issues identified    Type 2 Diabetes: Patient is not meeting A1c goal of < 8%. Self monitoring of blood glucose is not at goal of post prandial < 180 mg/dL.   Patient would benefit from ideal SMBG: Check blood sugars pre-prandial, 2 hours post-prandial, and with symptoms of hypoglycemia and Sulfonylurea (Glimepiride) :  Stay on daily dose of  4mg. Tab twice daily.     Donot fill Victoza at this time due to potential Nausea SE and monthly 90$ cost.     Prevent kidney stones:  Needs improvement --patient hesitant to begin preventive rx of potassium citrate --mtm will send her non drug things she can do until she decides her best course of action.            PLAN:                            1.  FYI--blood sugars looking pretty good -- stay on Glimepiride twice daily , eat as healthy as you can --enjoy yourself in [place].     2. FYi--After you return from [place]--please start your Potassium citrate to avoid kidney stones. This info below is from the kidney.org website:    Diet Recommendations for Kidney Stones:    General Recommendations:  Drink plenty of fluid: 2-3 quarts/day  This includes any type of fluid such as water, coffee and lemonade which have been shown to have a beneficial effect with the exception of grapefruit juice and soda.  This will help produce less concentrated urine and ensure a good urine volume of at least 2.5L/day  Limit foods with high oxalate content  Spinach, many berries, chocolate, wheat bran, nuts, beets, tea and rhubarb should be eliminated from your diet intake  Eat enough dietary calcium  Three servings of dairy per day will help lower the risk of calcium stone formation. Eat with meals.  Avoid extra calcium supplements  Calcium supplements should be individualized by your physician and registered kidney dietitian  Eat a moderate amount of protein  High protein intakes  will cause the kidneys to excrete more calcium therefore this may cause more stones to form in the kidney  Avoid high salt intake  High sodium intake increases calcium in the urine which increases the chances of developing stones  Low salt diet is also important to control blood pressure.  Avoid high doses of vitamin C supplements  It is recommend to take 60mg/day of vitamin C based on the [place] Dietary Reference Intake  Excess amounts of 1000mg/day or more may produce more oxalate in the body        Follow-up: Return in about 9 weeks (around 2021), or 9 AM, for Medication Therapy Management Visit, A1c lab, Blood sugar meter review.      SUBJECTIVE/OBJECTIVE:                          Kelly Barnes is a 81 year old female called for a follow-up (21)visit. She was referred to me from Lea Lopez  .      Reason for visit:   2 week bs review, now NO kidney stone via urology-no pain --she is reluctant to start rx for this --wants diet info.  Had 2 surgeries to get rid of past large stone august -.     Allergies/ADRs: Reviewed in chart  Past Medical History: Reviewed in chart  Tobacco: She reports that she has never smoked. She has never used smokeless tobacco.  Alcohol: rarely. (wine if anything).   Caffeine: 4 cups coffee /day .   Activity: was active till recent infection --now back to 30 minutes /day walking.     Medication Adherence/Access: no issues reported    Type 2 Diabetes:  Currently taking ; Glimepiride dose is 4mg . Bid .  Previously:  Victoza question--pcp ordered it -she wants to know if this will make her sick /nauseated? Cost is 90$/month .   Might be going on trip [place] (leaving oct[place]14th - ) --worried about starting a new medication.    . Patient is not experiencing side effects.  Blood sugar monitorin time(s) daily. Ranges (patient reported): Fasting- now running 170's , some 146-166 .   Post-Prandial- 2 hours after : 171,183,219 , 197 , 155,188,181, 216        Symptoms of low blood sugar? none  Symptoms of high blood sugar? none  Eye exam: up to date  Foot exam: up to date  Diet/Exercise: controlling carbs , walks daily.   Aspirin: Not taking due to age  Statin: Yes: Simvastatin    ACEi/ARB: Yes: Losartan.   Urine Albumin:   Lab Results   Component Value Date    UMALCR 21.82 09/16/2021      Lab Results   Component Value Date    A1C 8.1 08/27/2021    A1C 7.4 07/13/2021    A1C 7.3 05/11/2021    A1C 7.2 10/07/2020    A1C 7.6 10/15/2019    A1C 6.7 10/25/2018    A1C 7.3 10/26/2017       Prevent kidney stones:  Had 1 large stone not painful removed in august --urology wants her on twice daily preventive potassium citrate , she declines tot regina right now --wants non drug things to try first while she goes to [place] --then might consider drug for this ?      I spent 30 minutes with this patient today. All changes were made via collaborative practice agreement with Lea Lopez MD. A copy of the visit note was provided to the patient's primary care provider.    The patient was sent via [employer] a summary of these recommendations.     Jose Sue kira.  Medication Therapy Management Provider  [phone]      Telemedicine Visit Details  Type of service:  Telephone visit  Start Time: 10:00 am  End Time: 10:30am   Originating Location (patient location): [place]  Distant Location (provider location):  [facility]     Medication Therapy Recommendations  No medication therapy recommendations to display

## 2021-10-05 ENCOUNTER — VIRTUAL VISIT (OUTPATIENT)
Dept: PHARMACY | Facility: CLINIC | Age: 81
End: 2021-10-05
Payer: COMMERCIAL

## 2021-10-05 ENCOUNTER — VIRTUAL VISIT (OUTPATIENT)
Dept: FAMILY MEDICINE | Facility: CLINIC | Age: 81
End: 2021-10-05
Payer: COMMERCIAL

## 2021-10-05 ENCOUNTER — MEDICAL CORRESPONDENCE (OUTPATIENT)
Dept: HEALTH INFORMATION MANAGEMENT | Facility: CLINIC | Age: 81
End: 2021-10-05

## 2021-10-05 DIAGNOSIS — E78.5 HYPERLIPIDEMIA LDL GOAL <100: ICD-10-CM

## 2021-10-05 DIAGNOSIS — N20.0 KIDNEY STONE: ICD-10-CM

## 2021-10-05 DIAGNOSIS — N18.31 STAGE 3A CHRONIC KIDNEY DISEASE (H): Primary | ICD-10-CM

## 2021-10-05 DIAGNOSIS — E11.9 TYPE 2 DIABETES MELLITUS WITHOUT COMPLICATION, WITHOUT LONG-TERM CURRENT USE OF INSULIN (H): Primary | ICD-10-CM

## 2021-10-05 DIAGNOSIS — I10 HYPERTENSION GOAL BP (BLOOD PRESSURE) < 140/90: ICD-10-CM

## 2021-10-05 DIAGNOSIS — E11.9 TYPE 2 DIABETES MELLITUS WITHOUT COMPLICATION, WITHOUT LONG-TERM CURRENT USE OF INSULIN (H): ICD-10-CM

## 2021-10-05 PROBLEM — A04.72 COLITIS DUE TO CLOSTRIDIUM DIFFICILE: Status: RESOLVED | Noted: 2021-09-21 | Resolved: 2021-10-05

## 2021-10-05 PROBLEM — B49 FUNGEMIA: Status: RESOLVED | Noted: 2021-08-16 | Resolved: 2021-10-05

## 2021-10-05 PROCEDURE — 99441 PR PHYSICIAN TELEPHONE EVALUATION 5-10 MIN: CPT | Mod: 95 | Performed by: FAMILY MEDICINE

## 2021-10-05 PROCEDURE — 99606 MTMS BY PHARM EST 15 MIN: CPT | Performed by: PHARMACIST

## 2021-10-05 NOTE — PATIENT INSTRUCTIONS
Recommendations from today's MTM visit:                                                       1.  FYI--blood sugars looking pretty good -- stay on Glimepiride twice daily , eat as healthy as you can --enjoy yourself in [place].     2. FYi--After you return from [place]--please start your Potassium citrate to avoid kidney stones. This info below is from the kidney.org website:    Diet Recommendations for Kidney Stones:    General Recommendations:  Drink plenty of fluid: 2-3 quarts/day  This includes any type of fluid such as water, coffee and lemonade which have been shown to have a beneficial effect with the exception of grapefruit juice and soda.  This will help produce less concentrated urine and ensure a good urine volume of at least 2.5L/day  Limit foods with high oxalate content  Spinach, many berries, chocolate, wheat bran, nuts, beets, tea and rhubarb should be eliminated from your diet intake  Eat enough dietary calcium  Three servings of dairy per day will help lower the risk of calcium stone formation. Eat with meals.  Avoid extra calcium supplements  Calcium supplements should be individualized by your physician and registered kidney dietitian  Eat a moderate amount of protein  High protein intakes will cause the kidneys to excrete more calcium therefore this may cause more stones to form in the kidney  Avoid high salt intake  High sodium intake increases calcium in the urine which increases the chances of developing stones  Low salt diet is also important to control blood pressure.  Avoid high doses of vitamin C supplements  It is recommend to take 60mg/day of vitamin C based on the [place] Dietary Reference Intake  Excess amounts of 1000mg/day or more may produce more oxalate in the body        Follow-up: Return in about 9 weeks (around 12/7/2021), or 9 AM, for Medication Therapy Management Visit, A1c lab, Blood sugar meter review.    It was great to speak with you today.  I value your experience and would be  very thankful for your time with providing feedback on our clinic survey. You may receive a survey via email or text message in the next few days.     To schedule another MTM appointment, please call the clinic directly or you may call the MTM scheduling line at [phone] or toll-free at [phone].     My Clinical Pharmacist's contact information:                                                      Please feel free to contact me with any questions or concerns you have.      Jose Sue Rph.  Medication Therapy Management Provider  [phone]

## 2021-10-05 NOTE — Clinical Note
Lea--francois--bs's are better --will continue same plan --a1c recheck 12-7-21.  She declines at this time to take k-citrate to prevent stones --im gave her list of some non drug things to try for now.    Madelin.-tamara

## 2021-10-05 NOTE — PROGRESS NOTES
Kelly is a 81 year old who is being evaluated via a billable telephone visit.      What phone number would you like to be contacted at? [phone]  How would you like to obtain your AVS? [facility]    Assessment & Plan     (N18.31) Stage 3a chronic kidney disease (H)  (primary encounter diagnosis)  Comment: she'd had acute worsening with a kidney stone, will recheck at next visit  Plan: return to clinic 1 month for recheck    (I10) Hypertension goal BP (blood pressure) < 140/90  Comment: At goal  The current medical regimen is effective;  continue present plan and medications.      (E11.9) Type 2 diabetes mellitus without complication, without long-term current use of insulin (H)  Comment: new elevated in A1C as below, with recent glimiperide increase as per HPI  Plan: return to clinic 1 month for A1C recheck    (E78.5) Hyperlipidemia LDL goal <100  Comment:   LDL Cholesterol Calculated   Date Value Ref Range Status   09/16/2021 123 (H) <=100 mg/dL Final   05/11/2021 103 (H) <100 mg/dL Final     Comment:     Above desirable:  100-129 mg/dl  Borderline High:  130-159 mg/dL  High:             160-189 mg/dL  Very high:       >189 mg/dl        Plan: not quite at goal on simvastatin 20 mg, recheck at next lab draw and increase statin if LDL still elevated     GFR Estimate   Date Value Ref Range Status   08/26/2021 47 (L) >60 mL/min/1.73m2 Final     Comment:     As of July 11, 2021, eGFR is calculated by the CKD-EPI creatinine equation, without race adjustment. eGFR can be influenced by muscle mass, exercise, and diet. The reported eGFR is an estimation only and is only applicable if the renal function is stable.   08/16/2021 55 (L) >60 mL/min/1.73m2 Final     Comment:     As of July 11, 2021, eGFR is calculated by the CKD-EPI creatinine equation, without race adjustment. eGFR can be influenced by muscle mass, exercise, and diet. The reported eGFR is an estimation only and is only applicable if the renal function is  stable.   08/15/2021 52 (L) >60 mL/min/1.73m2 Final     Comment:     As of July 11, 2021, eGFR is calculated by the CKD-EPI creatinine equation, without race adjustment. eGFR can be influenced by muscle mass, exercise, and diet. The reported eGFR is an estimation only and is only applicable if the renal function is stable.   05/11/2021 55 (L) >60 mL/min/[1.73_m2] Final     Comment:     Non  GFR Calc  Starting 12/18/2018, serum creatinine based estimated GFR (eGFR) will be   calculated using the Chronic Kidney Disease Epidemiology Collaboration   (CKD-EPI) equation.     12/21/2020 51 (L) >60 mL/min/[1.73_m2] Final     Comment:     Non  GFR Calc  Starting 12/18/2018, serum creatinine based estimated GFR (eGFR) will be   calculated using the Chronic Kidney Disease Epidemiology Collaboration   (CKD-EPI) equation.     12/20/2020 48 (L) >60 mL/min/[1.73_m2] Final     Comment:     Non  GFR Calc  Starting 12/18/2018, serum creatinine based estimated GFR (eGFR) will be   calculated using the Chronic Kidney Disease Epidemiology Collaboration   (CKD-EPI) equation.         Return in about 30 days (around 11/4/2021) for Diabetes follow up, CKD follow up.    Lea Lopez MD  [facility]    Hoang Mendoza is a 81 year old who presents for the following health issues     HPI     Diabetes Follow-up    How often are you checking your blood sugar? Two times daily  She doesn't want to do any injectable anti-diabetic medication, she's worried about nausea, she increased her glimepiride dose to 4 mg twice daily, without side effects  What time of day are you checking your blood sugars (select all that apply)?  morning before breakfast and the other varies  Have you had any blood sugars above 200?  Yes couple of times  Have you had any blood sugars below 70?  No    What symptoms do you notice when your blood sugar is low?  None    What concerns do you  have today about your diabetes? Blood sugar is often over 200, but has gotten much better, lowest  118, usual range 160 - 180, average 166     Do you have any of these symptoms? (Select all that apply)  No numbness or tingling in feet.  No redness, sores or blisters on feet.  No complaints of excessive thirst.  No reports of blurry vision.  No significant changes to weight.      BP Readings from Last 2 Encounters:   09/23/21 127/81   09/04/21 124/78     Hemoglobin A1C (%)   Date Value   08/27/2021 8.1 (H)   07/13/2021 7.4 (H)   05/11/2021 7.3 (H)   10/07/2020 7.2 (H)     LDL Cholesterol Calculated (mg/dL)   Date Value   09/16/2021 123 (H)   05/11/2021 103 (H)   10/07/2020 134 (H)     Hypertension Follow-up      Do you check your blood pressure regularly outside of the clinic? Yes     Are you following a low salt diet? Yes    Are your blood pressures ever more than 140 on the top number (systolic) OR more   than 90 on the bottom number (diastolic), for example 140/90? No    Chronic Kidney Disease Follow-up      Do you take any over the counter pain medicine?: No       Hyperlipidemia Follow-Up      Are you regularly taking any medication or supplement to lower your cholesterol?   Yes- Simvastatin    Are you having muscle aches or other side effects that you think could be caused by your cholesterol lowering medication?  No      How many servings of fruits and vegetables do you eat daily?  2-3    On average, how many sweetened beverages do you drink each day (Examples: soda, juice, sweet tea, etc.  Do NOT count diet or artificially sweetened beverages)?   0    How many days per week do you exercise enough to make your heart beat faster? 6    How many minutes a day do you exercise enough to make your heart beat faster? 20 - 29    How many days per week do you miss taking your medication? 0    Review of Systems   Constitutional, HEENT, cardiovascular, pulmonary, GI, , musculoskeletal, neuro, skin, endocrine and psych  systems are negative, except as otherwise noted.      Objective           Vitals:  No vitals were obtained today due to virtual visit.    Physical Exam   healthy, alert and no distress  PSYCH: Alert and oriented times 3; coherent speech, normal   rate and volume, able to articulate logical thoughts, able   to abstract reason, no tangential thoughts, no hallucinations   or delusions  Her affect is normal  RESP: No cough, no audible wheezing, able to talk in full sentences  Remainder of exam unable to be completed due to telephone visits          Phone call duration: 10 minutes

## 2021-10-06 ENCOUNTER — MYC MEDICAL ADVICE (OUTPATIENT)
Dept: FAMILY MEDICINE | Facility: CLINIC | Age: 81
End: 2021-10-06

## 2021-10-07 ENCOUNTER — TELEPHONE (OUTPATIENT)
Dept: UROLOGY | Facility: CLINIC | Age: 81
End: 2021-10-07

## 2021-10-12 ENCOUNTER — LAB (OUTPATIENT)
Dept: URGENT CARE | Facility: URGENT CARE | Age: 81
End: 2021-10-12
Attending: NURSE PRACTITIONER
Payer: COMMERCIAL

## 2021-10-12 ENCOUNTER — OFFICE VISIT (OUTPATIENT)
Dept: URGENT CARE | Facility: URGENT CARE | Age: 81
End: 2021-10-12
Payer: COMMERCIAL

## 2021-10-12 ENCOUNTER — ANCILLARY PROCEDURE (OUTPATIENT)
Dept: GENERAL RADIOLOGY | Facility: CLINIC | Age: 81
End: 2021-10-12
Attending: NURSE PRACTITIONER
Payer: COMMERCIAL

## 2021-10-12 ENCOUNTER — HOSPITAL ENCOUNTER (INPATIENT)
Facility: CLINIC | Age: 81
LOS: 2 days | Discharge: HOME | DRG: 872 | End: 2021-10-14
Attending: STUDENT IN AN ORGANIZED HEALTH CARE EDUCATION/TRAINING PROGRAM | Admitting: STUDENT IN AN ORGANIZED HEALTH CARE EDUCATION/TRAINING PROGRAM
Payer: COMMERCIAL

## 2021-10-12 VITALS
WEIGHT: 148 LBS | OXYGEN SATURATION: 98 % | DIASTOLIC BLOOD PRESSURE: 82 MMHG | BODY MASS INDEX: 27.07 KG/M2 | SYSTOLIC BLOOD PRESSURE: 134 MMHG | TEMPERATURE: 100.7 F | HEART RATE: 111 BPM

## 2021-10-12 DIAGNOSIS — A04.72 CLOSTRIDIOIDES DIFFICILE DIARRHEA: Primary | ICD-10-CM

## 2021-10-12 DIAGNOSIS — R50.9 FEVER, UNSPECIFIED FEVER CAUSE: ICD-10-CM

## 2021-10-12 DIAGNOSIS — R50.9 FEVER, UNSPECIFIED FEVER CAUSE: Primary | ICD-10-CM

## 2021-10-12 DIAGNOSIS — Z71.84 TRAVEL ADVICE ENCOUNTER: ICD-10-CM

## 2021-10-12 DIAGNOSIS — Z20.822 ENCOUNTER FOR LABORATORY TESTING FOR COVID-19 VIRUS: ICD-10-CM

## 2021-10-12 DIAGNOSIS — N39.0 URINARY TRACT INFECTION WITHOUT HEMATURIA, SITE UNSPECIFIED: ICD-10-CM

## 2021-10-12 LAB
ALBUMIN SERPL-MCNC: 3.6 G/DL (ref 3.4–5)
ALBUMIN UR-MCNC: 10 MG/DL
ALBUMIN UR-MCNC: NEGATIVE MG/DL
ALP SERPL-CCNC: 83 U/L (ref 40–150)
ALT SERPL W P-5'-P-CCNC: 18 U/L (ref 0–50)
ANION GAP SERPL CALCULATED.3IONS-SCNC: 7 MMOL/L (ref 3–14)
APPEARANCE UR: CLEAR
APPEARANCE UR: CLEAR
AST SERPL W P-5'-P-CCNC: 17 U/L (ref 0–45)
BACTERIA #/AREA URNS HPF: ABNORMAL /HPF
BACTERIA #/AREA URNS HPF: ABNORMAL /HPF
BASOPHILS # BLD AUTO: 0.1 10E3/UL (ref 0–0.2)
BASOPHILS # BLD AUTO: 0.1 10E3/UL (ref 0–0.2)
BASOPHILS NFR BLD AUTO: 0 %
BASOPHILS NFR BLD AUTO: 0 %
BILIRUB SERPL-MCNC: 0.8 MG/DL (ref 0.2–1.3)
BILIRUB UR QL STRIP: NEGATIVE
BILIRUB UR QL STRIP: NEGATIVE
BUN SERPL-MCNC: 22 MG/DL (ref 7–30)
CALCIUM SERPL-MCNC: 9.1 MG/DL (ref 8.5–10.1)
CHLORIDE BLD-SCNC: 103 MMOL/L (ref 94–109)
CO2 SERPL-SCNC: 25 MMOL/L (ref 20–32)
COLOR UR AUTO: ABNORMAL
COLOR UR AUTO: YELLOW
CREAT SERPL-MCNC: 1.46 MG/DL (ref 0.52–1.04)
EOSINOPHIL # BLD AUTO: 0 10E3/UL (ref 0–0.7)
EOSINOPHIL # BLD AUTO: 0 10E3/UL (ref 0–0.7)
EOSINOPHIL NFR BLD AUTO: 0 %
EOSINOPHIL NFR BLD AUTO: 0 %
ERYTHROCYTE [DISTWIDTH] IN BLOOD BY AUTOMATED COUNT: 14.3 % (ref 10–15)
ERYTHROCYTE [DISTWIDTH] IN BLOOD BY AUTOMATED COUNT: 14.4 % (ref 10–15)
GFR SERPL CREATININE-BSD FRML MDRD: 34 ML/MIN/1.73M2
GLUCOSE BLD-MCNC: 181 MG/DL (ref 70–99)
GLUCOSE UR STRIP-MCNC: NEGATIVE MG/DL
GLUCOSE UR STRIP-MCNC: NEGATIVE MG/DL
HCT VFR BLD AUTO: 41.8 % (ref 35–47)
HCT VFR BLD AUTO: 42 % (ref 35–47)
HGB BLD-MCNC: 14 G/DL (ref 11.7–15.7)
HGB BLD-MCNC: 14.1 G/DL (ref 11.7–15.7)
HGB UR QL STRIP: ABNORMAL
HGB UR QL STRIP: NEGATIVE
HOLD SPECIMEN: NORMAL
IMM GRANULOCYTES # BLD: 0.1 10E3/UL
IMM GRANULOCYTES # BLD: 0.2 10E3/UL
IMM GRANULOCYTES NFR BLD: 0 %
IMM GRANULOCYTES NFR BLD: 1 %
KETONES UR STRIP-MCNC: 40 MG/DL
KETONES UR STRIP-MCNC: 40 MG/DL
LACTATE SERPL-SCNC: 1.1 MMOL/L (ref 0.7–2)
LEUKOCYTE ESTERASE UR QL STRIP: ABNORMAL
LEUKOCYTE ESTERASE UR QL STRIP: NEGATIVE
LYMPHOCYTES # BLD AUTO: 0.7 10E3/UL (ref 0.8–5.3)
LYMPHOCYTES # BLD AUTO: 0.8 10E3/UL (ref 0.8–5.3)
LYMPHOCYTES NFR BLD AUTO: 3 %
LYMPHOCYTES NFR BLD AUTO: 3 %
MCH RBC QN AUTO: 27.9 PG (ref 26.5–33)
MCH RBC QN AUTO: 28.1 PG (ref 26.5–33)
MCHC RBC AUTO-ENTMCNC: 33.5 G/DL (ref 31.5–36.5)
MCHC RBC AUTO-ENTMCNC: 33.6 G/DL (ref 31.5–36.5)
MCV RBC AUTO: 83 FL (ref 78–100)
MCV RBC AUTO: 84 FL (ref 78–100)
MONOCYTES # BLD AUTO: 1.2 10E3/UL (ref 0–1.3)
MONOCYTES # BLD AUTO: 1.4 10E3/UL (ref 0–1.3)
MONOCYTES NFR BLD AUTO: 5 %
MONOCYTES NFR BLD AUTO: 5 %
MUCOUS THREADS #/AREA URNS LPF: PRESENT /LPF
NEUTROPHILS # BLD AUTO: 25.2 10E3/UL (ref 1.6–8.3)
NEUTROPHILS # BLD AUTO: 26.1 10E3/UL (ref 1.6–8.3)
NEUTROPHILS NFR BLD AUTO: 91 %
NEUTROPHILS NFR BLD AUTO: 92 %
NITRATE UR QL: NEGATIVE
NITRATE UR QL: NEGATIVE
NRBC # BLD AUTO: 0 10E3/UL
NRBC # BLD AUTO: 0 10E3/UL
NRBC BLD AUTO-RTO: 0 /100
NRBC BLD AUTO-RTO: 0 /100
PH UR STRIP: 5 [PH] (ref 5–7)
PH UR STRIP: 5 [PH] (ref 5–7)
PLATELET # BLD AUTO: 363 10E3/UL (ref 150–450)
PLATELET # BLD AUTO: 371 10E3/UL (ref 150–450)
POTASSIUM BLD-SCNC: 3.6 MMOL/L (ref 3.4–5.3)
PROT SERPL-MCNC: 8 G/DL (ref 6.8–8.8)
RBC # BLD AUTO: 5.01 10E6/UL (ref 3.8–5.2)
RBC # BLD AUTO: 5.02 10E6/UL (ref 3.8–5.2)
RBC #/AREA URNS AUTO: ABNORMAL /HPF
RBC URINE: 1 /HPF
SARS-COV-2 RNA RESP QL NAA+PROBE: NEGATIVE
SODIUM SERPL-SCNC: 135 MMOL/L (ref 133–144)
SP GR UR STRIP: 1.02 (ref 1–1.03)
SP GR UR STRIP: 1.02 (ref 1–1.03)
SQUAMOUS #/AREA URNS AUTO: ABNORMAL /LPF
SQUAMOUS EPITHELIAL: 1 /HPF
TRANSITIONAL EPI: 1 /HPF
UROBILINOGEN UR STRIP-ACNC: 0.2 E.U./DL
UROBILINOGEN UR STRIP-MCNC: NORMAL MG/DL
WBC # BLD AUTO: 27.3 10E3/UL (ref 4–11)
WBC # BLD AUTO: 28.6 10E3/UL (ref 4–11)
WBC #/AREA URNS AUTO: ABNORMAL /HPF
WBC URINE: 32 /HPF

## 2021-10-12 PROCEDURE — 99207 PR INPT ADMISSION FROM CLINIC: CPT | Performed by: NURSE PRACTITIONER

## 2021-10-12 PROCEDURE — U0005 INFEC AGEN DETEC AMPLI PROBE: HCPCS

## 2021-10-12 PROCEDURE — 84155 ASSAY OF PROTEIN SERUM: CPT | Performed by: NURSE PRACTITIONER

## 2021-10-12 PROCEDURE — 71046 X-RAY EXAM CHEST 2 VIEWS: CPT | Performed by: RADIOLOGY

## 2021-10-12 PROCEDURE — 258N000003 HC RX IP 258 OP 636: Performed by: STUDENT IN AN ORGANIZED HEALTH CARE EDUCATION/TRAINING PROGRAM

## 2021-10-12 PROCEDURE — 96361 HYDRATE IV INFUSION ADD-ON: CPT | Performed by: STUDENT IN AN ORGANIZED HEALTH CARE EDUCATION/TRAINING PROGRAM

## 2021-10-12 PROCEDURE — 85004 AUTOMATED DIFF WBC COUNT: CPT | Performed by: STUDENT IN AN ORGANIZED HEALTH CARE EDUCATION/TRAINING PROGRAM

## 2021-10-12 PROCEDURE — 81001 URINALYSIS AUTO W/SCOPE: CPT | Performed by: STUDENT IN AN ORGANIZED HEALTH CARE EDUCATION/TRAINING PROGRAM

## 2021-10-12 PROCEDURE — 84075 ASSAY ALKALINE PHOSPHATASE: CPT | Performed by: NURSE PRACTITIONER

## 2021-10-12 PROCEDURE — 99285 EMERGENCY DEPT VISIT HI MDM: CPT | Mod: 25 | Performed by: STUDENT IN AN ORGANIZED HEALTH CARE EDUCATION/TRAINING PROGRAM

## 2021-10-12 PROCEDURE — 120N000002 HC R&B MED SURG/OB UMMC

## 2021-10-12 PROCEDURE — 87086 URINE CULTURE/COLONY COUNT: CPT | Performed by: STUDENT IN AN ORGANIZED HEALTH CARE EDUCATION/TRAINING PROGRAM

## 2021-10-12 PROCEDURE — 87493 C DIFF AMPLIFIED PROBE: CPT | Performed by: STUDENT IN AN ORGANIZED HEALTH CARE EDUCATION/TRAINING PROGRAM

## 2021-10-12 PROCEDURE — 85025 COMPLETE CBC W/AUTO DIFF WBC: CPT | Performed by: NURSE PRACTITIONER

## 2021-10-12 PROCEDURE — 87040 BLOOD CULTURE FOR BACTERIA: CPT | Performed by: STUDENT IN AN ORGANIZED HEALTH CARE EDUCATION/TRAINING PROGRAM

## 2021-10-12 PROCEDURE — U0003 INFECTIOUS AGENT DETECTION BY NUCLEIC ACID (DNA OR RNA); SEVERE ACUTE RESPIRATORY SYNDROME CORONAVIRUS 2 (SARS-COV-2) (CORONAVIRUS DISEASE [COVID-19]), AMPLIFIED PROBE TECHNIQUE, MAKING USE OF HIGH THROUGHPUT TECHNOLOGIES AS DESCRIBED BY CMS-2020-01-R: HCPCS

## 2021-10-12 PROCEDURE — 82247 BILIRUBIN TOTAL: CPT | Performed by: STUDENT IN AN ORGANIZED HEALTH CARE EDUCATION/TRAINING PROGRAM

## 2021-10-12 PROCEDURE — 82247 BILIRUBIN TOTAL: CPT | Performed by: NURSE PRACTITIONER

## 2021-10-12 PROCEDURE — 84450 TRANSFERASE (AST) (SGOT): CPT | Performed by: NURSE PRACTITIONER

## 2021-10-12 PROCEDURE — 84460 ALANINE AMINO (ALT) (SGPT): CPT | Performed by: NURSE PRACTITIONER

## 2021-10-12 PROCEDURE — 83605 ASSAY OF LACTIC ACID: CPT | Performed by: STUDENT IN AN ORGANIZED HEALTH CARE EDUCATION/TRAINING PROGRAM

## 2021-10-12 PROCEDURE — 99285 EMERGENCY DEPT VISIT HI MDM: CPT | Performed by: STUDENT IN AN ORGANIZED HEALTH CARE EDUCATION/TRAINING PROGRAM

## 2021-10-12 PROCEDURE — 36415 COLL VENOUS BLD VENIPUNCTURE: CPT | Performed by: STUDENT IN AN ORGANIZED HEALTH CARE EDUCATION/TRAINING PROGRAM

## 2021-10-12 PROCEDURE — 81001 URINALYSIS AUTO W/SCOPE: CPT | Performed by: NURSE PRACTITIONER

## 2021-10-12 PROCEDURE — 36415 COLL VENOUS BLD VENIPUNCTURE: CPT | Performed by: NURSE PRACTITIONER

## 2021-10-12 RX ORDER — VANCOMYCIN HYDROCHLORIDE 125 MG/1
125 CAPSULE ORAL 4 TIMES DAILY
Status: DISCONTINUED | OUTPATIENT
Start: 2021-10-12 | End: 2021-10-13

## 2021-10-12 RX ADMIN — SODIUM CHLORIDE 1000 ML: 9 INJECTION, SOLUTION INTRAVENOUS at 20:33

## 2021-10-12 NOTE — PATIENT INSTRUCTIONS
Pt triaged to ER for bacterial infection of unknown etiology, fever, myalgias, looser stools  WBC 25.68 and neutrophils 93%  No UTI or pneumonia  Was treated for cdiff, last took vanc 1.5 weeks ago

## 2021-10-12 NOTE — PROGRESS NOTES
Chief Complaint   Patient presents with     Urgent Care     Fever     c/o fever for 1 day     SUBJECTIVE:  Kelly Barnes is a 81 year old female who presents to the clinic today with a fever of 100.7 degrees, slight myalgias. She was just treated for cdiff and ended vanc 10 days ago, still having some loose stools. History of kidney stone surgery in August. Has had pneumonia with only presenting symptom being fever. COVID test pending, she is vaccinated. Had her flu shot a week ago. Declines dizziness, weakness, feeling faint. She is supposed to go to [place] in 2 days.    Past Medical History:   Diagnosis Date     Acute kidney injury (H) 12/19/2020     Allergic rhinitis, cause unspecified      Anemia      Aortic stenosis 02/29/2016    With new murmur noted 2/2016, normal echo, repeat echo 2/2017.     Aortic valve sclerosis      Atypical chest pain 07/24/2015     cuboid     left foot     Fungemia 8/16/2021     History of Clostridium difficile colitis      Hyperlipidemia LDL goal <100 11/01/2012     Hypertension goal BP (blood pressure) < 140/90      Musculoskeletal chest pain 11/25/2016     Obesity, Class I, BMI 30-34.9 11/11/2015     Pneumonia 03/2016     Pyelonephritis      Sepsis, due to unspecified organism, unspecified whether acute organ dysfunction present (H) 12/19/2020     Type 2 diabetes, HbA1c goal < 7% (H)      Urinary tract infection associated with nephrostomy catheter, initial encounter (H) 8/8/2021     No current facility-administered medications on file prior to visit.  atovaquone-proguanil (MALARONE) 250-100 MG tablet, Take 1 tablet by mouth daily Start 2 days before exposure to Malaria and continue daily till  7 days after exposure.  blood glucose (ONETOUCH ULTRA) test strip, Use to test blood sugar twice daily. Dispense 1 box of 200 test strips, #3 refills.  blood glucose monitoring (ONE TOUCH ULTRA 2) meter device kit, Use to test blood sugar 3 times daily  glimepiride (AMARYL) 4 MG tablet,  Take 1 tablet (4 mg) by mouth 2 times daily (with meals)  losartan-hydrochlorothiazide (HYZAAR) 50-12.5 MG tablet, Take 1 tablet by mouth every morning  OneTouch Delica Lancets 33G MISC, 1 Device by In Vitro route 2 times daily  potassium citrate (UROCIT-K) 10 MEQ (1080 MG) CR tablet, Take 2 tablets (20 mEq) by mouth 2 times daily  simvastatin (ZOCOR) 20 MG tablet, Take 1 tablet (20 mg) by mouth At Bedtime TAKE ONE TABLET BY MOUTH AT BEDTIME      Social History     Tobacco Use     Smoking status: Never Smoker     Smokeless tobacco: Never Used   Substance Use Topics     Alcohol use: Yes     Alcohol/week: 10.0 standard drinks     Comment: 1-2 drinks per week     Allergies   Allergen Reactions     Ibuprofen Other (See Comments)     numbness in hands and feet     Keflex [Cephalexin] Rash     Metformin Rash     Review of Systems   All systems negative except for those listed above in HPI.    EXAM:   /82   Pulse 111   Temp (!) 100.7  F (38.2  C) (Oral)   Wt 67.1 kg (148 lb)   SpO2 98%   BMI 27.07 kg/m      Physical Exam  Vitals reviewed.   Constitutional:       General: She is not in acute distress.     Appearance: Normal appearance. She is not ill-appearing, toxic-appearing or diaphoretic.   HENT:      Head: Normocephalic and atraumatic.   Cardiovascular:      Rate and Rhythm: Normal rate.      Pulses: Normal pulses.   Pulmonary:      Effort: Pulmonary effort is normal. No respiratory distress.      Breath sounds: Normal breath sounds. No stridor. No wheezing, rhonchi or rales.   Abdominal:      General: Abdomen is flat.      Palpations: Abdomen is soft.      Tenderness: There is no abdominal tenderness. There is no right CVA tenderness, left CVA tenderness or guarding.   Musculoskeletal:         General: Normal range of motion.   Skin:     General: Skin is warm and dry.      Findings: No rash.   Neurological:      General: No focal deficit present.      Mental Status: She is alert and oriented to person,  place, and time.   Psychiatric:         Mood and Affect: Mood normal.         Behavior: Behavior normal.       ASSESSMENT:    ICD-10-CM    1. Fever, unspecified fever cause  R50.9 CBC with platelets and differential     Comprehensive metabolic panel (BMP + Alb, Alk Phos, ALT, AST, Total. Bili, TP)     UA Macro with Reflex to Micro and Culture - lab collect     XR Chest 2 Views     Clostridium difficile Toxin B PCR     CBC with platelets and differential     Comprehensive metabolic panel (BMP + Alb, Alk Phos, ALT, AST, Total. Bili, TP)     UA Macro with Reflex to Micro and Culture - lab collect     Urine Microscopic     PLAN:    Pt triaged to ER for bacterial infection of unknown etiology, fever, myalgias, looser stools  WBC 25.68 and neutrophils 93%  No UTI or pneumonia  Was treated for cdiff, last took vanc 1.5 weeks ago    Follow up with primary care provider with any problems, questions or concerns or if symptoms worsen or fail to improve. Patient agreed to plan and verbalized understanding.    Tiffani John, BRADP-BC  [facility] [place]

## 2021-10-13 ENCOUNTER — APPOINTMENT (OUTPATIENT)
Dept: ULTRASOUND IMAGING | Facility: CLINIC | Age: 81
DRG: 872 | End: 2021-10-13
Payer: COMMERCIAL

## 2021-10-13 LAB
ALBUMIN SERPL-MCNC: 3.1 G/DL (ref 3.4–5)
ALBUMIN SERPL-MCNC: 3.6 G/DL (ref 3.4–5)
ALP SERPL-CCNC: 74 U/L (ref 40–150)
ALP SERPL-CCNC: 75 U/L (ref 40–150)
ALT SERPL W P-5'-P-CCNC: 13 U/L (ref 0–50)
ALT SERPL W P-5'-P-CCNC: 19 U/L (ref 0–50)
ANION GAP SERPL CALCULATED.3IONS-SCNC: 10 MMOL/L (ref 3–14)
ANION GAP SERPL CALCULATED.3IONS-SCNC: 8 MMOL/L (ref 3–14)
AST SERPL W P-5'-P-CCNC: 15 U/L (ref 0–45)
AST SERPL W P-5'-P-CCNC: 22 U/L (ref 0–45)
ATRIAL RATE - MUSE: 85 BPM
BASOPHILS # BLD AUTO: 0.1 10E3/UL (ref 0–0.2)
BASOPHILS NFR BLD AUTO: 0 %
BILIRUB SERPL-MCNC: 0.5 MG/DL (ref 0.2–1.3)
BILIRUB SERPL-MCNC: 0.7 MG/DL (ref 0.2–1.3)
BUN SERPL-MCNC: 16 MG/DL (ref 7–30)
BUN SERPL-MCNC: 20 MG/DL (ref 7–30)
C DIFF TOX B STL QL: POSITIVE
C PNEUM DNA SPEC QL NAA+PROBE: NOT DETECTED
CALCIUM SERPL-MCNC: 8.8 MG/DL (ref 8.5–10.1)
CALCIUM SERPL-MCNC: 9.4 MG/DL (ref 8.5–10.1)
CHLORIDE BLD-SCNC: 100 MMOL/L (ref 94–109)
CHLORIDE BLD-SCNC: 105 MMOL/L (ref 94–109)
CO2 SERPL-SCNC: 22 MMOL/L (ref 20–32)
CO2 SERPL-SCNC: 24 MMOL/L (ref 20–32)
CREAT SERPL-MCNC: 0.99 MG/DL (ref 0.52–1.04)
CREAT SERPL-MCNC: 0.99 MG/DL (ref 0.52–1.04)
DIASTOLIC BLOOD PRESSURE - MUSE: NORMAL MMHG
EOSINOPHIL # BLD AUTO: 0.1 10E3/UL (ref 0–0.7)
EOSINOPHIL NFR BLD AUTO: 0 %
ERYTHROCYTE [DISTWIDTH] IN BLOOD BY AUTOMATED COUNT: 14.5 % (ref 10–15)
FLUAV H1 2009 PAND RNA SPEC QL NAA+PROBE: NOT DETECTED
FLUAV H1 RNA SPEC QL NAA+PROBE: NOT DETECTED
FLUAV H3 RNA SPEC QL NAA+PROBE: NOT DETECTED
FLUAV RNA SPEC QL NAA+PROBE: NOT DETECTED
FLUBV RNA SPEC QL NAA+PROBE: NOT DETECTED
GFR SERPL CREATININE-BSD FRML MDRD: 54 ML/MIN/1.73M2
GFR SERPL CREATININE-BSD FRML MDRD: 54 ML/MIN/1.73M2
GLUCOSE BLD-MCNC: 110 MG/DL (ref 70–99)
GLUCOSE BLD-MCNC: 158 MG/DL (ref 70–99)
GLUCOSE BLDC GLUCOMTR-MCNC: 106 MG/DL (ref 70–99)
GLUCOSE BLDC GLUCOMTR-MCNC: 107 MG/DL (ref 70–99)
GLUCOSE BLDC GLUCOMTR-MCNC: 135 MG/DL (ref 70–99)
GLUCOSE BLDC GLUCOMTR-MCNC: 226 MG/DL (ref 70–99)
HADV DNA SPEC QL NAA+PROBE: NOT DETECTED
HCOV PNL SPEC NAA+PROBE: NOT DETECTED
HCT VFR BLD AUTO: 39.7 % (ref 35–47)
HGB BLD-MCNC: 13 G/DL (ref 11.7–15.7)
HMPV RNA SPEC QL NAA+PROBE: NOT DETECTED
HOLD SPECIMEN: NORMAL
HPIV1 RNA SPEC QL NAA+PROBE: NOT DETECTED
HPIV2 RNA SPEC QL NAA+PROBE: NOT DETECTED
HPIV3 RNA SPEC QL NAA+PROBE: NOT DETECTED
HPIV4 RNA SPEC QL NAA+PROBE: NOT DETECTED
IMM GRANULOCYTES # BLD: 0.2 10E3/UL
IMM GRANULOCYTES NFR BLD: 1 %
INR PPP: 1.11 (ref 0.85–1.15)
INTERPRETATION ECG - MUSE: NORMAL
LYMPHOCYTES # BLD AUTO: 1.2 10E3/UL (ref 0.8–5.3)
LYMPHOCYTES NFR BLD AUTO: 5 %
M PNEUMO DNA SPEC QL NAA+PROBE: NOT DETECTED
MCH RBC QN AUTO: 27.7 PG (ref 26.5–33)
MCHC RBC AUTO-ENTMCNC: 32.7 G/DL (ref 31.5–36.5)
MCV RBC AUTO: 85 FL (ref 78–100)
MONOCYTES # BLD AUTO: 1.1 10E3/UL (ref 0–1.3)
MONOCYTES NFR BLD AUTO: 5 %
NEUTROPHILS # BLD AUTO: 20.6 10E3/UL (ref 1.6–8.3)
NEUTROPHILS NFR BLD AUTO: 89 %
NRBC # BLD AUTO: 0 10E3/UL
NRBC BLD AUTO-RTO: 0 /100
P AXIS - MUSE: 24 DEGREES
PLATELET # BLD AUTO: 273 10E3/UL (ref 150–450)
POTASSIUM BLD-SCNC: 3 MMOL/L (ref 3.4–5.3)
POTASSIUM BLD-SCNC: 3.8 MMOL/L (ref 3.4–5.3)
POTASSIUM BLD-SCNC: 3.8 MMOL/L (ref 3.4–5.3)
PR INTERVAL - MUSE: 162 MS
PROT SERPL-MCNC: 6.9 G/DL (ref 6.8–8.8)
PROT SERPL-MCNC: 7.2 G/DL (ref 6.8–8.8)
QRS DURATION - MUSE: 82 MS
QT - MUSE: 388 MS
QTC - MUSE: 461 MS
R AXIS - MUSE: 6 DEGREES
RBC # BLD AUTO: 4.69 10E6/UL (ref 3.8–5.2)
RSV RNA SPEC QL NAA+PROBE: NOT DETECTED
RSV RNA SPEC QL NAA+PROBE: NOT DETECTED
RV+EV RNA SPEC QL NAA+PROBE: DETECTED
SODIUM SERPL-SCNC: 132 MMOL/L (ref 133–144)
SODIUM SERPL-SCNC: 137 MMOL/L (ref 133–144)
SYSTOLIC BLOOD PRESSURE - MUSE: NORMAL MMHG
T AXIS - MUSE: 6 DEGREES
VENTRICULAR RATE- MUSE: 85 BPM
WBC # BLD AUTO: 23.2 10E3/UL (ref 4–11)

## 2021-10-13 PROCEDURE — 250N000013 HC RX MED GY IP 250 OP 250 PS 637: Performed by: STUDENT IN AN ORGANIZED HEALTH CARE EDUCATION/TRAINING PROGRAM

## 2021-10-13 PROCEDURE — 85004 AUTOMATED DIFF WBC COUNT: CPT | Performed by: STUDENT IN AN ORGANIZED HEALTH CARE EDUCATION/TRAINING PROGRAM

## 2021-10-13 PROCEDURE — 99222 1ST HOSP IP/OBS MODERATE 55: CPT | Mod: GC | Performed by: INTERNAL MEDICINE

## 2021-10-13 PROCEDURE — 36415 COLL VENOUS BLD VENIPUNCTURE: CPT | Performed by: STUDENT IN AN ORGANIZED HEALTH CARE EDUCATION/TRAINING PROGRAM

## 2021-10-13 PROCEDURE — 76770 US EXAM ABDO BACK WALL COMP: CPT | Mod: 26 | Performed by: RADIOLOGY

## 2021-10-13 PROCEDURE — 76770 US EXAM ABDO BACK WALL COMP: CPT

## 2021-10-13 PROCEDURE — 87506 IADNA-DNA/RNA PROBE TQ 6-11: CPT | Performed by: STUDENT IN AN ORGANIZED HEALTH CARE EDUCATION/TRAINING PROGRAM

## 2021-10-13 PROCEDURE — 258N000003 HC RX IP 258 OP 636: Performed by: STUDENT IN AN ORGANIZED HEALTH CARE EDUCATION/TRAINING PROGRAM

## 2021-10-13 PROCEDURE — 250N000011 HC RX IP 250 OP 636: Performed by: STUDENT IN AN ORGANIZED HEALTH CARE EDUCATION/TRAINING PROGRAM

## 2021-10-13 PROCEDURE — 84132 ASSAY OF SERUM POTASSIUM: CPT | Performed by: STUDENT IN AN ORGANIZED HEALTH CARE EDUCATION/TRAINING PROGRAM

## 2021-10-13 PROCEDURE — 87633 RESP VIRUS 12-25 TARGETS: CPT | Performed by: STUDENT IN AN ORGANIZED HEALTH CARE EDUCATION/TRAINING PROGRAM

## 2021-10-13 PROCEDURE — 120N000002 HC R&B MED SURG/OB UMMC

## 2021-10-13 PROCEDURE — 96361 HYDRATE IV INFUSION ADD-ON: CPT | Performed by: STUDENT IN AN ORGANIZED HEALTH CARE EDUCATION/TRAINING PROGRAM

## 2021-10-13 PROCEDURE — 87581 M.PNEUMON DNA AMP PROBE: CPT | Performed by: STUDENT IN AN ORGANIZED HEALTH CARE EDUCATION/TRAINING PROGRAM

## 2021-10-13 PROCEDURE — 250N000012 HC RX MED GY IP 250 OP 636 PS 637: Performed by: STUDENT IN AN ORGANIZED HEALTH CARE EDUCATION/TRAINING PROGRAM

## 2021-10-13 PROCEDURE — 99223 1ST HOSP IP/OBS HIGH 75: CPT | Mod: AI | Performed by: STUDENT IN AN ORGANIZED HEALTH CARE EDUCATION/TRAINING PROGRAM

## 2021-10-13 PROCEDURE — 96365 THER/PROPH/DIAG IV INF INIT: CPT | Performed by: STUDENT IN AN ORGANIZED HEALTH CARE EDUCATION/TRAINING PROGRAM

## 2021-10-13 PROCEDURE — 85610 PROTHROMBIN TIME: CPT | Performed by: STUDENT IN AN ORGANIZED HEALTH CARE EDUCATION/TRAINING PROGRAM

## 2021-10-13 PROCEDURE — 82040 ASSAY OF SERUM ALBUMIN: CPT | Performed by: STUDENT IN AN ORGANIZED HEALTH CARE EDUCATION/TRAINING PROGRAM

## 2021-10-13 RX ORDER — SIMVASTATIN 20 MG
20 TABLET ORAL AT BEDTIME
Status: DISCONTINUED | OUTPATIENT
Start: 2021-10-13 | End: 2021-10-14 | Stop reason: HOSPADM

## 2021-10-13 RX ORDER — POTASSIUM CHLORIDE 750 MG/1
40 TABLET, EXTENDED RELEASE ORAL ONCE
Status: COMPLETED | OUTPATIENT
Start: 2021-10-13 | End: 2021-10-13

## 2021-10-13 RX ORDER — CEFTRIAXONE 1 G/1
1 INJECTION, POWDER, FOR SOLUTION INTRAMUSCULAR; INTRAVENOUS EVERY 24 HOURS
Status: DISCONTINUED | OUTPATIENT
Start: 2021-10-14 | End: 2021-10-13

## 2021-10-13 RX ORDER — ACETAMINOPHEN 325 MG/1
650 TABLET ORAL EVERY 6 HOURS PRN
Status: DISCONTINUED | OUTPATIENT
Start: 2021-10-13 | End: 2021-10-14 | Stop reason: HOSPADM

## 2021-10-13 RX ORDER — POTASSIUM CHLORIDE 750 MG/1
20 TABLET, EXTENDED RELEASE ORAL ONCE
Status: COMPLETED | OUTPATIENT
Start: 2021-10-13 | End: 2021-10-13

## 2021-10-13 RX ORDER — DEXTROSE MONOHYDRATE 25 G/50ML
25-50 INJECTION, SOLUTION INTRAVENOUS
Status: DISCONTINUED | OUTPATIENT
Start: 2021-10-13 | End: 2021-10-14 | Stop reason: HOSPADM

## 2021-10-13 RX ORDER — NICOTINE POLACRILEX 4 MG
15-30 LOZENGE BUCCAL
Status: DISCONTINUED | OUTPATIENT
Start: 2021-10-13 | End: 2021-10-14 | Stop reason: HOSPADM

## 2021-10-13 RX ORDER — LIDOCAINE 40 MG/G
CREAM TOPICAL
Status: DISCONTINUED | OUTPATIENT
Start: 2021-10-13 | End: 2021-10-14 | Stop reason: HOSPADM

## 2021-10-13 RX ORDER — CEFTRIAXONE 1 G/1
1 INJECTION, POWDER, FOR SOLUTION INTRAMUSCULAR; INTRAVENOUS ONCE
Status: COMPLETED | OUTPATIENT
Start: 2021-10-13 | End: 2021-10-13

## 2021-10-13 RX ADMIN — POTASSIUM CHLORIDE 20 MEQ: 750 TABLET, EXTENDED RELEASE ORAL at 16:50

## 2021-10-13 RX ADMIN — SODIUM CHLORIDE, POTASSIUM CHLORIDE, SODIUM LACTATE AND CALCIUM CHLORIDE 1000 ML: 600; 310; 30; 20 INJECTION, SOLUTION INTRAVENOUS at 05:34

## 2021-10-13 RX ADMIN — VANCOMYCIN HYDROCHLORIDE 125 MG: 125 CAPSULE ORAL at 16:50

## 2021-10-13 RX ADMIN — CEFTRIAXONE SODIUM 1 G: 1 INJECTION, POWDER, FOR SOLUTION INTRAMUSCULAR; INTRAVENOUS at 03:07

## 2021-10-13 RX ADMIN — POTASSIUM CHLORIDE 40 MEQ: 750 TABLET, EXTENDED RELEASE ORAL at 14:06

## 2021-10-13 RX ADMIN — VANCOMYCIN HYDROCHLORIDE 125 MG: 125 CAPSULE ORAL at 13:13

## 2021-10-13 RX ADMIN — VANCOMYCIN HYDROCHLORIDE 125 MG: 125 CAPSULE ORAL at 09:24

## 2021-10-13 RX ADMIN — SIMVASTATIN 20 MG: 20 TABLET, FILM COATED ORAL at 22:29

## 2021-10-13 RX ADMIN — FIDAXOMICIN 200 MG: 200 TABLET, FILM COATED ORAL at 20:47

## 2021-10-13 RX ADMIN — VANCOMYCIN HYDROCHLORIDE 125 MG: 125 CAPSULE ORAL at 00:29

## 2021-10-13 RX ADMIN — INSULIN ASPART 1 UNITS: 100 INJECTION, SOLUTION INTRAVENOUS; SUBCUTANEOUS at 22:29

## 2021-10-13 ASSESSMENT — ACTIVITIES OF DAILY LIVING (ADL)
FALL_HISTORY_WITHIN_LAST_SIX_MONTHS: NO
DRESSING/BATHING_DIFFICULTY: NO
TOILETING_ISSUES: NO
ADLS_ACUITY_SCORE: 6
WALKING_OR_CLIMBING_STAIRS_DIFFICULTY: NO
CONCENTRATING,_REMEMBERING_OR_MAKING_DECISIONS_DIFFICULTY: NO
DIFFICULTY_COMMUNICATING: NO
HEARING_DIFFICULTY_OR_DEAF: NO
DIFFICULTY_EATING/SWALLOWING: NO
DOING_ERRANDS_INDEPENDENTLY_DIFFICULTY: NO
WEAR_GLASSES_OR_BLIND: YES

## 2021-10-13 ASSESSMENT — ENCOUNTER SYMPTOMS
DIARRHEA: 1
FEVER: 1

## 2021-10-13 NOTE — ED PROVIDER NOTES
ED Provider Note  [facility]      History     Chief Complaint   Patient presents with     Abnormal Labs     Kelly Barnes is a 81 year old female who has history of recurrent C. difficile colitis, status post oral vancomycin x2, last dose 1.5 weeks ago amongst others who presents to the emergency department today for fever and elevated white blood cell count.  She states that she first noticed herself having fever and myalgias this morning, did have 3 soft stools that are not necessarily similar to previous episodes of C. difficile.  She went to an outside clinic who found her to have a white count of 25, were concerned for C. difficile infection, so sudhakar blood cultures and sent her here for further evaluation.  Afebrile here, she denies chest pain, shortness of breath, abdominal pain, nausea, vomiting, urinary symptoms.  COVID-19 testing negative at outside facility.  She denies other physical complaints.    This problem is acute, sudden onset, constant, worsening, moderate in intensity.  I have viewed the patient's past medical, past social, and past surgical history and they are otherwise not pertinent unless described above in the HPI.    The history is provided by the patient and medical records.         Past Medical History  Past Medical History:   Diagnosis Date     Acute kidney injury (H) 12/19/2020     Allergic rhinitis, cause unspecified      Anemia      Aortic stenosis 02/29/2016    With new murmur noted 2/2016, normal echo, repeat echo 2/2017.     Aortic valve sclerosis      Atypical chest pain 07/24/2015     cuboid     left foot     Fungemia 8/16/2021     History of Clostridium difficile colitis      Hyperlipidemia LDL goal <100 11/01/2012     Hypertension goal BP (blood pressure) < 140/90      Musculoskeletal chest pain 11/25/2016     Obesity, Class I, BMI 30-34.9 11/11/2015     Pneumonia 03/2016     Pyelonephritis      Sepsis, due to unspecified organism, unspecified  whether acute organ dysfunction present (H) 12/19/2020     Type 2 diabetes, HbA1c goal < 7% (H)      Urinary tract infection associated with nephrostomy catheter, initial encounter (H) 8/8/2021     Past Surgical History:   Procedure Laterality Date     CATARACT EXTRACTION       CYSTOSCOPY, RETROGRADES, INSERT STENT URETER(S), COMBINED Right 07/15/2021    Procedure: CYSTOURETEROSCOPY, WITH RETROGRADE PYELOGRAM,  AND STENT INSERTION RIGHT;  Surgeon: Kirk Law MD;  Location: [facility] OR     LASER HOLMIUM NEPHROLITHOTOMY VIA PERCUTANEOUS NEPHROSTOMY Right 08/05/2021    Procedure: NEPHROLITHOTOMY, PERCUTANEOUS, USING HOLMIUM LASER RIGHT;  Surgeon: Kirk Law MD;  Location: [facility] OR     LASER HOLMIUM NEPHROLITHOTOMY VIA PERCUTANEOUS NEPHROSTOMY Right 08/13/2021    Procedure: Ureteroscopic assisted secondary right percutaneous nephrolihtotomy, Holmium laser lithotriipsy;  Surgeon: Kirk Law MD;  Location: [facility]     PICC SINGLE LUMEN PLACEMENT Left 08/17/2021    41cm (2cm external), Medial brachial vein     glimepiride (AMARYL) 4 MG tablet  losartan-hydrochlorothiazide (HYZAAR) 50-12.5 MG tablet  potassium citrate (UROCIT-K) 10 MEQ (1080 MG) CR tablet  simvastatin (ZOCOR) 20 MG tablet  atovaquone-proguanil (MALARONE) 250-100 MG tablet  blood glucose (ONETOUCH ULTRA) test strip  blood glucose monitoring (ONE TOUCH ULTRA 2) meter device kit  OneTouch Delica Lancets 33G MISC      Allergies   Allergen Reactions     Ibuprofen Other (See Comments)     numbness in hands and feet     Keflex [Cephalexin] Rash     Metformin Rash     Family History  Family History   Problem Relation Age of Onset     Hypertension Mother      Diabetes No family hx of      Cerebrovascular Disease No family hx of      Breast Cancer No family hx of      Cancer - colorectal No family hx of      Prostate Cancer No family hx of      Anesthesia Reaction No family hx of      Bleeding Disorder No family hx of      Clotting Disorder No  family hx of      Social History   Social History     Tobacco Use     Smoking status: Never Smoker     Smokeless tobacco: Never Used   Vaping Use     Vaping Use: Never used   Substance Use Topics     Alcohol use: Yes     Alcohol/week: 10.0 standard drinks     Comment: 1-2 drinks per week     Drug use: No      Past medical history, past surgical history, medications, allergies, family history, and social history were reviewed with the patient. No additional pertinent items.       Review of Systems   Constitutional: Positive for fever.   Gastrointestinal: Positive for diarrhea.   All other systems reviewed and are negative.    A complete review of systems was performed with pertinent positives and negatives noted in the HPI, and all other systems negative.    Physical Exam   BP: 114/72  Pulse: 105  Temp: 98.7  F (37.1  C)  Resp: 16  Weight: 67.1 kg (148 lb)  SpO2: 98 %  Physical Exam  Vitals and nursing note reviewed.   Constitutional:       Appearance: She is not ill-appearing or toxic-appearing.   HENT:      Head: Normocephalic and atraumatic.      Right Ear: External ear normal.      Left Ear: External ear normal.      Nose: Nose normal.   Eyes:      Extraocular Movements: Extraocular movements intact.      Conjunctiva/sclera: Conjunctivae normal.   Cardiovascular:      Rate and Rhythm: Normal rate and regular rhythm.   Pulmonary:      Effort: Pulmonary effort is normal.      Breath sounds: Normal breath sounds.   Abdominal:      General: There is no distension.      Palpations: Abdomen is soft.      Tenderness: There is no abdominal tenderness.   Musculoskeletal:         General: No deformity or signs of injury.      Cervical back: Neck supple. No rigidity.   Skin:     General: Skin is warm.      Findings: No rash.   Neurological:      General: No focal deficit present.      Mental Status: She is alert. Mental status is at baseline.   Psychiatric:         Mood and Affect: Mood normal.         Behavior: Behavior  normal.       ED Course      Procedures       The medical record was reviewed and interpreted.       Results for orders placed or performed during the hospital encounter of 10/12/21   Comprehensive metabolic panel     Status: Abnormal   Result Value Ref Range    Sodium 135 133 - 144 mmol/L    Potassium 3.6 3.4 - 5.3 mmol/L    Chloride 103 94 - 109 mmol/L    Carbon Dioxide (CO2) 25 20 - 32 mmol/L    Anion Gap 7 3 - 14 mmol/L    Urea Nitrogen 22 7 - 30 mg/dL    Creatinine 1.46 (H) 0.52 - 1.04 mg/dL    Calcium 9.1 8.5 - 10.1 mg/dL    Glucose 181 (H) 70 - 99 mg/dL    Alkaline Phosphatase 83 40 - 150 U/L    AST 17 0 - 45 U/L    ALT 18 0 - 50 U/L    Protein Total 8.0 6.8 - 8.8 g/dL    Albumin 3.6 3.4 - 5.0 g/dL    Bilirubin Total 0.8 0.2 - 1.3 mg/dL    GFR Estimate 34 (L) >60 mL/min/1.73m2   Lactic acid whole blood STAT     Status: Normal   Result Value Ref Range    Lactic Acid 1.1 0.7 - 2.0 mmol/L   CBC with platelets and differential     Status: Abnormal   Result Value Ref Range    WBC Count 28.6 (H) 4.0 - 11.0 10e3/uL    RBC Count 5.02 3.80 - 5.20 10e6/uL    Hemoglobin 14.1 11.7 - 15.7 g/dL    Hematocrit 42.0 35.0 - 47.0 %    MCV 84 78 - 100 fL    MCH 28.1 26.5 - 33.0 pg    MCHC 33.6 31.5 - 36.5 g/dL    RDW 14.4 10.0 - 15.0 %    Platelet Count 363 150 - 450 10e3/uL    % Neutrophils 91 %    % Lymphocytes 3 %    % Monocytes 5 %    % Eosinophils 0 %    % Basophils 0 %    % Immature Granulocytes 1 %    NRBCs per 100 WBC 0 <1 /100    Absolute Neutrophils 26.1 (H) 1.6 - 8.3 10e3/uL    Absolute Lymphocytes 0.8 0.8 - 5.3 10e3/uL    Absolute Monocytes 1.4 (H) 0.0 - 1.3 10e3/uL    Absolute Eosinophils 0.0 0.0 - 0.7 10e3/uL    Absolute Basophils 0.1 0.0 - 0.2 10e3/uL    Absolute Immature Granulocytes 0.2 (H) <=0.0 10e3/uL    Absolute NRBCs 0.0 10e3/uL   Extra Blood Culture Bottle     Status: None   Result Value Ref Range    Hold Specimen [facility]    Extra Blood Culture Bottle     Status: None   Result Value Ref Range    Hold Specimen  [facility]    Extra Green Top (Lithium Heparin) Tube     Status: None   Result Value Ref Range    Hold Specimen [facility]    UA with Microscopic reflex to Culture     Status: Abnormal    Specimen: Urine, Midstream   Result Value Ref Range    Color Urine Light Yellow Colorless, Straw, Light Yellow, Yellow    Appearance Urine Clear Clear    Glucose Urine Negative Negative mg/dL    Bilirubin Urine Negative Negative    Ketones Urine 40  (A) Negative mg/dL    Specific Gravity Urine 1.020 1.003 - 1.035    Blood Urine Negative Negative    pH Urine 5.0 5.0 - 7.0    Protein Albumin Urine 10  (A) Negative mg/dL    Urobilinogen Urine Normal Normal, 2.0 mg/dL    Nitrite Urine Negative Negative    Leukocyte Esterase Urine Moderate (A) Negative    Bacteria Urine Few (A) None Seen /HPF    Mucus Urine Present (A) None Seen /LPF    RBC Urine 1 <=2 /HPF    WBC Urine 32 (H) <=5 /HPF    Squamous Epithelials Urine 1 <=1 /HPF    Transitional Epithelials Urine 1 <=1 /HPF    Narrative    Urine Culture ordered based on laboratory criteria   CBC with platelets differential     Status: Abnormal    Narrative    The following orders were created for panel order CBC with platelets differential.  Procedure                               Abnormality         Status                     ---------                               -----------         ------                     CBC with platelets and d...[[id number]]  Abnormal            Final result                 Please view results for these tests on the individual orders.   [place] Draw     Status: None    Narrative    The following orders were created for panel order [place] Draw.  Procedure                               Abnormality         Status                     ---------                               -----------         ------                     Extra Blood Culture Bottle[[id number]]                       Final result                 Please view results for these tests on the individual orders.   [place] Draw      Status: None    Narrative    The following orders were created for panel order [place] Draw.  Procedure                               Abnormality         Status                     ---------                               -----------         ------                     Extra Blood Culture Bottle[[id number]]                       Final result               Extra Green Top (Lithium...[[id number]]                      Final result                 Please view results for these tests on the individual orders.     Medications   vancomycin (VANCOCIN) capsule 125 mg (125 mg Oral Given 10/13/21 0029)   0.9% sodium chloride BOLUS (0 mLs Intravenous Stopped 10/13/21 0029)        Assessments & Plan (with Medical Decision Making)   MDM:    81 year old F with history of recurrent C. difficile colitis who presents emergency department today with fever and diarrhea, white blood cell count was elevated to 25 at outside facility, repeat testing here shows white blood cell count of 28, she also had a bowel movement here that she states is similar to her C. difficile bowel movements, therefore concern for recurrent C. difficile diarrhea.  Blood cultures drawn, lactate unremarkable.  She was started on oral vancomycin, but may be candidate for other treatment given her multiple previous relapses this diagnosis.  She is clinically well-appearing, taking p.o.. We will do likely observation admission for initiation of medications and overnight monitoring, if improvement in white blood cell count and she remains well-appearing she may be safe to discharge tomorrow.    On reevaluation she is clinically unchanged.  I discussed all test results and the plan of care with the patient, who is agreeable for admission.    I have reviewed the nursing notes. I have reviewed the findings, diagnosis, plan and need for follow up with the patient.    New Prescriptions    No medications on file       Final diagnoses:   Clostridioides difficile diarrhea        --  Catie Duke MD  [facility] EMERGENCY DEPARTMENT  10/12/2021

## 2021-10-13 NOTE — H&P
[facility]    History and Physical - [facility]   Date of Admission:  10/12/2021    Assessment & Plan    Kelly Barnes is a 81 year old woman with a history of type 2 diabetes, hyperlipidemia, hypertension, history of recurrent pyelonephritis and fungemia, recurrent C. difficile colitis who presents with diarrhea found to be positive for C. difficile infection.     Severe sepsis   C. difficile infection, second recurrence +/- urinary tract infection    Presented with fever, myalgias, increase frequency of stools, without change in quality (still soft stools, not watery) with leukocytosis (28) and tachycardia, as well as acute kidney injury. Source unclear, may be c.diff (stools not watery, WBC very high) vs UTI (no dysuria, but has pyuria) vs other. She has a history for recurrent C.diff, first episode was 7/23/21 and was given 10 days of PO vancomycin, first recurrence was was 9/2021 she was treated with vancomycin taper which concluded 1.5 weeks ago. Currently hemodynamically stable.   - ID consulted for recommendations  - Continue oral vancomycin, consider vanc taper with rifaximin   - Per pharmacy fidoxamicin is restricted, will have to discuss if bezlotoxumab indicated  - s/p 1L NS, additional 1L LR for 2L total   - IV CTX 1g q24h for UTI (10/12-)  - Follow up blood cultures from urgent care, urine culture  - Enteric panel, RSV     Acute kidney injury  History of staghorn calculus, pyelonephritis   Baseline Cr 0.9-1, and was 1.46 on admission. Likely prerenal in the setting of diarrhea plus possible urinary tract infection given pyuria and bacteria, though patient has no dysuria.  She does have a history of pyelonephritis however she underwent percutaneous nephrolithotomy and no longer has any tubes in place.  - LR as above   - Renal US    History of Candida glabrata fungemia  Patient developed fungemia last admission from urinary source due to infected  and obstructed nephrolithiasis she underwent percutaneous nephrolithotomy.  She was treated with IV micafungin and p.o. fluconazole.    Chronic medical conditions:  Type 2 diabetes (A1c 8.1): Hold PTA glimepiride, medium sliding scale insulin  Hypertension: Hold PTA losartan-hydrochlorothiazide given hypovolemia  Hyperlipidemia: PTA simvastatin     Diet: NPO for Medical/Clinical Reasons Except for: No Exceptions    DVT Prophylaxis: Low Risk/Ambulatory with no VTE prophylaxis indicated  Blanchard Catheter: Not present  Fluids: IVF as above  Central Lines: None  Code Status: Full Code      Disposition Plan   Expected discharge: 1-2 days recommended to prior living arrangement once workup complete.     Patient to be staffed in the AM.    Deidre Coker MD  PGY-2 Internal Medicine   [facility]  ______________________________________________________________________    Chief Complaint   Diarrhea    History is obtained from the patient and chart review.     History of Present Illness   Kelly Barnes is a 81 year old woman with a history of recurrent C. difficile, pyelonephritis, fungemia who presents with fever, muscle aches, increase in stools.    Patient reports that she noted a temperature of 100.8 yesterday which is why she went to urgent care.  She has also reported that her stools have increased in frequency she went about 4 times a day.  The consistency has not changed they are soft and brown, without any blood, or mucus or oily consistency.  They are not watery.  She reports that in the past when she has had C. difficile her stools have been watery.    She denies any nausea, vomiting, abdominal pain, dysuria.  She denies any chest pain shortness of breath.  She reports that the first time she had C. difficile was in July of this year after being hospitalized for acute pyelonephritis and being on antibiotics.  She took a 10-day course of oral vancomycin which completely resolved the watery stools.  At  that time she was having about 10 watery stools a day.  Then she reports a few days 2 weeks later she had another bout of C. difficile and was treated with a vancomycin taper.  She finished this taper 1.5 weeks ago.    At the outside clinic she was tested for C. difficile and blood cultures were drawn. She denies any chest pain, cough, runny nose, sore throat, shortness of breath, abdominal pain, nausea, vomiting, dysuria.    She does report she recently got her flu shot.  She lives with her son in [facility].  They have no pets.    ED course:  - Vitals: 98.7F HR 94 124/72 99% RA  - WBC 28, creatinine 1.48, UA with WBC, moderate LE, C. difficile toxin positive  - CXR without infiltrates   - 1L NS given  - Started on PO vanc and got 1 dose CTX    Review of Systems    The 10 point Review of Systems is negative other than noted in the HPI or here.     Past Medical History    I have reviewed this patient's medical history and updated it with pertinent information if needed.   Past Medical History:   Diagnosis Date     Acute kidney injury (H) 12/19/2020     Allergic rhinitis, cause unspecified      Anemia      Aortic stenosis 02/29/2016    With new murmur noted 2/2016, normal echo, repeat echo 2/2017.     Aortic valve sclerosis      Atypical chest pain 07/24/2015     cuboid     left foot     Fungemia 8/16/2021     History of Clostridium difficile colitis      Hyperlipidemia LDL goal <100 11/01/2012     Hypertension goal BP (blood pressure) < 140/90      Musculoskeletal chest pain 11/25/2016     Obesity, Class I, BMI 30-34.9 11/11/2015     Pneumonia 03/2016     Pyelonephritis      Sepsis, due to unspecified organism, unspecified whether acute organ dysfunction present (H) 12/19/2020     Type 2 diabetes, HbA1c goal < 7% (H)      Urinary tract infection associated with nephrostomy catheter, initial encounter (H) 8/8/2021      Past Surgical History   I have reviewed this patient's surgical history and updated it with  pertinent information if needed.  Past Surgical History:   Procedure Laterality Date     CATARACT EXTRACTION       CYSTOSCOPY, RETROGRADES, INSERT STENT URETER(S), COMBINED Right 07/15/2021    Procedure: CYSTOURETEROSCOPY, WITH RETROGRADE PYELOGRAM,  AND STENT INSERTION RIGHT;  Surgeon: Kirk Law MD;  Location: [facility] OR     LASER HOLMIUM NEPHROLITHOTOMY VIA PERCUTANEOUS NEPHROSTOMY Right 2021    Procedure: NEPHROLITHOTOMY, PERCUTANEOUS, USING HOLMIUM LASER RIGHT;  Surgeon: Kirk Law MD;  Location: [facility] OR     LASER HOLMIUM NEPHROLITHOTOMY VIA PERCUTANEOUS NEPHROSTOMY Right 2021    Procedure: Ureteroscopic assisted secondary right percutaneous nephrolihtotomy, Holmium laser lithotriipsy;  Surgeon: Kirk Law MD;  Location: [facility] OR     PICC SINGLE LUMEN PLACEMENT Left 2021    41cm (2cm external), Medial brachial vein      Social History   I have reviewed this patient's social history and updated it with pertinent information if needed. Kelly Barnes  reports that she has never smoked. She has never used smokeless tobacco. She reports current alcohol use of about 10.0 standard drinks of alcohol per week. She reports that she does not use drugs.    Family History   I have reviewed this patient's family history and updated it with pertinent information if needed.  Family History   Problem Relation Age of Onset     Hypertension Mother      Diabetes No family hx of      Cerebrovascular Disease No family hx of      Breast Cancer No family hx of      Cancer - colorectal No family hx of      Prostate Cancer No family hx of      Anesthesia Reaction No family hx of      Bleeding Disorder No family hx of      Clotting Disorder No family hx of        Prior to Admission Medications   Prior to Admission Medications   Prescriptions Last Dose Informant Patient Reported? Taking?   OneTouch Delica Lancets 33G MISC  Self No No   Si Device by In Vitro route 2 times daily    atovaquone-proguanil (MALARONE) 250-100 MG tablet  at hold Self No No   Sig: Take 1 tablet by mouth daily Start 2 days before exposure to Malaria and continue daily till  7 days after exposure.   blood glucose (ONETOUCH ULTRA) test strip  Self No No   Sig: Use to test blood sugar twice daily. Dispense 1 box of 200 test strips, #3 refills.   blood glucose monitoring (ONE TOUCH ULTRA 2) meter device kit  Self No No   Sig: Use to test blood sugar 3 times daily   glimepiride (AMARYL) 4 MG tablet 10/12/2021 at am Self No Yes   Sig: Take 1 tablet (4 mg) by mouth 2 times daily (with meals)   losartan-hydrochlorothiazide (HYZAAR) 50-12.5 MG tablet 10/12/2021 at am Self No Yes   Sig: Take 1 tablet by mouth every morning   potassium citrate (UROCIT-K) 10 MEQ (1080 MG) CR tablet Unknown at Unknown time Self No Yes   Sig: Take 2 tablets (20 mEq) by mouth 2 times daily   simvastatin (ZOCOR) 20 MG tablet 10/11/2021 at pm Self No Yes   Sig: Take 1 tablet (20 mg) by mouth At Bedtime TAKE ONE TABLET BY MOUTH AT BEDTIME      Facility-Administered Medications: None     Allergies   Allergies   Allergen Reactions     Ibuprofen Other (See Comments)     numbness in hands and feet     Keflex [Cephalexin] Rash     Metformin Rash       Physical Exam   Vital Signs: Temp: 98.7  F (37.1  C) Temp src: Oral BP: 122/65 Pulse: 94   Resp: 22 SpO2: 91 % O2 Device: None (Room air)    Weight: 148 lbs 0 oz    General Appearance: Well-appearing woman, in no acute distress  Eyes: Nonicteric sclera, glasses on  HEENT: EOMI, no periorbital edema  Respiratory: Normal work of breathing on room air  Cardiovascular: Regular rate, pulses 2+ radial  GI: Soft, nontender, nondistended, no suprapubic tenderness  Skin: No rashes on exposed skin surfaces  Musculoskeletal: 1+ lower extremity edema bilaterally, moving extremities spontaneously  Neurologic: Alert and oriented, no acute distress  Psychiatric: Mood appropriate, making eye contact    Data   Data reviewed  today: I reviewed all medications, new labs and imaging results over the last 24 hours. I personally reviewed the chest x-ray image(s) showing clear lungs and EKG in normal sinus rhythm.    Recent Labs   Lab 10/12/21  2030 10/12/21  1804   WBC 28.6* 27.3*   HGB 14.1 14.0   MCV 84 83    371     --    POTASSIUM 3.6  --    CHLORIDE 103  --    CO2 25  --    BUN 22  --    CR 1.46*  --    ANIONGAP 7  --    JAN 9.1  --    *  --    ALBUMIN 3.6  --    PROTTOTAL 8.0  --    BILITOTAL 0.8  --    ALKPHOS 83  --    ALT 18  --    AST 17  --

## 2021-10-13 NOTE — PLAN OF CARE
Admitted/transferred from: ED  2 RN skin assessment completed by Julius Vincent RN and JYOTI Reyna  Skin assessment finding: bruising on L thigh, edema in BLE, dryness on calves  Interventions/actions: encouraged frequent oob    Will continue to monitor.

## 2021-10-13 NOTE — CONSULTS
[facility] CONSULT[facility]     Patient:  Kelly Barnes   YOB: 1940  Date of Visit:  10/13/2021  Date of Admission: 10/12/2021  Consult Requester:Ingrid Moreno MD          Assessment and Recommendations:   ID Problem List:  1. C difficile infection, second recurrence   - Initial C difficile infection 7/23/21. Treated with PO vancomycin 125 mg QID x 10 days followed by 125 mg BID x 10 days.    - C difficile infection, first recurrence 9/3/21. Treated with PO vancomycin 125 mg QID x 7 days, 125 mg BID x 7 days, 125 mg every day x 7 days, 125 mg every other day x 10 days.  2. Asymptomatic pyuria   3. Rhino/enterovirus infection  4. Recent history of E coli pyelonephritis with large R staghorn calculus 7/13/21   - Treated with ceftriaxone and subsequently on suppressive Keflex, Bactrim, and cefpodoxime until definitive stone management   - S/p staged percutaneous nephrolithotomy 8/5/21, with repeat percutaneous nephrolithotomy 8/13   5. Recent history of candida glabrata fungemia 8/9/21 in setting of infected renal calculus   - Treated with IV micafungin and PO fluconazole x 14 days  6. Diabetes mellitus type II    Recommendations:  1. Stop IV ceftriaxone  2. Stop PO vancomycin  3. Start PO fidaxomicin 200 mg BID for a planned 10 day course  4. Will re-evaluate need for bezlotoxumab pending clinical course  5. We recommend outpatient follow up with Dr. Giselle Silva (ID) and Oh Pabon/Sunday Metzger (GI) for further evaluation for possible fecal microbiota transplantation.     Discussion:  Kelly Barnes is a 81 year old female with history of recurrent C difficile infection, E coli pyelonephritis with large R staghorn calculus s/p percutaneous nephrolithotomy, and recent candida glabrata fungemia who presents with 1 day history of fever and increased loose stools, concerning for a second recurrence C difficile infection.     Patient's infectious history is notable for  E. coli pyelonephritis in 7/13/21 with a large R staghorn calculus in 7/13/21. She was initially treated with a course of ceftriaxone with PNT placed 7/14 and ureteral stent placed 7/15, subsequently transitioned to Keflex and then Bactrim (due to possible rash) for suppressive therapy while awaiting lithotripsy. She subsequently presented 7/23 with initial concern for an infected stent but UC grew <10K mixed  with lower concern for recurrent UTI. She was found to be C difficile positive with CT confirming infectious colitis. She was treated with PO vancomycin 125 mg QID x 10 days followed by 125 mg BID x 10 days with resolution her symptoms. She underwent percutaneous nephrolithotomy on 8/5. She presented again on 8/8 with fever and was found to have candida glabrata fungemia with stone culture also growing candida glabrata complex. She was treated with IV micafungin and PO fluconazole for a 14 day course. She developed recurrent loose stools around the beginning of 9/2021 and presented to urgent care where she tested positive for C difficile 9/3/21. She was treated with a prolonged vancomycin taper (PO vancomycin  125 mg QID x 7 days, 125 mg BID x 7 days, 125 mg every day x 7 days, 125 mg every other day x 10 days) with resolution of her symptoms.     She now presents with 1 day history of fever and increased loose stools, concerning for second recurrence of C difficile. Given prior treatments with PO vancomycin and current IDSA guideline, we favor treatment with PO fidaxomicin. Patient has been having 3-4 loose stools per day with minimal abdominal symptoms and otherwise appears well. We recommend holding off on bezlotoxumab for now, but will re-evaluate its utility pending her clinical course. If fidaxomicin is cost prohibitive, we could consider a repeat course of PO vancomycin taper with bezlotoxumab. We also recommend outpatient work up for consideration of fecal microbiota transplant.     Patient notably  "has UA with leukocytosis, moderate LE, and few bacteria. Renal ultrasound demonstrated mild urothelial thickening of the proximal R ureter but without evidence of obstruction. In the absence of urinary symptoms, we believe she has asymptomatic pyuria and recommend against antibiotic treatment which could worsen her C. difficile. As such, we recommend discontinuing ceftriaxone.     Her rhino/enterovirus infection is likely contact with her son who was recently sick with a cold. She is currently asymptomatic and treatment would be supportive.     Thank you for the consult. ID will continue to follow with you.     Patient staffed with Dr. Onofre.     Jo Ann Tapia MD  PGY-2, Internal Medicine  [facility]   10/13/2021  ________________________________________________________________    Consult Question: \"Second recurrence of c.diff - assist with mgmt\"  Admission Diagnosis: Clostridioides difficile diarrhea [A04.72]         History of Present Illness:   History obtained from review of chart and discussion with patient.     Kelly Barnes is a 81 year old female with history of recurrent C difficile infection, E coli pyelonephritis with large R staghorn calculus s/p percutaneous nephrolithotomy, and recent candida glabrata fungemia who presents with 1 day history of fever and increased loose stools. See Discussion for recent infectious disease history.     She notes yesterday she started feeling warm and also noticed she started having softer stools. She had 4 soft stools yesterday and today noticed her 3 episodes thus far have become more watery. No blood in stool, no melena. No nausea, vomiting. She had a little cramping abdominal pain, but very minimal. When she has had C difficile in the past, she had up to 10 watery stools per day.     She has not had any urinary symptoms. No dysuria, increased frequency, change in urine characteristics, or flank pain.     She otherwise denies cough, shortness of breath, " rhinorrhea, skin sores, or joint pains. She has increased LLE edema but this has been chronic for the past five years, and several family members have similar symptoms. She has been physically active. No increased pain or redness of her LLE.     Her son whom she lives with was recently sick with a cold with a stuffy nose but she took some vitamin C and has not had any significant similar symptoms.     She has no history of prior hardware. She has no pets. She drinks alcohol occasionally but no tobacco or drug use. No recent international travel though she had been planning to go to [place], [facility], and [facility] this week prior to her admission.          Review of Systems:   10 point review of systems was negative except for noted as above in HPI.          Past Medical History:     Past Medical History:   Diagnosis Date     Acute kidney injury (H) 12/19/2020     Allergic rhinitis, cause unspecified      Anemia      Aortic stenosis 02/29/2016    With new murmur noted 2/2016, normal echo, repeat echo 2/2017.     Aortic valve sclerosis      Atypical chest pain 07/24/2015     cuboid     left foot     Fungemia 8/16/2021     History of Clostridium difficile colitis      Hyperlipidemia LDL goal <100 11/01/2012     Hypertension goal BP (blood pressure) < 140/90      Musculoskeletal chest pain 11/25/2016     Obesity, Class I, BMI 30-34.9 11/11/2015     Pneumonia 03/2016     Pyelonephritis      Sepsis, due to unspecified organism, unspecified whether acute organ dysfunction present (H) 12/19/2020     Type 2 diabetes, HbA1c goal < 7% (H)      Urinary tract infection associated with nephrostomy catheter, initial encounter (H) 8/8/2021            Past Surgical History:     Past Surgical History:   Procedure Laterality Date     CATARACT EXTRACTION       CYSTOSCOPY, RETROGRADES, INSERT STENT URETER(S), COMBINED Right 07/15/2021    Procedure: CYSTOURETEROSCOPY, WITH RETROGRADE PYELOGRAM,  AND STENT INSERTION RIGHT;  Surgeon:  Kirk Law MD;  Location: [facility] OR     LASER HOLMIUM NEPHROLITHOTOMY VIA PERCUTANEOUS NEPHROSTOMY Right 08/05/2021    Procedure: NEPHROLITHOTOMY, PERCUTANEOUS, USING HOLMIUM LASER RIGHT;  Surgeon: Kirk Law MD;  Location: [facility] OR     LASER HOLMIUM NEPHROLITHOTOMY VIA PERCUTANEOUS NEPHROSTOMY Right 08/13/2021    Procedure: Ureteroscopic assisted secondary right percutaneous nephrolihtotomy, Holmium laser lithotriipsy;  Surgeon: Kirk Law MD;  Location: [facility]     PICC SINGLE LUMEN PLACEMENT Left 08/17/2021    41cm (2cm external), Medial brachial vein            Family History:   Reviewed and non-contributory.   Family History   Problem Relation Age of Onset     Hypertension Mother      Diabetes No family hx of      Cerebrovascular Disease No family hx of      Breast Cancer No family hx of      Cancer - colorectal No family hx of      Prostate Cancer No family hx of      Anesthesia Reaction No family hx of      Bleeding Disorder No family hx of      Clotting Disorder No family hx of             Social History:     Social History     Tobacco Use     Smoking status: Never Smoker     Smokeless tobacco: Never Used   Substance Use Topics     Alcohol use: Yes     Alcohol/week: 10.0 standard drinks     Comment: 1-2 drinks per week     History   Sexual Activity     Sexual activity: Yes     Partners: Male            Current Medications:       [START ON 10/14/2021] cefTRIAXone  1 g Intravenous Q24H     insulin aspart  1-7 Units Subcutaneous TID AC     insulin aspart  1-5 Units Subcutaneous At Bedtime     simvastatin  20 mg Oral At Bedtime     sodium chloride (PF)  3 mL Intracatheter Q8H     vancomycin  125 mg Oral 4x Daily            Allergies:     Allergies   Allergen Reactions     Ibuprofen Other (See Comments)     numbness in hands and feet     Keflex [Cephalexin] Rash     Metformin Rash            Physical Exam:   Vitals were reviewed  Temp:  [98.7  F (37.1  C)-100.7  F (38.2  C)] 99.8  F (37.7   C)  Pulse:  [] 79  Resp:  [12-22] 16  BP: (102-136)/(55-82) 122/72  SpO2:  [91 %-100 %] 99 %    Vitals:    10/12/21 2100   Weight: 67.1 kg (148 lb)       Constitutional: Adult female seen lying in bed, in NAD. Awake, alert, interactive.  HEENT: NC/AT, EOMI, sclera clear, conjunctiva normal, OP with MMM  Respiratory: No increased work of breathing, CTAB, no crackles or wheezing.  Cardiovascular: RRR, no murmur noted. No peripheral edema.  GI: Normal bowel sounds, soft, non-distended and non-tender.  : No suprapubic tenderness. No flank pain.  Skin: Warm, dry, well-perfused. LLE > RLE edema with some increased warmth but no erythema or increased tenderness. No other bruising, bleeding, rashes, or lesions on limited exam.  Musculoskeletal: Extremities grossly normal, non-tender, no edema. Good strength and ROM in bed.   Neurologic: A&O. Answers questions appropriately, speech normal. Moves all extremities spontaneously.  Neuropsychiatric: Calm. Affect appropriate to situation.         Laboratory Data:     Creatinine   Date Value Ref Range Status   10/12/2021 1.46 (H) 0.52 - 1.04 mg/dL Final   08/26/2021 1.12 (H) 0.52 - 1.04 mg/dL Final   08/16/2021 0.97 0.52 - 1.04 mg/dL Final   08/15/2021 1.02 0.52 - 1.04 mg/dL Final   08/14/2021 1.03 0.52 - 1.04 mg/dL Final   05/11/2021 0.97 0.52 - 1.04 mg/dL Final   12/21/2020 1.04 0.52 - 1.04 mg/dL Final   12/20/2020 1.08 (H) 0.52 - 1.04 mg/dL Final   12/20/2020 1.11 (H) 0.52 - 1.04 mg/dL Final   12/19/2020 1.29 (H) 0.52 - 1.04 mg/dL Final     WBC   Date Value Ref Range Status   12/21/2020 9.2 4.0 - 11.0 10e9/L Final   12/20/2020 13.9 (H) 4.0 - 11.0 10e9/L Final   12/19/2020 17.7 (H) 4.0 - 11.0 10e9/L Final   12/19/2020 18.2 (H) 4.0 - 11.0 10e9/L Final   07/03/2019 11.3 (H) 4.0 - 11.0 10e9/L Final     WBC Count   Date Value Ref Range Status   10/13/2021 23.2 (H) 4.0 - 11.0 10e3/uL Final   10/12/2021 28.6 (H) 4.0 - 11.0 10e3/uL Final   10/12/2021 27.3 (H) 4.0 - 11.0 10e3/uL  Final   09/16/2021 7.0 4.0 - 11.0 10e3/uL Final   08/26/2021 9.1 4.0 - 11.0 10e3/uL Final     Hemoglobin   Date Value Ref Range Status   10/13/2021 13.0 11.7 - 15.7 g/dL Final   12/21/2020 11.3 (L) 11.7 - 15.7 g/dL Final     Platelet Count   Date Value Ref Range Status   10/13/2021 273 150 - 450 10e3/uL Final   12/21/2020 287 150 - 450 10e9/L Final     Lab Results   Component Value Date     10/13/2021    BUN 22 10/12/2021    CO2 25 10/12/2021     CRP Inflammation   Date Value Ref Range Status   09/16/2021 5.0 0.0 - 8.0 mg/L Final   08/26/2021 24.9 (H) 0.0 - 8.0 mg/L Final   08/16/2021 52.0 (H) 0.0 - 8.0 mg/L Final   08/10/2021 130.0 (H) 0.0 - 8.0 mg/L Final   08/09/2021 100.0 (H) 0.0 - 8.0 mg/L Final   12/21/2020 63.0 (H) 0.0 - 8.0 mg/L Final   12/19/2020 140.0 (H) 0.0 - 8.0 mg/L Final   03/02/2016 110.0 (H) 0.0 - 8.0 mg/L Final   02/29/2016 36.0 (H) 0.0 - 8.0 mg/L Final           Pertinent Recent Microbiology Data:     Culture   Date Value Ref Range Status   08/15/2021 No Growth  Final   08/15/2021 No Growth  Final   08/13/2021 No Growth  Final   08/13/2021 No Growth  Final   08/13/2021 No anaerobic organisms isolated  Final   08/13/2021 Candida glabrata complex (A)  Final   08/13/2021 1+ Candida glabrata complex (A)  Final     Comment:     Susceptibilities not routinely done   08/13/2021 No Growth  Final   08/13/2021 No Growth  Final   08/12/2021 No Growth  Final   08/12/2021 No Growth  Final       Urine Studies    Recent Labs   Lab Test 10/12/21  2200 10/12/21  1804 08/08/21 2028 07/25/21  1230 07/23/21  0140   LEUKEST Moderate* Negative Large* Large* Large*   WBCU 32* 5-10* >182* >182* 50*            Imaging:     Recent Results (from the past 48 hour(s))   XR Chest 2 Views    Narrative    EXAM: XR CHEST 2 VW  LOCATION: [facility]  DATE/TIME: 10/12/2021 6:17 PM    INDICATION: fever of 100.7, hx of pneumonia with just fevers  COMPARISON: 08/08/2021 radiograph. 09/21/2021 CT.        Impression    IMPRESSION: No pleural fluid or pneumothorax. Minimal scarring in the lung bases is suspected. No airspace disease or edema identified. Normal size of the heart. There is mild aortic tortuosity and calcification.

## 2021-10-13 NOTE — ED TRIAGE NOTES
Pt came from urgent care with abnormal labs. Pt had WBC of 25,000 at urgent care. Pt recently finished vancomycin for Cdiff 1.5 weeks ago.

## 2021-10-13 NOTE — PROVIDER NOTIFICATION
ED 25 - Kellyalejandra Tiwariam - Lab called with a positive result on respiratory panel. Positive for human rhinovirus / enterovirus. Ashtyn [phone]

## 2021-10-13 NOTE — PROVIDER NOTIFICATION
ED 25 - Kelly Barnes - Patient's potassium is 3.0. Would you like to place an K RN managed order? Ashtyn [phone]

## 2021-10-13 NOTE — PROGRESS NOTES
Brief Progress Note:    Kelly Barnes is a 81 year old woman with a history of type 2 diabetes, hyperlipidemia, hypertension, history of recurrent pyelonephritis and fungemia, recurrent C. difficile colitis who presents with fevers and diarrhea concerning for recurrence of C.difficile infection.     Severe sepsis   C. difficile infection, second recurrence  Presents with fever, myalgias, increase frequency of stools with loose consistency (diarrhea while here) with leukocytosis (28) and tachycardia, as well as acute kidney injury. Currently hemodynamically stable, negative lactate. She has a history for recurrent C.diff, first episode was 7/23/21 and was treated with a 20-day vancomycin taper, first recurrence was was 9/2021 she was again treated with a prolonged (~1 monthy) vancomycin taper which concluded 1.5 weeks ago. ID consulted, agree that current clinical picture is most likely due to second recurrence of C.difficile infection. Treatment as below.  - ID following, appreciate recs  - Switch PO vancomycin for PO fidaxomicin 200mg BID for planned 10-day total course  - Stopping ceftriaxone as below  - Fluids: PO + IV PRN  - Follow up blood cultures from urgent care, urine culture  - Enteric panel pending     Acute kidney injury  Hx of E.coli pyelonephritis   Hx of staghorn calculus  Baseline Cr 0.9-1, and was 1.46 on admission. Likely prerenal in the setting of diarrhea plus possible urinary tract infection given pyuria and bacteria, though patient has no dysuria.  She does have a history of pyelonephritis however she underwent percutaneous nephrolithotomy and no longer has any tubes in place.  - LR as above   - Renal US     Asymptomatic Pyuria  Bacteriuria   UA with moderate LE, few bacteria, 32 WBCs. Patient otherwise asymptomatic. Feel there is a low likelihood  that a UTI is contributing to her presenting sepsis, as C.difficile infection adequately explains her clinical presentation.  - Discontinuing  ceftriaxone as it may worsen her C.diff infection above.  - Urine cultures with NGTD    History of Candida glabrata fungemia  Patient developed fungemia last admission from urinary source due to infected and obstructed nephrolithiasis she underwent percutaneous nephrolithotomy.  She was treated with IV micafungin and p.o. fluconazole.     Hypokalemia  In setting of acute diarrhea  - Replete per RN protocol    Chronic medical conditions:  Type 2 diabetes (A1c 8.1): Hold PTA glimepiride, medium sliding scale insulin  Hypertension: Hold PTA losartan-hydrochlorothiazide given hypovolemia  Hyperlipidemia: PTA simvastatin

## 2021-10-14 VITALS
HEART RATE: 72 BPM | OXYGEN SATURATION: 98 % | TEMPERATURE: 96 F | RESPIRATION RATE: 16 BRPM | DIASTOLIC BLOOD PRESSURE: 61 MMHG | WEIGHT: 147.3 LBS | BODY MASS INDEX: 26.94 KG/M2 | SYSTOLIC BLOOD PRESSURE: 103 MMHG

## 2021-10-14 LAB
ANION GAP SERPL CALCULATED.3IONS-SCNC: 5 MMOL/L (ref 3–14)
BACTERIA UR CULT: NORMAL
BUN SERPL-MCNC: 12 MG/DL (ref 7–30)
C COLI+JEJUNI+LARI FUSA STL QL NAA+PROBE: NOT DETECTED
CALCIUM SERPL-MCNC: 8.4 MG/DL (ref 8.5–10.1)
CHLORIDE BLD-SCNC: 112 MMOL/L (ref 94–109)
CO2 SERPL-SCNC: 23 MMOL/L (ref 20–32)
CREAT SERPL-MCNC: 0.87 MG/DL (ref 0.52–1.04)
EC STX1 GENE STL QL NAA+PROBE: NOT DETECTED
EC STX2 GENE STL QL NAA+PROBE: NOT DETECTED
ERYTHROCYTE [DISTWIDTH] IN BLOOD BY AUTOMATED COUNT: 14.6 % (ref 10–15)
GFR SERPL CREATININE-BSD FRML MDRD: 63 ML/MIN/1.73M2
GLUCOSE BLD-MCNC: 130 MG/DL (ref 70–99)
GLUCOSE BLDC GLUCOMTR-MCNC: 130 MG/DL (ref 70–99)
GLUCOSE BLDC GLUCOMTR-MCNC: 211 MG/DL (ref 70–99)
HCT VFR BLD AUTO: 34.6 % (ref 35–47)
HGB BLD-MCNC: 11.1 G/DL (ref 11.7–15.7)
MCH RBC QN AUTO: 27.4 PG (ref 26.5–33)
MCHC RBC AUTO-ENTMCNC: 32.1 G/DL (ref 31.5–36.5)
MCV RBC AUTO: 85 FL (ref 78–100)
NOROV GI+II ORF1-ORF2 JNC STL QL NAA+PR: NOT DETECTED
PLATELET # BLD AUTO: 257 10E3/UL (ref 150–450)
POTASSIUM BLD-SCNC: 3.2 MMOL/L (ref 3.4–5.3)
RBC # BLD AUTO: 4.05 10E6/UL (ref 3.8–5.2)
RVA NSP5 STL QL NAA+PROBE: NOT DETECTED
SALMONELLA SP RPOD STL QL NAA+PROBE: NOT DETECTED
SHIGELLA SP+EIEC IPAH STL QL NAA+PROBE: NOT DETECTED
SODIUM SERPL-SCNC: 140 MMOL/L (ref 133–144)
V CHOL+PARA RFBL+TRKH+TNAA STL QL NAA+PR: NOT DETECTED
WBC # BLD AUTO: 11.9 10E3/UL (ref 4–11)
Y ENTERO RECN STL QL NAA+PROBE: NOT DETECTED

## 2021-10-14 PROCEDURE — 99238 HOSP IP/OBS DSCHRG MGMT 30/<: CPT | Mod: GC | Performed by: STUDENT IN AN ORGANIZED HEALTH CARE EDUCATION/TRAINING PROGRAM

## 2021-10-14 PROCEDURE — 250N000013 HC RX MED GY IP 250 OP 250 PS 637: Performed by: INTERNAL MEDICINE

## 2021-10-14 PROCEDURE — 80048 BASIC METABOLIC PNL TOTAL CA: CPT | Performed by: STUDENT IN AN ORGANIZED HEALTH CARE EDUCATION/TRAINING PROGRAM

## 2021-10-14 PROCEDURE — 250N000013 HC RX MED GY IP 250 OP 250 PS 637: Performed by: STUDENT IN AN ORGANIZED HEALTH CARE EDUCATION/TRAINING PROGRAM

## 2021-10-14 PROCEDURE — 99233 SBSQ HOSP IP/OBS HIGH 50: CPT | Mod: GC | Performed by: INTERNAL MEDICINE

## 2021-10-14 PROCEDURE — 85027 COMPLETE CBC AUTOMATED: CPT | Performed by: STUDENT IN AN ORGANIZED HEALTH CARE EDUCATION/TRAINING PROGRAM

## 2021-10-14 PROCEDURE — 36415 COLL VENOUS BLD VENIPUNCTURE: CPT | Performed by: STUDENT IN AN ORGANIZED HEALTH CARE EDUCATION/TRAINING PROGRAM

## 2021-10-14 RX ORDER — VANCOMYCIN HYDROCHLORIDE 125 MG/1
CAPSULE ORAL
Qty: 84 CAPSULE | Refills: 0 | Status: SHIPPED | OUTPATIENT
Start: 2021-10-23 | End: 2021-12-07

## 2021-10-14 RX ORDER — POTASSIUM CHLORIDE 750 MG/1
40 TABLET, EXTENDED RELEASE ORAL ONCE
Status: COMPLETED | OUTPATIENT
Start: 2021-10-14 | End: 2021-10-14

## 2021-10-14 RX ADMIN — POTASSIUM CHLORIDE 40 MEQ: 750 TABLET, EXTENDED RELEASE ORAL at 12:22

## 2021-10-14 RX ADMIN — FIDAXOMICIN 200 MG: 200 TABLET, FILM COATED ORAL at 08:18

## 2021-10-14 ASSESSMENT — ACTIVITIES OF DAILY LIVING (ADL)
ADLS_ACUITY_SCORE: 6
ADLS_ACUITY_SCORE: 4

## 2021-10-14 NOTE — PLAN OF CARE
/64 (BP Location: Left arm)   Pulse 77   Temp 97.7  F (36.5  C) (Oral)   Resp 18   Wt 66.8 kg (147 lb 4.8 oz)   SpO2 100%   BMI 26.94 kg/m       Shift: 2264-2144      Neuro: A&O x4. Able to make needs known  Respiratory: WDL on RA. Denies SOB  Cardiac: WDL. Denies cardiac chest pain   GI/: Voiding adequately, LBM 10/13/21  Diet: Regular diet  Pain/nausea: Denies pain and nausea  Incision/Drains: none  IV Access: PIV SL   Labs: WBC 23.2  Activity: Up independently in room     New changes this shift: Cares clustered to promote optimal rest/sleep  Plan:  Continue to monitor per POC

## 2021-10-14 NOTE — PLAN OF CARE
VSS  Neuro: WDL  Pain: denies  CMS: intact  Cardiac: WDL  Resp: O2>90 on RA, +RSV  GI/: voiding adequately, CDiff + (chronic) 3 BM evening shift  Wound: small bruise on leg and dryness on BLE, +2-3 swelling BLE  Access: PIV SL  Activity: Up independently in room  Nutrition: Reg  Plan: Continue to monitor

## 2021-10-14 NOTE — CONSULTS
Discharge Pharmacy Test Claim    D[facility] is covered through patient's [facility] Medicare Part D insurance with an expected copay of $448.91.      Michelle Anaya  [facility] Liaison  Ph: [phone] Pager: [phone]

## 2021-10-14 NOTE — PLAN OF CARE
/61 (BP Location: Left arm)   Pulse 72   Temp (!) 96  F (35.6  C) (Oral)   Resp 16   Wt 66.8 kg (147 lb 4.8 oz)   SpO2 98%   BMI 26.94 kg/m      AVSS. Patient reports denies pain or nausea. Good PO intake. Reports had 3 more loose BM since midnight. Voiding, not saving. PIV removed. AVS printed and reviewed with patient. Pt acknowledged understand the discharge education materials. Pt left with her song and all her belongings.

## 2021-10-14 NOTE — PROGRESS NOTES
[facility] CONSULTATION     Patient:  Kelly Barnes   YOB: 1940  Date of Visit:  10/14/2021  Date of Admission: 10/12/2021  Consult Requester:Ingrid Moreno MD          Assessment and Recommendations:   ID Problem List:  1. C difficile infection, second recurrence   - Initial C difficile infection 7/23/21. Treated with PO vancomycin 125 mg QID x 10 days followed by 125 mg BID x 10 days.    - C difficile infection, first recurrence 9/3/21. Treated with PO vancomycin 125 mg QID x 7 days, 125 mg BID x 7 days, 125 mg every day x 7 days, 125 mg every other day x 10 days.  2. Asymptomatic pyuria   3. Rhino/enterovirus infection  4. Recent history of E coli pyelonephritis with large R staghorn calculus 7/13/21   - Treated with ceftriaxone and subsequently on suppressive Keflex, Bactrim, and cefpodoxime until definitive stone management   - S/p staged percutaneous nephrolithotomy 8/5/21, with repeat percutaneous nephrolithotomy 8/13   5. Recent history of candida glabrata fungemia 8/9/21 in setting of infected renal calculus   - Treated with IV micafungin and PO fluconazole x 14 days  6. Diabetes mellitus type II    Recommendations:  1. Continue PO fidaxomicin 200 mg BID for a planned 10 day course, followed by a prolonged vancomycin taper as follows:    PO vancomycin 125 mg QID x 14 days   PO vancomycin 125 mg BID x 7 days   PO vancomycin 125 mg every day x 7 days   PO vancomycin 125 mg every other day x 14 days  2. We recommend outpatient follow up with Dr. Giselle Silva (ID) and Oh Pabon/Sunday Metzger (GI) for further evaluation for possible fecal microbiota transplantation.   3. It will be important avoid future antibiotics as much as possible to prevent C difficile recurrence unless clinically indicated. Patient should not be treated for asymptomatic pyuria/bacturia in the future.     Discussion:  Kelly Barnes is a 81 year old female with history of recurrent C  difficile infection, E coli pyelonephritis with large R staghorn calculus s/p percutaneous nephrolithotomy, and recent candida glabrata fungemia who presents with 1 day history of fever and increased loose stools, concerning for a second recurrence C difficile infection.     Patient's infectious history is notable for E. coli pyelonephritis in 7/13/21 with a large R staghorn calculus in 7/13/21. She was initially treated with a course of ceftriaxone with PNT placed 7/14 and ureteral stent placed 7/15, subsequently transitioned to Keflex and then Bactrim (due to possible rash) for suppressive therapy while awaiting lithotripsy. She subsequently presented 7/23 with initial concern for an infected stent but UC grew <10K mixed  with lower concern for recurrent UTI. She was found to be C difficile positive with CT confirming infectious colitis. She was treated with PO vancomycin 125 mg QID x 10 days followed by 125 mg BID x 10 days with resolution her symptoms. She underwent percutaneous nephrolithotomy on 8/5. She presented again on 8/8 with fever and was found to have candida glabrata fungemia with stone culture also growing candida glabrata complex. She was treated with IV micafungin and PO fluconazole for a 14 day course. She developed recurrent loose stools around the beginning of 9/2021 and presented to urgent care where she tested positive for C difficile 9/3/21. She was treated with a prolonged vancomycin taper (PO vancomycin  125 mg QID x 7 days, 125 mg BID x 7 days, 125 mg every day x 7 days, 125 mg every other day x 10 days) with resolution of her symptoms.     She now presents with 1 day history of fever and increased loose stools, concerning for second recurrence of C difficile. Given prior treatments with PO vancomycin and current IDSA guideline, we favored treatment with a 10 day course of PO fidaxomicin which was started 10/13. We recommend a prolonged vancomycin taper (see recommendations) after  "completion of her fidaxomicin course and following up for outpatient fecal microbiota transplant evaluation at that time. Patient has been having 3-4 loose stools per day with minimal abdominal symptoms and otherwise appears well, therefore we recommended holding off on bezlotoxumab.     Patient notably has UA with leukocytosis, moderate LE, and few bacteria. Renal ultrasound demonstrated mild urothelial thickening of the proximal R ureter but without evidence of obstruction. In the absence of urinary symptoms, we believe she has asymptomatic pyuria and recommended against antibiotic treatment which could worsen her C. difficile. As such, we recommended discontinuing ceftriaxone, which was empirically started on admission, on 10/13. It will be important to avoid future antibiotics as much as possible to prevent C difficile recurrence unless clinically indicated. Patient should not be treated for asymptomatic pyuria/bacturia in the future.     Her rhino/enterovirus infection is likely contact with her son who was recently sick with a cold. She is currently asymptomatic and treatment would be supportive.     Thank you for the consult. ID will sign off.    Patient staffed with Dr. Onofre.     Jo Ann Tapia MD  PGY-2, Internal Medicine  [facility]   10/14/2021  ________________________________________________________________    Consult Question: \"Second recurrence of c.diff - assist with mgmt\"  Admission Diagnosis: Clostridioides difficile diarrhea [A04.72]         Interval Events:   Afebrile. Feeling well overnight. Had 2 additional loose bowel movements overnight. No abdominal pain. Bilateral LE swelling has been unchanged from baseline.          History of Present Illness:   History obtained from review of chart and discussion with patient.     Kelly Barnes is a 81 year old female with history of recurrent C difficile infection, E coli pyelonephritis with large R staghorn calculus s/p percutaneous " nephrolithotomy, and recent candida glabrata fungemia who presents with 1 day history of fever and increased loose stools. See Discussion for recent infectious disease history.     She notes yesterday she started feeling warm and also noticed she started having softer stools. She had 4 soft stools yesterday and today noticed her 3 episodes thus far have become more watery. No blood in stool, no melena. No nausea, vomiting. She had a little cramping abdominal pain, but very minimal. When she has had C difficile in the past, she had up to 10 watery stools per day.     She has not had any urinary symptoms. No dysuria, increased frequency, change in urine characteristics, or flank pain.     She otherwise denies cough, shortness of breath, rhinorrhea, skin sores, or joint pains. She has increased LLE edema but this has been chronic for the past five years, and several family members have similar symptoms. She has been physically active. No increased pain or redness of her LLE.     Her son whom she lives with was recently sick with a cold with a stuffy nose but she took some vitamin C and has not had any significant similar symptoms.     She has no history of prior hardware. She has no pets. She drinks alcohol occasionally but no tobacco or drug use. No recent international travel though she had been planning to go to [place], [facility], and [facility] this week prior to her admission.     On admission, she was started on PO vancomycin 125 mg QID for C. difficile recurrence and received an empiric dose of ceftriaxone for concern for UTI.          Review of Systems:   4 point review of systems was negative except for noted as above.         Physical Exam:   Vitals were reviewed  Temp:  [96  F (35.6  C)-97.7  F (36.5  C)] 96  F (35.6  C)  Pulse:  [72-79] 72  Resp:  [16-18] 16  BP: (103-135)/(53-64) 103/61  SpO2:  [98 %-100 %] 98 %    Vitals:    10/12/21 2100 10/13/21 1757   Weight: 67.1 kg (148 lb) 66.8 kg (147 lb 4.8 oz)        Constitutional: Adult female seen lying in bed, in NAD. Awake, alert, interactive.  HEENT: NC/AT, EOMI, sclera clear, conjunctiva normal, OP with MMM  Respiratory: No increased work of breathing, CTAB, no crackles or wheezing.  Cardiovascular: RRR, no murmur noted. No peripheral edema.  GI: Normal bowel sounds, soft, non-distended and non-tender.  : No suprapubic tenderness. No flank pain.  Skin: Warm, dry, well-perfused. LLE > RLE edema with no increased erythema, warmth,or tenderness. No other bruising, bleeding, rashes, or lesions on limited exam.  Musculoskeletal: Extremities grossly normal, non-tender, no edema. Good strength and ROM in bed.   Neurologic: A&O. Answers questions appropriately, speech normal. Moves all extremities spontaneously.  Neuropsychiatric: Calm. Affect appropriate to situation.         Laboratory Data:     Creatinine   Date Value Ref Range Status   10/14/2021 0.87 0.52 - 1.04 mg/dL Final   10/13/2021 0.99 0.52 - 1.04 mg/dL Final   10/12/2021 1.46 (H) 0.52 - 1.04 mg/dL Final   10/12/2021 0.99 0.52 - 1.04 mg/dL Final   08/26/2021 1.12 (H) 0.52 - 1.04 mg/dL Final   05/11/2021 0.97 0.52 - 1.04 mg/dL Final   12/21/2020 1.04 0.52 - 1.04 mg/dL Final   12/20/2020 1.08 (H) 0.52 - 1.04 mg/dL Final   12/20/2020 1.11 (H) 0.52 - 1.04 mg/dL Final   12/19/2020 1.29 (H) 0.52 - 1.04 mg/dL Final     WBC   Date Value Ref Range Status   12/21/2020 9.2 4.0 - 11.0 10e9/L Final   12/20/2020 13.9 (H) 4.0 - 11.0 10e9/L Final   12/19/2020 17.7 (H) 4.0 - 11.0 10e9/L Final   12/19/2020 18.2 (H) 4.0 - 11.0 10e9/L Final   07/03/2019 11.3 (H) 4.0 - 11.0 10e9/L Final     WBC Count   Date Value Ref Range Status   10/14/2021 11.9 (H) 4.0 - 11.0 10e3/uL Final   10/13/2021 23.2 (H) 4.0 - 11.0 10e3/uL Final   10/12/2021 28.6 (H) 4.0 - 11.0 10e3/uL Final   10/12/2021 27.3 (H) 4.0 - 11.0 10e3/uL Final   09/16/2021 7.0 4.0 - 11.0 10e3/uL Final     Hemoglobin   Date Value Ref Range Status   10/14/2021 11.1 (L) 11.7 -  15.7 g/dL Final   12/21/2020 11.3 (L) 11.7 - 15.7 g/dL Final     Platelet Count   Date Value Ref Range Status   10/14/2021 257 150 - 450 10e3/uL Final   12/21/2020 287 150 - 450 10e9/L Final     Lab Results   Component Value Date     10/14/2021    BUN 12 10/14/2021    CO2 23 10/14/2021     CRP Inflammation   Date Value Ref Range Status   09/16/2021 5.0 0.0 - 8.0 mg/L Final   08/26/2021 24.9 (H) 0.0 - 8.0 mg/L Final   08/16/2021 52.0 (H) 0.0 - 8.0 mg/L Final   08/10/2021 130.0 (H) 0.0 - 8.0 mg/L Final   08/09/2021 100.0 (H) 0.0 - 8.0 mg/L Final   12/21/2020 63.0 (H) 0.0 - 8.0 mg/L Final   12/19/2020 140.0 (H) 0.0 - 8.0 mg/L Final   03/02/2016 110.0 (H) 0.0 - 8.0 mg/L Final   02/29/2016 36.0 (H) 0.0 - 8.0 mg/L Final           Pertinent Recent Microbiology Data:     Culture   Date Value Ref Range Status   10/12/2021 No growth after 1 day  Preliminary   10/12/2021 No growth after 1 day  Preliminary   08/15/2021 No Growth  Final   08/15/2021 No Growth  Final   08/13/2021 No Growth  Final   08/13/2021 No Growth  Final   08/13/2021 No anaerobic organisms isolated  Final   08/13/2021 Candida glabrata complex (A)  Final   08/13/2021 1+ Candida glabrata complex (A)  Final     Comment:     Susceptibilities not routinely done   08/13/2021 No Growth  Final   08/13/2021 No Growth  Final       Urine Studies    Recent Labs   Lab Test 10/12/21  2200 10/12/21  1804 08/08/21 2028 07/25/21  1230 07/23/21  0140   LEUKEST Moderate* Negative Large* Large* Large*   WBCU 32* 5-10* >182* >182* 50*            Imaging:     Recent Results (from the past 48 hour(s))   XR Chest 2 Views    Narrative    EXAM: XR CHEST 2 VW  LOCATION: [facility]  DATE/TIME: 10/12/2021 6:17 PM    INDICATION: fever of 100.7, hx of pneumonia with just fevers  COMPARISON: 08/08/2021 radiograph. 09/21/2021 CT.       Impression    IMPRESSION: No pleural fluid or pneumothorax. Minimal scarring in the lung bases is suspected. No airspace  disease or edema identified. Normal size of the heart. There is mild aortic tortuosity and calcification.

## 2021-10-15 ENCOUNTER — PATIENT OUTREACH (OUTPATIENT)
Dept: CARE COORDINATION | Facility: CLINIC | Age: 81
End: 2021-10-15

## 2021-10-15 ENCOUNTER — MYC MEDICAL ADVICE (OUTPATIENT)
Dept: UROLOGY | Facility: CLINIC | Age: 81
End: 2021-10-15

## 2021-10-15 DIAGNOSIS — Z71.89 OTHER SPECIFIED COUNSELING: ICD-10-CM

## 2021-10-15 NOTE — NURSING NOTE
Pt phoned. She is going through quite a lot and feeling very overwhelmed. Pt was recently hospitalized for her second c diff infection. Pt states she is home now but is on an extended course of antibiotics, to be completed about the first week of December. Pt states she had to miss a big trip to [place] due to this, and also states she does not recall hearing anything about doing this urine collection a home, she states frustration about these things as well.    Pt states due to this extended course of abx, she is not comfortable starting her low dose potassium citrate until the course is completed. Pt states she will begin potassium citrate the first week of December. She is wondering if her lab appt on 01/06 and follow up virtual on 01/07 is an okay time frame for this medication follow up.     Pt also states she is uninterested in doing a metabolic work up at this time, it seems too overwhelming to her. I suggested we connect in a week or two and re-evaluate. She is agreeable to this plan.     It also sounded like she would be more agreeable to the blood labs requested through [employer] packaging to be completed through our system with orders in epic. This would include BMP, uric acid, phosphorus, and magnesium. We will discuss this at our next phone conversation.

## 2021-10-15 NOTE — DISCHARGE SUMMARY
[facility]  Discharge Summary - Medicine & Pediatrics       Date of Admission:  10/12/2021  Date of Discharge:  10/14/2021  2:36 PM  Discharging Provider: Cornelio Dockery MD  Discharge Service: Regi Fields    Discharge Diagnoses   Severe sepsis  C. Difficile infection, second recurrence  Acute kidney injury  Hx of E.coli pyelonephritis   Hx of staghorn calculus  Bacteriuria  Pyuria  Hypokalemia    Follow-ups Needed After Discharge   Follow-up Appointments     Adult [facility] Follow-up and recommended labs and tests      Follow up with Dr. Giselle Silva (ID) and Oh Pabon/Sunday Metzger (GI) for further evaluation for possible fecal microbiota   transplantation within 2 weeks  to evaluate medication change and for   hospital follow- up. The following labs/tests are recommended: Blood   counts, electrolytes.    Appointments on [place] and/or [facility] (with [facility] or [facility]   provider or service). Call [phone] if you haven't heard regarding   these appointments within 7 days of discharge.           Unresulted Labs Ordered in the Past 30 Days of this Admission     Date and Time Order Name Status Description    10/12/2021  9:07 PM Blood Culture Peripheral Blood Preliminary     10/12/2021  9:07 PM Blood Culture Peripheral Blood Preliminary       These results will be followed up by PCP    Discharge Disposition   Discharged to home  Condition at discharge: Stable    Hospital Course   Kelly Barnes is a 81 year old woman with a history of type 2 diabetes, hyperlipidemia, hypertension, history of recurrent pyelonephritis and fungemia, recurrent C. difficile colitis who is admitted 10/12/21 with fevers and diarrhea concerning for recurrence of C.difficile infection. The following problems were addressed during her hospitalization:     Severe sepsis   C. difficile infection, second recurrence  Presented with fever, myalgias, increase frequency of stools with  loose consistency (diarrhea while here) with leukocytosis (28) and tachycardia, as well as acute kidney injury. Otherwise hemodynamically stable, negative lactate. She has a history for recurrent C.diff, first episode was 7/23/21 and was treated with a 20-day vancomycin taper, first recurrence was was 9/2021 when she was again treated with a prolonged (~1 month) vancomycin taper which concluded 1.5 weeks ago. Labs demonstrating positive C. Diff testing. ID consulted, agree that current clinical picture is most likely due to second recurrence of C.difficile infection. They recommended the following taper regimen and followup with Dr. Silva and Alvaro Pabon and Yassine (GI) for possible fecal transplant.    Continue PO fidaxomicin 200 mg BID for a planned 10 day course, followed by a prolonged vancomycin taper as follows:                PO vancomycin 125 mg QID x 14 days               PO vancomycin 125 mg BID x 7 days               PO vancomycin 125 mg every day x 7 days               PO vancomycin 125 mg every other day x 14 days     Acute kidney injury  Hx of E.coli pyelonephritis   Hx of staghorn calculus  Baseline Cr 0.9-1, and was 1.46 on admission. Likely prerenal in the setting of diarrhea plus possible urinary tract infection given pyuria and bacteria, though patient has no dysuria.  She does have a history of pyelonephritis however she underwent percutaneous nephrolithotomy and no longer has any tubes in place. Renal ultrasound obtained, negative for hydronephrosis. Ultimately, creatinine downtrended to normal s/p fluids.     Asymptomatic Pyuria  Bacteriuria   UA with moderate LE, few bacteria, 32 WBCs. Patient otherwise asymptomatic. Feel there is a low likelihood  that a UTI is contributing to her presenting sepsis, as C.difficile infection adequately explains her clinical presentation. Initially started on ceftriaxone, but quickly stopped as it may worsen her C.diff infection above.     Hypokalemia  In  "setting of acute diarrhea. Repleted per RN protocol    Consultations This Hospital Stay   INFECTIOUS DISEASE GENERAL ADULT IP CONSULT  PHARMACY LIAISON FOR MEDICATION COVERAGE CONSULT    Code Status   Prior       The patient was discussed with Dr. Cornelio Carlisle MD  [address] [address]  Phone: [phone]  ______________________________________________________________________    Physical Exam  Vital signs:  Temp: (!) 96  F (35.6  C) Temp src: Oral BP: 103/61 Pulse: 72   Resp: 16 SpO2: 98 % O2 Device: None (Room air)     Weight: 66.8 kg (147 lb 4.8 oz)  Estimated body mass index is 26.94 kg/m  as calculated from the following:    Height as of 9/23/21: 1.575 m (5' 2\").    Weight as of this encounter: 66.8 kg (147 lb 4.8 oz).    General: Alert, conversational, no apparent distress  Head: Normocephalic, atraumatic  Eyes: EOM grossly intact, sclera anicteric, no conjunctivitis  ENT: Trachea midline  Cardiovascular: Normal S1 and S2 with regular rate and rhythm, no murmurs, no friction rub  Pulmonary: Lungs clear to auscultation across bilateral anterior lung fields, no crackles, no wheezes  Abdomen: Soft, nontender, nondistended, +BS  Musculoskeletal: Grossly normal ROM of bilateral upper and lower extremities, no bony deformities, no palpable joint effusions  Neuro: Alert and oriented x3,speech is clear and fluent, CNII through XII grossly intact bilaterally   Psychiatric: Reactive affect, good personal hygiene, no visual or auditory hallucinations        Primary Care Physician   Lea Lopez    Discharge Orders      Reason for your hospital stay    Dear Kelly Barnes,    Your were hospitalized at [facility] with diarrhea due to C.difficile infection and Rhinovirus infection and treated with antibiotics - Fidoxamicin.  Over your hospitalization your condition improved and today you are " ready to be discharged.  You should continue to improve but if you develop fever, shortness of breath, abdominal pain, worsening diarrhea or constipation please seek medical attention.    We are suggesting the following medication changes:  - Continue Fidoxamicin for 9 additional days    Please set up an appointment with:  - Outpatient follow up with Dr. Giselle Silva (ID) and Oh Pabon/Sunday Metzger (GI) for further evaluation for possible fecal microbiota transplantation in 1 month    It was a pleasure meeting with you today. Thank you for allowing me and my team the privilege of caring for you during your hospitalization. You are the reason we are here, and I truly hope we provided you with the excellent service you deserve. Please let us know if there is anything else we can do for you so that we can be sure you are leaving completely satisfied with your care experience.    Your hospital unit at the time of discharge is 7B so if you have any questions please call the hospital at [phone] and ask to talk to a nurse on 7B.     Sincerely,    Kirk Carlisle MD  Internal Medicine  [facility]     Activity    Your activity upon discharge: activity as tolerated     Adult [facility] Follow-up and recommended labs and tests    Follow up with Dr. Giselle Silva (ID) and Oh Pabon/Sunday Metzger (GI) for further evaluation for possible fecal microbiota transplantation within 2 weeks  to evaluate medication change and for hospital follow- up. The following labs/tests are recommended: Blood counts, electrolytes.    Appointments on [place] and/or [facility] (with [facility] or [facility] provider or service). Call [phone] if you haven't heard regarding these appointments within 7 days of discharge.     Diet    Follow this diet upon discharge: Orders Placed This Encounter      Regular Diet Adult       Significant Results and Procedures   Most Recent 3 CBC's:Recent Labs   Lab Test  10/14/21  0627 10/13/21  0856 10/12/21  2030   WBC 11.9* 23.2* 28.6*   HGB 11.1* 13.0 14.1   MCV 85 85 84    273 363       Discharge Medications   Discharge Medication List as of 10/14/2021  1:19 PM      START taking these medications    Details   fidaxomicin (DIFICID) 200 MG tablet Take 1 tablet (200 mg) by mouth 2 times daily for 18 doses, Disp-18 tablet, R-0, E-Prescribe      vancomycin (VANCOCIN) 125 MG capsule Take 1 capsule (125 mg) by mouth 4 times daily for 14 days, THEN 1 capsule (125 mg) 2 times daily for 7 days, THEN 1 capsule (125 mg) daily for 7 days, THEN 1 capsule (125 mg) every other day for 14 days., Disp-84 capsule, R-0, E-Prescribe         CONTINUE these medications which have NOT CHANGED    Details   glimepiride (AMARYL) 4 MG tablet Take 1 tablet (4 mg) by mouth 2 times daily (with meals), Disp-180 tablet, R-1, E-PrescribeNew dosage . -tamara      losartan-hydrochlorothiazide (HYZAAR) 50-12.5 MG tablet Take 1 tablet by mouth every morning, No Print Out      potassium citrate (UROCIT-K) 10 MEQ (1080 MG) CR tablet Take 2 tablets (20 mEq) by mouth 2 times daily, Disp-120 tablet, R-3, E-Prescribe      simvastatin (ZOCOR) 20 MG tablet Take 1 tablet (20 mg) by mouth At Bedtime TAKE ONE TABLET BY MOUTH AT BEDTIME, Disp-90 tablet, R-3, E-Prescribe      atovaquone-proguanil (MALARONE) 250-100 MG tablet Take 1 tablet by mouth daily Start 2 days before exposure to Malaria and continue daily till  7 days after exposure., Disp-24 tablet, R-0, E-PrescribeStart on 10/13/2021 and take with food      blood glucose (ONETOUCH ULTRA) test strip Use to test blood sugar twice daily. Dispense 1 box of 200 test strips, #3 refills., Disp-100 strip, R-3, E-Prescribe      blood glucose monitoring (ONE TOUCH ULTRA 2) meter device kit Use to test blood sugar 3 times dailyDisp-1 kit, [id number]      OneTouch Delica Lancets 33G MISC 1 Device by In Vitro route 2 times daily, Disp-100 each, R-11, E-Prescribe            Allergies   Allergies   Allergen Reactions     Ibuprofen Other (See Comments)     numbness in hands and feet     Keflex [Cephalexin] Rash     Metformin Rash

## 2021-10-15 NOTE — PROGRESS NOTES
Clinic Care Coordination Contact  Community Health Worker Initial Outreach    Patient accepts CC: No, Pt declined needs for extra support.      Clinic Care Coordination Contact  [facility]  SITUATION                                                      Admission:    Admission Date: 10/12/21   Reason for Admission: Severe sepsisC. Difficile infection, second recurrenceAcute kidney injuryHx of E.coli pyelonephritis Hx of staghorn calculusBacteriuriaPyuriaHypokalemia  Discharge:   Discharge Date: 10/14/21  Discharge Diagnosis: Severe sepsisC. Difficile infection, second recurrenceAcute kidney injuryHx of E.coli pyelonephritis Hx of staghorn calculusBacteriuriaPyuriaHypokalemia    BACKGROUND                                                      Kelly Barnes is a 81 year old woman with a history of type 2 diabetes, hyperlipidemia, hypertension, history of recurrent pyelonephritis and fungemia, recurrent C. difficile colitis who is admitted 10/12/21 with fevers and diarrhea concerning for recurrence of C.difficile infection    ASSESSMENT      Enrollment  Primary Care Care Coordination Status: Declined    Discharge Assessment  How are you doing now that you are home?: doing well  How are your symptoms? (Red Flag symptoms escalate to triage hotline per guidelines): Improved  Do you feel your condition is stable enough to be safe at home until your provider visit?: Yes  Does the patient have their discharge instructions? : Yes  Does the patient have questions regarding their discharge instructions? : No  Were you started on any new medications or were there changes to any of your previous medications? : Yes  Does the patient have all of their medications?: Yes  Do you have questions regarding any of your medications? : Yes (see comment)  Do you have all of your needed medical supplies or equipment (DME)?  (i.e. oxygen tank, CPAP, cane, etc.): Yes  Discharge follow-up appointment scheduled within  14 calendar days? : Yes  Discharge Follow Up Appointment Date: 11/04/21  Discharge Follow Up Appointment Scheduled with?: Specialty Care Provider    Post-op ([facility] Only)  If the patient had a surgery or procedure, do they have any questions for a nurse?: No             PLAN                                                      Outpatient Plan:     Follow-up Appointments     Adult [facility] Follow-up and recommended labs and tests      Follow up with Dr. Giselle Silva (ID) and Oh Pabon/Sunday Metzger (GI) for further evaluation for possible fecal microbiota   transplantation within 2 weeks  to evaluate medication change and for   hospital follow- up. The following labs/tests are recommended: Blood   counts, electrolytes.    Future Appointments   Date Time Provider Department Center   11/4/2021  8:20 AM Lea Lopez MD [facility]   12/7/2021  9:00 AM Jose Sue, [facility]   1/6/2022 10:15 AM  LAB [facility]   1/7/2022  9:00 AM Brenda Snowden, CNP [facility]   6/2/2022  8:00 AM [facility]         For any urgent concerns, please contact our 24 hour nurse triage line: [phone])       DAVID Sommers  [phone]  [facility]

## 2021-10-18 LAB
BACTERIA BLD CULT: NO GROWTH
BACTERIA BLD CULT: NO GROWTH

## 2021-10-21 ENCOUNTER — TELEPHONE (OUTPATIENT)
Dept: GASTROENTEROLOGY | Facility: CLINIC | Age: 81
End: 2021-10-21

## 2021-10-21 NOTE — TELEPHONE ENCOUNTER
"[facility] Call Center    Phone Message    May a detailed message be left on voicemail: yes     Reason for Call: Other: Discharge order received for Pt for \"Follow up with Dr. Giselle Silva (ID) and Oh Pabon/Sunday Metzger (GI) for further evaluation for possible fecal microbiota transplantation within 2 weeks  to evaluate medication change and for hospital follow- up. The following labs/tests are recommended: Blood counts, electrolytes\" nothing available within 2 weeks - please reach out to Pt to discuss scheduling options, thanks     Action Taken: Other: GI    Travel Screening: Not Applicable                                                                      "

## 2021-10-24 ENCOUNTER — HEALTH MAINTENANCE LETTER (OUTPATIENT)
Age: 81
End: 2021-10-24

## 2021-10-25 ENCOUNTER — MYC MEDICAL ADVICE (OUTPATIENT)
Dept: FAMILY MEDICINE | Facility: CLINIC | Age: 81
End: 2021-10-25

## 2021-10-28 ENCOUNTER — TELEPHONE (OUTPATIENT)
Dept: FAMILY MEDICINE | Facility: CLINIC | Age: 81
End: 2021-10-28
Payer: COMMERCIAL

## 2021-10-28 NOTE — TELEPHONE ENCOUNTER
Reason for Call:  Form, our goal is to have forms completed with 72 hours, however, some forms may require a visit or additional information.    Type of letter, form or note:  travel insurance physician's statement    Who is the form from?: Patient    Where did the form come from: Patient or family brought in       What clinic location was the form placed at?: [facility]    Where the form was placed: placed in pod C providers form folder Box/Folder    What number is listed as a contact on the form?: no phone number on form        Additional comments: form completion request patient said's that she will  at her Nov 4th appointment    Call taken on 10/28/2021 at 7:33 AM by Mayte Joseph

## 2021-11-04 ENCOUNTER — OFFICE VISIT (OUTPATIENT)
Dept: FAMILY MEDICINE | Facility: CLINIC | Age: 81
End: 2021-11-04
Payer: COMMERCIAL

## 2021-11-04 VITALS
HEIGHT: 62 IN | OXYGEN SATURATION: 99 % | SYSTOLIC BLOOD PRESSURE: 128 MMHG | RESPIRATION RATE: 18 BRPM | DIASTOLIC BLOOD PRESSURE: 75 MMHG | BODY MASS INDEX: 27.05 KG/M2 | WEIGHT: 147 LBS | TEMPERATURE: 98.6 F | HEART RATE: 73 BPM

## 2021-11-04 DIAGNOSIS — A04.72 CLOSTRIDIOIDES DIFFICILE DIARRHEA: ICD-10-CM

## 2021-11-04 DIAGNOSIS — E78.5 HYPERLIPIDEMIA LDL GOAL <100: ICD-10-CM

## 2021-11-04 DIAGNOSIS — E11.9 TYPE 2 DIABETES MELLITUS WITHOUT COMPLICATION, WITHOUT LONG-TERM CURRENT USE OF INSULIN (H): Primary | ICD-10-CM

## 2021-11-04 PROBLEM — N18.31 STAGE 3A CHRONIC KIDNEY DISEASE (H): Status: RESOLVED | Noted: 2020-10-15 | Resolved: 2021-11-04

## 2021-11-04 PROBLEM — N12 EMPHYSEMATOUS PYELONEPHRITIS: Status: RESOLVED | Noted: 2021-07-13 | Resolved: 2021-11-04

## 2021-11-04 PROBLEM — L23.9 ALLERGIC DERMATITIS: Status: RESOLVED | Noted: 2020-04-23 | Resolved: 2021-11-04

## 2021-11-04 LAB
CHOLEST SERPL-MCNC: 233 MG/DL
FASTING STATUS PATIENT QL REPORTED: NO
HBA1C MFR BLD: 7.4 % (ref 0–5.6)
HDLC SERPL-MCNC: 77 MG/DL
LDLC SERPL CALC-MCNC: 141 MG/DL
NONHDLC SERPL-MCNC: 156 MG/DL
TRIGL SERPL-MCNC: 73 MG/DL

## 2021-11-04 PROCEDURE — 36415 COLL VENOUS BLD VENIPUNCTURE: CPT | Performed by: FAMILY MEDICINE

## 2021-11-04 PROCEDURE — 83036 HEMOGLOBIN GLYCOSYLATED A1C: CPT | Performed by: FAMILY MEDICINE

## 2021-11-04 PROCEDURE — 99214 OFFICE O/P EST MOD 30 MIN: CPT | Performed by: FAMILY MEDICINE

## 2021-11-04 PROCEDURE — 80061 LIPID PANEL: CPT | Performed by: FAMILY MEDICINE

## 2021-11-04 RX ORDER — GLIMEPIRIDE 4 MG/1
4 TABLET ORAL 2 TIMES DAILY WITH MEALS
Qty: 180 TABLET | Refills: 3 | Status: SHIPPED | OUTPATIENT
Start: 2021-11-04 | End: 2022-05-26

## 2021-11-04 ASSESSMENT — MIFFLIN-ST. JEOR: SCORE: 1082.1

## 2021-11-04 NOTE — RESULT ENCOUNTER NOTE
Patient was seen today in clinic.  I discussed results in clinic, please see clinic progress note.    Lea Lopez MD 11/4/2021

## 2021-11-04 NOTE — PROGRESS NOTES
"  Assessment & Plan     (E11.9) Type 2 diabetes mellitus without complication, without long-term current use of insulin (H)  (primary encounter diagnosis)  Comment: worsening, not at goal, start oral medication as below, continue to monitor blood sugars  Plan: glimepiride (AMARYL) 4 MG tablet            (E78.5) Hyperlipidemia LDL goal <100  Comment:   LDL Cholesterol Calculated   Date Value Ref Range Status   09/16/2021 123 (H) <=100 mg/dL Final   05/11/2021 103 (H) <100 mg/dL Final     Comment:     Above desirable:  100-129 mg/dl  Borderline High:  130-159 mg/dL  High:             160-189 mg/dL  Very high:       >189 mg/dl        Plan: At goal  The current medical regimen is effective;  continue present plan and medications.      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    Lae Lopez MD  [facility]    Hoang Mendoza is a 81 year old who presents for the following health issues:    HPI     Hyperlipidemia Follow-Up      Are you regularly taking any medication or supplement to lower your cholesterol?   Yes- atorvastatin 20 mg daily    Are you having muscle aches or other side effects that you think could be caused by your cholesterol lowering medication?      Diabetes Follow-up      How often are you checking your blood sugar? At least once a day sometimes twice     What concerns do you have today about your diabetes? For a while blood sugar was not under control. C-diff  Numbers have been better; \"still might be too high\".      Do you have any of these symptoms? (Select all that apply)  No numbness or tingling in feet.  No redness, sores or blisters on feet.  No complaints of excessive thirst.  " "No reports of blurry vision.  No significant changes to weight. lost weight while in the hospital this summer       BP Readings from Last 2 Encounters:   11/04/21 128/75   10/14/21 103/61     Hemoglobin A1C (%)   Date Value   11/04/2021 7.4 (H)   08/27/2021 8.1 (H)   05/11/2021 7.3 (H)   10/07/2020 7.2 (H)     LDL Cholesterol Calculated (mg/dL)   Date Value   09/16/2021 123 (H)   05/11/2021 103 (H)   10/07/2020 134 (H)     147 lbs 0 oz  Wt Readings from Last 4 Encounters:   11/04/21 66.7 kg (147 lb)   10/13/21 66.8 kg (147 lb 4.8 oz)   10/12/21 67.1 kg (148 lb)   09/23/21 67.4 kg (148 lb 11.2 oz)           How many servings of fruits and vegetables do you eat daily? Varies: 2-3 servings     On average, how many sweetened beverages do you drink each day (Examples: soda, juice, sweet tea, etc.  Do NOT count diet or artificially sweetened beverages)? 0     How many days per week do you exercise enough to make your heart beat faster? Most days walking 15-30 minutes     How many minutes a day do you exercise enough to make your heart beat faster? N/A    How many days per week do you miss taking your medication? Rare       Review of Systems   Constitutional, HEENT, cardiovascular, pulmonary, GI, , musculoskeletal, neuro, skin, endocrine and psych systems are negative, except as otherwise noted.      Objective    /75   Pulse 73   Temp 98.6  F (37  C) (Oral)   Resp 18   Ht 1.562 m (5' 1.5\")   Wt 66.7 kg (147 lb)   SpO2 99%   BMI 27.33 kg/m    Body mass index is 27.33 kg/m .  Physical Exam   GENERAL: healthy, alert and no distress  NECK: no adenopathy, no asymmetry, masses, or scars and thyroid normal to palpation  RESP: lungs clear to auscultation - no rales, rhonchi or wheezes  CV: regular rate and rhythm, normal S1 S2, no S3 or S4, no murmur, click or rub, no peripheral edema and peripheral pulses strong  MS: no gross musculoskeletal defects noted, no edema  NEURO: Normal strength and tone, mentation intact " and speech normal  BACK: no CVA tenderness, no paralumbar tenderness  PSYCH: mentation appears normal, affect normal/bright

## 2021-11-07 ASSESSMENT — ENCOUNTER SYMPTOMS
BLOOD IN STOOL: 0
BOWEL INCONTINENCE: 0
VOMITING: 0
JAUNDICE: 0
RECTAL PAIN: 0
NAUSEA: 0
ABDOMINAL PAIN: 0
HEARTBURN: 0
BLOATING: 0
DIARRHEA: 1
CONSTIPATION: 0

## 2021-11-08 NOTE — TELEPHONE ENCOUNTER
RECORDS RECEIVED FROM: Internal   DATE RECEIVED: 11.10.2021   NOTES (Gather within 2 years) STATUS DETAILS   OFFICE NOTE from referring provider   N/A    OFFICE NOTE from other specialist Internal 09.07.2021   Lea Lopez MD    09.04.2021 Barrington Torres MD        DISCHARGE SUMMARY from hospital Internal 10.12.2021 Ingrid Moreno MD    08.08.2021 Cornelio Dockery, DO  More in Epic   DISCHARGE REPORT from the ER N/A    LABS (any labs) Internal    MEDICATION LIST Internal    IMAGING  (NEED IMAGES AND REPORTS)     Osteomyelitis: Foot imaging  N/A    Liver Abscess: Abdominal imaging N/A    Other (anything related to diagnoses N/A

## 2021-11-10 ENCOUNTER — OFFICE VISIT (OUTPATIENT)
Dept: INFECTIOUS DISEASES | Facility: CLINIC | Age: 81
End: 2021-11-10
Attending: INTERNAL MEDICINE
Payer: COMMERCIAL

## 2021-11-10 ENCOUNTER — PRE VISIT (OUTPATIENT)
Dept: INFECTIOUS DISEASES | Facility: CLINIC | Age: 81
End: 2021-11-10
Payer: COMMERCIAL

## 2021-11-10 VITALS
SYSTOLIC BLOOD PRESSURE: 146 MMHG | TEMPERATURE: 98.5 F | BODY MASS INDEX: 28.63 KG/M2 | WEIGHT: 154 LBS | DIASTOLIC BLOOD PRESSURE: 84 MMHG | HEART RATE: 75 BPM | OXYGEN SATURATION: 99 %

## 2021-11-10 DIAGNOSIS — A04.72 CLOSTRIDIOIDES DIFFICILE DIARRHEA: Primary | ICD-10-CM

## 2021-11-10 PROCEDURE — 99205 OFFICE O/P NEW HI 60 MIN: CPT | Performed by: INTERNAL MEDICINE

## 2021-11-10 PROCEDURE — 99417 PROLNG OP E/M EACH 15 MIN: CPT | Performed by: INTERNAL MEDICINE

## 2021-11-10 ASSESSMENT — PAIN SCALES - GENERAL: PAINLEVEL: NO PAIN (0)

## 2021-11-10 NOTE — LETTER
11/10/2021       RE: Kelly Barnes  [address]  [place] [address]     Dear Colleague,    Thank you for referring your patient, Kelly Barnes, to the [facility] INFECTIOUS DISEASE CLINIC [place] [facility]. Please see a copy of my visit note below.    ID Clinic Initial Visit Note    Assessment:  1.  Recurrent CDI.  Her first episode in July 2021 was triggered by antimicrobials.  Her second episode in September 2021 was approximately 1 month after she was hospitalized for a urinary issue that required antimicrobials.  Her third episode was in October 2021 and represented a spontaneous relapse following cessation of Vanco taper that was not triggered by new systemic antimicrobials.  Her first and third C. difficile episodes have been severe.  I think that she is at moderate to high risk of C. difficile recurrence she would be in my view eligible for intestinal microbiota transplantation or bezlotoxumab for prevention of future C. difficile symptoms.    2.  Obstructing staghorn calculus with associated E. 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  Status post percutaneous nephro lithotomy with report of successful stone removal    3.  History of UTI every 12 to 18 months.  On exam, her vaginal mucosa appears deestrogenized and somewhat dry.  This may be a risk factor for future UTI.  However, she is extremely reluctant to pursue topical vaginal estrogen therapy in light of prior breast cysts that developed with systemic estrogen therapy.  I do have some concern that future UTI may require antibiotics and trigger C. difficile down the road.  It may be worth her seeing gynecology in the future to further discuss the risks and benefits of topical vaginal estrogen therapy.    4.  Diabetes mellitus    Plan:  - Continue on current Po vanco taper, which is schedule to be ~3 weeks of tapering therapy and concluding with every other  day dosing.   - I will refer her to the [employer] program. I provided her with printed information and with a copy of the informed consent, explaining how IMT is not FDA approved but recommended by expert guidance.  - Consider future GYN referral to assess candidacy for topical vaginal estrogen for UTI prevention, especially if she proceeds with IMT. My view is that absorption of topical estrogen would be minimal and therefore I would not expect that she would experience the same breast side effects that she had with systemic estrogen during her early post-menopausal period.    It is a pleasure to participate in Kelly's care. Visit length 90 minutes, including record review on the day of encounter along with encounter that included clinical counseling and care coordination.    Giselle Silva MD   of Medicine, Division of Infectious Diseases  [facility] [phone]    HPI:  This is an 81-year-old woman with past history diabetes mellitus, aortic stenosis, who is presenting with history of recurrent CDI.    In July 2021, she developed sudden onset fever and abdominal discomfort.  She was found to have nephrolithiasis due to a staghorn calculus.  She underwent cystoscopy and stent placement as a temporizing measure.  She received ceftriaxone followed by cefazolin that was intended to continue until she could undergo percutaneous nephrolithotomy.    She was readmitted with her first-ever episode of on 7/31/2021.  Her white blood cell count was 30 and her creatinine had elevated from its normal range to 1.4.  Her symptoms consisted of diarrhea that was alternating with constipation and fever.  Her systemic antibiotics were stopped.  She was placed on vancomycin, and reported a 10 to 14-day course.  Her gastrointestinal symptoms resolved.    Unfortunately, she was readmitted on 8/4/2021.  She was febrile, placed on systemic antimicrobials, and ultimately found to have Candida glabrata  fungemia.  Blood cultures were positive on 8/8 and 8/11, and were negative thereafter.  She underwent staged percutaneous nephrolithotomy and following her second procedure there was no visible remaining stone.  TTE was negative.  She was placed on micafungin to complete 14 days.  Of note, she reported some dizziness while on micafungin that subsided following its cessation.    In early September, she redeveloped diarrhea.  On 9/3 she was tested for C. difficile and was positive.  She was placed on a tapering regimen of oral vancomycin.  Her gastrointestinal symptoms resolved following initiation of vancomycin.    Unfortunately, she was recently hospitalized in October 2021 with recurrence of diarrhea and fever.  White blood cell count was 20.  C. difficile testing was positive.  She was placed on fidaxomicin for 10 days, followed by vancomycin for what she reports is 14 days, is currently on twice daily dosing, and is scheduled to have tapering doses over the next 3 weeks.  Her gastrointestinal symptoms have resolved, but she is very fearful of redeveloping C. difficile.    She does not have significant gastrointestinal history.  Her bowel movements are typically daily and not difficult to pass.  She reports her last colonoscopy was performed around 10 years ago and her endoscopist made mention that the study was somewhat technically difficult due to her anatomy.  Her  had C. difficile in 2013 prior to his death.  No prior gastrointestinal surgeries.  Not on proton pump inhibitor and does not have GERD.    She does not have prior history of kidney stones.  She estimates that she has infrequent urinary tract infections, and these occur once every 12 to 18 months.  She does not have ongoing symptoms of vaginal dryness.  She reports having tried systemic estrogen therapy following menopause, but developed cysts in her breasts, and stopped using systemic estrogen therapy at that time.    Her functional status is  that she lives independently and requires no assistance with her activities of daily living.    Past Medical History:   Diagnosis Date     Acute kidney injury (H) 12/19/2020     Allergic rhinitis, cause unspecified      Anemia      Aortic stenosis 02/29/2016    With new murmur noted 2/2016, normal echo, repeat echo 2/2017.     Aortic valve sclerosis      Atypical chest pain 07/24/2015     cuboid     left foot     Fungemia 8/16/2021     History of Clostridium difficile colitis      Hyperlipidemia LDL goal <100 11/01/2012     Hypertension goal BP (blood pressure) < 140/90      Musculoskeletal chest pain 11/25/2016     Obesity, Class I, BMI 30-34.9 11/11/2015     Pneumonia 03/2016     Pyelonephritis      Sepsis, due to unspecified organism, unspecified whether acute organ dysfunction present (H) 12/19/2020     Type 2 diabetes, HbA1c goal < 7% (H)      Urinary tract infection associated with nephrostomy catheter, initial encounter (H) 8/8/2021     Current Outpatient Medications   Medication     blood glucose (ONETOUCH ULTRA) test strip     blood glucose monitoring (ONE TOUCH ULTRA 2) meter device kit     glimepiride (AMARYL) 4 MG tablet     losartan-hydrochlorothiazide (HYZAAR) 50-12.5 MG tablet     OneTouch Delica Lancets 33G MISC     simvastatin (ZOCOR) 20 MG tablet     vancomycin (VANCOCIN) 125 MG capsule     potassium citrate (UROCIT-K) 10 MEQ (1080 MG) CR tablet     No current facility-administered medications for this visit.        Allergies   Allergen Reactions     Ibuprofen Other (See Comments)     numbness in hands and feet     Keflex [Cephalexin] Rash     Metformin Rash     BP (!) 146/84   Pulse 75   Temp 98.5  F (36.9  C) (Oral)   Wt 69.9 kg (154 lb)   SpO2 99%   BMI 28.63 kg/m    GENERAL: Healthy, alert and no distress  EYES: Eyes grossly normal to inspection.  No discharge or erythema, or obvious scleral/conjunctival abnormalities.  RESP: No audible wheeze, cough, or visible cyanosis.  No visible  retractions or increased work of breathing.    SKIN: Leg edema with compression stocks.   No significant rash, abnormal pigmentation or lesions.   : external vaginal mucosa appears somewhat dry, non-irritated  NEURO: Cranial nerves grossly intact.  Mentation and speech appropriate for age.  She does not appear frail  PSYCH: Mentation appears normal, affect normal/bright, judgement and insight intact, normal speech and appearance well-groomed.    Giselle Silva MD

## 2021-11-10 NOTE — NURSING NOTE
Chief Complaint   Patient presents with     New Patient     C. Diff     Blood pressure (!) 146/84, pulse 75, temperature 98.5  F (36.9  C), temperature source Oral, weight 69.9 kg (154 lb), SpO2 99 %, not currently breastfeeding.    Carole Watters, CMA

## 2021-11-11 ENCOUNTER — TELEPHONE (OUTPATIENT)
Dept: INFECTIOUS DISEASES | Facility: CLINIC | Age: 81
End: 2021-11-11
Payer: COMMERCIAL

## 2021-11-11 DIAGNOSIS — N39.0 FREQUENT UTI: Primary | ICD-10-CM

## 2021-11-11 RX ORDER — METHENAMINE HIPPURATE 1000 MG/1
1 TABLET ORAL 2 TIMES DAILY
Qty: 60 TABLET | Refills: 3 | Status: SHIPPED | OUTPATIENT
Start: 2021-11-11 | End: 2022-03-10

## 2021-11-11 NOTE — PROGRESS NOTES
GI CLINIC VISIT    CC/REFERRING PROVIDER: Sunday Metzger  REASON FOR CONSULTATION: IMT    HPI: 81 year old female with PMH of type 2 diabetes, hyperlipidemia, hypertension, history of recurrent pyelonephritis and fungemia presenting to GI clinic for recurrent C.difficile infection here to discuss intestinal microbiota transplant (IMT)    Initial diagnosis of C.diff was 7/23/2021, following a course of ceftriaxone for E.coli pyelonephritis with a 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 Prior to this, she had repeated antibiotic exposures around 1-2 times per year, largely for urinary tract infections. CT imaging also demonstrated colitis. CRP elevated at 130, however she had elevated values in the past as well.  She was treated with a 20-day vancomycin taper, with symptomatic recurrence 9/2021, confirmed by PCR, and treated with a four week vancomycin taper. About 1.5 weeks after completing the taper, she again presented with recurrent diarrhea and positive C.diff testing. She was hospitalized due to symptoms, with WBC 28k with predominant neutrophils, acute kidney injury, and tachycardia. She was treated with oral fidaxomcin, followed by a vancomycin taper.    She met with Dr. Giselle Silva yesterday who referred her for IMT.     Kelly is here today to review the IMT process and answer any questions she may have. She describes that she is doing well on vancomycin at this time, with once daily, soft formed stool, with resolution of urgency. No abdominal pain, cramping, BRBPR, or there GI concerns. She is currently on vancomycin 125 mg twice daily, starting daily dosing tomorrow.      PAST MEDICAL HISTORY:  Past Medical History:   Diagnosis Date     Acute kidney injury (H) 12/19/2020     Allergic rhinitis, cause unspecified      Anemia      Aortic stenosis 02/29/2016    With new murmur noted 2/2016, normal echo, repeat echo 2/2017.     Aortic valve sclerosis      Atypical chest pain 07/24/2015     cuboid     left foot      Fungemia 8/16/2021     History of Clostridium difficile colitis      Hyperlipidemia LDL goal <100 11/01/2012     Hypertension goal BP (blood pressure) < 140/90      Musculoskeletal chest pain 11/25/2016     Obesity, Class I, BMI 30-34.9 11/11/2015     Pneumonia 03/2016     Pyelonephritis      Sepsis, due to unspecified organism, unspecified whether acute organ dysfunction present (H) 12/19/2020     Type 2 diabetes, HbA1c goal < 7% (H)      Urinary tract infection associated with nephrostomy catheter, initial encounter (H) 8/8/2021       PREVIOUS ABDOMINAL/GYNECOLOGIC SURGERIES:  Past Surgical History:   Procedure Laterality Date     CATARACT EXTRACTION       CYSTOSCOPY, RETROGRADES, INSERT STENT URETER(S), COMBINED Right 07/15/2021    Procedure: CYSTOURETEROSCOPY, WITH RETROGRADE PYELOGRAM,  AND STENT INSERTION RIGHT;  Surgeon: Kirk Law MD;  Location: [facility] OR     LASER HOLMIUM NEPHROLITHOTOMY VIA PERCUTANEOUS NEPHROSTOMY Right 08/05/2021    Procedure: NEPHROLITHOTOMY, PERCUTANEOUS, USING HOLMIUM LASER RIGHT;  Surgeon: Kirk Law MD;  Location: [facility] OR     LASER HOLMIUM NEPHROLITHOTOMY VIA PERCUTANEOUS NEPHROSTOMY Right 08/13/2021    Procedure: Ureteroscopic assisted secondary right percutaneous nephrolihtotomy, Holmium laser lithotriipsy;  Surgeon: Kirk Law MD;  Location: [facility] OR     PICC SINGLE LUMEN PLACEMENT Left 08/17/2021    41cm (2cm external), Medial brachial vein         PERTINENT MEDICATIONS:  Current Outpatient Medications   Medication Sig Dispense Refill     blood glucose (ONETOUCH ULTRA) test strip Use to test blood sugar twice daily. Dispense 1 box of 200 test strips, #3 refills. 100 strip 3     blood glucose monitoring (ONE TOUCH ULTRA 2) meter device kit Use to test blood sugar 3 times daily 1 kit 0     glimepiride (AMARYL) 4 MG tablet Take 1 tablet (4 mg) by mouth 2 times daily (with meals) 180 tablet 3     losartan-hydrochlorothiazide (HYZAAR) 50-12.5 MG tablet Take 1  tablet by mouth every morning       OneTouch Delica Lancets 33G MISC 1 Device by In Vitro route 2 times daily 100 each 11     potassium citrate (UROCIT-K) 10 MEQ (1080 MG) CR tablet Take 2 tablets (20 mEq) by mouth 2 times daily (Patient not taking: Reported on 11/4/2021) 120 tablet 3     simvastatin (ZOCOR) 20 MG tablet Take 1 tablet (20 mg) by mouth At Bedtime TAKE ONE TABLET BY MOUTH AT BEDTIME 90 tablet 3     vancomycin (VANCOCIN) 125 MG capsule Take 1 capsule (125 mg) by mouth 4 times daily for 14 days, THEN 1 capsule (125 mg) 2 times daily for 7 days, THEN 1 capsule (125 mg) daily for 7 days, THEN 1 capsule (125 mg) every other day for 14 days. 84 capsule 0     SOCIAL HISTORY:    Social History     Socioeconomic History     Marital status:      Spouse name: Not on file     Number of children: Not on file     Years of education: Not on file     Highest education level: Not on file   Occupational History     Not on file   Tobacco Use     Smoking status: Never Smoker     Smokeless tobacco: Never Used   Vaping Use     Vaping Use: Never used   Substance and Sexual Activity     Alcohol use: Yes     Alcohol/week: 10.0 standard drinks     Comment: 1-2 drinks per week     Drug use: No     Sexual activity: Not Currently     Partners: Male   Other Topics Concern      Service No     Blood Transfusions No     Caffeine Concern No     Occupational Exposure No     Hobby Hazards No     Sleep Concern Yes     Stress Concern No     Weight Concern Yes     Special Diet No     Back Care No     Exercise Yes     Comment: walk     Bike Helmet No     Seat Belt Yes     Self-Exams Yes     Parent/sibling w/ CABG, MI or angioplasty before 65F 55M? No   Social History Narrative     Not on file     Social Determinants of Health     Financial Resource Strain: Not on file   Food Insecurity: Not on file   Transportation Needs: Not on file   Physical Activity: Not on file   Stress: Not on file   Social Connections: Not on file    Intimate Partner Violence: Not on file   Housing Stability: Not on file       FAMILY HISTORY:  Family History   Problem Relation Age of Onset     Hypertension Mother      Diabetes No family hx of      Cerebrovascular Disease No family hx of      Breast Cancer No family hx of      Cancer - colorectal No family hx of      Prostate Cancer No family hx of      Anesthesia Reaction No family hx of      Bleeding Disorder No family hx of      Clotting Disorder No family hx of        PHYSICAL EXAMINATION:  Vitals reviewed  There were no vitals taken for this visit.  Video physical exam  General: Patient appears well in no acute distress.   Skin: No visualized rash or lesions on visualized skin  Eyes: EOMI, no erythema, sclera icterus or discharge noted  Resp: Appears to be breathing comfortably without accessory muscle usage, speaking in full sentences, no cough  MSK: Appears to have normal range of motion based on visualized movements  Neurologic: No apparent tremors, facial movements symmetric  Psych: affect normal, alert and oriented    The rest of a comprehensive physical examination is deferred due to PHE (public health emergency) video restrictions      PERTINENT STUDIES Reviewed in EMR    ASSESSMENT/PLAN:    # Recurrent CDI  Initially presented with CDI in the setting of antibiotic exposures for pyelonephritis, with preceding antibiotics around 1-2 times per year, largely for UTI. She has now had two recurrences of CDI, with the latter being severe and requiring hospitalization. She is here today to review IMT for recurrent CDI.    We had a long discussion about the risks and benefits of IMT for multiple recurrent C difficile infection.  We discussed that the efficacy for multiple recurrent C diff is greater than 90% and that there have not been serious side effects reported in the literature.  We discussed the risks including transmission of potentially pathogenic microbiota (with possible infectious, inflammatory  or metabolic consequences).  We discussed that there were likely unknown risks and that the long term risks were currently unknown. We discussed our donor screening protocol.  She already has the IMT consent papers at home. She is agreeable to moving forward with IMT.    In regards to recurrent UTI, she has started prophylactic Hiprex. We did discuss vaginal estrogen, however she is not interested in this due to previous negative experience with estrogens. If she does develop uncomplicated UTI  post-IMT, IM gentamicin can be preferentially used for treatment.      RTC 10-12 weeks    Thank you for this consultation. It was a pleasure to participate in the care of this patient; please contact us with any further questions.    Alesia Gómez PA-C    70 minutes spent on the date of the encounter doing chart review, review of test results, patient visit and documentation

## 2021-11-11 NOTE — PROGRESS NOTES
[facility] Clinic Initial Visit Note    Assessment:  1.  Recurrent CDI.  Her first episode in July 2021 was triggered by antimicrobials.  Her second episode in September 2021 was approximately 1 month after she was hospitalized for a urinary issue that required antimicrobials.  Her third episode was in October 2021 and represented a spontaneous relapse following cessation of Vanco taper that was not triggered by new systemic antimicrobials.  Her first and third C. difficile episodes have been severe.  I think that she is at moderate to high risk of C. difficile recurrence she would be in my view eligible for intestinal microbiota transplantation or bezlotoxumab for prevention of future C. difficile symptoms.    2.  Obstructing staghorn calculus with associated E. 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  Status post percutaneous nephro lithotomy with report of successful stone removal    3.  History of UTI every 12 to 18 months.  On exam, her vaginal mucosa appears deestrogenized and somewhat dry.  This may be a risk factor for future UTI.  However, she is extremely reluctant to pursue topical vaginal estrogen therapy in light of prior breast cysts that developed with systemic estrogen therapy.  I do have some concern that future UTI may require antibiotics and trigger C. difficile down the road.  It may be worth her seeing gynecology in the future to further discuss the risks and benefits of topical vaginal estrogen therapy.    4.  Diabetes mellitus    Plan:  - Continue on current Po vanco taper, which is schedule to be ~3 weeks of tapering therapy and concluding with every other day dosing.   - I will refer her to the [facility] [employer] program. I provided her with printed information and with a copy of the informed consent, explaining how IMT is not FDA approved but recommended by expert guidance.  - Consider future GYN referral to assess candidacy for topical vaginal estrogen  for UTI prevention, especially if she proceeds with IMT. My view is that absorption of topical estrogen would be minimal and therefore I would not expect that she would experience the same breast side effects that she had with systemic estrogen during her early post-menopausal period.    It is a pleasure to participate in Kelly's care. Visit length 90 minutes, including record review on the day of encounter along with encounter that included clinical counseling and care coordination.    Giselle Silva MD   of Medicine, Division of Infectious Diseases  [facility] [phone]    HPI:  This is an 81-year-old woman with past history diabetes mellitus, aortic stenosis, who is presenting with history of recurrent CDI.    In July 2021, she developed sudden onset fever and abdominal discomfort.  She was found to have nephrolithiasis due to a staghorn calculus.  She underwent cystoscopy and stent placement as a temporizing measure.  She received ceftriaxone followed by cefazolin that was intended to continue until she could undergo percutaneous nephrolithotomy.    She was readmitted with her first-ever episode of on 7/31/2021.  Her white blood cell count was 30 and her creatinine had elevated from its normal range to 1.4.  Her symptoms consisted of diarrhea that was alternating with constipation and fever.  Her systemic antibiotics were stopped.  She was placed on vancomycin, and reported a 10 to 14-day course.  Her gastrointestinal symptoms resolved.    Unfortunately, she was readmitted on 8/4/2021.  She was febrile, placed on systemic antimicrobials, and ultimately found to have Candida glabrata fungemia.  Blood cultures were positive on 8/8 and 8/11, and were negative thereafter.  She underwent staged percutaneous nephrolithotomy and following her second procedure there was no visible remaining stone.  TTE was negative.  She was placed on micafungin to complete 14 days.  Of note, she reported some dizziness  while on micafungin that subsided following its cessation.    In early September, she redeveloped diarrhea.  On 9/3 she was tested for C. difficile and was positive.  She was placed on a tapering regimen of oral vancomycin.  Her gastrointestinal symptoms resolved following initiation of vancomycin.    Unfortunately, she was recently hospitalized in October 2021 with recurrence of diarrhea and fever.  White blood cell count was 20.  C. difficile testing was positive.  She was placed on fidaxomicin for 10 days, followed by vancomycin for what she reports is 14 days, is currently on twice daily dosing, and is scheduled to have tapering doses over the next 3 weeks.  Her gastrointestinal symptoms have resolved, but she is very fearful of redeveloping C. difficile.    She does not have significant gastrointestinal history.  Her bowel movements are typically daily and not difficult to pass.  She reports her last colonoscopy was performed around 10 years ago and her endoscopist made mention that the study was somewhat technically difficult due to her anatomy.  Her  had C. difficile in 2013 prior to his death.  No prior gastrointestinal surgeries.  Not on proton pump inhibitor and does not have GERD.    She does not have prior history of kidney stones.  She estimates that she has infrequent urinary tract infections, and these occur once every 12 to 18 months.  She does not have ongoing symptoms of vaginal dryness.  She reports having tried systemic estrogen therapy following menopause, but developed cysts in her breasts, and stopped using systemic estrogen therapy at that time.    Her functional status is that she lives independently and requires no assistance with her activities of daily living.    Past Medical History:   Diagnosis Date     Acute kidney injury (H) 12/19/2020     Allergic rhinitis, cause unspecified      Anemia      Aortic stenosis 02/29/2016    With new murmur noted 2/2016, normal echo, repeat echo  2/2017.     Aortic valve sclerosis      Atypical chest pain 07/24/2015     cuboid     left foot     Fungemia 8/16/2021     History of Clostridium difficile colitis      Hyperlipidemia LDL goal <100 11/01/2012     Hypertension goal BP (blood pressure) < 140/90      Musculoskeletal chest pain 11/25/2016     Obesity, Class I, BMI 30-34.9 11/11/2015     Pneumonia 03/2016     Pyelonephritis      Sepsis, due to unspecified organism, unspecified whether acute organ dysfunction present (H) 12/19/2020     Type 2 diabetes, HbA1c goal < 7% (H)      Urinary tract infection associated with nephrostomy catheter, initial encounter (H) 8/8/2021     Current Outpatient Medications   Medication     blood glucose (ONETOUCH ULTRA) test strip     blood glucose monitoring (ONE TOUCH ULTRA 2) meter device kit     glimepiride (AMARYL) 4 MG tablet     losartan-hydrochlorothiazide (HYZAAR) 50-12.5 MG tablet     OneTouch Delica Lancets 33G MISC     simvastatin (ZOCOR) 20 MG tablet     vancomycin (VANCOCIN) 125 MG capsule     potassium citrate (UROCIT-K) 10 MEQ (1080 MG) CR tablet     No current facility-administered medications for this visit.        Allergies   Allergen Reactions     Ibuprofen Other (See Comments)     numbness in hands and feet     Keflex [Cephalexin] Rash     Metformin Rash     BP (!) 146/84   Pulse 75   Temp 98.5  F (36.9  C) (Oral)   Wt 69.9 kg (154 lb)   SpO2 99%   BMI 28.63 kg/m    GENERAL: Healthy, alert and no distress  EYES: Eyes grossly normal to inspection.  No discharge or erythema, or obvious scleral/conjunctival abnormalities.  RESP: No audible wheeze, cough, or visible cyanosis.  No visible retractions or increased work of breathing.    SKIN: Leg edema with compression stocks.   No significant rash, abnormal pigmentation or lesions.   : external vaginal mucosa appears somewhat dry, non-irritated  NEURO: Cranial nerves grossly intact.  Mentation and speech appropriate for age.  She does not appear  frail  PSYCH: Mentation appears normal, affect normal/bright, judgement and insight intact, normal speech and appearance well-groomed.

## 2021-11-11 NOTE — TELEPHONE ENCOUNTER
I called Kelly and she will keep her appt with GI tomorrow to further discuss IMT.    I prescribed Hiprex 1g BID and instructed her to take Vit C 1g daily. She will begin Vit C, then Hiprex, and assess for s/e. If any s/e emerge, would stop and transition to D-mannose 1g BID.    Giselle Silva MD   of Medicine, Division of Infectious Diseases  [facility] [phone]

## 2021-11-12 ENCOUNTER — VIRTUAL VISIT (OUTPATIENT)
Dept: GASTROENTEROLOGY | Facility: CLINIC | Age: 81
End: 2021-11-12
Payer: COMMERCIAL

## 2021-11-12 VITALS — BODY MASS INDEX: 28.63 KG/M2 | WEIGHT: 154 LBS

## 2021-11-12 DIAGNOSIS — A04.72 CLOSTRIDIOIDES DIFFICILE DIARRHEA: Primary | ICD-10-CM

## 2021-11-12 PROCEDURE — 99205 OFFICE O/P NEW HI 60 MIN: CPT | Mod: 95 | Performed by: PHYSICIAN ASSISTANT

## 2021-11-12 RX ORDER — VANCOMYCIN HYDROCHLORIDE 125 MG/1
CAPSULE ORAL
Qty: 30 CAPSULE | Refills: 1 | Status: SHIPPED | OUTPATIENT
Start: 2021-11-12 | End: 2021-12-07

## 2021-11-12 NOTE — LETTER
11/12/2021         RE: Kelly Barnes  [address]  [facility] [address]        Dear Colleague,    Thank you for referring your patient, Kelly Barnes, to the [facility] [place]. Please see a copy of my visit note below.    GI CLINIC VISIT    CC/REFERRING PROVIDER: Sunday Metzger  REASON FOR CONSULTATION: IMT    HPI: 81 year old female with PMH of type 2 diabetes, hyperlipidemia, hypertension, history of recurrent pyelonephritis and fungemia presenting to GI clinic for recurrent C.difficile infection here to discuss intestinal microbiota transplant (IMT)    Initial diagnosis of C.diff was 7/23/2021, following a course of ceftriaxone for E.coli pyelonephritis with a 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 Prior to this, she had repeated antibiotic exposures around 1-2 times per year, largely for urinary tract infections. CT imaging also demonstrated colitis. CRP elevated at 130, however she had elevated values in the past as well.  She was treated with a 20-day vancomycin taper, with symptomatic recurrence 9/2021, confirmed by PCR, and treated with a four week vancomycin taper. About 1.5 weeks after completing the taper, she again presented with recurrent diarrhea and positive C.diff testing. She was hospitalized due to symptoms, with WBC 28k with predominant neutrophils, acute kidney injury, and tachycardia. She was treated with oral fidaxomcin, followed by a vancomycin taper.    She met with Dr. Giselle Silva yesterday who referred her for IMT.     Kelly is here today to review the IMT process and answer any questions she may have. She describes that she is doing well on vancomycin at this time, with once daily, soft formed stool, with resolution of urgency. No abdominal pain, cramping, BRBPR, or there GI concerns. She is currently on vancomycin 125 mg twice daily, starting daily dosing tomorrow.      PAST MEDICAL HISTORY:  Past Medical History:   Diagnosis Date     Acute  kidney injury (H) 12/19/2020     Allergic rhinitis, cause unspecified      Anemia      Aortic stenosis 02/29/2016    With new murmur noted 2/2016, normal echo, repeat echo 2/2017.     Aortic valve sclerosis      Atypical chest pain 07/24/2015     cuboid     left foot     Fungemia 8/16/2021     History of Clostridium difficile colitis      Hyperlipidemia LDL goal <100 11/01/2012     Hypertension goal BP (blood pressure) < 140/90      Musculoskeletal chest pain 11/25/2016     Obesity, Class I, BMI 30-34.9 11/11/2015     Pneumonia 03/2016     Pyelonephritis      Sepsis, due to unspecified organism, unspecified whether acute organ dysfunction present (H) 12/19/2020     Type 2 diabetes, HbA1c goal < 7% (H)      Urinary tract infection associated with nephrostomy catheter, initial encounter (H) 8/8/2021       PREVIOUS ABDOMINAL/GYNECOLOGIC SURGERIES:  Past Surgical History:   Procedure Laterality Date     CATARACT EXTRACTION       CYSTOSCOPY, RETROGRADES, INSERT STENT URETER(S), COMBINED Right 07/15/2021    Procedure: CYSTOURETEROSCOPY, WITH RETROGRADE PYELOGRAM,  AND STENT INSERTION RIGHT;  Surgeon: Kirk Law MD;  Location: [facility] OR     LASER HOLMIUM NEPHROLITHOTOMY VIA PERCUTANEOUS NEPHROSTOMY Right 08/05/2021    Procedure: NEPHROLITHOTOMY, PERCUTANEOUS, USING HOLMIUM LASER RIGHT;  Surgeon: Kirk Law MD;  Location: [facility] OR     LASER HOLMIUM NEPHROLITHOTOMY VIA PERCUTANEOUS NEPHROSTOMY Right 08/13/2021    Procedure: Ureteroscopic assisted secondary right percutaneous nephrolihtotomy, Holmium laser lithotriipsy;  Surgeon: Kirk Law MD;  Location: [facility]     PICC SINGLE LUMEN PLACEMENT Left 08/17/2021    41cm (2cm external), Medial brachial vein         PERTINENT MEDICATIONS:  Current Outpatient Medications   Medication Sig Dispense Refill     blood glucose (ONETOUCH ULTRA) test strip Use to test blood sugar twice daily. Dispense 1 box of 200 test strips, #3 refills. 100 strip 3     blood glucose  monitoring (ONE TOUCH ULTRA 2) meter device kit Use to test blood sugar 3 times daily 1 kit 0     glimepiride (AMARYL) 4 MG tablet Take 1 tablet (4 mg) by mouth 2 times daily (with meals) 180 tablet 3     losartan-hydrochlorothiazide (HYZAAR) 50-12.5 MG tablet Take 1 tablet by mouth every morning       OneTouch Delica Lancets 33G MISC 1 Device by In Vitro route 2 times daily 100 each 11     potassium citrate (UROCIT-K) 10 MEQ (1080 MG) CR tablet Take 2 tablets (20 mEq) by mouth 2 times daily (Patient not taking: Reported on 11/4/2021) 120 tablet 3     simvastatin (ZOCOR) 20 MG tablet Take 1 tablet (20 mg) by mouth At Bedtime TAKE ONE TABLET BY MOUTH AT BEDTIME 90 tablet 3     vancomycin (VANCOCIN) 125 MG capsule Take 1 capsule (125 mg) by mouth 4 times daily for 14 days, THEN 1 capsule (125 mg) 2 times daily for 7 days, THEN 1 capsule (125 mg) daily for 7 days, THEN 1 capsule (125 mg) every other day for 14 days. 84 capsule 0     SOCIAL HISTORY:    Social History     Socioeconomic History     Marital status:      Spouse name: Not on file     Number of children: Not on file     Years of education: Not on file     Highest education level: Not on file   Occupational History     Not on file   Tobacco Use     Smoking status: Never Smoker     Smokeless tobacco: Never Used   Vaping Use     Vaping Use: Never used   Substance and Sexual Activity     Alcohol use: Yes     Alcohol/week: 10.0 standard drinks     Comment: 1-2 drinks per week     Drug use: No     Sexual activity: Not Currently     Partners: Male   Other Topics Concern      Service No     Blood Transfusions No     Caffeine Concern No     Occupational Exposure No     Hobby Hazards No     Sleep Concern Yes     Stress Concern No     Weight Concern Yes     Special Diet No     Back Care No     Exercise Yes     Comment: walk     Bike Helmet No     Seat Belt Yes     Self-Exams Yes     Parent/sibling w/ CABG, MI or angioplasty before 65F 55M? No   Social  History Narrative     Not on file     Social Determinants of Health     Financial Resource Strain: Not on file   Food Insecurity: Not on file   Transportation Needs: Not on file   Physical Activity: Not on file   Stress: Not on file   Social Connections: Not on file   Intimate Partner Violence: Not on file   Housing Stability: Not on file       FAMILY HISTORY:  Family History   Problem Relation Age of Onset     Hypertension Mother      Diabetes No family hx of      Cerebrovascular Disease No family hx of      Breast Cancer No family hx of      Cancer - colorectal No family hx of      Prostate Cancer No family hx of      Anesthesia Reaction No family hx of      Bleeding Disorder No family hx of      Clotting Disorder No family hx of        PHYSICAL EXAMINATION:  Vitals reviewed  There were no vitals taken for this visit.  Video physical exam  General: Patient appears well in no acute distress.   Skin: No visualized rash or lesions on visualized skin  Eyes: EOMI, no erythema, sclera icterus or discharge noted  Resp: Appears to be breathing comfortably without accessory muscle usage, speaking in full sentences, no cough  MSK: Appears to have normal range of motion based on visualized movements  Neurologic: No apparent tremors, facial movements symmetric  Psych: affect normal, alert and oriented    The rest of a comprehensive physical examination is deferred due to PHE (public health emergency) video restrictions      PERTINENT STUDIES Reviewed in EMR    ASSESSMENT/PLAN:    # Recurrent CDI  Initially presented with CDI in the setting of antibiotic exposures for pyelonephritis, with preceding antibiotics around 1-2 times per year, largely for UTI. She has now had two recurrences of CDI, with the latter being severe and requiring hospitalization. She is here today to review IMT for recurrent CDI.    We had a long discussion about the risks and benefits of IMT for multiple recurrent C difficile infection.  We discussed  that the efficacy for multiple recurrent C diff is greater than 90% and that there have not been serious side effects reported in the literature.  We discussed the risks including transmission of potentially pathogenic microbiota (with possible infectious, inflammatory or metabolic consequences).  We discussed that there were likely unknown risks and that the long term risks were currently unknown. We discussed our donor screening protocol.  She already has the IMT consent papers at home. She is agreeable to moving forward with IMT.    In regards to recurrent UTI, she has started prophylactic Hiprex. We did discuss vaginal estrogen, however she is not interested in this due to previous negative experience with estrogens. If she does develop uncomplicated UTI  post-IMT, IM gentamicin can be preferentially used for treatment.      RTC 10-12 weeks    Thank you for this consultation. It was a pleasure to participate in the care of this patient; please contact us with any further questions.    Alesia Gómez PA-C    70 minutes spent on the date of the encounter doing chart review, review of test results, patient visit and documentation          Again, thank you for allowing me to participate in the care of your patient.      Sincerely,    Alesia Gómez PA-C

## 2021-11-12 NOTE — PATIENT INSTRUCTIONS
It was a pleasure taking care of you today.  I've included a brief summary of our discussion and care plan from today's visit below.  Please review this information with your primary care provider.  _______________________________________________________________________    My recommendations are summarized as follows:    -- Continue the vancomycin 125 mg once daily until directed to stop. Lea will be communicating with you soon, and will provide a stop date for this pending the date of your capsule transplant.    Return to GI Clinic around 2 months after the transplant to review your progress.    ______________________________________________________________________    How do I schedule labs, imaging studies, or procedures that were ordered in clinic today?     Labs: To schedule lab appointment at the [facility], use my chart or call [phone]. If you have a [facility] lab closer to home where you are regularly seen you can give them a call.     Procedures: If a colonoscopy, upper endoscopy, breath test, esophageal manometry, or pH impedence was ordered today, our endoscopy team will call you to schedule this. If you have not heard from our endoscopy team within a week, please call [phone] to schedule.     Imaging Studies: If you were scheduled for a CT scan, X-ray, MRI, ultrasound, HIDA scan or other imaging study, please call [phone] to have this scheduled.     Referral: If a referral to another specialty was ordered, expect a phone call or follow instructions above. If you have not heard from anyone regarding your referral in a week, please call our clinic to check the status.     Who do I call with any questions after my visit?  Please be in touch if there are any further questions that arise following today's visit.  There are multiple ways to contact your gastroenterology care team.        During business hours, you may reach a Gastroenterology nurse at [phone]      To  schedule or reschedule an appointment, please call [phone].       You can always send a secure message through [employer].  [employer] messages are answered by your nurse or doctor typically within 24 hours.  Please allow extra time on weekends and holidays.        For urgent/emergent questions after business hours, you may reach the on-call GI Fellow by contacting the [facility]  at [phone].     How will I get the results of any tests ordered?    You will receive all of your results.  If you have signed up for [employer], any tests ordered at your visit will be available to you after your physician reviews them.  Typically this takes 1-2 weeks.  If there are urgent results that require a change in your care plan, your physician or nurse will call you to discuss the next steps.      What is [employer]?  [employer] is a secure way for you to access all of your healthcare records from the [facility].  It is a web based computer program, so you can sign on to it from any location.  It also allows you to send secure messages to your care team.  I recommend signing up for [employer] access if you have not already done so and are comfortable with using a computer.      How to I schedule a follow-up visit?  If you did not schedule a follow-up visit today, please call [phone] to schedule a follow-up office visit.      Sincerely,    Alesia Gómez PA-C  Division of Gastroenterology, Hepatology & Nutrition  [facility]

## 2021-11-12 NOTE — NURSING NOTE
Chief Complaint   Patient presents with     Follow Up       Vitals:    11/12/21 0732   Weight: 69.9 kg (154 lb)       Body mass index is 28.63 kg/m .    Gabriella Billingsley CMA

## 2021-11-15 ENCOUNTER — TELEPHONE (OUTPATIENT)
Dept: GASTROENTEROLOGY | Facility: CLINIC | Age: 81
End: 2021-11-15
Payer: COMMERCIAL

## 2021-11-15 NOTE — TELEPHONE ENCOUNTER
shanicem to schedule 2-3 month follow up with SAMUEL PATRICIO, left call center phone number and sent [employer].

## 2021-11-16 DIAGNOSIS — E78.5 HYPERLIPIDEMIA LDL GOAL <100: Primary | ICD-10-CM

## 2021-11-16 RX ORDER — SIMVASTATIN 40 MG
40 TABLET ORAL AT BEDTIME
Qty: 90 TABLET | Refills: 3 | Status: SHIPPED | OUTPATIENT
Start: 2021-11-16 | End: 2022-01-24 | Stop reason: DRUGHIGH

## 2021-11-16 NOTE — RESULT ENCOUNTER NOTE
Thank you for getting your cholesterol checked!  Your total cholesterol is too high.     Desired or goal levels are:  CHOLESTEROL: Desirable is less than 200.  HDL (Good Cholesterol): Desirable is greater than 40 in men and greater than 50 in women.  LDL (Bad Cholesterol): Desirable is less than 130 or less than 100 in patients with diabetes or vascular disease. For some patients with diabetes or vascular disease, the desireable LDL is less that 70.  TRIGLYCERIDES: Desirable is less than 150.    I recommend increasing your simvastatin (Zocor) to 40 mg daily . I'll send a new prescription to our pharmacy, and you can use up the pills you have at home by taking 2 daily. Please repeat a fasting lipid test in 3 months.    If you have any questions, please contact the clinic or schedule an appointment with me, thank you!      Sincerely,  Dr. Lea Lopez MD  11/16/2021

## 2021-11-29 ENCOUNTER — ALLIED HEALTH/NURSE VISIT (OUTPATIENT)
Dept: GASTROENTEROLOGY | Facility: CLINIC | Age: 81
End: 2021-11-29
Payer: COMMERCIAL

## 2021-11-29 DIAGNOSIS — A04.71 RECURRENT CLOSTRIDIOIDES DIFFICILE DIARRHEA: Primary | ICD-10-CM

## 2021-11-29 PROCEDURE — 99207 PR NO CHARGE LOS: CPT | Performed by: INTERNAL MEDICINE

## 2021-11-30 NOTE — PROGRESS NOTES
At-home encounter with pt to facilitate Intestinal Microbiota Transplantation via enteric capsules. Pt's adult son was present for the visit. Informed consent was obtained; correlative handouts, including an antibiotic guidance letter, were provided.  Pt will discontinue vancomycin today; plan for capsule ingestion December 1, 2021  Follow up phone call in 1-2 weeks    Capsule details:  Donor 070     9.0  x 10^11 cells    11/18/20   5 DR caps

## 2021-12-06 ENCOUNTER — MYC MEDICAL ADVICE (OUTPATIENT)
Dept: FAMILY MEDICINE | Facility: CLINIC | Age: 81
End: 2021-12-06
Payer: COMMERCIAL

## 2021-12-06 DIAGNOSIS — E78.5 HYPERLIPIDEMIA LDL GOAL <100: ICD-10-CM

## 2021-12-06 NOTE — PROGRESS NOTES
Medication Therapy Management (MTM) Encounter    ASSESSMENT:                            Medication Adherence/Access: No issues identified    Type 2 Diabetes: Patient is  meeting A1c goal of < 8%. Self monitoring of blood glucose is not at goal of post prandial < 180 mg/dL.   Patient would benefit from ideal SMBG: Check blood sugars pre-prandial, 2 hours post-prandial, and with symptoms of hypoglycemia and Sulfonylurea (Glimepiride) :  Stay on daily dose of  4mg. Tab twice daily.         Prevent kidney stones:  Needs improvement --patient hesitant to begin preventive rx of potassium citrate --mtm did previously sent her non drug things she can do until --she is doing well with them.     No drug -drug interactions with hiprex(prevent uti's and thus antibiotics course that might trigger a c. Diff.) + potassium  Citrate. See plan for med restarts.         PLAN:                            1. FYI--A1c is excellent at 7.4% --stay on glimepiride twice daily as is --continue excellent diet changes.     2. FYI--no drug-drug interaction listed with hiprex and potassium citrate .  Please start hiprex 1 pill 2 x day to prevent uti which will prevent a subsequent antibiotic that might trigger another c.diff problem.  Also -to prevent another kidney stone I would have you try 1-potassium citrate 10meq. pill --twice daily + all the good non-drug changes your doing to prevent another kidney stone.     See urology in Jan-2022.         Follow-up: Return in about 7 weeks (around 1/24/2022), or 9 Am via Telephone, for Medication Therapy Management Visit.    SUBJECTIVE/OBJECTIVE:                          Kelly Barnes is a 81 year old female called for a follow-up (10-5-21)visit. She was referred to me from Lea Lopez  .      Reason for visit:     a1c review, fecal microbiota started for chronic c.diiff. post hospital. Capsules last week - no diarrhea .     Doing my suggested kidney stone prevention non drug measures.        Allergies/ADRs: Reviewed in chart  Past Medical History: Reviewed in chart  Tobacco: She reports that she has never smoked. She has never used smokeless tobacco.  Alcohol: rarely. (wine if anything).   Caffeine: 4 cups coffee /day .   Activity: was active till recent infection --now back to 30 minutes /day walking.     Medication Adherence/Access: no issues reported    Type 2 Diabetes:  Currently taking ; Glimepiride dose is 4mg . Bid .  Previously:   . Patient is not experiencing side effects.  Blood sugar monitorin time(s) daily. Ranges (patient reported): Fasting- now running 130-140's.   Post-Prandial- 2 hours after : none recently .       Symptoms of low blood sugar? none  Symptoms of high blood sugar? none  Eye exam: up to date  Foot exam: up to date  Diet/Exercise: controlling carbs , walks daily.   Aspirin: Not taking due to age  Statin: Yes: Simvastatin    ACEi/ARB: Yes: Losartan.   Urine Albumin:   Lab Results   Component Value Date    UMALCR 21.82 2021      Lab Results   Component Value Date    A1C 7.4 2021    A1C 8.1 2021    A1C 7.4 2021    A1C 7.3 2021    A1C 7.2 10/07/2020    A1C 7.6 10/15/2019    A1C 6.7 10/25/2018    A1C 7.3 10/26/2017       Prevent kidney stones:  Had 1 large stone not painful removed in august --urology wants her on twice daily preventive potassium citrate , she declines to take right now --wants non drug things to try first --mtm gave her list of recommendations.  ID doctor suggested she take hiprex(prevent uti's) --she stopped that now due to IMT for her c. Diff.           I spent 30 minutes with this patient today. All changes were made via collaborative practice agreement with Lea Lopez MD. A copy of the visit note was provided to the patient's primary care provider.    The patient was sent via [employer] a summary of these recommendations.     Jose Sue Rp.  Medication Therapy Management  Provider  [phone]        Telemedicine Visit Details  Type of service:  Telephone visit  Start Time: 9:00 AM  End Time: 9:30 AM  Originating Location (patient location): Home  Distant Location (provider location):  [facility]     Medication Therapy Recommendations  Kidney stone    Current Medication: potassium citrate (UROCIT-K) 10 MEQ (1080 MG) CR tablet   Rationale: Patient prefers not to take - Adherence - Adherence   Recommendation: Provide Adherence Intervention - Restart drug at 1 tab twice daily to prevent kidney stones.   Status: Accepted per CPA         Preventive medication therapy needed    Current Medication: methenamine (HIPREX) 1 g tablet   Rationale: Patient prefers not to take - Adherence - Adherence   Recommendation: Provide Adherence Intervention - start 1 tab twice daily to prevent UTI.   Status: Accepted per CPA

## 2021-12-07 ENCOUNTER — VIRTUAL VISIT (OUTPATIENT)
Dept: PHARMACY | Facility: CLINIC | Age: 81
End: 2021-12-07
Payer: COMMERCIAL

## 2021-12-07 DIAGNOSIS — E11.9 TYPE 2 DIABETES MELLITUS WITHOUT COMPLICATION, WITHOUT LONG-TERM CURRENT USE OF INSULIN (H): ICD-10-CM

## 2021-12-07 DIAGNOSIS — Z29.9 PREVENTIVE MEDICATION THERAPY NEEDED: ICD-10-CM

## 2021-12-07 DIAGNOSIS — N20.0 KIDNEY STONE: Primary | ICD-10-CM

## 2021-12-07 PROCEDURE — 99606 MTMS BY PHARM EST 15 MIN: CPT | Performed by: PHARMACIST

## 2021-12-07 PROCEDURE — 99607 MTMS BY PHARM ADDL 15 MIN: CPT | Performed by: PHARMACIST

## 2021-12-07 NOTE — PATIENT INSTRUCTIONS
Recommendations from today's MTM visit:                                                       1. FYI--A1c is excellent at 7.4% --stay on glimepiride twice daily as is --continue excellent diet changes.     2. FYI--no drug-drug interaction listed with hiprex and potassium citrate .  Please start hiprex 1 pill 2 x day to prevent uti which will prevent a subsequent antibiotic that might trigger another c.diff problem.  Also -to prevent another kidney stone I would have you try 1-potassium citrate 10meq. pill --twice daily + all the good non-drug changes your doing to prevent another kidney stone.     See urology in Jan-2022.         Follow-up: Return in about 7 weeks (around 1/24/2022), or 9 Am via Telephone, for Medication Therapy Management Visit.    It was great to speak with you today.  I value your experience and would be very thankful for your time with providing feedback on our clinic survey. You may receive a survey via email or text message in the next few days.     To schedule another MTM appointment, please call the clinic directly or you may call the MTM scheduling line at [phone] or toll-free at [phone].     My Clinical Pharmacist's contact information:                                                      Please feel free to contact me with any questions or concerns you have.      Jose Sue Rph.  Medication Therapy Management Provider  [phone]

## 2021-12-07 NOTE — Clinical Note
Lea--francois-- I spoke with ingrid today --she had concerns abour drug interactions with IMT that potassium citrate for stone prevention and hiprex for uti prevention might cause as well as didn't want to take 2 large pills twice daily of the potassium -- I checked drug interactions --none found and we came to a compromise --take the potassium citrate 1 pill 2 x day and hiprex the same --then see urology in jan -2022.  She is agreeable to that .     -juan francisco

## 2021-12-08 NOTE — TELEPHONE ENCOUNTER
She would like to try a stricter diet with taking the Zocor 20 mg daily. If this is ok please sign off on script.    Thank you

## 2021-12-09 RX ORDER — SIMVASTATIN 20 MG
20 TABLET ORAL AT BEDTIME
Qty: 90 TABLET | Refills: 1 | Status: SHIPPED | OUTPATIENT
Start: 2021-12-09 | End: 2022-05-26

## 2021-12-21 ENCOUNTER — TELEPHONE (OUTPATIENT)
Dept: GASTROENTEROLOGY | Facility: CLINIC | Age: 81
End: 2021-12-21

## 2021-12-21 NOTE — TELEPHONE ENCOUNTER
I, Karli EVERETT was notified by the notes section that the pt needed to cancel/reschedule due to the provider being out sick. I saw the Pt was in the video so I jumped into it to let the Pt Kelly know that we needed to cancel/reschedule. She was NOT happy because she was able to get a sooner appt from February to today, I stated the providers next available opening is February, I let her know I could also put her on the wait list but she declined that and said the wait list never works for her and she does NOT want to be put on that. She said she was dissatisfied that she couldn't have this visit today. I let her know that unfortunately the provider is out, sick.   I let her know I would send a message over to the Provider to see if we could get her in sooner, and that it is not guaranteed.  I said IF we are able to get you in sooner someone will reach out to her. I told her sorry about 3 times but she still didn't want to seem to accept that I couldn't just put her in sooner myself.      Please let me know if there is anything I can do. Karli EVERETT [phone]

## 2021-12-23 ENCOUNTER — PRE VISIT (OUTPATIENT)
Dept: UROLOGY | Facility: CLINIC | Age: 81
End: 2021-12-23
Payer: COMMERCIAL

## 2021-12-23 NOTE — TELEPHONE ENCOUNTER
Reason for visit: follow up/stone prevention     Dx/Hx/Sx: history of staghorn stone    Records/imaging/labs/orders: in epic    At Rooming: video visit

## 2022-01-02 ENCOUNTER — MYC MEDICAL ADVICE (OUTPATIENT)
Dept: UROLOGY | Facility: CLINIC | Age: 82
End: 2022-01-02
Payer: COMMERCIAL

## 2022-01-06 ENCOUNTER — LAB (OUTPATIENT)
Dept: LAB | Facility: CLINIC | Age: 82
End: 2022-01-06
Payer: COMMERCIAL

## 2022-01-06 DIAGNOSIS — N20.0 URIC ACID KIDNEY STONE: ICD-10-CM

## 2022-01-06 DIAGNOSIS — E78.5 HYPERLIPIDEMIA LDL GOAL <100: ICD-10-CM

## 2022-01-06 LAB
ANION GAP SERPL CALCULATED.3IONS-SCNC: 9 MMOL/L (ref 3–14)
BUN SERPL-MCNC: 32 MG/DL (ref 7–30)
CALCIUM SERPL-MCNC: 9 MG/DL (ref 8.5–10.1)
CHLORIDE BLD-SCNC: 106 MMOL/L (ref 94–109)
CHOLEST SERPL-MCNC: 225 MG/DL
CO2 SERPL-SCNC: 25 MMOL/L (ref 20–32)
CREAT SERPL-MCNC: 0.97 MG/DL (ref 0.52–1.04)
FASTING STATUS PATIENT QL REPORTED: NO
GFR SERPL CREATININE-BSD FRML MDRD: 59 ML/MIN/1.73M2
GLUCOSE BLD-MCNC: 138 MG/DL (ref 70–99)
HDLC SERPL-MCNC: 73 MG/DL
HOLD SPECIMEN: NORMAL
LDLC SERPL CALC-MCNC: 131 MG/DL
NONHDLC SERPL-MCNC: 152 MG/DL
POTASSIUM BLD-SCNC: 4.3 MMOL/L (ref 3.4–5.3)
SODIUM SERPL-SCNC: 140 MMOL/L (ref 133–144)
TRIGL SERPL-MCNC: 103 MG/DL
URATE SERPL-MCNC: 5.5 MG/DL (ref 2.6–6)

## 2022-01-06 PROCEDURE — 36415 COLL VENOUS BLD VENIPUNCTURE: CPT | Performed by: PATHOLOGY

## 2022-01-06 PROCEDURE — 80048 BASIC METABOLIC PNL TOTAL CA: CPT | Performed by: PATHOLOGY

## 2022-01-06 PROCEDURE — 80061 LIPID PANEL: CPT | Performed by: PATHOLOGY

## 2022-01-06 PROCEDURE — 84550 ASSAY OF BLOOD/URIC ACID: CPT | Performed by: PATHOLOGY

## 2022-01-06 NOTE — TELEPHONE ENCOUNTER
Eloisa from 1st floor [facility] called wondering what labs shall be completed. Per Jo Fields RN note on 10/15/2021 in regards to the potassium citrate the pt will be taking, labs to be completed are BMP, uric acid, phosphorous and magnesium.  BMP and uric acid are in. I am asking Brenda Snowden if phosphorous and magnesium shall be ordered. Pt is going to complete BMP and uric acid and leave an additional sample for potential testing of phosphorous and magnesium.

## 2022-01-07 ENCOUNTER — VIRTUAL VISIT (OUTPATIENT)
Dept: UROLOGY | Facility: CLINIC | Age: 82
End: 2022-01-07
Attending: NURSE PRACTITIONER
Payer: COMMERCIAL

## 2022-01-07 DIAGNOSIS — N20.0 KIDNEY STONE: Primary | ICD-10-CM

## 2022-01-07 PROCEDURE — 99213 OFFICE O/P EST LOW 20 MIN: CPT | Mod: 95 | Performed by: NURSE PRACTITIONER

## 2022-01-07 NOTE — PROGRESS NOTES
Kelly is a 81 year old who is being evaluated via a billable video visit.      How would you like to obtain your AVS? MyChart  If the video visit is dropped, the invitation should be resent by: Text to cell phone: [phone]  Will anyone else be joining your video visit? No    Video Start Time: 9:02 AM  Video-Visit Details    Type of service:  Video Visit    Video End Time: 9:26 AM    Originating Location (pt. Location): Home    Distant Location (provider location):  [facility] [place]     Platform used for Video Visit: CarlosEdgar       Kelly TARAN Barnes complains of   Chief Complaint   Patient presents with     Follow Up     stones       Assessment/Plan:  81 year old female with a history of DM2, hyperlipidemia and hypertension who is s/p staged percutaneous nephrolithotomy. Has had recurrent C.diff, now s/p IMT. Has not yet started potassium citrate prescribed by Dr. Law. She did start Hiprex a few weeks ago for UTI-prevention. Her serum uric acid level is normal, so she is likely growing uric acid stones 2/2 acidic urine, likely from her DM2. My concern is that Hiprex will contribute to this as it purposely acidifies the urine to prevent bacterial growth. If she were to start the potassium citrate, these may offset and she may not get the benefit from either drug. We discussed this in detail today. I therefore recommend that she stop the Hiprex now and start the potassium citrate. She would like to start at a lower dose than prescribed, so will start at 10 mEq BID and titrate up as tolerated. If UTIs were to return, we could revisit this and make another plan. She is not interested in trialing topical estrogen.   -Stop Hiprex.   -Start the potassium citrate 10 mEq (one tab) twice daily.   -Will check a serum potassium in two weeks.   -If tolerating potassium citrate well, increase to 20 mEq BID in one month. She will let me know when she is making this dose change and another serum  potassium order can be placed, to be checked in two weeks.  -She will otherwise follow up with me in 2-3 months to check in. If she develops UTI symptoms in the meantime, she will let me know via MyChart.     Brenda Snowden, CNP  Department of Urology      Subjective:   81 year old female with a history of DM2, hyperlipidemia and hypertension who is s/p staged percutaneous nephrolithotomy with Dr. Law on 8/5 and 8/13. Perioperative complications included an episode of fungemia and C. difficile after treatment of her initial infection. Stones composed of uric acid. Was doing well at her follow up visit with Dr. Allen on 9/28. Litholink was ordered and she was started on low-dose potassium citrate.     Unfortunately, she developed sepsis 2/2 C.diff again and was hospitalized from 10/12-10/14. Due to this episode, she did not feel comfortable starting the potassium citrate and has therefore not yet taken it.     She had labs drawn yesterday, showing a normal serum uric acid level.     Today, she states that she started Hiprex about 3 weeks ago, prescribed by ID, for UTI-prevention. She has not yet started the potassium citrate, though she is open to doing so if indicated now. She does not wish to complete the Litholink. Regarding UTIs, ID has discussed topical estrogen with her, but she refused this. She again notes that she will not take this. Of note, she is now s/p IMT.       Objective:     Exam:   Patient is a 81 year old female   No vital signs obtained due to virtual visit.  General Appearance: Well groomed, hygenic  Respiratory: No cough, no respiratory distress or labored breathing  Musculoskeletal: Grossly normal with no gross deficits  Skin: No discoloration or apparent rashes  Neurologic: No tremors  Psychiatric: Alert and oriented  Further examination is deferred due to the nature of our visit.

## 2022-01-07 NOTE — LETTER
1/7/2022       RE: Kelly Barnes  [address]  [place] [address]     Dear Colleague,    Thank you for referring your patient, Kelly Barnes, to the [facility] [place] at [facility]. Please see a copy of my visit note below.    Kelly is a 81 year old who is being evaluated via a billable video visit.      How would you like to obtain your AVS? MyChart  If the video visit is dropped, the invitation should be resent by: Text to cell phone: [phone]  Will anyone else be joining your video visit? No    Video Start Time: 9:02 AM  Video-Visit Details    Type of service:  Video Visit    Video End Time: 9:26 AM    Originating Location (pt. Location): Home    Distant Location (provider location):  [facility] [place]     Platform used for Video Visit: [employer]       Kelly Barnes complains of   Chief Complaint   Patient presents with     Follow Up     stones       Assessment/Plan:  81 year old female with a history of DM2, hyperlipidemia and hypertension who is s/p staged percutaneous nephrolithotomy. Has had recurrent C.diff, now s/p IMT. Has not yet started potassium citrate prescribed by Dr. Law. She did start Hiprex a few weeks ago for UTI-prevention. Her serum uric acid level is normal, so she is likely growing uric acid stones 2/2 acidic urine, likely from her DM2. My concern is that Hiprex will contribute to this as it purposely acidifies the urine to prevent bacterial growth. If she were to start the potassium citrate, these may offset and she may not get the benefit from either drug. We discussed this in detail today. I therefore recommend that she stop the Hiprex now and start the potassium citrate. She would like to start at a lower dose than prescribed, so will start at 10 mEq BID and titrate up as tolerated. If UTIs were to return, we could revisit this and make another plan. She is not interested  in trialing topical estrogen.   -Stop Hiprex.   -Start the potassium citrate 10 mEq (one tab) twice daily.   -Will check a serum potassium in two weeks.   -If tolerating potassium citrate well, increase to 20 mEq BID in one month. She will let me know when she is making this dose change and another serum potassium order can be placed, to be checked in two weeks.  -She will otherwise follow up with me in 2-3 months to check in. If she develops UTI symptoms in the meantime, she will let me know via MyChart.     Brenda Snowden, CNP  Department of Urology      Subjective:   81 year old female with a history of DM2, hyperlipidemia and hypertension who is s/p staged percutaneous nephrolithotomy with Dr. Law on 8/5 and 8/13. Perioperative complications included an episode of fungemia and C. difficile after treatment of her initial infection. Stones composed of uric acid. Was doing well at her follow up visit with Dr. Allen on 9/28. Litholink was ordered and she was started on low-dose potassium citrate.     Unfortunately, she developed sepsis 2/2 C.diff again and was hospitalized from 10/12-10/14. Due to this episode, she did not feel comfortable starting the potassium citrate and has therefore not yet taken it.     She had labs drawn yesterday, showing a normal serum uric acid level.     Today, she states that she started Hiprex about 3 weeks ago, prescribed by ID, for UTI-prevention. She has not yet started the potassium citrate, though she is open to doing so if indicated now. She does not wish to complete the Litholink. Regarding UTIs, ID has discussed topical estrogen with her, but she refused this. She again notes that she will not take this. Of note, she is now s/p IMT.     Objective:     Exam:   Patient is a 81 year old female   No vital signs obtained due to virtual visit.  General Appearance: Well groomed, hygenic  Respiratory: No cough, no respiratory distress or labored breathing  Musculoskeletal:  Grossly normal with no gross deficits  Skin: No discoloration or apparent rashes  Neurologic: No tremors  Psychiatric: Alert and oriented  Further examination is deferred due to the nature of our visit.

## 2022-01-07 NOTE — PATIENT INSTRUCTIONS
UROLOGY CLINIC VISIT PATIENT INSTRUCTIONS    -Stop the Hiprex  -Start the potassium citrate 10 mEq (one tab) twice daily.   -We will check a potassium level at the lab after you have been taking this for two weeks. Please schedule a lab appointment to complete this.   -If you are tolerating the potassium citrate, please increase your dose to 20 mEq (two tabs) twice daily in one month. Please let me know when you are increasing this dose and I will order another potassium level to be drawn two weeks from that time.   -Follow up with me in 2-3 months to revisit this. If you develop symptoms of a UTI in the meantime, please let me know. You can send me messages via [employer].     If you have any issues, questions or concerns in the meantime, do not hesitate to contact us at [phone] or via [employer].       Brenda Snowden, CNP  Department of Urology

## 2022-01-17 NOTE — RESULT ENCOUNTER NOTE
Thank you very much for getting labs done!     Your total cholesterol is slightly high but everything else is at goal, and your triglycerides and HDL are fantastic!    Desired or goal levels are:  CHOLESTEROL: Desirable is less than 200.  HDL (Good Cholesterol): Desirable is greater than 40 in men and greater than 50 in women.  LDL (Bad Cholesterol): Desirable is less than 100.  TRIGLYCERIDES: Desirable is less than 150.    As you may know, abnormal cholesterol is one factor that increases your risk for heart disease and stroke. You can improve your cholesterol by controlling the amount and type of fat you eat and by increasing your daily activity level.    Here are some ways to improve your nutrition (adapted from the American Academy of Family Practice handout):  Eat less fat (especially butter, Crisco and other saturated fats)  Buy lean cuts of meat; reduce your portions of red meat or substitute poultry or fish  Use skim milk and low-fat dairy products  Eat no more than 4 egg yolks per week  Avoid fried or fast foods that are high in fat  Eat more fruits and vegetables    If you have any questions, please contact the clinic or schedule an appointment with me, thank you!      Sincerely,  Dr. Lea Lopez MD  1/17/2022

## 2022-01-21 NOTE — PROGRESS NOTES
Medication Therapy Management (MTM) Encounter    ASSESSMENT:                            Medication Adherence/Access: No issues identified    Type 2 Diabetes: Patient is  meeting A1c goal of < 8%. Self monitoring of blood glucose is not at goal of post prandial < 180 mg/dL.   Patient would benefit from ideal SMBG: Check blood sugars pre-prandial, 2 hours post-prandial, and with symptoms of hypoglycemia and Sulfonylurea (Glimepiride) :  Stay on daily dose of  4mg. Tab twice daily.         Prevent kidney stones:  Now taking 2 x day preventive pill--lab recheck in 2 days .         Hyperlipidemia: Stable.  Patient is on moderate intensity statin which is indicated based on 2019 ACC/AHA guidelines for lipid management.            PLAN:                              1.   -- continue all same medications for now, have labs in 2 days , enjoy [facility] on vacation.         Follow-up: Follow-up in summer or 2022 if needed?        SUBJECTIVE/OBJECTIVE:                          Kelly Barnes is a 81 year old female called for a follow-up (21)visit. She was referred to me from Lea Lopez  .      Reason for visit:     Saw urology - told to just take the potassium citrate --lab recheck wed.     Heading to [place] next week for vacation.       Allergies/ADRs: Reviewed in chart  Past Medical History: Reviewed in chart  Tobacco: She reports that she has never smoked. She has never used smokeless tobacco.  Alcohol: rarely. (wine if anything).   Caffeine: 4 cups coffee /day .   Activity: was active till recent infection --now back to 30 minutes /day walking.     Medication Adherence/Access: no issues reported    Type 2 Diabetes:  Currently taking ; Glimepiride dose is 4mg . Bid .  Previously:   . Patient is not experiencing side effects.  Blood sugar monitorin time(s) daily. Ranges (patient reported): Fasting- now running 150-160's.   Post-Prandial- 2 hours after : none recently .       Symptoms of low  blood sugar? none  Symptoms of high blood sugar? none  Eye exam: up to date  Foot exam: up to date  Diet/Exercise: controlling carbs , walks daily.   Aspirin: Not taking due to age  Statin: Yes: Simvastatin    ACEi/ARB: Yes: Losartan.   Urine Albumin:   Lab Results   Component Value Date    UMALCR 21.82 09/16/2021      Lab Results   Component Value Date    A1C 7.4 11/04/2021    A1C 8.1 08/27/2021    A1C 7.4 07/13/2021    A1C 7.3 05/11/2021    A1C 7.2 10/07/2020    A1C 7.6 10/15/2019    A1C 6.7 10/25/2018    A1C 7.3 10/26/2017       Prevent kidney stones:  Had 1 large stone not painful removed in august --urology wants her on twice daily preventive  10meq .potassium citrate  Bid --she has been doing this now --lab rechecks 1-26-22.     ID doctor suggested she take hiprex(prevent uti's) --she stopped that now due to IMT for her c. Diff. It has worked nicely --no more diarrhea x 7 weeks.     Hyperlipidemia: Current therapy includes : Simvastatin 20mg daily.  Patient reports no significant myalgias or other side effects.  The ASCVD Risk score (Abdiaziz DC Jr., et al., 2013) failed to calculate for the following reasons:    The 2013 ASCVD risk score is only valid for ages 40 to 79  Recent Labs   Lab Test 01/06/22  1022 11/04/21  0825 03/04/16  0930 11/10/15  0829 07/23/15  0800   CHOL 225* 233*   < > 210* 235*   HDL 73 77   < > 67 69   * 141*   < > 126 147*   TRIG 103 73   < > 86 96   CHOLHDLRATIO  --   --   --  3.1 3.4    < > = values in this interval not displayed.             I spent 30 minutes with this patient today. All changes were made via collaborative practice agreement with Lea Lopez MD. A copy of the visit note was provided to the patient's primary care provider.    The patient was sent via [employer] a summary of these recommendations.     Jose Sue [facility].  Medication Therapy Management Provider  [phone]        Telemedicine Visit Details  Type of service:  Telephone visit  Start Time:  9:00 AM  End Time: 9:30 AM  Originating Location (patient location): Home  Distant Location (provider location):  [facility]     Medication Therapy Recommendations  No medication therapy recommendations to display

## 2022-01-24 ENCOUNTER — VIRTUAL VISIT (OUTPATIENT)
Dept: PHARMACY | Facility: CLINIC | Age: 82
End: 2022-01-24
Payer: COMMERCIAL

## 2022-01-24 DIAGNOSIS — E11.9 TYPE 2 DIABETES MELLITUS WITHOUT COMPLICATION, WITHOUT LONG-TERM CURRENT USE OF INSULIN (H): Primary | ICD-10-CM

## 2022-01-24 DIAGNOSIS — E78.5 HYPERLIPIDEMIA LDL GOAL <100: ICD-10-CM

## 2022-01-24 DIAGNOSIS — Z29.9 PREVENTIVE MEDICATION THERAPY NEEDED: ICD-10-CM

## 2022-01-24 DIAGNOSIS — N20.0 KIDNEY STONE: ICD-10-CM

## 2022-01-24 PROCEDURE — 99605 MTMS BY PHARM NP 15 MIN: CPT | Performed by: PHARMACIST

## 2022-01-24 NOTE — LETTER
"Recommended To-Do List      Prepared on: 1/24/2022     You can get the best results from your medications by completing the items on this \"To-Do List.\"      Bring your To-Do List when you go to your doctor. And, share it with your family or caregivers.    My To-Do List:  What we talked about: What I should do:     No changes--keep taking all current medications as is , have potassium lab in 2 days , enjoy your trip to [place] next week.                 "

## 2022-01-24 NOTE — LETTER
January 24, 2022  Kelly Barnes  [address]  [place] [address]    Dear TARAN Kowalski [facility]        Thank you for talking with me on Jan 24, 2022 about your health and medications. As a follow-up to our conversation, I have included two documents:      1. Your Recommended To-Do List has steps you should take to get the best results from your medications.  2. Your Medication List will help you keep track of your medications and how to take them.    If you want to talk about these documents, please call Jose Sue RPH at phone: [phone], Monday-Friday 8-4:30pm.    I look forward to working with you and your doctors to make sure your medications work well for you.    Sincerely,    Jose Sue RPH

## 2022-01-24 NOTE — PATIENT INSTRUCTIONS
Recommendations from today's MTM visit:                                                       1.   FYi-- continue all same medications for now, have labs in 2 days , enjoy [facility] on vacation.         Follow-up: Follow-up in summer or fall 2022 if needed?    It was great to speak with you today.  I value your experience and would be very thankful for your time with providing feedback on our clinic survey. You may receive a survey via email or text message in the next few days.     To schedule another MTM appointment, please call the clinic directly or you may call the MTM scheduling line at [phone] or toll-free at [phone].     My Clinical Pharmacist's contact information:                                                      Please feel free to contact me with any questions or concerns you have.      Jose Sue Rph.  Medication Therapy Management Provider  [phone]

## 2022-01-24 NOTE — Clinical Note
Lea-- francois--she is doing great -- very stable now -- will have potassium lab this week --then she is heading to [place] for a week for her 82st bday --how fun!    IMT has resolved her c. Diff.      -Jose

## 2022-01-24 NOTE — LETTER
_  Medication List        Prepared on: 1/24/2022     Bring your Medication List when you go to the doctor, hospital, or   emergency room. And, share it with your family or caregivers.     Note any changes to how you take your medications.  Cross out medications when you no longer use them.    Medication How I take it Why I use it Prescriber   blood glucose (ONETOUCH ULTRA) test strip Use to test blood sugar twice daily. Dispense 1 box of 200 test strips, #3 refills. Type 2 diabetes mellitus without complication, without long-term current use of insulin (H) Lea Lopez MD   blood glucose monitoring (ONE TOUCH ULTRA 2) meter device kit Use to test blood sugar 3 times daily DM type 2, goal A1C below 8.0 Lea Lopez MD   glimepiride (AMARYL) 4 MG tablet Take 1 tablet (4 mg) by mouth 2 times daily (with meals) Type 2 diabetes mellitus without complication, without long-term current use of insulin (H) Lea Lopez MD   losartan-hydrochlorothiazide (HYZAAR) 50-12.5 MG tablet Take 1 tablet by mouth every morning Hypertension Goal BP (Blood Pressure) < 140/90 Lea Lopez MD   methenamine (HIPREX) 1 g tablet On HOLD 1-24-22.  Frequent UTI Giselle Silva MD   OneTouch Delica Lancets 33G MISC 1 Device by In Vitro route 2 times daily Type 2 diabetes mellitus without complication, without long-term current use of insulin (H) Lea Lopez MD   potassium citrate (UROCIT-K) 10 MEQ (1080 MG) CR tablet Take 2 tablets (20 mEq) by mouth 2 times daily Uric Acid Kidney Stone Kirk Law MD   simvastatin (ZOCOR) 20 MG tablet Take 1 tablet (20 mg) by mouth At Bedtime TAKE ONE TABLET BY MOUTH AT BEDTIME Hyperlipidemia LDL Goal <100 Lea Lopez MD         Add new medications, over-the-counter drugs, herbals, vitamins, or  minerals in the blank rows below.    Medication How I take it Why I use it Prescriber                          Allergies:      ibuprofen; keflex  [cephalexin]; metformin        Side effects I have had:              Other Information:             My notes and questions:

## 2022-01-26 ENCOUNTER — LAB (OUTPATIENT)
Dept: LAB | Facility: CLINIC | Age: 82
End: 2022-01-26
Payer: COMMERCIAL

## 2022-01-26 DIAGNOSIS — N20.0 KIDNEY STONE: ICD-10-CM

## 2022-01-26 LAB — POTASSIUM BLD-SCNC: 4.1 MMOL/L (ref 3.4–5.3)

## 2022-01-26 PROCEDURE — 36415 COLL VENOUS BLD VENIPUNCTURE: CPT | Performed by: PATHOLOGY

## 2022-01-26 PROCEDURE — 84132 ASSAY OF SERUM POTASSIUM: CPT | Performed by: PATHOLOGY

## 2022-02-13 ENCOUNTER — HEALTH MAINTENANCE LETTER (OUTPATIENT)
Age: 82
End: 2022-02-13

## 2022-02-17 ENCOUNTER — VIRTUAL VISIT (OUTPATIENT)
Dept: GASTROENTEROLOGY | Facility: CLINIC | Age: 82
End: 2022-02-17
Payer: COMMERCIAL

## 2022-02-17 DIAGNOSIS — A04.71 RECURRENT CLOSTRIDIOIDES DIFFICILE DIARRHEA: Primary | ICD-10-CM

## 2022-02-17 PROCEDURE — 99213 OFFICE O/P EST LOW 20 MIN: CPT | Mod: 95 | Performed by: PHYSICIAN ASSISTANT

## 2022-02-17 NOTE — LETTER
2/17/2022         RE: Kelly Barnes  [address]  [facility] [address]        Dear Colleague,    Thank you for referring your patient, Kelly Barnes, to the [facility] [place]. Please see a copy of my visit note below.    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 Prior to this, she had repeated antibiotic exposures around 1-2 times per year, largely for urinary tract infections. CT imaging also demonstrated colitis. CRP elevated at 130, however she had elevated values in the past as well.  She was treated with a 20-day vancomycin taper, with symptomatic recurrence 9/2021, confirmed by PCR, and treated with a four week vancomycin taper. About 1.5 weeks after completing the taper, she again presented with recurrent diarrhea and positive C.diff testing. She was hospitalized due to symptoms, with WBC 28k with predominant neutrophils, acute kidney injury, and tachycardia. She was treated with oral fidaxomcin, followed by a vancomycin taper. Symptoms resolved on vancomycin.    She underwent IMT via enteric capsules 12/1/2021. She tolerated this well, with some minor increased gas initially, which then resolved. She is now having a daily, soft formed stool without any associated concerns, denies abdominal pain, cramping, urgency, BRBPR. She is following with urology and started potassium supplements for recurrent UTI.    PAST MEDICAL HISTORY:  Past Medical History:   Diagnosis Date     Acute kidney injury (H) 12/19/2020     Allergic  rhinitis, cause unspecified      Anemia      Aortic stenosis 02/29/2016    With new murmur noted 2/2016, normal echo, repeat echo 2/2017.     Aortic valve sclerosis      Atypical chest pain 07/24/2015     cuboid     left foot     Fungemia 8/16/2021     History of Clostridium difficile colitis      Hyperlipidemia LDL goal <100 11/01/2012     Hypertension goal BP (blood pressure) < 140/90      Musculoskeletal chest pain 11/25/2016     Obesity, Class I, BMI 30-34.9 11/11/2015     Pneumonia 03/2016     Pyelonephritis      Sepsis, due to unspecified organism, unspecified whether acute organ dysfunction present (H) 12/19/2020     Type 2 diabetes, HbA1c goal < 7% (H)      Urinary tract infection associated with nephrostomy catheter, initial encounter (H) 8/8/2021       PREVIOUS ABDOMINAL/GYNECOLOGIC SURGERIES:  Past Surgical History:   Procedure Laterality Date     CATARACT EXTRACTION       CYSTOSCOPY, RETROGRADES, INSERT STENT URETER(S), COMBINED Right 07/15/2021    Procedure: CYSTOURETEROSCOPY, WITH RETROGRADE PYELOGRAM,  AND STENT INSERTION RIGHT;  Surgeon: Kirk Law MD;  [facility]     LASER HOLMIUM NEPHROLITHOTOMY VIA PERCUTANEOUS NEPHROSTOMY Right 08/05/2021    Procedure: NEPHROLITHOTOMY, PERCUTANEOUS, USING HOLMIUM LASER RIGHT;  Surgeon: Kirk Law MD;  [facility]     LASER HOLMIUM NEPHROLITHOTOMY VIA PERCUTANEOUS NEPHROSTOMY Right 08/13/2021    Procedure: Ureteroscopic assisted secondary right percutaneous nephrolihtotomy, Holmium laser lithotriipsy;  Surgeon: Kirk Law MD;  [facility]     PICC SINGLE LUMEN PLACEMENT Left 08/17/2021    41cm (2cm external), Medial brachial vein         PERTINENT MEDICATIONS:  Current Outpatient Medications   Medication Sig Dispense Refill     blood glucose (ONETOUCH ULTRA) test strip Use to test blood sugar twice daily. Dispense 1 box of 200 test strips, #3 refills. 100 strip 3     blood glucose monitoring (ONE TOUCH ULTRA 2) meter  device kit Use to test blood sugar 3 times daily 1 kit 0     glimepiride (AMARYL) 4 MG tablet Take 1 tablet (4 mg) by mouth 2 times daily (with meals) 180 tablet 3     losartan-hydrochlorothiazide (HYZAAR) 50-12.5 MG tablet Take 1 tablet by mouth every morning       methenamine (HIPREX) 1 g tablet Take 1 tablet (1 g) by mouth 2 times daily (Patient not taking: Reported on 1/24/2022) 60 tablet 3     OneTouch Delica Lancets 33G MISC 1 Device by In Vitro route 2 times daily 100 each 11     potassium citrate (UROCIT-K) 10 MEQ (1080 MG) CR tablet Take 2 tablets (20 mEq) by mouth 2 times daily (Patient taking differently: Take 10 mEq by mouth 2 times daily ) 120 tablet 3     simvastatin (ZOCOR) 20 MG tablet Take 1 tablet (20 mg) by mouth At Bedtime TAKE ONE TABLET BY MOUTH AT BEDTIME 90 tablet 1     SOCIAL HISTORY:    Social History     Socioeconomic History     Marital status:      Spouse name: Not on file     Number of children: Not on file     Years of education: Not on file     Highest education level: Not on file   Occupational History     Not on file   Tobacco Use     Smoking status: Never Smoker     Smokeless tobacco: Never Used   Vaping Use     Vaping Use: Never used   Substance and Sexual Activity     Alcohol use: Yes     Alcohol/week: 10.0 standard drinks     Comment: 1-2 drinks per week     Drug use: No     Sexual activity: Not Currently     Partners: Male   Other Topics Concern      Service No     Blood Transfusions No     Caffeine Concern No     Occupational Exposure No     Hobby Hazards No     Sleep Concern Yes     Stress Concern No     Weight Concern Yes     Special Diet No     Back Care No     Exercise Yes     Comment: walk     Bike Helmet No     Seat Belt Yes     Self-Exams Yes     Parent/sibling w/ CABG, MI or angioplasty before 65F 55M? No   Social History Narrative     Not on file     Social Determinants of Health     Financial Resource Strain: Not on file   Food Insecurity: Not on file    Transportation Needs: Not on file   Physical Activity: Not on file   Stress: Not on file   Social Connections: Not on file   Intimate Partner Violence: Not on file   Housing Stability: Not on file       FAMILY HISTORY:  Family History   Problem Relation Age of Onset     Hypertension Mother      Diabetes No family hx of      Cerebrovascular Disease No family hx of      Breast Cancer No family hx of      Cancer - colorectal No family hx of      Prostate Cancer No family hx of      Anesthesia Reaction No family hx of      Bleeding Disorder No family hx of      Clotting Disorder No family hx of        PHYSICAL EXAMINATION:  Vitals reviewed  There were no vitals taken for this visit.  Video physical exam  General: Patient appears well in no acute distress.   Skin: No visualized rash or lesions on visualized skin  Eyes: EOMI, no erythema, sclera icterus or discharge noted  Resp: Appears to be breathing comfortably without accessory muscle usage, speaking in full sentences, no cough  MSK: Appears to have normal range of motion based on visualized movements  Neurologic: No apparent tremors, facial movements symmetric  Psych: affect normal, alert and oriented    The rest of a comprehensive physical examination is deferred due to PHE (public health emergency) video restrictions      PERTINENT STUDIES Reviewed in EMR    ASSESSMENT/PLAN:    # Recurrent CDI  Initially presented with CDI in the setting of antibiotic exposures for pyelonephritis, with preceding antibiotics around 1-2 times per year, largely for UTI. She has now had two recurrences of CDI, with the latter being severe and requiring hospitalization. She is now s/p IMT delivered via capsules (12/1/2021). She has done well post-IMT without recurrence of C diff and thus far, no recurrence of UTI. We discussed that at this point, the risk of recurrence of C diff is extremely low, however she remains at increased risk for recurrent CDI following antibiotic  exposures.    In regards to recurrent UTI, she is following with urology is on prophylactic potassium citrate. She has also met with Dr. Silva in ID and declined a trial of vaginal estrogen. If she does develop uncomplicated UTI  post-IMT, IM gentamicin can be preferentially used for treatment. Confirmed that Kelly has a copy of the antibiotic stewardship letter for IM graduates on hand.      RTC 4months    Thank you for this consultation. It was a pleasure to participate in the care of this patient; please contact us with any further questions.    Alesia Gómez PA-C    22 minutes spent on the date of the encounter doing chart review, review of test results, patient visit and documentation        Again, thank you for allowing me to participate in the care of your patient.      Sincerely,    Alesia Gómez PA-C

## 2022-02-17 NOTE — NURSING NOTE
Patient verified meds and allergies are correct via patients echeck-in.    Bianca Araiza, Virtual Facilitator

## 2022-02-17 NOTE — PROGRESS NOTES
Kelly is a 82 year old who is being evaluated via a billable video visit.      How would you like to obtain your AVS? MyChart  If the video visit is dropped, the invitation should be resent by: Send to e-mail at: [email or web address]  Will anyone else be joining your video visit? No      Video Start Time: 703  Video-Visit Details    Type of service:  Video Visit    Video End Time:717    Originating Location (pt. Location): Home    Distant Location (provider location):  [facility] [place]     Platform used for Video Visit: [employer]     GI CLINIC VISIT    CC/REFERRING PROVIDER: Sunday Metzger  REASON FOR CONSULTATION: IMT    HPI: 82 year old female with PMH of type 2 diabetes, hyperlipidemia, hypertension, history of recurrent pyelonephritis and fungemia presenting to GI clinic for recurrent C.difficile infection here for follow-up intestinal microbiota transplant (IMT)    Initial diagnosis of C.diff was 7/23/2021, following a course of ceftriaxone for E.coli pyelonephritis with a 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 Prior to this, she had repeated antibiotic exposures around 1-2 times per year, largely for urinary tract infections. CT imaging also demonstrated colitis. CRP elevated at 130, however she had elevated values in the past as well.  She was treated with a 20-day vancomycin taper, with symptomatic recurrence 9/2021, confirmed by PCR, and treated with a four week vancomycin taper. About 1.5 weeks after completing the taper, she again presented with recurrent diarrhea and positive C.diff testing. She was hospitalized due to symptoms, with WBC 28k with predominant neutrophils, acute kidney injury, and tachycardia. She was treated with oral fidaxomcin, followed by a vancomycin taper. Symptoms resolved on vancomycin.    She underwent IMT via enteric capsules 12/1/2021. She tolerated this well, with some minor increased gas initially, which then resolved. She is now having a  daily, soft formed stool without any associated concerns, denies abdominal pain, cramping, urgency, BRBPR. She is following with urology and started potassium supplements for recurrent UTI.    PAST MEDICAL HISTORY:  Past Medical History:   Diagnosis Date     Acute kidney injury (H) 12/19/2020     Allergic rhinitis, cause unspecified      Anemia      Aortic stenosis 02/29/2016    With new murmur noted 2/2016, normal echo, repeat echo 2/2017.     Aortic valve sclerosis      Atypical chest pain 07/24/2015     cuboid     left foot     Fungemia 8/16/2021     History of Clostridium difficile colitis      Hyperlipidemia LDL goal <100 11/01/2012     Hypertension goal BP (blood pressure) < 140/90      Musculoskeletal chest pain 11/25/2016     Obesity, Class I, BMI 30-34.9 11/11/2015     Pneumonia 03/2016     Pyelonephritis      Sepsis, due to unspecified organism, unspecified whether acute organ dysfunction present (H) 12/19/2020     Type 2 diabetes, HbA1c goal < 7% (H)      Urinary tract infection associated with nephrostomy catheter, initial encounter (H) 8/8/2021       PREVIOUS ABDOMINAL/GYNECOLOGIC SURGERIES:  Past Surgical History:   Procedure Laterality Date     CATARACT EXTRACTION       CYSTOSCOPY, RETROGRADES, INSERT STENT URETER(S), COMBINED Right 07/15/2021    Procedure: CYSTOURETEROSCOPY, WITH RETROGRADE PYELOGRAM,  AND STENT INSERTION RIGHT;  Surgeon: Kirk Law MD;  [facility]     LASER HOLMIUM NEPHROLITHOTOMY VIA PERCUTANEOUS NEPHROSTOMY Right 08/05/2021    Procedure: NEPHROLITHOTOMY, PERCUTANEOUS, USING HOLMIUM LASER RIGHT;  Surgeon: Kirk Law MD;  [facility]     LASER HOLMIUM NEPHROLITHOTOMY VIA PERCUTANEOUS NEPHROSTOMY Right 08/13/2021    Procedure: Ureteroscopic assisted secondary right percutaneous nephrolihtotomy, Holmium laser lithotriipsy;  Surgeon: Kirk Law MD;  [facility]     PICC SINGLE LUMEN PLACEMENT Left 08/17/2021    41cm (2cm external), Medial  brachial vein         PERTINENT MEDICATIONS:  Current Outpatient Medications   Medication Sig Dispense Refill     blood glucose (ONETOUCH ULTRA) test strip Use to test blood sugar twice daily. Dispense 1 box of 200 test strips, #3 refills. 100 strip 3     blood glucose monitoring (ONE TOUCH ULTRA 2) meter device kit Use to test blood sugar 3 times daily 1 kit 0     glimepiride (AMARYL) 4 MG tablet Take 1 tablet (4 mg) by mouth 2 times daily (with meals) 180 tablet 3     losartan-hydrochlorothiazide (HYZAAR) 50-12.5 MG tablet Take 1 tablet by mouth every morning       methenamine (HIPREX) 1 g tablet Take 1 tablet (1 g) by mouth 2 times daily (Patient not taking: Reported on 1/24/2022) 60 tablet 3     OneTouch Delica Lancets 33G MISC 1 Device by In Vitro route 2 times daily 100 each 11     potassium citrate (UROCIT-K) 10 MEQ (1080 MG) CR tablet Take 2 tablets (20 mEq) by mouth 2 times daily (Patient taking differently: Take 10 mEq by mouth 2 times daily ) 120 tablet 3     simvastatin (ZOCOR) 20 MG tablet Take 1 tablet (20 mg) by mouth At Bedtime TAKE ONE TABLET BY MOUTH AT BEDTIME 90 tablet 1     SOCIAL HISTORY:    Social History     Socioeconomic History     Marital status:      Spouse name: Not on file     Number of children: Not on file     Years of education: Not on file     Highest education level: Not on file   Occupational History     Not on file   Tobacco Use     Smoking status: Never Smoker     Smokeless tobacco: Never Used   Vaping Use     Vaping Use: Never used   Substance and Sexual Activity     Alcohol use: Yes     Alcohol/week: 10.0 standard drinks     Comment: 1-2 drinks per week     Drug use: No     Sexual activity: Not Currently     Partners: Male   Other Topics Concern      Service No     Blood Transfusions No     Caffeine Concern No     Occupational Exposure No     Hobby Hazards No     Sleep Concern Yes     Stress Concern No     Weight Concern Yes     Special Diet No     Back Care No      Exercise Yes     Comment: walk     Bike Helmet No     Seat Belt Yes     Self-Exams Yes     Parent/sibling w/ CABG, MI or angioplasty before 65F 55M? No   Social History Narrative     Not on file     Social Determinants of Health     Financial Resource Strain: Not on file   Food Insecurity: Not on file   Transportation Needs: Not on file   Physical Activity: Not on file   Stress: Not on file   Social Connections: Not on file   Intimate Partner Violence: Not on file   Housing Stability: Not on file       FAMILY HISTORY:  Family History   Problem Relation Age of Onset     Hypertension Mother      Diabetes No family hx of      Cerebrovascular Disease No family hx of      Breast Cancer No family hx of      Cancer - colorectal No family hx of      Prostate Cancer No family hx of      Anesthesia Reaction No family hx of      Bleeding Disorder No family hx of      Clotting Disorder No family hx of        PHYSICAL EXAMINATION:  Vitals reviewed  There were no vitals taken for this visit.  Video physical exam  General: Patient appears well in no acute distress.   Skin: No visualized rash or lesions on visualized skin  Eyes: EOMI, no erythema, sclera icterus or discharge noted  Resp: Appears to be breathing comfortably without accessory muscle usage, speaking in full sentences, no cough  MSK: Appears to have normal range of motion based on visualized movements  Neurologic: No apparent tremors, facial movements symmetric  Psych: affect normal, alert and oriented    The rest of a comprehensive physical examination is deferred due to PHE (public health emergency) video restrictions      PERTINENT STUDIES Reviewed in EMR    ASSESSMENT/PLAN:    # Recurrent CDI  Initially presented with CDI in the setting of antibiotic exposures for pyelonephritis, with preceding antibiotics around 1-2 times per year, largely for UTI. She has now had two recurrences of CDI, with the latter being severe and requiring hospitalization. She is now  s/p IMT delivered via capsules (12/1/2021). She has done well post-IMT without recurrence of C diff and thus far, no recurrence of UTI. We discussed that at this point, the risk of recurrence of C diff is extremely low, however she remains at increased risk for recurrent CDI following antibiotic exposures.    In regards to recurrent UTI, she is following with urology is on prophylactic potassium citrate. She has also met with Dr. Silva in ID and declined a trial of vaginal estrogen. If she does develop uncomplicated UTI  post-IMT, IM gentamicin can be preferentially used for treatment. Confirmed that Kelly has a copy of the antibiotic stewardship letter for IM graduates on hand.      RTC 4months    Thank you for this consultation. It was a pleasure to participate in the care of this patient; please contact us with any further questions.    Alesia Gómez PA-C    22 minutes spent on the date of the encounter doing chart review, review of test results, patient visit and documentation

## 2022-02-17 NOTE — PATIENT INSTRUCTIONS
"It was a pleasure taking care of you today.  I've included a brief summary of our discussion and care plan from today's visit below.  Please review this information with your primary care provider.  _______________________________________________________________________    My recommendations are summarized as follows:    -- If you are prescribed antibiotics, please remember to show the provider your \"Letter to the IMT Graduate\" note regarding antibiotics and notify us.  If the infection is a urinary tract infection, you can definitely ask your urologist to reach out to us regarding treatments.    Return to GI Clinic in 4 months to review your progress.    ______________________________________________________________________    How do I schedule labs, imaging studies, or procedures that were ordered in clinic today?     Labs: To schedule lab appointment at the [facility], use my chart or call [phone]. If you have a [facility] lab closer to home where you are regularly seen you can give them a call.     Procedures: If a colonoscopy, upper endoscopy, breath test, esophageal manometry, or pH impedence was ordered today, our endoscopy team will call you to schedule this. If you have not heard from our endoscopy team within a week, please call [phone] to schedule.     Imaging Studies: If you were scheduled for a CT scan, X-ray, MRI, ultrasound, HIDA scan or other imaging study, please call [phone] to have this scheduled.     Referral: If a referral to another specialty was ordered, expect a phone call or follow instructions above. If you have not heard from anyone regarding your referral in a week, please call our clinic to check the status.     Who do I call with any questions after my visit?  Please be in touch if there are any further questions that arise following today's visit.  There are multiple ways to contact your gastroenterology care team.        During business hours, you may " reach a Gastroenterology nurse at [phone]      To schedule or reschedule an appointment, please call [phone].       You can always send a secure message through "[employer].  "[employer] messages are answered by your nurse or doctor typically within 24 hours.  Please allow extra time on weekends and holidays.        For urgent/emergent questions after business hours, you may reach the on-call GI Fellow by contacting the [facility] at [phone].     How will I get the results of any tests ordered?    You will receive all of your results.  If you have signed up for [employer], any tests ordered at your visit will be available to you after your physician reviews them.  Typically this takes 1-2 weeks.  If there are urgent results that require a change in your care plan, your physician or nurse will call you to discuss the next steps.      What is "[employer]?  "[employer] is a secure way for you to access all of your healthcare records from the [facility].  It is a web based computer program, so you can sign on to it from any location.  It also allows you to send secure messages to your care team.  I recommend signing up for "[employer] access if you have not already done so and are comfortable with using a computer.      How to I schedule a follow-up visit?  If you did not schedule a follow-up visit today, please call [phone] to schedule a follow-up office visit.      Sincerely,    Alesia Gómez PA-C  Division of Gastroenterology, Hepatology & Nutrition  [facility]

## 2022-02-25 RX ORDER — KETOCONAZOLE 20 MG/G
CREAM TOPICAL
COMMUNITY
Start: 2022-01-11 | End: 2022-05-26

## 2022-03-03 ENCOUNTER — OFFICE VISIT (OUTPATIENT)
Dept: FAMILY MEDICINE | Facility: CLINIC | Age: 82
End: 2022-03-03
Payer: COMMERCIAL

## 2022-03-03 VITALS
WEIGHT: 157 LBS | DIASTOLIC BLOOD PRESSURE: 68 MMHG | BODY MASS INDEX: 29.18 KG/M2 | TEMPERATURE: 97.5 F | HEART RATE: 83 BPM | SYSTOLIC BLOOD PRESSURE: 138 MMHG | OXYGEN SATURATION: 99 %

## 2022-03-03 DIAGNOSIS — N39.0 URINARY TRACT INFECTION ASSOCIATED WITH NEPHROSTOMY CATHETER, INITIAL ENCOUNTER (H): ICD-10-CM

## 2022-03-03 DIAGNOSIS — E11.9 TYPE 2 DIABETES MELLITUS WITHOUT COMPLICATION, WITHOUT LONG-TERM CURRENT USE OF INSULIN (H): ICD-10-CM

## 2022-03-03 DIAGNOSIS — N20.0 URIC ACID KIDNEY STONE: ICD-10-CM

## 2022-03-03 DIAGNOSIS — I10 HYPERTENSION GOAL BP (BLOOD PRESSURE) < 140/90: Primary | ICD-10-CM

## 2022-03-03 DIAGNOSIS — N18.31 CHRONIC KIDNEY DISEASE, STAGE 3A (H): ICD-10-CM

## 2022-03-03 DIAGNOSIS — T83.512A URINARY TRACT INFECTION ASSOCIATED WITH NEPHROSTOMY CATHETER, INITIAL ENCOUNTER (H): ICD-10-CM

## 2022-03-03 DIAGNOSIS — E78.5 HYPERLIPIDEMIA LDL GOAL <100: ICD-10-CM

## 2022-03-03 PROCEDURE — 99214 OFFICE O/P EST MOD 30 MIN: CPT | Performed by: FAMILY MEDICINE

## 2022-03-03 NOTE — PROGRESS NOTES
Assessment & Plan     (I10) Hypertension goal BP (blood pressure) < 140/90  (primary encounter diagnosis)  At goal  The current medical regimen is effective;  continue present plan and medications.      (N18.31) Chronic kidney disease, stage 3a (H)  Comment: stable  Plan: manage risk factors    (E78.5) Hyperlipidemia LDL goal <100  Comment:   LDL Cholesterol Calculated   Date Value Ref Range Status   01/06/2022 131 (H) <=100 mg/dL Final   05/11/2021 103 (H) <100 mg/dL Final     Comment:     Above desirable:  100-129 mg/dl  Borderline High:  130-159 mg/dL  High:             160-189 mg/dL  Very high:       >189 mg/dl      not at goal, now on statin, recheck and adjust medication as needed  Plan: Lipid panel reflex to direct LDL Fasting          (E11.9) Type 2 diabetes mellitus without complication, without long-term current use of insulin (H)  Comment: stable, at goal  Plan: Hemoglobin A1c            (T83.512A,  N39.0) Urinary tract infection associated with nephrostomy catheter, initial encounter (H)  Comment: recurrent, return to clinic as needed for labs when symptomatic  Plan: UA Macro with Reflex to Micro and Culture - lab        collect  No results found for any visits on 03/03/22.     No follow-ups on file.    Lea Lopez MD  [facility] is a 82 year old who presents for the following health issues   Blood pressure at home 118/67    History of Present Illness       Hypertension: She presents for follow up of hypertension.  She does check blood pressure  regularly outside of the clinic. Outpatient blood pressures have not been over 140/90. She does not follow a low salt diet.     She eats 2-3 servings of fruits and vegetables daily.She consumes 0 sweetened beverage(s) daily.She exercises with enough effort to increase her heart rate 30 to 60 minutes per day.  She exercises with enough effort to increase her heart rate 5 days per week.   She is taking  medications regularly.     Recent Labs   Lab Test 01/06/22  1022 11/04/21  0825 03/04/16  0930 11/10/15  0829 07/23/15  0800   CHOL 225* 233*   < > 210* 235*   HDL 73 77   < > 67 69   * 141*   < > 126 147*   TRIG 103 73   < > 86 96   CHOLHDLRATIO  --   --   --  3.1 3.4    < > = values in this interval not displayed.     Diabetes Follow-up    How often are you checking your blood sugar? Two times daily  Blood sugar testing frequency justification:  Patient modifying lifestyle changes (diet, exercise) with blood sugars  What time of day are you checking your blood sugars (select all that apply)?  Before meals and After meals  Have you had any blood sugars above 200?  No  Have you had any blood sugars below 70?  No    What symptoms do you notice when your blood sugar is low?  None    What concerns do you have today about your diabetes? None     Do you have any of these symptoms? (Select all that apply)  No numbness or tingling in feet.  No redness, sores or blisters on feet.  No complaints of excessive thirst.  No reports of blurry vision.  No significant changes to weight.      BP Readings from Last 2 Encounters:   03/03/22 138/68   11/10/21 (!) 146/84     Hemoglobin A1C POCT (%)   Date Value   05/11/2021 7.3 (H)   10/07/2020 7.2 (H)     Hemoglobin A1C (%)   Date Value   11/04/2021 7.4 (H)   08/27/2021 8.1 (H)     LDL Cholesterol Calculated (mg/dL)   Date Value   01/06/2022 131 (H)   11/04/2021 141 (H)   05/11/2021 103 (H)   10/07/2020 134 (H)               Chronic Kidney Disease Follow-up      Do you take any over the counter pain medicine?: No    Recurrent UTIs  Recurrent bothersome symptoms, followed by urologist    Review of Systems   Constitutional, HEENT, cardiovascular, pulmonary, GI, , musculoskeletal, neuro, skin, endocrine and psych systems are negative, except as otherwise noted.      Objective    /68   Pulse 83   Temp 97.5  F (36.4  C)   Wt 71.2 kg (157 lb)   SpO2 99%   Breastfeeding No    BMI 29.18 kg/m    Body mass index is 29.18 kg/m .  Physical Exam   GENERAL: healthy, alert and no distress  NECK: no adenopathy, no asymmetry, masses, or scars and thyroid normal to palpation  RESP: lungs clear to auscultation - no rales, rhonchi or wheezes  CV: regular rate and rhythm, normal S1 S2, no S3 or S4, no murmur, click or rub, no peripheral edema and peripheral pulses strong  ABDOMEN: soft, nontender, no hepatosplenomegaly, no masses and bowel sounds normal  MS: no gross musculoskeletal defects noted, no edema  PSYCH: mentation appears normal, affect normal/bright    No results found for any visits on 03/03/22.

## 2022-03-09 ENCOUNTER — PRE VISIT (OUTPATIENT)
Dept: UROLOGY | Facility: CLINIC | Age: 82
End: 2022-03-09
Payer: COMMERCIAL

## 2022-03-09 NOTE — TELEPHONE ENCOUNTER
Reason for visit: symptom check      Dx/Hx/Sx: kidney stone     Records/imaging/labs/orders: in epic    At Rooming: video visit

## 2022-03-10 RX ORDER — POTASSIUM CITRATE 10 MEQ/1
10 TABLET, EXTENDED RELEASE ORAL 2 TIMES DAILY
Start: 2022-03-10 | End: 2022-05-26

## 2022-04-12 ENCOUNTER — VIRTUAL VISIT (OUTPATIENT)
Dept: UROLOGY | Facility: CLINIC | Age: 82
End: 2022-04-12
Payer: COMMERCIAL

## 2022-04-12 DIAGNOSIS — N20.0 KIDNEY STONE: Primary | ICD-10-CM

## 2022-04-12 PROCEDURE — 99213 OFFICE O/P EST LOW 20 MIN: CPT | Mod: 95 | Performed by: NURSE PRACTITIONER

## 2022-04-12 NOTE — PATIENT INSTRUCTIONS
UROLOGY CLINIC VISIT PATIENT INSTRUCTIONS    -Follow up with me in 6 months with a renal ultrasound obtained beforehand. To schedule this scan, please call [phone].     If you have any issues, questions or concerns in the meantime, do not hesitate to contact us at [phone] or via [employer].     Brenda Snowden, CNP  Department of Urology

## 2022-04-12 NOTE — PROGRESS NOTES
Kelly is a 82 year old who is being evaluated via a billable video visit.      How would you like to obtain your AVS? MyChart  If the video visit is dropped, the invitation should be resent by: Text to cell phone: [phone]  Will anyone else be joining your video visit? No    Video Start Time: 7:33 AM  Video-Visit Details    Type of service:  Video Visit    Video End Time: 7:47 AM    Originating Location (pt. Location): Home    Distant Location (provider location):  [facility] [place]     Platform used for Video Visit: [employer]      Kelly TARAN Barnes complains of   Chief Complaint   Patient presents with     RECHECK     Kidney stones       Assessment/Plan:  82 year old female with DM2, hyperlipidemia, hypertension, recurrent UTIs and large-burden uric acid kidney stones, now s/p PCNL last summer. Currently on potassium citrate 10 mEq twice daily for urine alkalinization. We revisited [facility]olink today to get a better idea of other stone risks and urine pH, but she would again like to defer this.   -Follow up with me in 6 months with a renal ultrasound beforehand. If evidence of new stones, will need to move forward with Litholink to help guide potassium citrate dose and control other stone risk factors.   -She will follow up with me sooner if UTIs were to recur.     Brenda Snowden CNP  Department of Urology      Subjective:   82 year old female with a history of DM2, hyperlipidemia, hypertension, recurrent UTIs and kidney stones, s/p staged PCNL with Dr. Law last summer. Stone composed of uric acid, though her serum uric acid level was normal. Was prescribed potassium citrate for stone prevention by Dr. Law, but at our last visit in January, she had not started this. She had, however, started taking Hiprex for UTI prevention. Therefore, it was suspected that she was growing stones 2/2 acidic urine, likely from her DM2. Concern that Hiprex could contribute to this stone growth  through acidification of urine. If potassium citrate was started, these two medications could offset each other, limiting benefit from either drug. Therefore, Hiprex stopped and potassium citrate started, though at a lower dose than initially prescribed, per patient request. Started at 10 mEq BID, with plans to titrate up as needed/tolerated.     Today, she states that she has been doing well. Has not had any UTIs that she knows of. Her PCP did place an order for a UA along with other routine labs, and she is planning to do this in the coming weeks. She did have an episode of diarrhea and vomited twice last night, and had a slight temp of 99.2 at home this morning, but she thinks this may just be something she ate- she is feeling much better now. No concerns today.       Objective:     Exam:   Patient is a 82 year old female   No vital signs obtained due to virtual visit.  General Appearance: Well groomed, hygenic  Respiratory: No cough, no respiratory distress or labored breathing  Musculoskeletal: Grossly normal with no gross deficits  Skin: No discoloration or apparent rashes  Neurologic: No tremors  Psychiatric: Alert and oriented  Further examination is deferred due to the nature of our visit.

## 2022-04-12 NOTE — LETTER
4/12/2022       RE: Kelly Barnes  [address]  [facility] [address]     Dear Colleague,    Thank you for referring your patient, Kelly Barnes, to the [facility] [place] at [facility]. Please see a copy of my visit note below.    Kelly is a 82 year old who is being evaluated via a billable video visit.      How would you like to obtain your AVS? MyChart  If the video visit is dropped, the invitation should be resent by: Text to cell phone: [phone]  Will anyone else be joining your video visit? No    Video Start Time: 7:33 AM  Video-Visit Details    Type of service:  Video Visit    Video End Time: 7:47 AM    Originating Location (pt. Location): Home    Distant Location (provider location):  [facility] [place]     Platform used for Video Visit: [employer]      Kelly Barnes complains of   Chief Complaint   Patient presents with     RECHECK     Kidney stones       Assessment/Plan:  82 year old female with DM2, hyperlipidemia, hypertension, recurrent UTIs and large-burden uric acid kidney stones, now s/p PCNL last summer. Currently on potassium citrate 10 mEq twice daily for urine alkalinization. We revisited Litholink today to get a better idea of other stone risks and urine pH, but she would again like to defer this.   -Follow up with me in 6 months with a renal ultrasound beforehand. If evidence of new stones, will need to move forward with Litholink to help guide potassium citrate dose and control other stone risk factors.   -She will follow up with me sooner if UTIs were to recur.     Brenda Snowden, CNP  Department of Urology      Subjective:   82 year old female with a history of DM2, hyperlipidemia, hypertension, recurrent UTIs and kidney stones, s/p staged PCNL with Dr. Law last summer. Stone composed of uric acid, though her serum uric acid level was normal. Was prescribed potassium citrate for  stone prevention by Dr. Law, but at our last visit in January, she had not started this. She had, however, started taking Hiprex for UTI prevention. Therefore, it was suspected that she was growing stones 2/2 acidic urine, likely from her DM2. Concern that Hiprex could contribute to this stone growth through acidification of urine. If potassium citrate was started, these two medications could offset each other, limiting benefit from either drug. Therefore, Hiprex stopped and potassium citrate started, though at a lower dose than initially prescribed, per patient request. Started at 10 mEq BID, with plans to titrate up as needed/tolerated.     Today, she states that she has been doing well. Has not had any UTIs that she knows of. Her PCP did place an order for a UA along with other routine labs, and she is planning to do this in the coming weeks. She did have an episode of diarrhea and vomited twice last night, and had a slight temp of 99.2 at home this morning, but she thinks this may just be something she ate- she is feeling much better now. No concerns today.       Objective:     Exam:   Patient is a 82 year old female   No vital signs obtained due to virtual visit.  General Appearance: Well groomed, hygenic  Respiratory: No cough, no respiratory distress or labored breathing  Musculoskeletal: Grossly normal with no gross deficits  Skin: No discoloration or apparent rashes  Neurologic: No tremors  Psychiatric: Alert and oriented  Further examination is deferred due to the nature of our visit.

## 2022-05-24 ENCOUNTER — LAB (OUTPATIENT)
Dept: LAB | Facility: CLINIC | Age: 82
End: 2022-05-24
Payer: COMMERCIAL

## 2022-05-24 DIAGNOSIS — E78.5 HYPERLIPIDEMIA LDL GOAL <100: ICD-10-CM

## 2022-05-24 DIAGNOSIS — T83.512A URINARY TRACT INFECTION ASSOCIATED WITH NEPHROSTOMY CATHETER, INITIAL ENCOUNTER (H): ICD-10-CM

## 2022-05-24 DIAGNOSIS — N39.0 URINARY TRACT INFECTION ASSOCIATED WITH NEPHROSTOMY CATHETER, INITIAL ENCOUNTER (H): ICD-10-CM

## 2022-05-24 DIAGNOSIS — E11.9 TYPE 2 DIABETES MELLITUS WITHOUT COMPLICATION, WITHOUT LONG-TERM CURRENT USE OF INSULIN (H): ICD-10-CM

## 2022-05-24 LAB
ALBUMIN UR-MCNC: NEGATIVE MG/DL
APPEARANCE UR: CLEAR
BACTERIA #/AREA URNS HPF: ABNORMAL /HPF
BILIRUB UR QL STRIP: NEGATIVE
CHOLEST SERPL-MCNC: 184 MG/DL
COLOR UR AUTO: YELLOW
FASTING STATUS PATIENT QL REPORTED: YES
GLUCOSE UR STRIP-MCNC: NEGATIVE MG/DL
HBA1C MFR BLD: 7.7 % (ref 0–5.6)
HDLC SERPL-MCNC: 77 MG/DL
HGB UR QL STRIP: NEGATIVE
KETONES UR STRIP-MCNC: NEGATIVE MG/DL
LDLC SERPL CALC-MCNC: 93 MG/DL
LEUKOCYTE ESTERASE UR QL STRIP: ABNORMAL
NITRATE UR QL: NEGATIVE
NONHDLC SERPL-MCNC: 107 MG/DL
PH UR STRIP: 5.5 [PH] (ref 5–7)
RBC #/AREA URNS AUTO: ABNORMAL /HPF
SP GR UR STRIP: 1.01 (ref 1–1.03)
TRIGL SERPL-MCNC: 68 MG/DL
UROBILINOGEN UR STRIP-ACNC: 0.2 E.U./DL
WBC #/AREA URNS AUTO: ABNORMAL /HPF

## 2022-05-24 PROCEDURE — 81001 URINALYSIS AUTO W/SCOPE: CPT

## 2022-05-24 PROCEDURE — 80061 LIPID PANEL: CPT

## 2022-05-24 PROCEDURE — 83036 HEMOGLOBIN GLYCOSYLATED A1C: CPT

## 2022-05-24 PROCEDURE — 36415 COLL VENOUS BLD VENIPUNCTURE: CPT

## 2022-05-26 ENCOUNTER — OFFICE VISIT (OUTPATIENT)
Dept: FAMILY MEDICINE | Facility: CLINIC | Age: 82
End: 2022-05-26
Payer: COMMERCIAL

## 2022-05-26 VITALS
DIASTOLIC BLOOD PRESSURE: 73 MMHG | WEIGHT: 159 LBS | BODY MASS INDEX: 29.56 KG/M2 | SYSTOLIC BLOOD PRESSURE: 130 MMHG | TEMPERATURE: 97.7 F | RESPIRATION RATE: 18 BRPM | OXYGEN SATURATION: 96 % | HEART RATE: 68 BPM

## 2022-05-26 DIAGNOSIS — Z00.00 ENCOUNTER FOR MEDICARE ANNUAL WELLNESS EXAM: Primary | ICD-10-CM

## 2022-05-26 DIAGNOSIS — I10 HYPERTENSION GOAL BP (BLOOD PRESSURE) < 140/90: ICD-10-CM

## 2022-05-26 DIAGNOSIS — E78.5 HYPERLIPIDEMIA LDL GOAL <100: ICD-10-CM

## 2022-05-26 DIAGNOSIS — E11.9 TYPE 2 DIABETES MELLITUS WITHOUT COMPLICATION, WITHOUT LONG-TERM CURRENT USE OF INSULIN (H): ICD-10-CM

## 2022-05-26 DIAGNOSIS — N18.31 CHRONIC KIDNEY DISEASE, STAGE 3A (H): ICD-10-CM

## 2022-05-26 DIAGNOSIS — Z12.31 ENCOUNTER FOR SCREENING MAMMOGRAM FOR BREAST CANCER: ICD-10-CM

## 2022-05-26 DIAGNOSIS — N20.0 URIC ACID KIDNEY STONE: ICD-10-CM

## 2022-05-26 PROCEDURE — 99397 PER PM REEVAL EST PAT 65+ YR: CPT | Performed by: FAMILY MEDICINE

## 2022-05-26 PROCEDURE — 99207 PR FOOT EXAM NO CHARGE: CPT | Mod: 25 | Performed by: FAMILY MEDICINE

## 2022-05-26 PROCEDURE — 99214 OFFICE O/P EST MOD 30 MIN: CPT | Mod: 25 | Performed by: FAMILY MEDICINE

## 2022-05-26 RX ORDER — GLIMEPIRIDE 4 MG/1
4 TABLET ORAL 2 TIMES DAILY WITH MEALS
Qty: 180 TABLET | Refills: 3 | Status: SHIPPED | OUTPATIENT
Start: 2022-05-26 | End: 2023-05-09

## 2022-05-26 RX ORDER — POTASSIUM CITRATE 10 MEQ/1
10 TABLET, EXTENDED RELEASE ORAL 2 TIMES DAILY
Qty: 180 TABLET | Refills: 3 | Status: SHIPPED | OUTPATIENT
Start: 2022-05-26 | End: 2022-10-14

## 2022-05-26 RX ORDER — SIMVASTATIN 20 MG
20 TABLET ORAL AT BEDTIME
Qty: 90 TABLET | Refills: 3 | Status: SHIPPED | OUTPATIENT
Start: 2022-05-26 | End: 2023-05-09

## 2022-05-26 RX ORDER — LOSARTAN POTASSIUM AND HYDROCHLOROTHIAZIDE 12.5; 5 MG/1; MG/1
1 TABLET ORAL EVERY MORNING
Qty: 90 TABLET | Refills: 3 | Status: SHIPPED | OUTPATIENT
Start: 2022-05-26 | End: 2023-05-09

## 2022-05-26 ASSESSMENT — ENCOUNTER SYMPTOMS
COUGH: 0
HEARTBURN: 0
EYE PAIN: 0
MYALGIAS: 0
ABDOMINAL PAIN: 0
JOINT SWELLING: 0
HEADACHES: 0
HEMATOCHEZIA: 0
PARESTHESIAS: 1
NERVOUS/ANXIOUS: 0
PALPITATIONS: 0
BREAST MASS: 0
DIARRHEA: 0
CONSTIPATION: 0
DYSURIA: 0
SORE THROAT: 0
ARTHRALGIAS: 0
SHORTNESS OF BREATH: 0
FEVER: 0
NAUSEA: 0
FREQUENCY: 0
HEMATURIA: 0
DIZZINESS: 0
WEAKNESS: 0

## 2022-05-26 ASSESSMENT — ACTIVITIES OF DAILY LIVING (ADL): CURRENT_FUNCTION: NO ASSISTANCE NEEDED

## 2022-05-26 NOTE — RESULT ENCOUNTER NOTE
Patient was seen today in clinic.  I discussed results in clinic, please see clinic progress note.    Lea Lopez MD 5/26/2022

## 2022-05-26 NOTE — PROGRESS NOTES
SUBJECTIVE:   Kelly Barnes is a 82 year old female who presents for Preventive Visit and chronic disease management.  Diabetes Follow-up    How often are you checking your blood sugar? One time daily  What time of day are you checking your blood sugars (select all that apply)?  Before meals  Have you had any blood sugars above 200?  No  Have you had any blood sugars below 70?  No    What symptoms do you notice when your blood sugar is low?  None    What concerns do you have today about your diabetes? None     Do you have any of these symptoms? (Select all that apply)  No numbness or tingling in feet.  No redness, sores or blisters on feet.  No complaints of excessive thirst.  No reports of blurry vision.  No significant changes to weight.    Hyperlipidemia Follow-Up      Are you regularly taking any medication or supplement to lower your cholesterol?   Yes- simvastatin 20 mg qhs    Are you having muscle aches or other side effects that you think could be caused by your cholesterol lowering medication?  No    Hypertension Follow-up      Do you check your blood pressure regularly outside of the clinic? Yes     Are you following a low salt diet? Yes    Are your blood pressures ever more than 140 on the top number (systolic) OR more   than 90 on the bottom number (diastolic), for example 140/90? No    BP Readings from Last 2 Encounters:   05/26/22 130/73   03/03/22 138/68     Hemoglobin A1C POCT (%)   Date Value   05/11/2021 7.3 (H)   10/07/2020 7.2 (H)     Hemoglobin A1C (%)   Date Value   05/24/2022 7.7 (H)   11/04/2021 7.4 (H)     LDL Cholesterol Calculated (mg/dL)   Date Value   05/24/2022 93   01/06/2022 131 (H)   05/11/2021 103 (H)   10/07/2020 134 (H)       Chronic Kidney Disease Follow-up      Do you take any over the counter pain medicine?: No       Patient has been advised of split billing requirements and indicates understanding: Yes  Are you in the first 12 months of your Medicare coverage?  No    Healthy  "Habits:     In general, how would you rate your overall health?  Good    Frequency of exercise:  4-5 days/week    Duration of exercise:  15-30 minutes    Do you usually eat at least 4 servings of fruit and vegetables a day, include whole grains    & fiber and avoid regularly eating high fat or \"junk\" foods?  Yes    Taking medications regularly:  Yes    Medication side effects:  None    Ability to successfully perform activities of daily living:  No assistance needed    Home Safety:  No safety concerns identified    Hearing Impairment:  No hearing concerns    In the past 6 months, have you been bothered by leaking of urine?  No    In general, how would you rate your overall mental or emotional health?  Excellent      PHQ-2 Total Score: 0    Additional concerns today:  No    Do you feel safe in your environment? Yes    Have you ever done Advance Care Planning? (For example, a Health Directive, POLST, or a discussion with a medical provider or your loved ones about your wishes): Invalid health care directive on file. Gave patient a new blank one to take home.     Fall risk  Fallen 2 or more times in the past year?: No  Any fall with injury in the past year?: No    Cognitive Screening   1) Repeat 3 items (Leader, Season, Table)    2) Clock draw: NORMAL  3) 3 item recall: Recalls 3 objects  Results: 3 items recalled: COGNITIVE IMPAIRMENT LESS LIKELY    Mini-CogTM Copyright ARCELIA Juarez. Licensed by the author for use in [facility]; reprinted with permission ([email or web address]). All rights reserved.          Reviewed and updated as needed this visit by clinical staff   Tobacco  Allergies  Meds   Med Hx  Surg Hx  Fam Hx  Soc Hx          Reviewed and updated as needed this visit by Provider                   Social History     Tobacco Use     Smoking status: Never Smoker     Smokeless tobacco: Never Used   Substance Use Topics     Alcohol use: Yes     Alcohol/week: 10.0 standard drinks     Comment: 1-2 drinks per " week         Alcohol Use 5/26/2022   Prescreen: >3 drinks/day or >7 drinks/week? No   Prescreen: >3 drinks/day or >7 drinks/week? -         Current providers sharing in care for this patient include:   Patient Care Team:  Lea Lopez MD as PCP - General (Family Practice)  Lea Lopez MD as Assigned PCP  [facility] as Pharmacist (Pharmacist)  Brenda Snowden CNP as Nurse Practitioner (Urology)  Kirk Law MD as MD (Urology)  Giselle Silva MD as Assigned Infectious Disease Provider  Alesia Gómez PA-C as Assigned Cancer Care Provider  Brenda Snowden CNP as Assigned Surgical Provider    The following health maintenance items are reviewed in Epic and correct as of today:  Health Maintenance Due   Topic Date Due     ZOSTER IMMUNIZATION (1 of 2) Never done     DEXA  05/06/2012     COVID-19 Vaccine (4 - Booster for Pfizer series) 01/27/2022     Patient Active Problem List   Diagnosis     Osteopenia     Hypertension goal BP (blood pressure) < 140/90     Hyperlipidemia LDL goal <100     Type 2 diabetes mellitus without complication, without long-term current use of insulin (H)     Family history of breast cancer in sister     Hepatitis A immune     Clostridioides difficile diarrhea     Chronic kidney disease, stage 3a (H)     Past Surgical History:   Procedure Laterality Date     CATARACT EXTRACTION       CYSTOSCOPY, RETROGRADES, INSERT STENT URETER(S), COMBINED Right 07/15/2021    Procedure: CYSTOURETEROSCOPY, WITH RETROGRADE PYELOGRAM,  AND STENT INSERTION RIGHT;  Surgeon: Kirk Law MD;  Location: [facility] OR     LASER HOLMIUM NEPHROLITHOTOMY VIA PERCUTANEOUS NEPHROSTOMY Right 08/05/2021    Procedure: NEPHROLITHOTOMY, PERCUTANEOUS, USING HOLMIUM LASER RIGHT;  Surgeon: Kirk Law MD;  Location: [facility] OR     LASER HOLMIUM NEPHROLITHOTOMY VIA PERCUTANEOUS NEPHROSTOMY Right 08/13/2021    Procedure: Ureteroscopic assisted secondary right  percutaneous nephrolihtotomy, Holmium laser lithotriipsy;  Surgeon: Kirk Law MD;  [facility]     PICC SINGLE LUMEN PLACEMENT Left 08/17/2021    41cm (2cm external), Medial brachial vein       Social History     Tobacco Use     Smoking status: Never Smoker     Smokeless tobacco: Never Used   Substance Use Topics     Alcohol use: Yes     Alcohol/week: 10.0 standard drinks     Comment: 1-2 drinks per week     Family History   Problem Relation Age of Onset     Hypertension Mother      Diabetes No family hx of      Cerebrovascular Disease No family hx of      Breast Cancer No family hx of      Cancer - colorectal No family hx of      Prostate Cancer No family hx of      Anesthesia Reaction No family hx of      Bleeding Disorder No family hx of      Clotting Disorder No family hx of          Allergies   Allergen Reactions     Ibuprofen Other (See Comments)     numbness in hands and feet     Keflex [Cephalexin] Rash     Metformin Rash       Mammogram Screening - Patient over age 75, has elected to continue with screening.  Pertinent mammograms are reviewed under the imaging tab.    Review of Systems   Constitutional: Negative for fever.   HENT: Negative for congestion, ear pain, hearing loss and sore throat.    Eyes: Negative for pain and visual disturbance.   Respiratory: Negative for cough and shortness of breath.    Cardiovascular: Positive for peripheral edema. Negative for chest pain and palpitations.   Gastrointestinal: Negative for abdominal pain, constipation, diarrhea, heartburn, hematochezia and nausea.   Breasts:  Negative for tenderness, breast mass and discharge.   Genitourinary: Negative for dysuria, frequency, genital sores, hematuria, pelvic pain, urgency, vaginal bleeding and vaginal discharge.   Musculoskeletal: Negative for arthralgias, joint swelling and myalgias.   Skin: Negative for rash.   Neurological: Positive for paresthesias. Negative for dizziness, weakness and headaches.  "  Psychiatric/Behavioral: Negative for mood changes. The patient is not nervous/anxious.      OBJECTIVE:   /73   Pulse 68   Temp 97.7  F (36.5  C) (Temporal)   Resp 18   Wt 72.1 kg (159 lb)   SpO2 96%   BMI 29.56 kg/m   Estimated body mass index is 29.56 kg/m  as calculated from the following:    Height as of 11/4/21: 1.562 m (5' 1.5\").    Weight as of this encounter: 72.1 kg (159 lb).  Physical Exam  GENERAL: healthy, alert and no distress  EYES: Eyes grossly normal to inspection, PERRL and conjunctivae and sclerae normal  HENT: ear canals and TM's normal, nose and mouth without ulcers or lesions  NECK: no adenopathy, no asymmetry, masses, or scars and thyroid normal to palpation  RESP: lungs clear to auscultation - no rales, rhonchi or wheezes  CV: regular rate and rhythm, normal S1 S2, no S3 or S4, no murmur, click or rub, no peripheral edema and peripheral pulses strong  ABDOMEN: soft, nontender, no hepatosplenomegaly, no masses and bowel sounds normal  MS: no gross musculoskeletal defects noted, no edema  SKIN: no suspicious lesions or rashes  NEURO: Normal strength and tone, mentation intact and speech normal  PSYCH: mentation appears normal, affect normal/bright    ASSESSMENT / PLAN:   (Z00.00) Encounter for Medicare annual wellness exam  (primary encounter diagnosis)  Comment: routine, very healthy 82 year old!    (E11.9) Type 2 diabetes mellitus without complication, without long-term current use of insulin (H)  Comment: A1C has gone up slightly, but will get more activity over the summer, recheck  Plan: glimepiride (AMARYL) 4 MG tablet, FOOT EXAM            (I10) Hypertension goal BP (blood pressure) < 140/90  Comment:   BP Readings from Last 3 Encounters:   05/26/22 130/73   03/03/22 138/68   11/10/21 (!) 146/84    At goal  The current medical regimen is effective;  continue present plan and medications.    Plan: losartan-hydrochlorothiazide (HYZAAR) 50-12.5         MG tablet        "     (E78.5) Hyperlipidemia LDL goal <100  Comment:   LDL Cholesterol Calculated   Date Value Ref Range Status   05/24/2022 93 <=100 mg/dL Final   05/11/2021 103 (H) <100 mg/dL Final     Comment:     Above desirable:  100-129 mg/dl  Borderline High:  130-159 mg/dL  High:             160-189 mg/dL  Very high:       >189 mg/dl      At goal  The current medical regimen is effective;  continue present plan and medications.    Plan: simvastatin (ZOCOR) 20 MG tablet            (N18.31) Chronic kidney disease, stage 3a (H)  Comment:   GFR Estimate   Date Value Ref Range Status   01/06/2022 59 (L) >60 mL/min/1.73m2 Final     Comment:     Effective December 21, 2021 eGFRcr in adults is calculated using the 2021 CKD-EPI creatinine equation which includes age and gender (Raya et al., NEJ, DOI: 10.1056/UOULdy1036789)   10/14/2021 63 >60 mL/min/1.73m2 Final     Comment:     As of July 11, 2021, eGFR is calculated by the CKD-EPI creatinine equation, without race adjustment. eGFR can be influenced by muscle mass, exercise, and diet. The reported eGFR is an estimation only and is only applicable if the renal function is stable.   10/13/2021 54 (L) >60 mL/min/1.73m2 Final     Comment:     As of July 11, 2021, eGFR is calculated by the CKD-EPI creatinine equation, without race adjustment. eGFR can be influenced by muscle mass, exercise, and diet. The reported eGFR is an estimation only and is only applicable if the renal function is stable.   05/11/2021 55 (L) >60 mL/min/[1.73_m2] Final     Comment:     Non  GFR Calc  Starting 12/18/2018, serum creatinine based estimated GFR (eGFR) will be   calculated using the Chronic Kidney Disease Epidemiology Collaboration   (CKD-EPI) equation.     12/21/2020 51 (L) >60 mL/min/[1.73_m2] Final     Comment:     Non  GFR Calc  Starting 12/18/2018, serum creatinine based estimated GFR (eGFR) will be   calculated using the Chronic Kidney Disease Epidemiology  "Collaboration   (CKD-EPI) equation.     12/20/2020 48 (L) >60 mL/min/[1.73_m2] Final     Comment:     Non  GFR Calc  Starting 12/18/2018, serum creatinine based estimated GFR (eGFR) will be   calculated using the Chronic Kidney Disease Epidemiology Collaboration   (CKD-EPI) equation.       Plan: continue risk factor management    (N20.0) Uric acid kidney stone  Comment: follow up with urologist as scheduled  Plan: potassium citrate (UROCIT-K) 10 MEQ (1080 MG)         CR tablet            (Z12.31) Encounter for screening mammogram for breast cancer  Comment:   Plan: *MA Screening Digital Bilateral              Patient has been advised of split billing requirements and indicates understanding: Yes    COUNSELING:  Reviewed preventive health counseling, as reflected in patient instructions    Estimated body mass index is 29.56 kg/m  as calculated from the following:    Height as of 11/4/21: 1.562 m (5' 1.5\").    Weight as of this encounter: 72.1 kg (159 lb).        She reports that she has never smoked. She has never used smokeless tobacco.      Appropriate preventive services were discussed with this patient, including applicable screening as appropriate for cardiovascular disease, diabetes, osteopenia/osteoporosis, and glaucoma.  As appropriate for age/gender, discussed screening for colorectal cancer, prostate cancer, breast cancer, and cervical cancer. Checklist reviewing preventive services available has been given to the patient.    Reviewed patients plan of care and provided an AVS. The Intermediate Care Plan ( asthma action plan, low back pain action plan, and migraine action plan) for Kelly meets the Care Plan requirement. This Care Plan has been established and reviewed with the Patient.    Counseling Resources:  ATP IV Guidelines  Pooled Cohorts Equation Calculator  Breast Cancer Risk Calculator  Breast Cancer: Medication to Reduce Risk  FRAX Risk Assessment  ICSI Preventive " Guidelines  Dietary Guidelines for Americans, 2010  USDA's MyPlate  ASA Prophylaxis  Lung CA Screening    eLa Lopez MD  [facility]    Identified Health Risks:

## 2022-05-26 NOTE — PATIENT INSTRUCTIONS
Patient Education   Personalized Prevention Plan  You are due for the preventive services outlined below.  Your care team is available to assist you in scheduling these services.  If you have already completed any of these items, please share that information with your care team to update in your medical record.  Health Maintenance Due   Topic Date Due     Zoster (Shingles) Vaccine (1 of 2) Never done     Osteoporosis Screening  05/06/2012     COVID-19 Vaccine (4 - Booster for Pfizer series) 01/27/2022     Preventive Health Recommendations    See your health care provider every year to    Review health changes.     Discuss preventive care.      Review your medicines if your doctor has prescribed any.    You no longer need a yearly Pap test unless you've had an abnormal Pap test in the past 10 years. If you have vaginal symptoms, such as bleeding or discharge, be sure to talk with your provider about a Pap test.    Every 1 to 2 years, have a mammogram.  If you are over 69, talk with your health care provider about whether or not you want to continue having screening mammograms.    Every 10 years, have a colonoscopy. Or, have a yearly FIT test (stool test). These exams will check for colon cancer.     Have a cholesterol test every 5 years, or more often if your doctor advises it.     Have a diabetes test (fasting glucose) every three years. If you are at risk for diabetes, you should have this test more often.     At age 65, have a bone density scan (DEXA) to check for osteoporosis (brittle bone disease).    Shots:    Get a flu shot each year.    Get a tetanus shot every 10 years.    Talk to your doctor about your pneumonia vaccines. There are now two you should receive - Pneumovax (PPSV 23) and Prevnar (PCV 13).    Talk to your pharmacist about the shingles vaccine.    Talk to your doctor about the hepatitis B vaccine.    Nutrition:     Eat at least 5 servings of fruits and vegetables each day.    Eat whole-grain  bread, whole-wheat pasta and brown rice instead of white grains and rice.    Get adequate Calcium and Vitamin D.     Lifestyle    Exercise at least 150 minutes a week (30 minutes a day, 5 days a week). This will help you control your weight and prevent disease.    Limit alcohol to one drink per day.    No smoking.     Wear sunscreen to prevent skin cancer.     See your dentist twice a year for an exam and cleaning.    See your eye doctor every 1 to 2 years to screen for conditions such as glaucoma, macular degeneration and cataracts.    Personalized Prevention Plan  You are due for the preventive services outlined below.  Your care team is available to assist you in scheduling these services.  If you have already completed any of these items, please share that information with your care team to update in your medical record.  Health Maintenance   Topic Date Due     ZOSTER IMMUNIZATION (1 of 2) Never done     DEXA  05/06/2012     COVID-19 Vaccine (4 - Booster for Pfizer series) 01/27/2022     EYE EXAM  08/11/2022     LIPID  08/24/2022     MICROALBUMIN  09/16/2022     CMP  10/13/2022     HEMOGLOBIN  10/14/2022     A1C  11/24/2022     BMP  01/06/2023     MEDICARE ANNUAL WELLNESS VISIT  05/26/2023     DIABETIC FOOT EXAM  05/26/2023     ANNUAL REVIEW OF HM ORDERS  05/26/2023     FALL RISK ASSESSMENT  05/26/2023     ADVANCE CARE PLANNING  09/30/2026     DTAP/TDAP/TD IMMUNIZATION (2 - Td or Tdap) 10/06/2031     PHQ-2 (once per calendar year)  Completed     INFLUENZA VACCINE  Completed     Pneumococcal Vaccine: 65+ Years  Completed     URINALYSIS  Completed     IPV IMMUNIZATION  Aged Out     MENINGITIS IMMUNIZATION  Aged Out

## 2022-06-01 ENCOUNTER — TRANSFERRED RECORDS (OUTPATIENT)
Dept: MULTI SPECIALTY CLINIC | Facility: CLINIC | Age: 82
End: 2022-06-01

## 2022-06-01 ENCOUNTER — VIRTUAL VISIT (OUTPATIENT)
Dept: FAMILY MEDICINE | Facility: CLINIC | Age: 82
End: 2022-06-01
Payer: COMMERCIAL

## 2022-06-01 DIAGNOSIS — U07.1 INFECTION DUE TO 2019 NOVEL CORONAVIRUS: Primary | ICD-10-CM

## 2022-06-01 LAB — RETINOPATHY: NORMAL

## 2022-06-01 PROCEDURE — 99214 OFFICE O/P EST MOD 30 MIN: CPT | Mod: 95 | Performed by: FAMILY MEDICINE

## 2022-06-01 NOTE — PROGRESS NOTES
Kelly is a 82 year old who is being evaluated via a billable video visit.      How would you like to obtain your AVS? MyChart  If the video visit is dropped, the invitation should be resent by: Text to cell phone: [phone]  Will anyone else be joining your video visit? No      Video Start Time: 310      Subjective   Kelly is a 82 year old who presents for the following health issues     HPI     Acute Illness  Acute illness concerns: Tested positive for COVID  Onset/Duration: Tuesday  Symptoms:  Fever: YES  Chills/Sweats: no  Headache (location?): no  Sinus Pressure: YES  Conjunctivitis:  no  Ear Pain: no  Rhinorrhea: YES  Congestion: YES  Sore Throat: no  Cough: YES-non-productive  Wheeze: no  Decreased Appetite: no  Nausea: no  Vomiting: no  Diarrhea: no  Dysuria/Freq.: YES  Dysuria or Hematuria: no  Fatigue/Achiness: no  Sick/Strep Exposure: no  Therapies tried and outcome: Tried Zycam but stopped that today    -------------------------    Assessment/Plan:    Kelly Barnes is a 82 year old female presenting for:    Infection due to 2019 novel coronavirus  After lengthy discussion with the patient we have decided to initiate Paxlovid.  After discussion with pharmacist we have decided to do the lower dose due to slightly decreased GFR.    Discussed the risks of most common side effects of Paxlovid.  Discussed holding statin medication while taking the antiviral as well as for 5 to 7 days thereafter.  Discussed monitoring blood sugars.  - nirmatrelvir and ritonavir (PAXLOVID) therapy pack  Dispense: 30 each; Refill: 0  - nirmatrelvir and ritonavir (PAXLOVID) therapy pack  Dispense: 30 each; Refill: 0        There are no discontinued medications.        Chief Complaint:  COVID Treatment           Subjective:   Kelly Barnes is a pleasant 82 year old female being evaluated via video visit today for the following concern/s:     COVID-19: Patient began having some mild symptoms yesterday and tested positive for  COVID-19 today.  She has a past medical history significant for well-controlled diabetes not on insulin.  She also has hyperlipidemia on a statin medication.    She is feeling a bit rundown.  Mild cough.  No shortness of breath.  She is fully vaccinated and boosted.    She has been eating fairly well.  No GI effects.    12 point review of systems completed and negative except for what has been described above    History   Smoking Status     Never Smoker   Smokeless Tobacco     Never Used         Current Outpatient Medications:      blood glucose (ONETOUCH ULTRA) test strip, Use to test blood sugar twice daily. Dispense 1 box of 200 test strips, #3 refills., Disp: 100 strip, Rfl: 3     blood glucose monitoring (ONE TOUCH ULTRA 2) meter device kit, Use to test blood sugar 3 times daily, Disp: 1 kit, Rfl: 0     glimepiride (AMARYL) 4 MG tablet, Take 1 tablet (4 mg) by mouth 2 times daily (with meals), Disp: 180 tablet, Rfl: 3     losartan-hydrochlorothiazide (HYZAAR) 50-12.5 MG tablet, Take 1 tablet by mouth every morning, Disp: 90 tablet, Rfl: 3     nirmatrelvir and ritonavir (PAXLOVID) therapy pack, Take 3 tablets by mouth 2 times daily for 5 days Take 2 Nirmatrelvir tablets and 1 Ritonavir tablet twice daily for 5 days., Disp: 30 each, Rfl: 0     nirmatrelvir and ritonavir (PAXLOVID) therapy pack, Take 2 tablets by mouth 2 times daily for 5 days Take 1 tablet of Nirmatelvir and 1 tablet of Ritonavir twice daily for 5 days, Disp: 30 each, Rfl: 0     OneTouch Delica Lancets 33G MISC, 1 Device by In Vitro route 2 times daily, Disp: 100 each, Rfl: 11     potassium citrate (UROCIT-K) 10 MEQ (1080 MG) CR tablet, Take 1 tablet (10 mEq) by mouth 2 times daily, Disp: 180 tablet, Rfl: 3     simvastatin (ZOCOR) 20 MG tablet, Take 1 tablet (20 mg) by mouth At Bedtime TAKE ONE TABLET BY MOUTH AT BEDTIME (Patient not taking: Reported on 6/1/2022), Disp: 90 tablet, Rfl: 3        Objective:  No vitals were done due to the nature of  "this visit  Vitals - Patient Reported 9/7/2021 1/7/2022 2/17/2022   Height (Patient Reported) 5' 2\" 5' 2.5\" -   Weight (Patient Reported) 143 lb 150 lb 158 lb   BMI (Based on Pt Reported Ht/Wt) 26.15 kg/m2 27 kg/m2 -   Systolic (Patient Reported) - - -   Diastolic (Patient Reported) - - -   Pulse (Patient Reported) - - -               General: No acute distress  Psych: Appropriate affect  HEENT: moist mucous membranes  Pulmonary: Breathing comfortably, speaking in complete sentences  Extremities: warm and well perfused with no edema  Skin: warm and dry with no rash         This note has been dictated and transcribed using voice recognition software.   Any errors in transcription are unintentional and inherent to the software.        Video-Visit Details    Type of service:  Video Visit    Video End Time:    Originating Location (pt. Location): 332    Distant Location (provider location):  [facility]     Platform used for Video Visit: [place]  "

## 2022-09-09 DIAGNOSIS — E11.9 TYPE 2 DIABETES MELLITUS WITHOUT COMPLICATION, WITHOUT LONG-TERM CURRENT USE OF INSULIN (H): ICD-10-CM

## 2022-09-13 ENCOUNTER — VIRTUAL VISIT (OUTPATIENT)
Dept: FAMILY MEDICINE | Facility: CLINIC | Age: 82
End: 2022-09-13
Payer: COMMERCIAL

## 2022-09-13 ENCOUNTER — MYC MEDICAL ADVICE (OUTPATIENT)
Dept: FAMILY MEDICINE | Facility: CLINIC | Age: 82
End: 2022-09-13

## 2022-09-13 DIAGNOSIS — E78.5 HYPERLIPIDEMIA LDL GOAL <100: ICD-10-CM

## 2022-09-13 DIAGNOSIS — I10 HYPERTENSION GOAL BP (BLOOD PRESSURE) < 140/90: ICD-10-CM

## 2022-09-13 DIAGNOSIS — E11.9 TYPE 2 DIABETES MELLITUS WITHOUT COMPLICATION, WITHOUT LONG-TERM CURRENT USE OF INSULIN (H): ICD-10-CM

## 2022-09-13 DIAGNOSIS — E11.9 TYPE 2 DIABETES MELLITUS WITHOUT COMPLICATION, WITHOUT LONG-TERM CURRENT USE OF INSULIN (H): Primary | ICD-10-CM

## 2022-09-13 DIAGNOSIS — N18.31 CHRONIC KIDNEY DISEASE, STAGE 3A (H): ICD-10-CM

## 2022-09-13 PROBLEM — A04.72 CLOSTRIDIOIDES DIFFICILE DIARRHEA: Status: RESOLVED | Noted: 2021-10-12 | Resolved: 2022-09-13

## 2022-09-13 PROCEDURE — 99214 OFFICE O/P EST MOD 30 MIN: CPT | Mod: 95 | Performed by: FAMILY MEDICINE

## 2022-09-13 RX ORDER — LANCETS 33 GAUGE
1 EACH MISCELLANEOUS 2 TIMES DAILY
Qty: 100 EACH | Refills: 11 | Status: SHIPPED | OUTPATIENT
Start: 2022-09-13 | End: 2024-03-28

## 2022-09-13 NOTE — PROGRESS NOTES
Kelly is a 82 year old who is being evaluated via a billable video visit.      How would you like to obtain your AVS? MyChart  If the video visit is dropped, the invitation should be resent by: Text to cell phone: [phone]  Will anyone else be joining your video visit? No      Assessment & Plan     (E11.9) Type 2 diabetes mellitus without complication, without long-term current use of insulin (H)  (primary encounter diagnosis)  Comment: at goal, but blood sugars increasing, will recheck A1C and consider adding medication as needed.  Plan: Comprehensive metabolic panel, Hemoglobin A1c,         Hemoglobin A1c, Hemoglobin A1c        Return to clinic for labs    (I10) Hypertension goal BP (blood pressure) < 140/90  Comment:   BP Readings from Last 3 Encounters:   05/26/22 130/73   03/03/22 138/68   11/10/21 (!) 146/84    at goal, due for labs  Plan: Comprehensive metabolic panel, Albumin Random         Urine Quantitative with Creat Ratio        Will fu as indicated.     (N18.31) Chronic kidney disease, stage 3a (H)  Comment: stable, recheck annually  Plan: Comprehensive metabolic panel, Albumin Random         Urine Quantitative with Creat Ratio, CBC with         platelets and differential            (E78.5) Hyperlipidemia LDL goal <100  Comment:   LDL Cholesterol Calculated   Date Value Ref Range Status   05/24/2022 93 <=100 mg/dL Final   05/11/2021 103 (H) <100 mg/dL Final     Comment:     Above desirable:  100-129 mg/dl  Borderline High:  130-159 mg/dL  High:             160-189 mg/dL  Very high:       >189 mg/dl      At goal  The current medical regimen is effective;  continue present plan and medications.    Plan: Comprehensive metabolic panel, Lipid panel         reflex to direct LDL Fasting          Return in about 1 week (around 9/20/2022) for lab appointment.    Lea Lopez MD  [facility]    Hoang Mendoza is a 82 year old, presenting for the following health  issues:  Diabetes      History of Present Illness       Diabetes:   She presents for follow up of diabetes.  She is checking home blood glucose one time daily. She checks blood glucose before meals.  Blood glucose is sometimes over 200 and never under 70. When her blood glucose is low, the patient is asymptomatic for confusion, blurred vision, lethargy and reports not feeling dizzy, shaky, or weak.  She is concerned about other. She is not experiencing numbness or burning in feet, excessive thirst, blurry vision, weight changes or redness, sores or blisters on feet. The patient has had a diabetic eye exam in the last 12 months. Eye exam performed on june 2022. Location of last eye exam [facility].        She eats 2-3 servings of fruits and vegetables daily.She consumes 0 sweetened beverage(s) daily.She exercises with enough effort to increase her heart rate 20 to 29 minutes per day.  She exercises with enough effort to increase her heart rate 5 days per week.   She is taking medications regularly.     Diabetes Follow-up    How often are you checking your blood sugar? Two times daily  Blood sugar testing frequency justification:  higher blood sugars recently  What time of day are you checking your blood sugars (select all that apply)?  Before and after meals  Have you had any blood sugars above 200?  Yes fasting blood sugars in the morning has been as high as 228  Is correlated with eating more sugar/desserts  Have you had any blood sugars below 70?  No    What symptoms do you notice when your blood sugar is low?  Not applicable    What concerns do you have today about your diabetes? Blood sugar is often over 200     Do you have any of these symptoms? (Select all that apply)  No numbness or tingling in feet.  No redness, sores or blisters on feet.  No complaints of excessive thirst.  No reports of blurry vision.  No significant changes to weight.    Have you had a diabetic eye exam in the last 12 months? Yes-  "Date of last eye exam: June 20, 2022,  Location: [facility], [facility], [address] [place] [address]    Hyperlipidemia Follow-Up      Are you regularly taking any medication or supplement to lower your cholesterol?   Yes- simvastatin 20 mg daily    Are you having muscle aches or other side effects that you think could be caused by your cholesterol lowering medication?  No    Hypertension Follow-up      Do you check your blood pressure regularly outside of the clinic? No     Are you following a low salt diet? Yes    Are your blood pressures ever more than 140 on the top number (systolic) OR more   than 90 on the bottom number (diastolic), for example 140/90? No    BP Readings from Last 2 Encounters:   05/26/22 130/73   03/03/22 138/68     Hemoglobin A1C (%)   Date Value   05/24/2022 7.7 (H)   11/04/2021 7.4 (H)   05/11/2021 7.3 (H)   10/07/2020 7.2 (H)     LDL Cholesterol Calculated (mg/dL)   Date Value   05/24/2022 93   01/06/2022 131 (H)   05/11/2021 103 (H)   10/07/2020 134 (H)       Chronic Kidney Disease Follow-up      Do you take any over the counter pain medicine?: No    Review of Systems   Constitutional, HEENT, cardiovascular, pulmonary, GI, , musculoskeletal, neuro, skin, endocrine and psych systems are negative, except as otherwise noted.      Objective    Vitals - Patient Reported  Weight (Patient Reported): 73.9 kg (163 lb)  Height (Patient Reported): 160 cm (5' 3\")  BMI (Based on Pt Reported Ht/Wt): 28.87  Temperature (Patient Reported): 97.4  F (36.3  C)      Vitals:  No vitals were obtained today due to virtual visit.    Physical Exam   GENERAL: Healthy, alert and no distress  RESP: no wheezes  NEURO: mentation intact  PSYCH: mentation appears normal and affect normal/bright                Telephone-Visit Details    Telephone visit duration: 18 minutes  "

## 2022-09-14 RX ORDER — LANCETS 33 GAUGE
EACH MISCELLANEOUS
Refills: 11 | OUTPATIENT
Start: 2022-09-14

## 2022-09-14 RX ORDER — BLOOD SUGAR DIAGNOSTIC
STRIP MISCELLANEOUS
Qty: 200 STRIP | Refills: 3 | OUTPATIENT
Start: 2022-09-14

## 2022-09-19 ENCOUNTER — LAB (OUTPATIENT)
Dept: LAB | Facility: CLINIC | Age: 82
End: 2022-09-19
Payer: COMMERCIAL

## 2022-09-19 DIAGNOSIS — E78.5 HYPERLIPIDEMIA LDL GOAL <100: ICD-10-CM

## 2022-09-19 DIAGNOSIS — I10 HYPERTENSION GOAL BP (BLOOD PRESSURE) < 140/90: ICD-10-CM

## 2022-09-19 DIAGNOSIS — N18.31 CHRONIC KIDNEY DISEASE, STAGE 3A (H): Primary | ICD-10-CM

## 2022-09-19 DIAGNOSIS — E11.9 TYPE 2 DIABETES MELLITUS WITHOUT COMPLICATION, WITHOUT LONG-TERM CURRENT USE OF INSULIN (H): ICD-10-CM

## 2022-09-19 LAB
ALBUMIN SERPL-MCNC: 3.8 G/DL (ref 3.4–5)
ALP SERPL-CCNC: 96 U/L (ref 40–150)
ALT SERPL W P-5'-P-CCNC: 20 U/L (ref 0–50)
ANION GAP SERPL CALCULATED.3IONS-SCNC: 7 MMOL/L (ref 3–14)
AST SERPL W P-5'-P-CCNC: 21 U/L (ref 0–45)
BASOPHILS # BLD AUTO: 0 10E3/UL (ref 0–0.2)
BASOPHILS NFR BLD AUTO: 0 %
BILIRUB SERPL-MCNC: 0.4 MG/DL (ref 0.2–1.3)
BUN SERPL-MCNC: 32 MG/DL (ref 7–30)
CALCIUM SERPL-MCNC: 9.1 MG/DL (ref 8.5–10.1)
CHLORIDE BLD-SCNC: 102 MMOL/L (ref 94–109)
CHOLEST SERPL-MCNC: 198 MG/DL
CO2 SERPL-SCNC: 26 MMOL/L (ref 20–32)
CREAT SERPL-MCNC: 1.08 MG/DL (ref 0.52–1.04)
CREAT UR-MCNC: 63 MG/DL
EOSINOPHIL # BLD AUTO: 0.2 10E3/UL (ref 0–0.7)
EOSINOPHIL NFR BLD AUTO: 3 %
ERYTHROCYTE [DISTWIDTH] IN BLOOD BY AUTOMATED COUNT: 12.5 % (ref 10–15)
FASTING STATUS PATIENT QL REPORTED: YES
GFR SERPL CREATININE-BSD FRML MDRD: 51 ML/MIN/1.73M2
GLUCOSE BLD-MCNC: 167 MG/DL (ref 70–99)
HBA1C MFR BLD: 8 % (ref 0–5.6)
HCT VFR BLD AUTO: 43.5 % (ref 35–47)
HDLC SERPL-MCNC: 79 MG/DL
HGB BLD-MCNC: 14.8 G/DL (ref 11.7–15.7)
IMM GRANULOCYTES # BLD: 0 10E3/UL
IMM GRANULOCYTES NFR BLD: 0 %
LDLC SERPL CALC-MCNC: 104 MG/DL
LYMPHOCYTES # BLD AUTO: 2 10E3/UL (ref 0.8–5.3)
LYMPHOCYTES NFR BLD AUTO: 25 %
MCH RBC QN AUTO: 28.1 PG (ref 26.5–33)
MCHC RBC AUTO-ENTMCNC: 34 G/DL (ref 31.5–36.5)
MCV RBC AUTO: 83 FL (ref 78–100)
MICROALBUMIN UR-MCNC: 24 MG/L
MICROALBUMIN/CREAT UR: 38.1 MG/G CR (ref 0–25)
MONOCYTES # BLD AUTO: 0.6 10E3/UL (ref 0–1.3)
MONOCYTES NFR BLD AUTO: 7 %
NEUTROPHILS # BLD AUTO: 5.3 10E3/UL (ref 1.6–8.3)
NEUTROPHILS NFR BLD AUTO: 65 %
NONHDLC SERPL-MCNC: 119 MG/DL
PLATELET # BLD AUTO: 311 10E3/UL (ref 150–450)
POTASSIUM BLD-SCNC: 3.7 MMOL/L (ref 3.4–5.3)
PROT SERPL-MCNC: 7.5 G/DL (ref 6.8–8.8)
RBC # BLD AUTO: 5.27 10E6/UL (ref 3.8–5.2)
SODIUM SERPL-SCNC: 135 MMOL/L (ref 133–144)
TRIGL SERPL-MCNC: 76 MG/DL
WBC # BLD AUTO: 8.2 10E3/UL (ref 4–11)

## 2022-09-19 PROCEDURE — 82043 UR ALBUMIN QUANTITATIVE: CPT

## 2022-09-19 PROCEDURE — 85025 COMPLETE CBC W/AUTO DIFF WBC: CPT

## 2022-09-19 PROCEDURE — 83036 HEMOGLOBIN GLYCOSYLATED A1C: CPT

## 2022-09-19 PROCEDURE — 80053 COMPREHEN METABOLIC PANEL: CPT

## 2022-09-19 PROCEDURE — 36415 COLL VENOUS BLD VENIPUNCTURE: CPT

## 2022-09-19 PROCEDURE — 80061 LIPID PANEL: CPT

## 2022-09-23 NOTE — RESULT ENCOUNTER NOTE
Thank you for getting labs done!    Your HgA1C has crept up a bit and is now 8. Please schedule a virtual appointment to discuss possibly adding some medication. Obviously we have to tread carefully because of you past allergic reactions and side effects from different meds!    Your cholesterol is at goal, which is good news.    Your microalbumen is still elevated but much improved from last time. This means you have some protein in the urine. It indicates that your kidneys are being affected by diabetes. Keeping blood pressure and blood fats low is the best treatment in order to keep this  stable.      The testing of your blood sugar, liver function and electrolytes was normal.  Kidney function is assessed by blood work that measures creatinine and calculates estimated GFR (glomerular filtration rate).  By current criteria you do have stage 3 chronic kidney disease as your eGFR is < 60.  This is not something that is urgent as your value has not changed much with time.    However, it does mean will monitor your labs (urine and blood tests) every 6-12 months and we will keep trying to make sure your blood pressure and cholesterol are at goal to keep your kidneys as healthy as possible.     Your cbc, or complete blood count, which measures red blood cells (to check for anemia and other vitamin deficiencies) and white blood cells (to check for infection and leukemia) is normal, which is great!  Your platelets, which reflect liver function and ability to clot, are normal as well.     If you have any questions, please contact the clinic or schedule an appointment with me, thank you!      Sincerely,  Dr. Lea Lopez MD  9/23/2022

## 2022-09-30 ENCOUNTER — PRE VISIT (OUTPATIENT)
Dept: UROLOGY | Facility: CLINIC | Age: 82
End: 2022-09-30

## 2022-09-30 NOTE — TELEPHONE ENCOUNTER
Reason for visit: 6 month follow up      Dx/Hx/Sx: kidney stone    Records/imaging/labs/orders: renal US in epic    At Rooming: video visit

## 2022-10-11 ENCOUNTER — ANCILLARY PROCEDURE (OUTPATIENT)
Dept: ULTRASOUND IMAGING | Facility: CLINIC | Age: 82
End: 2022-10-11
Attending: NURSE PRACTITIONER
Payer: COMMERCIAL

## 2022-10-11 DIAGNOSIS — N20.0 KIDNEY STONE: ICD-10-CM

## 2022-10-11 PROCEDURE — 76770 US EXAM ABDO BACK WALL COMP: CPT | Performed by: RADIOLOGY

## 2022-10-14 ENCOUNTER — VIRTUAL VISIT (OUTPATIENT)
Dept: UROLOGY | Facility: CLINIC | Age: 82
End: 2022-10-14
Payer: COMMERCIAL

## 2022-10-14 DIAGNOSIS — N20.0 URIC ACID KIDNEY STONE: ICD-10-CM

## 2022-10-14 PROCEDURE — 99213 OFFICE O/P EST LOW 20 MIN: CPT | Mod: 95 | Performed by: NURSE PRACTITIONER

## 2022-10-14 RX ORDER — POTASSIUM CITRATE 10 MEQ/1
10 TABLET, EXTENDED RELEASE ORAL 2 TIMES DAILY
Qty: 180 TABLET | Refills: 3 | Status: SHIPPED | OUTPATIENT
Start: 2022-10-14 | End: 2023-11-17

## 2022-10-14 NOTE — PROGRESS NOTES
Kelly is a 82 year old who is being evaluated via a billable video visit.      How would you like to obtain your AVS? MyChart  If the video visit is dropped, the invitation should be resent by: Text to cell phone: [phone]  Will anyone else be joining your video visit? No    Video-Visit Details    Video Start Time: 8:04 AM    Type of service:  Video Visit    Video End Time: 8:13 AM    Originating Location (pt. Location): Home    Distant Location (provider location):  Off-site    Platform used for Video Visit: Benton Barnes complains of   Chief Complaint   Patient presents with     Follow Up     6 month       Assessment/Plan:  82 year old female with DM2, hyperlipidemia, hypertension, recurrent UTIs and large-burden uric acid kidney stones, now s/p PCNL last summer. On potassium citrate 10 mEq twice daily with no evidence of recurrent stones on MARYJO. We discussed these results, and given the lack of stones, can continue to defer the Litholink study, as she prefers.   -Continue potassium citrate. Refills sent to pharmacy.   -Follow up with me in one year with a MARYJO beforehand.     Brenda Snowden, CNP  Department of Urology      Subjective:   82 year old female with DM2, hyperlipidemia, hypertension, recurrent UTIs and large-burden uric acid kidney stones, now s/p PCNL last summer. Currently on potassium citrate 10 mEq twice daily for urine alkalinization. Litholink has been advised in the past but she has declined to do this. Our last visit was 04/12/22, at which time we planned to obtain a repeat MARYJO and move forward with Litholink if evidence of new stones. This was completed on 10/11/22 and shows no appreciable stones. BMP from 9/19/22 shows normal serum potassium.     Today, she states that she has done well since our last visit and denies any symptoms or issues. She has had no recurrent UTIs. Tolerating the potassium citrate well.       Objective:     Exam:   Patient is a 82 year old female    No vital signs obtained due to virtual visit.  General Appearance: Well groomed, hygenic  Respiratory: No cough, no respiratory distress or labored breathing  Musculoskeletal: Grossly normal with no gross deficits  Skin: No discoloration or apparent rashes  Neurologic: No tremors  Psychiatric: Alert and oriented  Further examination is deferred due to the nature of our visit.

## 2022-10-14 NOTE — LETTER
10/14/2022       RE: Kelly Barnes  [address]  [place] [address]     Dear Colleague,    Thank you for referring your patient, Kelly Barnes, to the [facility] UROLOGY [facility]INIC [place] at [facility]. Please see a copy of my visit note below.    Kelly is a 82 year old who is being evaluated via a billable video visit.      How would you like to obtain your AVS? MyChart  If the video visit is dropped, the invitation should be resent by: Text to cell phone: [phone]  Will anyone else be joining your video visit? No    Video-Visit Details    Video Start Time: 8:04 AM    Type of service:  Video Visit    Video End Time: 8:13 AM    Originating Location (pt. Location): Home    Distant Location (provider location):  Off-site    [facility] for [facility] Visit: [facility]       Kelly Barnes complains of   Chief Complaint   Patient presents with     Follow Up     6 month       Assessment/Plan:  82 year old female with DM2, hyperlipidemia, hypertension, recurrent UTIs and large-burden uric acid kidney stones, now s/p PCNL last summer. On potassium citrate 10 mEq twice daily with no evidence of recurrent stones on MARYJO. We discussed these results, and given the lack of stones, can continue to defer the Litholink study, as she prefers.   -Continue potassium citrate. Refills sent to pharmacy.   -Follow up with me in one year with a MARYJO beforehand.     Brenda Snowden, CNP  Department of Urology      Subjective:   82 year old female with DM2, hyperlipidemia, hypertension, recurrent UTIs and large-burden uric acid kidney stones, now s/p PCNL last summer. Currently on potassium citrate 10 mEq twice daily for urine alkalinization. Litholink has been advised in the past but she has declined to do this. Our last visit was 04/12/22, at which time we planned to obtain a repeat MAYRJO and move forward with Litholink if evidence of new stones. This was completed on 10/11/22  and shows no appreciable stones. BMP from 9/19/22 shows normal serum potassium.     Today, she states that she has done well since our last visit and denies any symptoms or issues. She has had no recurrent UTIs. Tolerating the potassium citrate well.       Objective:     Exam:   Patient is a 82 year old female   No vital signs obtained due to virtual visit.  General Appearance: Well groomed, hygenic  Respiratory: No cough, no respiratory distress or labored breathing  Musculoskeletal: Grossly normal with no gross deficits  Skin: No discoloration or apparent rashes  Neurologic: No tremors  Psychiatric: Alert and oriented  Further examination is deferred due to the nature of our visit.

## 2022-10-14 NOTE — PATIENT INSTRUCTIONS
UROLOGY CLINIC VISIT PATIENT INSTRUCTIONS    -Continue the potassium citrate. Refills were sent to your pharmacy.   -Follow up with me in one year with another kidney ultrasound beforehand.     If you have any issues, questions or concerns in the meantime, do not hesitate to contact us at [phone] or via [email or web address].     Brenda Snowden, CNP  Department of Urology

## 2022-10-16 ENCOUNTER — HEALTH MAINTENANCE LETTER (OUTPATIENT)
Age: 82
End: 2022-10-16

## 2022-10-25 ENCOUNTER — IMMUNIZATION (OUTPATIENT)
Dept: NURSING | Facility: CLINIC | Age: 82
End: 2022-10-25
Payer: COMMERCIAL

## 2022-10-25 PROCEDURE — 91312 COVID-19,PF,PFIZER BOOSTER BIVALENT: CPT

## 2022-10-25 PROCEDURE — 0124A COVID-19,PF,PFIZER BOOSTER BIVALENT: CPT

## 2022-11-02 ASSESSMENT — PAIN SCALES - GENERAL: PAINLEVEL: NO PAIN (0)

## 2022-11-04 ENCOUNTER — VIRTUAL VISIT (OUTPATIENT)
Dept: GASTROENTEROLOGY | Facility: CLINIC | Age: 82
End: 2022-11-04
Payer: COMMERCIAL

## 2022-11-04 VITALS
HEIGHT: 63 IN | SYSTOLIC BLOOD PRESSURE: 138 MMHG | DIASTOLIC BLOOD PRESSURE: 72 MMHG | WEIGHT: 159 LBS | BODY MASS INDEX: 28.17 KG/M2

## 2022-11-04 DIAGNOSIS — A04.71 RECURRENT CLOSTRIDIOIDES DIFFICILE DIARRHEA: Primary | ICD-10-CM

## 2022-11-04 PROCEDURE — 99212 OFFICE O/P EST SF 10 MIN: CPT | Mod: 95 | Performed by: PHYSICIAN ASSISTANT

## 2022-11-04 NOTE — PROGRESS NOTES
Kelly is a 82 year old who is being evaluated via a billable video visit.      How would you like to obtain your AVS? MyChart  If the video visit is dropped, the invitation should be resent by: Send to e-mail at: [email or web address]  Will anyone else be joining your video visit? No      Video-Visit Details    Video Start Time: 701    Type of service:  Video Visit    Video End Time:707    Originating Location (pt. Location): Home        Distant Location (provider location):  Off-site    Platform used for Video Visit: [facility]

## 2022-11-04 NOTE — PROCEDURES
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 Prior to this, she had repeated antibiotic exposures around 1-2 times per year, largely for urinary tract infections. CT imaging also demonstrated colitis. CRP elevated at 130, however she had elevated values in the past as well.  She was treated with a 20-day vancomycin taper, with symptomatic recurrence 9/2021, confirmed by PCR, and treated with a four week vancomycin taper. About 1.5 weeks after completing the taper, she again presented with recurrent diarrhea and positive C.diff testing. She was hospitalized due to symptoms, with WBC 28k with predominant neutrophils, acute kidney injury, and tachycardia. She was treated with oral fidaxomcin, followed by a vancomycin taper. Symptoms resolved on vancomycin.    She underwent IMT via enteric capsules 12/1/2021. She tolerated this well, with some minor increased gas initially, which then resolved.    Today, Kelly notes she continues to do quite well. She has not had a UTI for over a year! She continues to have a daily, soft to firm formed stool without any associated concerns, denies abdominal pain, cramping, urgency, BRBPR. She continues on potassium supplement for recurrent UTI. No antibiotic exposures.    PAST MEDICAL HISTORY:  Past Medical History:   Diagnosis Date     Acute kidney injury (H) 12/19/2020     Allergic rhinitis, cause unspecified      Anemia      Aortic stenosis 02/29/2016    With new murmur noted 2/2016, normal echo, repeat echo 2/2017.     Aortic valve sclerosis       Atypical chest pain 07/24/2015     cuboid     left foot     Fungemia 8/16/2021     History of Clostridium difficile colitis      Hyperlipidemia LDL goal <100 11/01/2012     Hypertension goal BP (blood pressure) < 140/90      Musculoskeletal chest pain 11/25/2016     Obesity, Class I, BMI 30-34.9 11/11/2015     Pneumonia 03/2016     Pyelonephritis      Sepsis, due to unspecified organism, unspecified whether acute organ dysfunction present (H) 12/19/2020     Type 2 diabetes, HbA1c goal < 7% (H)      Urinary tract infection associated with nephrostomy catheter, initial encounter (H) 8/8/2021       PREVIOUS ABDOMINAL/GYNECOLOGIC SURGERIES:  Past Surgical History:   Procedure Laterality Date     CATARACT EXTRACTION       CYSTOSCOPY, RETROGRADES, INSERT STENT URETER(S), COMBINED Right 07/15/2021    Procedure: CYSTOURETEROSCOPY, WITH RETROGRADE PYELOGRAM,  AND STENT INSERTION RIGHT;  Surgeon: Kirk Law MD;  Location: [facility] OR     LASER HOLMIUM NEPHROLITHOTOMY VIA PERCUTANEOUS NEPHROSTOMY Right 08/05/2021    Procedure: NEPHROLITHOTOMY, PERCUTANEOUS, USING HOLMIUM LASER RIGHT;  Surgeon: Kirk Law MD;  Location: [facility] OR     LASER HOLMIUM NEPHROLITHOTOMY VIA PERCUTANEOUS NEPHROSTOMY Right 08/13/2021    Procedure: Ureteroscopic assisted secondary right percutaneous nephrolihtotomy, Holmium laser lithotriipsy;  Surgeon: Kirk Law MD;  [facility]     PICC SINGLE LUMEN PLACEMENT Left 08/17/2021    41cm (2cm external), Medial brachial vein         PERTINENT MEDICATIONS:  Current Outpatient Medications   Medication Sig Dispense Refill     blood glucose (ONETOUCH ULTRA) test strip Use to test blood sugar twice daily. Dispense 1 box of 200 test strips, #3 refills. 100 strip 3     blood glucose monitoring (ONE TOUCH ULTRA 2) meter device kit Use to test blood sugar 3 times daily 1 kit 0     glimepiride (AMARYL) 4 MG tablet Take 1 tablet (4 mg) by mouth 2 times daily (with meals) 180 tablet 3      losartan-hydrochlorothiazide (HYZAAR) 50-12.5 MG tablet Take 1 tablet by mouth every morning 90 tablet 3     OneTouch Delica Lancets 33G MISC 1 Device by In Vitro route 2 times daily 100 each 11     potassium citrate (UROCIT-K) 10 MEQ (1080 MG) CR tablet Take 1 tablet (10 mEq) by mouth 2 times daily 180 tablet 3     simvastatin (ZOCOR) 20 MG tablet Take 1 tablet (20 mg) by mouth At Bedtime TAKE ONE TABLET BY MOUTH AT BEDTIME 90 tablet 3     SOCIAL HISTORY:    Social History     Socioeconomic History     Marital status:      Spouse name: Not on file     Number of children: Not on file     Years of education: Not on file     Highest education level: Not on file   Occupational History     Not on file   Tobacco Use     Smoking status: Never     Smokeless tobacco: Never   Vaping Use     Vaping Use: Never used   Substance and Sexual Activity     Alcohol use: Yes     Alcohol/week: 10.0 standard drinks     Comment: 1-2 drinks per week     Drug use: No     Sexual activity: Yes     Partners: Male   Other Topics Concern      Service No     Blood Transfusions No     Caffeine Concern No     Occupational Exposure No     Hobby Hazards No     Sleep Concern Yes     Stress Concern No     Weight Concern Yes     Special Diet No     Back Care No     Exercise Yes     Comment: walk     Bike Helmet No     Seat Belt Yes     Self-Exams Yes     Parent/sibling w/ CABG, MI or angioplasty before 65F 55M? No   Social History Narrative     Not on file     Social Determinants of Health     Financial Resource Strain: Not on file   Food Insecurity: Not on file   Transportation Needs: Not on file   Physical Activity: Not on file   Stress: Not on file   Social Connections: Not on file   Intimate Partner Violence: Not on file   Housing Stability: Not on file       FAMILY HISTORY:  Family History   Problem Relation Age of Onset     Hypertension Mother      Diabetes No family hx of      Cerebrovascular Disease No family hx of      Breast  "Cancer No family hx of      Cancer - colorectal No family hx of      Prostate Cancer No family hx of      Anesthesia Reaction No family hx of      Bleeding Disorder No family hx of      Clotting Disorder No family hx of        PHYSICAL EXAMINATION:  Vitals reviewed  /72   Ht 1.6 m (5' 3\")   Wt 72.1 kg (159 lb)   BMI 28.17 kg/m    Video physical exam  General: Patient appears well in no acute distress.   Skin: No visualized rash or lesions on visualized skin  Eyes: EOMI, no erythema, sclera icterus or discharge noted  Resp: Appears to be breathing comfortably without accessory muscle usage, speaking in full sentences, no cough  MSK: Appears to have normal range of motion based on visualized movements  Neurologic: No apparent tremors, facial movements symmetric  Psych: affect normal, alert and oriented    The rest of a comprehensive physical examination is deferred due to PHE (public health emergency) video restrictions      PERTINENT STUDIES Reviewed in EMR    ASSESSMENT/PLAN:    # Recurrent CDI  History of rCDI following antibiotic course for pyelonephritis, with at least one episode of C diff being severe and requiring hospitalization. She is now s/p IMT via oral capsules (12/1/2021). She continues to do well post-IMT with normalization of bowel habits, and has not had any subsequent UTI.    We again discussed that she would be at an increased risk of CDI should she be exposed to antibiotics. She understands to let us know should she require antibioitcs. If recurrent uncomplicated UTI, we can use IM gentamicin, which does not disturb the gut microbiota. Confirmed that Kelly has a copy of the antibiotic stewardship letter for IM graduates on hand.      RTC 6 months per patient preference    Thank you for this consultation. It was a pleasure to participate in the care of this patient; please contact us with any further questions.    Alesia Gómez PA-C    18 minutes spent on the date of the encounter doing " chart review, review of test results, patient visit and documentation

## 2022-11-04 NOTE — LETTER
Date:November 4, 2022      Provider requested that no letter be sent. Do not send.       [facility]

## 2022-11-04 NOTE — LETTER
11/4/2022         RE: Kelly Barnes  [address]  [place] [place] [facility] [place] [facility] [address]        Dear Colleague,    Thank you for referring your patient, Kelly Barnes, to the [facility] GASTROENTEROLOGY CLINIC [place]. Please see a copy of my visit note below.    Kelly is a 82 year old who is being evaluated via a billable video visit.      How would you like to obtain your AVS? MyChart  If the video visit is dropped, the invitation should be resent by: Send to e-mail at: [email or web address]  Will anyone else be joining your video visit? No      Video-Visit Details    Video Start Time: 701    Type of service:  Video Visit    Video End Time:707    Originating Location (pt. Location): Home        Distant Location (provider location):  Off-site    Platform used for Video Visit: [employer]enton      Again, thank you for allowing me to participate in the care of your patient.        Sincerely,        Alesia Gómez PA-C

## 2022-11-04 NOTE — PATIENT INSTRUCTIONS
"It was a pleasure taking care of you today.  I've included a brief summary of our discussion and care plan from today's visit below.  Please review this information with your primary care provider.  _______________________________________________________________________    My recommendations are summarized as follows:    -- If you are recommended antibioics, please let us know! You can contact us via calling our clinic, or by the phone numbers listed on the \"Letter to the [facility] graduate\".    Return to GI Clinic in 6-12 months to review your progress.    ______________________________________________________________________    How do I schedule labs, imaging studies, or procedures that were ordered in clinic today?     Labs: To schedule lab appointment at the [facility], use my chart or call [phone]. If you have a [facility] lab closer to home where you are regularly seen you can give them a call.     Procedures: If a colonoscopy, upper endoscopy, breath test, esophageal manometry, or pH impedence was ordered today, our endoscopy team will call you to schedule this. If you have not heard from our endoscopy team within a week, please call [phone] to schedule.     Imaging Studies: If you were scheduled for a CT scan, X-ray, MRI, ultrasound, HIDA scan or other imaging study, please call [phone] to have this scheduled.     Referral: If a referral to another specialty was ordered, expect a phone call or follow instructions above. If you have not heard from anyone regarding your referral in a week, please call our clinic to check the status.     Who do I call with any questions after my visit?  Please be in touch if there are any further questions that arise following today's visit.  There are multiple ways to contact your gastroenterology care team.      During business hours, you may reach a Gastroenterology nurse at [phone]    To schedule or reschedule an appointment, please call " [phone].     You can always send a secure message through [employer].  [employer] messages are answered by your nurse or doctor typically within 24 hours.  Please allow extra time on weekends and holidays.      For urgent/emergent questions after business hours, you may reach the on-call GI Fellow by contacting the [facility]  at [phone].     How will I get the results of any tests ordered?    You will receive all of your results.  If you have signed up for [facility], any tests ordered at your visit will be available to you after your physician reviews them.  Typically this takes 1-2 weeks.  If there are urgent results that require a change in your care plan, your physician or nurse will call you to discuss the next steps.      What is [employer]?  [employer]base is a secure way for you to access all of your healthcare records from the [facility].  It is a web based computer program, so you can sign on to it from any location.  It also allows you to send secure messages to your care team.  I recommend signing up for [employer] access if you have not already done so and are comfortable with using a computer.      How to I schedule a follow-up visit?  If you did not schedule a follow-up visit today, please call [phone] to schedule a follow-up office visit.      Sincerely,    Alesia Gómez PA-C  Division of Gastroenterology, Hepatology & Nutrition  [facility]

## 2022-11-15 ENCOUNTER — VIRTUAL VISIT (OUTPATIENT)
Dept: FAMILY MEDICINE | Facility: CLINIC | Age: 82
End: 2022-11-15
Payer: COMMERCIAL

## 2022-11-15 DIAGNOSIS — E11.9 TYPE 2 DIABETES MELLITUS WITHOUT COMPLICATION, WITHOUT LONG-TERM CURRENT USE OF INSULIN (H): Primary | ICD-10-CM

## 2022-11-15 PROCEDURE — 99213 OFFICE O/P EST LOW 20 MIN: CPT | Mod: 95 | Performed by: FAMILY MEDICINE

## 2022-11-15 RX ORDER — BLOOD-GLUCOSE METER
EACH MISCELLANEOUS
Qty: 1 KIT | Refills: 0 | Status: SHIPPED | OUTPATIENT
Start: 2022-11-15

## 2022-11-15 RX ORDER — BLOOD SUGAR DIAGNOSTIC
STRIP MISCELLANEOUS
Qty: 100 STRIP | Refills: 3 | Status: SHIPPED | OUTPATIENT
Start: 2022-11-15 | End: 2023-11-28

## 2022-11-15 NOTE — PROGRESS NOTES
Answers for HPI/ROS submitted by the patient on 11/14/2022  Frequency of checking blood sugars:: one time daily  What time of day are you checking your blood sugars : before meals  Have you had any blood sugars above 200?: Yes  Have you had any blood sugars below 70?: No  Hypoglycemia symptoms:: none  Diabetic concerns:: other  Paraesthesia present:: none of these symptoms  How many servings of fruits and vegetables do you eat daily?: 2-3  On average, how many sweetened beverages do you drink each day (Examples: soda, juice, sweet tea, etc.  Do NOT count diet or artificially sweetened beverages)?: 0  How many minutes a day do you exercise enough to make your heart beat faster?: 20 to 29  How many days a week do you exercise enough to make your heart beat faster?: 5  How many days per week do you miss taking your medication?: 0    Kelly is a 82 year old who is being evaluated via a billable telephone visit.      Assessment & Plan     (E11.9) Type 2 diabetes mellitus without complication, without long-term current use of insulin (H)  (primary encounter diagnosis)  Comment: glucometer may be not be working as well, almost 10 years old, will send in new order as below  Return to clinic for repeat A1C, and let me know blood sugars and will add medication as needed  Plan: blood glucose monitoring (ONE TOUCH ULTRA 2)         meter device kit, blood glucose (ONETOUCH         ULTRA) test strip, Hemoglobin A1c            Return in about 4 weeks (around 12/13/2022) for Diabetes follow up.      Follow-up Visit   Expected date: Nov 15, 2022      Follow Up Appointment Details:     Follow-up with whom?: Other Primary Care Services    Follow-Up for what?: Lab Visit                  Lea Lopez MD  [facility]   Kelly is a 82 year old, presenting for the following health issues:  No chief complaint on file.      HPI     Diabetes Follow-up    How often are you checking your blood  "sugar? One time daily, but she's worried it's not accurate, because values can be very high or very low and with quick recheck values are very different  She changes the batteries but it hasn't helped  199, immediate repeat 161  190, repeat 172  133, repeat 163  She took blood sugars before dinner yesterday, 138  This am 215, fasting, and with a \"good dinner\" the night before, repeat 186  Her glucometer is almost 10 years old.  What time of day are you checking your blood sugars (select all that apply)?  Before meals  Have you had any blood sugars above 200?  Yes   Have you had any blood sugars below 70?  No    What symptoms do you notice when your blood sugar is low?  None    What concerns do you have today about your diabetes? Blood sugar is often over 200     Do you have any of these symptoms? (Select all that apply)  No numbness or tingling in feet.  No redness, sores or blisters on feet.  No complaints of excessive thirst.  No reports of blurry vision.  No significant changes to weight.      BP Readings from Last 2 Encounters:   11/02/22 138/72   05/26/22 130/73     Hemoglobin A1C (%)   Date Value   09/19/2022 8.0 (H)   05/24/2022 7.7 (H)   05/11/2021 7.3 (H)   10/07/2020 7.2 (H)     LDL Cholesterol Calculated (mg/dL)   Date Value   09/19/2022 104 (H)   05/24/2022 93   05/11/2021 103 (H)   10/07/2020 134 (H)       Review of Systems   Constitutional, HEENT, cardiovascular, pulmonary, GI, , musculoskeletal, neuro, skin, endocrine and psych systems are negative, except as otherwise noted.      Objective           Vitals:  No vitals were obtained today due to virtual visit.    Physical Exam   healthy, alert and no distress  PSYCH: Alert and oriented times 3; coherent speech, normal   rate and volume, able to articulate logical thoughts, able   to abstract reason, no tangential thoughts, no hallucinations   or delusions  Her affect is normal  RESP: No cough, no audible wheezing, able to talk in full " sentences  Remainder of exam unable to be completed due to telephone visits          Phone call duration: 14 minutes

## 2022-11-23 ENCOUNTER — LAB (OUTPATIENT)
Dept: LAB | Facility: CLINIC | Age: 82
End: 2022-11-23
Attending: FAMILY MEDICINE
Payer: COMMERCIAL

## 2022-11-23 DIAGNOSIS — Z13.220 SCREENING FOR HYPERLIPIDEMIA: ICD-10-CM

## 2022-11-23 DIAGNOSIS — E11.9 TYPE 2 DIABETES MELLITUS WITHOUT COMPLICATION, WITHOUT LONG-TERM CURRENT USE OF INSULIN (H): ICD-10-CM

## 2022-11-23 DIAGNOSIS — N18.31 CHRONIC KIDNEY DISEASE, STAGE 3A (H): ICD-10-CM

## 2022-11-23 LAB
CHOLEST SERPL-MCNC: 177 MG/DL
HBA1C MFR BLD: 8.4 % (ref 0–5.6)
HDLC SERPL-MCNC: 73 MG/DL
HGB BLD-MCNC: 14.2 G/DL (ref 11.7–15.7)
LDLC SERPL CALC-MCNC: 89 MG/DL
NONHDLC SERPL-MCNC: 104 MG/DL
TRIGL SERPL-MCNC: 74 MG/DL

## 2022-11-23 PROCEDURE — 80061 LIPID PANEL: CPT

## 2022-11-23 PROCEDURE — 85018 HEMOGLOBIN: CPT

## 2022-11-23 PROCEDURE — 36415 COLL VENOUS BLD VENIPUNCTURE: CPT

## 2022-11-23 PROCEDURE — 83036 HEMOGLOBIN GLYCOSYLATED A1C: CPT

## 2022-12-01 DIAGNOSIS — E11.9 TYPE 2 DIABETES MELLITUS WITHOUT COMPLICATION, WITHOUT LONG-TERM CURRENT USE OF INSULIN (H): Primary | ICD-10-CM

## 2022-12-02 NOTE — RESULT ENCOUNTER NOTE
Thank you very much for getting labs done!     Good news, your LDL (or bad cholesterol) is at goal. Your medication is working well, hurray! Please continue to take your cholesterol lowering medication, simvastatin, as you have been doing.    Your hemoglobin was normal, you are not anemic.    Your HgA1C, also called glycosylated hemoglobin, which measures the level of sugar in your blood over the past few months, is not  goal, so we need to find a way to control your blood sugar better. I recommend starting a medication called Alouvia. I sent a prescription to your pharmacy. Please schedule a virtual appointment with me to go over these results and recommendations, and please return to clinic in about 2 months for repeat lab testing.    If you have any questions, please contact the clinic or schedule an appointment with me, thank you!      Sincerely,  Dr. Lea Lopez MD  12/1/2022

## 2023-01-07 ENCOUNTER — TRANSFERRED RECORDS (OUTPATIENT)
Dept: MULTI SPECIALTY CLINIC | Facility: CLINIC | Age: 83
End: 2023-01-07

## 2023-01-07 LAB — RETINOPATHY: NORMAL

## 2023-01-17 ENCOUNTER — VIRTUAL VISIT (OUTPATIENT)
Dept: FAMILY MEDICINE | Facility: CLINIC | Age: 83
End: 2023-01-17
Attending: FAMILY MEDICINE
Payer: COMMERCIAL

## 2023-01-17 DIAGNOSIS — E11.9 TYPE 2 DIABETES MELLITUS WITHOUT COMPLICATION, WITHOUT LONG-TERM CURRENT USE OF INSULIN (H): Primary | ICD-10-CM

## 2023-01-17 PROCEDURE — 99442 PR PHYSICIAN TELEPHONE EVALUATION 11-20 MIN: CPT | Mod: 95 | Performed by: FAMILY MEDICINE

## 2023-01-17 RX ORDER — KETOCONAZOLE 20 MG/G
CREAM TOPICAL
COMMUNITY
Start: 2023-01-11 | End: 2023-06-15

## 2023-01-17 NOTE — PROGRESS NOTES
"Kelly is a 82 year old who is being evaluated via a billable telephone visit.      What phone number would you like to be contacted at? [phone]  How would you like to obtain your AVS? Oliva    Distant Location (provider location):  Off-site    Assessment & Plan     (E11.9) Type 2 diabetes mellitus without complication, without long-term current use of insulin (H)  (primary encounter diagnosis)  Comment: at goal! Continue current medications, and recheck labs in 2 months, see orders  Plan: sitagliptin (JANUVIA) 25 MG tablet, Hemoglobin         A1c, Lipid panel reflex to direct LDL Fasting            BMI:   Estimated body mass index is 28.17 kg/m  as calculated from the following:    Height as of 11/2/22: 1.6 m (5' 3\").    Weight as of 11/2/22: 72.1 kg (159 lb).   Weight management plan: Discussed healthy diet and exercise guidelines  Blood sugar testing frequency justification:  Adjustment of medication(s)    No follow-ups on file.   Follow-up Visit   Expected date:  Mar 17, 2023 (Approximate)      Follow Up Appointment Details:     Follow-up with whom?: Other Primary Care Services    Follow-Up for what?: Lab Visit              Follow-up Visit   Expected date:  Apr 17, 2023 (Approximate)      Follow Up Appointment Details:     Follow-up with whom?: Me    Follow-Up for what?: Chronic Disease f/u    Chronic Disease f/u: Diabetes    How?: In Person or Virtual                    Lea Lopez MD  [facility]   Kelly is a 82 year old, presenting for the following health issues:  Diabetes and Recheck Medication      History of Present Illness       Diabetes:   She presents for follow up of diabetes.  She is checking home blood glucose two times daily. She checks blood glucose before meals.  Blood glucose is sometimes over 200 and never under 70. She is aware of hypoglycemia symptoms including other. She is concerned about other. She is not experiencing numbness or " burning in feet, excessive thirst, blurry vision, weight changes or redness, sores or blisters on feet.           Diabetes Follow-up    How often are you checking your blood sugar? Two times daily  She started Januvia about 2 weeks ago, 1/3/2023 and has noticed improved blood sugars, none over 200 since starting medication  She's been taking glimiperide with meals and Januvia  Blood sugar testing frequency justification:  Adjustment of medication(s)   What time of day are you checking your blood sugars (select all that apply)?  Before and after meals  Have you had any blood sugars above 200?  No  Have you had any blood sugars below 70?  No    What symptoms do you notice when your blood sugar is low?  Not applicable    What concerns do you have today about your diabetes? None     Do you have any of these symptoms? (Select all that apply)  No numbness or tingling in feet.  No redness, sores or blisters on feet.  No complaints of excessive thirst.  No reports of blurry vision.  No significant changes to weight.  Am blood sugar this morning 158, down to 92 before dinner    BP Readings from Last 2 Encounters:   11/02/22 138/72   05/26/22 130/73     Hemoglobin A1C (%)   Date Value   11/23/2022 8.4 (H)   09/19/2022 8.0 (H)   05/11/2021 7.3 (H)   10/07/2020 7.2 (H)     LDL Cholesterol Calculated (mg/dL)   Date Value   11/23/2022 89   09/19/2022 104 (H)   05/11/2021 103 (H)   10/07/2020 134 (H)               How many servings of fruits and vegetables do you eat daily?  4 or more    On average, how many sweetened beverages do you drink each day (Examples: soda, juice, sweet tea, etc.  Do NOT count diet or artificially sweetened beverages)?   0    How many days per week do you exercise enough to make your heart beat faster? 7    How many minutes a day do you exercise enough to make your heart beat faster? 30 - 60    How many days per week do you miss taking your medication? 0    Review of Systems   Constitutional, HEENT,  "cardiovascular, pulmonary, GI, , musculoskeletal, neuro, skin, endocrine and psych systems are negative, except as otherwise noted.      Objective    Vitals - Patient Reported  Weight (Patient Reported): 72.1 kg (159 lb)  Height (Patient Reported): 160 cm (5' 3\")  BMI (Based on Pt Reported Ht/Wt): 28.17  Pain Score: No Pain (0)  Vitals:  No vitals were obtained today due to virtual visit.    Physical Exam   healthy, alert and no distress  PSYCH: Alert and oriented times 3; coherent speech, normal   rate and volume, able to articulate logical thoughts, able   to abstract reason, no tangential thoughts, no hallucinations   or delusions  Her affect is normal  RESP: No cough, no audible wheezing, able to talk in full sentences  Remainder of exam unable to be completed due to telephone visits    Wt Readings from Last 4 Encounters:   11/02/22 72.1 kg (159 lb)   05/26/22 72.1 kg (159 lb)   03/03/22 71.2 kg (157 lb)   11/12/21 69.9 kg (154 lb)           Phone call duration: 12 minutes    "

## 2023-03-26 ENCOUNTER — HEALTH MAINTENANCE LETTER (OUTPATIENT)
Age: 83
End: 2023-03-26

## 2023-03-30 ENCOUNTER — TELEPHONE (OUTPATIENT)
Dept: GASTROENTEROLOGY | Facility: CLINIC | Age: 83
End: 2023-03-30
Payer: COMMERCIAL

## 2023-04-06 ENCOUNTER — LAB (OUTPATIENT)
Dept: LAB | Facility: CLINIC | Age: 83
End: 2023-04-06
Payer: COMMERCIAL

## 2023-04-06 DIAGNOSIS — E11.9 TYPE 2 DIABETES MELLITUS WITHOUT COMPLICATION, WITHOUT LONG-TERM CURRENT USE OF INSULIN (H): ICD-10-CM

## 2023-04-06 LAB
CHOLEST SERPL-MCNC: 198 MG/DL
HBA1C MFR BLD: 7.6 % (ref 0–5.6)
HDLC SERPL-MCNC: 80 MG/DL
LDLC SERPL CALC-MCNC: 105 MG/DL
NONHDLC SERPL-MCNC: 118 MG/DL
TRIGL SERPL-MCNC: 64 MG/DL

## 2023-04-06 PROCEDURE — 83036 HEMOGLOBIN GLYCOSYLATED A1C: CPT

## 2023-04-06 PROCEDURE — 36415 COLL VENOUS BLD VENIPUNCTURE: CPT

## 2023-04-06 PROCEDURE — 80061 LIPID PANEL: CPT

## 2023-04-06 NOTE — RESULT ENCOUNTER NOTE
Kathleen Barnes  Your PCP is currently out of the office.  Your lab results appear to be within normal limits.  Your PCP may offer more advice when they return to the office.  At this time, continue your current plan of care.    Please contact us if you have any questions.    [place], [facility]    [phone]

## 2023-04-26 ENCOUNTER — PATIENT OUTREACH (OUTPATIENT)
Dept: CARE COORDINATION | Facility: CLINIC | Age: 83
End: 2023-04-26
Payer: COMMERCIAL

## 2023-05-09 ENCOUNTER — VIRTUAL VISIT (OUTPATIENT)
Dept: FAMILY MEDICINE | Facility: CLINIC | Age: 83
End: 2023-05-09
Payer: COMMERCIAL

## 2023-05-09 DIAGNOSIS — L65.9 HAIR LOSS: Primary | ICD-10-CM

## 2023-05-09 DIAGNOSIS — E11.9 TYPE 2 DIABETES MELLITUS WITHOUT COMPLICATION, WITHOUT LONG-TERM CURRENT USE OF INSULIN (H): ICD-10-CM

## 2023-05-09 DIAGNOSIS — E78.5 HYPERLIPIDEMIA LDL GOAL <100: ICD-10-CM

## 2023-05-09 DIAGNOSIS — I10 HYPERTENSION GOAL BP (BLOOD PRESSURE) < 140/90: ICD-10-CM

## 2023-05-09 PROCEDURE — 99443 PR PHYSICIAN TELEPHONE EVALUATION 21-30 MIN: CPT | Mod: 95 | Performed by: FAMILY MEDICINE

## 2023-05-09 RX ORDER — SIMVASTATIN 20 MG
20 TABLET ORAL AT BEDTIME
Qty: 90 TABLET | Refills: 3 | Status: SHIPPED | OUTPATIENT
Start: 2023-05-09 | End: 2024-06-21

## 2023-05-09 RX ORDER — GLIMEPIRIDE 4 MG/1
4 TABLET ORAL 2 TIMES DAILY WITH MEALS
Qty: 180 TABLET | Refills: 3 | Status: SHIPPED | OUTPATIENT
Start: 2023-05-09 | End: 2024-05-13

## 2023-05-09 RX ORDER — LOSARTAN POTASSIUM AND HYDROCHLOROTHIAZIDE 12.5; 5 MG/1; MG/1
1 TABLET ORAL EVERY MORNING
Qty: 90 TABLET | Refills: 3 | Status: SHIPPED | OUTPATIENT
Start: 2023-05-09 | End: 2023-08-30

## 2023-05-09 NOTE — PROGRESS NOTES
Kelly is a 83 year old who is being evaluated via a billable telephone visit.    Distant Location (provider location):  Off-site    Assessment & Plan     (L65.9) Hair loss  (primary encounter diagnosis)  Comment: New, previously undiagnosed problem with uncertain prognosis and additional work-up planned.   Will evaluate for vitamin, nutrient deficiency, thyroid disorder, metabolic disorder as below  Will fu as indicated.   Plan: Vitamin B12, Folate, TSH with free T4 reflex,         CBC with Platelets & Differential,         Comprehensive metabolic panel, Vitamin B12,         Folate          Start taking daily folate.    Chronic disease, at goal, requiring medication refill today:    (E11.9) Type 2 diabetes mellitus without complication, without long-term current use of insulin (H)  Comment: At goal  The current medical regimen is effective;  continue present plan and medications.    Plan: glimepiride (AMARYL) 4 MG tablet          (I10) Hypertension goal BP (blood pressure) < 140/90  Plan: losartan-hydrochlorothiazide (HYZAAR) 50-12.5         MG tablet    (E78.5) Hyperlipidemia LDL goal <100  Plan: simvastatin (ZOCOR) 20 MG tablet          Lea Lopez MD  [facility]    Subjective   Kelly is a 83 year old, presenting for the following health issues:  No chief complaint on file.         View : No data to display.              HPI     Hair Loss  Kelly reports diffuse thinning of her hair over the past month without any known stressers, change in diet, change in shampoos or other triggers, except new medication Ozempic started recently. She ortherwise feels well, denies any other medical symptoms. She's concerned about hair loss. She's though about Rhogan but doesn't think she'd want to take this.      Review of Systems   Constitutional, HEENT, cardiovascular, pulmonary, gi and gu systems are negative, except as otherwise noted.      Objective           Vitals:  No vitals were  obtained today due to virtual visit.    Physical Exam   healthy, alert and no distress  PSYCH: Alert and oriented times 3; coherent speech, normal   rate and volume, able to articulate logical thoughts, able   to abstract reason, no tangential thoughts, no hallucinations   or delusions  Her affect is normal  RESP: No cough, no audible wheezing, able to talk in full sentences  Remainder of exam unable to be completed due to telephone visits    TSH   Date Value Ref Range Status   10/15/2019 1.11 0.40 - 4.00 mU/L Final   10/26/2017 1.52 0.40 - 4.00 mU/L Final   10/27/2016 1.26 0.40 - 4.00 mU/L Final   10/08/2014 0.92 0.40 - 4.00 mU/L Final     Comment:     Effective 7/30/2014, the reference range for this assay has changed to reflect   new instrumentation/methodology.     04/10/2012 1.14 0.4 - 5.0 mU/L Final   ]            Phone call duration: 25 minutes

## 2023-05-31 ENCOUNTER — LAB (OUTPATIENT)
Dept: LAB | Facility: CLINIC | Age: 83
End: 2023-05-31
Payer: COMMERCIAL

## 2023-05-31 DIAGNOSIS — L65.9 HAIR LOSS: ICD-10-CM

## 2023-05-31 LAB
ALBUMIN SERPL BCG-MCNC: 4.2 G/DL (ref 3.5–5.2)
ALP SERPL-CCNC: 81 U/L (ref 35–104)
ALT SERPL W P-5'-P-CCNC: 9 U/L (ref 10–35)
ANION GAP SERPL CALCULATED.3IONS-SCNC: 10 MMOL/L (ref 7–15)
AST SERPL W P-5'-P-CCNC: 21 U/L (ref 10–35)
BASOPHILS # BLD AUTO: 0.1 10E3/UL (ref 0–0.2)
BASOPHILS NFR BLD AUTO: 1 %
BILIRUB SERPL-MCNC: 0.2 MG/DL
BUN SERPL-MCNC: 30.2 MG/DL (ref 8–23)
CALCIUM SERPL-MCNC: 9.4 MG/DL (ref 8.8–10.2)
CHLORIDE SERPL-SCNC: 105 MMOL/L (ref 98–107)
CREAT SERPL-MCNC: 1.07 MG/DL (ref 0.51–0.95)
DEPRECATED HCO3 PLAS-SCNC: 26 MMOL/L (ref 22–29)
EOSINOPHIL # BLD AUTO: 0.2 10E3/UL (ref 0–0.7)
EOSINOPHIL NFR BLD AUTO: 3 %
ERYTHROCYTE [DISTWIDTH] IN BLOOD BY AUTOMATED COUNT: 13 % (ref 10–15)
FOLATE SERPL-MCNC: 13.5 NG/ML (ref 4.6–34.8)
GFR SERPL CREATININE-BSD FRML MDRD: 52 ML/MIN/1.73M2
GLUCOSE SERPL-MCNC: 174 MG/DL (ref 70–99)
HCT VFR BLD AUTO: 44.1 % (ref 35–47)
HGB BLD-MCNC: 14 G/DL (ref 11.7–15.7)
IMM GRANULOCYTES # BLD: 0 10E3/UL
IMM GRANULOCYTES NFR BLD: 0 %
LYMPHOCYTES # BLD AUTO: 1.3 10E3/UL (ref 0.8–5.3)
LYMPHOCYTES NFR BLD AUTO: 18 %
MCH RBC QN AUTO: 27.9 PG (ref 26.5–33)
MCHC RBC AUTO-ENTMCNC: 31.7 G/DL (ref 31.5–36.5)
MCV RBC AUTO: 88 FL (ref 78–100)
MONOCYTES # BLD AUTO: 0.5 10E3/UL (ref 0–1.3)
MONOCYTES NFR BLD AUTO: 7 %
NEUTROPHILS # BLD AUTO: 5 10E3/UL (ref 1.6–8.3)
NEUTROPHILS NFR BLD AUTO: 71 %
NRBC # BLD AUTO: 0 10E3/UL
NRBC BLD AUTO-RTO: 0 /100
PLATELET # BLD AUTO: 307 10E3/UL (ref 150–450)
POTASSIUM SERPL-SCNC: 4.4 MMOL/L (ref 3.4–5.3)
PROT SERPL-MCNC: 7 G/DL (ref 6.4–8.3)
RBC # BLD AUTO: 5.01 10E6/UL (ref 3.8–5.2)
SODIUM SERPL-SCNC: 141 MMOL/L (ref 136–145)
TSH SERPL DL<=0.005 MIU/L-ACNC: 1.33 UIU/ML (ref 0.3–4.2)
VIT B12 SERPL-MCNC: 608 PG/ML (ref 232–1245)
WBC # BLD AUTO: 7 10E3/UL (ref 4–11)

## 2023-05-31 PROCEDURE — 84443 ASSAY THYROID STIM HORMONE: CPT

## 2023-05-31 PROCEDURE — 82607 VITAMIN B-12: CPT

## 2023-05-31 PROCEDURE — 36415 COLL VENOUS BLD VENIPUNCTURE: CPT

## 2023-05-31 PROCEDURE — 85025 COMPLETE CBC W/AUTO DIFF WBC: CPT

## 2023-05-31 PROCEDURE — 82746 ASSAY OF FOLIC ACID SERUM: CPT

## 2023-05-31 PROCEDURE — 80053 COMPREHEN METABOLIC PANEL: CPT

## 2023-06-02 NOTE — RESULT ENCOUNTER NOTE
Hello!  It was a pleasure to see you in clinic!  Thank you for getting labs done. Everything looks normal, which is good news.    The testing of your blood sugar, liver function and electrolytes was normal.  Kidney function is assessed by blood work that measures creatinine and calculates estimated GFR (glomerular filtration rate).  By current criteria you do have stage 3 chronic kidney disease as your eGFR is < 60.  This is not something that is urgent as your value has not changed much with time.    However, it does mean will monitor your labs (urine and blood tests) every 6-12 months and we will keep trying to make sure your blood pressure and cholesterol are at goal to keep your kidneys as healthy as possible.     If you have any questions, please contact the clinic or schedule an appointment with me, thank you!      Sincerely,  Dr. Lea Lopez MD  6/2/2023

## 2023-06-12 ASSESSMENT — ENCOUNTER SYMPTOMS
SORE THROAT: 0
FEVER: 0
DIARRHEA: 0
CONSTIPATION: 0
MYALGIAS: 0
NERVOUS/ANXIOUS: 0
WEAKNESS: 0
DIZZINESS: 0
CHILLS: 0
COUGH: 0
NAUSEA: 0
BREAST MASS: 0
JOINT SWELLING: 0
EYE PAIN: 0
ARTHRALGIAS: 0
SHORTNESS OF BREATH: 0
PARESTHESIAS: 0
DYSURIA: 0
PALPITATIONS: 0
FREQUENCY: 0
HEMATURIA: 0
ABDOMINAL PAIN: 0
HEARTBURN: 0
HEMATOCHEZIA: 0
HEADACHES: 0

## 2023-06-12 ASSESSMENT — ACTIVITIES OF DAILY LIVING (ADL): CURRENT_FUNCTION: NO ASSISTANCE NEEDED

## 2023-06-15 ENCOUNTER — OFFICE VISIT (OUTPATIENT)
Dept: FAMILY MEDICINE | Facility: CLINIC | Age: 83
End: 2023-06-15
Payer: COMMERCIAL

## 2023-06-15 VITALS
HEIGHT: 61 IN | HEART RATE: 68 BPM | WEIGHT: 159.4 LBS | RESPIRATION RATE: 18 BRPM | OXYGEN SATURATION: 99 % | TEMPERATURE: 97.7 F | SYSTOLIC BLOOD PRESSURE: 136 MMHG | BODY MASS INDEX: 30.09 KG/M2 | DIASTOLIC BLOOD PRESSURE: 68 MMHG

## 2023-06-15 DIAGNOSIS — R26.89 POOR BALANCE: ICD-10-CM

## 2023-06-15 DIAGNOSIS — Z23 NEED FOR SHINGLES VACCINE: ICD-10-CM

## 2023-06-15 DIAGNOSIS — I10 HYPERTENSION GOAL BP (BLOOD PRESSURE) < 140/90: ICD-10-CM

## 2023-06-15 DIAGNOSIS — Z00.00 ENCOUNTER FOR MEDICARE ANNUAL WELLNESS EXAM: Primary | ICD-10-CM

## 2023-06-15 DIAGNOSIS — E78.5 HYPERLIPIDEMIA LDL GOAL <100: ICD-10-CM

## 2023-06-15 DIAGNOSIS — E11.9 TYPE 2 DIABETES MELLITUS WITHOUT COMPLICATION, WITHOUT LONG-TERM CURRENT USE OF INSULIN (H): ICD-10-CM

## 2023-06-15 DIAGNOSIS — N18.31 CHRONIC KIDNEY DISEASE, STAGE 3A (H): ICD-10-CM

## 2023-06-15 LAB
CHOLEST SERPL-MCNC: 199 MG/DL
CREAT UR-MCNC: 47.3 MG/DL
HBA1C MFR BLD: 7.7 % (ref 0–5.6)
HDLC SERPL-MCNC: 74 MG/DL
LDLC SERPL CALC-MCNC: 91 MG/DL
MICROALBUMIN UR-MCNC: <12 MG/L
MICROALBUMIN/CREAT UR: NORMAL MG/G{CREAT}
NONHDLC SERPL-MCNC: 125 MG/DL
TRIGL SERPL-MCNC: 172 MG/DL

## 2023-06-15 PROCEDURE — 36415 COLL VENOUS BLD VENIPUNCTURE: CPT | Performed by: FAMILY MEDICINE

## 2023-06-15 PROCEDURE — 99214 OFFICE O/P EST MOD 30 MIN: CPT | Mod: 25 | Performed by: FAMILY MEDICINE

## 2023-06-15 PROCEDURE — G0439 PPPS, SUBSEQ VISIT: HCPCS | Performed by: FAMILY MEDICINE

## 2023-06-15 PROCEDURE — 80061 LIPID PANEL: CPT | Performed by: FAMILY MEDICINE

## 2023-06-15 PROCEDURE — 82043 UR ALBUMIN QUANTITATIVE: CPT | Performed by: FAMILY MEDICINE

## 2023-06-15 PROCEDURE — 99207 PR FOOT EXAM NO CHARGE: CPT | Performed by: FAMILY MEDICINE

## 2023-06-15 PROCEDURE — 83036 HEMOGLOBIN GLYCOSYLATED A1C: CPT | Performed by: FAMILY MEDICINE

## 2023-06-15 PROCEDURE — 82570 ASSAY OF URINE CREATININE: CPT | Performed by: FAMILY MEDICINE

## 2023-06-15 ASSESSMENT — ENCOUNTER SYMPTOMS
COUGH: 0
ABDOMINAL PAIN: 0
NERVOUS/ANXIOUS: 0
JOINT SWELLING: 0
FEVER: 0
FREQUENCY: 0
SHORTNESS OF BREATH: 0
HEARTBURN: 0
HEMATOCHEZIA: 0
PALPITATIONS: 0
HEADACHES: 0
DIZZINESS: 0
BREAST MASS: 0
PARESTHESIAS: 0
CHILLS: 0
SORE THROAT: 0
DYSURIA: 0
NAUSEA: 0
HEMATURIA: 0
EYE PAIN: 0
WEAKNESS: 0
DIARRHEA: 0
CONSTIPATION: 0
ARTHRALGIAS: 0
MYALGIAS: 0

## 2023-06-15 ASSESSMENT — PAIN SCALES - GENERAL: PAINLEVEL: NO PAIN (0)

## 2023-06-15 ASSESSMENT — ACTIVITIES OF DAILY LIVING (ADL): CURRENT_FUNCTION: NO ASSISTANCE NEEDED

## 2023-06-15 NOTE — PROGRESS NOTES
SUBJECTIVE:   Kelly is a 83 year old who presents for Preventive Visit.  Diabetes Follow-up    How often are you checking your blood sugar? Four or more times daily  Blood sugar testing frequency justification:  she was on prednisone for 14 days, so was checking blood sugar more often  What time of day are you checking your blood sugars (select all that apply)?  Before and after meals  Have you had any blood sugars above 200?  No  Have you had any blood sugars below 70?  No    What symptoms do you notice when your blood sugar is low?  None    What concerns do you have today about your diabetes? None     Do you have any of these symptoms? (Select all that apply)  No numbness or tingling in feet.  No redness, sores or blisters on feet.  No complaints of excessive thirst.  No reports of blurry vision.  No significant changes to weight.    Have you had a diabetic eye exam in the last 12 months? Yes- Date of last eye exam: January 7 2023,  Location: [facility]    Hyperlipidemia Follow-Up      Are you regularly taking any medication or supplement to lower your cholesterol?   Yes- simvastatin    Are you having muscle aches or other side effects that you think could be caused by your cholesterol lowering medication?  No    Hypertension Follow-up      Do you check your blood pressure regularly outside of the clinic? Yes     Are you following a low salt diet? Yes    Are your blood pressures ever more than 140 on the top number (systolic) OR more   than 90 on the bottom number (diastolic), for example 140/90? No    BP Readings from Last 2 Encounters:   06/15/23 136/68   11/02/22 138/72     Hemoglobin A1C (%)   Date Value   06/15/2023 7.7 (H)   04/06/2023 7.6 (H)   05/11/2021 7.3 (H)   10/07/2020 7.2 (H)     LDL Cholesterol Calculated (mg/dL)   Date Value   04/06/2023 105 (H)   11/23/2022 89   05/11/2021 103 (H)   10/07/2020 134 (H)       Chronic Kidney Disease Follow-up      Do you take any over  the counter pain medicine?: No     GFR Estimate   Date Value Ref Range Status   05/31/2023 52 (L) >60 mL/min/1.73m2 Final     Comment:     eGFR calculated using 2021 CKD-EPI equation.   09/19/2022 51 (L) >60 mL/min/1.73m2 Final     Comment:     Effective December 21, 2021 eGFRcr in adults is calculated using the 2021 CKD-EPI creatinine equation which includes age and gender (Raya guerra al., [facility], DOI: 10.1056/RZYDmf3586680)   01/06/2022 59 (L) >60 mL/min/1.73m2 Final     Comment:     Effective December 21, 2021 eGFRcr in adults is calculated using the 2021 CKD-EPI creatinine equation which includes age and gender (Raya guerra al., [facility], DOI: 10.1056/TCINnk2516066)   05/11/2021 55 (L) >60 mL/min/[1.73_m2] Final     Comment:     Non  GFR Calc  Starting 12/18/2018, serum creatinine based estimated GFR (eGFR) will be   calculated using the Chronic Kidney Disease Epidemiology Collaboration   (CKD-EPI) equation.     12/21/2020 51 (L) >60 mL/min/[1.73_m2] Final     Comment:     Non  GFR Calc  Starting 12/18/2018, serum creatinine based estimated GFR (eGFR) will be   calculated using the Chronic Kidney Disease Epidemiology Collaboration   (CKD-EPI) equation.     12/20/2020 48 (L) >60 mL/min/[1.73_m2] Final     Comment:     Non  GFR Calc  Starting 12/18/2018, serum creatinine based estimated GFR (eGFR) will be   calculated using the Chronic Kidney Disease Epidemiology Collaboration   (CKD-EPI) equation.       GFR Estimate If Black   Date Value Ref Range Status   05/11/2021 64 >60 mL/min/[1.73_m2] Final     Comment:      GFR Calc  Starting 12/18/2018, serum creatinine based estimated GFR (eGFR) will be   calculated using the Chronic Kidney Disease Epidemiology Collaboration   (CKD-EPI) equation.     12/21/2020 59 (L) >60 mL/min/[1.73_m2] Final     Comment:      GFR Calc  Starting 12/18/2018, serum creatinine based estimated GFR (eGFR) will be  "  calculated using the Chronic Kidney Disease Epidemiology Collaboration   (CKD-EPI) equation.     12/20/2020 56 (L) >60 mL/min/[1.73_m2] Final     Comment:      GFR Calc  Starting 12/18/2018, serum creatinine based estimated GFR (eGFR) will be   calculated using the Chronic Kidney Disease Epidemiology Collaboration   (CKD-EPI) equation.             6/15/2023     3:56 PM   Additional Questions   Roomed by Rox   Accompanied by Self     Are you in the first 12 months of your Medicare coverage?  No    Healthy Habits:     In general, how would you rate your overall health?  Good    Frequency of exercise:  4-5 days/week    Do you usually eat at least 4 servings of fruit and vegetables a day, include whole grains    & fiber and avoid regularly eating high fat or \"junk\" foods?  Yes    Taking medications regularly:  Yes    Medication side effects:  Other    Ability to successfully perform activities of daily living:  No assistance needed    Home Safety:  No safety concerns identified    Hearing Impairment:  No hearing concerns    In the past 6 months, have you been bothered by leaking of urine?  No    In general, how would you rate your overall mental or emotional health?  Excellent      PHQ-2 Total Score: 0    Additional concerns today:  Yes        Have you ever done Advance Care Planning? (For example, a Health Directive, POLST, or a discussion with a medical provider or your loved ones about your wishes): Yes, patient states has an Advance Care Planning document and will bring a copy to the clinic.      Fall risk  Fallen 2 or more times in the past year?: No  Any fall with injury in the past year?: No    Cognitive Screening   1) Repeat 3 items (Leader, Season, Table)    2) Clock draw: NORMAL  3) 3 item recall: Recalls 3 objects  Results: 3 items recalled: COGNITIVE IMPAIRMENT LESS LIKELY    Mini-CogTM Copyright S Larry. Licensed by the author for use in [facility]; reprinted with " permission ([email or web address]). All rights reserved.      Do you have sleep apnea, excessive snoring or daytime drowsiness?: no    Reviewed and updated as needed this visit by clinical staff   Tobacco  Allergies  Meds  Problems             Reviewed and updated as needed this visit by Provider      Problems            Social History     Tobacco Use     Smoking status: Never     Smokeless tobacco: Never   Vaping Use     Vaping status: Never Used   Substance Use Topics     Alcohol use: Yes     Alcohol/week: 10.0 standard drinks of alcohol     Comment: 1-2 drinks per week           6/12/2023     1:05 PM   Alcohol Use   Prescreen: >3 drinks/day or >7 drinks/week? No     Do you have a current opioid prescription? No  Do you use any other controlled substances or medications that are not prescribed by a provider? None    Current providers sharing in care for this patient include:   Patient Care Team:  Lea Lopez MD as PCP - General (Family Medicine)  Lea Lopez MD as Assigned PCP  Jose Sue RPH as Pharmacist (Pharmacist)  Brenda Snowden CNP as Nurse Practitioner (Urology)  Kirk Law MD as MD (Urology)  Alesia Gómez PA-C as Assigned Cancer Care Provider  Brenda Snowden CNP as Assigned Surgical Provider  Jose Sue RPH as Assigned [place] Pharmacist    The following health maintenance items are reviewed in Epic and correct as of today:  Health Maintenance   Topic Date Due     ZOSTER IMMUNIZATION (1 of 2) Never done     DEXA  05/06/2012     COVID-19 Vaccine (6 - Pfizer series) 02/25/2023     DIABETIC FOOT EXAM  05/26/2023     EYE EXAM  06/01/2023     MEDICARE ANNUAL WELLNESS VISIT  05/26/2023     LIPID  07/06/2023     ANNUAL REVIEW OF HM ORDERS  09/13/2023     A1C  09/15/2023     MICROALBUMIN  09/19/2023     BMP  05/31/2024     CMP  05/31/2024     HEMOGLOBIN  05/31/2024     FALL RISK ASSESSMENT  06/15/2024     ADVANCE CARE PLANNING  09/30/2026      "DTAP/TDAP/TD IMMUNIZATION (2 - Td or Tdap) 10/06/2031     PHQ-2 (once per calendar year)  Completed     INFLUENZA VACCINE  Completed     Pneumococcal Vaccine: 65+ Years  Completed     URINALYSIS  Completed     IPV IMMUNIZATION  Aged Out     MENINGITIS IMMUNIZATION  Aged Out     MAMMO SCREENING  Discontinued     Allergies   Allergen Reactions     Ibuprofen Other (See Comments)     numbness in hands and feet     Keflex [Cephalexin] Rash     Metformin Rash     Mammogram Screening - Patient over age 75, has elected to discontinue screenings.  Pertinent mammograms are reviewed under the imaging tab.    Review of Systems   Constitutional: Negative for chills and fever.   HENT: Negative for congestion, ear pain, hearing loss and sore throat.    Eyes: Negative for pain and visual disturbance.   Respiratory: Negative for cough and shortness of breath.    Cardiovascular: Positive for peripheral edema. Negative for chest pain and palpitations.   Gastrointestinal: Negative for abdominal pain, constipation, diarrhea, heartburn, hematochezia and nausea.   Breasts:  Negative for tenderness, breast mass and discharge.   Genitourinary: Negative for dysuria, frequency, genital sores, hematuria, urgency, vaginal bleeding and vaginal discharge.   Musculoskeletal: Negative for arthralgias, joint swelling and myalgias.   Skin: Positive for rash.   Neurological: Negative for dizziness, weakness, headaches and paresthesias.   Psychiatric/Behavioral: Negative for mood changes. The patient is not nervous/anxious.        OBJECTIVE:   /68 (BP Location: Right arm, Patient Position: Sitting, Cuff Size: Adult Regular)   Pulse 68   Temp 97.7  F (36.5  C) (Temporal)   Resp 18   Ht 1.555 m (5' 1.22\")   Wt 72.3 kg (159 lb 6.4 oz)   SpO2 99%   BMI 29.90 kg/m   Estimated body mass index is 29.9 kg/m  as calculated from the following:    Height as of this encounter: 1.555 m (5' 1.22\").    Weight as of this encounter: 72.3 kg (159 lb 6.4 " oz).  Physical Exam  GENERAL: healthy, alert and no distress  EYES: Eyes grossly normal to inspection, PERRL and conjunctivae and sclerae normal  NECK: no adenopathy, no asymmetry, masses, or scars and thyroid normal to palpation  RESP: lungs clear to auscultation - no rales, rhonchi or wheezes  CV: regular rate and rhythm, normal S1 S2, no S3 or S4, no murmur, click or rub, no peripheral edema and peripheral pulses strong  ABDOMEN: soft, nontender, no hepatosplenomegaly, no masses and bowel sounds normal  MS: no gross musculoskeletal defects noted, no edema  SKIN: no suspicious lesions or rashes  NEURO: Normal strength and tone, mentation intact and speech normal  PSYCH: mentation appears normal, affect normal/bright    Diagnostic Test Results:  Labs reviewed in Epic  Results for orders placed or performed in visit on 06/15/23 (from the past 24 hour(s))   Hemoglobin A1c   Result Value Ref Range    Hemoglobin A1C 7.7 (H) 0.0 - 5.6 %       ASSESSMENT / PLAN:   (Z00.00) Encounter for Medicare annual wellness exam  (primary encounter diagnosis)  Routine    (E11.9) Type 2 diabetes mellitus without complication, without long-term current use of insulin (H)  Comment: At goal  The current medical regimen is effective;  continue present plan and medications.    Plan: Albumin Random Urine Quantitative with Creat         Ratio, Hemoglobin A1c, FOOT EXAM        Will obtain GONZALO to get eye exam report  Eye doctor: every 6 months Kirk Guido, [facility], [address] [place] [address]    (E78.5) Hyperlipidemia LDL goal <100  Comment:   LDL Cholesterol Calculated   Date Value Ref Range Status   04/06/2023 105 (H) <=100 mg/dL Final   05/11/2021 103 (H) <100 mg/dL Final     Comment:     Above desirable:  100-129 mg/dl  Borderline High:  130-159 mg/dL  High:             160-189 mg/dL  Very high:       >189 mg/dl        Plan: Lipid panel reflex to direct LDL Fasting        Continue statin and encourage activity    (Z23) Need  for shingles vaccine  Comment: advised patient to get vaccinated at a pharmacy      (I10) Hypertension goal BP (blood pressure) < 140/90  Comment:   BP Readings from Last 3 Encounters:   06/15/23 136/68   11/02/22 138/72   05/26/22 130/73      Plan: At goal  The current medical regimen is effective;  continue present plan and medications.      (N18.31) Chronic kidney disease, stage 3a (H)  Comment:   GFR Estimate   Date Value Ref Range Status   05/31/2023 52 (L) >60 mL/min/1.73m2 Final     Comment:     eGFR calculated using 2021 CKD-EPI equation.   09/19/2022 51 (L) >60 mL/min/1.73m2 Final     Comment:     Effective December 21, 2021 eGFRcr in adults is calculated using the 2021 CKD-EPI creatinine equation which includes age and gender (Raya et al., [facility], DOI: 10.1056/[id number])   01/06/2022 59 (L) >60 mL/min/1.73m2 Final     Comment:     Effective December 21, 2021 eGFRcr in adults is calculated using the 2021 CKD-EPI creatinine equation which includes age and gender (Raya et al., [facility], DOI: 10.1056/GUKCxu3850560)   05/11/2021 55 (L) >60 mL/min/[1.73_m2] Final     Comment:     Non  GFR Calc  Starting 12/18/2018, serum creatinine based estimated GFR (eGFR) will be   calculated using the Chronic Kidney Disease Epidemiology Collaboration   (CKD-EPI) equation.     12/21/2020 51 (L) >60 mL/min/[1.73_m2] Final     Comment:     Non  GFR Calc  Starting 12/18/2018, serum creatinine based estimated GFR (eGFR) will be   calculated using the Chronic Kidney Disease Epidemiology Collaboration   (CKD-EPI) equation.     12/20/2020 48 (L) >60 mL/min/[1.73_m2] Final     Comment:     Non  GFR Calc  Starting 12/18/2018, serum creatinine based estimated GFR (eGFR) will be   calculated using the Chronic Kidney Disease Epidemiology Collaboration   (CKD-EPI) equation.       GFR Estimate If Black   Date Value Ref Range Status   05/11/2021 64 >60 mL/min/[1.73_m2] Final     Comment:       GFR Calc  Starting 12/18/2018, serum creatinine based estimated GFR (eGFR) will be   calculated using the Chronic Kidney Disease Epidemiology Collaboration   (CKD-EPI) equation.     12/21/2020 59 (L) >60 mL/min/[1.73_m2] Final     Comment:      GFR Calc  Starting 12/18/2018, serum creatinine based estimated GFR (eGFR) will be   calculated using the Chronic Kidney Disease Epidemiology Collaboration   (CKD-EPI) equation.     12/20/2020 56 (L) >60 mL/min/[1.73_m2] Final     Comment:      GFR Calc  Starting 12/18/2018, serum creatinine based estimated GFR (eGFR) will be   calculated using the Chronic Kidney Disease Epidemiology Collaboration   (CKD-EPI) equation.         Plan: stable      (R26.89) Poor balance  Comment: new concern, noted when descending stairs, no actual falls, I recommended physical therapy, see order  Plan: Physical Therapy Referral            Patient has been advised of split billing requirements and indicates understanding: Yes      COUNSELING:  Reviewed preventive health counseling, as reflected in patient instructions        She reports that she has never smoked. She has never used smokeless tobacco.      Appropriate preventive services were discussed with this patient, including applicable screening as appropriate for cardiovascular disease, diabetes, osteopenia/osteoporosis, and glaucoma.  As appropriate for age/gender, discussed screening for colorectal cancer, prostate cancer, breast cancer, and cervical cancer. Checklist reviewing preventive services available has been given to the patient.    Reviewed patients plan of care and provided an AVS. The Intermediate Care Plan ( asthma action plan, low back pain action plan, and migraine action plan) for Kelly meets the Care Plan requirement. This Care Plan has been established and reviewed with the Patient.          Lea Lopez MD  [facility]    Identified  Health Risks:    I have reviewed Opioid Use Disorder and Substance Use Disorder risk factors and made any needed referrals.

## 2023-06-15 NOTE — RESULT ENCOUNTER NOTE
Patient was seen today in clinic.  I discussed results in clinic, please see clinic progress note.    Lea Lopez MD 6/15/2023

## 2023-06-15 NOTE — PATIENT INSTRUCTIONS
Patient Education   Personalized Prevention Plan  You are due for the preventive services outlined below.  Your care team is available to assist you in scheduling these services.  If you have already completed any of these items, please share that information with your care team to update in your medical record.  Health Maintenance Due   Topic Date Due     Zoster (Shingles) Vaccine (1 of 2) Never done     Osteoporosis Screening  05/06/2012     COVID-19 Vaccine (6 - Pfizer series) 02/25/2023     Diabetic Foot Exam  05/26/2023     Eye Exam  06/01/2023     Cholesterol Lab  07/06/2023     Preventive Health Recommendations    See your health care provider every year to    Review health changes.     Discuss preventive care.      Review your medicines if your doctor has prescribed any.    You no longer need a yearly Pap test unless you've had an abnormal Pap test in the past 10 years. If you have vaginal symptoms, such as bleeding or discharge, be sure to talk with your provider about a Pap test.    Every 1 to 2 years, have a mammogram.  If you are over 69, talk with your health care provider about whether or not you want to continue having screening mammograms.    Every 10 years, have a colonoscopy. Or, have a yearly FIT test (stool test). These exams will check for colon cancer.     Have a cholesterol test every 5 years, or more often if your doctor advises it.     Have a diabetes test (fasting glucose) every three years. If you are at risk for diabetes, you should have this test more often.     At age 65, have a bone density scan (DEXA) to check for osteoporosis (brittle bone disease).    Shots:    Get a flu shot each year.    Get a tetanus shot every 10 years.    Talk to your doctor about your pneumonia vaccines. There are now two you should receive - Pneumovax (PPSV 23) and Prevnar (PCV 13).    Talk to your pharmacist about the shingles vaccine.    Talk to your doctor about the hepatitis B vaccine.    Nutrition:      Eat at least 5 servings of fruits and vegetables each day.    Eat whole-grain bread, whole-wheat pasta and brown rice instead of white grains and rice.    Get adequate Calcium and Vitamin D.     Lifestyle    Exercise at least 150 minutes a week (30 minutes a day, 5 days a week). This will help you control your weight and prevent disease.    Limit alcohol to one drink per day.    No smoking.     Wear sunscreen to prevent skin cancer.     See your dentist twice a year for an exam and cleaning.    See your eye doctor every 1 to 2 years to screen for conditions such as glaucoma, macular degeneration and cataracts.    Personalized Prevention Plan  You are due for the preventive services outlined below.  Your care team is available to assist you in scheduling these services.  If you have already completed any of these items, please share that information with your care team to update in your medical record.  Health Maintenance   Topic Date Due     ZOSTER IMMUNIZATION (1 of 2) Never done     DEXA  05/06/2012     COVID-19 Vaccine (6 - Pfizer series) 02/25/2023     DIABETIC FOOT EXAM  05/26/2023     EYE EXAM  06/01/2023     LIPID  07/06/2023     ANNUAL REVIEW OF HM ORDERS  09/13/2023     A1C  09/15/2023     MICROALBUMIN  09/19/2023     BMP  05/31/2024     CMP  05/31/2024     HEMOGLOBIN  05/31/2024     MEDICARE ANNUAL WELLNESS VISIT  06/15/2024     FALL RISK ASSESSMENT  06/15/2024     ADVANCE CARE PLANNING  06/15/2028     DTAP/TDAP/TD IMMUNIZATION (2 - Td or Tdap) 10/06/2031     PHQ-2 (once per calendar year)  Completed     INFLUENZA VACCINE  Completed     Pneumococcal Vaccine: 65+ Years  Completed     URINALYSIS  Completed     IPV IMMUNIZATION  Aged Out     MENINGITIS IMMUNIZATION  Aged Out     MAMMO SCREENING  Discontinued

## 2023-06-16 NOTE — RESULT ENCOUNTER NOTE
Thank you very much for getting labs done!    Your microalbumen is normal, which is great news. This means you do not have protein in the urine, and that your kidneys are currently functioning well. Keeping blood pressure and blood fats low is the best way to preserve your kidney function over your lifetime.     Your HgA1C, also called glycosylated hemoglobin, which measures the level of sugar in your blood over the past few months, is at goal, which is great! Congratulations!    Your cholesterol looks very good overall. Your cholesterol is at goal, the medication is working well, please keep taking your Zocor (simvastatin) as you have been doing.  The triglycerides are high. Lowering  the amount of sugar ,alcohol and sweets in your diet helps to control this and trying to add more daily exercise will help lower your triglycerides.    If you have any questions, please contact the clinic or schedule an appointment with me, thank you!    Sincerely,  Dr. Lea Lopez MD  6/16/2023

## 2023-07-10 ENCOUNTER — THERAPY VISIT (OUTPATIENT)
Dept: PHYSICAL THERAPY | Facility: CLINIC | Age: 83
End: 2023-07-10
Attending: FAMILY MEDICINE
Payer: COMMERCIAL

## 2023-07-10 DIAGNOSIS — R26.89 POOR BALANCE: ICD-10-CM

## 2023-07-10 PROCEDURE — 97162 PT EVAL MOD COMPLEX 30 MIN: CPT | Mod: GP | Performed by: PHYSICAL THERAPIST

## 2023-07-10 PROCEDURE — 97110 THERAPEUTIC EXERCISES: CPT | Mod: GP | Performed by: PHYSICAL THERAPIST

## 2023-07-10 NOTE — PROGRESS NOTES
PHYSICAL THERAPY EVALUATION  Type of Visit: Evaluation    See electronic medical record for Abuse and Falls Screening details.    Subjective      Presenting condition or subjective complaint: balance issues  Date of onset: 06/15/23 (date of order)    Relevant medical history: Bladder or bowel problems Pt noted impaired balance when descending stairs, no actual falls, during routine PCP visit. PMHx: impaired kidney function, obesity, aortic stensis, HRN, DM 2. 6/15/23 A1c 7.7.   Dates & types of surgery: kidney stone    Prior diagnostic imaging/testing results:       Prior therapy history for the same diagnosis, illness or injury: No      Prior Level of Function   Transfers: Independent  Ambulation: Independent  ADL: Independent  IADL: Driving, Finances, Housekeeping, Laundry, Meal preparation, Medication management, Yard work indep with all    Living Environment  Social support: With family members - son  Type of home: House   Stairs to enter the home:       4  Ramp: No   Stairs inside the home: Yes   Is there a railing: Yes   Help at home: None  Equipment owned:   None    Employment: Not Applicable  Retired - teacher, middle school history  Hobbies/Interests:  Reading, gardening, travel (air or driving) - [employer] in [place], [employer] in [place].  Exercise: Walk (1x per week around [place], try to go outside if not too hot around the block, 3-4x per week). Wants to have exercises to do at home.    Patient goals for therapy:  Be able to go up steps without help or a railing. Go to the basement confidently - laundry in basement, chest freezer, storage. Balance has been getting slowly steadily.  Gardening - get up without using outside support.     Pain assessment: Pain denied     Objective   Cognitive Status Examination  Orientation: Oriented to person, place and time   Level of Consciousness: Alert  Follows Commands and Answers Questions: 100% of the time, Follows multi step instructions  Personal Safety and Judgement:  Intact  Memory: Intact, some word finding challenges but not a big issue    OBSERVATION:   INTEGUMENTARY: has edema in feet, wears compression socks, has been checked by cardiology. None in the AM, slowly progressive during the day. No pain.  POSTURE: Standing with B genuvalgus (R worse), increased hip and knee flex.  PALPATION: NA  RANGE OF MOTION: Functionally limited hip ext and abduction B LES  STRENGTH:   Pain: - none + mild ++ moderate +++ severe  Strength Scale: 0-5/5 Left Right   Hip Flexion 3+ 3+   Hip Extension 3+ 3+   Hip Abduction 4- 3+   Hip Adduction 4- 4-   Knee Flexion 4- 3+   Knee Extension 4 4+       BED MOBILITY: Independent    TRANSFERS: Independent, some hesitation with first standing more if sitting for a while. Feels a little unsteady for a few steps.    WHEELCHAIR MOBILITY: NA    GAIT:   Level of [place]: Independent  Assistive Device(s): None  Gait Deviations: Base of support increased  Stride length decreased  Shaila decreased  Toe in L  Toe in R  Gait Distance: 200' (submax)  Stairs: 3 stairs with rail step through pattern with mild vaulting when leading with L LE ascending. Descending with decreased stance time L LE. Able to do 3 stairs without railing step through, but much more hesitant.  Pt notes she can walk across grass, but needs to slow down. Have  for walking in the snow - avoids doing this.    BALANCE:     SPECIAL TESTS  10 Meter Walk Test (Comfortable)  1.22m/s   6 Minute Walk Test (6MWT)   1043'     Did not require standing rest break, Vitals response: 114/75, HR 68 resting. After 6MWT 145/81, HR 72, 99%   5 Times Sit-to-Stand (5TSTS)   16.16 secs no hands     Timed Up and Go (TUG) - sec 12.00 secs no AD       Assessment & Plan   CLINICAL IMPRESSIONS   Medical Diagnosis: Poor balance (R26.89)    Treatment Diagnosis: Impaired force production   Impression/Assessment: Patient is a 83 year old female with imbalance complaints.  The following significant findings have  been identified: Decreased ROM/flexibility, Decreased strength, Impaired balance, Impaired gait, Impaired posture, Instability and impaired stair climbing and floor recovery ability. These impairments interfere with their ability to perform self care tasks, recreational activities, household chores, household mobility and community mobility as compared to previous level of function.     Clinical Decision Making (Complexity):   Clinical Presentation: Evolving/Changing  Clinical Presentation Rationale: based on medical and personal factors listed in PT evaluation  Clinical Decision Making (Complexity): Moderate complexity    PLAN OF CARE  Treatment Interventions:  Modalities: Cryotherapy  Interventions: Gait Training, Neuromuscular Re-education, Therapeutic Activity, Therapeutic Exercise    Long Term Goals     PT Goal 1  Goal Identifier: 6MWT  Goal Description: Pt demo improved LE strength and balance to be able to walk >1147' (10% improved from baseline) for improved ease of community access.  Goal Progress: 7/10/23: 1043' no AD.  Target Date: 10/07/23  PT Goal 2  Goal Identifier: 5x sit/stand  Goal Description: Pt demo improved LE strength and decreased risk for falls as evidenced by 5x sit/stand  in <12.9 secs (20% improved from baseline).  Goal Progress: 7/10/23: 16.16 secs.  Target Date: 10/07/23  PT Goal 3  Goal Identifier: TUG  Goal Description: Pt demo decreased risk for falls and improved efficiency of initiating gait upon standing as evidenced by TUG <9.6 secs (20% improved).  Goal Progress: 7/10/23: 12.00 secs no AD.  Target Date: 10/07/23  PT Goal 4  Goal Identifier: Floor recovery  Goal Description: Pt demo ability to stand up from the ground in case of fall, and improved ease of gardening - without external support for assistance.  Target Date: 10/07/23  PT Goal 5  Goal Identifier: Stairs  Goal Description: Pt report improved confidence going up.down at least 6 stairs without a railing as result of  improved balance and LE strength to be able to access stairs in the community.  Target Date: 10/07/23      Frequency of Treatment: 1x per 1-2 weeks  Duration of Treatment: 90 days    Recommended Referrals to Other Professionals: NA  Education Assessment:   Learner/Method: Patient  Education Comments: PT POC, HEP    Risks and benefits of evaluation/treatment have been explained.   Patient/Family/caregiver agrees with Plan of Care.     Evaluation Time:     PT Eval, Moderate Complexity Minutes (55655): 35  Signing Clinician: BELLA Harris [facility]                                                                                   OUTPATIENT PHYSICAL THERAPY      PLAN OF TREATMENT FOR OUTPATIENT REHABILITATION   Patient's Last Name, First Name, Kelly Enciso YOB: 1940   Provider's Name   [facility]   Medical Record No.  [id number]     Onset Date: 06/15/23 (date of order)  Start of Care Date: 07/10/23     Medical Diagnosis:  Poor balance (R26.89)      PT Treatment Diagnosis:  Impaired force production Plan of Treatment  Frequency/Duration: 1x per 1-2 weeks/ 90 days    Certification date from 07/10/23 to 10/07/23         See note for plan of treatment details and functional goals     Kelly Courtney, PT                         I CERTIFY THE NEED FOR THESE SERVICES FURNISHED UNDER        THIS PLAN OF TREATMENT AND WHILE UNDER MY CARE     (Physician attestation of this document indicates review and certification of the therapy plan).                  Referring Provider:  Lea Lopez      Initial Assessment  See Epic Evaluation- Start of Care Date: 07/10/23

## 2023-07-17 ENCOUNTER — THERAPY VISIT (OUTPATIENT)
Dept: PHYSICAL THERAPY | Facility: CLINIC | Age: 83
End: 2023-07-17
Attending: FAMILY MEDICINE
Payer: COMMERCIAL

## 2023-07-17 DIAGNOSIS — R26.89 POOR BALANCE: Primary | ICD-10-CM

## 2023-07-17 PROCEDURE — 97110 THERAPEUTIC EXERCISES: CPT | Mod: GP | Performed by: PHYSICAL THERAPIST

## 2023-07-17 PROCEDURE — 97112 NEUROMUSCULAR REEDUCATION: CPT | Mod: GP | Performed by: PHYSICAL THERAPIST

## 2023-07-24 ENCOUNTER — THERAPY VISIT (OUTPATIENT)
Dept: PHYSICAL THERAPY | Facility: CLINIC | Age: 83
End: 2023-07-24
Attending: FAMILY MEDICINE
Payer: COMMERCIAL

## 2023-07-24 DIAGNOSIS — R26.89 POOR BALANCE: Primary | ICD-10-CM

## 2023-07-24 PROCEDURE — 97750 PHYSICAL PERFORMANCE TEST: CPT | Mod: GP | Performed by: PHYSICAL THERAPIST

## 2023-07-24 PROCEDURE — 97110 THERAPEUTIC EXERCISES: CPT | Mod: GP | Performed by: PHYSICAL THERAPIST

## 2023-08-07 ENCOUNTER — TELEPHONE (OUTPATIENT)
Dept: GASTROENTEROLOGY | Facility: CLINIC | Age: 83
End: 2023-08-07
Payer: COMMERCIAL

## 2023-08-07 ENCOUNTER — THERAPY VISIT (OUTPATIENT)
Dept: PHYSICAL THERAPY | Facility: CLINIC | Age: 83
End: 2023-08-07
Attending: FAMILY MEDICINE
Payer: COMMERCIAL

## 2023-08-07 DIAGNOSIS — R26.89 POOR BALANCE: Primary | ICD-10-CM

## 2023-08-07 PROCEDURE — 97110 THERAPEUTIC EXERCISES: CPT | Mod: GP | Performed by: PHYSICAL THERAPIST

## 2023-08-07 NOTE — TELEPHONE ENCOUNTER
LVM, sent mychart to reschedule the following appointment:    Appt on 9/27/23 with Alesia Gómez due to the provider not being available. Reschedule with either Alesia Gómez, Genet Andrew, Genet Last or Rosa Ramirez. Next available Return GI appt. Left CC#

## 2023-08-09 ENCOUNTER — TELEPHONE (OUTPATIENT)
Dept: GASTROENTEROLOGY | Facility: CLINIC | Age: 83
End: 2023-08-09
Payer: COMMERCIAL

## 2023-08-14 NOTE — PROGRESS NOTES
SUBJECTIVE:                                                   Kelly Barnes is a 83 year old female who presents to clinic today for the following health issue(s):  Patient presents with:  Consult: C/o one episode of postmenopausal bleeding, little heavier than spotting and was bright red. Also had light spotting a few months ago. Denies any pain or abdominal cramping.     HPI:  The patient to me here today with concerns of postmenopausal bleeding on multiple occasions.  She states that she had watermelon tinged spotting this spring probably around April or May then about a week or so ago she had bright red spotting with no cramping that lasted about a day then trickled off over the course of a week.  She has no vaginal bleeding currently.  She is a  1 para 1.  She is diabetic.  She is not currently sexually active and is on no hormone replacement therapy.    No LMP recorded. Patient is postmenopausal.    Patient is not sexually active, No obstetric history on file.  Using not sexually active for contraception.    reports that she has never smoked. She has never used smokeless tobacco.    STD testing offered?  Declined    Health maintenance updated:  yes    Today's PHQ-2 Score:       2023     8:40 PM   PHQ-2 (  [employer])   Q1: Little interest or pleasure in doing things 0   Q2: Feeling down, depressed or hopeless 0   PHQ-2 Score 0   Q1: Little interest or pleasure in doing things Not at all   Q2: Feeling down, depressed or hopeless Not at all   PHQ-2 Score 0     Today's PHQ-9 Score:       10/15/2019     8:28 AM   PHQ-9 SCORE   PHQ-9 Total Score 0     Today's JENNIE-7 Score:       2013     8:59 AM   JENNIE-7 SCORE   Total Score 5       Problem list and histories reviewed & adjusted, as indicated.  Additional history: as documented.    Patient Active Problem List   Diagnosis    Osteopenia    Hypertension goal BP (blood pressure) < 140/90    Hyperlipidemia LDL goal <100    Type 2 diabetes mellitus  without complication, without long-term current use of insulin (H)    Family history of breast cancer in sister    Hepatitis A immune    Chronic kidney disease, stage 3a (H)     Past Surgical History:   Procedure Laterality Date    CATARACT EXTRACTION      CYSTOSCOPY, RETROGRADES, INSERT STENT URETER(S), COMBINED Right 07/15/2021    Procedure: CYSTOURETEROSCOPY, WITH RETROGRADE PYELOGRAM,  AND STENT INSERTION RIGHT;  Surgeon: Kirk Law MD;  Location: [facility] OR    LASER HOLMIUM NEPHROLITHOTOMY VIA PERCUTANEOUS NEPHROSTOMY Right 08/05/2021    Procedure: NEPHROLITHOTOMY, PERCUTANEOUS, USING HOLMIUM LASER RIGHT;  Surgeon: Kirk Law MD;  Location: [facility] OR    LASER HOLMIUM NEPHROLITHOTOMY VIA PERCUTANEOUS NEPHROSTOMY Right 08/13/2021    Procedure: Ureteroscopic assisted secondary right percutaneous nephrolihtotomy, Holmium laser lithotriipsy;  Surgeon: Kirk Law MD;  Location: [facility] OR    PICC SINGLE LUMEN PLACEMENT Left 08/17/2021    41cm (2cm external), Medial brachial vein      Social History     Tobacco Use    Smoking status: Never    Smokeless tobacco: Never   Substance Use Topics    Alcohol use: Yes     Alcohol/week: 10.0 standard drinks of alcohol     Comment: 1-2 drinks per week      Problem (# of Occurrences) Relation (Name,Age of Onset)    Hypertension (1) Mother (mother)           Negative family history of: Diabetes, Cerebrovascular Disease, Breast Cancer, Cancer - colorectal, Prostate Cancer, Anesthesia Reaction, Bleeding Disorder, Clotting Disorder              Current Outpatient Medications   Medication Sig    blood glucose (ONETOUCH ULTRA) test strip Use to test blood sugar 2 times daily or as directed.    blood glucose (ONETOUCH ULTRA) test strip Use to test blood sugar twice daily. Dispense 1 box of 200 test strips, #3 refills.    blood glucose monitoring (ONE TOUCH ULTRA 2) meter device kit Use to test blood sugar 2 times daily or as directed.    blood glucose monitoring (ONE  "TOUCH ULTRA 2) meter device kit Use to test blood sugar 3 times daily    glimepiride (AMARYL) 4 MG tablet Take 1 tablet (4 mg) by mouth 2 times daily (with meals)    losartan-hydrochlorothiazide (HYZAAR) 50-12.5 MG tablet Take 1 tablet by mouth every morning    OneTouch Delica Lancets 33G MISC 1 Device by In Vitro route 2 times daily    potassium citrate (UROCIT-K) 10 MEQ (1080 MG) CR tablet Take 1 tablet (10 mEq) by mouth 2 times daily    simvastatin (ZOCOR) 20 MG tablet Take 1 tablet (20 mg) by mouth At Bedtime TAKE ONE TABLET BY MOUTH AT BEDTIME    sitagliptin (JANUVIA) 25 MG tablet Take 1 tablet (25 mg) by mouth daily     No current facility-administered medications for this visit.     Allergies   Allergen Reactions    Ibuprofen Other (See Comments)     numbness in hands and feet    Keflex [Cephalexin] Rash    Metformin Rash       ROS:  12 point review of systems negative other than symptoms noted below or in the HPI.  No urinary frequency or dysuria, bladder or kidney problems      OBJECTIVE:     /[birth year]   Ht 1.556 m (5' 1.25\")   Wt 73 kg (161 lb)   BMI 30.17 kg/m    Body mass index is 30.17 kg/m .    Exam:  Constitutional:  Appearance: Well nourished, well developed alert, in no acute distress  Psychiatric:  Mentation appears normal and affect normal/bright.  Pelvic Exam:  External Genitalia:     Normal appearance for age, no discharge present, no tenderness present, no inflammatory lesions present, color normal  Vagina:     Normal vaginal vault without central or paravaginal defects, small amount of brown/red streak discharge in the back the vault discharge present, no inflammatory lesions present, no masses present  Bladder:     Nontender to palpation  Urethra:   Urethral Body:  Urethra palpation normal, urethra structural support normal   Urethral Meatus:  No erythema or lesions present  Cervix:     Appearance healthy, no lesions present, nontender to palpation, no bleeding present  Uterus:     Uterus: " firm, normal sized and nontender, anteverted in position.   Adnexa:     No adnexal tenderness present, no adnexal masses present  Perineum:     Perineum within normal limits, no evidence of trauma, no rashes or skin lesions present  Anus:     Anus within normal limits, no hemorrhoids present  Inguinal Lymph Nodes:     No lymphadenopathy present  Pubic Hair:     Normal pubic hair distribution for age  Genitalia and Groin:     No rashes present, no lesions present, no areas of discoloration, no masses present     In-Clinic Test Results:  No results found for this or any previous visit (from the past 24 hour(s)).    ASSESSMENT/PLAN:                                                        ICD-10-CM    1. PMB (postmenopausal bleeding)  N95.0 Follicle stimulating hormone     TSH with free T4 reflex     US Transvaginal Pelvic Non-OB     Follicle stimulating hormone     TSH with free T4 reflex      2. Screening for thyroid disorder  Z13.29 Follicle stimulating hormone     TSH with free T4 reflex     Follicle stimulating hormone     TSH with free T4 reflex      3. Prediabetes  R73.03 TSH with free T4 reflex     TSH with free T4 reflex          There are no Patient Instructions on file for this visit.    83-year-old female with multiple episodes of postmenopausal spotting this year.  Her exam is benign aside from some abnormal colored discharge.  We will check a thyroid and FSH today and have her come back for a transvaginal ultrasound.    APRIL Mehta CNP  [facility] [place]

## 2023-08-17 ENCOUNTER — OFFICE VISIT (OUTPATIENT)
Dept: OBGYN | Facility: CLINIC | Age: 83
End: 2023-08-17
Payer: COMMERCIAL

## 2023-08-17 VITALS
HEIGHT: 61 IN | BODY MASS INDEX: 30.4 KG/M2 | WEIGHT: 161 LBS | DIASTOLIC BLOOD PRESSURE: 82 MMHG | SYSTOLIC BLOOD PRESSURE: 128 MMHG

## 2023-08-17 DIAGNOSIS — Z13.29 SCREENING FOR THYROID DISORDER: ICD-10-CM

## 2023-08-17 DIAGNOSIS — R73.03 PREDIABETES: ICD-10-CM

## 2023-08-17 DIAGNOSIS — N95.0 PMB (POSTMENOPAUSAL BLEEDING): Primary | ICD-10-CM

## 2023-08-17 LAB
FSH SERPL IRP2-ACNC: 76.3 MIU/ML
TSH SERPL DL<=0.005 MIU/L-ACNC: 1.33 UIU/ML (ref 0.3–4.2)

## 2023-08-17 PROCEDURE — 99204 OFFICE O/P NEW MOD 45 MIN: CPT | Performed by: NURSE PRACTITIONER

## 2023-08-17 PROCEDURE — 84443 ASSAY THYROID STIM HORMONE: CPT | Performed by: NURSE PRACTITIONER

## 2023-08-17 PROCEDURE — 83001 ASSAY OF GONADOTROPIN (FSH): CPT | Performed by: NURSE PRACTITIONER

## 2023-08-17 PROCEDURE — 36415 COLL VENOUS BLD VENIPUNCTURE: CPT | Performed by: NURSE PRACTITIONER

## 2023-08-21 ENCOUNTER — THERAPY VISIT (OUTPATIENT)
Dept: PHYSICAL THERAPY | Facility: CLINIC | Age: 83
End: 2023-08-21
Attending: FAMILY MEDICINE
Payer: COMMERCIAL

## 2023-08-21 DIAGNOSIS — R26.89 POOR BALANCE: Primary | ICD-10-CM

## 2023-08-21 PROCEDURE — 97110 THERAPEUTIC EXERCISES: CPT | Mod: GP | Performed by: PHYSICAL THERAPIST

## 2023-08-21 NOTE — PATIENT INSTRUCTIONS
Attempt #1 to call report.    Emergency Department with worsening of symptoms    Please call if not improving in 24-48 hours    Patient Education     Kidney Infection (Adult Female)     An infection in one or both kidneys is called pyelonephritis. It usually happens when bacteria ) get into the kidney. Rarely it is caused when other germs such as viruses, fungi, or other disease-causing organisms get into the kidney. The bacteria or other disease-causing organisms can enter the kidneys from the bladder or blood traveling from other parts of the body. A kidney infection can become serious. It can cause severe illness, scarring of the kidneys, or kidney failure if not treated correctly.  Common causes for this problem include:    Not keeping the genital area clean and dry, which promotes the growth of bacteria    Wiping back to front. This drags bacteria from the rectum toward the urinary opening (urethra).    Wearing tight pants or underwear. This lets moisture build up in the genital area, which helps bacteria grow.    Holding urine in for long periods of time    Dehydration    Urinary tract infections    Blockages of urine draining from the kidney, such as a kidney stone  Kidney infections can cause symptoms similar to a bladder infection. Symptoms include:    Pain (or burning) when urinating    Having to urinate more often than usual    Blood in the urine (pink or red)    Belly (abdominal) pain or discomfort, usually in the lower abdomen    Pain in the side or back    Pain above the pubic bone    Fever or chills    Vomiting    Loss of appetite  Treatment is oral antibiotics. More severe cases are treated with intramuscular or IV (intravenous) antibiotics. These are started right away and may be changed once urine culture results show the infecting organisms. Treatment helps prevent a more serious kidney infection. Symptoms of kidney infections can vary based on your age.  Medicines  Medicines can help in the treatment of a bladder  infection:    Take antibiotics exactly as prescribed and until they are used up, even if you feel better. It's important to finish them to make sure the infection is gone.    Unless another medicine was prescribed, you can use over-the-counter medicines for pain, fever, or discomfort. If you have chronic liver of kidney disease, talk with your healthcare provider before using these medicines. Also talk with your provider if you've ever had a stomach ulcer or digestive bleeding, or are taking blood thinners.  Home care  The following are general care guidelines:    Stay home from work or school. Rest in bed until your fever breaks and you are feeling better, or as advised by your healthcare provider.    Drink lots of fluid unless you must restrict fluids for other medical reasons. This will force the medicine into your urinary system and flush the bacteria out of your body. Ask your healthcare provider how much you should drink.    Don't have sex until you have finished all of your medicine and your symptoms are gone.    Don't have caffeine, alcohol, or spicy foods. These foods may irritate the kidneys and bladder.    Don't take bubble baths. Sensitivity to the chemicals in bubble baths can irritate the urethra.    Make sure you wipe from front to back after using the toilet.    Wear loose cloths and cotton underwear.  Prevention  These self-care steps can help prevent future infections:    Drink plenty of fluids to prevent dehydration and flush out the bladder. Do this unless you must restrict fluids for other health reasons, or your healthcare provider told you not to.    Correct cleaning after going to the bathroom in important. Make sure you wipe from front to back after using the toilet.    Urinate more often. Don't try to hold urine in for a long time.    Don't wear tight-fitting pants and underwear.    Improve your diet to prevent constipation. Eat more fruits, vegetables, and fiber. Eat less junk and fatty  foods. Constipation can make a urinary tract infection more likely. Talk with your healthcare provider if you have trouble with bowel movements.    Urinate right after sex to flush out the bladder.  Follow-up care  Follow up with your healthcare provider, or as advised. Additional testing may be needed to make sure the infection has cleared. Close follow-up and further testing is very important to find the cause and to prevent future infections.  If a urine culture was done, you will be contacted if your treatment needs to be changed. If directed, you may call to find out the results.  If you had an X-ray, CT scan, or other diagnostic test, you will be notified of any new findings that may affect your care.  Call 911  Call 911 if any of the following occur:    Trouble breathing    Fainting or loss of consciousness    Rapid or very slow heart rate    Weakness, dizziness, or fainting    Trouble arousing or confusion  When to seek medical advice  Call your healthcare provider right away if any of these occur:    Fever 100.4 F (38 C) or higher, or as directed by your healthcare provider    Not feeling better or symptoms get worse within 1 to 2 days after starting antibiotics    Any symptom that continues after 3 days of treatment    Increasing pain in the stomach, back, side, or groin area    Repeated vomiting    Not able to take prescribed medicine due to nausea or another reason    Bloody, dark-colored, or foul smelling urine    Trouble urinating or decreased urine output    No urine for 8 hours, no tears when crying, confusion, sunken eyes, or dry mouth  [employer] last reviewed this educational content on 11/1/2019 2000-2020 [employer]. [address] [place] [address], [place] [address]. All rights reserved. This information is not intended as a substitute for professional medical care. Always follow your healthcare professional's instructions.

## 2023-08-30 ENCOUNTER — MYC MEDICAL ADVICE (OUTPATIENT)
Dept: FAMILY MEDICINE | Facility: CLINIC | Age: 83
End: 2023-08-30
Payer: COMMERCIAL

## 2023-08-30 DIAGNOSIS — I10 HYPERTENSION GOAL BP (BLOOD PRESSURE) < 140/90: ICD-10-CM

## 2023-08-30 DIAGNOSIS — E11.9 TYPE 2 DIABETES MELLITUS WITHOUT COMPLICATION, WITHOUT LONG-TERM CURRENT USE OF INSULIN (H): ICD-10-CM

## 2023-08-31 RX ORDER — LOSARTAN POTASSIUM AND HYDROCHLOROTHIAZIDE 12.5; 5 MG/1; MG/1
1 TABLET ORAL EVERY MORNING
Qty: 90 TABLET | Refills: 0 | Status: SHIPPED | OUTPATIENT
Start: 2023-08-31 | End: 2023-11-28

## 2023-09-05 NOTE — PROGRESS NOTES
SUBJECTIVE:                                                   Kelly Barnes is a 83 year old female who presents to clinic today for the following health issue(s):  Patient presents with:  Follow Up: Follow up US       HPI:  Pt here today for US evaluation of PMB,. She continues to have pink tinge discharge, but no further bright red menstrual like bleeding.     No LMP recorded. Patient is postmenopausal..     Patient is not sexually active, .  Using not sexually active for contraception.    reports that she has never smoked. She has never used smokeless tobacco.    STD testing offered?  Declined    Health maintenance updated:  yes    Today's PHQ-2 Score:       2023     8:40 PM   PHQ-2 (  [employer])   Q1: Little interest or pleasure in doing things 0   Q2: Feeling down, depressed or hopeless 0   PHQ-2 Score 0   Q1: Little interest or pleasure in doing things Not at all   Q2: Feeling down, depressed or hopeless Not at all   PHQ-2 Score 0     Today's PHQ-9 Score:       10/15/2019     8:28 AM   PHQ-9 SCORE   PHQ-9 Total Score 0     Today's JENNIE-7 Score:       2013     8:59 AM   JENNIE-7 SCORE   Total Score 5       Problem list and histories reviewed & adjusted, as indicated.  Additional history: as documented.    Patient Active Problem List   Diagnosis    Osteopenia    Hypertension goal BP (blood pressure) < 140/90    Hyperlipidemia LDL goal <100    Type 2 diabetes mellitus without complication, without long-term current use of insulin (H)    Family history of breast cancer in sister    Hepatitis A immune    Chronic kidney disease, stage 3a (H)     Past Surgical History:   Procedure Laterality Date    CATARACT EXTRACTION      CYSTOSCOPY, RETROGRADES, INSERT STENT URETER(S), COMBINED Right 07/15/2021    Procedure: CYSTOURETEROSCOPY, WITH RETROGRADE PYELOGRAM,  AND STENT INSERTION RIGHT;  Surgeon: Kirk Law MD;  [facility]    LASER HOLMIUM NEPHROLITHOTOMY VIA PERCUTANEOUS NEPHROSTOMY  Right 08/05/2021    Procedure: NEPHROLITHOTOMY, PERCUTANEOUS, USING HOLMIUM LASER RIGHT;  Surgeon: Kirk Law MD;  [facility]    LASER HOLMIUM NEPHROLITHOTOMY VIA PERCUTANEOUS NEPHROSTOMY Right 08/13/2021    Procedure: Ureteroscopic assisted secondary right percutaneous nephrolihtotomy, Holmium laser lithotriipsy;  Surgeon: Kirk aLw MD;  [facility]    PICC SINGLE LUMEN PLACEMENT Left 08/17/2021    41cm (2cm external), Medial brachial vein      Social History     Tobacco Use    Smoking status: Never    Smokeless tobacco: Never   Substance Use Topics    Alcohol use: Yes     Alcohol/week: 10.0 standard drinks of alcohol     Comment: 1-2 drinks per week      Problem (# of Occurrences) Relation (Name,Age of Onset)    Hypertension (1) Mother (mother)           Negative family history of: Diabetes, Cerebrovascular Disease, Breast Cancer, Cancer - colorectal, Prostate Cancer, Anesthesia Reaction, Bleeding Disorder, Clotting Disorder              Current Outpatient Medications   Medication Sig    blood glucose (ONETOUCH ULTRA) test strip Use to test blood sugar 2 times daily or as directed.    blood glucose (ONETOUCH ULTRA) test strip Use to test blood sugar twice daily. Dispense 1 box of 200 test strips, #3 refills.    blood glucose monitoring (ONE TOUCH ULTRA 2) meter device kit Use to test blood sugar 2 times daily or as directed.    blood glucose monitoring (ONE TOUCH ULTRA 2) meter device kit Use to test blood sugar 3 times daily    glimepiride (AMARYL) 4 MG tablet Take 1 tablet (4 mg) by mouth 2 times daily (with meals)    losartan-hydrochlorothiazide (HYZAAR) 50-12.5 MG tablet Take 1 tablet by mouth every morning    OneTouch Delica Lancets 33G MISC 1 Device by In Vitro route 2 times daily    potassium citrate (UROCIT-K) 10 MEQ (1080 MG) CR tablet Take 1 tablet (10 mEq) by mouth 2 times daily    simvastatin (ZOCOR) 20 MG tablet Take 1 tablet (20 mg) by mouth At Bedtime TAKE ONE TABLET BY  MOUTH AT BEDTIME    sitagliptin (JANUVIA) 25 MG tablet Take 1 tablet (25 mg) by mouth daily     No current facility-administered medications for this visit.     Allergies   Allergen Reactions    Ibuprofen Other (See Comments)     numbness in hands and feet    Keflex [Cephalexin] Rash    Metformin Rash       ROS:  12 point review of systems negative other than symptoms noted below or in the HPI.  No urinary frequency or dysuria, bladder or kidney problems      OBJECTIVE:     /76   Wt 73 kg (161 lb)   BMI 30.17 kg/m    Body mass index is 30.17 kg/m .    Exam:  Constitutional:  Appearance: Well nourished, well developed alert, in no acute distress  Psychiatric:  Mentation appears normal and affect normal/bright.     In-Clinic Test Results:  Results for orders placed or performed in visit on 09/06/23 (from the past 24 hour(s))   US Transvaginal Pelvic Non-OB    Narrative    Table formatting from the original result was not included.     US Transvaginal Pelvic Non-OB  Order #: [id number] Accession #: [id number]  Study Notes     Spurling, Brittnee on 9/6/2023 12:01 PM      Gynecological Ultrasound Report  Pelvic U/S - Transvaginal  [facility]  Referring Provider: Dacia Lopes CNP   Sonographer:  Brittnee Spurling, RDMS  Indication: Bleeding/Menses- Postmenopausal bleeding  LMP: No LMP recorded. Patient is postmenopausal.  History:   Gynecological Ultrasonography:   Uterus: anteverted. Contour is smooth/regular.  Size: 5.7 x 4.3 x 3.1 cm  Endometrium: Thickness Total 21.2 mm  Findings: thickened irregular endometrium, cystic structure fundal uterus   1.0 x 0.6 cm  Right Ovary:  Not visualized   Left Ovary:  Not visualized   Cul de Sac Free Fluid: No free fluid     Impression:             The uterus is anteverted and without any obvious fibroids.  The endometrium appeared  thickened and more echogenic and ballooning out   from the REMI and measures 21mm  This is suspicious for possible  endometrial polyp vs just thickened   endometrium and needs additional evaluation to assess and treat  The ovaries were not visualized but no adnexal masses or pelvic fluid is   noted  There is a translucent cystic structure on the serosal surface of the   uterine fundus that appears to be a myometrial cyst    Dahlia Welsh MD       ASSESSMENT/PLAN:                                                        ICD-10-CM    1. PMB (postmenopausal bleeding)  N95.0       2. Thickened endometrium  R93.89           There are no Patient Instructions on file for this visit.    82yo PM female with a possible endo polyp as well as thickened EMS. Discussed surgery consult.     APRIL Mehta CNP  [facility] [place]

## 2023-09-06 ENCOUNTER — ANCILLARY PROCEDURE (OUTPATIENT)
Dept: ULTRASOUND IMAGING | Facility: CLINIC | Age: 83
End: 2023-09-06
Attending: NURSE PRACTITIONER
Payer: COMMERCIAL

## 2023-09-06 ENCOUNTER — OFFICE VISIT (OUTPATIENT)
Dept: OBGYN | Facility: CLINIC | Age: 83
End: 2023-09-06
Attending: NURSE PRACTITIONER
Payer: COMMERCIAL

## 2023-09-06 VITALS — DIASTOLIC BLOOD PRESSURE: 76 MMHG | SYSTOLIC BLOOD PRESSURE: 130 MMHG | BODY MASS INDEX: 30.17 KG/M2 | WEIGHT: 161 LBS

## 2023-09-06 DIAGNOSIS — R93.89 THICKENED ENDOMETRIUM: ICD-10-CM

## 2023-09-06 DIAGNOSIS — N95.0 PMB (POSTMENOPAUSAL BLEEDING): Primary | ICD-10-CM

## 2023-09-06 DIAGNOSIS — N95.0 PMB (POSTMENOPAUSAL BLEEDING): ICD-10-CM

## 2023-09-06 PROCEDURE — 76830 TRANSVAGINAL US NON-OB: CPT | Performed by: OBSTETRICS & GYNECOLOGY

## 2023-09-06 PROCEDURE — 99213 OFFICE O/P EST LOW 20 MIN: CPT | Performed by: NURSE PRACTITIONER

## 2023-09-06 NOTE — PROGRESS NOTES
SUBJECTIVE:                                                   Kelly Barnes is a 83 year old female who presents to clinic today for the following health issue(s):  Patient presents with:  Consult      Additional information: Postmenopausal bleeding    HPI:  Kelly presents to discuss surgical management of her post-menopausal bleeding. She states that for the past two years she has had on and off spotting that was pink tinged. About 2 weeks ago she had one day of bright red bleeding. She was evaluated with an ultrasound that showed a thickened endometrium measuring 21 mm. It was heterogeneous and cystic in appearance.     No LMP recorded. Patient is postmenopausal..     Patient is not sexually active, .  Using menopause for contraception.    reports that she has never smoked. She has never used smokeless tobacco.    STD testing offered?  Declined    Health maintenance updated:  yes    Today's PHQ-2 Score:       2023     8:40 PM   PHQ-2 (  [employer])   Q1: Little interest or pleasure in doing things 0   Q2: Feeling down, depressed or hopeless 0   PHQ-2 Score 0   Q1: Little interest or pleasure in doing things Not at all   Q2: Feeling down, depressed or hopeless Not at all   PHQ-2 Score 0     Today's PHQ-9 Score:       10/15/2019     8:28 AM   PHQ-9 SCORE   PHQ-9 Total Score 0     Today's JENNIE-7 Score:       2013     8:59 AM   JENNIE-7 SCORE   Total Score 5       Problem list and histories reviewed & adjusted, as indicated.  Additional history: as documented.    Patient Active Problem List   Diagnosis     Osteopenia     Hypertension goal BP (blood pressure) < 140/90     Hyperlipidemia LDL goal <100     Type 2 diabetes mellitus without complication, without long-term current use of insulin (H)     Family history of breast cancer in sister     Hepatitis A immune     Chronic kidney disease, stage 3a (H)     Past Surgical History:   Procedure Laterality Date     CATARACT EXTRACTION       CYSTOSCOPY,  RETROGRADES, INSERT STENT URETER(S), COMBINED Right 07/15/2021    Procedure: CYSTOURETEROSCOPY, WITH RETROGRADE PYELOGRAM,  AND STENT INSERTION RIGHT;  Surgeon: Kirk Law MD;  [facility]     LASER HOLMIUM NEPHROLITHOTOMY VIA PERCUTANEOUS NEPHROSTOMY Right 08/05/2021    Procedure: NEPHROLITHOTOMY, PERCUTANEOUS, USING HOLMIUM LASER RIGHT;  Surgeon: Kirk Law MD;  [facility]     LASER HOLMIUM NEPHROLITHOTOMY VIA PERCUTANEOUS NEPHROSTOMY Right 08/13/2021    Procedure: Ureteroscopic assisted secondary right percutaneous nephrolihtotomy, Holmium laser lithotriipsy;  Surgeon: Kirk Law MD;  [facility]     PICC SINGLE LUMEN PLACEMENT Left 08/17/2021    41cm (2cm external), Medial brachial vein      Social History     Tobacco Use     Smoking status: Never     Smokeless tobacco: Never   Substance Use Topics     Alcohol use: Yes     Alcohol/week: 10.0 standard drinks of alcohol     Comment: 1-2 drinks per week      Problem (# of Occurrences) Relation (Name,Age of Onset)    Hypertension (1) Mother (mother)           Negative family history of: Diabetes, Cerebrovascular Disease, Breast Cancer, Cancer - colorectal, Prostate Cancer, Anesthesia Reaction, Bleeding Disorder, Clotting Disorder              Current Outpatient Medications   Medication Sig     blood glucose (ONETOUCH ULTRA) test strip Use to test blood sugar 2 times daily or as directed.     blood glucose (ONETOUCH ULTRA) test strip Use to test blood sugar twice daily. Dispense 1 box of 200 test strips, #3 refills.     blood glucose monitoring (ONE TOUCH ULTRA 2) meter device kit Use to test blood sugar 2 times daily or as directed.     blood glucose monitoring (ONE TOUCH ULTRA 2) meter device kit Use to test blood sugar 3 times daily     glimepiride (AMARYL) 4 MG tablet Take 1 tablet (4 mg) by mouth 2 times daily (with meals)     losartan-hydrochlorothiazide (HYZAAR) 50-12.5 MG tablet Take 1 tablet by mouth every morning  "    OneTouch Delica Lancets 33G MISC 1 Device by In Vitro route 2 times daily     potassium citrate (UROCIT-K) 10 MEQ (1080 MG) CR tablet Take 1 tablet (10 mEq) by mouth 2 times daily     simvastatin (ZOCOR) 20 MG tablet Take 1 tablet (20 mg) by mouth At Bedtime TAKE ONE TABLET BY MOUTH AT BEDTIME     sitagliptin (JANUVIA) 25 MG tablet Take 1 tablet (25 mg) by mouth daily     No current facility-administered medications for this visit.     Allergies   Allergen Reactions     Ibuprofen Other (See Comments)     numbness in hands and feet     Keflex [Cephalexin] Rash     Metformin Rash       ROS:  12 point review of systems negative other than symptoms noted below or in the HPI.  POSITIVE for:, postmenopausal bleeding        OBJECTIVE:     BP (!) 158/74   Ht 1.6 m (5' 3\")   Wt 73.1 kg (161 lb 3.2 oz)   Breastfeeding No   BMI 28.56 kg/m    Body mass index is 28.56 kg/m .    Exam:  Constitutional:  Appearance: Well nourished, well developed alert, in no acute distress  Skin: General Inspection:  No rashes present, no lesions present, no areas of discoloration.  Neurologic:  Mental Status:  Oriented X3.  Normal strength and tone, sensory exam grossly normal, mentation intact and speech normal.    Psychiatric:  Mentation appears normal and affect normal/bright.  Cervix: appears normal. No active bleeding.    Endometrial Biopsy: After a consent was signed. Kelly was placed in dorsal supine position in lithotomy. A speculum was inserted. The cervix was seen and cleaned with betadine X 3. The endometrial pipelle was able to be advanced to the uterine fundus which sounded to 7 cm. There was a total of two passes with the pipelle removing a large amount of tissue. This was collected labeled and sent to pathology.     In-Clinic Test Results:  Pelvic ultrasound 9/6/2023  Uterus: anteverted. Contour is smooth/regular.  Size: 5.7 x 4.3 x 3.1 cm  Endometrium: Thickness Total 21.2 mm  Findings: thickened irregular endometrium, " cystic structure fundal uterus 1.0 x 0.6 cm  Right Ovary:  Not visualized   Left Ovary:  Not visualized   Cul de Sac Free Fluid: No free fluid     Impression:             The uterus is anteverted and without any obvious fibroids.  The endometrium appeared  thickened and more echogenic and ballooning out from the REMI and measures 21mm  This is suspicious for possible endometrial polyp vs just thickened endometrium and needs additional evaluation to assess and treat  The ovaries were not visualized but no adnexal masses or pelvic fluid is noted  There is a translucent cystic structure on the serosal surface of the uterine fundus that appears to be a myometrial cyst    ASSESSMENT/PLAN:                                                        ICD-10-CM    1. PMB (postmenopausal bleeding)  N95.0 Surgical Pathology Exam     ENDOMETRIAL BIOPSY W/O CERVICAL DILATION      2. Thickened endometrium  R93.89 Surgical Pathology Exam     ENDOMETRIAL BIOPSY W/O CERVICAL DILATION          Kelly was counseled about performing an initial endometrial biopsy due to the thickness of her endometrium and the prolonged nature of her bleeding: two years. If her pathology is benign I recommend a hysteroscopy and polypectomy. If her pathology shows complex endometrial hyperplasia, any atypia or endometrial cancer she was counseled that I would recommend seeing Gynecologic Oncology at that time.     Lanny Sebastian MD  [facility] [place]

## 2023-09-07 ENCOUNTER — OFFICE VISIT (OUTPATIENT)
Dept: OBGYN | Facility: CLINIC | Age: 83
End: 2023-09-07
Payer: COMMERCIAL

## 2023-09-07 VITALS
SYSTOLIC BLOOD PRESSURE: 158 MMHG | DIASTOLIC BLOOD PRESSURE: 74 MMHG | BODY MASS INDEX: 28.56 KG/M2 | WEIGHT: 161.2 LBS | HEIGHT: 63 IN

## 2023-09-07 DIAGNOSIS — N95.0 PMB (POSTMENOPAUSAL BLEEDING): Primary | ICD-10-CM

## 2023-09-07 DIAGNOSIS — C54.1 ENDOMETRIAL CANCER (H): ICD-10-CM

## 2023-09-07 DIAGNOSIS — R93.89 THICKENED ENDOMETRIUM: ICD-10-CM

## 2023-09-07 PROCEDURE — 88341 IMHCHEM/IMCYTCHM EA ADD ANTB: CPT | Performed by: PATHOLOGY

## 2023-09-07 PROCEDURE — 88342 IMHCHEM/IMCYTCHM 1ST ANTB: CPT | Performed by: PATHOLOGY

## 2023-09-07 PROCEDURE — 58100 BIOPSY OF UTERUS LINING: CPT | Performed by: OBSTETRICS & GYNECOLOGY

## 2023-09-07 PROCEDURE — 99213 OFFICE O/P EST LOW 20 MIN: CPT | Mod: 25 | Performed by: OBSTETRICS & GYNECOLOGY

## 2023-09-07 PROCEDURE — 88305 TISSUE EXAM BY PATHOLOGIST: CPT | Performed by: PATHOLOGY

## 2023-09-12 ENCOUNTER — VIRTUAL VISIT (OUTPATIENT)
Dept: GASTROENTEROLOGY | Facility: CLINIC | Age: 83
End: 2023-09-12
Payer: COMMERCIAL

## 2023-09-12 VITALS — WEIGHT: 161 LBS | BODY MASS INDEX: 28.52 KG/M2

## 2023-09-12 DIAGNOSIS — A04.71 RECURRENT CLOSTRIDIOIDES DIFFICILE DIARRHEA: Primary | ICD-10-CM

## 2023-09-12 PROCEDURE — 99212 OFFICE O/P EST SF 10 MIN: CPT | Mod: VID | Performed by: PHYSICIAN ASSISTANT

## 2023-09-12 ASSESSMENT — PAIN SCALES - GENERAL: PAINLEVEL: NO PAIN (0)

## 2023-09-12 NOTE — LETTER
9/12/2023         RE: Kelly Barnes  [address]  [place] [address]        Dear Colleague,    Thank you for referring your patient, Kelly Barnes, to the [facility] [place]. Please see a copy of my visit note below.    Kelly Barnes is a 83 year old female who is being evaluated via a billable video visit.        GI CLINIC VISIT    CC/REFERRING PROVIDER: Sunday Metzger  REASON FOR CONSULTATION: IMT    HPI: 83 year old female with PMH of type 2 diabetes, hyperlipidemia, hypertension, history of recurrent pyelonephritis and fungemia presenting to GI clinic for recurrent C.difficile infection here for follow-up intestinal microbiota transplant (IMT)    Initial diagnosis of C.diff was 7/23/2021, following a course of ceftriaxone for E.coli pyelonephritis with a 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 Prior to this, she had repeated antibiotic exposures around 1-2 times per year, largely for urinary tract infections. CT imaging also demonstrated colitis. CRP elevated at 130, however she had elevated values in the past as well.  She was treated with a 20-day vancomycin taper, with symptomatic recurrence 9/2021, confirmed by PCR, and treated with a four week vancomycin taper. About 1.5 weeks after completing the taper, she again presented with recurrent diarrhea and positive C.diff testing. She was hospitalized due to symptoms, with WBC 28k with predominant neutrophils, acute kidney injury, and tachycardia. She was treated with oral fidaxomcin, followed by a vancomycin taper. Symptoms resolved on vancomycin.    She underwent IMT via enteric capsules 12/1/2021. She tolerated this well, with some minor increased gas initially, which then resolved.    Today, Kelly notes she continues to do quite well. She continues to have satisfactory bowel habits. She has not had any UTIs since IMT! May have a uterine polyp removed, and inquires about antibiotics.    PAST MEDICAL  HISTORY:  Past Medical History:   Diagnosis Date    Acute kidney injury (H) 12/19/2020    Allergic rhinitis, cause unspecified     Anemia     Aortic stenosis 02/29/2016    With new murmur noted 2/2016, normal echo, repeat echo 2/2017.    Aortic valve sclerosis     Atypical chest pain 07/24/2015    cuboid     left foot    Fungemia 8/16/2021    History of Clostridium difficile colitis     Hyperlipidemia LDL goal <100 11/01/2012    Hypertension goal BP (blood pressure) < 140/90     Musculoskeletal chest pain 11/25/2016    Obesity, Class I, BMI 30-34.9 11/11/2015    Pneumonia 03/2016    Pyelonephritis     Sepsis, due to unspecified organism, unspecified whether acute organ dysfunction present (H) 12/19/2020    Type 2 diabetes, HbA1c goal < 7% (H)     Urinary tract infection associated with nephrostomy catheter, initial encounter (H) 8/8/2021       PREVIOUS ABDOMINAL/GYNECOLOGIC SURGERIES:  Past Surgical History:   Procedure Laterality Date    CATARACT EXTRACTION      CYSTOSCOPY, RETROGRADES, INSERT STENT URETER(S), COMBINED Right 07/15/2021    Procedure: CYSTOURETEROSCOPY, WITH RETROGRADE PYELOGRAM,  AND STENT INSERTION RIGHT;  Surgeon: Kirk Law MD;  [facility]    LASER HOLMIUM NEPHROLITHOTOMY VIA PERCUTANEOUS NEPHROSTOMY Right 08/05/2021    Procedure: NEPHROLITHOTOMY, PERCUTANEOUS, USING HOLMIUM LASER RIGHT;  Surgeon: Kirk Law MD;  [facility]    LASER HOLMIUM NEPHROLITHOTOMY VIA PERCUTANEOUS NEPHROSTOMY Right 08/13/2021    Procedure: Ureteroscopic assisted secondary right percutaneous nephrolihtotomy, Holmium laser lithotriipsy;  Surgeon: Kirk Law MD;  [facility]    PICC SINGLE LUMEN PLACEMENT Left 08/17/2021    41cm (2cm external), Medial brachial vein         PERTINENT MEDICATIONS:  Current Outpatient Medications   Medication Sig Dispense Refill    blood glucose (ONETOUCH ULTRA) test strip Use to test blood sugar 2 times daily or as directed. 100 strip 3    blood  glucose (ONETOUCH ULTRA) test strip Use to test blood sugar twice daily. Dispense 1 box of 200 test strips, #3 refills. 100 strip 3    blood glucose monitoring (ONE TOUCH ULTRA 2) meter device kit Use to test blood sugar 2 times daily or as directed. 1 kit 0    blood glucose monitoring (ONE TOUCH ULTRA 2) meter device kit Use to test blood sugar 3 times daily 1 kit 0    glimepiride (AMARYL) 4 MG tablet Take 1 tablet (4 mg) by mouth 2 times daily (with meals) 180 tablet 3    losartan-hydrochlorothiazide (HYZAAR) 50-12.5 MG tablet Take 1 tablet by mouth every morning 90 tablet 0    OneTouch Delica Lancets 33G MISC 1 Device by In Vitro route 2 times daily 100 each 11    potassium citrate (UROCIT-K) 10 MEQ (1080 MG) CR tablet Take 1 tablet (10 mEq) by mouth 2 times daily 180 tablet 3    simvastatin (ZOCOR) 20 MG tablet Take 1 tablet (20 mg) by mouth At Bedtime TAKE ONE TABLET BY MOUTH AT BEDTIME 90 tablet 3    sitagliptin (JANUVIA) 25 MG tablet Take 1 tablet (25 mg) by mouth daily 90 tablet 0     SOCIAL HISTORY:    Social History     Socioeconomic History    Marital status:      Spouse name: Not on file    Number of children: Not on file    Years of education: Not on file    Highest education level: Not on file   Occupational History    Not on file   Tobacco Use    Smoking status: Never    Smokeless tobacco: Never   Vaping Use    Vaping Use: Never used   Substance and Sexual Activity    Alcohol use: Yes     Alcohol/week: 10.0 standard drinks of alcohol     Comment: 1-2 drinks per week    Drug use: No    Sexual activity: Yes     Partners: Male   Other Topics Concern     Service No    Blood Transfusions No    Caffeine Concern No    Occupational Exposure No    Hobby Hazards No    Sleep Concern Yes    Stress Concern No    Weight Concern Yes    Special Diet No    Back Care No    Exercise Yes     Comment: walk    Bike Helmet No    Seat Belt Yes    Self-Exams Yes    Parent/sibling w/ CABG, MI or angioplasty  before 65F 55M? No   Social History Narrative    Not on file     Social Determinants of Health     Financial Resource Strain: Not on file   Food Insecurity: Not on file   Transportation Needs: Not on file   Physical Activity: Not on file   Stress: Not on file   Social Connections: Not on file   Intimate Partner Violence: Not on file   Housing Stability: Not on file       FAMILY HISTORY:  Family History   Problem Relation Age of Onset    Hypertension Mother     Diabetes No family hx of     Cerebrovascular Disease No family hx of     Breast Cancer No family hx of     Cancer - colorectal No family hx of     Prostate Cancer No family hx of     Anesthesia Reaction No family hx of     Bleeding Disorder No family hx of     Clotting Disorder No family hx of        PHYSICAL EXAMINATION:  Vitals reviewed  Wt 73 kg (161 lb)   BMI 28.52 kg/m    Video physical exam  General: Patient appears well in no acute distress.   Skin: No visualized rash or lesions on visualized skin  Eyes: EOMI, no erythema, sclera icterus or discharge noted  Resp: Appears to be breathing comfortably without accessory muscle usage, speaking in full sentences, no cough  MSK: Appears to have normal range of motion based on visualized movements  Neurologic: No apparent tremors, facial movements symmetric  Psych: affect normal, alert and oriented    The rest of a comprehensive physical examination is deferred due to PHE (public health emergency) video restrictions      PERTINENT STUDIES Reviewed in EMR    ASSESSMENT/PLAN:    # Recurrent CDI  History of rCDI following antibiotic course for pyelonephritis, with at least one episode of C diff being severe and requiring hospitalization. She is now s/p IMT via oral capsules (12/1/2021). She continues to do well post-IMT with normalization of bowel habits, and has not had any subsequent UTI.    We again discussed that she would be at an increased risk of CDI should she be exposed to antibiotics. She understands to  let us know should she require antibioitcs. If recurrent uncomplicated UTI, we can use IM gentamicin, which does not disturb the gut microbiota. Confirmed that Kelly has a copy of the antibiotic stewardship letter for IM graduates on hand.    RTC PRN  Thank you for this consultation. It was a pleasure to participate in the care of this patient; please contact us with any further questions.    13 minutes spent on the date of the encounter doing chart review, review of test results, patient visit and documentation      Again, thank you for allowing me to participate in the care of your patient.      Sincerely,    Alesia Gómez PA-C

## 2023-09-12 NOTE — NURSING NOTE
Is the patient currently in the state of [place]? YES    Visit mode:VIDEO    If the visit is dropped, the patient can be reconnected by: VIDEO VISIT: Text to cell phone:   Telephone Information:   Mobile [phone]       Will anyone else be joining the visit? NO  (If patient encounters technical issues they should call [phone]    How would you like to obtain your AVS? MyChart    Are changes needed to the allergy or medication list? No    Reason for visit: Video Visit (Recheck)    Elissa MARCOS

## 2023-09-12 NOTE — PATIENT INSTRUCTIONS
It was a pleasure taking care of you today.  I've included a brief summary of our discussion and care plan from today's visit below.  Please review this information with your primary care provider.  _______________________________________________________________________    My recommendations are summarized as follows:    Feel free to reach out any time with any concerns regarding antibioitcs.    Return to GI Clinic as needed   ______________________________________________________________________    How do I schedule labs, imaging studies, or procedures that were ordered in clinic today?     Labs: To schedule lab appointment at the [facility], use my chart or call [phone]. If you have a [place] lab closer to home where you are regularly seen you can give them a call.     Procedures: If a colonoscopy, upper endoscopy, breath test, esophageal manometry, or pH impedence was ordered today, our endoscopy team will call you to schedule this. If you have not heard from our endoscopy team within a week, please call [phone] option 2 to schedule.     Imaging Studies: If you were scheduled for a CT scan, X-ray, MRI, ultrasound, HIDA scan or other imaging study, please call [phone] to have this scheduled.     Referral: If a referral to another specialty was ordered, expect a phone call or follow instructions above. If you have not heard from anyone regarding your referral in a week, please call our clinic to check the status.     Who do I call with any questions after my visit?  Please be in touch if there are any further questions that arise following today's visit.  There are multiple ways to contact your gastroenterology care team.      During business hours, you may reach a Gastroenterology nurse at [phone]    To schedule or reschedule an appointment, please call [phone].     You can always send a secure message through [employer].  [employer] messages are answered by your nurse or  doctor typically within 24 hours.  Please allow extra time on weekends and holidays.      For urgent/emergent questions after business hours, you may reach the on-call GI Fellow by contacting the [facility]  at [phone].     How will I get the results of any tests ordered?    You will receive all of your results.  If you have signed up for [email or web address], any tests ordered at your visit will be available to you after your physician reviews them.  Typically this takes 1-2 weeks.  If there are urgent results that require a change in your care plan, your physician or nurse will call you to discuss the next steps.      What is NewsWhipt?  Gutenbergz is a secure way for you to access all of your healthcare records from the [facility].  It is a web based computer program, so you can sign on to it from any location.  It also allows you to send secure messages to your care team.  I recommend signing up for Gutenbergz access if you have not already done so and are comfortable with using a computer.      How to I schedule a follow-up visit?  If you did not schedule a follow-up visit today, please call [phone] to schedule a follow-up office visit.      Sincerely,    Alesia Gómez PA-C  Division of Gastroenterology, Hepatology & Nutrition  [facility]

## 2023-09-12 NOTE — PROGRESS NOTES
Kelly Barnes is a 83 year old female who is being evaluated via a billable video visit.    Virtual Visit Details    Type of service:  Video Visit   Video Start Time: 950  Video End Time:956    Originating Location (pt. Location): Home  Distant Location (provider location):  Off-site  Platform used for Video Visit: Plasmon       GI CLINIC VISIT    CC/REFERRING PROVIDER: Sunday Metzger  REASON FOR CONSULTATION: IMT    HPI: 83 year old female with PMH of type 2 diabetes, hyperlipidemia, hypertension, history of recurrent pyelonephritis and fungemia presenting to GI clinic for recurrent C.difficile infection here for follow-up intestinal microbiota transplant (IMT)    Initial diagnosis of C.diff was 7/23/2021, following a course of ceftriaxone for E.coli pyelonephritis with a 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 Prior to this, she had repeated antibiotic exposures around 1-2 times per year, largely for urinary tract infections. CT imaging also demonstrated colitis. CRP elevated at 130, however she had elevated values in the past as well.  She was treated with a 20-day vancomycin taper, with symptomatic recurrence 9/2021, confirmed by PCR, and treated with a four week vancomycin taper. About 1.5 weeks after completing the taper, she again presented with recurrent diarrhea and positive C.diff testing. She was hospitalized due to symptoms, with WBC 28k with predominant neutrophils, acute kidney injury, and tachycardia. She was treated with oral fidaxomcin, followed by a vancomycin taper. Symptoms resolved on vancomycin.    She underwent IMT via enteric capsules 12/1/2021. She tolerated this well, with some minor increased gas initially, which then resolved.    Today, Kelly notes she continues to do quite well. She continues to have satisfactory bowel habits. She has not had any UTIs since IMT! May have a uterine polyp removed, and inquires about antibiotics.    PAST MEDICAL HISTORY:  Past Medical History:   Diagnosis  Date     Acute kidney injury (H) 12/19/2020     Allergic rhinitis, cause unspecified      Anemia      Aortic stenosis 02/29/2016    With new murmur noted 2/2016, normal echo, repeat echo 2/2017.     Aortic valve sclerosis      Atypical chest pain 07/24/2015     cuboid     left foot     Fungemia 8/16/2021     History of Clostridium difficile colitis      Hyperlipidemia LDL goal <100 11/01/2012     Hypertension goal BP (blood pressure) < 140/90      Musculoskeletal chest pain 11/25/2016     Obesity, Class I, BMI 30-34.9 11/11/2015     Pneumonia 03/2016     Pyelonephritis      Sepsis, due to unspecified organism, unspecified whether acute organ dysfunction present (H) 12/19/2020     Type 2 diabetes, HbA1c goal < 7% (H)      Urinary tract infection associated with nephrostomy catheter, initial encounter (H) 8/8/2021       PREVIOUS ABDOMINAL/GYNECOLOGIC SURGERIES:  Past Surgical History:   Procedure Laterality Date     CATARACT EXTRACTION       CYSTOSCOPY, RETROGRADES, INSERT STENT URETER(S), COMBINED Right 07/15/2021    Procedure: CYSTOURETEROSCOPY, WITH RETROGRADE PYELOGRAM,  AND STENT INSERTION RIGHT;  Surgeon: Kirk Law MD;  Location: [facility] OR     LASER HOLMIUM NEPHROLITHOTOMY VIA PERCUTANEOUS NEPHROSTOMY Right 08/05/2021    Procedure: NEPHROLITHOTOMY, PERCUTANEOUS, USING HOLMIUM LASER RIGHT;  Surgeon: Kirk Law MD;  Location: [facility] OR     LASER HOLMIUM NEPHROLITHOTOMY VIA PERCUTANEOUS NEPHROSTOMY Right 08/13/2021    Procedure: Ureteroscopic assisted secondary right percutaneous nephrolihtotomy, Holmium laser lithotriipsy;  Surgeon: Kirk Law MD;  Location: [facility]     PICC SINGLE LUMEN PLACEMENT Left 08/17/2021    41cm (2cm external), Medial brachial vein         PERTINENT MEDICATIONS:  Current Outpatient Medications   Medication Sig Dispense Refill     blood glucose (ONETOUCH ULTRA) test strip Use to test blood sugar 2 times daily or as directed. 100 strip 3     blood glucose (ONETOUCH  ULTRA) test strip Use to test blood sugar twice daily. Dispense 1 box of 200 test strips, #3 refills. 100 strip 3     blood glucose monitoring (ONE TOUCH ULTRA 2) meter device kit Use to test blood sugar 2 times daily or as directed. 1 kit 0     blood glucose monitoring (ONE TOUCH ULTRA 2) meter device kit Use to test blood sugar 3 times daily 1 kit 0     glimepiride (AMARYL) 4 MG tablet Take 1 tablet (4 mg) by mouth 2 times daily (with meals) 180 tablet 3     losartan-hydrochlorothiazide (HYZAAR) 50-12.5 MG tablet Take 1 tablet by mouth every morning 90 tablet 0     OneTouch Delica Lancets 33G MISC 1 Device by In Vitro route 2 times daily 100 each 11     potassium citrate (UROCIT-K) 10 MEQ (1080 MG) CR tablet Take 1 tablet (10 mEq) by mouth 2 times daily 180 tablet 3     simvastatin (ZOCOR) 20 MG tablet Take 1 tablet (20 mg) by mouth At Bedtime TAKE ONE TABLET BY MOUTH AT BEDTIME 90 tablet 3     sitagliptin (JANUVIA) 25 MG tablet Take 1 tablet (25 mg) by mouth daily 90 tablet 0     SOCIAL HISTORY:    Social History     Socioeconomic History     Marital status:      Spouse name: Not on file     Number of children: Not on file     Years of education: Not on file     Highest education level: Not on file   Occupational History     Not on file   Tobacco Use     Smoking status: Never     Smokeless tobacco: Never   Vaping Use     Vaping Use: Never used   Substance and Sexual Activity     Alcohol use: Yes     Alcohol/week: 10.0 standard drinks of alcohol     Comment: 1-2 drinks per week     Drug use: No     Sexual activity: Yes     Partners: Male   Other Topics Concern      Service No     Blood Transfusions No     Caffeine Concern No     Occupational Exposure No     Hobby Hazards No     Sleep Concern Yes     Stress Concern No     Weight Concern Yes     Special Diet No     Back Care No     Exercise Yes     Comment: walk     Bike Helmet No     Seat Belt Yes     Self-Exams Yes     Parent/sibling w/ CABG, MI or  angioplasty before 65F 55M? No   Social History Narrative     Not on file     Social Determinants of Health     Financial Resource Strain: Not on file   Food Insecurity: Not on file   Transportation Needs: Not on file   Physical Activity: Not on file   Stress: Not on file   Social Connections: Not on file   Intimate Partner Violence: Not on file   Housing Stability: Not on file       FAMILY HISTORY:  Family History   Problem Relation Age of Onset     Hypertension Mother      Diabetes No family hx of      Cerebrovascular Disease No family hx of      Breast Cancer No family hx of      Cancer - colorectal No family hx of      Prostate Cancer No family hx of      Anesthesia Reaction No family hx of      Bleeding Disorder No family hx of      Clotting Disorder No family hx of        PHYSICAL EXAMINATION:  Vitals reviewed  Wt 73 kg (161 lb)   BMI 28.52 kg/m    Video physical exam  General: Patient appears well in no acute distress.   Skin: No visualized rash or lesions on visualized skin  Eyes: EOMI, no erythema, sclera icterus or discharge noted  Resp: Appears to be breathing comfortably without accessory muscle usage, speaking in full sentences, no cough  MSK: Appears to have normal range of motion based on visualized movements  Neurologic: No apparent tremors, facial movements symmetric  Psych: affect normal, alert and oriented    The rest of a comprehensive physical examination is deferred due to PHE (public health emergency) video restrictions      PERTINENT STUDIES Reviewed in EMR    ASSESSMENT/PLAN:    # Recurrent CDI  History of rCDI following antibiotic course for pyelonephritis, with at least one episode of C diff being severe and requiring hospitalization. She is now s/p IMT via oral capsules (12/1/2021). She continues to do well post-IMT with normalization of bowel habits, and has not had any subsequent UTI.    We again discussed that she would be at an increased risk of CDI should she be exposed to  antibiotics. She understands to let us know should she require antibioitcs. If recurrent uncomplicated UTI, we can use IM gentamicin, which does not disturb the gut microbiota. Confirmed that Kelly has a copy of the antibiotic stewardship letter for IM graduates on hand.      RTC PRN    Thank you for this consultation. It was a pleasure to participate in the care of this patient; please contact us with any further questions.    Alesia Gómez PA-C    13 minutes spent on the date of the encounter doing chart review, review of test results, patient visit and documentation

## 2023-09-13 ENCOUNTER — PATIENT OUTREACH (OUTPATIENT)
Dept: ONCOLOGY | Facility: CLINIC | Age: 83
End: 2023-09-13
Payer: COMMERCIAL

## 2023-09-13 LAB
PATH REPORT.ADDENDUM SPEC: ABNORMAL
PATH REPORT.COMMENTS IMP SPEC: ABNORMAL
PATH REPORT.COMMENTS IMP SPEC: YES
PATH REPORT.FINAL DX SPEC: ABNORMAL
PATH REPORT.GROSS SPEC: ABNORMAL
PATH REPORT.MICROSCOPIC SPEC OTHER STN: ABNORMAL
PATH REPORT.RELEVANT HX SPEC: ABNORMAL
PATHOLOGY SYNOPTIC REPORT: ABNORMAL
PHOTO IMAGE: ABNORMAL

## 2023-09-13 NOTE — PROGRESS NOTES
New Patient Hematology / Oncology Nurse Navigator Note     Referral Date: 9/13/23    Referring provider:   Lanny Sebastian MD   [address] [address]   Phone: [phone]   Fax: [phone]       Referring Clinic/Organization: [facility]     Referred to: GynOnc    Requested provider (if applicable): First available - did not specify     Evaluation for : New Diagnosis of Endometrial Cancer      Clinical History (per Nurse review of records provided):    9/7/23 Office Visit with OBGYN -- BOOKMARKED   9/7 Path:  Final Diagnosis   A.  Endometrial biopsy:  - Endometrioid adenocarcinoma, FIGO grade 1   9/6 Pelvic US-- BOOKMARKED    Clinical Assessment / Barriers to Care (Per Nurse):  Pt lives in [place], [place]      Records Location: [facility] Needed:   N/A    Additional testing needed prior to consult:   N/A    Referral updates and Plan:   OUTGOING CALL to pt:  Introduced my role as nurse navigator with [facility]ematology/Oncology dept and that we have recd the referral for dx of Endometrial Cancer from Dr Sebastian  Pt confirms they are aware of the referral and ready to schedule  Provided contact information if future questions arise.     -Transferred pt to NPS line [phone] to schedule appt per scheduling instructions.      Yolie Tomlin, MICHAELN, RN, PHN, OCN  Hematology/Oncology Nurse Navigator  [facility]  [phone]

## 2023-09-13 NOTE — RESULT ENCOUNTER NOTE
Here are the results we talked about on the phone. I have placed a referral to Gynecologic Oncology and you can see a physician at the [facility]. Just please let them know this.  Best  Dr. Sebastian

## 2023-09-14 NOTE — TELEPHONE ENCOUNTER
Referring Provider/Location:  Lanny Sebastian MD   Dx and Code: C54.1 (ICD-10-CM) - Endometrial cancer (H)   Appt Date:  9.19.23  Provider: Jenise  September 14, 2023 2:25 PM TJ   Internal Referral, No outside records needed

## 2023-09-19 ENCOUNTER — PRE VISIT (OUTPATIENT)
Dept: ONCOLOGY | Facility: CLINIC | Age: 83
End: 2023-09-19
Payer: COMMERCIAL

## 2023-09-19 ENCOUNTER — TELEPHONE (OUTPATIENT)
Dept: ONCOLOGY | Facility: CLINIC | Age: 83
End: 2023-09-19

## 2023-09-19 ENCOUNTER — ONCOLOGY VISIT (OUTPATIENT)
Dept: ONCOLOGY | Facility: CLINIC | Age: 83
End: 2023-09-19
Attending: OBSTETRICS & GYNECOLOGY
Payer: COMMERCIAL

## 2023-09-19 VITALS
DIASTOLIC BLOOD PRESSURE: 91 MMHG | BODY MASS INDEX: 29.11 KG/M2 | TEMPERATURE: 98.7 F | HEART RATE: 98 BPM | HEIGHT: 62 IN | SYSTOLIC BLOOD PRESSURE: 148 MMHG | WEIGHT: 158.2 LBS | OXYGEN SATURATION: 97 %

## 2023-09-19 DIAGNOSIS — C54.1 ENDOMETRIAL CANCER (H): ICD-10-CM

## 2023-09-19 DIAGNOSIS — I10 HYPERTENSION GOAL BP (BLOOD PRESSURE) < 140/90: ICD-10-CM

## 2023-09-19 DIAGNOSIS — E11.00 TYPE 2 DIABETES MELLITUS WITH HYPEROSMOLARITY WITHOUT COMA, WITHOUT LONG-TERM CURRENT USE OF INSULIN (H): Primary | ICD-10-CM

## 2023-09-19 PROCEDURE — G0463 HOSPITAL OUTPT CLINIC VISIT: HCPCS | Performed by: OBSTETRICS & GYNECOLOGY

## 2023-09-19 PROCEDURE — 99205 OFFICE O/P NEW HI 60 MIN: CPT | Performed by: OBSTETRICS & GYNECOLOGY

## 2023-09-19 RX ORDER — CEFAZOLIN SODIUM 2 G/50ML
2 SOLUTION INTRAVENOUS
Status: CANCELLED | OUTPATIENT
Start: 2023-09-19

## 2023-09-19 RX ORDER — PHENAZOPYRIDINE HYDROCHLORIDE 200 MG/1
200 TABLET, FILM COATED ORAL ONCE
Status: CANCELLED | OUTPATIENT
Start: 2023-09-19 | End: 2023-09-19

## 2023-09-19 RX ORDER — CEFAZOLIN SODIUM 2 G/50ML
2 SOLUTION INTRAVENOUS SEE ADMIN INSTRUCTIONS
Status: CANCELLED | OUTPATIENT
Start: 2023-09-19

## 2023-09-19 RX ORDER — HEPARIN SODIUM 5000 [USP'U]/.5ML
5000 INJECTION, SOLUTION INTRAVENOUS; SUBCUTANEOUS
Status: CANCELLED | OUTPATIENT
Start: 2023-09-19

## 2023-09-19 ASSESSMENT — PAIN SCALES - GENERAL: PAINLEVEL: NO PAIN (0)

## 2023-09-19 NOTE — PATIENT INSTRUCTIONS
Preparation for Surgery:  You will be scheduled for surgery. My  will reach out at her earliest convenience   We will schedule a preoperative visit with PAC.       NO bowel prep needed     DAY OF SURGERY:  Diet:  NO food or Milk products 8 hours prior to ARRIVAL TIME. You can have WATER up until 2 hours prior to ARRIVAL TIME. PAC will give specifics       The surgical procedure is: TOTAL LAPAROSCOPIC HYSTERECTOMY, WITH BILATERAL SALPINGO-OOPHORECTOMY, BILATEAL PELVIC SENTINEL LYMPH NODE, CYSTOSCOPY     Anticipate: .  You will be in the recovery room for approximately 2-4hrs after surgery.        At discharge you will need a someone to drive you home.    Postoperative Restrictions:    No heavy lifting >15 lbs or strenuous activity for six weeks  Nothing in the vagina (no tampons, no intercourse, no douching) for eight weeks.     Postoperative plan  Decreased appetite is normal, plan to eat 6-8 small meal day, drink at least 6-8 glasses of water per day, there are no dietary restrictions. However, it is recommended that you keep your diet light, simple and small. NOTHING: greasy, spicy, things you know give you gas and carbonation until you are passing gas regularly.     Take stool softener daily as directed  for at least 1-2 weeks unless you develop diarrhea.    Activity as tolerated, reminder to balance rest with activity as you will tire easily while recovering. Using stairs is ok. Walk as much as tolerated, increasing your activity every day.     You may shower 48 hours after surgery, your incision site can get wet/soapy, pat dry.      You will be given ibuprofen and tylenol, take on schedule every 6 hours.  Do not set an alarm to wake yourself up to take the pain medications.  Take consistently for a week  then you can decrease/stop as tolerated.  You will also be given a small dose of narcotics, you may take this in addition to the tylenol/ibuprofen as needed if the pain is not manageable.  do not take  this on a schedule.      Ok to resume driving after surgery after you STOP using narcotics AND FEEL SAFE to handle a vehicle     Wound care:  No need to keep your incision covered. However, you may cover your incision sites if there is drainage or clothing is causing irritation otherwise leave open to the air.  No need to put an creams/ointments on incision site.  Wound will be mildly pink and might have a firm ridge underneath this is normal and will resolve in 4-6 weeks    OTHER: Patients are often constipated after general anesthesia and surgery. The patient should continue to take stool softeners (for example, Senokot-S) for the next six weeks and consume adequate amounts of water. If the patient remains constipated or unable to pass stool, please try one or all of the following measures:    1. Milk of Magnesia 30cc twice a day as needed by mouth  2. Metamucil 2 tablespoons in 12 ounces of fluid  3. Dulcolax oral or suppositories  4. Prunes or prune juice  5. Miralax daily    Call clinic if you have fever greater than 100.5, heavy vaginal bleeding, persistent nausea/vomiting, increasing redness, pain, swelling at incision site, drainage with bad odor or other concerns.      You should be feeling better every day.     PLEASE BE AWARE THAT SOME OF THESE INSTRUCTIONS MAY CHANGE DEPENDING ON THE OUTCOME OF YOUR SURGERY    Sugar

## 2023-09-19 NOTE — PROGRESS NOTES
GYNECOLOGIC  ONCOLOGY CONSULT    Referring provider:    Lanny Sebastian MD  [address] [facility] [address]  [place] [address]   RE: Kelly Barnes  : 1940  MARCELO: 2023    CC: Grade 1 endometrioid adenocarcinoma of uterus    HPI: Ms Kelly Barnes is a 83 year old  female who presents for consultation regarding adenocarcinoma of the endometrium.     Kelly presents alone for this clinic visit.  She lives in her own home and her son lives with her and assists her in some of her activities.  Reports some light vaginal discharge slightly pink-tinged over the last few years.  But most recently approximately 2 to 3 weeks ago she had 1 episode of vaginal bleeding and presented to her gynecology clinic and underwent the below biopsy.  Currently denies any vaginal bleeding, pelvic pain, urinary symptoms or GI concerns.    Reports undergoing a procedure for a kidney stone in , subsequently she developed a C. difficile colitis.  Is been recommended that she minimize antibiotics due to risk of her C. difficile colitis.     She is an upcoming trip planned to [place] towards the end of October and if possible she would like to undergo surgery when she returns.    She is active and walks daily 20 to 30 minutes.    Work up to date  6/15/23 HgA1C 7.7   23 Pelvic US  Uterus with endometrial thickness at 21 mm  23 Endometrial Biopsy  Pathology Grade 1 Endometrioid Adenocarcinoma    OBGYN history and Health Maintenance:    Last Mammogram:   negative  Last Colonoscopy:   negative    Review of Systems:  Systemic:No weight changes.    Skin : No skin changes or new lesions.   Eye : No changes in vision.   Pulmonary: No cough or SOB.   Cardiovascular: No CP or palpitations  Gastrointestinal : No diarrhea, constipation, abdominal pain. Bowel habits normal.   Genitourinary: No dysuria, urgency or bleeding  Psychiatric: No depression or anxiety  Hematologic : No palpable lymph nodes.   Endocrine : No  hot flashes. No heat/cold intolerance.      Neurological: No headaches, no numbness.     Past Medical History:   Diagnosis Date    Acute kidney injury (H) 12/19/2020    Allergic rhinitis, cause unspecified     Anemia     Aortic stenosis 02/29/2016    With new murmur noted 2/2016, normal echo, repeat echo 2/2017.    Aortic valve sclerosis     Atypical chest pain 07/24/2015    cuboid     left foot    Fungemia 8/16/2021    History of Clostridium difficile colitis     Hyperlipidemia LDL goal <100 11/01/2012    Hypertension goal BP (blood pressure) < 140/90     Musculoskeletal chest pain 11/25/2016    Obesity, Class I, BMI 30-34.9 11/11/2015    Pneumonia 03/2016    Pyelonephritis     Sepsis, due to unspecified organism, unspecified whether acute organ dysfunction present (H) 12/19/2020    Type 2 diabetes, HbA1c goal < 7% (H)     Urinary tract infection associated with nephrostomy catheter, initial encounter (H) 8/8/2021       Past Surgical History:   Procedure Laterality Date    CATARACT EXTRACTION      CYSTOSCOPY, RETROGRADES, INSERT STENT URETER(S), COMBINED Right 07/15/2021    Procedure: CYSTOURETEROSCOPY, WITH RETROGRADE PYELOGRAM,  AND STENT INSERTION RIGHT;  Surgeon: Kirk Law MD;  [facility]    LASER HOLMIUM NEPHROLITHOTOMY VIA PERCUTANEOUS NEPHROSTOMY Right 08/05/2021    Procedure: NEPHROLITHOTOMY, PERCUTANEOUS, USING HOLMIUM LASER RIGHT;  Surgeon: Kirk Law MD;  [facility]    LASER HOLMIUM NEPHROLITHOTOMY VIA PERCUTANEOUS NEPHROSTOMY Right 08/13/2021    Procedure: Ureteroscopic assisted secondary right percutaneous nephrolihtotomy, Holmium laser lithotriipsy;  Surgeon: Kirk Law MD;  [facility]    PICC SINGLE LUMEN PLACEMENT Left 08/17/2021    41cm (2cm external), Medial brachial vein          Current Outpatient Medications   Medication Sig Dispense Refill    blood glucose (ONETOUCH ULTRA) test strip Use to test blood sugar 2 times daily or as directed. 100 strip 3     blood glucose (ONETOUCH ULTRA) test strip Use to test blood sugar twice daily. Dispense 1 box of 200 test strips, #3 refills. 100 strip 3    blood glucose monitoring (ONE TOUCH ULTRA 2) meter device kit Use to test blood sugar 2 times daily or as directed. 1 kit 0    blood glucose monitoring (ONE TOUCH ULTRA 2) meter device kit Use to test blood sugar 3 times daily 1 kit 0    glimepiride (AMARYL) 4 MG tablet Take 1 tablet (4 mg) by mouth 2 times daily (with meals) 180 tablet 3    losartan-hydrochlorothiazide (HYZAAR) 50-12.5 MG tablet Take 1 tablet by mouth every morning 90 tablet 0    OneTouch Delica Lancets 33G MISC 1 Device by In Vitro route 2 times daily 100 each 11    potassium citrate (UROCIT-K) 10 MEQ (1080 MG) CR tablet Take 1 tablet (10 mEq) by mouth 2 times daily 180 tablet 3    simvastatin (ZOCOR) 20 MG tablet Take 1 tablet (20 mg) by mouth At Bedtime TAKE ONE TABLET BY MOUTH AT BEDTIME 90 tablet 3    sitagliptin (JANUVIA) 25 MG tablet Take 1 tablet (25 mg) by mouth daily 90 tablet 0         Allergies   Allergen Reactions    Ibuprofen Other (See Comments)     numbness in hands and feet    Keflex [Cephalexin] Rash    Metformin Rash       Social History:  Social History     Tobacco Use    Smoking status: Never    Smokeless tobacco: Never   Substance Use Topics    Alcohol use: Yes     Alcohol/week: 10.0 standard drinks of alcohol     Comment: 1-2 drinks per week         Family History:   The patient's family history is notable for   Family History   Problem Relation Age of Onset    Hypertension Mother     Diabetes No family hx of     Cerebrovascular Disease No family hx of     Breast Cancer No family hx of     Cancer - colorectal No family hx of     Prostate Cancer No family hx of     Anesthesia Reaction No family hx of     Bleeding Disorder No family hx of     Clotting Disorder No family hx of          Physical Exam:     BP (!) 148/91 (BP Location: Right arm, Patient Position: Sitting, Cuff Size: Adult  "Regular)   Pulse 98   Temp 98.7  F (37.1  C) (Oral)   Ht 1.585 m (5' 2.4\")   Wt 71.8 kg (158 lb 3.2 oz)   SpO2 97%   BMI 28.56 kg/m    Body mass index is 28.56 kg/m .    General: Alert and oriented, no acute distress  Psych: Mood stable  Pulmonary: Lungs clear . Normal respiratory effort  GI: No distention. No masses. No hernia.   Lymph: No enlarge lymph nodes in neck or groin  : Normal external genitalia. Vagina without lesions, small cervix, mobile uterus, no adnexal mass.      Assessment: Kelly Barnes is a 83 year old woman with a new diagnosis of grade 1 endometrioid adenocarcinoma.     Medical history is significant for diabetes last hemoglobin A1c at 7.7 chronic hypertension under control.    Kelly is 83 but lives independently with her son and walks 20 to 30 minutes daily ECOG performance status of 0-1          Plan:     1.)    This visit I discussed treatment options including alternative to surgery.  She overall feels well and will proceed with more definitive treatment.  She is eligible for minimally invasive surgery and the best approach would be laparoscopy with total hysterectomy, bilateral salpingo-oophorectomy and bilateral pelvic sentinel lymph nodes.  Approach to surgery risks and benefits were reviewed.  Because of her prior C. difficile infection will minimize exposure to antibiotics and only provide her 1 dose of the cephalosporin for pre-operative antibiotics.      Surgical consent obtained.     Diabetes with moderate control she will follow-up with her primary care and is aware the importance of keeping her glucose under tight control during perioperative time.    Hypertension with no associated symptoms of chest pain.     She will complete a preoperative assessment and prefers to do that with our PAC clinic.  Counseled to hold her aspirin 7 to 10 days prior to surgery     2.) Genetic risk factors were assessed: MMR intact, not indicated    3.) Labs and/or tests ordered include:  CT " scan chest, abdomen and pelvis to r/o metastatic disease.         Kaykay Burden M.D., MPH,  F.A.C.O.G.  Department of Obstetrics, Gynecology and Women's Health  Division of Gynecologic Oncology      Time: total time spent today, 60 minutes , including preparation, review of outside records, face to face counseling, and documentation.

## 2023-09-19 NOTE — LETTER
2023         RE: Kelly Barnes  [address]  [facility] [address]        Dear Colleague,    Thank you for referring your patient, Kelly Barnes, to the [facility]. Please see a copy of my visit note below.    GYNECOLOGIC  ONCOLOGY CONSULT    Referring provider:    Lanny Sebastian MD  [address]  [place] [address]   RE: Kelly Barnes  : 1940  MARCELO: 2023    CC: Grade 1 endometrioid adenocarcinoma of uterus    HPI: Ms Kelly Barnes is a 83 year old  female who presents for consultation regarding adenocarcinoma of the endometrium.     Kelly presents alone for this clinic visit.  She lives in her own home and her son lives with her and assists her in some of her activities.  Reports some light vaginal discharge slightly pink-tinged over the last few years.  But most recently approximately 2 to 3 weeks ago she had 1 episode of vaginal bleeding and presented to her gynecology clinic and underwent the below biopsy.  Currently denies any vaginal bleeding, pelvic pain, urinary symptoms or GI concerns.    Reports undergoing a procedure for a kidney stone in , subsequently she developed a C. difficile colitis.  Is been recommended that she minimize antibiotics due to risk of her C. difficile colitis.     She is an upcoming trip planned to [place] towards the end of October and if possible she would like to undergo surgery when she returns.    She is active and walks daily 20 to 30 minutes.    Work up to date  6/15/23 HgA1C 7.7   23 Pelvic US  Uterus with endometrial thickness at 21 mm  23 Endometrial Biopsy  Pathology Grade 1 Endometrioid Adenocarcinoma    OBGYN history and Health Maintenance:    Last Mammogram:   negative  Last Colonoscopy:   negative    Review of Systems:  Systemic:No weight changes.    Skin : No skin changes or new lesions.   Eye : No changes in vision.   Pulmonary: No cough or SOB.    Cardiovascular: No CP or palpitations  Gastrointestinal : No diarrhea, constipation, abdominal pain. Bowel habits normal.   Genitourinary: No dysuria, urgency or bleeding  Psychiatric: No depression or anxiety  Hematologic : No palpable lymph nodes.   Endocrine : No hot flashes. No heat/cold intolerance.      Neurological: No headaches, no numbness.     Past Medical History:   Diagnosis Date    Acute kidney injury (H) 12/19/2020    Allergic rhinitis, cause unspecified     Anemia     Aortic stenosis 02/29/2016    With new murmur noted 2/2016, normal echo, repeat echo 2/2017.    Aortic valve sclerosis     Atypical chest pain 07/24/2015    cuboid     left foot    Fungemia 8/16/2021    History of Clostridium difficile colitis     Hyperlipidemia LDL goal <100 11/01/2012    Hypertension goal BP (blood pressure) < 140/90     Musculoskeletal chest pain 11/25/2016    Obesity, Class I, BMI 30-34.9 11/11/2015    Pneumonia 03/2016    Pyelonephritis     Sepsis, due to unspecified organism, unspecified whether acute organ dysfunction present (H) 12/19/2020    Type 2 diabetes, HbA1c goal < 7% (H)     Urinary tract infection associated with nephrostomy catheter, initial encounter (H) 8/8/2021       Past Surgical History:   Procedure Laterality Date    CATARACT EXTRACTION      CYSTOSCOPY, RETROGRADES, INSERT STENT URETER(S), COMBINED Right 07/15/2021    Procedure: CYSTOURETEROSCOPY, WITH RETROGRADE PYELOGRAM,  AND STENT INSERTION RIGHT;  Surgeon: Kirk Law MD;  [facility]    LASER HOLMIUM NEPHROLITHOTOMY VIA PERCUTANEOUS NEPHROSTOMY Right 08/05/2021    Procedure: NEPHROLITHOTOMY, PERCUTANEOUS, USING HOLMIUM LASER RIGHT;  Surgeon: Kirk Law MD;  [facility]    LASER HOLMIUM NEPHROLITHOTOMY VIA PERCUTANEOUS NEPHROSTOMY Right 08/13/2021    Procedure: Ureteroscopic assisted secondary right percutaneous nephrolihtotomy, Holmium laser lithotriipsy;  Surgeon: Kirk Law MD;  [facility]     PICC SINGLE LUMEN PLACEMENT Left 08/17/2021    41cm (2cm external), Medial brachial vein          Current Outpatient Medications   Medication Sig Dispense Refill    blood glucose (ONETOUCH ULTRA) test strip Use to test blood sugar 2 times daily or as directed. 100 strip 3    blood glucose (ONETOUCH ULTRA) test strip Use to test blood sugar twice daily. Dispense 1 box of 200 test strips, #3 refills. 100 strip 3    blood glucose monitoring (ONE TOUCH ULTRA 2) meter device kit Use to test blood sugar 2 times daily or as directed. 1 kit 0    blood glucose monitoring (ONE TOUCH ULTRA 2) meter device kit Use to test blood sugar 3 times daily 1 kit 0    glimepiride (AMARYL) 4 MG tablet Take 1 tablet (4 mg) by mouth 2 times daily (with meals) 180 tablet 3    losartan-hydrochlorothiazide (HYZAAR) 50-12.5 MG tablet Take 1 tablet by mouth every morning 90 tablet 0    OneTouch Delica Lancets 33G MISC 1 Device by In Vitro route 2 times daily 100 each 11    potassium citrate (UROCIT-K) 10 MEQ (1080 MG) CR tablet Take 1 tablet (10 mEq) by mouth 2 times daily 180 tablet 3    simvastatin (ZOCOR) 20 MG tablet Take 1 tablet (20 mg) by mouth At Bedtime TAKE ONE TABLET BY MOUTH AT BEDTIME 90 tablet 3    sitagliptin (JANUVIA) 25 MG tablet Take 1 tablet (25 mg) by mouth daily 90 tablet 0         Allergies   Allergen Reactions    Ibuprofen Other (See Comments)     numbness in hands and feet    Keflex [Cephalexin] Rash    Metformin Rash       Social History:  Social History     Tobacco Use    Smoking status: Never    Smokeless tobacco: Never   Substance Use Topics    Alcohol use: Yes     Alcohol/week: 10.0 standard drinks of alcohol     Comment: 1-2 drinks per week         Family History:   The patient's family history is notable for   Family History   Problem Relation Age of Onset    Hypertension Mother     Diabetes No family hx of     Cerebrovascular Disease No family hx of     Breast Cancer No family hx of     Cancer - colorectal No  "family hx of     Prostate Cancer No family hx of     Anesthesia Reaction No family hx of     Bleeding Disorder No family hx of     Clotting Disorder No family hx of          Physical Exam:     BP (!) 148/91 (BP Location: Right arm, Patient Position: Sitting, Cuff Size: Adult Regular)   Pulse 98   Temp 98.7  F (37.1  C) (Oral)   Ht 1.585 m (5' 2.4\")   Wt 71.8 kg (158 lb 3.2 oz)   SpO2 97%   BMI 28.56 kg/m    Body mass index is 28.56 kg/m .    General: Alert and oriented, no acute distress  Psych: Mood stable  Pulmonary: Lungs clear . Normal respiratory effort  GI: No distention. No masses. No hernia.   Lymph: No enlarge lymph nodes in neck or groin  : Normal external genitalia. Vagina without lesions, small cervix, mobile uterus, no adnexal mass.      Assessment: Kelly Barnes is a 83 year old woman with a new diagnosis of grade 1 endometrioid adenocarcinoma.     Medical history is significant for diabetes last hemoglobin A1c at 7.7 chronic hypertension under control.    Kelly is 83 but lives independently with her son and walks 20 to 30 minutes daily ECOG performance status of 0-1          Plan:     1.)    This visit I discussed treatment options including alternative to surgery.  She overall feels well and will proceed with more definitive treatment.  She is eligible for minimally invasive surgery and the best approach would be laparoscopy with total hysterectomy, bilateral salpingo-oophorectomy and bilateral pelvic sentinel lymph nodes.  Approach to surgery risks and benefits were reviewed.  Because of her prior C. difficile infection will minimize exposure to antibiotics and only provide her 1 dose of the cephalosporin for pre-operative antibiotics.      Surgical consent obtained.     Diabetes with moderate control she will follow-up with her primary care and is aware the importance of keeping her glucose under tight control during perioperative time.    Hypertension with no associated symptoms of chest " pain.     She will complete a preoperative assessment and prefers to do that with our PAC clinic.  Counseled to hold her aspirin 7 to 10 days prior to surgery     2.) Genetic risk factors were assessed: MMR intact, not indicated    3.) Labs and/or tests ordered include:  CT scan chest, abdomen and pelvis to r/o metastatic disease.         Kaykay Burden M.D., MPH,  F.A.C.O.G.  Department of Obstetrics, Gynecology and Women's Health  Division of Gynecologic Oncology      Time: total time spent today, 60 minutes , including preparation, review of outside records, face to face counseling, and documentation.       Again, thank you for allowing me to participate in the care of your patient.        Sincerely,        Kaykay Burden MD

## 2023-09-19 NOTE — TELEPHONE ENCOUNTER
Left voicemail for patient regarding scheduling surgery with Dr. Burden.  Provided contact number to discuss.  [phone]    Kailyn Kessler, on 9/19/2023 at 3:39 PM

## 2023-09-19 NOTE — NURSING NOTE
"Oncology Rooming Note    September 19, 2023 9:53 AM   Kelly Barnes is a 83 year old female who presents for:    Chief Complaint   Patient presents with    Oncology Clinic Visit     Endometrial cancer     Initial Vitals: BP (!) 148/91 (BP Location: Right arm, Patient Position: Sitting, Cuff Size: Adult Regular)   Pulse 98   Temp 98.7  F (37.1  C) (Oral)   Ht 1.585 m (5' 2.4\")   Wt 71.8 kg (158 lb 3.2 oz)   SpO2 97%   BMI 28.56 kg/m   Estimated body mass index is 28.56 kg/m  as calculated from the following:    Height as of this encounter: 1.585 m (5' 2.4\").    Weight as of this encounter: 71.8 kg (158 lb 3.2 oz). Body surface area is 1.78 meters squared.  No Pain (0) Comment: Data Unavailable   No LMP recorded. Patient is postmenopausal.  Allergies reviewed: Yes  Medications reviewed: Yes    Medications: Medication refills not needed today.  Pharmacy name entered into [facility]:    [facility] [place], [place] - [address] [facility] [facility] - [place], [place] - [address]  WRITTEN PRESCRIPTION REQUESTED  [facility] [place], [place] - [address] [facility] [phone]    Clinical concerns: none.       Candido Gallegos"

## 2023-09-19 NOTE — NURSING NOTE
Hysterectomy form completed and scanned into urgent HIM     Surgical education folder and Hibiclens given     Full surgery education will be completed via telephone closer to surgery     Suzy Sherman RN

## 2023-09-20 NOTE — TELEPHONE ENCOUNTER
Patient is schedule for surgery with: Dr. Burden     Surgery Date: 10/30/23     Location: [facility]    H&P: to be completed by PAC clinic on 10/3/23  CT to be completed on: 10/3/23     Post-op:  11/16/23, in person visit    Patient will receive surgery arrival and start time from PAC.    Patient aware times are subject to change up until day before surgery.     Patient questions/concerns: N/A     Surgery packet was provided to patient during appointment      Kailyn Kessler on 9/20/2023 at 1:47 PM

## 2023-09-20 NOTE — TELEPHONE ENCOUNTER
FUTURE VISIT INFORMATION      SURGERY INFORMATION:  Date: 10/30/23  [facility]  Surgeon:  Kaykay Burden MD   Anesthesia Type:  General  Procedure: TOTAL LAPAROSCOPIC HYSTERECTOMY, WITH BILATERAL SALPINGO-OOPHORECTOMY, BILATEAL PELVIC SENTINEL LYMPH NODE CYSTOSCOPY   Consult: ov 23    RECORDS REQUESTED FROM:       Primary Care Provider: Lea Lopez MD - [facility]    Pertinent Medical History: hypertension    Most recent EKG+ Tracin23    Most recent ECHO: 21

## 2023-09-22 LAB
INTERPRETATION: NORMAL
SIGNIFICANT RESULTS: NORMAL
SPECIMEN DESCRIPTION: NORMAL
TEST DETAILS, MDL: NORMAL

## 2023-09-25 PROCEDURE — G0452 MOLECULAR PATHOLOGY INTERPR: HCPCS | Mod: 26 | Performed by: PATHOLOGY

## 2023-09-25 PROCEDURE — 81479 UNLISTED MOLECULAR PATHOLOGY: CPT | Performed by: OBSTETRICS & GYNECOLOGY

## 2023-09-26 ENCOUNTER — IMMUNIZATION (OUTPATIENT)
Dept: FAMILY MEDICINE | Facility: CLINIC | Age: 83
End: 2023-09-26
Payer: COMMERCIAL

## 2023-09-26 PROCEDURE — 90662 IIV NO PRSV INCREASED AG IM: CPT

## 2023-09-26 PROCEDURE — G0008 ADMIN INFLUENZA VIRUS VAC: HCPCS

## 2023-10-02 LAB
ABO/RH(D): NORMAL
ANTIBODY SCREEN: NEGATIVE
SPECIMEN EXPIRATION DATE: NORMAL

## 2023-10-03 ENCOUNTER — LAB (OUTPATIENT)
Dept: LAB | Facility: CLINIC | Age: 83
End: 2023-10-03
Payer: COMMERCIAL

## 2023-10-03 ENCOUNTER — OFFICE VISIT (OUTPATIENT)
Dept: SURGERY | Facility: CLINIC | Age: 83
End: 2023-10-03
Payer: COMMERCIAL

## 2023-10-03 ENCOUNTER — PRE VISIT (OUTPATIENT)
Dept: SURGERY | Facility: CLINIC | Age: 83
End: 2023-10-03

## 2023-10-03 ENCOUNTER — ANESTHESIA EVENT (OUTPATIENT)
Dept: SURGERY | Facility: CLINIC | Age: 83
End: 2023-10-03
Payer: COMMERCIAL

## 2023-10-03 ENCOUNTER — ANCILLARY PROCEDURE (OUTPATIENT)
Dept: ULTRASOUND IMAGING | Facility: CLINIC | Age: 83
End: 2023-10-03
Attending: NURSE PRACTITIONER
Payer: COMMERCIAL

## 2023-10-03 VITALS
SYSTOLIC BLOOD PRESSURE: 148 MMHG | RESPIRATION RATE: 16 BRPM | OXYGEN SATURATION: 97 % | DIASTOLIC BLOOD PRESSURE: 80 MMHG | TEMPERATURE: 98.5 F | HEIGHT: 62 IN | WEIGHT: 159 LBS | HEART RATE: 108 BPM | BODY MASS INDEX: 29.26 KG/M2

## 2023-10-03 DIAGNOSIS — C54.1 ENDOMETRIAL CANCER (H): Primary | ICD-10-CM

## 2023-10-03 DIAGNOSIS — Z01.818 PRE-OP EVALUATION: ICD-10-CM

## 2023-10-03 DIAGNOSIS — N20.0 URIC ACID KIDNEY STONE: ICD-10-CM

## 2023-10-03 DIAGNOSIS — Z01.818 PRE-OP EVALUATION: Primary | ICD-10-CM

## 2023-10-03 LAB
ANION GAP SERPL CALCULATED.3IONS-SCNC: 13 MMOL/L (ref 7–15)
BUN SERPL-MCNC: 28.9 MG/DL (ref 8–23)
CALCIUM SERPL-MCNC: 9.5 MG/DL (ref 8.8–10.2)
CHLORIDE SERPL-SCNC: 99 MMOL/L (ref 98–107)
CREAT SERPL-MCNC: 1.04 MG/DL (ref 0.51–0.95)
DEPRECATED HCO3 PLAS-SCNC: 26 MMOL/L (ref 22–29)
EGFRCR SERPLBLD CKD-EPI 2021: 53 ML/MIN/1.73M2
ERYTHROCYTE [DISTWIDTH] IN BLOOD BY AUTOMATED COUNT: 12.6 % (ref 10–15)
GLUCOSE SERPL-MCNC: 211 MG/DL (ref 70–99)
HCT VFR BLD AUTO: 42.7 % (ref 35–47)
HGB BLD-MCNC: 14.6 G/DL (ref 11.7–15.7)
MCH RBC QN AUTO: 28.3 PG (ref 26.5–33)
MCHC RBC AUTO-ENTMCNC: 34.2 G/DL (ref 31.5–36.5)
MCV RBC AUTO: 83 FL (ref 78–100)
PLATELET # BLD AUTO: 294 10E3/UL (ref 150–450)
POTASSIUM SERPL-SCNC: 3.8 MMOL/L (ref 3.4–5.3)
RBC # BLD AUTO: 5.16 10E6/UL (ref 3.8–5.2)
SODIUM SERPL-SCNC: 138 MMOL/L (ref 135–145)
WBC # BLD AUTO: 11.1 10E3/UL (ref 4–11)

## 2023-10-03 PROCEDURE — 76770 US EXAM ABDO BACK WALL COMP: CPT | Performed by: RADIOLOGY

## 2023-10-03 PROCEDURE — 86900 BLOOD TYPING SEROLOGIC ABO: CPT | Performed by: NURSE PRACTITIONER

## 2023-10-03 PROCEDURE — 36415 COLL VENOUS BLD VENIPUNCTURE: CPT | Performed by: PATHOLOGY

## 2023-10-03 PROCEDURE — 80048 BASIC METABOLIC PNL TOTAL CA: CPT | Performed by: PATHOLOGY

## 2023-10-03 PROCEDURE — 99215 OFFICE O/P EST HI 40 MIN: CPT | Performed by: NURSE PRACTITIONER

## 2023-10-03 PROCEDURE — 85027 COMPLETE CBC AUTOMATED: CPT | Performed by: PATHOLOGY

## 2023-10-03 ASSESSMENT — ENCOUNTER SYMPTOMS: ORTHOPNEA: 0

## 2023-10-03 ASSESSMENT — PAIN SCALES - GENERAL: PAINLEVEL: NO PAIN (0)

## 2023-10-03 NOTE — H&P
Pre-Operative H & P     CC:  Preoperative exam to assess for increased cardiopulmonary risk while undergoing surgery and anesthesia.    Date of Encounter: 10/3/2023  Primary Care Physician:  Lea Lopez     Reason for visit:   Encounter Diagnosis   Name Primary?    Pre-op evaluation Yes       HPI  Kelly Barnes is a 83 year old female who presents for pre-operative H & P in preparation for  Procedure Information       Case: [id number] Date/Time: 10/30/23 0730    Procedures:       TOTAL LAPAROSCOPIC HYSTERECTOMY, WITH BILATERAL SALPINGO-OOPHORECTOMY, BILATEAL PELVIC SENTINEL LYMPH NODE (Abdomen)      CYSTOSCOPY (Urethra)    Anesthesia type: General    Diagnosis: Endometrial cancer (H) [C54.1]    Pre-op diagnosis: Endometrial cancer (H) [C54.1]    Location:  OR  /  OR    Providers: Kaykay Burden MD            The patient is an 83-year-old woman who has a past medical history significant for HTN, HLD, anemia, DM2, GERSON, pyelonephritis and fungemia, 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  She consulted with Dr. Burden regarding and presents today in preparation for the above recommended procedures. She is active and walks daily 20 to 30 minutes.      History is obtained from the patient and chart review    Hx of abnormal bleeding or anti-platelet use: no    Menstrual history: No LMP recorded. Patient is postmenopausal.:      Past Medical History  Past Medical History:   Diagnosis Date    Acute kidney injury (H24) 12/19/2020    Allergic rhinitis, cause unspecified     Anemia     Aortic stenosis 02/29/2016    With new murmur noted 2/2016, normal echo, repeat echo 2/2017.    Aortic valve sclerosis     Atypical chest pain 07/24/2015    cuboid     left foot    Fungemia 8/16/2021    History of Clostridium  difficile colitis     Hyperlipidemia LDL goal <100 11/01/2012    Hypertension goal BP (blood pressure) < 140/90     Musculoskeletal chest pain 11/25/2016    Obesity, Class I, BMI 30-34.9 11/11/2015    Pneumonia 03/2016    Pyelonephritis     Sepsis, due to unspecified organism, unspecified whether acute organ dysfunction present (H) 12/19/2020    Type 2 diabetes, HbA1c goal < 7% (H)     Urinary tract infection associated with nephrostomy catheter, initial encounter  8/8/2021       Past Surgical History  Past Surgical History:   Procedure Laterality Date    CATARACT EXTRACTION      CYSTOSCOPY, RETROGRADES, INSERT STENT URETER(S), COMBINED Right 07/15/2021    Procedure: CYSTOURETEROSCOPY, WITH RETROGRADE PYELOGRAM,  AND STENT INSERTION RIGHT;  Surgeon: Kirk Law MD;  Location: [facility] OR    LASER HOLMIUM NEPHROLITHOTOMY VIA PERCUTANEOUS NEPHROSTOMY Right 08/05/2021    Procedure: NEPHROLITHOTOMY, PERCUTANEOUS, USING HOLMIUM LASER RIGHT;  Surgeon: Kirk Law MD;  Location: [facility] OR    LASER HOLMIUM NEPHROLITHOTOMY VIA PERCUTANEOUS NEPHROSTOMY Right 08/13/2021    Procedure: Ureteroscopic assisted secondary right percutaneous nephrolihtotomy, Holmium laser lithotriipsy;  Surgeon: Kirk Law MD;  Location: [facility] OR    PICC SINGLE LUMEN PLACEMENT Left 08/17/2021    41cm (2cm external), Medial brachial vein       Prior to Admission Medications  Current Outpatient Medications   Medication Sig Dispense Refill    glimepiride (AMARYL) 4 MG tablet Take 1 tablet (4 mg) by mouth 2 times daily (with meals) 180 tablet 3    losartan-hydrochlorothiazide (HYZAAR) 50-12.5 MG tablet Take 1 tablet by mouth every morning 90 tablet 0    potassium citrate (UROCIT-K) 10 MEQ (1080 MG) CR tablet Take 1 tablet (10 mEq) by mouth 2 times daily (Patient taking differently: Take 10 mEq by mouth every morning) 180 tablet 3    simvastatin (ZOCOR) 20 MG tablet Take 1 tablet (20 mg) by mouth At Bedtime TAKE ONE TABLET BY MOUTH AT  BEDTIME 90 tablet 3    sitagliptin (JANUVIA) 25 MG tablet Take 1 tablet (25 mg) by mouth daily (Patient taking differently: Take 25 mg by mouth every evening) 90 tablet 0    blood glucose (ONETOUCH ULTRA) test strip Use to test blood sugar 2 times daily or as directed. 100 strip 3    blood glucose (ONETOUCH ULTRA) test strip Use to test blood sugar twice daily. Dispense 1 box of 200 test strips, #3 refills. 100 strip 3    blood glucose monitoring (ONE TOUCH ULTRA 2) meter device kit Use to test blood sugar 2 times daily or as directed. 1 kit 0    blood glucose monitoring (ONE TOUCH ULTRA 2) meter device kit Use to test blood sugar 3 times daily 1 kit 0    OneTouch Delica Lancets 33G MISC 1 Device by In Vitro route 2 times daily 100 each 11       Allergies  Allergies   Allergen Reactions    Ibuprofen Other (See Comments)     numbness in hands and feet    Keflex [Cephalexin] Rash    Metformin Rash       Social History  Social History     Socioeconomic History    Marital status:      Spouse name: Not on file    Number of children: Not on file    Years of education: Not on file    Highest education level: Not on file   Occupational History    Not on file   Tobacco Use    Smoking status: Never    Smokeless tobacco: Never   Vaping Use    Vaping Use: Never used   Substance and Sexual Activity    Alcohol use: Yes     Alcohol/week: 10.0 standard drinks of alcohol     Comment: 1-2 drinks per week    Drug use: No    Sexual activity: Yes     Partners: Male   Other Topics Concern     Service No    Blood Transfusions No    Caffeine Concern No    Occupational Exposure No    Hobby Hazards No    Sleep Concern Yes    Stress Concern No    Weight Concern Yes    Special Diet No    Back Care No    Exercise Yes     Comment: walk    Bike Helmet No    Seat Belt Yes    Self-Exams Yes    Parent/sibling w/ CABG, MI or angioplasty before 65F 55M? No   Social History Narrative    Not on file     Social Determinants of Health      Financial Resource Strain: Not on file   Food Insecurity: Not on file   Transportation Needs: Not on file   Physical Activity: Not on file   Stress: Not on file   Social Connections: Not on file   Interpersonal Safety: Not on file   Housing Stability: Not on file       Family History  Family History   Problem Relation Age of Onset    Hypertension Mother     Diabetes No family hx of     Cerebrovascular Disease No family hx of     Breast Cancer No family hx of     Cancer - colorectal No family hx of     Prostate Cancer No family hx of     Anesthesia Reaction No family hx of     Bleeding Disorder No family hx of     Clotting Disorder No family hx of        Review of Systems  The complete review of systems is negative other than noted in the HPI or here.   Anesthesia Evaluation   Pt has had prior anesthetic. Type: General and MAC.    No history of anesthetic complications       ROS/MED HX  ENT/Pulmonary:     (+)     SHANNON risk factors,  hypertension,    allergic rhinitis,                         (-) asthma and recent URI   Neurologic:  - neg neurologic ROS     Cardiovascular:     (+) Dyslipidemia hypertension- -   -  - -                           valvular problems/murmurs type: AS     Previous cardiac testing   Echo: Date: 2021 Results:    Stress Test:  Date: Results:    ECG Reviewed:  Date: Results:    Cath:  Date: Results:   (-) MARTINEZ, orthopnea/PND and irregular heartbeat/palpitations   METS/Exercise Tolerance: 4 - Raking leaves, gardening    Hematologic:     (+)      anemia,          Musculoskeletal:  - neg musculoskeletal ROS     GI/Hepatic:  - neg GI/hepatic ROS     Renal/Genitourinary: Comment: CKD 3  Pyelonephritis   GERSON (2021) secondary to dehydration        (+) renal disease, type: CRI,            Endo:     (+)  type II DM, Last HgA1c: 6/15/2023, date: 7.7, Not using insulin,  Normal glucose range: 150-175,        Obesity,       Psychiatric/Substance Use:  - neg psychiatric ROS     Infectious Disease: Comment:  "Fungemia  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       Malignancy:   (+) Malignancy, History of Other.Other CA Endometrial Active status post.    Other:            BP (!) 148/80 (BP Location: Right arm, Patient Position: Sitting, Cuff Size: Adult Regular)   Pulse 108   Temp 98.5  F (36.9  C) (Oral)   Resp 16   Ht 1.585 m (5' 2.4\")   Wt 72.1 kg (159 lb)   SpO2 97%   Breastfeeding No   BMI 28.71 kg/m      Physical Exam   Constitutional: Awake, alert, cooperative, no apparent distress, and appears stated age.  Eyes: Pupils equal, round and reactive to light, extra ocular muscles intact, sclera clear, conjunctiva normal.  HENT: Normocephalic, oral pharynx with moist mucus membranes, good dentition. No goiter appreciated.   Respiratory: Clear to auscultation bilaterally, no crackles or wheezing.  Cardiovascular: Regular rate and rhythm, normal S1 and S2, and no murmur noted.  Carotids +2, no bruits. No edema. Palpable pulses to radial  DP and PT arteries.   GI: Normal bowel sounds, soft, non-distended, non-tender, no masses palpated, no hepatosplenomegaly.    Lymph/Hematologic: No cervical lymphadenopathy and no supraclavicular lymphadenopathy.  Genitourinary:  deferred  Skin: Warm and dry.  No rashes at anticipated surgical site.   Musculoskeletal: Full ROM of neck. There is no redness, warmth, or swelling of the joints. Gross motor strength is normal.    Neurologic: Awake, alert, oriented to name, place and time. Cranial nerves II-XII are grossly intact. Gait is normal.   Neuropsychiatric: Calm, cooperative. Normal affect.     Prior Labs/Diagnostic Studies   All labs and imaging personally reviewed     9/6/23 Pelvic US   Uterus with endometrial thickness at 21 mm     9/7/23 Endometrial Biopsy   Pathology Grade 1 Endometrioid Adenocarcinoma       EKG/ stress test - if available please see in ROS above   8/8/2021  Echo result w/o MOPS: Interpretation SummaryNo vegetation visualized, " however inferctive endocarditis cannot be excluded.Left ventricular function is normal.The ejection fraction is 60-65%. Grade IIor moderate diastolic dysfunction.Right ventricular function, chamber size, wall motion, and thickness arenormal.Estimated RA pressure of 3 mmHg. Compared to prior study on 5/3/2017 no significant change.       The patient's records and results personally reviewed by this provider.     Outside records reviewed from: Care Everywhere    LAB/DIAGNOSTIC STUDIES TODAY:    Last Comprehensive Metabolic Panel:  Lab Results   Component Value Date     10/03/2023    POTASSIUM 3.8 10/03/2023    CHLORIDE 99 10/03/2023    CO2 26 10/03/2023    ANIONGAP 13 10/03/2023     (H) 10/03/2023    BUN 28.9 (H) 10/03/2023    CR 1.04 (H) 10/03/2023    GFRESTIMATED 53 (L) 10/03/2023    JAN 9.5 10/03/2023       Lab Results   Component Value Date    WBC 11.1 10/03/2023    WBC 9.2 12/21/2020     Lab Results   Component Value Date    RBC 5.16 10/03/2023    RBC 4.15 12/21/2020     Lab Results   Component Value Date    HGB 14.6 10/03/2023    HGB 11.3 12/21/2020     Lab Results   Component Value Date    HCT 42.7 10/03/2023    HCT 34.8 12/21/2020     No components found for: MCT  Lab Results   Component Value Date    MCV 83 10/03/2023    MCV 84 12/21/2020     Lab Results   Component Value Date    MCH 28.3 10/03/2023    MCH 27.2 12/21/2020     Lab Results   Component Value Date    MCHC 34.2 10/03/2023    MCHC 32.5 12/21/2020     Lab Results   Component Value Date    RDW 12.6 10/03/2023    RDW 13.3 12/21/2020     Lab Results   Component Value Date     10/03/2023     12/21/2020           Assessment    Kelly Barnes is a 83 year old female seen as a PAC referral for risk assessment and optimization for anesthesia.    Plan/Recommendations  Pt will be optimized for the proposed procedure.  See below for details on the assessment, risk, and preoperative recommendations    NEUROLOGY  - No history of TIA,  "CVA or seizure  -Post Op delirium risk factors:  Age    ENT  - No current airway concerns.  Will need to be reassessed day of surgery.  Mallampati: I  TM: > 3    CARDIAC  - No history of CAD and Afib  No anginal symptoms, Denies palpitations, PND, dizziness or syncope.   - Hypertension  Well controlled on atenolol and (losartan will hold DOS)  - Hyperlipidemia- on statins  Well controlled on home regimen  - METS (Metabolic Equivalents) gardens and walks daily  Patient performs 4 or more METS exercise without symptoms            Total Score: 0      RCRI-Very low risk: Class 1 0.4% complication rate            Total Score: 0        PULMONARY  Never smoked, denies SOB or any other pulmonary history.     SHANNON Low Risk            Total Score: 2    SHANNON: Hypertension    SHANNON: Over 50 ys old      - Denies asthma or inhaler use  - Tobacco History    History   Smoking Status    Never   Smokeless Tobacco    Never       GI    PONV High Risk  Total Score: 3           1 AN PONV: Pt is Female    1 AN PONV: Patient is not a current smoker    1 AN PONV: Intended Post Op Opioids        /RENAL  -CKD 3  Pyelonephritis   GERSON (2021) secondary to dehydration  - Baseline Creatinine  1.04- 1.07      ENDOCRINE    - BMI: Estimated body mass index is 28.71 kg/m  as calculated from the following:    Height as of this encounter: 1.585 m (5' 2.4\").    Weight as of this encounter: 72.1 kg (159 lb).  Overweight (BMI 25.0-29.9)  - Diabetes  Hemoglobin A1C (%)   Date Value   06/15/2023 7.7 (H)   05/11/2021 7.3 (H)     Diabetes Mellitus, Type 2, non-insulin dependent.  Hold morning oral hypoglycemic medications. Recommend close monitoring of the patient's blood glucose levels throughout the perioperative period.    HEME/ONC  VTE Medium Risk 1.8%            Total Score: 6    VTE: Greater than 59 yrs old    VTE: Current cancer      - No history of abnormal bleeding or antiplatelet use.  -Grade 1 Endometrioid Adenocarcinoma - Procedure scheduled as " "above.       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     Different anesthesia methods/types have been discussed with the patient, but they are aware that the final plan will be decided by the assigned anesthesia provider on the date of service.    Per Dr. Burden's consult note 9/19/2023   \"Because of her prior C. difficile infection will minimize exposure to antibiotics and only provide her 1 dose of the cephalosporin for pre-operative antibiotics.\"     The patient is optimized for their procedure. AVS with information on surgery time/arrival time, meds and NPO status given by nursing staff. No further diagnostic testing indicated.      On the day of service:     Prep time: 15 minutes  Visit time: 20 minutes  Documentation time: 10 minutes  ------------------------------------------  Total time: 45 minutes      APRIL Jacob CNP  Preoperative Assessment Center  [facility]  Phone: [phone]  Fax: [phone]    "

## 2023-10-03 NOTE — PATIENT INSTRUCTIONS
Preparing for Your Surgery      Name:  Kelly Barnes   MRN:  [id number]   :  1940   Today's Date:  10/3/2023       Arriving for surgery:  Surgery date:  10/30/23  Arrival time:  5:30 am  Surgery time: 7:30 am    Please come to:     Please come to:      [facility] [address]  [address] [place]  [address]      The [facility] Patient /Visitor Ramp is located at [address]. Patients and visitors who self-park will receive the reduced hospital parking rate. If the Patient /Visitor Ramp is full, please follow the signs to the  parking located at the main hospital entrance.     parking is available ( 24 hours/ 7 days a week)    Discounted parking pass options are available for patients and visitors. They can be purchased at the [employer] desk at the main hospital entrance.    -    Stop at the security desk and they will direct surgery patients to the 3rd floor Surgery Waiting Room. [phone]     -  If you are in need of directions, wheelchair or escort please stop at the Information/security desk in the lobby.       What can I eat or drink?  -  You may eat and drink normally up to 8 hours prior to arrival time. (Until 9:30 pm on 10/29/23)  -  You may have clear liquids until 2 hours prior to arrival time. (Until 3:30 am on 10/30/23)    Examples of clear liquids:  Water  Clear broth  Juices (apple, white grape, white cranberry  and cider) without pulp  Noncarbonated, powder based beverages  (lemonade and Mariano-Aid)  Sodas (Sprite, 7-Up, ginger ale and seltzer)  Coffee or tea (without milk or cream)  Gatorade    -  No Alcohol or cannabis products for at least 24 hours before surgery.     Which medicines can I take?    Hold Aspirin for 7 days before surgery.   Hold Multivitamins for 7 days before surgery.  Hold Supplements for 7 days before surgery.  Hold Ibuprofen (Advil, Motrin) for 1 day(s) before  surgery--unless otherwise directed by surgeon.  Hold Naproxen (Aleve) for 4 days before surgery.    -  DO NOT take these medications the day of surgery:  Glimepiride (Amaryl)  Losartan-Hydrochlorothiazide (Hyzaar) - this should not be taken within 12 hours before surgery  Potassium  Sitagliptan (Januvia)    -  PLEASE TAKE these medications per your usual routine:  Simvastatin (Zocor)    How do I prepare myself?  - Please take 2 showers (one the night prior to surgery and one the morning of surgery) using Scrubcare or Hibiclens soap.    Use this soap only from the neck to your toes.     Leave the soap on your skin for one minute--then rinse thoroughly.      You may use your own shampoo and conditioner. No other hair products.   - Please remove all jewelry and body piercings.  - No lotions, deodorants or fragrance.  - No makeup or fingernail polish.   - Bring your ID and insurance card.    -If you have a Deep Brain Stimulator, Spinal Cord Stimulator, or any Neuro Stimulator device---you must bring the remote control to the hospital.      ALL PATIENTS GOING HOME THE SAME DAY OF SURGERY ARE REQUIRED TO HAVE A RESPONSIBLE ADULT TO DRIVE AND BE IN ATTENDANCE WITH THEM FOR 24 HOURS FOLLOWING SURGERY.    Covid testing policy as of 12/06/2022  Your surgeon will notify and schedule you for a COVID test if one is needed before surgery--please direct any questions or COVID symptoms to your surgeon      Questions or Concerns:    - For any questions regarding the day of surgery or your hospital stay, please contact the Pre Admission Nursing Office at [phone].       - If you have health changes between today and your surgery, please call your surgeon.       - For questions after surgery, please call your surgeons office.           Current Visitor Guidelines    You may have 2 visitors in the pre op area.    Visiting hours: 8 a.m. to 8:30 p.m.    You may have four visitors during your inpatient hospital stay.    Patients  confirmed or suspected to have symptoms of COVID 19 or flu:     No visitors allowed for adult patients.   Children (under age 18) can have 1 named visitor.     People who are sick or showing symptoms of COVID 19 or flu:    Are not allowed to visit patients--we can only make exceptions in special situations.       Please follow these guidelines for your visit:          Please maintain social distance          Masking is optional--however at times you may be asked to wear a mask for the safety of yourself and others     Clean your hands with alcohol hand . Do this when you arrive at and leave the building and patient room,    And again after you touch your mask or anything in the room.     Go directly to and from the room you are visiting.     Stay in the patient s room during your visit. Limit going to other places in the hospital as much as possible     Leave bags and jackets at home or in the car.     For everyone s health, please don t come and go during your visit. That includes for smoking   during your visit.

## 2023-10-06 ENCOUNTER — ANCILLARY PROCEDURE (OUTPATIENT)
Dept: CT IMAGING | Facility: CLINIC | Age: 83
End: 2023-10-06
Attending: OBSTETRICS & GYNECOLOGY
Payer: COMMERCIAL

## 2023-10-06 DIAGNOSIS — C54.1 ENDOMETRIAL CANCER (H): ICD-10-CM

## 2023-10-06 PROCEDURE — 71260 CT THORAX DX C+: CPT | Mod: GC | Performed by: RADIOLOGY

## 2023-10-06 PROCEDURE — 74177 CT ABD & PELVIS W/CONTRAST: CPT | Mod: GC | Performed by: RADIOLOGY

## 2023-10-06 RX ORDER — IOPAMIDOL 755 MG/ML
84 INJECTION, SOLUTION INTRAVASCULAR ONCE
Status: COMPLETED | OUTPATIENT
Start: 2023-10-06 | End: 2023-10-06

## 2023-10-06 RX ADMIN — IOPAMIDOL 84 ML: 755 INJECTION, SOLUTION INTRAVASCULAR at 07:44

## 2023-10-06 NOTE — DISCHARGE INSTRUCTIONS

## 2023-10-09 ENCOUNTER — TELEPHONE (OUTPATIENT)
Dept: UROLOGY | Facility: CLINIC | Age: 83
End: 2023-10-09
Payer: COMMERCIAL

## 2023-10-09 NOTE — TELEPHONE ENCOUNTER
Left detailed voice message with call back number to schedule follow up appointment with dinesh, approved by her to use [facility] spot.

## 2023-10-09 NOTE — TELEPHONE ENCOUNTER
----- Message from Brenda Snowden CNP sent at 10/5/2023  3:55 PM CDT -----  Yes, ok to use [facility]. Thanks!  ----- Message -----  From: Mercedes Burns  Sent: 10/5/2023   2:04 PM CDT  To: Brenda Snowden CNP    Permission to schedule in [facility] spot? In person appointments are booked till February and virtual visits are booked until December.  ----- Message -----  From: Brenda Snowden CNP  Sent: 10/3/2023   4:02 PM CDT  To: [facility]    Patient completed her renal ultrasound that I ordered at our last visit but has no visit with me to review. Please contact her to schedule. Thank you!

## 2023-10-28 RX ORDER — HEPARIN SODIUM 5000 [USP'U]/.5ML
5000 INJECTION, SOLUTION INTRAVENOUS; SUBCUTANEOUS
Status: COMPLETED | OUTPATIENT
Start: 2023-10-28 | End: 2023-10-30

## 2023-10-28 RX ORDER — CEFAZOLIN SODIUM/WATER 2 G/20 ML
2 SYRINGE (ML) INTRAVENOUS
Status: DISCONTINUED | OUTPATIENT
Start: 2023-10-28 | End: 2023-10-28

## 2023-10-28 RX ORDER — PHENAZOPYRIDINE HYDROCHLORIDE 200 MG/1
200 TABLET, FILM COATED ORAL ONCE
Status: COMPLETED | OUTPATIENT
Start: 2023-10-28 | End: 2023-10-30

## 2023-10-28 RX ORDER — CEFAZOLIN SODIUM/WATER 2 G/20 ML
2 SYRINGE (ML) INTRAVENOUS SEE ADMIN INSTRUCTIONS
Status: DISCONTINUED | OUTPATIENT
Start: 2023-10-28 | End: 2023-10-28

## 2023-10-29 ENCOUNTER — NURSE TRIAGE (OUTPATIENT)
Dept: NURSING | Facility: CLINIC | Age: 83
End: 2023-10-29
Payer: COMMERCIAL

## 2023-10-29 ENCOUNTER — TRANSFERRED RECORDS (OUTPATIENT)
Dept: MULTI SPECIALTY CLINIC | Facility: CLINIC | Age: 83
End: 2023-10-29

## 2023-10-29 LAB — RETINOPATHY: NORMAL

## 2023-10-29 NOTE — TELEPHONE ENCOUNTER
Patient is calling and states that she is having surgery tomorrow a hysterectomy with Kaykay Worthington and states that she has congestion in head.  Denies fever and denies cough.  Took a covid test and was negative.  Patient states that she is going to show up for her surgery.  Declines triage.    Reason for Disposition   Health Information question, no triage required and triager able to answer question    Additional Information   Negative: RN needs further essential information from caller in order to complete triage   Negative: Requesting regular office appointment   Negative: [1] Caller requesting NON-URGENT health information AND [2] PCP's office is the best resource    Protocols used: Information Only Call - No Triage-A-

## 2023-10-29 NOTE — OP NOTE
Gynecologic Oncology Operative Report    10/30/23  Kelly Barnes    PREOPERATIVE DIAGNOSIS: Grade 1 endometrial adenocarcinoma    POSTOPERATIVE DIAGNOSIS: Same, final pathology pending    PROCEDURES: Total laparoscopic hysterectomy, bilateral salpingo-oophorectomy, bilateral pelvic sentinel lymph node dissection, vaginal laceration repair    SURGEON: Kaykay Burden MD    ASSISTANTS:  Elier Arauz MD - PGY4    ANESTHESIA: General endotracheal    ESTIMATED BLOOD LOSS: 50  IV FLUIDS: 800  URINE OUTPUT: 100     INDICATIONS: Kelly Barnes is a 83 year old who presented with postmenopausal bleeding.  She underwent a pelvic US which revealed a thickened EMS.  She thus had an EMB which revealed grade 1 endometrial adenocarcinoma.  Following a thorough discussion of the risks, benefits, indications and alternatives she consented to the above procedure.    FINDINGS: EUA revealed small, anteverted, mobile uterus. No adnexal masses noted. External genitalia, vaginal vault and cervix appeared normal. On laparoscopy, no injury noted on entry. Normal appearing liver edge, gallbladder, omentum, bowel, appendix. Normal appearing uterus, bilateral fallopian tubes and ovaries. Filmy adhesions from sigmoid colon to pelvic sidewall. Appendix visualized. Small vaginal laceration at introitus at end of case. Made hemostatic with 2-0 Vicryl stitch. Hemostatic surgical sites at end of case.       SPECIMENS:   ID Type Source Tests Collected by Time Destination   1 : Left pelvic sentinel lymph node Tissue Lymph Node(s) SURGICAL PATHOLOGY EXAM Kaykay Burden MD 10/30/2023  8:39 AM    2 : Right pelvic sentinel lymph node Tissue Lymph Node(s) SURGICAL PATHOLOGY EXAM Kaykay Burden MD 10/30/2023  9:00 AM    3 : Uterus, cervix, bilateral fallopian tubes, and bilateral ovaries Tissue Uterus, Cervix, Bilateral Fallopian Tubes & Ovaries SURGICAL PATHOLOGY EXAM Kaykay Burden MD 10/30/2023  9:29 AM        COMPLICATIONS: None.      CONDITION: Stable to PACU.     PROCEDURE:   Consent was reviewed with the patient in the preoperative setting and confirmed. She received prophylactic antibiotics. In addition, she received heparin for venous thrombosis prevention. The patient was transferred to the operating room and placed in dorsal supine position. General anesthetic was obtained in the usual manner without noted difficulties. The patient was then positioned onto Gerardo stirrups and an exam under anesthesia was performed with findings as described above.  The patient was prepped and draped for the above-mentioned procedure and Blanchard catheter was then placed under sterile techniques.  Timeout was called at which point the patient's name, procedure and operative site was confirmed by the operative team. Initially, the instruments for the vaginal manipulator were positioned, a speculum was placed in the vagina. The anterior lip of the cervix was grasped, the uterus sounded to 8 cm and cervix was serially dilated. The cervix was injected with 4 cc of ICG at the 3 and 9 o'clock positions.  The VCare vaginal manipulator was then inserted and the vaginal balloon was then inserted to obtain intraabdominal pressure.     Attention was then turned to the upper abdomen. Initially, an incision was made at the umbilicus and the Veress needle introduced through this stab incision. The abdomen was insufflated with an opening pressure of 3 mmHg. The Veress needle was removed. The initial midline 5 mm port was inserted without difficulties and initial survey revealed no damage to underlying structures.  The right lateral 12 mm and left llateral 5 mm ports were then placed under direct visualization without any injury noted to underlying structures. At this point, the patient was placed into steep Trendelenburg. The pelvis was inspected as well as the upper abdomen with findings as noted above. Pelvic washings were obtained and sent for cytology. Bowels were  packed up into the upper abdomen with gentle traction.     Attention was then turned to the pelvis. The round ligaments were identified bilaterally. They were divided using cautery and the retroperitoneal space was entered by dissecting the peritoneum lateral and cephalad to the IP ligaments. The retroperitoneum was opened parallel to the gonadal vessels. The external iliac artery was followed cephalad and the ureter was identified. The para-vesical space was opened to allow visualization of the obturator space. The pelvic sentinel lymph nodes were identified bilaterally using the ICG dye, tracing the afferent lymphatic channels to the sentinel lymph node. The sentinel lymph nodes were dissected and removed via an EndoCatch bag. They were sent for permanent pathology.    The rest of the peritoneum was skeletonized down to the level of the uterine arteries which were then cauterized and divided.  The bladder flap was created using cautery down to just below the level of the uterine manipulator cup. The rest of the parametrial tissue was dissected off of the uterus serially cauterized and divided sharply. A colpotomy was made on the anterior side and carried around to the posterior side of the uterine manipulator cup using the electrocautery. The uterus was removed through the vagina and sent for permanent pathology.    The vaginal cuff was then closed using the EndoStitch device and the V-Loc suture with excellent hemostasis and reapproximation. At this point, the vaginal balloon was removed from the vagina. We closed the fascia on the 12 mm incision using the Figueroa-Morillo device. All laparoscopic instruments and ports were now removed and CO2 allowed to escape from the abdomen. A small vaginal laceration was noted at the introitus. This was closed with running 2-0 Vicryl suture and then noted to be hemostatic. Skin was reapproximated with 4-0 Vicryl sutures and Dermabond applied. The patient tolerated the  procedure well and was taken to the recovery room in stable condition.    Sponge, lap and needle counts were reported as correct x2 and instrument count was correct x1.     Elier Arauz MD  Obstetrics, Gynecology, and Women's Health  PGY4  6:53 AM 10/30/2023    As the primary surgeon, I was scrubbed and present for the full course of the procedure.    Kaykay Burden M.D.

## 2023-10-30 ENCOUNTER — ANESTHESIA (OUTPATIENT)
Dept: SURGERY | Facility: CLINIC | Age: 83
End: 2023-10-30
Payer: COMMERCIAL

## 2023-10-30 ENCOUNTER — HOSPITAL ENCOUNTER (OUTPATIENT)
Facility: CLINIC | Age: 83
Setting detail: OBSERVATION
Discharge: HOME | End: 2023-10-31
Attending: OBSTETRICS & GYNECOLOGY | Admitting: OBSTETRICS & GYNECOLOGY
Payer: COMMERCIAL

## 2023-10-30 DIAGNOSIS — N18.31 CHRONIC KIDNEY DISEASE, STAGE 3A (H): ICD-10-CM

## 2023-10-30 DIAGNOSIS — G89.18 PAIN AT SURGICAL SITE: Primary | ICD-10-CM

## 2023-10-30 LAB
ABO/RH(D): NORMAL
ANTIBODY SCREEN: NEGATIVE
ERYTHROCYTE [DISTWIDTH] IN BLOOD BY AUTOMATED COUNT: 13.3 % (ref 10–15)
GLUCOSE BLDC GLUCOMTR-MCNC: 193 MG/DL (ref 70–99)
GLUCOSE BLDC GLUCOMTR-MCNC: 240 MG/DL (ref 70–99)
GLUCOSE BLDC GLUCOMTR-MCNC: 242 MG/DL (ref 70–99)
GLUCOSE BLDC GLUCOMTR-MCNC: 269 MG/DL (ref 70–99)
GLUCOSE BLDC GLUCOMTR-MCNC: 277 MG/DL (ref 70–99)
GLUCOSE BLDC GLUCOMTR-MCNC: 314 MG/DL (ref 70–99)
GLUCOSE BLDC GLUCOMTR-MCNC: 329 MG/DL (ref 70–99)
HCT VFR BLD AUTO: 43.6 % (ref 35–47)
HGB BLD-MCNC: 14.1 G/DL (ref 11.7–15.7)
MCH RBC QN AUTO: 28.4 PG (ref 26.5–33)
MCHC RBC AUTO-ENTMCNC: 32.3 G/DL (ref 31.5–36.5)
MCV RBC AUTO: 88 FL (ref 78–100)
PLATELET # BLD AUTO: 281 10E3/UL (ref 150–450)
RBC # BLD AUTO: 4.97 10E6/UL (ref 3.8–5.2)
SPECIMEN EXPIRATION DATE: NORMAL
WBC # BLD AUTO: 10.1 10E3/UL (ref 4–11)

## 2023-10-30 PROCEDURE — 120N000002 HC R&B MED SURG/OB UMMC

## 2023-10-30 PROCEDURE — 258N000003 HC RX IP 258 OP 636: Performed by: STUDENT IN AN ORGANIZED HEALTH CARE EDUCATION/TRAINING PROGRAM

## 2023-10-30 PROCEDURE — 250N000012 HC RX MED GY IP 250 OP 636 PS 637: Performed by: ORTHOPAEDIC SURGERY

## 2023-10-30 PROCEDURE — 258N000003 HC RX IP 258 OP 636

## 2023-10-30 PROCEDURE — 250N000012 HC RX MED GY IP 250 OP 636 PS 637: Performed by: STUDENT IN AN ORGANIZED HEALTH CARE EDUCATION/TRAINING PROGRAM

## 2023-10-30 PROCEDURE — 250N000013 HC RX MED GY IP 250 OP 250 PS 637

## 2023-10-30 PROCEDURE — 88307 TISSUE EXAM BY PATHOLOGIST: CPT | Mod: 26 | Performed by: PATHOLOGY

## 2023-10-30 PROCEDURE — 360N000077 HC SURGERY LEVEL 4, PER MIN: Performed by: OBSTETRICS & GYNECOLOGY

## 2023-10-30 PROCEDURE — 370N000017 HC ANESTHESIA TECHNICAL FEE, PER MIN: Performed by: OBSTETRICS & GYNECOLOGY

## 2023-10-30 PROCEDURE — 250N000011 HC RX IP 250 OP 636

## 2023-10-30 PROCEDURE — 88307 TISSUE EXAM BY PATHOLOGIST: CPT | Mod: TC | Performed by: OBSTETRICS & GYNECOLOGY

## 2023-10-30 PROCEDURE — 88309 TISSUE EXAM BY PATHOLOGIST: CPT | Mod: 26 | Performed by: PATHOLOGY

## 2023-10-30 PROCEDURE — 250N000009 HC RX 250

## 2023-10-30 PROCEDURE — 250N000011 HC RX IP 250 OP 636: Mod: JZ | Performed by: STUDENT IN AN ORGANIZED HEALTH CARE EDUCATION/TRAINING PROGRAM

## 2023-10-30 PROCEDURE — 85027 COMPLETE CBC AUTOMATED: CPT | Performed by: OBSTETRICS & GYNECOLOGY

## 2023-10-30 PROCEDURE — 36415 COLL VENOUS BLD VENIPUNCTURE: CPT | Performed by: OBSTETRICS & GYNECOLOGY

## 2023-10-30 PROCEDURE — 86901 BLOOD TYPING SEROLOGIC RH(D): CPT | Performed by: OBSTETRICS & GYNECOLOGY

## 2023-10-30 PROCEDURE — 86850 RBC ANTIBODY SCREEN: CPT | Performed by: OBSTETRICS & GYNECOLOGY

## 2023-10-30 PROCEDURE — 250N000025 HC SEVOFLURANE, PER MIN: Performed by: OBSTETRICS & GYNECOLOGY

## 2023-10-30 PROCEDURE — 250N000013 HC RX MED GY IP 250 OP 250 PS 637: Performed by: OBSTETRICS & GYNECOLOGY

## 2023-10-30 PROCEDURE — 250N000011 HC RX IP 250 OP 636: Performed by: STUDENT IN AN ORGANIZED HEALTH CARE EDUCATION/TRAINING PROGRAM

## 2023-10-30 PROCEDURE — 250N000013 HC RX MED GY IP 250 OP 250 PS 637: Performed by: STUDENT IN AN ORGANIZED HEALTH CARE EDUCATION/TRAINING PROGRAM

## 2023-10-30 PROCEDURE — 88342 IMHCHEM/IMCYTCHM 1ST ANTB: CPT | Mod: 26 | Performed by: PATHOLOGY

## 2023-10-30 PROCEDURE — 250N000009 HC RX 250: Performed by: OBSTETRICS & GYNECOLOGY

## 2023-10-30 PROCEDURE — 272N000001 HC OR GENERAL SUPPLY STERILE: Performed by: OBSTETRICS & GYNECOLOGY

## 2023-10-30 PROCEDURE — 999N000141 HC STATISTIC PRE-PROCEDURE NURSING ASSESSMENT: Performed by: OBSTETRICS & GYNECOLOGY

## 2023-10-30 PROCEDURE — 710N000010 HC RECOVERY PHASE 1, LEVEL 2, PER MIN: Performed by: OBSTETRICS & GYNECOLOGY

## 2023-10-30 RX ORDER — FENTANYL CITRATE 50 UG/ML
25 INJECTION, SOLUTION INTRAMUSCULAR; INTRAVENOUS EVERY 5 MIN PRN
Status: DISCONTINUED | OUTPATIENT
Start: 2023-10-30 | End: 2023-10-30 | Stop reason: HOSPADM

## 2023-10-30 RX ORDER — AMOXICILLIN 250 MG
1 CAPSULE ORAL 2 TIMES DAILY
Status: DISCONTINUED | OUTPATIENT
Start: 2023-10-30 | End: 2023-10-31 | Stop reason: HOSPADM

## 2023-10-30 RX ORDER — FENTANYL CITRATE 50 UG/ML
INJECTION, SOLUTION INTRAMUSCULAR; INTRAVENOUS PRN
Status: DISCONTINUED | OUTPATIENT
Start: 2023-10-30 | End: 2023-10-30

## 2023-10-30 RX ORDER — ONDANSETRON 4 MG/1
4 TABLET, ORALLY DISINTEGRATING ORAL EVERY 30 MIN PRN
Status: DISCONTINUED | OUTPATIENT
Start: 2023-10-30 | End: 2023-10-30 | Stop reason: HOSPADM

## 2023-10-30 RX ORDER — DEXTROSE MONOHYDRATE 25 G/50ML
25-50 INJECTION, SOLUTION INTRAVENOUS
Status: DISCONTINUED | OUTPATIENT
Start: 2023-10-30 | End: 2023-10-31 | Stop reason: HOSPADM

## 2023-10-30 RX ORDER — ONDANSETRON 4 MG/1
4 TABLET, ORALLY DISINTEGRATING ORAL EVERY 6 HOURS PRN
Status: DISCONTINUED | OUTPATIENT
Start: 2023-10-30 | End: 2023-10-31 | Stop reason: HOSPADM

## 2023-10-30 RX ORDER — PROPOFOL 10 MG/ML
INJECTION, EMULSION INTRAVENOUS PRN
Status: DISCONTINUED | OUTPATIENT
Start: 2023-10-30 | End: 2023-10-30

## 2023-10-30 RX ORDER — ONDANSETRON 2 MG/ML
4 INJECTION INTRAMUSCULAR; INTRAVENOUS EVERY 30 MIN PRN
Status: DISCONTINUED | OUTPATIENT
Start: 2023-10-30 | End: 2023-10-30 | Stop reason: HOSPADM

## 2023-10-30 RX ORDER — BISACODYL 10 MG
10 SUPPOSITORY, RECTAL RECTAL DAILY PRN
Status: DISCONTINUED | OUTPATIENT
Start: 2023-10-30 | End: 2023-10-31 | Stop reason: HOSPADM

## 2023-10-30 RX ORDER — AMOXICILLIN 250 MG
1-2 CAPSULE ORAL 2 TIMES DAILY
Qty: 30 TABLET | Refills: 0 | Status: SHIPPED | OUTPATIENT
Start: 2023-10-30 | End: 2023-10-31

## 2023-10-30 RX ORDER — LIDOCAINE 40 MG/G
CREAM TOPICAL
Status: DISCONTINUED | OUTPATIENT
Start: 2023-10-30 | End: 2023-10-30 | Stop reason: HOSPADM

## 2023-10-30 RX ORDER — NALOXONE HYDROCHLORIDE 0.4 MG/ML
0.4 INJECTION, SOLUTION INTRAMUSCULAR; INTRAVENOUS; SUBCUTANEOUS
Status: DISCONTINUED | OUTPATIENT
Start: 2023-10-30 | End: 2023-10-31 | Stop reason: HOSPADM

## 2023-10-30 RX ORDER — ACETAMINOPHEN 325 MG/1
650 TABLET ORAL EVERY 6 HOURS
Status: DISCONTINUED | OUTPATIENT
Start: 2023-11-02 | End: 2023-10-31 | Stop reason: HOSPADM

## 2023-10-30 RX ORDER — OXYCODONE HYDROCHLORIDE 5 MG/1
5 TABLET ORAL EVERY 4 HOURS PRN
Status: DISCONTINUED | OUTPATIENT
Start: 2023-10-30 | End: 2023-10-31 | Stop reason: HOSPADM

## 2023-10-30 RX ORDER — NALOXONE HYDROCHLORIDE 0.4 MG/ML
0.2 INJECTION, SOLUTION INTRAMUSCULAR; INTRAVENOUS; SUBCUTANEOUS
Status: DISCONTINUED | OUTPATIENT
Start: 2023-10-30 | End: 2023-10-31 | Stop reason: HOSPADM

## 2023-10-30 RX ORDER — LOSARTAN POTASSIUM AND HYDROCHLOROTHIAZIDE 12.5; 5 MG/1; MG/1
1 TABLET ORAL EVERY MORNING
Status: DISCONTINUED | OUTPATIENT
Start: 2023-10-31 | End: 2023-10-30

## 2023-10-30 RX ORDER — VANCOMYCIN HYDROCHLORIDE 1 G/200ML
15 INJECTION, SOLUTION INTRAVENOUS
Status: COMPLETED | OUTPATIENT
Start: 2023-10-30 | End: 2023-10-30

## 2023-10-30 RX ORDER — LIDOCAINE 40 MG/G
CREAM TOPICAL
Status: DISCONTINUED | OUTPATIENT
Start: 2023-10-30 | End: 2023-10-31 | Stop reason: HOSPADM

## 2023-10-30 RX ORDER — HYDROMORPHONE HCL IN WATER/PF 6 MG/30 ML
0.2 PATIENT CONTROLLED ANALGESIA SYRINGE INTRAVENOUS EVERY 5 MIN PRN
Status: DISCONTINUED | OUTPATIENT
Start: 2023-10-30 | End: 2023-10-30 | Stop reason: HOSPADM

## 2023-10-30 RX ORDER — SODIUM CHLORIDE, SODIUM LACTATE, POTASSIUM CHLORIDE, CALCIUM CHLORIDE 600; 310; 30; 20 MG/100ML; MG/100ML; MG/100ML; MG/100ML
INJECTION, SOLUTION INTRAVENOUS CONTINUOUS
Status: DISCONTINUED | OUTPATIENT
Start: 2023-10-30 | End: 2023-10-30 | Stop reason: HOSPADM

## 2023-10-30 RX ORDER — NICOTINE POLACRILEX 4 MG
15-30 LOZENGE BUCCAL
Status: DISCONTINUED | OUTPATIENT
Start: 2023-10-30 | End: 2023-10-31 | Stop reason: HOSPADM

## 2023-10-30 RX ORDER — HYDROMORPHONE HCL IN WATER/PF 6 MG/30 ML
0.2 PATIENT CONTROLLED ANALGESIA SYRINGE INTRAVENOUS
Status: DISCONTINUED | OUTPATIENT
Start: 2023-10-30 | End: 2023-10-30

## 2023-10-30 RX ORDER — HYDROMORPHONE HCL IN WATER/PF 6 MG/30 ML
0.4 PATIENT CONTROLLED ANALGESIA SYRINGE INTRAVENOUS EVERY 5 MIN PRN
Status: DISCONTINUED | OUTPATIENT
Start: 2023-10-30 | End: 2023-10-30 | Stop reason: HOSPADM

## 2023-10-30 RX ORDER — BUPIVACAINE HYDROCHLORIDE 2.5 MG/ML
INJECTION, SOLUTION INFILTRATION; PERINEURAL PRN
Status: DISCONTINUED | OUTPATIENT
Start: 2023-10-30 | End: 2023-10-30 | Stop reason: HOSPADM

## 2023-10-30 RX ORDER — LOSARTAN POTASSIUM 50 MG/1
50 TABLET ORAL DAILY
Status: DISCONTINUED | OUTPATIENT
Start: 2023-10-31 | End: 2023-10-31 | Stop reason: HOSPADM

## 2023-10-30 RX ORDER — PROCHLORPERAZINE MALEATE 5 MG
5 TABLET ORAL EVERY 6 HOURS PRN
Status: DISCONTINUED | OUTPATIENT
Start: 2023-10-30 | End: 2023-10-31 | Stop reason: HOSPADM

## 2023-10-30 RX ORDER — HYDROMORPHONE HCL IN WATER/PF 6 MG/30 ML
0.1 PATIENT CONTROLLED ANALGESIA SYRINGE INTRAVENOUS
Status: DISCONTINUED | OUTPATIENT
Start: 2023-10-30 | End: 2023-10-30

## 2023-10-30 RX ORDER — DEXAMETHASONE SODIUM PHOSPHATE 4 MG/ML
INJECTION, SOLUTION INTRA-ARTICULAR; INTRALESIONAL; INTRAMUSCULAR; INTRAVENOUS; SOFT TISSUE PRN
Status: DISCONTINUED | OUTPATIENT
Start: 2023-10-30 | End: 2023-10-30

## 2023-10-30 RX ORDER — ACETAMINOPHEN 325 MG/1
975 TABLET ORAL EVERY 6 HOURS
Qty: 36 TABLET | Refills: 0 | Status: DISCONTINUED | OUTPATIENT
Start: 2023-10-30 | End: 2023-10-31 | Stop reason: HOSPADM

## 2023-10-30 RX ORDER — INDOCYANINE GREEN AND WATER 25 MG
KIT INJECTION PRN
Status: DISCONTINUED | OUTPATIENT
Start: 2023-10-30 | End: 2023-10-30 | Stop reason: HOSPADM

## 2023-10-30 RX ORDER — PHENYLEPHRINE HCL IN 0.9% NACL 50MG/250ML
.5-1.25 PLASTIC BAG, INJECTION (ML) INTRAVENOUS CONTINUOUS
Status: DISCONTINUED | OUTPATIENT
Start: 2023-10-30 | End: 2023-10-30

## 2023-10-30 RX ORDER — FENTANYL CITRATE 50 UG/ML
50 INJECTION, SOLUTION INTRAMUSCULAR; INTRAVENOUS EVERY 5 MIN PRN
Status: DISCONTINUED | OUTPATIENT
Start: 2023-10-30 | End: 2023-10-30 | Stop reason: HOSPADM

## 2023-10-30 RX ORDER — OXYCODONE HYDROCHLORIDE 5 MG/1
5 TABLET ORAL
Status: DISCONTINUED | OUTPATIENT
Start: 2023-10-30 | End: 2023-10-30

## 2023-10-30 RX ORDER — ACETAMINOPHEN 325 MG/1
975 TABLET ORAL ONCE
Status: COMPLETED | OUTPATIENT
Start: 2023-10-30 | End: 2023-10-30

## 2023-10-30 RX ORDER — ACETAMINOPHEN 325 MG/1
650 TABLET ORAL EVERY 6 HOURS PRN
Qty: 24 TABLET | Refills: 0 | Status: SHIPPED | OUTPATIENT
Start: 2023-10-30 | End: 2023-10-31

## 2023-10-30 RX ORDER — ONDANSETRON 2 MG/ML
4 INJECTION INTRAMUSCULAR; INTRAVENOUS EVERY 6 HOURS PRN
Status: DISCONTINUED | OUTPATIENT
Start: 2023-10-30 | End: 2023-10-31 | Stop reason: HOSPADM

## 2023-10-30 RX ORDER — IBUPROFEN 600 MG/1
600 TABLET, FILM COATED ORAL EVERY 6 HOURS PRN
Qty: 12 TABLET | Refills: 0 | Status: SHIPPED | OUTPATIENT
Start: 2023-10-30 | End: 2023-10-31

## 2023-10-30 RX ORDER — LIDOCAINE HYDROCHLORIDE 20 MG/ML
INJECTION, SOLUTION INFILTRATION; PERINEURAL PRN
Status: DISCONTINUED | OUTPATIENT
Start: 2023-10-30 | End: 2023-10-30

## 2023-10-30 RX ORDER — SODIUM CHLORIDE, SODIUM LACTATE, POTASSIUM CHLORIDE, CALCIUM CHLORIDE 600; 310; 30; 20 MG/100ML; MG/100ML; MG/100ML; MG/100ML
INJECTION, SOLUTION INTRAVENOUS CONTINUOUS PRN
Status: DISCONTINUED | OUTPATIENT
Start: 2023-10-30 | End: 2023-10-30

## 2023-10-30 RX ORDER — OXYCODONE HYDROCHLORIDE 10 MG/1
10 TABLET ORAL EVERY 4 HOURS PRN
Status: DISCONTINUED | OUTPATIENT
Start: 2023-10-30 | End: 2023-10-31 | Stop reason: HOSPADM

## 2023-10-30 RX ORDER — SIMVASTATIN 10 MG
20 TABLET ORAL AT BEDTIME
Status: DISCONTINUED | OUTPATIENT
Start: 2023-10-30 | End: 2023-10-31 | Stop reason: HOSPADM

## 2023-10-30 RX ORDER — OXYCODONE AND ACETAMINOPHEN 5; 325 MG/1; MG/1
1 TABLET ORAL EVERY 6 HOURS PRN
Qty: 6 TABLET | Refills: 0 | Status: SHIPPED | OUTPATIENT
Start: 2023-10-30 | End: 2023-11-02

## 2023-10-30 RX ORDER — HYDROCHLOROTHIAZIDE 12.5 MG/1
12.5 TABLET ORAL DAILY
Status: DISCONTINUED | OUTPATIENT
Start: 2023-10-31 | End: 2023-10-31 | Stop reason: HOSPADM

## 2023-10-30 RX ADMIN — ACETAMINOPHEN 975 MG: 325 TABLET, FILM COATED ORAL at 07:11

## 2023-10-30 RX ADMIN — PHENAZOPYRIDINE 200 MG: 200 TABLET ORAL at 07:11

## 2023-10-30 RX ADMIN — Medication 0.2 MCG/KG/MIN: at 08:17

## 2023-10-30 RX ADMIN — SODIUM CHLORIDE 2 UNITS: 9 INJECTION, SOLUTION INTRAVENOUS at 10:29

## 2023-10-30 RX ADMIN — INSULIN ASPART 6 UNITS: 100 INJECTION, SOLUTION INTRAVENOUS; SUBCUTANEOUS at 21:38

## 2023-10-30 RX ADMIN — VANCOMYCIN HYDROCHLORIDE 1000 MG: 1 INJECTION, SOLUTION INTRAVENOUS at 07:03

## 2023-10-30 RX ADMIN — SUGAMMADEX 200 MG: 100 INJECTION, SOLUTION INTRAVENOUS at 10:02

## 2023-10-30 RX ADMIN — FENTANYL CITRATE 25 MCG: 50 INJECTION, SOLUTION INTRAMUSCULAR; INTRAVENOUS at 10:36

## 2023-10-30 RX ADMIN — Medication 50 MG: at 07:55

## 2023-10-30 RX ADMIN — FENTANYL CITRATE 25 MCG: 50 INJECTION, SOLUTION INTRAMUSCULAR; INTRAVENOUS at 10:27

## 2023-10-30 RX ADMIN — SIMVASTATIN 20 MG: 10 TABLET, FILM COATED ORAL at 21:12

## 2023-10-30 RX ADMIN — PHENYLEPHRINE HYDROCHLORIDE 100 MCG: 10 INJECTION INTRAVENOUS at 07:38

## 2023-10-30 RX ADMIN — SODIUM CHLORIDE, POTASSIUM CHLORIDE, SODIUM LACTATE AND CALCIUM CHLORIDE: 600; 310; 30; 20 INJECTION, SOLUTION INTRAVENOUS at 07:31

## 2023-10-30 RX ADMIN — SENNOSIDES AND DOCUSATE SODIUM 1 TABLET: 50; 8.6 TABLET ORAL at 21:13

## 2023-10-30 RX ADMIN — HYDROMORPHONE HYDROCHLORIDE 0.25 MG: 1 INJECTION, SOLUTION INTRAMUSCULAR; INTRAVENOUS; SUBCUTANEOUS at 09:44

## 2023-10-30 RX ADMIN — DEXAMETHASONE SODIUM PHOSPHATE 4 MG: 4 INJECTION, SOLUTION INTRA-ARTICULAR; INTRALESIONAL; INTRAMUSCULAR; INTRAVENOUS; SOFT TISSUE at 08:00

## 2023-10-30 RX ADMIN — SODIUM CHLORIDE, POTASSIUM CHLORIDE, SODIUM LACTATE AND CALCIUM CHLORIDE: 600; 310; 30; 20 INJECTION, SOLUTION INTRAVENOUS at 07:34

## 2023-10-30 RX ADMIN — VANCOMYCIN HYDROCHLORIDE 1000 MG: 10 INJECTION, POWDER, LYOPHILIZED, FOR SOLUTION INTRAVENOUS at 07:47

## 2023-10-30 RX ADMIN — HYDROMORPHONE HYDROCHLORIDE 0.2 MG: 0.2 INJECTION, SOLUTION INTRAMUSCULAR; INTRAVENOUS; SUBCUTANEOUS at 11:06

## 2023-10-30 RX ADMIN — FENTANYL CITRATE 25 MCG: 50 INJECTION, SOLUTION INTRAMUSCULAR; INTRAVENOUS at 10:19

## 2023-10-30 RX ADMIN — PHENYLEPHRINE HYDROCHLORIDE 100 MCG: 10 INJECTION INTRAVENOUS at 08:10

## 2023-10-30 RX ADMIN — Medication 10 MG: at 09:39

## 2023-10-30 RX ADMIN — PHENYLEPHRINE HYDROCHLORIDE 100 MCG: 10 INJECTION INTRAVENOUS at 07:53

## 2023-10-30 RX ADMIN — HEPARIN SODIUM 5000 UNITS: 5000 INJECTION, SOLUTION INTRAVENOUS; SUBCUTANEOUS at 07:11

## 2023-10-30 RX ADMIN — FENTANYL CITRATE 25 MCG: 50 INJECTION, SOLUTION INTRAMUSCULAR; INTRAVENOUS at 10:43

## 2023-10-30 RX ADMIN — HYDROMORPHONE HYDROCHLORIDE 0.2 MG: 0.2 INJECTION, SOLUTION INTRAMUSCULAR; INTRAVENOUS; SUBCUTANEOUS at 10:56

## 2023-10-30 RX ADMIN — PHENYLEPHRINE HYDROCHLORIDE 100 MCG: 10 INJECTION INTRAVENOUS at 07:59

## 2023-10-30 RX ADMIN — ACETAMINOPHEN 975 MG: 325 TABLET, FILM COATED ORAL at 21:13

## 2023-10-30 RX ADMIN — PROPOFOL 150 MG: 10 INJECTION, EMULSION INTRAVENOUS at 07:55

## 2023-10-30 RX ADMIN — Medication 10 MG: at 09:17

## 2023-10-30 RX ADMIN — SODIUM CHLORIDE 5 UNITS: 9 INJECTION, SOLUTION INTRAVENOUS at 12:13

## 2023-10-30 RX ADMIN — PHENYLEPHRINE HYDROCHLORIDE 100 MCG: 10 INJECTION INTRAVENOUS at 08:01

## 2023-10-30 RX ADMIN — LIDOCAINE HYDROCHLORIDE 100 MG: 20 INJECTION, SOLUTION INFILTRATION; PERINEURAL at 07:55

## 2023-10-30 RX ADMIN — ACETAMINOPHEN 975 MG: 325 TABLET, FILM COATED ORAL at 14:03

## 2023-10-30 RX ADMIN — PHENYLEPHRINE HYDROCHLORIDE 100 MCG: 10 INJECTION INTRAVENOUS at 07:41

## 2023-10-30 RX ADMIN — HYDROMORPHONE HYDROCHLORIDE 0.25 MG: 1 INJECTION, SOLUTION INTRAMUSCULAR; INTRAVENOUS; SUBCUTANEOUS at 09:41

## 2023-10-30 RX ADMIN — FENTANYL CITRATE 100 MCG: 50 INJECTION INTRAMUSCULAR; INTRAVENOUS at 07:55

## 2023-10-30 RX ADMIN — GENTAMICIN SULFATE 300 MG: 40 INJECTION, SOLUTION INTRAMUSCULAR; INTRAVENOUS at 07:31

## 2023-10-30 RX ADMIN — SODIUM CHLORIDE 5 UNITS: 9 INJECTION, SOLUTION INTRAVENOUS at 11:27

## 2023-10-30 ASSESSMENT — ACTIVITIES OF DAILY LIVING (ADL)
ADLS_ACUITY_SCORE: 18
ADLS_ACUITY_SCORE: 21
ADLS_ACUITY_SCORE: 18
ADLS_ACUITY_SCORE: 21
ADLS_ACUITY_SCORE: 18
ADLS_ACUITY_SCORE: 21
ADLS_ACUITY_SCORE: 20
ADLS_ACUITY_SCORE: 20
ADLS_ACUITY_SCORE: 18

## 2023-10-30 ASSESSMENT — COPD QUESTIONNAIRES: COPD: 1

## 2023-10-30 NOTE — PROGRESS NOTES
Gynecologic Oncology   Postoperative Check  10/30/2023    S:   Patient reports she is doing well postoperatively. Pain is well controlled with pain medications. Voiding spontaneously. Denies nausea or vomiting. Chronic LE edema. No bleeding noted. Denies chest pain, shortness of breath, dizziness, or other concerns at this time.     O:  Vitals:    10/30/23 1145 10/30/23 1200 10/30/23 1215 10/30/23 1300   BP: 124/76 127/70 105/63 (!) 115/92   BP Location:    Right arm   Cuff Size:    Adult Regular   Pulse: 76 75 68 82   Resp: 12 16 14 16   Temp:    97.9  F (36.6  C)   TempSrc:    Oral   SpO2: 97% 96% 97% 98%   Weight:       Height:       O2 2L NC  Gen: NAD. Stained orange hair from emesis during surgery.  Cardio: RRR  Resp: CTAB, no wheezing or crackles  Abdomen: soft, appropriately tender, non distended, incision c/d/i  Extremities: Non-tender, +1 LE edema    A/P:   83 year old POD#0 s/p TLH, BSO, sLND. Doing well postoperatively. Reviewed surgical procedure and postop plan. Likely discharge home tomorrow. Discussed discharge instructions.     #Routine postop care  Dz: Grade 1 endometrial adenocarcinoma. S/P procedure above.   FEN: Advance as tolerated  Pain: Tylenol, ibuprofen, oxycodone PRN  CV: HTN/HLD, continue home hydrochlorothiazide, losartan, and simvastatin.  GI: Stool softeners. Hx of cdiff s/p fecal transplant.   : Await void. Hx chronic kidney disease.   Endocrine: DMII, monitor glucose ACHS with medium sliding scale insulin.    Dispo: Overnight for observation.     Matilde Brandt, APRN, DNP, CNP  10/30/2023 2:52 PM

## 2023-10-30 NOTE — ANESTHESIA PROCEDURE NOTES
Airway         Procedure Start/Stop Times: 10/30/2023 7:43 AM  Staff -        Anesthesiologist:  Giselle Yepez MD       Resident/Fellow: Sunday Bennett MD       Performed By: resident  Consent for Airway        Urgency: elective  Indications and Patient Condition       Indications for airway management: demetris-procedural         Mask difficulty assessment: 1 - vent by mask    Final Airway Details       Final airway type: endotracheal airway       Successful airway: ETT - single  Endotracheal Airway Details        ETT size (mm): 7.5       Cuffed: yes       Successful intubation technique: direct laryngoscopy       DL Blade Type: MAC 3       Grade View of Cords: 3       Adjucts: stylet       Position: Right       Measured from: lips       Secured at (cm): 22    Post intubation assessment        Placement verified by: capnometry, equal breath sounds and chest rise        Number of attempts at approach: 1       Number of other approaches attempted: 0       Secured with: pink tape       Ease of procedure: easy       Dentition: Intact    Medication(s) Administered   Medication Administration Time: 10/30/2023 7:43 AM

## 2023-10-30 NOTE — ANESTHESIA CARE TRANSFER NOTE
Patient: Kelly Barnes    Procedure: Procedure(s):  TOTAL LAPAROSCOPIC HYSTERECTOMY, WITH BILATERAL SALPINGO-OOPHORECTOMY, BILATEAL PELVIC SENTINEL LYMPH NODE       Diagnosis: Endometrial cancer (H) [C54.1]  Diagnosis Additional Information: No value filed.    Anesthesia Type:   General     Note:    Oropharynx: oropharynx clear of all foreign objects and spontaneously breathing  Level of Consciousness: awake and drowsy  Oxygen Supplementation: room air  Level of Supplemental Oxygen (L/min / FiO2): 6  Independent Airway: airway patency satisfactory and stable  Dentition: dentition unchanged  Vital Signs Stable: post-procedure vital signs reviewed and stable  Report to RN Given: handoff report given  Patient transferred to: PACU    Handoff Report: Identifed the Patient, Identified the Reponsible Provider, Reviewed the pertinent medical history, Discussed the surgical course, Reviewed Intra-OP anesthesia mangement and issues during anesthesia, Set expectations for post-procedure period and Allowed opportunity for questions and acknowledgement of understanding      Vitals:  Vitals Value Taken Time   /90 10/30/23 1015   Temp     Pulse 71 10/30/23 1017   Resp     SpO2 100 % 10/30/23 1017   Vitals shown include unfiled device data.    Electronically Signed By: Sunday Bennett MD  October 30, 2023  10:18 AM

## 2023-10-30 NOTE — ANESTHESIA PREPROCEDURE EVALUATION
Anesthesia Pre-Procedure Evaluation    Patient: Kelly Barnes   MRN: [id number] : 1940        Procedure : Procedure(s):  TOTAL LAPAROSCOPIC HYSTERECTOMY, WITH BILATERAL SALPINGO-OOPHORECTOMY, BILATEAL PELVIC SENTINEL LYMPH NODE  CYSTOSCOPY          Past Medical History:   Diagnosis Date    Acute kidney injury (H24) 2020    Allergic rhinitis, cause unspecified     Anemia     Aortic stenosis 2016    With new murmur noted 2016, normal echo, repeat echo 2017.    Aortic valve sclerosis     Atypical chest pain 2015    cuboid     left foot    Fungemia 2021    History of Clostridium difficile colitis     Hyperlipidemia LDL goal <100 2012    Hypertension goal BP (blood pressure) < 140/90     Musculoskeletal chest pain 2016    Obesity, Class I, BMI 30-34.9 2015    Pneumonia 2016    Pyelonephritis     Sepsis, due to unspecified organism, unspecified whether acute organ dysfunction present (H) 2020    Type 2 diabetes, HbA1c goal < 7% (H)     Urinary tract infection associated with nephrostomy catheter, initial encounter  2021      Past Surgical History:   Procedure Laterality Date    CATARACT EXTRACTION      CYSTOSCOPY, RETROGRADES, INSERT STENT URETER(S), COMBINED Right 07/15/2021    Procedure: CYSTOURETEROSCOPY, WITH RETROGRADE PYELOGRAM,  AND STENT INSERTION RIGHT;  Surgeon: Kirk Law MD;  Location: [facility] OR    LASER HOLMIUM NEPHROLITHOTOMY VIA PERCUTANEOUS NEPHROSTOMY Right 2021    Procedure: NEPHROLITHOTOMY, PERCUTANEOUS, USING HOLMIUM LASER RIGHT;  Surgeon: Kirk Law MD;  Location: [facility] OR    LASER HOLMIUM NEPHROLITHOTOMY VIA PERCUTANEOUS NEPHROSTOMY Right 2021    Procedure: Ureteroscopic assisted secondary right percutaneous nephrolihtotomy, Holmium laser lithotriipsy;  Surgeon: Kirk Law MD;  Location: [facility] OR    PICC SINGLE LUMEN PLACEMENT Left 2021    41cm (2cm external), Medial brachial vein      Allergies    Allergen Reactions    Ibuprofen Other (See Comments)     numbness in hands and feet    Keflex [Cephalexin] Rash    Metformin Rash      Social History     Tobacco Use    Smoking status: Never    Smokeless tobacco: Never   Substance Use Topics    Alcohol use: Yes     Alcohol/week: 10.0 standard drinks of alcohol     Comment: 1-2 drinks per week      Wt Readings from Last 1 Encounters:   10/30/23 71.4 kg (157 lb 6.5 oz)        Anesthesia Evaluation   Pt has had prior anesthetic. Type: General.    No history of anesthetic complications       ROS/MED HX  ENT/Pulmonary:     (+)                         COPD,              Neurologic:  - neg neurologic ROS     Cardiovascular:     (+)  hypertension- -   -  - -                                      METS/Exercise Tolerance:  Comment: Moderate diastolic dysfunction on echo in 2021. Also with aortic stenosis   Hematologic:     (+)      anemia,          Musculoskeletal:  - neg musculoskeletal ROS     GI/Hepatic: Comment: Hx of c-diff s/p fecal transplant.      Renal/Genitourinary:     (+) renal disease, type: CRI, Pt does not require dialysis,           Endo:     (+)  type II DM,   Not using insulin,          Obesity,       Psychiatric/Substance Use:  - neg psychiatric ROS     Infectious Disease:       Malignancy:       Other:            Physical Exam    Airway        Mallampati: III   TM distance: > 3 FB   Neck ROM: full   Mouth opening: > 3 cm    Respiratory Devices and Support         Dental       (+) Minor Abnormalities - some fillings, tiny chips      Cardiovascular          Rhythm and rate: regular and normal     Pulmonary           breath sounds clear to auscultation           OUTSIDE LABS:  CBC:   Lab Results   Component Value Date    WBC 11.1 (H) 10/03/2023    WBC 7.0 05/31/2023    HGB 14.6 10/03/2023    HGB 14.0 05/31/2023    HCT 42.7 10/03/2023    HCT 44.1 05/31/2023     10/03/2023     05/31/2023     BMP:   Lab Results   Component Value Date      10/03/2023     05/31/2023    POTASSIUM 3.8 10/03/2023    POTASSIUM 4.4 05/31/2023    CHLORIDE 99 10/03/2023    CHLORIDE 105 05/31/2023    CO2 26 10/03/2023    CO2 26 05/31/2023    BUN 28.9 (H) 10/03/2023    BUN 30.2 (H) 05/31/2023    CR 1.04 (H) 10/03/2023    CR 1.07 (H) 05/31/2023     (H) 10/03/2023     (H) 05/31/2023     COAGS:   Lab Results   Component Value Date    INR 1.11 10/13/2021     POC:   Lab Results   Component Value Date     (H) 12/22/2020     HEPATIC:   Lab Results   Component Value Date    ALBUMIN 4.2 05/31/2023    PROTTOTAL 7.0 05/31/2023    ALT 9 (L) 05/31/2023    AST 21 05/31/2023    ALKPHOS 81 05/31/2023    BILITOTAL 0.2 05/31/2023     OTHER:   Lab Results   Component Value Date    PH 7.47 (H) 08/08/2021    LACT 1.1 10/12/2021    A1C 7.7 (H) 06/15/2023    JAN 9.5 10/03/2023    MAG 1.8 07/25/2021    LIPASE 71 (L) 07/23/2021    TSH 1.33 08/17/2023    CRP 5.0 09/16/2021    SED 30 12/19/2020       Anesthesia Plan    ASA Status:  3    NPO Status:  NPO Appropriate    Anesthesia Type: General.     - Airway: ETT   Induction: Intravenous.   Maintenance: Balanced.   Techniques and Equipment:     - Lines/Monitors: BIS, 2nd IV     Consents    Anesthesia Plan(s) and associated risks, benefits, and realistic alternatives discussed. Questions answered and patient/representative(s) expressed understanding.     - Discussed: Risks, Benefits and Alternatives for BOTH SEDATION and the PROCEDURE were discussed     - Discussed with:  Patient      - Extended Intubation/Ventilatory Support Discussed: Yes.      - Patient is DNR/DNI Status: No     Use of blood products discussed: Yes.     - Discussed with: Patient.     Postoperative Care    Pain management: IV analgesics, Oral pain medications, Multi-modal analgesia.   PONV prophylaxis: Ondansetron (or other 5HT-3), Dexamethasone or Solumedrol     Comments:    Other Comments: NPO. Meds reviewed. No problems with anesthesia in past. Pt had echos  in past showing mild aortic stenosis. Last echo in 2023 shows no stenosis, some mild insufficiency. Pt is asymptomatic.  Pt had a URI about 1 month ago, has residual mild cough.            Sunday Bennett MD

## 2023-10-30 NOTE — DISCHARGE SUMMARY
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  - Recurrent c.diff s/p IMT (2021)  - Type 2 diabetes  - Hypertension  - Hyperlipidemia  - Osteopenia  - Fhx of breast cancer  - CKD, stage 3a    Discharge Dx:  - s/p procedures below    Patient Active Problem List   Diagnosis    Osteopenia    Hypertension goal BP (blood pressure) < 140/90    Hyperlipidemia LDL goal <100    Type 2 diabetes mellitus without complication, without long-term current use of insulin (H)    Family history of breast cancer in sister    Hepatitis A immune    Chronic kidney disease, stage 3a (H)    Pain at surgical site       Procedures:   TOTAL LAPAROSCOPIC HYSTERECTOMY, WITH BILATERAL SALPINGO-OOPHORECTOMY, BILATEAL PELVIC SENTINEL LYMPH NODE      Prior to Admission Medications:  Medications Prior to Admission   Medication Sig Dispense Refill Last Dose    glimepiride (AMARYL) 4 MG tablet Take 1 tablet (4 mg) by mouth 2 times daily (with meals) 180 tablet 3 10/29/2023 at 1830    losartan-hydrochlorothiazide (HYZAAR) 50-12.5 MG tablet Take 1 tablet by mouth every morning 90 tablet 0 10/29/2023 at 0700    simvastatin (ZOCOR) 20 MG tablet Take 1 tablet (20 mg) by mouth At Bedtime TAKE ONE TABLET BY MOUTH AT BEDTIME 90 tablet 3 10/29/2023 at 2030    sitagliptin (JANUVIA) 25 MG tablet Take 1 tablet (25 mg) by mouth daily (Patient taking differently: Take 25 mg by mouth every evening) 90 tablet 0 10/29/2023 at 1830    blood glucose (ONETOUCH ULTRA) test strip Use to test blood sugar 2 times daily or as directed. 100 strip 3     blood glucose monitoring (ONE TOUCH ULTRA 2) meter device kit Use to test blood sugar 2 times daily or as directed. 1 kit 0     OneTouch Delica Lancets 33G MISC 1 Device by In Vitro route 2 times daily  100 each 11     potassium citrate (UROCIT-K) 10 MEQ (1080 MG) CR tablet Take 1 tablet (10 mEq) by mouth 2 times daily (Patient taking differently: Take 10 mEq by mouth every morning) 180 tablet 3     [DISCONTINUED] blood glucose (ONETOUCH ULTRA) test strip Use to test blood sugar twice daily. Dispense 1 box of 200 test strips, #3 refills. 100 strip 3     [DISCONTINUED] blood glucose monitoring (ONE TOUCH ULTRA 2) meter device kit Use to test blood sugar 3 times daily 1 kit 0        Discharge Medications:     Review of your medicines        START taking        Dose / Directions   acetaminophen 325 MG tablet  Commonly known as: TYLENOL  Used for: Chronic kidney disease, stage 3a (H)      Dose: 650 mg  Take 2 tablets (650 mg) by mouth every 6 hours as needed for mild pain  Quantity: 24 tablet  Refills: 0     ibuprofen 600 MG tablet  Commonly known as: ADVIL/MOTRIN  Used for: Chronic kidney disease, stage 3a (H)      Dose: 600 mg  Take 1 tablet (600 mg) by mouth every 6 hours as needed for moderate pain  Quantity: 12 tablet  Refills: 0     oxyCODONE-acetaminophen 5-325 MG tablet  Commonly known as: PERCOCET      Dose: 1 tablet  Take 1 tablet by mouth every 6 hours as needed for pain  Quantity: 6 tablet  Refills: 0     senna-docusate 8.6-50 MG tablet  Commonly known as: SENOKOT-S/PERICOLACE  Used for: Chronic kidney disease, stage 3a (H)      Dose: 1-2 tablet  Take 1-2 tablets by mouth 2 times daily  Quantity: 30 tablet  Refills: 0            CONTINUE these medicines which may have CHANGED, or have new prescriptions. If we are uncertain of the size of tablets/capsules you have at home, strength may be listed as something that might have changed.        Dose / Directions   blood glucose monitoring meter device kit  This may have changed: Another medication with the same name was removed. Continue taking this medication, and follow the directions you see here.  Used for: Type 2 diabetes mellitus without complication,  without long-term current use of insulin (H)      Use to test blood sugar 2 times daily or as directed.  Quantity: 1 kit  Refills: 0     OneTouch Ultra test strip  This may have changed: Another medication with the same name was removed. Continue taking this medication, and follow the directions you see here.  Used for: Type 2 diabetes mellitus without complication, without long-term current use of insulin (H)  Generic drug: blood glucose      Use to test blood sugar 2 times daily or as directed.  Quantity: 100 strip  Refills: 3     sitagliptin 25 MG tablet  Commonly known as: JANUVIA  This may have changed: when to take this  Used for: Type 2 diabetes mellitus without complication, without long-term current use of insulin (H)      Dose: 25 mg  Take 1 tablet (25 mg) by mouth daily  Quantity: 90 tablet  Refills: 0            CONTINUE these medicines which have NOT CHANGED        Dose / Directions   glimepiride 4 MG tablet  Commonly known as: AMARYL  Used for: Type 2 diabetes mellitus without complication, without long-term current use of insulin (H)      Dose: 4 mg  Take 1 tablet (4 mg) by mouth 2 times daily (with meals)  Quantity: 180 tablet  Refills: 3     losartan-hydrochlorothiazide 50-12.5 MG tablet  Commonly known as: HYZAAR  Used for: Hypertension goal BP (blood pressure) < 140/90      Dose: 1 tablet  Take 1 tablet by mouth every morning  Quantity: 90 tablet  Refills: 0     OneTouch Delica Lancets 33G Misc  Used for: Type 2 diabetes mellitus without complication, without long-term current use of insulin (H)      Dose: 1 Device  1 Device by In Vitro route 2 times daily  Quantity: 100 each  Refills: 11     potassium citrate 10 MEQ (1080 MG) CR tablet  Commonly known as: UROCIT-K  Used for: Uric acid kidney stone      Dose: 10 mEq  Take 1 tablet (10 mEq) by mouth 2 times daily  Quantity: 180 tablet  Refills: 3     simvastatin 20 MG tablet  Commonly known as: ZOCOR  Used for: Hyperlipidemia LDL goal <100       Dose: 20 mg  Take 1 tablet (20 mg) by mouth At Bedtime TAKE ONE TABLET BY MOUTH AT BEDTIME  Quantity: 90 tablet  Refills: 3               Where to get your medicines        These medications were sent to [facility] - [place], [place] - [address]  [address], [facility] [address]      Phone: [phone]   oxyCODONE-acetaminophen 5-325 MG tablet       Some of these will need a paper prescription and others can be bought over the counter. Ask your nurse if you have questions.    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  She consulted with Dr. Burden regarding and presents today in preparation for the above recommended procedures.     Hospital Course:  Dz:   - Preoperative diagnosis was grade 1 endometrial adenocarcinoma.  Final pathology is pending at the time of discharge.  She will follow-up postoperatively for a care plan.  FEN:   - She was given a regular diet day of surgery. By discharge, she was tolerating a regular diet without nausea and vomiting and able to maintain her hydration without IVF supplementation.  Pain:   - Her pain was initially controlled on IV pain medications. Once tolerating PO pain meds, she was transitioned to a PO pain regimen.  Her pain was well controlled on this and she was discharged home with these medications.  CV:   - She has a history of HTN controlled with PTA losartan-hydrochlorothiazide.  She also has HLD, for which she takes simvastatin.  Her vital signs were stable while in house and she had no acute CV issues.  PULM:   - She has no history of pulmonary issues.  She was initially given O2 supplementation in to maintain her O2 sats in the immediate postop period and was transitioned off of this without difficulty.  By discharge, her O2 sats were greater than 94% on RA.  She was encouraged to use her bedside IS while in house.  She had no acute pulmonary issues while in house.  HEME:   - She had no acute heme issues while in house.  GI:   - She was made NPO prior to the procedure.  On POD#0, her diet was advanced slowly as tolerated.  At the time of discharge, she was tolerating a regular diet without nausea and vomiting.  She was passing flatus prior to discharge. She will be discharged with a bowel regimen to prevent constipation in the postoperative period.  She had no acute GI issues while in house. She has a history of C. Difficile and received vancomycin and gentamicin intraoperatively.   :    - A torres catheter was placed at the time of the surgery.  Once ambulating unassisted, the torres catheter was removed.  Prior to discharge, the patient was voiding spontaneously without difficulty.  She had no acute  issues while in house.  ID:  - Patient has a history of recurrent C.diff requiring a fecal transplant. Vancomycin and gentamicin were used for procedure prophylaxis as cephalosporins were contraindicated.  - The patient was AF during her hospitalization.  She received standard preoperative antibiotics without incident.    ENDO:   - She has type 2 diabetes and she is on PTA Januvia and glimepiride. Overnight her BG were elevated up to 300. Patient states her fasting blood sugars are typically in 150s. Her blood sugars improved while in house on insulin. Did review that goal would be to be under 180. She will return to her home DM medications at discharge.   PSYCH/NEURO:   - No issues    PPX:    - She was given SCDs, IS during her hospital course.  She tolerated these prophylactic interventions without incident.  They were discontinued at the time of her discharge.      Discharge Instructions and Follow up:  Ms. Kelly Barnes was discharged from the hospital with follow up with:    Discharge Diet: Regular  Discharge Activity: Lifting restricted to 15 pounds, no driving while on narcotics, nothing per vagina for 6 weeks.  Discharge Follow up: Gynecology oncology, Dr. Burden on 11/16/23 at 3:50PM  Patient should also follow up with her PCP for improved blood sugar control as an outpatient.     Discharge Disposition:  Discharged to home    Discharge Staff: MD Elier Payton MD - PGY4  Gynecologic Oncology Resident  6:57 AM 10/31/2023  Gyn Onc Pager: [phone]

## 2023-10-30 NOTE — PROGRESS NOTES
Admitted/transferred from: PACU  2 RN full   skin assessment completed by Li Ospina, RN and Vance TAYLOR RN.  Skin assessment finding: issues found 3 abd lap sites with intact derma bond, chronic BLE edema L>R , blanchable redness on coccyx, sophia hands PIV-sl'd.  Interventions/actions: other MEET   for lap sites, pt repositioned to side for red coccyx.    Will continue to monitor.

## 2023-10-30 NOTE — ANESTHESIA POSTPROCEDURE EVALUATION
Patient: Kelly Barnes    Procedure: Procedure(s):  TOTAL LAPAROSCOPIC HYSTERECTOMY, WITH BILATERAL SALPINGO-OOPHORECTOMY, BILATEAL PELVIC SENTINEL LYMPH NODE       Anesthesia Type:  General    Note:  Disposition: Outpatient   Postop Pain Control: Uneventful            Sign Out: Well controlled pain   PONV: No   Neuro/Psych: Uneventful            Sign Out: Acceptable/Baseline neuro status   Airway/Respiratory: Uneventful            Sign Out: Acceptable/Baseline resp. status   CV/Hemodynamics: Uneventful            Sign Out: Acceptable CV status; No obvious hypovolemia; No obvious fluid overload   Other NRE: NONE   DID A NON-ROUTINE EVENT OCCUR? No           Last vitals:  Vitals Value Taken Time   /87 10/30/23 1230   Temp 36.1  C (97  F) 10/30/23 1130   Pulse 73 10/30/23 1230   Resp 17 10/30/23 1230   SpO2 98 % 10/30/23 1231   Vitals shown include unfiled device data.    Electronically Signed By: Giselle Yepez MD  October 30, 2023  1:11 PM

## 2023-10-30 NOTE — PROGRESS NOTES
Gynecologic Oncology   10/31/2023    S:   Patient reports she is doing well postoperatively. Requesting to go home this AM. Pain is well controlled with pain medications. Voiding spontaneously. Denies nausea or vomiting. Ambulating independently. Chronic LE edema. Denies vaginal bleeding. Denies chest pain, shortness of breath, dizziness, or other concerns at this time.     O:  Vitals:    10/30/23 1200 10/30/23 1215 10/30/23 1300 10/30/23 1606   BP: 127/70 105/63 (!) 115/92 115/52   BP Location:   Right arm Left arm   Cuff Size:   Adult Regular    Pulse: 75 68 82 78   Resp: 16 14 16 18   Temp:   97.9  F (36.6  C) 98.7  F (37.1  C)   TempSrc:   Oral Oral   SpO2: 96% 97% 98% 96%   Weight:       Height:       Gen: NAD. Stained orange hair from emesis during surgery.  Cardio: RRR  Resp: CTAB, on room air  Abdomen: soft, appropriately tender, non distended, incision c/d/I with glue in place  Extremities: Non-tender, +1 LE edema    A/P:   83 year old POD#1 s/p TLH, BSO, sLND. Doing well postoperatively. Reviewed surgical procedure and postop plan. Discussed discharge instructions.     #Routine postop care  Dz: Grade 1 endometrial adenocarcinoma. S/P procedure above.   FEN: Advance as tolerated  Pain: Tylenol, ibuprofen, oxycodone PRN  CV: HTN/HLD, continue home hydrochlorothiazide, losartan, and simvastatin.  GI: Stool softeners. Hx of cdiff s/p fecal transplant.   : Voiding without issue. Hx chronic kidney disease.   Endocrine: DMII, monitor glucose ACHS with medium sliding scale insulin. Had elevated blood sugars overnight since her dinner BG check was delayed and then did not get insulin. Has elevated fasting blood sugars at baseline    Dispo: To home today    Elier Arauz MD - PGY4  Gynecologic Oncology Resident  4:55 PM 10/30/2023  Gyn Onc Pager: [phone]      I have examined this patient personally with the team and agree with the above findings and plan.  Appropriate for discharge to home    Barrington  Sunday Paul

## 2023-10-31 VITALS
DIASTOLIC BLOOD PRESSURE: 65 MMHG | BODY MASS INDEX: 28.97 KG/M2 | TEMPERATURE: 97.8 F | SYSTOLIC BLOOD PRESSURE: 120 MMHG | HEIGHT: 62 IN | WEIGHT: 157.41 LBS | OXYGEN SATURATION: 98 % | RESPIRATION RATE: 16 BRPM | HEART RATE: 67 BPM

## 2023-10-31 LAB
GLUCOSE BLDC GLUCOMTR-MCNC: 170 MG/DL (ref 70–99)
GLUCOSE BLDC GLUCOMTR-MCNC: 197 MG/DL (ref 70–99)

## 2023-10-31 PROCEDURE — G0378 HOSPITAL OBSERVATION PER HR: HCPCS

## 2023-10-31 PROCEDURE — 250N000013 HC RX MED GY IP 250 OP 250 PS 637: Performed by: OBSTETRICS & GYNECOLOGY

## 2023-10-31 RX ORDER — ACETAMINOPHEN 325 MG/1
650 TABLET ORAL EVERY 6 HOURS PRN
Qty: 24 TABLET | Refills: 0 | Status: SHIPPED | OUTPATIENT
Start: 2023-10-31 | End: 2023-11-28

## 2023-10-31 RX ORDER — AMOXICILLIN 250 MG
1-2 CAPSULE ORAL 2 TIMES DAILY PRN
Qty: 30 TABLET | Refills: 0 | Status: SHIPPED | OUTPATIENT
Start: 2023-10-31 | End: 2023-11-28

## 2023-10-31 RX ADMIN — SENNOSIDES AND DOCUSATE SODIUM 1 TABLET: 50; 8.6 TABLET ORAL at 08:48

## 2023-10-31 RX ADMIN — HYDROCHLOROTHIAZIDE 12.5 MG: 12.5 TABLET ORAL at 08:48

## 2023-10-31 RX ADMIN — ACETAMINOPHEN 975 MG: 325 TABLET, FILM COATED ORAL at 02:02

## 2023-10-31 RX ADMIN — ACETAMINOPHEN 975 MG: 325 TABLET, FILM COATED ORAL at 08:48

## 2023-10-31 RX ADMIN — LOSARTAN POTASSIUM 50 MG: 50 TABLET, FILM COATED ORAL at 08:48

## 2023-10-31 ASSESSMENT — ACTIVITIES OF DAILY LIVING (ADL)
ADLS_ACUITY_SCORE: 21
ADLS_ACUITY_SCORE: 24

## 2023-10-31 NOTE — PROGRESS NOTES
Date Admitted: 10/30/23    Reason For Admission: Scheduled Total Lap Hysterectomy  with bilateral salpingo-oophorectomy, bilateral pelvic sentinel lymph node    Dx/Hx: Endometrial Cancer, CKD    3037-6901: VSS, incisional pain 3/10, managed with scheduled Tylenol, weaned off O2 per pt care order, tolerating RA with sat >92%,  at 1700, checked after meal, no correction given, provider notified, follow-up check at 2000 329, provider order HS dose to be given at 2030, 6 units given per 2000 value, ambulated to bathroom with SBA, voided 2x, orange to blood tinged urine color, will measure output overnight, hat in commode, lap sites 3x, tolerating regular diet, PIV SL, pt expected to discharge tomorrow

## 2023-10-31 NOTE — PROVIDER NOTIFICATION
Notified: Neftaly Tejada MD #[id number]      I am sorry for the late notification. Pt EMILIANO Barnes on 5A room 5227 Blood glucose at 1700 was 277, but it was checked after she ate, follow-up check now is 329. Please advise. Thanks, Jona RAMIREZ

## 2023-10-31 NOTE — PLAN OF CARE
"Goal Outcome Evaluation:      Plan of Care Reviewed With: patient    Overall Patient Progress: improving    /62 (BP Location: Right arm)   Pulse 69   Temp 97.7  F (36.5  C) (Oral)   Resp 15   Ht 1.575 m (5' 2\")   Wt 71.4 kg (157 lb 6.5 oz)   SpO2 94%   BMI 28.79 kg/m      Neuro: A&Ox4.   Cardiac: Afebrile, VSS.   Respiratory: RA. Capno wdl   GI/: Voiding spontaneously. No BM this shift. Hypo BS  Diet/appetite: Tolerating diet. Denies nausea  Endocrine:  0200 bg 197 mg/dL  Activity: Up with stand by assist    Pain: pain maintained at tolerable level with scheduled tylenol  Skin: lap sites CDI; norma. Vag pads with minimal drainage noted.  Lines: piv x2- sl'd  Replacement: No RN managed replacement protocols or conditional transfusion ordered in place.    Rested btwn cares. Able to make needs known. Continue to monitor. Notify MD of changes/concerns.             "

## 2023-10-31 NOTE — PHARMACY-ADMISSION MEDICATION HISTORY
Pharmacist Admission Medication History    Admission medication history is complete. The information provided in this note is only as accurate as the sources available at the time of the update.    Information Source(s): Pre-op RN assessment, Dispense history (Surescripts)        Medication History Completed By: Marzena Johnson [facility] 10/30/2023 7:37 PM    Prior to Admission medications    Medication Sig Last Dose Taking? Auth Provider Long Term End Date   acetaminophen (TYLENOL) 325 MG tablet Take 2 tablets (650 mg) by mouth every 6 hours as needed for mild pain  Yes Kaykay Burden MD     glimepiride (AMARYL) 4 MG tablet Take 1 tablet (4 mg) by mouth 2 times daily (with meals) 10/29/2023 at 1830 Yes Lea Lopez MD Yes    ibuprofen (ADVIL/MOTRIN) 600 MG tablet Take 1 tablet (600 mg) by mouth every 6 hours as needed for moderate pain  Yes Kaykay Burden MD     losartan-hydrochlorothiazide (HYZAAR) 50-12.5 MG tablet Take 1 tablet by mouth every morning 10/29/2023 at 0700 Yes Radha Mejia MD Yes    oxyCODONE-acetaminophen (PERCOCET) 5-325 MG tablet Take 1 tablet by mouth every 6 hours as needed for pain  Yes Kaykay Burden MD  11/2/23   senna-docusate (SENOKOT-S/PERICOLACE) 8.6-50 MG tablet Take 1-2 tablets by mouth 2 times daily  Yes Kaykay Burden MD     simvastatin (ZOCOR) 20 MG tablet Take 1 tablet (20 mg) by mouth At Bedtime TAKE ONE TABLET BY MOUTH AT BEDTIME 10/29/2023 at 2030 Yes Lea Lopez MD Yes    sitagliptin (JANUVIA) 25 MG tablet Take 1 tablet (25 mg) by mouth daily  Patient taking differently: Take 25 mg by mouth every evening 10/29/2023 at 1830 Yes Radha Mejia MD Yes    blood glucose (ONETOUCH ULTRA) test strip Use to test blood sugar 2 times daily or as directed.   Lea Lopez MD     blood glucose monitoring (ONE TOUCH ULTRA 2) meter device kit Use to test blood sugar 2 times daily or as directed.   Lea Lopez MD Yes    OneTouch Delica  Lancets 33G MISC 1 Device by In Vitro route 2 times daily   Lea Lopez MD     potassium citrate (UROCIT-K) 10 MEQ (1080 MG) CR tablet Take 1 tablet (10 mEq) by mouth 2 times daily  Patient taking differently: Take 10 mEq by mouth every morning   Brenda Snowden, CNP

## 2023-10-31 NOTE — PLAN OF CARE
Goal Outcome Evaluation:      Plan of Care Reviewed With: patient    Overall Patient Progress: improvingOverall Patient Progress: improving    Outcome Evaluation: Discharging to home    Nursing Focus: Discharge    D: Patient discharged to home at 1130H. Patient Kelly and accompanied by Son waiting at the main entrance of the hospital.    I: Discharge prescriptions sent to discharge pharmacy to be filled. All discharge medications and instructions reviewed with patient. Patient instructed to call clinic triage nurse if patient experiences a fever >100.4, uncontrolled nausea, vomiting, diarrhea, or pain; or experiences any signs or symptoms of bleeding. Other phone numbers to call with questions or concerns after discharge reviewed. PIV removed per hospital policy. Education completed. Copy of AVS given to patient.    A: Patient verbalized understanding of discharge medications and instructions. Patient will  medications at discharge pharmacy. Patient discharge in a wheel chair in a good condition.     P: Patient to follow-up in clinic on 11/16 with Dr. Burden.

## 2023-10-31 NOTE — DISCHARGE INSTRUCTIONS
You have a follow up appointment with Dr. Burden on 11/16/23.   Please schedule an appointment with your PCP to go over your diabetes care.

## 2023-10-31 NOTE — UTILIZATION REVIEW
"  Admission Status; Secondary Review Determination         Under the authority of the Utilization Management Committee, the utilization review process indicated a secondary review on the above patient.  The review outcome is based on review of the medical records, discussions with staff, and applying clinical experience noted on the date of the review.        ()      Inpatient Status Appropriate - This patient's medical care is consistent with medical management for inpatient care and reasonable inpatient medical practice.      (xxx) Observation Status Appropriate - This patient does not meet hospital inpatient criteria and is placed in observation status. If this patient's primary payer is Medicare and was admitted as an inpatient, Condition Code 44 should be used and patient status changed to \"observation\".   () Admission Status NOT Appropriate - This patient's medical care is not consistent with medical management for Inpatient or Observation Status.          RATIONALE FOR DETERMINATION     Kelly Barnes is an 82 yo female who underwent a laparoscopic hysterectomy with bilateral salpingo-oophorectomy and bilateral pelvic sentinel lymph node biopsy on 10/30/2023.  The procedure went well and she was admitted to the hospital overnight for post-operative monitoring.  She is medically stable for discharge today.  Observation status is appropriate.  I spoke with the care team.    The severity of illness, intensity of service provided, expected LOS and risk for adverse outcome make the care complex, high risk and appropriate for hospital admission.        The information on this document is developed by the utilization review team in order for the business office to ensure compliance.  This only denotes the appropriateness of proper admission status and does not reflect the quality of care rendered.         The definitions of Inpatient Status and Observation Status used in making the determination above are those " provided in the CMS Coverage Manual, Chapter 1 and Chapter 6, section 70.4.      Sincerely,     Francesca Lazaro MD  Physician Advisor   Utilization Review/ Case Management  [facility].

## 2023-11-01 ENCOUNTER — PATIENT OUTREACH (OUTPATIENT)
Dept: CARE COORDINATION | Facility: CLINIC | Age: 83
End: 2023-11-01
Payer: COMMERCIAL

## 2023-11-01 NOTE — PROGRESS NOTES
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  She consulted with Dr. Burden regarding and presents today in preparation for the above recommended procedures.     ASSESSMENT           Discharge Assessment  How are you doing now that you are home?: I am doing very well.  How are your symptoms? (Red Flag symptoms escalate to triage hotline per guidelines): Improved  Do you feel your condition is stable enough to be safe at home until your provider visit?: Yes  Does the patient have their discharge instructions? : Yes  Does the patient have questions regarding their discharge instructions? : No  Were you started on any new medications or were there changes to any of your previous medications? : Yes  Does the patient have all of their medications?: Yes  Do you have questions regarding any of your medications? : No  Discharge follow-up appointment scheduled within 14 calendar days? : Yes  Discharge Follow Up Appointment Date: 11/16/23  Discharge Follow Up Appointment Scheduled with?: Specialty Care Provider    Post-op ([facility] Only)  If the patient had a surgery or procedure, do they have any questions for a nurse?: No             PLAN                                                       Outpatient Plan: Follow up appointment  You have a postoperative visit scheduled with Dr. Burden on 11/16.    Future Appointments   Date Time Provider Department Center   11/16/2023  3:50 PM Kaykay Burden MD [facility]   11/17/2023 11:30 AM Brenda Snowden CNP [facility]   12/21/2023 10:10 AM Lea Lopez MD [facility]         For any urgent concerns, please contact our 24 hour nurse triage line: [phone] ([phone])       DAVID Peralta  [phone]  [facility]

## 2023-11-02 ENCOUNTER — TELEPHONE (OUTPATIENT)
Dept: GASTROENTEROLOGY | Facility: CLINIC | Age: 83
End: 2023-11-02
Payer: COMMERCIAL

## 2023-11-03 LAB
PATH REPORT.COMMENTS IMP SPEC: ABNORMAL
PATH REPORT.COMMENTS IMP SPEC: YES
PATH REPORT.FINAL DX SPEC: ABNORMAL
PATH REPORT.GROSS SPEC: ABNORMAL
PATH REPORT.MICROSCOPIC SPEC OTHER STN: ABNORMAL
PATH REPORT.MICROSCOPIC SPEC OTHER STN: ABNORMAL
PATH REPORT.RELEVANT HX SPEC: ABNORMAL
PATHOLOGY SYNOPTIC REPORT: ABNORMAL
PHOTO IMAGE: ABNORMAL

## 2023-11-04 ENCOUNTER — PRE VISIT (OUTPATIENT)
Dept: UROLOGY | Facility: CLINIC | Age: 83
End: 2023-11-04
Payer: COMMERCIAL

## 2023-11-16 ENCOUNTER — ONCOLOGY VISIT (OUTPATIENT)
Dept: ONCOLOGY | Facility: CLINIC | Age: 83
End: 2023-11-16
Attending: OBSTETRICS & GYNECOLOGY
Payer: COMMERCIAL

## 2023-11-16 VITALS
HEART RATE: 96 BPM | TEMPERATURE: 98.4 F | RESPIRATION RATE: 16 BRPM | OXYGEN SATURATION: 98 % | DIASTOLIC BLOOD PRESSURE: 81 MMHG | WEIGHT: 156.6 LBS | BODY MASS INDEX: 28.64 KG/M2 | SYSTOLIC BLOOD PRESSURE: 147 MMHG

## 2023-11-16 DIAGNOSIS — C54.1 ENDOMETRIAL CANCER (H): Primary | ICD-10-CM

## 2023-11-16 PROCEDURE — 99024 POSTOP FOLLOW-UP VISIT: CPT | Performed by: OBSTETRICS & GYNECOLOGY

## 2023-11-16 PROCEDURE — G0463 HOSPITAL OUTPT CLINIC VISIT: HCPCS | Performed by: OBSTETRICS & GYNECOLOGY

## 2023-11-16 ASSESSMENT — PAIN SCALES - GENERAL: PAINLEVEL: NO PAIN (0)

## 2023-11-16 NOTE — NURSING NOTE
"Oncology Rooming Note    November 16, 2023 3:44 PM   Kelly Barnes is a 83 year old female who presents for:    Chief Complaint   Patient presents with    Oncology Clinic Visit     TOTAL LAPAROSCOPIC HYSTERECTOMY, WITH BILATERAL SALPINGO-OOPHORECTOMY, BILATEAL PELVIC SENTINEL LYMPH NODE 2 WK POST-OP     Initial Vitals: BP (!) 147/81 (BP Location: Right arm, Patient Position: Sitting, Cuff Size: Adult Regular)   Pulse 96   Temp 98.4  F (36.9  C) (Oral)   Resp 16   Wt 71 kg (156 lb 9.6 oz)   SpO2 98%   BMI 28.64 kg/m   Estimated body mass index is 28.64 kg/m  as calculated from the following:    Height as of 10/30/23: 1.575 m (5' 2\").    Weight as of this encounter: 71 kg (156 lb 9.6 oz). Body surface area is 1.76 meters squared.  No Pain (0) Comment: Data Unavailable   No LMP recorded. Patient is postmenopausal.  Allergies reviewed: Yes  Medications reviewed: Yes    Medications: Medication refills not needed today.  Pharmacy name entered into [facility]:    [facility] [place], [place] - [address]  [facility] [place] [facility] [place], [place] - [address] [facility]  WRITTEN PRESCRIPTION REQUESTED  [facility] [place], [place] - [phone]    Clinical concerns: none       Shaina Mercado              "

## 2023-11-16 NOTE — LETTER
2023         RE: Kelly Barnes  [address]  [place] [address]        Dear Colleague,    Thank you for referring your patient, Kelly Barnes, to the [facility]. Please see a copy of my visit note below.    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    Since surgery she reports doing well, minimal pain along right port incision. Her hair turned yellow as a result of having some gastric pyridium stained vomit leaking into her hair during the course of the procedure.     Work up to date  6/15/23 HgA1C 7.7   23 Pelvic US  Uterus with endometrial thickness at 21 mm  23 Endometrial Biopsy  Pathology Grade 1 Endometrioid Adenocarcinoma    10/30/23 Surgery: Total Laparoscopic Hysterectomy, Bilateral Salpingo-oophorectomy, Bilateral Pelvic [place] Lymph Nodes    Pathology: Grade 1 endometrioid adenocarcinoma, MMR intact, MELF pattern of myoinvasion.  Depth of Invasion 12/18 mm  LVSI low< 3 vessels  Right pelvic positive isolated tumor cells    OBGYN history and Health Maintenance:    Last Mammogram:   negative  Last Colonoscopy:   negative        Past Medical History:   Diagnosis Date    Acute kidney injury (H24) 2020    Allergic rhinitis, cause unspecified     Anemia     Aortic stenosis 2016    With new murmur noted 2016, normal echo, repeat echo 2017.    Aortic valve sclerosis     Atypical chest pain 2015    cuboid     left foot    Fungemia 2021    History of Clostridium difficile colitis     Hyperlipidemia LDL goal <100 2012    Hypertension goal BP (blood pressure) < 140/90     Musculoskeletal  chest pain 11/25/2016    Obesity, Class I, BMI 30-34.9 11/11/2015    Pneumonia 03/2016    Pyelonephritis     Sepsis, due to unspecified organism, unspecified whether acute organ dysfunction present (H) 12/19/2020    Type 2 diabetes, HbA1c goal < 7% (H)     Urinary tract infection associated with nephrostomy catheter, initial encounter  8/8/2021       Past Surgical History:   Procedure Laterality Date    CATARACT EXTRACTION      CYSTOSCOPY, RETROGRADES, INSERT STENT URETER(S), COMBINED Right 07/15/2021    Procedure: CYSTOURETEROSCOPY, WITH RETROGRADE PYELOGRAM,  AND STENT INSERTION RIGHT;  Surgeon: Kirk Law MD;  [facility]    LAPAROSCOPIC HYSTERECTOMY TOTAL, BILATERAL SALPINGO-OOPHORECTOMY, NODE DISSECTION, COMBINED N/A 10/30/2023    Procedure: TOTAL LAPAROSCOPIC HYSTERECTOMY, WITH BILATERAL SALPINGO-OOPHORECTOMY, BILATEAL PELVIC SENTINEL LYMPH NODE;  Surgeon: Kaykay Burden MD;  [facility]    LASER HOLMIUM NEPHROLITHOTOMY VIA PERCUTANEOUS NEPHROSTOMY Right 08/05/2021    Procedure: NEPHROLITHOTOMY, PERCUTANEOUS, USING HOLMIUM LASER RIGHT;  Surgeon: Kirk Law MD;  [facility]    LASER HOLMIUM NEPHROLITHOTOMY VIA PERCUTANEOUS NEPHROSTOMY Right 08/13/2021    Procedure: Ureteroscopic assisted secondary right percutaneous nephrolihtotomy, Holmium laser lithotriipsy;  Surgeon: Kirk Law MD;  [facility]    PICC SINGLE LUMEN PLACEMENT Left 08/17/2021    41cm (2cm external), Medial brachial vein          Current Outpatient Medications   Medication Sig Dispense Refill    acetaminophen (TYLENOL) 325 MG tablet Take 2 tablets (650 mg) by mouth every 6 hours as needed for mild pain 24 tablet 0    blood glucose (ONETOUCH ULTRA) test strip Use to test blood sugar 2 times daily or as directed. 100 strip 3    blood glucose monitoring (ONE TOUCH ULTRA 2) meter device kit Use to test blood sugar 2 times daily or as directed. 1 kit 0    glimepiride (AMARYL) 4 MG tablet Take 1 tablet  (4 mg) by mouth 2 times daily (with meals) 180 tablet 3    losartan-hydrochlorothiazide (HYZAAR) 50-12.5 MG tablet Take 1 tablet by mouth every morning 90 tablet 0    OneTouch Delica Lancets 33G MISC 1 Device by In Vitro route 2 times daily 100 each 11    potassium citrate (UROCIT-K) 10 MEQ (1080 MG) CR tablet Take 1 tablet (10 mEq) by mouth 2 times daily (Patient taking differently: Take 10 mEq by mouth every morning) 180 tablet 3    senna-docusate (SENOKOT-S/PERICOLACE) 8.6-50 MG tablet Take 1-2 tablets by mouth 2 times daily as needed for constipation 30 tablet 0    simvastatin (ZOCOR) 20 MG tablet Take 1 tablet (20 mg) by mouth At Bedtime TAKE ONE TABLET BY MOUTH AT BEDTIME 90 tablet 3    sitagliptin (JANUVIA) 25 MG tablet Take 1 tablet (25 mg) by mouth daily (Patient taking differently: Take 25 mg by mouth every evening) 90 tablet 0         Allergies   Allergen Reactions    Ibuprofen Other (See Comments)     numbness in hands and feet    Keflex [Cephalexin] Rash    Metformin Rash       Social History:  Social History     Tobacco Use    Smoking status: Never    Smokeless tobacco: Never   Substance Use Topics    Alcohol use: Yes     Alcohol/week: 10.0 standard drinks of alcohol     Comment: 1-2 drinks per week         Family History:   The patient's family history is notable for   Family History   Problem Relation Age of Onset    Hypertension Mother     Diabetes No family hx of     Cerebrovascular Disease No family hx of     Breast Cancer No family hx of     Cancer - colorectal No family hx of     Prostate Cancer No family hx of     Anesthesia Reaction No family hx of     Bleeding Disorder No family hx of     Clotting Disorder No family hx of          Physical Exam:     BP (!) 147/81 (BP Location: Right arm, Patient Position: Sitting, Cuff Size: Adult Regular)   Pulse 96   Temp 98.4  F (36.9  C) (Oral)   Resp 16   Wt 71 kg (156 lb 9.6 oz)   SpO2 98%   BMI 28.64 kg/m    Body mass index is 28.64  kg/m .    General: Alert and oriented, no acute distress  Psych: Mood stable  GI: No distention. No masses. No hernia.   Incision: intact, no erythema    Assessment: Kelly Barnes is a 83 year old woman with Stage IB grade 1 endometrioid adenocarcinoma s/p surgical staging.     Doing well post surgery    Discussed pathology, consistent with intermediate risk endometrial cancer, reviewed that adjuvant treatment would offer no survival benefit.       Plan:     1.)   Endometrial cancer- discussed signs and symptoms of recurrence, low riks. Plan follow up in 6 months for cancer surveillance visit, okay to follow up with NP clinic    2.) Genetic risk factors were assessed: MMR intact, not indicated      Kaykay Burden M.D., MPH,  F.A.C.O.G.  Department of Obstetrics, Gynecology and Women's Health  Division of Gynecologic Oncology

## 2023-11-16 NOTE — PATIENT INSTRUCTIONS
Follow up in 6 months for cancer check up visit  Call with any new concerns including vaginal bleeding or pelvic pain    Kaykay Burden MD

## 2023-11-16 NOTE — PROGRESS NOTES
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    Since surgery she reports doing well, minimal pain along right port incision. Her hair turned yellow as a result of having some gastric pyridium stained vomit leaking into her hair during the course of the procedure.     Work up to date  6/15/23 HgA1C 7.7   23 Pelvic US  Uterus with endometrial thickness at 21 mm  23 Endometrial Biopsy  Pathology Grade 1 Endometrioid Adenocarcinoma    10/30/23 Surgery: Total Laparoscopic Hysterectomy, Bilateral Salpingo-oophorectomy, Bilateral Pelvic [place] Lymph Nodes    Pathology: Grade 1 endometrioid adenocarcinoma, MMR intact, MELF pattern of myoinvasion.  Depth of Invasion 12/18 mm  LVSI low< 3 vessels  Right pelvic positive isolated tumor cells    OBGYN history and Health Maintenance:    Last Mammogram:   negative  Last Colonoscopy:   negative        Past Medical History:   Diagnosis Date    Acute kidney injury (H24) 2020    Allergic rhinitis, cause unspecified     Anemia     Aortic stenosis 2016    With new murmur noted 2016, normal echo, repeat echo 2017.    Aortic valve sclerosis     Atypical chest pain 2015    cuboid     left foot    Fungemia 2021    History of Clostridium difficile colitis     Hyperlipidemia LDL goal <100 2012    Hypertension goal BP (blood pressure) < 140/90     Musculoskeletal chest pain 2016    Obesity, Class I, BMI 30-34.9 2015    Pneumonia 2016    Pyelonephritis     Sepsis, due to unspecified organism, unspecified whether acute organ dysfunction present (H) 2020    Type 2 diabetes, HbA1c goal < 7% (H)      Urinary tract infection associated with nephrostomy catheter, initial encounter  8/8/2021       Past Surgical History:   Procedure Laterality Date    CATARACT EXTRACTION      CYSTOSCOPY, RETROGRADES, INSERT STENT URETER(S), COMBINED Right 07/15/2021    Procedure: CYSTOURETEROSCOPY, WITH RETROGRADE PYELOGRAM,  AND STENT INSERTION RIGHT;  Surgeon: Kirk Law MD;  [facility]    LAPAROSCOPIC HYSTERECTOMY TOTAL, BILATERAL SALPINGO-OOPHORECTOMY, NODE DISSECTION, COMBINED N/A 10/30/2023    Procedure: TOTAL LAPAROSCOPIC HYSTERECTOMY, WITH BILATERAL SALPINGO-OOPHORECTOMY, BILATEAL PELVIC SENTINEL LYMPH NODE;  Surgeon: Kaykay Burden MD;  [facility]    LASER HOLMIUM NEPHROLITHOTOMY VIA PERCUTANEOUS NEPHROSTOMY Right 08/05/2021    Procedure: NEPHROLITHOTOMY, PERCUTANEOUS, USING HOLMIUM LASER RIGHT;  Surgeon: Kirk Law MD;  [facility]    LASER HOLMIUM NEPHROLITHOTOMY VIA PERCUTANEOUS NEPHROSTOMY Right 08/13/2021    Procedure: Ureteroscopic assisted secondary right percutaneous nephrolihtotomy, Holmium laser lithotriipsy;  Surgeon: Kirk Law MD;  [facility]    PICC SINGLE LUMEN PLACEMENT Left 08/17/2021    41cm (2cm external), Medial brachial vein          Current Outpatient Medications   Medication Sig Dispense Refill    acetaminophen (TYLENOL) 325 MG tablet Take 2 tablets (650 mg) by mouth every 6 hours as needed for mild pain 24 tablet 0    blood glucose (ONETOUCH ULTRA) test strip Use to test blood sugar 2 times daily or as directed. 100 strip 3    blood glucose monitoring (ONE TOUCH ULTRA 2) meter device kit Use to test blood sugar 2 times daily or as directed. 1 kit 0    glimepiride (AMARYL) 4 MG tablet Take 1 tablet (4 mg) by mouth 2 times daily (with meals) 180 tablet 3    losartan-hydrochlorothiazide (HYZAAR) 50-12.5 MG tablet Take 1 tablet by mouth every morning 90 tablet 0    OneTouch Delica Lancets 33G MISC 1 Device by In Vitro route 2 times daily 100 each 11     potassium citrate (UROCIT-K) 10 MEQ (1080 MG) CR tablet Take 1 tablet (10 mEq) by mouth 2 times daily (Patient taking differently: Take 10 mEq by mouth every morning) 180 tablet 3    senna-docusate (SENOKOT-S/PERICOLACE) 8.6-50 MG tablet Take 1-2 tablets by mouth 2 times daily as needed for constipation 30 tablet 0    simvastatin (ZOCOR) 20 MG tablet Take 1 tablet (20 mg) by mouth At Bedtime TAKE ONE TABLET BY MOUTH AT BEDTIME 90 tablet 3    sitagliptin (JANUVIA) 25 MG tablet Take 1 tablet (25 mg) by mouth daily (Patient taking differently: Take 25 mg by mouth every evening) 90 tablet 0         Allergies   Allergen Reactions    Ibuprofen Other (See Comments)     numbness in hands and feet    Keflex [Cephalexin] Rash    Metformin Rash       Social History:  Social History     Tobacco Use    Smoking status: Never    Smokeless tobacco: Never   Substance Use Topics    Alcohol use: Yes     Alcohol/week: 10.0 standard drinks of alcohol     Comment: 1-2 drinks per week         Family History:   The patient's family history is notable for   Family History   Problem Relation Age of Onset    Hypertension Mother     Diabetes No family hx of     Cerebrovascular Disease No family hx of     Breast Cancer No family hx of     Cancer - colorectal No family hx of     Prostate Cancer No family hx of     Anesthesia Reaction No family hx of     Bleeding Disorder No family hx of     Clotting Disorder No family hx of          Physical Exam:     BP (!) 147/81 (BP Location: Right arm, Patient Position: Sitting, Cuff Size: Adult Regular)   Pulse 96   Temp 98.4  F (36.9  C) (Oral)   Resp 16   Wt 71 kg (156 lb 9.6 oz)   SpO2 98%   BMI 28.64 kg/m    Body mass index is 28.64 kg/m .    General: Alert and oriented, no acute distress  Psych: Mood stable  GI: No distention. No masses. No hernia.   Incision: intact, no erythema    Assessment: Kelly Barnes is a 83 year old woman with Stage IB grade 1 endometrioid adenocarcinoma s/p surgical  staging.     Doing well post surgery    Discussed pathology, consistent with intermediate risk endometrial cancer, reviewed that adjuvant treatment would offer no survival benefit.       Plan:     1.)   Endometrial cancer- discussed signs and symptoms of recurrence, low riks. Plan follow up in 6 months for cancer surveillance visit, okay to follow up with NP clinic    2.) Genetic risk factors were assessed: MMR intact, not indicated      Kaykay Burden M.D., MPH,  F.A.C.O.G.  Department of Obstetrics, Gynecology and Women's Health  Division of Gynecologic Oncology

## 2023-11-17 ENCOUNTER — VIRTUAL VISIT (OUTPATIENT)
Dept: UROLOGY | Facility: CLINIC | Age: 83
End: 2023-11-17
Attending: NURSE PRACTITIONER
Payer: COMMERCIAL

## 2023-11-17 DIAGNOSIS — N20.0 URIC ACID KIDNEY STONE: ICD-10-CM

## 2023-11-17 PROCEDURE — 99213 OFFICE O/P EST LOW 20 MIN: CPT | Mod: 24 | Performed by: NURSE PRACTITIONER

## 2023-11-17 RX ORDER — POTASSIUM CITRATE 10 MEQ/1
10 TABLET, EXTENDED RELEASE ORAL 2 TIMES DAILY
Qty: 180 TABLET | Refills: 3 | Status: SHIPPED | OUTPATIENT
Start: 2023-11-17

## 2023-11-17 ASSESSMENT — PAIN SCALES - GENERAL: PAINLEVEL: NO PAIN (0)

## 2023-11-17 NOTE — LETTER
11/17/2023       RE: Kelly Barnes  [address]  [facility] [address]     Dear Colleague,    Thank you for referring your patient, Kelly Barnes, to the [facility] [place] [facility]. Please see a copy of my visit note below.    Virtual Visit Details    Type of service:  Video Visit   Video Start Time: 11:29 AM  Video End Time: 11:34 AM    Originating Location (pt. Location): Home  Distant Location (provider location):  Off-site  Platform used for Video Visit: Benton    CC: Kidney stone follow up    Assessment/Plan:  83 year old female with a history of uric acid stones who is now maintained on potassium citrate 10 mEq twice daily. She remains stone-free on recent imaging, so will continue this. She asks about reducing her frequency, though described that the goal is to have her urine more alkaline for more hours of the day, so best to keep it twice daily. Tolerating this fine. Will reevaluate this next year.  -Continue the potassium citrate twice daily. Refills sent.   -Follow up with me in one year with a repeat MARYJO obtained beforehand.    Brenda Snowden, CNP  Department of Urology      Subjective:   83 year old female with PMH of DM2, hyperlipidemia, hypertension, recurrent UTIs and large-burden uric acid kidney stones, s/p PCNL in 2021. Stones uric acid. Currently managed with potassium citrate 10 mEq twice daily and has had no recurrence of stones. Recent scans (MARYJO and CT) confirm that she remains stone-free.     She has been tolerating the potassium citrate fine. Continues to take twice daily but asks about reducing the frequency of this.     Objective:     Exam:   Patient is a 83 year old female   No vital signs obtained due to virtual visit.  General Appearance: Well groomed, hygenic  Respiratory: No cough, no respiratory distress or labored breathing  Musculoskeletal: Grossly normal with no gross deficits  Skin: No  discoloration or apparent rashes  Neurologic: No tremors  Psychiatric: Alert and oriented  Further examination is deferred due to the nature of our visit.

## 2023-11-17 NOTE — NURSING NOTE
Is the patient currently in the state of [place]? YES    Visit mode:VIDEO    If the visit is dropped, the patient can be reconnected by: VIDEO VISIT: Text to cell phone:   Telephone Information:   Mobile [phone]       Will anyone else be joining the visit? NO  (If patient encounters technical issues they should call [phone])    How would you like to obtain your AVS? MyChart    Are changes needed to the allergy or medication list? Pt stated no changes to allergies and Pt stated no med changes    Reason for visit: RECHECK (Discuss imaging results )    Allyn MARCOS

## 2023-11-17 NOTE — PROGRESS NOTES
Virtual Visit Details    Type of service:  Video Visit   Video Start Time: 11:29 AM  Video End Time: 11:34 AM    Originating Location (pt. Location): Home  Distant Location (provider location):  Off-site  Platform used for Video Visit: Benton    CC: Kidney stone follow up    Assessment/Plan:  83 year old female with a history of uric acid stones who is now maintained on potassium citrate 10 mEq twice daily. She remains stone-free on recent imaging, so will continue this. She asks about reducing her frequency, though described that the goal is to have her urine more alkaline for more hours of the day, so best to keep it twice daily. Tolerating this fine. Will reevaluate this next year.  -Continue the potassium citrate twice daily. Refills sent.   -Follow up with me in one year with a repeat MARYJO obtained beforehand.    Brenda Snowden, CNP  Department of Urology      Subjective:   83 year old female with PMH of DM2, hyperlipidemia, hypertension, recurrent UTIs and large-burden uric acid kidney stones, s/p PCNL in 2021. Stones uric acid. Currently managed with potassium citrate 10 mEq twice daily and has had no recurrence of stones. Recent scans (MARYJO and CT) confirm that she remains stone-free.     She has been tolerating the potassium citrate fine. Continues to take twice daily but asks about reducing the frequency of this.     Objective:     Exam:   Patient is a 83 year old female   No vital signs obtained due to virtual visit.  General Appearance: Well groomed, hygenic  Respiratory: No cough, no respiratory distress or labored breathing  Musculoskeletal: Grossly normal with no gross deficits  Skin: No discoloration or apparent rashes  Neurologic: No tremors  Psychiatric: Alert and oriented  Further examination is deferred due to the nature of our visit.

## 2023-11-17 NOTE — PATIENT INSTRUCTIONS
UROLOGY CLINIC VISIT PATIENT INSTRUCTIONS    -Continue the potassium citrate 10 mEq twice daily.   -Follow up with me in one year with a renal ultrasound done beforehand.     If you have any issues, questions or concerns in the meantime, do not hesitate to contact us at [phone] or via [place].     Brenda Snowden, CNP  Department of Urology

## 2023-11-24 ENCOUNTER — DOCUMENTATION ONLY (OUTPATIENT)
Dept: FAMILY MEDICINE | Facility: CLINIC | Age: 83
End: 2023-11-24
Payer: COMMERCIAL

## 2023-11-24 DIAGNOSIS — E11.9 TYPE 2 DIABETES MELLITUS WITHOUT COMPLICATION, WITHOUT LONG-TERM CURRENT USE OF INSULIN (H): ICD-10-CM

## 2023-11-24 DIAGNOSIS — I10 HYPERTENSION GOAL BP (BLOOD PRESSURE) < 140/90: Primary | ICD-10-CM

## 2023-11-24 DIAGNOSIS — E78.5 HYPERLIPIDEMIA LDL GOAL <100: ICD-10-CM

## 2023-11-24 PROBLEM — G89.18 PAIN AT SURGICAL SITE: Status: RESOLVED | Noted: 2023-10-30 | Resolved: 2023-11-24

## 2023-11-24 NOTE — PROGRESS NOTES
Kelly Barnes has an upcoming lab appointment.        Future Appointments   Date Time Provider Department [place]   11/26/2023  9:30 AM [facility]    11/28/2023  8:30 AM Lea Lopez MD [facility]    12/21/2023 10:10 AM Lea Lopez MD [facility]        The appointment note says:need result by the 28th     The only lab order her chart is A1C from early 2023      Please review and place future orders as appropriate.  If no Lab order will be placed, please advise patient.      Also for consideration: PO    Health Maintenance Due   Topic    ANNUAL REVIEW OF  ORDERS     LIPID        Thanks,    Yasmeen Castillo

## 2023-11-26 ENCOUNTER — LAB (OUTPATIENT)
Dept: LAB | Facility: CLINIC | Age: 83
End: 2023-11-26
Payer: COMMERCIAL

## 2023-11-26 DIAGNOSIS — I10 HYPERTENSION GOAL BP (BLOOD PRESSURE) < 140/90: ICD-10-CM

## 2023-11-26 DIAGNOSIS — E11.9 TYPE 2 DIABETES MELLITUS WITHOUT COMPLICATION, WITHOUT LONG-TERM CURRENT USE OF INSULIN (H): ICD-10-CM

## 2023-11-26 DIAGNOSIS — E78.5 HYPERLIPIDEMIA LDL GOAL <100: ICD-10-CM

## 2023-11-26 LAB
ALBUMIN SERPL BCG-MCNC: 4.1 G/DL (ref 3.5–5.2)
ALP SERPL-CCNC: 74 U/L (ref 40–150)
ALT SERPL W P-5'-P-CCNC: 11 U/L (ref 0–50)
ANION GAP SERPL CALCULATED.3IONS-SCNC: 10 MMOL/L (ref 7–15)
AST SERPL W P-5'-P-CCNC: 26 U/L (ref 0–45)
BILIRUB SERPL-MCNC: 0.4 MG/DL
BUN SERPL-MCNC: 26.4 MG/DL (ref 8–23)
CALCIUM SERPL-MCNC: 9.7 MG/DL (ref 8.8–10.2)
CHLORIDE SERPL-SCNC: 101 MMOL/L (ref 98–107)
CHOLEST SERPL-MCNC: 200 MG/DL
CREAT SERPL-MCNC: 1.06 MG/DL (ref 0.51–0.95)
DEPRECATED HCO3 PLAS-SCNC: 28 MMOL/L (ref 22–29)
EGFRCR SERPLBLD CKD-EPI 2021: 52 ML/MIN/1.73M2
GLUCOSE SERPL-MCNC: 191 MG/DL (ref 70–99)
HBA1C MFR BLD: 7.7 % (ref 0–5.6)
HDLC SERPL-MCNC: 71 MG/DL
LDLC SERPL CALC-MCNC: 112 MG/DL
NONHDLC SERPL-MCNC: 129 MG/DL
POTASSIUM SERPL-SCNC: 5.2 MMOL/L (ref 3.4–5.3)
PROT SERPL-MCNC: 7.2 G/DL (ref 6.4–8.3)
SODIUM SERPL-SCNC: 139 MMOL/L (ref 135–145)
TRIGL SERPL-MCNC: 86 MG/DL

## 2023-11-26 PROCEDURE — 80061 LIPID PANEL: CPT

## 2023-11-26 PROCEDURE — 36415 COLL VENOUS BLD VENIPUNCTURE: CPT

## 2023-11-26 PROCEDURE — 80053 COMPREHEN METABOLIC PANEL: CPT

## 2023-11-26 PROCEDURE — 83036 HEMOGLOBIN GLYCOSYLATED A1C: CPT

## 2023-11-28 ENCOUNTER — VIRTUAL VISIT (OUTPATIENT)
Dept: FAMILY MEDICINE | Facility: CLINIC | Age: 83
End: 2023-11-28
Payer: COMMERCIAL

## 2023-11-28 DIAGNOSIS — I10 HYPERTENSION GOAL BP (BLOOD PRESSURE) < 140/90: ICD-10-CM

## 2023-11-28 DIAGNOSIS — E11.9 TYPE 2 DIABETES MELLITUS WITHOUT COMPLICATION, WITHOUT LONG-TERM CURRENT USE OF INSULIN (H): ICD-10-CM

## 2023-11-28 PROCEDURE — 99443 PR PHYSICIAN TELEPHONE EVALUATION 21-30 MIN: CPT | Mod: 95 | Performed by: FAMILY MEDICINE

## 2023-11-28 RX ORDER — BLOOD SUGAR DIAGNOSTIC
STRIP MISCELLANEOUS
Qty: 100 STRIP | Refills: 3 | Status: SHIPPED | OUTPATIENT
Start: 2023-11-28 | End: 2024-09-10

## 2023-11-28 RX ORDER — LOSARTAN POTASSIUM AND HYDROCHLOROTHIAZIDE 12.5; 5 MG/1; MG/1
1 TABLET ORAL EVERY MORNING
Qty: 90 TABLET | Refills: 3 | Status: SHIPPED | OUTPATIENT
Start: 2023-11-28

## 2023-11-28 NOTE — PROGRESS NOTES
"Kelly is a 83 year old who is being evaluated via a billable telephone visit.      What phone number would you like to be contacted at? [phone]  How would you like to obtain your AVS? Oliva    Distant Location (provider location):  Off-site    Assessment & Plan     (E11.9) Type 2 diabetes mellitus without complication, without long-term current use of insulin (H)  Stable, continue current therapies    (I10) Hypertension goal BP (blood pressure) < 140/90  Comment: At goal  The current medical regimen is effective;  continue present plan and medications.      =   MED REC REQUIRED  Post Medication Reconciliation Status: discharge medications reconciled, continue medications without change  BMI:   Estimated body mass index is 28.64 kg/m  as calculated from the following:    Height as of 10/30/23: 1.575 m (5' 2\").    Weight as of 11/16/23: 71 kg (156 lb 9.6 oz).   Lea Lopez MD  [facility]   Kelly is a 83 year old, presenting for the following health issues:  Hospital F/U        11/28/2023     7:55 AM   Additional Questions   Roomed by Love CLEVELAND     Hypertension Follow-up    Do you check your blood pressure regularly outside of the clinic? Yes   Are your blood pressures ever more than 140 on the top number (systolic) OR more   than 90 on the bottom number (diastolic), for example 140/90? No    Diabetes Follow-up    How often are you checking your blood sugar? Two times daily  Blood sugar testing frequency justification:  Uncontrolled diabetes  What time of day are you checking your blood sugars (select all that apply)?  Before and after meals  Have you had any blood sugars above 200?  Yes am morning fasting blood sugars have been out of goal, around 200, and she had values in the 300's while recently hospitalized, post prandial at goal, lowest 94.  Have you had any blood sugars below 70?  No  What symptoms do you notice when your blood sugar is low?  " None  What concerns do you have today about your diabetes? Other: am fasting glucose usually 170's to 180's   Do you have any of these symptoms? (Select all that apply)  No numbness or tingling in feet.  No redness, sores or blisters on feet.  No complaints of excessive thirst.  No reports of blurry vision.  No significant changes to weight.      BP Readings from Last 2 Encounters:   11/16/23 (!) 147/81   10/31/23 120/65     Hemoglobin A1C (%)   Date Value   11/26/2023 7.7 (H)   06/15/2023 7.7 (H)   05/11/2021 7.3 (H)   10/07/2020 7.2 (H)     LDL Cholesterol Calculated (mg/dL)   Date Value   11/26/2023 112 (H)   06/15/2023 91   05/11/2021 103 (H)   10/07/2020 134 (H)     GFR Estimate   Date Value Ref Range Status   11/26/2023 52 (L) >60 mL/min/1.73m2 Final   10/03/2023 53 (L) >60 mL/min/1.73m2 Final   05/31/2023 52 (L) >60 mL/min/1.73m2 Final     Comment:     eGFR calculated using 2021 CKD-EPI equation.   05/11/2021 55 (L) >60 mL/min/[1.73_m2] Final     Comment:     Non  GFR Calc  Starting 12/18/2018, serum creatinine based estimated GFR (eGFR) will be   calculated using the Chronic Kidney Disease Epidemiology Collaboration   (CKD-EPI) equation.     12/21/2020 51 (L) >60 mL/min/[1.73_m2] Final     Comment:     Non  GFR Calc  Starting 12/18/2018, serum creatinine based estimated GFR (eGFR) will be   calculated using the Chronic Kidney Disease Epidemiology Collaboration   (CKD-EPI) equation.     12/20/2020 48 (L) >60 mL/min/[1.73_m2] Final     Comment:     Non  GFR Calc  Starting 12/18/2018, serum creatinine based estimated GFR (eGFR) will be   calculated using the Chronic Kidney Disease Epidemiology Collaboration   (CKD-EPI) equation.           Hospital Follow-up Visit:    Hospital/Nursing Home/ Rehab Facility: [facility]  Date of Admission: 10/30/2023  Date of Discharge: 10/31/2023  Reason(s) for Admission:  "Hysterectomy    Was your hospitalization related to COVID-19? No   Problems taking medications regularly:  None  Medication changes since discharge: None  Problems adhering to non-medication therapy:  None    Summary of hospitalization:  [facility] discharge summary reviewed  Diagnostic Tests/Treatments reviewed.  Follow up needed: none  Other Healthcare Providers Involved in Patient s Care:         None  Update since discharge: improved.         Plan of care communicated with patient           =  Review of Systems   Constitutional, HEENT, cardiovascular, pulmonary, GI, , musculoskeletal, neuro, skin, endocrine and psych systems are negative, except as otherwise noted.      Objective    Vitals - Patient Reported  Systolic (Patient Reported): 130  Diastolic (Patient Reported): 70  Weight (Patient Reported): 70.8 kg (156 lb)  Height (Patient Reported): 157.5 cm (5' 2\")  BMI (Based on Pt Reported Ht/Wt): 28.53  Pain Score: No Pain (0)    Physical Exam   healthy, alert, and no distress  PSYCH: Alert and oriented times 3; coherent speech, normal   rate and volume, able to articulate logical thoughts, able   to abstract reason, no tangential thoughts, no hallucinations   or delusions  Her affect is normal  RESP: No cough, no audible wheezing, able to talk in full sentences  Remainder of exam unable to be completed due to telephone visits        Phone call duration: 22 minutes      "

## 2023-11-28 NOTE — RESULT ENCOUNTER NOTE
Patient was seen today in clinic.  I discussed results in clinic, please see clinic progress note.    Lea Lopez MD 11/28/2023

## 2023-11-29 NOTE — PROGRESS NOTES
08/21/23 0500   Appointment Info   Signing clinician's name / credentials Maury Cortez PT, Board Certified Geriatric Clinical Specialist   Total/Authorized Visits [employer]   Medical Diagnosis Poor balance (R26.89)   PT Tx Diagnosis Impaired force production   Precautions/Limitations Falls   Progress Note/Certification   Start of Care Date 07/10/23   Onset of illness/injury or Date of Surgery 06/15/23   Therapy Frequency 1x per 1-2 weeks   Predicted Duration 90 days   Certification date from 07/10/23   Certification date to 10/07/23   PT Goal 1   Goal Identifier [id number]   Goal Description Pt demo improved LE strength and balance to be able to walk >1147' (10% improved from baseline) for improved ease of community access.   Goal Progress 7/24/23  1208' no AD  7/10/23: 1043' no AD.   Target Date 10/07/23   Date Met 07/24/23   PT Goal 2   Goal Identifier 5x sit/stand   Goal Description Pt demo improved LE strength and decreased risk for falls as evidenced by 5x sit/stand  in <12.9 secs (20% improved from baseline).   Goal Progress 7/24/23  9.47 sec    7/10/23: 16.16 secs.   Target Date 10/07/23   Date Met 07/24/23   PT Goal 3   Goal Identifier TUG   Goal Description Pt demo decreased risk for falls and improved efficiency of initiating gait upon standing as evidenced by TUG <9.6 secs (20% improved).   Goal Progress 8/7/23 11:53 sec. 7/10/23: 12.00 secs no AD.   Target Date 10/07/23   PT Goal 4   Goal Identifier Floor recovery   Goal Description Pt demo ability to stand up from the ground in case of fall, and improved ease of gardening - without external support for assistance.   Goal Progress 8/7/23  [place] Moderately hard but doable. 7/17/23: Pt states she got up from the floor 2x in the last week - once after slipping on some vomit while walking in the mall, 2nd while out gardening - no furniture assist with either.   Target Date 10/07/23   PT Goal 5   Goal Identifier  "Stairs   Goal Description Pt report improved confidence going up.down at least 6 stairs without a railing as result of improved balance and LE strength to be able to access stairs in the community.   Target Date 10/07/23   Goal Progress 8/21/23 went up her 2 stairs with out the rail.   Subjective Report   Subjective Report No falls , went up her steps with out railing. feels a little steadier.   Therapeutic Procedure/Exercise   Therapeutic Procedures: strength, endurance, ROM, flexibillity minutes (51921) 40   Ther Proc 1 Nustep   Ther Proc 1 - Details 6 mins seat 7 arms 9  96% 77bpm,146/62   PTRx Ther Proc 1 Hip Abduction Straight Leg Raise   PTRx Ther Proc 1 - Details 10 reps cues to keep  tummy tight and heel long   PTRx Ther Proc 2 Bridging With Single Leg   PTRx Ther Proc 2 - Details Progressed from B bridging (5 reps) to unilat bridging x3 reps B. Pt stated it felt equally challenging on both sides.   PTRx Ther Proc 3 Education Sheet General   PTRx Ther Proc 3 - Details -10x and alternating sides now along with abd setting   PTRx Ther Proc 4 Nerve Mobility Sciatic Position 5   PTRx Ther Proc 4 - Details 5 reps   PTRx Ther Proc 5 Knee Bends   PTRx Ther Proc 5 - Details has done this but with out the band.  practiced 10 reps with no band booty back then stqnd tqll   PTRx Ther Proc 6 Floor Transfer - Half Kneeling Technique   PTRx Ther Proc 6 - Details 2 reps with out mat hands on floor 1 rep  with mat.  instructed to alternate legs   PTRx Ther Proc 7 Lateral Stepdown with Neutral Trunk Position   PTRx Ther Proc 7 - Details 10 reps  4\" step really tough on R LE.    PTRx Ther Proc 8 Bridging #1   PTRx Ther Proc 8 - Details 10  reps 5\" hold   Skilled Intervention selection and instruction in performance of exercise   Patient Response/Progress tolerated well   Neuromuscular Re-education   Neuromuscular re-ed of mvmt, balance, coord, kinesthetic sense, posture, proprioception minutes (88031) 8   PTRx Neuro Re-ed 1 " "Braiding/[place]   PTRx Neuro Re-ed 1 - Details 15' x 2 each direction   Skilled Intervention selection of ex   Patient Response/Progress tolerated well   Neuro Re-ed 1 stepping up and over   Neuro Re-ed 1 - Details lateral stepping up  and over 4\" step x 10 tolerated well   Education   Learner/Method Patient   Education Comments HEP progression, balance   Plan   Home program sidelying hip abd, single leg bridging, toe taps on bottom step. squat with YTB, Nerve glide,   Updates to plan of care plan to see x 2 more visits   Plan for next session check HEP , Check goals, continue to advance core and hip girdle strengthening   Comments   Comments Going to [place] in Oct. check goals, adjust PRN   Total Session Time   Timed Code Treatment Minutes 48   Total Treatment Time (sum of timed and untimed services) 48       DISCHARGE  Reason for Discharge: Patient has met all goals.  Patient chooses to discontinue therapy.  Pt did not return after this visit.    Equipment Issued: none    Discharge Plan: Patient to continue home program.    Referring Provider:  Lea Lopez    "

## 2024-03-26 DIAGNOSIS — E11.9 TYPE 2 DIABETES MELLITUS WITHOUT COMPLICATION, WITHOUT LONG-TERM CURRENT USE OF INSULIN (H): ICD-10-CM

## 2024-03-28 RX ORDER — LANCETS 33 GAUGE
EACH MISCELLANEOUS
Qty: 200 EACH | Refills: 3 | Status: SHIPPED | OUTPATIENT
Start: 2024-03-28

## 2024-04-10 ENCOUNTER — HOSPITAL ENCOUNTER (OUTPATIENT)
Dept: ULTRASOUND IMAGING | Facility: CLINIC | Age: 84
Discharge: HOME | End: 2024-04-10
Attending: INTERNAL MEDICINE | Admitting: INTERNAL MEDICINE
Payer: COMMERCIAL

## 2024-04-10 ENCOUNTER — OFFICE VISIT (OUTPATIENT)
Dept: PEDIATRICS | Facility: CLINIC | Age: 84
End: 2024-04-10
Payer: COMMERCIAL

## 2024-04-10 ENCOUNTER — OFFICE VISIT (OUTPATIENT)
Dept: FAMILY MEDICINE | Facility: CLINIC | Age: 84
End: 2024-04-10
Payer: COMMERCIAL

## 2024-04-10 VITALS
SYSTOLIC BLOOD PRESSURE: 143 MMHG | RESPIRATION RATE: 16 BRPM | BODY MASS INDEX: 28.72 KG/M2 | WEIGHT: 156.09 LBS | DIASTOLIC BLOOD PRESSURE: 82 MMHG | OXYGEN SATURATION: 98 % | TEMPERATURE: 98.3 F | HEART RATE: 70 BPM | HEIGHT: 62 IN

## 2024-04-10 VITALS
WEIGHT: 156.1 LBS | DIASTOLIC BLOOD PRESSURE: 72 MMHG | RESPIRATION RATE: 18 BRPM | TEMPERATURE: 98.3 F | HEART RATE: 80 BPM | SYSTOLIC BLOOD PRESSURE: 126 MMHG | BODY MASS INDEX: 28.73 KG/M2 | OXYGEN SATURATION: 97 % | HEIGHT: 62 IN

## 2024-04-10 DIAGNOSIS — M79.89 LEFT LEG SWELLING: Primary | ICD-10-CM

## 2024-04-10 DIAGNOSIS — M79.662 PAIN OF LEFT CALF: ICD-10-CM

## 2024-04-10 DIAGNOSIS — M79.662 PAIN OF LEFT CALF: Primary | ICD-10-CM

## 2024-04-10 DIAGNOSIS — M79.662 PAIN OF LEFT LOWER LEG: ICD-10-CM

## 2024-04-10 PROCEDURE — 99214 OFFICE O/P EST MOD 30 MIN: CPT | Performed by: INTERNAL MEDICINE

## 2024-04-10 PROCEDURE — 99207 REFERRAL TO ACUTE AND DIAGNOSTIC SERVICES: CPT | Performed by: FAMILY MEDICINE

## 2024-04-10 PROCEDURE — 93971 EXTREMITY STUDY: CPT | Mod: LT

## 2024-04-10 RX ORDER — RESPIRATORY SYNCYTIAL VIRUS VACCINE 120MCG/0.5
0.5 KIT INTRAMUSCULAR ONCE
Qty: 1 EACH | Refills: 0 | Status: CANCELLED | OUTPATIENT
Start: 2024-04-10 | End: 2024-04-10

## 2024-04-10 ASSESSMENT — PAIN SCALES - GENERAL: PAINLEVEL: SEVERE PAIN (7)

## 2024-04-10 NOTE — PROGRESS NOTES
Assessment & Plan     Left leg swelling  Pain of left lower leg  -Symptoms x 1.5 months worse after [place] trip in March  -No signs of cellulitis  -Vitals stable  -Concerns for DVT. Recommended ADS, Dariana PEREYRA provider accepted patient. Patient given info to Dariana PEREYRA today, will head over for further eval.  - Referral to Acute and Diagnostic Services (Day of diagnostic / First order acute)          Subjective   Kelly is a 84 year old, presenting for the following health issues:  Pain (Pain in leg started in February and has gotten worst- lower left leg)      4/10/2024     9:06 AM   Additional Questions   Roomed by Anh bolden     Via the fflick Maintenance questionnaire, the patient has reported the following services have been completed -Eye Exam, this information has been sent to the abstraction team.  Pain    History of Present Illness       Reason for visit:  Leg pain  Symptom onset:  More than a month  Symptoms include:  Leg pain  Symptom intensity:  Moderate  Symptom progression:  Worsening  Had these symptoms before:  No    She eats 2-3 servings of fruits and vegetables daily.She consumes 0 sweetened beverage(s) daily.She exercises with enough effort to increase her heart rate 20 to 29 minutes per day.  She exercises with enough effort to increase her heart rate 5 days per week.   She is taking medications regularly.     Pt with hx of DM2.  Here for leg/calf pain. Heavy ache x 1.5 months. Started off as annoying sensation. Worse with walking. Better in the am.   Usually wears compression socks, but did not wear them today. Took a trip to [place] in March, symptoms worse after trip.  Denies fever, chills, CP, SOB, numbness, tingling, weakness.   Has been taking up to 4 aspirin a day for pain. Hesitant about taking too much acetaminophen.                  Objective    /72 (BP Location: Right arm, Patient Position: Sitting, Cuff Size: Adult Regular)   Pulse 80   Temp 98.3  F (36.8  C) (Temporal)   Resp 18  "  Ht 1.575 m (5' 2\")   Wt 70.8 kg (156 lb 1.6 oz)   LMP  (LMP Unknown)   SpO2 97%   BMI 28.55 kg/m    Body mass index is 28.55 kg/m .  Physical Exam   MS: 1-2+ swelling of left leg to knee. No significant warmth or erythema when compared to right leg. Pulses intact distally. No sores noted on left leg or foot.             Signed Electronically by: Sarath Hernandez DO    "

## 2024-04-10 NOTE — PROGRESS NOTES
Acute and Diagnostic Services Clinic Visit        Assessment & Plan     Pt referred to [facility] with concern for DVT.  This is thankfully negative.   I suspect her symptoms are musculoskeletal in nature with overuse while in [place].  This is supported by the improvement with aspirin.    I suggest a change from aspirin to Tylenol NTE 3000mg in 24 hours.  The patient is agreeable.      Should the pain persist of worsen can be evaluated further for peripheral arterial disease or bony destructive process.      Return precautions discussed.      Pain of left calf    - US Lower Extremity Venous Duplex Left      36  minutes were spent doing chart review, history and exam, documentation and further activities per the note.            No follow-ups on file.    Hoang Mendoza is a 84 year old, presenting for the following health issues:  Leg Pain      Pt with LLE pain around the calf for several weeks.  Started prior to a trip to [place] but notes increased walking while taking in the sites there.    She notes improvement with Aspirin.      No chest pain, cough, exertional dyspnea.      HPI     Evaluation for possible DVT  Onset/Duration: 1.5 months  Description:       Location: left calf       Redness: no        Pain: moderate       Warmth: no        Joint swelling YES- baseline has swelling  Progression of symptoms worse  Accompanying signs and symptoms:       Fevers: no        Numbness/tingling/weakness: Unsure       Chest pain/pleurisy: no        Shortness of breath: no   History        Trauma: no         Recent travel/when: YES- got back from [place] on 03/17/24        Previous history of DVT: no         Family history of DVT: no         Recent surgery: no   Aggravating factors include: walking and climbing stairs  Therapies tried and outcome: rest/inactivity and aspirin  Prior surgery on arteries of veins in this area: No      History of bilateral edema, worse in the left leg at baseline, which she says is about consistent  "with what how her legs look now. Pain developed about the end of February, before her trip to [place]. Worse with movement. It feels best in the morning. She describes it as an achy pain that can get up to a 7/10.      She notes that it is now impeding her ability to move around normally, and she struggles to walk around the block or climb the stairs in her house. She denies weakness and notes she avoids using the leg because it is painful. She does take aspirin, which helps, but she is requiring more to achieve pain relief.       Objective    BP (!) 143/82 (BP Location: Left arm, Patient Position: Sitting, Cuff Size: Adult Regular)   Pulse 70   Temp 98.3  F (36.8  C)   Resp 16   Ht 1.575 m (5' 2\")   Wt 70.8 kg (156 lb 1.4 oz)   LMP  (LMP Unknown)   SpO2 98%   BMI 28.55 kg/m    Body mass index is 28.55 kg/m .  Physical Exam   GENERAL: alert and no distress, very pleasant woman  RESP: breathing comfortably  MS: no gross musculoskeletal defects noted, no edema  NEURO: Normal strength and tone, mentation intact and speech normal            Signed Electronically by: Alexander Byrne MD    "

## 2024-04-16 ENCOUNTER — VIRTUAL VISIT (OUTPATIENT)
Dept: FAMILY MEDICINE | Facility: CLINIC | Age: 84
End: 2024-04-16
Payer: COMMERCIAL

## 2024-04-16 DIAGNOSIS — C54.1 ENDOMETRIAL CANCER (H): ICD-10-CM

## 2024-04-16 DIAGNOSIS — E11.59 TYPE 2 DIABETES MELLITUS WITH OTHER CIRCULATORY COMPLICATIONS (H): ICD-10-CM

## 2024-04-16 DIAGNOSIS — M79.605 PAIN OF LEFT LOWER EXTREMITY: Primary | ICD-10-CM

## 2024-04-16 DIAGNOSIS — N18.31 CHRONIC KIDNEY DISEASE, STAGE 3A (H): ICD-10-CM

## 2024-04-16 PROCEDURE — 99443 PR PHYSICIAN TELEPHONE EVALUATION 21-30 MIN: CPT | Mod: 93 | Performed by: FAMILY MEDICINE

## 2024-04-16 NOTE — PROGRESS NOTES
Kelly is a 84 year old who is being evaluated via a billable telephone visit.    What phone number would you like to be contacted at? [phone]   How would you like to obtain your AVS? Oliva  Originating Location (pt. Location): Home    Distant Location (provider location):  Off-site    Assessment & Plan     (N03.269) Pain of left lower extremity  (primary encounter diagnosis)  PVD strongly suspected, will obtain imaging as below and refer to cardiovascular surgeon if positive.  The patient indicates understanding of these issues and agrees with the plan.   Plan: US GATO Doppler with Exercise Bilateral  Please call [facility] at [phone] to schedule imaging.    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   05/11/2021 55 (L) >60 mL/min/[1.73_m2] Final     Comment:     Non  GFR Calc  Starting 12/18/2018, serum creatinine based estimated GFR (eGFR) will be   calculated using the Chronic Kidney Disease Epidemiology Collaboration   (CKD-EPI) equation.     12/21/2020 51 (L) >60 mL/min/[1.73_m2] Final     Comment:     Non  GFR Calc  Starting 12/18/2018, serum creatinine based estimated GFR (eGFR) will be   calculated using the Chronic Kidney Disease Epidemiology Collaboration   (CKD-EPI) equation.     12/20/2020 48 (L) >60  mL/min/[1.73_m2] Final     Comment:     Non  GFR Calc  Starting 12/18/2018, serum creatinine based estimated GFR (eGFR) will be   calculated using the Chronic Kidney Disease Epidemiology Collaboration   (CKD-EPI) equation.           Plan: laron Mendoza is a 84 year old, presenting for the following health issues:  RECHECK (Leg pain)      4/16/2024     9:20 AM   Additional Questions   Roomed by Love DICKINSON   Left leg pain that worsens over the course of the day, associated with swelling, although the swelling is not new.  Pain involves leg between ankle and knee, sometimes into thigh. She thinks the pain is muscle pain, not bony. Pain is aggravated by any walking or weight bearing, completely relieved with rest. She reports no pain every morning when she wakes. She denies foot pain, color changes of leg or foot.    She had a recent LOWER EXTREMITY venous duplex scan that was negative for a blood clot.    Wearing compression socks helps a little.  She takes aspirin which helps a little, but over the counter pain meds wear off and don't help at all in the afternoon when pain is severe.    Via the Health Maintenance questionnaire, the patient has reported the following services have been completed -Eye Exam, this information has been sent to the abstraction team.    Recent Labs   Lab Test 11/26/23  0931 06/15/23  1550   CHOL 200* 199   HDL 71 74   * 91   TRIG 86 172*       HPI       Diabetes Date    BP Readings from Last 2 Encounters:   04/10/24 (!) 143/82   04/10/24 126/72     Hemoglobin A1C (%)   Date Value   11/26/2023 7.7 (H)   06/15/2023 7.7 (H)   05/11/2021 7.3 (H)   10/07/2020 7.2 (H)     LDL Cholesterol Calculated (mg/dL)   Date Value   11/26/2023 112 (H)   06/15/2023 91   05/11/2021 103 (H)   10/07/2020 134 (H)             Chronic Kidney Disease Follow-up    Do you take any over the counter pain medicine?: aspirin and acetaminophen daily        Review of  Systems  Constitutional, HEENT, cardiovascular, pulmonary, gi and gu systems are negative, except as otherwise noted.      Objective    Vitals - Patient Reported  Pain Score: Moderate Pain (4)  Pain Loc: Lower Leg        Physical Exam   General: Alert and no distress //Respiratory: No audible wheeze, cough, or shortness of breath // Psychiatric:  Appropriate affect, tone, and pace of words      US Lower Extremity Venous Duplex Left    Result Date: 4/10/2024  VENOUS ULTRASOUND LEFT LOWER EXTREMITY April 10, 2024 2:13 PM HISTORY: Left calf pain for 1.5 months. Recent air travel, evaluate for deep vein thrombosis. Pain of left calf. COMPARISON: None. TECHNIQUE: Color Doppler and spectral waveform analysis performed throughout the deep veins of the left lower extremity. FINDINGS: The left common femoral, proximal great saphenous, femoral, and popliteal veins demonstrate normal blood flow, compression, and augmentation. Posterior tibial and peroneal veins are compressible. Contralateral right common femoral vein is patent.     IMPRESSION: Negative for deep vein thrombosis in the left lower MARCELINA YORK MD   SYSTEM ID:  [id number]       Phone call duration: 25 minutes  Signed Electronically by: Lea Lopez MD

## 2024-04-17 ENCOUNTER — ANCILLARY PROCEDURE (OUTPATIENT)
Dept: ULTRASOUND IMAGING | Facility: CLINIC | Age: 84
End: 2024-04-17
Attending: FAMILY MEDICINE
Payer: COMMERCIAL

## 2024-04-17 DIAGNOSIS — M79.605 PAIN OF LEFT LOWER EXTREMITY: ICD-10-CM

## 2024-04-17 DIAGNOSIS — E11.59 TYPE 2 DIABETES MELLITUS WITH OTHER CIRCULATORY COMPLICATIONS (H): ICD-10-CM

## 2024-04-17 PROCEDURE — 93922 UPR/L XTREMITY ART 2 LEVELS: CPT | Performed by: RADIOLOGY

## 2024-04-18 DIAGNOSIS — M79.605 PAIN OF LEFT LOWER EXTREMITY: Primary | ICD-10-CM

## 2024-04-18 DIAGNOSIS — R68.89 ABNORMAL ANKLE BRACHIAL INDEX: ICD-10-CM

## 2024-04-19 NOTE — RESULT ENCOUNTER NOTE
HI, thank you for getting your test done!    It does show that you have some cardiovascular disease on your left side, and I do recommend that you see a vascular surgeon to discuss these results and to consider possibly doing additional testing.  Please call to schedule your appointment    Diagnoses: Pain of left lower extremity  Abnormal toe brachial index  Order: Vascular Surgery Referral  [facility]  [address] [address]    Phone: [phone]      Comment: Please be aware that coverage of these services is subject to the terms and limitations of your health insurance plan.  Call member services at your health plan with any benefit or coverage questions.    Please let me know if you have any questions!    Sincerely,  Dr. Lea Lopez MD  4/18/2024

## 2024-04-22 ENCOUNTER — TELEPHONE (OUTPATIENT)
Dept: VASCULAR SURGERY | Facility: CLINIC | Age: 84
End: 2024-04-22
Payer: COMMERCIAL

## 2024-04-22 NOTE — TELEPHONE ENCOUNTER
Patient Contacted for the patient to call back and schedule the following:New PAD.  Location: [facility]  Provider:   Appointment type: New PAD  Appointment mode In person  Return date: Next Available    Specialty phone number: [phone]    Is Imaging Needed: No  Imaging Phone Number to provide to patient: No    Additional Notes: The pt request the [facility] the pt was given the phone nilda and the referral was transferred to the [facility].  Tre Parkinson on 4/22/2024 at 4:50 PM

## 2024-04-23 NOTE — TELEPHONE ENCOUNTER
СВЕТЛАНАI below and active requests. Patient states [place] [facility] [place] location.    Patient cites two previous US. Asking for next available.    Kelly  [phone]

## 2024-04-23 NOTE — TELEPHONE ENCOUNTER
Routing to scheduling to coordinate the following:    NEW PAD consult with Vascular Medicine  Please schedule this at next available    Appt note:    Ref by Dr. Lea Lopez for PAD.  GATO w/o exercise & LLE venous duplex in Epic.

## 2024-05-08 ENCOUNTER — OFFICE VISIT (OUTPATIENT)
Dept: OTHER | Facility: CLINIC | Age: 84
End: 2024-05-08
Attending: INTERNAL MEDICINE
Payer: COMMERCIAL

## 2024-05-08 ENCOUNTER — TELEPHONE (OUTPATIENT)
Dept: OTHER | Facility: CLINIC | Age: 84
End: 2024-05-08

## 2024-05-08 VITALS
OXYGEN SATURATION: 98 % | HEART RATE: 81 BPM | WEIGHT: 155.2 LBS | BODY MASS INDEX: 28.39 KG/M2 | DIASTOLIC BLOOD PRESSURE: 79 MMHG | SYSTOLIC BLOOD PRESSURE: 126 MMHG

## 2024-05-08 DIAGNOSIS — I10 BENIGN ESSENTIAL HYPERTENSION: ICD-10-CM

## 2024-05-08 DIAGNOSIS — E78.5 HYPERLIPIDEMIA LDL GOAL <70: ICD-10-CM

## 2024-05-08 DIAGNOSIS — E11.9 TYPE 2 DIABETES MELLITUS WITHOUT COMPLICATION, WITHOUT LONG-TERM CURRENT USE OF INSULIN (H): ICD-10-CM

## 2024-05-08 DIAGNOSIS — I89.0 LYMPHEDEMA: Primary | ICD-10-CM

## 2024-05-08 DIAGNOSIS — M79.662 PAIN OF LEFT LOWER LEG: ICD-10-CM

## 2024-05-08 DIAGNOSIS — C54.1 ENDOMETRIAL CANCER (H): ICD-10-CM

## 2024-05-08 PROCEDURE — G0463 HOSPITAL OUTPT CLINIC VISIT: HCPCS | Performed by: INTERNAL MEDICINE

## 2024-05-08 PROCEDURE — 99417 PROLNG OP E/M EACH 15 MIN: CPT | Performed by: INTERNAL MEDICINE

## 2024-05-08 PROCEDURE — 99205 OFFICE O/P NEW HI 60 MIN: CPT | Performed by: INTERNAL MEDICINE

## 2024-05-08 PROCEDURE — G2211 COMPLEX E/M VISIT ADD ON: HCPCS | Performed by: INTERNAL MEDICINE

## 2024-05-08 RX ORDER — ASPIRIN 325 MG
325 TABLET, DELAYED RELEASE (ENTERIC COATED) ORAL PRN
COMMUNITY

## 2024-05-08 NOTE — PROGRESS NOTES
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  Starting approximately February 2024 she is experiencing left leg pain and bilateral leg swelling left more than right side.  She underwent venous duplex in both legs no DVT.  Last week she underwent resting GATO with TBI which was unremarkable triphasic waveforms as delineated below.  She denies any chest pain, shortness of breath or palpitations.  She never smoked.  She is having issues to put compression stockings on currently using 15 to 18 mmHg strength.     She is new to me reviewed available records, imaging studies in the epic and updated chart      PAST MEDICAL HISTORY  Past Medical History:   Diagnosis Date    Acute kidney injury (H24) 12/19/2020    Allergic rhinitis, cause unspecified     Anemia     Aortic stenosis 02/29/2016    With new murmur noted 2/2016, normal echo, repeat echo 2/2017.    Aortic valve sclerosis     Atypical chest pain 07/24/2015    cuboid     left foot    Endometrial cancer (H)     Fungemia 08/16/2021    History of Clostridium difficile colitis     Hyperlipidemia LDL goal <100  11/01/2012    Hypertension goal BP (blood pressure) < 140/90     Musculoskeletal chest pain 11/25/2016    Obesity, Class I, BMI 30-34.9 11/11/2015    Pneumonia 03/2016    Pyelonephritis     Sepsis, due to unspecified organism, unspecified whether acute organ dysfunction present (H) 12/19/2020    Type 2 diabetes, HbA1c goal < 7% (H)     Urinary tract infection associated with nephrostomy catheter, initial encounter  (H24) 08/08/2021       CURRENT MEDICATIONS  Current Outpatient Medications   Medication Sig Dispense Refill    aspirin (ASA) 325 MG EC tablet Take 325 mg by mouth as needed for moderate pain      blood glucose (ONETOUCH ULTRA) test strip Use to test blood sugar 2 times daily or as directed. 100 strip 3    blood glucose monitoring (ONE TOUCH ULTRA 2) meter device kit Use to test blood sugar 2 times daily or as directed. 1 kit 0    glimepiride (AMARYL) 4 MG tablet Take 1 tablet (4 mg) by mouth 2 times daily (with meals) 180 tablet 3    Lancets (ONETOUCH DELICA PLUS [id number]) MISC USE ONE DEVICE BY IN VITRO ROUTE TWO TIMES A  each 3    losartan-hydrochlorothiazide (HYZAAR) 50-12.5 MG tablet Take 1 tablet by mouth every morning 90 tablet 3    potassium citrate (UROCIT-K) 10 MEQ (1080 MG) CR tablet Take 1 tablet (10 mEq) by mouth 2 times daily 180 tablet 3    simvastatin (ZOCOR) 20 MG tablet Take 1 tablet (20 mg) by mouth At Bedtime TAKE ONE TABLET BY MOUTH AT BEDTIME 90 tablet 3    sitagliptin (JANUVIA) 25 MG tablet Take 1 tablet (25 mg) by mouth every evening 90 tablet 3     No current facility-administered medications for this visit.       PAST SURGICAL HISTORY:  Past Surgical History:   Procedure Laterality Date    CATARACT EXTRACTION      CYSTOSCOPY, RETROGRADES, INSERT STENT URETER(S), COMBINED Right 07/15/2021    Procedure: CYSTOURETEROSCOPY, WITH RETROGRADE PYELOGRAM,  AND STENT INSERTION RIGHT;  Surgeon: Kirk Law MD;  Location: [facility] OR    LAPAROSCOPIC HYSTERECTOMY TOTAL, BILATERAL  SALPINGO-OOPHORECTOMY, NODE DISSECTION, COMBINED N/A 10/30/2023    Procedure: TOTAL LAPAROSCOPIC HYSTERECTOMY, WITH BILATERAL SALPINGO-OOPHORECTOMY, BILATEAL PELVIC SENTINEL LYMPH NODE;  Surgeon: Kaykay Burden MD;  Location: [facility]    LASER HOLMIUM NEPHROLITHOTOMY VIA PERCUTANEOUS NEPHROSTOMY Right 08/05/2021    Procedure: NEPHROLITHOTOMY, PERCUTANEOUS, USING HOLMIUM LASER RIGHT;  Surgeon: Kirk Law MD;  Location: [facility] OR    LASER HOLMIUM NEPHROLITHOTOMY VIA PERCUTANEOUS NEPHROSTOMY Right 08/13/2021    Procedure: Ureteroscopic assisted secondary right percutaneous nephrolihtotomy, Holmium laser lithotriipsy;  Surgeon: Kirk Law MD;  Location: [facility]    PICC SINGLE LUMEN PLACEMENT Left 08/17/2021    41cm (2cm external), Medial brachial vein       ALLERGIES     Allergies   Allergen Reactions    Ibuprofen Other (See Comments)     numbness in hands and feet    Keflex [Cephalexin] Rash    Metformin Rash       FAMILY HISTORY  Family History   Problem Relation Age of Onset    Hypertension Mother     Diabetes No family hx of     Cerebrovascular Disease No family hx of     Breast Cancer No family hx of     Cancer - colorectal No family hx of     Prostate Cancer No family hx of     Anesthesia Reaction No family hx of     Bleeding Disorder No family hx of     Clotting Disorder No family hx of        VASCULAR FAMILY HISTORY  1st order relative with atherosclerotic PAD: No  1st order relative with AAA: No  Family history of Familial Hyperlipidemia No  Family History of Hypercoagulable state:No    VASCULAR RISK FACTORS  1. Diabetes:Yes uncontrolled  2. Smoking: has never smoked.  3. HTN: controlled  4.Hyperlipidemia: Yes - uncontrolled      SOCIAL HISTORY  Social History     Socioeconomic History    Marital status:      Spouse name: Not on file    Number of children: Not on file    Years of education: Not on file    Highest education level: Not on file   Occupational History    Not on file   Tobacco  Use    Smoking status: Never    Smokeless tobacco: Never   Vaping Use    Vaping status: Never Used   Substance and Sexual Activity    Alcohol use: Yes     Alcohol/week: 10.0 standard drinks of alcohol     Comment: 1-2 drinks per week    Drug use: No    Sexual activity: Yes     Partners: Male   Other Topics Concern     Service No    Blood Transfusions No    Caffeine Concern No    Occupational Exposure No    Hobby Hazards No    Sleep Concern Yes    Stress Concern No    Weight Concern Yes    Special Diet No    Back Care No    Exercise Yes     Comment: walk    Bike Helmet No    Seat Belt Yes    Self-Exams Yes    Parent/sibling w/ CABG, MI or angioplasty before 65F 55M? No   Social History Narrative    Not on file     Social Determinants of Health     Financial Resource Strain: Low Risk  (11/28/2023)    Financial Resource Strain     Within the past 12 months, have you or your family members you live with been unable to get utilities (heat, electricity) when it was really needed?: No   Food Insecurity: Low Risk  (11/28/2023)    Food Insecurity     Within the past 12 months, did you worry that your food would run out before you got money to buy more?: No     Within the past 12 months, did the food you bought just not last and you didn t have money to get more?: No   Transportation Needs: Low Risk  (11/28/2023)    Transportation Needs     Within the past 12 months, has lack of transportation kept you from medical appointments, getting your medicines, non-medical meetings or appointments, work, or from getting things that you need?: No   Physical Activity: Not on file   Stress: Not on file   Social Connections: Not on file   Interpersonal Safety: Low Risk  (4/10/2024)    Interpersonal Safety     Do you feel physically and emotionally safe where you currently live?: Yes     Within the past 12 months, have you been hit, slapped, kicked or otherwise physically hurt by someone?: No     Within the past 12 months, have you  been humiliated or emotionally abused in other ways by your partner or ex-partner?: No   Housing Stability: Low Risk  (11/28/2023)    Housing Stability     Do you have housing? : Yes     Are you worried about losing your housing?: No       ROS:   General: No change in weight, sleep or appetite.  Normal energy.  No fever or chills  Eyes: Negative for vision changes or eye problems  ENT: No problems with ears, nose or throat.  No difficulty swallowing.  Resp: No coughing, wheezing or shortness of breath  CV: No chest pains or palpitations  GI: No nausea, vomiting,  heartburn, abdominal pain, diarrhea, constipation or change in bowel habits  : No urinary frequency or dysuria, bladder or kidney problems  Musculoskeletal: No significant muscle or joint pains  Neurologic: No headaches, numbness, tingling, weakness, problems with balance or coordination  Psychiatric: No problems with anxiety, depression or mental health  Heme/immune/allergy: No history of bleeding or clotting problems or anemia.  No allergies or immune system problems  Endocrine: No history of thyroid disease, diabetes or other endocrine disorders  Skin: No rashes,worrisome lesions or skin problems  Vascular: Bilateral lower extremity swelling left more than right side worsening since February 2024  Underwent hysterectomy with bilateral oophorectomy in October 2023 for endometrial cancer and also left pelvic lymph node dissection      EXAM:  /79 (BP Location: Left arm, Patient Position: Chair, Cuff Size: Adult Regular)   Pulse 81   Wt 155 lb 3.2 oz (70.4 kg)   LMP  (LMP Unknown)   SpO2 98%   BMI 28.39 kg/m    In general, the patient is a pleasant female in no apparent distress.    HEENT: NC/AT.  PERRLA.  EOMI.  Sclerae white, not injected.  Nares clear.  Pharynx without erythema or exudate.  Dentition intact.    Neck: No adenopathy.  No thyromegaly. Carotids +2/2 bilaterally without bruits.  No jugular venous distension.   Heart: RRR. Normal  S1, S2 splits physiologically. No murmur, rub, click, or gallop. The PMI is in the 5th ICS in the midclavicular line. There is no heave.    Lungs: CTA.  No ronchi, wheezes, rales.  No dullness to percussion.   Abdomen: Soft, nontender, nondistended. No organomegaly. No AAA.  No bruits.   Extremities: She has a classical features of bilateral lower extremity lymphedema with loss of contour of the legs dorsal hump, squaring of the toes in both lower extremities left more than right side  She has a good palpable and dopplerable peripheral pulses  No evidence of venous insufficiency and no varicose veins  No cellulitis      Labs:  LIPID RESULTS:  Lab Results   Component Value Date    CHOL 200 (H) 11/26/2023    CHOL 194 05/11/2021    HDL 71 11/26/2023    HDL 77 05/11/2021     (H) 11/26/2023     (H) 05/11/2021    TRIG 86 11/26/2023    TRIG 68 05/11/2021    CHOLHDLRATIO 3.1 11/10/2015       LIVER ENZYME RESULTS:  Lab Results   Component Value Date    AST 26 11/26/2023    AST 15 12/19/2020    ALT 11 11/26/2023    ALT 19 12/19/2020       CBC RESULTS:  Lab Results   Component Value Date    WBC 10.1 10/30/2023    WBC 9.2 12/21/2020    RBC 4.97 10/30/2023    RBC 4.15 12/21/2020    HGB 14.1 10/30/2023    HGB 11.3 (L) 12/21/2020    HCT 43.6 10/30/2023    HCT 34.8 (L) 12/21/2020    MCV 88 10/30/2023    MCV 84 12/21/2020    MCH 28.4 10/30/2023    MCH 27.2 12/21/2020    MCHC 32.3 10/30/2023    MCHC 32.5 12/21/2020    RDW 13.3 10/30/2023    RDW 13.3 12/21/2020     10/30/2023     12/21/2020       BMP RESULTS:  Lab Results   Component Value Date     11/26/2023     05/11/2021    POTASSIUM 5.2 11/26/2023    POTASSIUM 3.7 09/19/2022    POTASSIUM 4.9 05/11/2021    CHLORIDE 101 11/26/2023    CHLORIDE 102 09/19/2022    CHLORIDE 108 05/11/2021    CO2 28 11/26/2023    CO2 26 09/19/2022    CO2 27 05/11/2021    ANIONGAP 10 11/26/2023    ANIONGAP 7 09/19/2022    ANIONGAP 5 05/11/2021     (H)  11/26/2023     (H) 10/31/2023     (H) 09/19/2022     (H) 05/11/2021    BUN 26.4 (H) 11/26/2023    BUN 32 (H) 09/19/2022    BUN 30 05/11/2021    CR 1.06 (H) 11/26/2023    CR 0.97 05/11/2021    GFRESTIMATED 52 (L) 11/26/2023    GFRESTIMATED 55 (L) 05/11/2021    GFRESTBLACK 64 05/11/2021    JAN 9.7 11/26/2023    JAN 9.1 05/11/2021        A1C RESULTS:  Lab Results   Component Value Date    A1C 7.7 (H) 11/26/2023    A1C 7.3 (H) 05/11/2021       THYROID RESULTS:  Lab Results   Component Value Date    TSH 1.33 08/17/2023    TSH 1.11 10/15/2019       Procedures:   EXAMINATION: CT CHEST/ABDOMEN/PELVIS W CONTRAST, 10/6/2023 8:03 AM     TECHNIQUE: Helical CT images of the chest, abdomen, and pelvis  obtained following the administration of intravenous and oral contrast  material. Coronal and sagittal reconstructions obtained.  CONTRAST: Isovue 370 84cc injected IV     HISTORY: Endometrial cancer (H)  COMPARISON: Pelvic ultrasound 9/6/2023, CT abdomen/pelvis 9/21/2021     FINDINGS:     MEDIASTINUM/AXILLAE: Normal.     LUNGS AND PLEURA: No suspicious masses or nodules. Minimal subpleural  scarring/atelectasis in the lower lobes.     CORONARY ARTERY CALCIFICATION: Mild.     HEPATOBILIARY: Normal.     SPLEEN: Normal.     PANCREAS: Normal.     ADRENALS: Normal.     KIDNEYS/BLADDER: Prominent extrarenal pelves. No hydronephrosis. No  solid or cystic masses. Unremarkable urinary bladder.     BOWEL: Colonic diverticulosis, fairly extensive within the sigmoid  colon without evidence of acute diverticulitis. No bowel obstruction  or inflammatory change.     LYMPH NODES: No Lymphadenopathy.     VASCULATURE: Normal.     PELVIC ORGANS: Thickened, irregular, and heterogeneously enhancing  endometrium consistent with the 9/7/2023 pathology diagnosis of  endometrioid adenocarcinoma. Abnormal endometrium measures  approximately 3.4 x 2.5 x 1.9 cm. 5 mm well-circumscribed  hypoattenuating lesion at the tip of the fundal  myometrial wall  (series 3 image [id number]), correlating with the anechoic lesion on the  prior ultrasound and could represent a myometrial cyst. Presence of  cervical or vaginal involvement is indeterminate on CT. No evidence of  gross extra serosal/parametrial extension. Small amount of air within  the endocervical canal and upper vagina noted, possibly related to  intervention/examination. Bilateral ovaries are identified within  normal postmenopausal limits for size. No pelvic free fluid.     MUSCULOSKELETAL: No acute or suspicious osseous abnormality. Sclerotic  changes along the SI joints and pubic symphysis likely represent  sacroiliitis and osteitis pubis. Degenerative pattern of multilevel  vertebral body endplate sclerosis. Grade 1 anterolisthesis of L4 on L5  without spondylolysis.                                                                         IMPRESSION:   1. Endometrial tumor consistent with biopsy proven endometrioid  adenocarcinoma.  2. No solid organ metastases identified. No evidence of local  metastatic disease to the extent assessed on CT.     I have personally reviewed the examination and initial interpretation  and I agree with the findings.     VIJI PEREA MD     RESTING GATO, TBI, VPR, AND 1ST DIGIT PPG 4/17/2024 3:59 PM     CLINICAL HISTORY: Pain of left lower extremity; Type 2 diabetes  mellitus with other circulatory complications (H).      COMPARISONS: None available.     REFERRING PROVIDER: CHRISTA CABALLERO     TECHNIQUE: Patient did not meet requirements for exercise. Exercise  study not performed.     Bilateral GATO, TBI, VPR, and 1st digit PPG obtained.     FINDINGS:  RIGHT:       Brachial: 144 mmHg       Ankle (PT): 150 mmHg       Ankle (DP): 154 mmHg       1st Digit: 110 mmHg          GATO: 1.07       TBI: 0.76          VPR:            High thigh: Normal            Low thigh: Normal            Calf: Normal            Ankle: Normal          1st Digit PPG: Normal      LEFT:       Brachial: 143 mmHg       Ankle (PT): 150 mmHg       Ankle (DP): 156 mmHg       1st Digit: 94 mmHg          GATO: 1.08       TBI: 0.65          VPR:            High thigh: Normal            Low thigh: Normal            Calf: Normal            Ankle: Normal          1st Digit PPG: Normal                                                                      IMPRESSION:  1. RIGHT:       A. Resting GATO is normal, 1.07.       B. Resting TBI is normal, 0.76.     2. LEFT:       A. Resting GATO is normal, 1.08.       B. Resting TBI is ABNORMAL, 0.65.     3. Exercise study not performed.     Guidelines:     GATO Diagnostic Criteria (Based on criteria published in Circulation  2011; 124: 4192-2617):    > 1.4: Non compressible    1.00 - 1.40: Normal    0.91 - 0.99: Borderline    At or below 0.90: Abnormal     SHERLY BOWERS MD      VENOUS ULTRASOUND LEFT LOWER EXTREMITY April 10, 2024 2:13 PM      HISTORY: Left calf pain for 1.5 months. Recent air travel, evaluate  for deep vein thrombosis. Pain of left calf.     COMPARISON: None.     TECHNIQUE: Color Doppler and spectral waveform analysis performed  throughout the deep veins of the left lower extremity.     FINDINGS: The left common femoral, proximal great saphenous, femoral,  and popliteal veins demonstrate normal blood flow, compression, and  augmentation. Posterior tibial and peroneal veins are compressible.  Contralateral right common femoral vein is patent.                                                                      IMPRESSION: Negative for deep vein thrombosis in the left lower     MARCELINA YORK MD        Assessment and Plan:     1. Lymphedema, BLE  - Lymphedema Therapy  Referral; Future    2. Endometrial cancer (H) S/P ROBE with BSO and left pelvic node dissection 10/30/2023    3. Pain of left lower leg    4. Benign essential hypertension    5. Hyperlipidemia LDL goal <70    6. Type 2 diabetes mellitus without complication, without long-term  current use of insulin (H)      This is a very pleasant 84-year-old female with classical features of bilateral lower extremity lymphedema left more than right side and history of endometrial cancer status post total abdominal hysterectomy and bilateral salpingo-oophorectomy left-sided pelvic node dissection on October 30, 2023 having pain and discomfort in the left leg unable to walk more than 3 blocks left leg is heavier than the right leg.  No injury no history of a cellulitis.  Last CT scan was done before the surgery.  2 weeks ago underwent bilateral lower extremity venous duplex negative for DVT and recently GATO with TBI which was unremarkable.  No evidence of arterial insufficiency or venous insufficiency.  She never smoked.  Type 2 diabetes reasonably controlled hypertension well-controlled lipids are decent range with statin.  She has been taking aspirin /Tylenol which is helping her pain.    Reviewed imaging studies with the patient, she will need additional evaluation and she will benefit with compression with MLD, wraps etc.    At present my recommendations,  Arrange referral to see lymphedema therapist for MLD, compression, wraps and appropriate custom made compression stockings since she is having issues to wear over-the-counter ones  Arrange lymphoscintigram  Arrange CT abdomen pelvis with contrast to rule out any compressive etiology given history of endometrial cancer and status post total abdominal hysterectomy and bilateral salpingo-oophorectomy and also left pelvic lymph node dissection  She will benefit with lymphatic formula 1000 supplementation information given flyer given for the first month take 2 pills daily and then followed by 1 pill daily    Follow-up with me in the clinic 1 week after completion of all the studies and evaluation by edema therapist     75 minutes spent on the date of the encounter doing chart review, history and exam, documentation, and further activities as noted  above.  Prolonged services due to medical complexity and review of previous evaluation and recent imaging studies.  She is new to this clinic reviewed extensive records and updated chart.  Coordinated imaging studies, referral to see edema therapist etc.    The longitudinal care of plan for the above diagnoses was addressed during this visit. Due to added complexity of care, we will continue to supprt Kelly TARAN Barnes and the subsequent management of this/these conditions and with ongoing continuity of care for this/these conditions.     AVS with detailed instructions given    Thank you for the consultation  This note was dictated by utilizing dragon software    Copy of this note to primary care physician      Richard Freedman MD,FAHA,FSVM,FNLA, FACP  Vascular Medicine  Clinical Hypertension Specialist   Clinical Lipidologist

## 2024-05-08 NOTE — PROGRESS NOTES
[facility]        Patient is here for a consult to discuss Peripheral artery disease (PAD).     Pt is currently taking Aspirin and Statin.    /79 (BP Location: Left arm, Patient Position: Chair, Cuff Size: Adult Regular)   Pulse 81   Wt 155 lb 3.2 oz (70.4 kg)   LMP  (LMP Unknown)   SpO2 98%   BMI 28.39 kg/m      The provider has been notified that the patient has no concerns.     Questions patient would like addressed today are: N/A.    Refills are needed: No    Has homecare services and agency name:  Noemí Barragan MA

## 2024-05-08 NOTE — TELEPHONE ENCOUNTER
Scheduled patient for CTA scan, transferred patient to central scheduling to coordinate NM scan patient will call [facility] back to schedule follow-up visit.

## 2024-05-08 NOTE — PATIENT INSTRUCTIONS
Please see edema therapist referral done they will call you     Use compression stockings as you are     Our staff will call you and arrange CT abdomen and pelvis with contrast and Lymphoscintigram test     Take Lymphatic formula 1000 for the first month one pill two times a day then one pill daily     Follow up with me one week after all the tests completed

## 2024-05-08 NOTE — TELEPHONE ENCOUNTER
Please coordinate the following:    NM lymphoscintigraphy   CT abdomen pelvis   In person follow up with Dr. Freedman a week after all tests.  Please schedule this at next available     Appt note: Follow up to 5/8/24    Jeanette WANG, RN    [facility]  Vascular [facility]  Office: [phone]  Fax: [phone]

## 2024-05-09 NOTE — TELEPHONE ENCOUNTER
Patient is scheduled for her CTA on 05/21/24, NM Lymphoscintigraphy on 05/29/24 and follow up with Dr Freedman 06/05/24.

## 2024-05-10 ENCOUNTER — THERAPY VISIT (OUTPATIENT)
Dept: PHYSICAL THERAPY | Facility: CLINIC | Age: 84
End: 2024-05-10
Attending: INTERNAL MEDICINE
Payer: COMMERCIAL

## 2024-05-10 DIAGNOSIS — C54.1 ENDOMETRIAL CANCER (H): Primary | ICD-10-CM

## 2024-05-10 DIAGNOSIS — I89.0 LYMPHEDEMA: ICD-10-CM

## 2024-05-10 PROCEDURE — 97110 THERAPEUTIC EXERCISES: CPT | Mod: GP | Performed by: PHYSICAL THERAPIST

## 2024-05-10 PROCEDURE — 97535 SELF CARE MNGMENT TRAINING: CPT | Mod: GP | Performed by: PHYSICAL THERAPIST

## 2024-05-10 PROCEDURE — 97162 PT EVAL MOD COMPLEX 30 MIN: CPT | Mod: GP | Performed by: PHYSICAL THERAPIST

## 2024-05-10 NOTE — PROGRESS NOTES
PHYSICAL THERAPY EVALUATION  Type of Visit: Evaluation    See electronic medical record for Abuse and Falls Screening details.    Subjective   84 year old female presenting to lymphedema clinic for evaluation of BLE edema (left worse than R). Kelly reports swelling in both legs for many years, but recently worsened with new onset pain in left calf that is affecting her walking and daily activity. She underwent endometrial cancer in 2023 with hysterectomy, bilateral oophorectomy and left pelvic lymph node dissection. She had an unremarkable GATO recently, and is scheduled for further testing in the next month with the vascular clinic.         Presenting condition or subjective complaint: leg swelling  Date of onset: 05/08/24    Relevant medical history: Cancer; Diabetes; High blood pressure   Dates & types of surgery: Kidney stone 2021; hysterectomy Oct 2023 (LND)    Prior diagnostic imaging/testing results:       Prior therapy history for the same diagnosis, illness or injury:        Prior Level of Function  Transfers: Independent  Ambulation: Independent  ADL: Independent  IADL: Driving, Finances, Housekeeping, Laundry, Meal preparation    Living Environment  Social support: With family members   Type of home: House   Stairs to enter the home: Yes 3     Ramp:     Stairs inside the home: Yes 0 (12) Is there a railing: Yes   Help at home: Home management tasks (cooking, cleaning); Home and Yard maintenance tasks  Equipment owned: Walker; Straight Cane; Walker with wheels     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 Tends to be much better in AM, tylenol/ aspirin helps but not always. Pain by the evening limits most activity         Objective       EDEMA EVALUATION  Additional history:  Body part affected by edema: both legs, left worse  If cancer related, treatment: hysterectomy,  bilateral oophorectomy and left pelvic lymph node dissection  If not cancer related, problems with veins or cause of swelling: n  Distance able to walk: 1-2 blocks  Time able to stand: 15-30 minutes  Sensation problems in hands/feet: No    Edema etiology: hysterectomy, bilateral oophorectomy and left pelvic lymph node dissection    FUNCTIONAL SCALES  LLIS 16    Cognitive Status Examination  Orientation: Oriented to person, place and time   Level of Consciousness: Alert  Follows Commands and Answers Questions: 100% of the time  Personal Safety and Judgement: Intact  Memory: Intact    EDEMA  Skin Condition: Dryness, Intact, Non-pitting  BLE edema L greater than R. Intact, non-pitting puffy edema. Dorsal foot full with loss of contour of ankles. Flaking dry skin, but soft and mobile  Scar: Abdominal incision from hysterectomy, bilateral oophorectomy and left pelvic lymph node dissection  Capillary Refill: Symmetrical  Radial Pulse:   Dorsal Pedal Pulse: Symmetrical  Stemmer Sign: +  Ulceration: No    GIRTH MEASUREMENTS: Refer to separate girth measurement flowsheet.     VOLUME LE  Right LE (mL) 3482.97 mL   Left LE (mL) 3914.63 mL   LE Volume Comparison LLE volume greater than RLE volume   % Difference 12.4%   Head/Neck Volume     Trunk Volume    Genital Volume       RANGE OF MOTION: LE ROM WFL  STRENGTH: LE Strength WFL  POSTURE: WFL  PALPATION: Non-tender to deep palpation of LE  ACTIVITIES OF DAILY LIVING: IND  BED MOBILITY: Independent  TRANSFERS: Independent  GAIT/LOCOMOTION: Limited by pain in left lower leg, no device, independent  BALANCE: WFL  SENSATION: LE Sensation WNL  VASCULAR:  GATO normal, no hx of vein issues, scheduled for lymphoscintigraphy  COORDINATION:   MUSCLE TONE:     Assessment & Plan   CLINICAL IMPRESSIONS  Medical Diagnosis: BLE lymphedema    Treatment Diagnosis: Lymphedema   Impression/Assessment: Patient is a 84 year old female with BLE edema and left leg pain complaints.  The following  significant findings have been identified: Pain, Decreased ROM/flexibility, and Edema. These impairments interfere with their ability to perform self care tasks, recreational activities, household chores, and community mobility as compared to previous level of function.     Clinical Decision Making (Complexity):  Clinical Presentation: Evolving/Changing  Clinical Presentation Rationale: based on medical and personal factors listed in PT evaluation  Clinical Decision Making (Complexity): Moderate complexity    PLAN OF CARE  Treatment Interventions:  Interventions: Manual Therapy, Therapeutic Exercise, Self-Care/Home Management, Gradient Compression Bandaging    Long Term Goals     PT Goal 1  Goal Identifier: Lymphedema Home Program  Goal Description: Pt will be independent with drainage exercise, self massage, and skin care to best manage swelling to decrease symptoms.  Target Date: 08/07/24  PT Goal 2  Goal Identifier: Volume  Goal Description: Pt will have 5% reduction in BLE volume for improved tissue integrity, decreased risk of infection, and improved fit of clothing  Target Date: 07/09/24  PT Goal 3  Goal Identifier: LLIS  Goal Description: Pt will have at least 7 point reduction in score on subsequent assessment to reflect the MCID in impact of swelling on quality of life.  Target Date: 08/07/24  PT Goal 4  Goal Identifier: Maintenance  Goal Description: Pt will use compression garments as instructed AND home management tools/program for long term management of chronic condition to prevent tissue fibrosis, infection, wound and other secondary sequelae  Target Date: 08/07/24  PT Goal 5  Goal Identifier: Pain  Goal Description: Pt will report left leg pain no greater than 3/10 to demonstrate greater than 50% reduction to allow return to daily exercise walks and allow travel to [place].  Target Date: 08/07/24      Frequency of Treatment: 3x a week  Duration of Treatment: 90 days    Recommended Referrals to Other  Professionals:   Education Assessment:   Learner/Method: Patient;Listening;Pictures/Video;Demonstration    Risks and benefits of evaluation/treatment have been explained.   Patient/Family/caregiver agrees with Plan of Care.     Evaluation Time:     PT Eval, Moderate Complexity Minutes (75176): 20       Signing Clinician: Genet Velazquez, BELLA CHIRINOS [facility]                                                                                   OUTPATIENT PHYSICAL THERAPY      PLAN OF TREATMENT FOR OUTPATIENT REHABILITATION   Patient's Last Name, First Name, Kelly Enciso YOB: 1940   Provider's Name   [facility]   Medical Record No.  [id number]     Onset Date: 05/08/24  Start of Care Date: 05/10/24     Medical Diagnosis:  BLE lymphedema      PT Treatment Diagnosis:  Lymphedema Plan of Treatment  Frequency/Duration: 3x a week/ 90 days    Certification date from 05/10/24 to 08/07/24         See note for plan of treatment details and functional goals     Genet Velazquez, PT                         I CERTIFY THE NEED FOR THESE SERVICES FURNISHED UNDER        THIS PLAN OF TREATMENT AND WHILE UNDER MY CARE     (Physician attestation of this document indicates review and certification of the therapy plan).              Referring Provider:  Richard Freedman    Initial Assessment  See Epic Evaluation- Start of Care Date: 05/10/24

## 2024-05-13 DIAGNOSIS — E11.9 TYPE 2 DIABETES MELLITUS WITHOUT COMPLICATION, WITHOUT LONG-TERM CURRENT USE OF INSULIN (H): ICD-10-CM

## 2024-05-13 RX ORDER — GLIMEPIRIDE 4 MG/1
4 TABLET ORAL 2 TIMES DAILY WITH MEALS
Qty: 180 TABLET | Refills: 3 | Status: SHIPPED | OUTPATIENT
Start: 2024-05-13

## 2024-05-13 NOTE — PROGRESS NOTES
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   She is here today for a surveillance visit.      Oncology history:   23: Endometrial biopsy:  - Endometrioid adenocarcinoma, FIGO grade 1    10/6/23: CT cap IMPRESSION:   1. Endometrial tumor consistent with biopsy proven endometrioid adenocarcinoma.  2. No solid organ metastases identified. No evidence of local metastatic disease to the extent assessed on CT.    10/30/23:   A. Left pelvic sentinel lymph node, lymphadenectomy:  - One reactive lymph node examined, negative for malignancy (0/1)      B. Right pelvic sentinel lymph node, lymphadenectomy:  - One lymph node positive for isolated tumor cells (ITCs)      C. Uterus, cervix, bilateral fallopian tubes, and bilateral ovaries, hysterectomy with bilateral salpingo-oophorectomy:  - Endometrial endometrioid adenocarcinoma, FIGO grade 1, MMR-intact, with microcystic, elongated and fragmented (MELF)-pattern of myoinvasion  - Leiomyoma (0.3 cm)  - Cervix with no significant histologic abnormality   - Ovaries with atrophic changes  - Fallopian tubes with no significant histologic abnormality       Today she comes to clinic feeling well. She denies any vaginal bleeding, no changes in her bowel or bladder habits, no nausea/emesis, and no difficulties eating or sleeping. She denies any abdominal discomfort/bloating, no fevers or chills, and no chest pain or shortness of breath. She reports the following:     Home BPs 120-130/70-80  Home BGs 121-182  Just started seeing a vascular specialist; just started lymphedema treatment  Has had leg swelling since at least ; wears compression stockings as of 4 years ago  Has had 2 leg US since Feb as was having left calf pain; does not have a DVT      Annual exam with  PCP:11/23  Mammogram: 5/2010  Colonoscopy: 5/2009      Review of Systems  See HPI      Past Medical History:    Past Medical History:   Diagnosis Date    Acute kidney injury (H24) 12/19/2020    Allergic rhinitis, cause unspecified     Anemia     Aortic stenosis 02/29/2016    With new murmur noted 2/2016, normal echo, repeat echo 2/2017.    Aortic valve sclerosis     Atypical chest pain 07/24/2015    cuboid     left foot    Endometrial cancer (H)     Fungemia 08/16/2021    History of Clostridium difficile colitis     Hyperlipidemia LDL goal <100 11/01/2012    Hypertension goal BP (blood pressure) < 140/90     Musculoskeletal chest pain 11/25/2016    Obesity, Class I, BMI 30-34.9 11/11/2015    Pneumonia 03/2016    Pyelonephritis     Sepsis, due to unspecified organism, unspecified whether acute organ dysfunction present (H) 12/19/2020    Type 2 diabetes, HbA1c goal < 7% (H)     Urinary tract infection associated with nephrostomy catheter, initial encounter  (H24) 08/08/2021         Past Surgical History:    Past Surgical History:   Procedure Laterality Date    CATARACT EXTRACTION      CYSTOSCOPY, RETROGRADES, INSERT STENT URETER(S), COMBINED Right 07/15/2021    Procedure: CYSTOURETEROSCOPY, WITH RETROGRADE PYELOGRAM,  AND STENT INSERTION RIGHT;  Surgeon: Kirk Law MD;  Location: [facility] OR    LAPAROSCOPIC HYSTERECTOMY TOTAL, BILATERAL SALPINGO-OOPHORECTOMY, NODE DISSECTION, COMBINED N/A 10/30/2023    Procedure: TOTAL LAPAROSCOPIC HYSTERECTOMY, WITH BILATERAL SALPINGO-OOPHORECTOMY, BILATEAL PELVIC SENTINEL LYMPH NODE;  Surgeon: Kaykay Burden MD;  Location: [facility] OR    LASER HOLMIUM NEPHROLITHOTOMY VIA PERCUTANEOUS NEPHROSTOMY Right 08/05/2021    Procedure: NEPHROLITHOTOMY, PERCUTANEOUS, USING HOLMIUM LASER RIGHT;  Surgeon: Kirk Law MD;  Location: [facility] OR    LASER HOLMIUM NEPHROLITHOTOMY VIA PERCUTANEOUS NEPHROSTOMY Right 08/13/2021    Procedure: Ureteroscopic assisted secondary right percutaneous  nephrolihtotomy, Holmium laser lithotriipsy;  Surgeon: Kirk Law MD;  Location: [facility] OR    PICC SINGLE LUMEN PLACEMENT Left 08/17/2021    41cm (2cm external), Medial brachial vein       Current Medications:     Current Outpatient Medications   Medication Sig Dispense Refill    aspirin (ASA) 325 MG EC tablet Take 325 mg by mouth as needed for moderate pain      blood glucose (ONETOUCH ULTRA) test strip Use to test blood sugar 2 times daily or as directed. 100 strip 3    blood glucose monitoring (ONE TOUCH ULTRA 2) meter device kit Use to test blood sugar 2 times daily or as directed. 1 kit 0    glimepiride (AMARYL) 4 MG tablet TAKE ONE TABLET BY MOUTH TWICE A DAY WITH MEALS 180 tablet 3    Lancets (ONETOUCH DELICA PLUS MHUGSI32V) MISC USE ONE DEVICE BY IN VITRO ROUTE TWO TIMES A  each 3    losartan-hydrochlorothiazide (HYZAAR) 50-12.5 MG tablet Take 1 tablet by mouth every morning 90 tablet 3    potassium citrate (UROCIT-K) 10 MEQ (1080 MG) CR tablet Take 1 tablet (10 mEq) by mouth 2 times daily 180 tablet 3    simvastatin (ZOCOR) 20 MG tablet Take 1 tablet (20 mg) by mouth At Bedtime TAKE ONE TABLET BY MOUTH AT BEDTIME 90 tablet 3    sitagliptin (JANUVIA) 25 MG tablet Take 1 tablet (25 mg) by mouth every evening 90 tablet 3         Allergies:        Allergies   Allergen Reactions    Ibuprofen Other (See Comments)     numbness in hands and feet    Keflex [Cephalexin] Rash    Metformin Rash        Social History:     Social History     Tobacco Use    Smoking status: Never    Smokeless tobacco: Never   Substance Use Topics    Alcohol use: Yes     Alcohol/week: 10.0 standard drinks of alcohol     Comment: 1-2 drinks per week       History   Drug Use No         Family History:     The patient's family history is notable for     Family History   Problem Relation Age of Onset    Hypertension Mother     Diabetes No family hx of     Cerebrovascular Disease No family hx of     Breast Cancer No family hx of      Cancer - colorectal No family hx of     Prostate Cancer No family hx of     Anesthesia Reaction No family hx of     Bleeding Disorder No family hx of     Clotting Disorder No family hx of          Physical Exam:     /80 (BP Location: Left arm, Patient Position: Sitting, Cuff Size: Adult Regular)   Pulse 67   Temp 98.5  F (36.9  C) (Oral)   Resp 16   Wt 71.2 kg (156 lb 14.4 oz)   LMP  (LMP Unknown)   SpO2 99%   BMI 28.70 kg/m    Body mass index is 28.7 kg/m .    General Appearance: healthy and alert, no distress     HEENT: no thyromegaly, no palpable nodules or masses        Cardiovascular: regular rate and rhythm, no gallops, rubs or murmurs     Respiratory: lungs clear, no rales, rhonchi or wheezes, normal diaphragmatic excursion    Musculoskeletal: extremities non tender and without edema    Skin: no lesions or rashes     Neurological: normal gait, no gross defects     Psychiatric: appropriate mood and affect                               Hematological: normal cervical, supraclavicular and inguinal lymph nodes     Gastrointestinal:       abdomen soft, non-tender, non-distended, no organomegaly or masses    Genitourinary: External genitalia and urethral meatus appears normal.  Vagina is smooth without nodularity or masses.  Cervix surgically absent  Bimanual exam reveal no masses, nodularity or fullness.  Recto-vaginal exam confirms these findings.      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   She is here today for a surveillance visit.      31 minutes spent on the date of the encounter doing chart review, history and exam, documentation and further activities as noted above      Plan:     1.)       Patient to RTC in 6 months for her next surveillance visit.  Reviewed recommendations from [facility] not to perform surveillance pap smears in women diagnosed with endometrial cancer as this does not improve detection of  local recurrence. Reviewed signs and symptoms for when she should contact the clinic or seek additional care. Patient to contact the clinic with any questions or concerns in the interim.  Answered all of her questions to the best of my ability.     2.) Genetic risk factors were assessed and her MMR proteins were intact. Reviewed what this means and she does not have Haynes syndrome.     3.) Labs and/or tests ordered include:  none.     4.) Health maintenance issues addressed today include annual health maintenance and non-gynecologic issues with PCP. Continue monitoring BP and BG at home.     APRIL Rogers, WHNP-BC, ANP-BC, AOCNP  Women's Health Nurse Practitioner  Adult Nurse Practitioner  Division of Gynecologic Oncology'      CC  Patient Care Team:  Lea Lopez MD as PCP - General (Family Medicine)  Jose Sue RPH as Pharmacist (Pharmacist)  Brenda Snowden CNP as Nurse Practitioner (Urology)  Kirk Law MD as MD (Urology)  Brenda Snowdne CNP as Assigned Surgical Provider  Cindy Cortez PT as Physical Therapist (Physical Therapy)  Dacia Lopes APRN CNP as Assigned OBGYN Provider  Kaykay Burden MD as Assigned Cancer Care Provider  Lea Lopez MD as Assigned PCP  Genet Velazquez PT as Physical Therapist (Physical Therapy)  SELF, REFERRED

## 2024-05-14 ENCOUNTER — ONCOLOGY VISIT (OUTPATIENT)
Dept: ONCOLOGY | Facility: CLINIC | Age: 84
End: 2024-05-14
Attending: OBSTETRICS & GYNECOLOGY
Payer: COMMERCIAL

## 2024-05-14 VITALS
SYSTOLIC BLOOD PRESSURE: 130 MMHG | WEIGHT: 156.9 LBS | TEMPERATURE: 98.5 F | BODY MASS INDEX: 28.7 KG/M2 | DIASTOLIC BLOOD PRESSURE: 80 MMHG | HEART RATE: 67 BPM | OXYGEN SATURATION: 99 % | RESPIRATION RATE: 16 BRPM

## 2024-05-14 DIAGNOSIS — E11.9 TYPE 2 DIABETES MELLITUS WITHOUT COMPLICATION, WITHOUT LONG-TERM CURRENT USE OF INSULIN (H): ICD-10-CM

## 2024-05-14 DIAGNOSIS — C54.1 ENDOMETRIAL CANCER (H): Primary | ICD-10-CM

## 2024-05-14 DIAGNOSIS — I10 HYPERTENSION GOAL BP (BLOOD PRESSURE) < 140/90: ICD-10-CM

## 2024-05-14 PROCEDURE — 99214 OFFICE O/P EST MOD 30 MIN: CPT | Performed by: NURSE PRACTITIONER

## 2024-05-14 PROCEDURE — G0463 HOSPITAL OUTPT CLINIC VISIT: HCPCS | Performed by: NURSE PRACTITIONER

## 2024-05-14 ASSESSMENT — PAIN SCALES - GENERAL: PAINLEVEL: MODERATE PAIN (5)

## 2024-05-14 NOTE — LETTER
2024         RE: Kelly Barnes  [address]  [facility] [address]        Dear Colleague,    Thank you for referring your patient, Kelly Barnes, to the [facility]. Please see a copy of my visit note below.                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   She is here today for a surveillance visit.      Oncology history:   23: Endometrial biopsy:  - Endometrioid adenocarcinoma, FIGO grade 1    10/6/23: CT cap IMPRESSION:   1. Endometrial tumor consistent with biopsy proven endometrioid adenocarcinoma.  2. No solid organ metastases identified. No evidence of local metastatic disease to the extent assessed on CT.    10/30/23:   A. Left pelvic sentinel lymph node, lymphadenectomy:  - One reactive lymph node examined, negative for malignancy (0/1)      B. Right pelvic sentinel lymph node, lymphadenectomy:  - One lymph node positive for isolated tumor cells (ITCs)      C. Uterus, cervix, bilateral fallopian tubes, and bilateral ovaries, hysterectomy with bilateral salpingo-oophorectomy:  - Endometrial endometrioid adenocarcinoma, FIGO grade 1, MMR-intact, with microcystic, elongated and fragmented (MELF)-pattern of myoinvasion  - Leiomyoma (0.3 cm)  - Cervix with no significant histologic abnormality   - Ovaries with atrophic changes  - Fallopian tubes with no significant histologic abnormality       Today she comes to clinic feeling well. She denies any vaginal bleeding, no changes in her bowel or bladder habits, no nausea/emesis, and no difficulties eating or sleeping. She denies any abdominal discomfort/bloating, no fevers or chills, and no chest pain or shortness of breath. She reports the following:     Home BPs 120-130/70-80  Home BGs 121-182  Just started seeing  a vascular specialist; just started lymphedema treatment  Has had leg swelling since at least 2014; wears compression stockings as of 4 years ago  Has had 2 leg US since Feb as was having left calf pain; does not have a DVT      Annual exam with PCP:11/23  Mammogram: 5/2010  Colonoscopy: 5/2009      Review of Systems  See HPI      Past Medical History:    Past Medical History:   Diagnosis Date    Acute kidney injury (H24) 12/19/2020    Allergic rhinitis, cause unspecified     Anemia     Aortic stenosis 02/29/2016    With new murmur noted 2/2016, normal echo, repeat echo 2/2017.    Aortic valve sclerosis     Atypical chest pain 07/24/2015    cuboid     left foot    Endometrial cancer (H)     Fungemia 08/16/2021    History of Clostridium difficile colitis     Hyperlipidemia LDL goal <100 11/01/2012    Hypertension goal BP (blood pressure) < 140/90     Musculoskeletal chest pain 11/25/2016    Obesity, Class I, BMI 30-34.9 11/11/2015    Pneumonia 03/2016    Pyelonephritis     Sepsis, due to unspecified organism, unspecified whether acute organ dysfunction present (H) 12/19/2020    Type 2 diabetes, HbA1c goal < 7% (H)     Urinary tract infection associated with nephrostomy catheter, initial encounter  (H24) 08/08/2021         Past Surgical History:    Past Surgical History:   Procedure Laterality Date    CATARACT EXTRACTION      CYSTOSCOPY, RETROGRADES, INSERT STENT URETER(S), COMBINED Right 07/15/2021    Procedure: CYSTOURETEROSCOPY, WITH RETROGRADE PYELOGRAM,  AND STENT INSERTION RIGHT;  Surgeon: Kirk Law MD;  Location: [facility] OR    LAPAROSCOPIC HYSTERECTOMY TOTAL, BILATERAL SALPINGO-OOPHORECTOMY, NODE DISSECTION, COMBINED N/A 10/30/2023    Procedure: TOTAL LAPAROSCOPIC HYSTERECTOMY, WITH BILATERAL SALPINGO-OOPHORECTOMY, BILATEAL PELVIC SENTINEL LYMPH NODE;  Surgeon: Kaykay Burden MD;  Location: [facility] OR    LASER HOLMIUM NEPHROLITHOTOMY VIA PERCUTANEOUS NEPHROSTOMY Right 08/05/2021    Procedure:  NEPHROLITHOTOMY, PERCUTANEOUS, USING HOLMIUM LASER RIGHT;  Surgeon: Kirk Law MD;  Location: [facility] OR    LASER HOLMIUM NEPHROLITHOTOMY VIA PERCUTANEOUS NEPHROSTOMY Right 08/13/2021    Procedure: Ureteroscopic assisted secondary right percutaneous nephrolihtotomy, Holmium laser lithotriipsy;  Surgeon: Kirk Law MD;  Location: [facility] OR    PICC SINGLE LUMEN PLACEMENT Left 08/17/2021    41cm (2cm external), Medial brachial vein       Current Medications:     Current Outpatient Medications   Medication Sig Dispense Refill    aspirin (ASA) 325 MG EC tablet Take 325 mg by mouth as needed for moderate pain      blood glucose (ONETOUCH ULTRA) test strip Use to test blood sugar 2 times daily or as directed. 100 strip 3    blood glucose monitoring (ONE TOUCH ULTRA 2) meter device kit Use to test blood sugar 2 times daily or as directed. 1 kit 0    glimepiride (AMARYL) 4 MG tablet TAKE ONE TABLET BY MOUTH TWICE A DAY WITH MEALS 180 tablet 3    Lancets (ONETOUCH DELICA PLUS [id number]) MISC USE ONE DEVICE BY IN VITRO ROUTE TWO TIMES A  each 3    losartan-hydrochlorothiazide (HYZAAR) 50-12.5 MG tablet Take 1 tablet by mouth every morning 90 tablet 3    potassium citrate (UROCIT-K) 10 MEQ (1080 MG) CR tablet Take 1 tablet (10 mEq) by mouth 2 times daily 180 tablet 3    simvastatin (ZOCOR) 20 MG tablet Take 1 tablet (20 mg) by mouth At Bedtime TAKE ONE TABLET BY MOUTH AT BEDTIME 90 tablet 3    sitagliptin (JANUVIA) 25 MG tablet Take 1 tablet (25 mg) by mouth every evening 90 tablet 3         Allergies:        Allergies   Allergen Reactions    Ibuprofen Other (See Comments)     numbness in hands and feet    Keflex [Cephalexin] Rash    Metformin Rash        Social History:     Social History     Tobacco Use    Smoking status: Never    Smokeless tobacco: Never   Substance Use Topics    Alcohol use: Yes     Alcohol/week: 10.0 standard drinks of alcohol     Comment: 1-2 drinks per week       History   Drug Use No          Family History:     The patient's family history is notable for     Family History   Problem Relation Age of Onset    Hypertension Mother     Diabetes No family hx of     Cerebrovascular Disease No family hx of     Breast Cancer No family hx of     Cancer - colorectal No family hx of     Prostate Cancer No family hx of     Anesthesia Reaction No family hx of     Bleeding Disorder No family hx of     Clotting Disorder No family hx of          Physical Exam:     /80 (BP Location: Left arm, Patient Position: Sitting, Cuff Size: Adult Regular)   Pulse 67   Temp 98.5  F (36.9  C) (Oral)   Resp 16   Wt 71.2 kg (156 lb 14.4 oz)   LMP  (LMP Unknown)   SpO2 99%   BMI 28.70 kg/m    Body mass index is 28.7 kg/m .    General Appearance: healthy and alert, no distress     HEENT: no thyromegaly, no palpable nodules or masses        Cardiovascular: regular rate and rhythm, no gallops, rubs or murmurs     Respiratory: lungs clear, no rales, rhonchi or wheezes, normal diaphragmatic excursion    Musculoskeletal: extremities non tender and without edema    Skin: no lesions or rashes     Neurological: normal gait, no gross defects     Psychiatric: appropriate mood and affect                               Hematological: normal cervical, supraclavicular and inguinal lymph nodes     Gastrointestinal:       abdomen soft, non-tender, non-distended, no organomegaly or masses    Genitourinary: External genitalia and urethral meatus appears normal.  Vagina is smooth without nodularity or masses.  Cervix surgically absent  Bimanual exam reveal no masses, nodularity or fullness.  Recto-vaginal exam confirms these findings.      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   She is here today for a surveillance visit.      31 minutes spent on the date of the encounter doing chart review, history and exam, documentation and further  activities as noted above      Plan:     1.)       Patient to RTC in 6 months for her next surveillance visit.  Reviewed recommendations from [facility] not to perform surveillance pap smears in women diagnosed with endometrial cancer as this does not improve detection of local recurrence. Reviewed signs and symptoms for when she should contact the clinic or seek additional care. Patient to contact the clinic with any questions or concerns in the interim.  Answered all of her questions to the best of my ability.     2.) Genetic risk factors were assessed and her MMR proteins were intact. Reviewed what this means and she does not have Haynes syndrome.     3.) Labs and/or tests ordered include:  none.     4.) Health maintenance issues addressed today include annual health maintenance and non-gynecologic issues with PCP. Continue monitoring BP and BG at home.     APRIL Rogers, WHNP-BC, ANP-BC, AOCNP  Women's Health Nurse Practitioner  Adult Nurse Practitioner  Division of Gynecologic Oncology'      CC  Patient Care Team:  Lea Lopez MD as PCP - General (Family Medicine

## 2024-05-14 NOTE — NURSING NOTE
"Oncology Rooming Note    May 14, 2024 8:54 AM   Kelly Barnes is a 84 year old female who presents for:    Chief Complaint   Patient presents with    Oncology Clinic Visit     Endometrial cancer     Initial Vitals: /80 (BP Location: Left arm, Patient Position: Sitting, Cuff Size: Adult Regular)   Pulse 67   Temp 98.5  F (36.9  C) (Oral)   Resp 16   Wt 71.2 kg (156 lb 14.4 oz)   LMP  (LMP Unknown)   SpO2 99%   BMI 28.70 kg/m   Estimated body mass index is 28.7 kg/m  as calculated from the following:    Height as of 4/10/24: 1.575 m (5' 2\").    Weight as of this encounter: 71.2 kg (156 lb 14.4 oz). Body surface area is 1.76 meters squared.  Moderate Pain (5) Comment: L leg   No LMP recorded (lmp unknown). Patient is postmenopausal.  Allergies reviewed: Yes  Medications reviewed: Yes    Medications: Medication refills not needed today.  Pharmacy name entered into [employer]:    [facility] [place], [place] - [address]  [facility] - [place], [place] - [address]  WRITTEN PRESCRIPTION REQUESTED  [facility] [place], [place] - [phone]    Frailty Screening:   Is the patient here for a new oncology consult visit in cancer care? 2. No      Clinical concerns: none       Shaina Mercado              "

## 2024-05-21 ENCOUNTER — HOSPITAL ENCOUNTER (OUTPATIENT)
Dept: CT IMAGING | Facility: CLINIC | Age: 84
Discharge: HOME | End: 2024-05-21
Attending: INTERNAL MEDICINE | Admitting: INTERNAL MEDICINE
Payer: COMMERCIAL

## 2024-05-21 DIAGNOSIS — C54.1 ENDOMETRIAL CANCER (H): ICD-10-CM

## 2024-05-21 DIAGNOSIS — I89.0 LYMPHEDEMA: ICD-10-CM

## 2024-05-21 LAB
CREAT BLD-MCNC: 1.2 MG/DL (ref 0.5–1)
EGFRCR SERPLBLD CKD-EPI 2021: 45 ML/MIN/1.73M2

## 2024-05-21 PROCEDURE — 74177 CT ABD & PELVIS W/CONTRAST: CPT

## 2024-05-21 PROCEDURE — 82565 ASSAY OF CREATININE: CPT

## 2024-05-21 PROCEDURE — 250N000009 HC RX 250: Performed by: INTERNAL MEDICINE

## 2024-05-21 PROCEDURE — 250N000011 HC RX IP 250 OP 636: Performed by: INTERNAL MEDICINE

## 2024-05-21 RX ORDER — IOPAMIDOL 755 MG/ML
79 INJECTION, SOLUTION INTRAVASCULAR ONCE
Status: COMPLETED | OUTPATIENT
Start: 2024-05-21 | End: 2024-05-21

## 2024-05-21 RX ADMIN — SODIUM CHLORIDE 62 ML: 9 INJECTION, SOLUTION INTRAVENOUS at 07:05

## 2024-05-21 RX ADMIN — IOPAMIDOL 79 ML: 755 INJECTION, SOLUTION INTRAVENOUS at 07:04

## 2024-05-21 NOTE — RESULT ENCOUNTER NOTE
Unremarkable CT scan, postsurgical changes seen    Continue same plan discussed in the clinic and follow-up with me as scheduled in June

## 2024-05-24 ENCOUNTER — THERAPY VISIT (OUTPATIENT)
Dept: PHYSICAL THERAPY | Facility: CLINIC | Age: 84
End: 2024-05-24
Attending: INTERNAL MEDICINE
Payer: COMMERCIAL

## 2024-05-24 DIAGNOSIS — I89.0 LYMPHEDEMA: Primary | ICD-10-CM

## 2024-05-24 PROCEDURE — 97140 MANUAL THERAPY 1/> REGIONS: CPT | Mod: GP | Performed by: PHYSICAL THERAPIST

## 2024-05-24 PROCEDURE — 97110 THERAPEUTIC EXERCISES: CPT | Mod: GP | Performed by: PHYSICAL THERAPIST

## 2024-05-29 ENCOUNTER — HOSPITAL ENCOUNTER (OUTPATIENT)
Dept: NUCLEAR MEDICINE | Facility: CLINIC | Age: 84
Setting detail: NUCLEAR MEDICINE
Discharge: HOME | End: 2024-05-29
Attending: INTERNAL MEDICINE | Admitting: INTERNAL MEDICINE
Payer: COMMERCIAL

## 2024-05-29 DIAGNOSIS — I89.0 LYMPHEDEMA: ICD-10-CM

## 2024-05-29 PROCEDURE — 343N000001 HC RX 343: Performed by: INTERNAL MEDICINE

## 2024-05-29 PROCEDURE — 78195 LYMPH SYSTEM IMAGING: CPT

## 2024-05-29 PROCEDURE — A9541 TC99M SULFUR COLLOID: HCPCS | Performed by: INTERNAL MEDICINE

## 2024-05-29 RX ORDER — TECHNETIUM TC 99M SULFUR COLLOID 2 MG
8 KIT MISCELLANEOUS ONCE
Status: COMPLETED | OUTPATIENT
Start: 2024-05-29 | End: 2024-05-29

## 2024-05-29 RX ADMIN — TECHNETIUM TC 99M SULFUR COLLOID 8.8 MILLICURIE: KIT at 07:20

## 2024-06-01 ENCOUNTER — HEALTH MAINTENANCE LETTER (OUTPATIENT)
Age: 84
End: 2024-06-01

## 2024-06-05 ENCOUNTER — OFFICE VISIT (OUTPATIENT)
Dept: OTHER | Facility: CLINIC | Age: 84
End: 2024-06-05
Attending: INTERNAL MEDICINE
Payer: COMMERCIAL

## 2024-06-05 VITALS
BODY MASS INDEX: 28.79 KG/M2 | OXYGEN SATURATION: 99 % | WEIGHT: 157.4 LBS | HEART RATE: 77 BPM | DIASTOLIC BLOOD PRESSURE: 83 MMHG | SYSTOLIC BLOOD PRESSURE: 136 MMHG

## 2024-06-05 DIAGNOSIS — M79.662 PAIN OF LEFT LOWER LEG: ICD-10-CM

## 2024-06-05 DIAGNOSIS — I89.0 LYMPHEDEMA: Primary | ICD-10-CM

## 2024-06-05 DIAGNOSIS — E78.5 HYPERLIPIDEMIA LDL GOAL <70: ICD-10-CM

## 2024-06-05 DIAGNOSIS — I10 BENIGN ESSENTIAL HYPERTENSION: ICD-10-CM

## 2024-06-05 DIAGNOSIS — C54.1 ENDOMETRIAL CANCER (H): ICD-10-CM

## 2024-06-05 DIAGNOSIS — E11.9 TYPE 2 DIABETES MELLITUS WITHOUT COMPLICATION, WITHOUT LONG-TERM CURRENT USE OF INSULIN (H): ICD-10-CM

## 2024-06-05 PROCEDURE — G2211 COMPLEX E/M VISIT ADD ON: HCPCS | Performed by: INTERNAL MEDICINE

## 2024-06-05 PROCEDURE — 99215 OFFICE O/P EST HI 40 MIN: CPT | Performed by: INTERNAL MEDICINE

## 2024-06-05 PROCEDURE — G0463 HOSPITAL OUTPT CLINIC VISIT: HCPCS | Performed by: INTERNAL MEDICINE

## 2024-06-05 NOTE — PROGRESS NOTES
[facility]        Patient is here for a  follow up.    Pt is currently taking Aspirin and Statin.    /83 (BP Location: Right arm, Patient Position: Chair, Cuff Size: Adult Regular)   Pulse 77   Wt 157 lb 6.4 oz (71.4 kg)   LMP  (LMP Unknown)   SpO2 99%   BMI 28.79 kg/m      The provider has been notified that the patient has no concerns.     Questions patient would like addressed today are: N/A.    Refills are needed: N/A    Has homecare services and agency name:  Noemí Barragan MA

## 2024-06-05 NOTE — PATIENT INSTRUCTIONS
Continue Lymphatic formula 1000 one pill daily     Continue edema therapist recommendations , compression, wraps, MLD and eventually pump therapy     Your recent Venous duplex negative, Arterial study normal, Lymphoscintigram normal, CT abdomen and pelvis unremarkable     Please see primary for chronic leg pain issues

## 2024-06-05 NOTE — PROGRESS NOTES
[facility] VASCULAR [facility]  VASCULAR MEDICINE       PRIMARY HEALTH CARE PROVIDER:  Lea Lopez MD      Follow-up visit  Review of recent imaging studies  Seen and evaluated by edema therapist few more sessions pending  Known history of bilateral lower extremity lymphedema  Ongoing left leg pain  She underwent venous duplex ultrasound which was negative for DVT   GATO with TBI normal triphasic waveforms  Lymphoscintigram normal  CT abdomen pelvis no compressive etiology, she has known history of endometrial cancer status post surgery in October 2023      She was initially seen on May 8, 2024 for evaluation and management of vascular causes of left lower extremity pain mainly in the calf worsened end  of the day and also bilateral lower extremity swelling in a 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  Starting approximately February 2024 she is experiencing left leg pain and bilateral leg swelling left more than right side.  She underwent venous duplex in both legs no DVT.  she underwent resting GATO with TBI which was unremarkable triphasic waveforms as delineated below.  She denies any chest pain, shortness of breath or palpitations.  She never smoked.  She is having issues to put compression stockings on currently using 15 to 18 mmHg strength.     During last visit arranged lymphedema therapy she went for 2 sessions and few more pending  She underwent lymphoscintigram which was normal  CT chest abdomen pelvis no compressive  etiology  She still continues to have a left leg pain        PAST MEDICAL HISTORY  Past Medical History:   Diagnosis Date    Acute kidney injury (H24) 12/19/2020    Allergic rhinitis, cause unspecified     Anemia     Aortic stenosis 02/29/2016    With new murmur noted 2/2016, normal echo, repeat echo 2/2017.    Aortic valve sclerosis     Atypical chest pain 07/24/2015    cuboid     left foot    Endometrial cancer (H)     Fungemia 08/16/2021    History of Clostridium difficile colitis     Hyperlipidemia LDL goal <100 11/01/2012    Hypertension goal BP (blood pressure) < 140/90     Musculoskeletal chest pain 11/25/2016    Obesity, Class I, BMI 30-34.9 11/11/2015    Pneumonia 03/2016    Pyelonephritis     Sepsis, due to unspecified organism, unspecified whether acute organ dysfunction present (H) 12/19/2020    Type 2 diabetes, HbA1c goal < 7% (H)     Urinary tract infection associated with nephrostomy catheter, initial encounter  (H24) 08/08/2021       CURRENT MEDICATIONS  Current Outpatient Medications   Medication Sig Dispense Refill    aspirin (ASA) 325 MG EC tablet Take 325 mg by mouth as needed for moderate pain      blood glucose (ONETOUCH ULTRA) test strip Use to test blood sugar 2 times daily or as directed. 100 strip 3    blood glucose monitoring (ONE TOUCH ULTRA 2) meter device kit Use to test blood sugar 2 times daily or as directed. 1 kit 0    glimepiride (AMARYL) 4 MG tablet TAKE ONE TABLET BY MOUTH TWICE A DAY WITH MEALS 180 tablet 3    Lancets (ONETOUCH DELICA PLUS [id number]) MISC USE ONE DEVICE BY IN VITRO ROUTE TWO TIMES A  each 3    losartan-hydrochlorothiazide (HYZAAR) 50-12.5 MG tablet Take 1 tablet by mouth every morning 90 tablet 3    potassium citrate (UROCIT-K) 10 MEQ (1080 MG) CR tablet Take 1 tablet (10 mEq) by mouth 2 times daily 180 tablet 3    simvastatin (ZOCOR) 20 MG tablet Take 1 tablet (20 mg) by mouth At Bedtime TAKE ONE TABLET BY MOUTH AT BEDTIME 90 tablet 3    sitagliptin  (JANUVIA) 25 MG tablet Take 1 tablet (25 mg) by mouth every evening 90 tablet 3     No current facility-administered medications for this visit.       PAST SURGICAL HISTORY:  Past Surgical History:   Procedure Laterality Date    CATARACT EXTRACTION      CYSTOSCOPY, RETROGRADES, INSERT STENT URETER(S), COMBINED Right 07/15/2021    Procedure: CYSTOURETEROSCOPY, WITH RETROGRADE PYELOGRAM,  AND STENT INSERTION RIGHT;  Surgeon: Kirk Law MD;  Location: [facility] OR    LAPAROSCOPIC HYSTERECTOMY TOTAL, BILATERAL SALPINGO-OOPHORECTOMY, NODE DISSECTION, COMBINED N/A 10/30/2023    Procedure: TOTAL LAPAROSCOPIC HYSTERECTOMY, WITH BILATERAL SALPINGO-OOPHORECTOMY, BILATEAL PELVIC SENTINEL LYMPH NODE;  Surgeon: Kaykay Burden MD;  Location: [facility] OR    LASER HOLMIUM NEPHROLITHOTOMY VIA PERCUTANEOUS NEPHROSTOMY Right 08/05/2021    Procedure: NEPHROLITHOTOMY, PERCUTANEOUS, USING HOLMIUM LASER RIGHT;  Surgeon: Kirk Law MD;  Location: [facility] OR    LASER HOLMIUM NEPHROLITHOTOMY VIA PERCUTANEOUS NEPHROSTOMY Right 08/13/2021    Procedure: Ureteroscopic assisted secondary right percutaneous nephrolihtotomy, Holmium laser lithotriipsy;  Surgeon: Kirk Law MD;  Location: [facility] OR    PICC SINGLE LUMEN PLACEMENT Left 08/17/2021    41cm (2cm external), Medial brachial vein       ALLERGIES     Allergies   Allergen Reactions    Ibuprofen Other (See Comments)     numbness in hands and feet    Keflex [Cephalexin] Rash    Metformin Rash       FAMILY HISTORY  Family History   Problem Relation Age of Onset    Hypertension Mother     Diabetes No family hx of     Cerebrovascular Disease No family hx of     Breast Cancer No family hx of     Cancer - colorectal No family hx of     Prostate Cancer No family hx of     Anesthesia Reaction No family hx of     Bleeding Disorder No family hx of     Clotting Disorder No family hx of        VASCULAR FAMILY HISTORY  1st order relative with atherosclerotic PAD: No  1st order relative with AAA:  No  Family history of Familial Hyperlipidemia No  Family History of Hypercoagulable state:No    VASCULAR RISK FACTORS  1. Diabetes:Yes uncontrolled  2. Smoking: has never smoked.  3. HTN: controlled  4.Hyperlipidemia: Yes - uncontrolled      SOCIAL HISTORY  Social History     Socioeconomic History    Marital status:      Spouse name: Not on file    Number of children: Not on file    Years of education: Not on file    Highest education level: Not on file   Occupational History    Not on file   Tobacco Use    Smoking status: Never    Smokeless tobacco: Never   Vaping Use    Vaping status: Never Used   Substance and Sexual Activity    Alcohol use: Yes     Alcohol/week: 10.0 standard drinks of alcohol     Comment: 1-2 drinks per week    Drug use: No    Sexual activity: Yes     Partners: Male   Other Topics Concern     Service No    Blood Transfusions No    Caffeine Concern No    Occupational Exposure No    Hobby Hazards No    Sleep Concern Yes    Stress Concern No    Weight Concern Yes    Special Diet No    Back Care No    Exercise Yes     Comment: walk    Bike Helmet No    Seat Belt Yes    Self-Exams Yes    Parent/sibling w/ CABG, MI or angioplasty before 65F 55M? No   Social History Narrative    Not on file     Social Determinants of Health     Financial Resource Strain: Low Risk  (11/28/2023)    Financial Resource Strain     Within the past 12 months, have you or your family members you live with been unable to get utilities (heat, electricity) when it was really needed?: No   Food Insecurity: Low Risk  (11/28/2023)    Food Insecurity     Within the past 12 months, did you worry that your food would run out before you got money to buy more?: No     Within the past 12 months, did the food you bought just not last and you didn t have money to get more?: No   Transportation Needs: Low Risk  (11/28/2023)    Transportation Needs     Within the past 12 months, has lack of transportation kept you from medical  appointments, getting your medicines, non-medical meetings or appointments, work, or from getting things that you need?: No   Physical Activity: Not on file   Stress: Not on file   Social Connections: Not on file   Interpersonal Safety: Low Risk  (4/10/2024)    Interpersonal Safety     Do you feel physically and emotionally safe where you currently live?: Yes     Within the past 12 months, have you been hit, slapped, kicked or otherwise physically hurt by someone?: No     Within the past 12 months, have you been humiliated or emotionally abused in other ways by your partner or ex-partner?: No   Housing Stability: Low Risk  (11/28/2023)    Housing Stability     Do you have housing? : Yes     Are you worried about losing your housing?: No       ROS:   General: No change in weight, sleep or appetite.  Normal energy.  No fever or chills  Eyes: Negative for vision changes or eye problems  ENT: No problems with ears, nose or throat.  No difficulty swallowing.  Resp: No coughing, wheezing or shortness of breath  CV: No chest pains or palpitations  GI: No nausea, vomiting,  heartburn, abdominal pain, diarrhea, constipation or change in bowel habits  : No urinary frequency or dysuria, bladder or kidney problems  Musculoskeletal: No significant muscle or joint pains  Neurologic: No headaches, numbness, tingling, weakness, problems with balance or coordination  Psychiatric: No problems with anxiety, depression or mental health  Heme/immune/allergy: No history of bleeding or clotting problems or anemia.  No allergies or immune system problems  Endocrine: No history of thyroid disease, diabetes or other endocrine disorders  Skin: No rashes,worrisome lesions or skin problems  Vascular: Bilateral lower extremity swelling left more than right side worsening since February 2024  Underwent hysterectomy with bilateral oophorectomy in October 2023 for endometrial cancer and also left pelvic lymph node dissection      EXAM:  /83  (BP Location: Right arm, Patient Position: Chair, Cuff Size: Adult Regular)   Pulse 77   Wt 157 lb 6.4 oz (71.4 kg)   LMP  (LMP Unknown)   SpO2 99%   BMI 28.79 kg/m    In general, the patient is a pleasant female in no apparent distress.    HEENT: NC/AT.  PERRLA.  EOMI.  Sclerae white, not injected.  Nares clear.  Pharynx without erythema or exudate.  Dentition intact.    Neck: No adenopathy.  No thyromegaly. Carotids +2/2 bilaterally without bruits.  No jugular venous distension.   Heart: RRR. Normal S1, S2 splits physiologically. No murmur, rub, click, or gallop. The PMI is in the 5th ICS in the midclavicular line. There is no heave.    Lungs: CTA.  No ronchi, wheezes, rales.  No dullness to percussion.   Abdomen: Soft, nontender, nondistended. No organomegaly. No AAA.  No bruits.   Extremities: She has a classical features of bilateral lower extremity lymphedema with loss of contour of the legs dorsal hump, squaring of the toes in both lower extremities left more than right side  She has a good palpable and dopplerable peripheral pulses  No evidence of venous insufficiency and no varicose veins  No cellulitis      Labs:  LIPID RESULTS:  Lab Results   Component Value Date    CHOL 200 (H) 11/26/2023    CHOL 194 05/11/2021    HDL 71 11/26/2023    HDL 77 05/11/2021     (H) 11/26/2023     (H) 05/11/2021    TRIG 86 11/26/2023    TRIG 68 05/11/2021    CHOLHDLRATIO 3.1 11/10/2015       LIVER ENZYME RESULTS:  Lab Results   Component Value Date    AST 26 11/26/2023    AST 15 12/19/2020    ALT 11 11/26/2023    ALT 19 12/19/2020       CBC RESULTS:  Lab Results   Component Value Date    WBC 10.1 10/30/2023    WBC 9.2 12/21/2020    RBC 4.97 10/30/2023    RBC 4.15 12/21/2020    HGB 14.1 10/30/2023    HGB 11.3 (L) 12/21/2020    HCT 43.6 10/30/2023    HCT 34.8 (L) 12/21/2020    MCV 88 10/30/2023    MCV 84 12/21/2020    MCH 28.4 10/30/2023    MCH 27.2 12/21/2020    MCHC 32.3 10/30/2023    MCHC 32.5 12/21/2020     RDW 13.3 10/30/2023    RDW 13.3 12/21/2020     10/30/2023     12/21/2020       BMP RESULTS:  Lab Results   Component Value Date     11/26/2023     05/11/2021    POTASSIUM 5.2 11/26/2023    POTASSIUM 3.7 09/19/2022    POTASSIUM 4.9 05/11/2021    CHLORIDE 101 11/26/2023    CHLORIDE 102 09/19/2022    CHLORIDE 108 05/11/2021    CO2 28 11/26/2023    CO2 26 09/19/2022    CO2 27 05/11/2021    ANIONGAP 10 11/26/2023    ANIONGAP 7 09/19/2022    ANIONGAP 5 05/11/2021     (H) 11/26/2023     (H) 10/31/2023     (H) 09/19/2022     (H) 05/11/2021    BUN 26.4 (H) 11/26/2023    BUN 32 (H) 09/19/2022    BUN 30 05/11/2021    CR 1.2 (H) 05/21/2024    CR 1.06 (H) 11/26/2023    CR 0.97 05/11/2021    GFRESTIMATED 45 (L) 05/21/2024    GFRESTIMATED 55 (L) 05/11/2021    GFRESTBLACK 64 05/11/2021    JAN 9.7 11/26/2023    JAN 9.1 05/11/2021        A1C RESULTS:  Lab Results   Component Value Date    A1C 7.7 (H) 11/26/2023    A1C 7.3 (H) 05/11/2021       THYROID RESULTS:  Lab Results   Component Value Date    TSH 1.33 08/17/2023    TSH 1.11 10/15/2019       Procedures:   EXAMINATION: CT CHEST/ABDOMEN/PELVIS W CONTRAST, 10/6/2023 8:03 AM     TECHNIQUE: Helical CT images of the chest, abdomen, and pelvis  obtained following the administration of intravenous and oral contrast  material. Coronal and sagittal reconstructions obtained.  CONTRAST: Isovue 370 84cc injected IV     HISTORY: Endometrial cancer (H)  COMPARISON: Pelvic ultrasound 9/6/2023, CT abdomen/pelvis 9/21/2021     FINDINGS:     MEDIASTINUM/AXILLAE: Normal.     LUNGS AND PLEURA: No suspicious masses or nodules. Minimal subpleural  scarring/atelectasis in the lower lobes.     CORONARY ARTERY CALCIFICATION: Mild.     HEPATOBILIARY: Normal.     SPLEEN: Normal.     PANCREAS: Normal.     ADRENALS: Normal.     KIDNEYS/BLADDER: Prominent extrarenal pelves. No hydronephrosis. No  solid or cystic masses. Unremarkable urinary bladder.      BOWEL: Colonic diverticulosis, fairly extensive within the sigmoid  colon without evidence of acute diverticulitis. No bowel obstruction  or inflammatory change.     LYMPH NODES: No Lymphadenopathy.     VASCULATURE: Normal.     PELVIC ORGANS: Thickened, irregular, and heterogeneously enhancing  endometrium consistent with the 9/7/2023 pathology diagnosis of  endometrioid adenocarcinoma. Abnormal endometrium measures  approximately 3.4 x 2.5 x 1.9 cm. 5 mm well-circumscribed  hypoattenuating lesion at the tip of the fundal myometrial wall  (series 3 image [id number]), correlating with the anechoic lesion on the  prior ultrasound and could represent a myometrial cyst. Presence of  cervical or vaginal involvement is indeterminate on CT. No evidence of  gross extra serosal/parametrial extension. Small amount of air within  the endocervical canal and upper vagina noted, possibly related to  intervention/examination. Bilateral ovaries are identified within  normal postmenopausal limits for size. No pelvic free fluid.     MUSCULOSKELETAL: No acute or suspicious osseous abnormality. Sclerotic  changes along the SI joints and pubic symphysis likely represent  sacroiliitis and osteitis pubis. Degenerative pattern of multilevel  vertebral body endplate sclerosis. Grade 1 anterolisthesis of L4 on L5  without spondylolysis.                                                                         IMPRESSION:   1. Endometrial tumor consistent with biopsy proven endometrioid  adenocarcinoma.  2. No solid organ metastases identified. No evidence of local  metastatic disease to the extent assessed on CT.     I have personally reviewed the examination and initial interpretation  and I agree with the findings.     VIJI PEREA MD     RESTING GATO, TBI, VPR, AND 1ST DIGIT PPG 4/17/2024 3:59 PM     CLINICAL HISTORY: Pain of left lower extremity; Type 2 diabetes  mellitus with other circulatory complications (H).      COMPARISONS: None  available.     REFERRING PROVIDER: CHRISTA CABALLERO     TECHNIQUE: Patient did not meet requirements for exercise. Exercise  study not performed.     Bilateral GATO, TBI, VPR, and 1st digit PPG obtained.     FINDINGS:  RIGHT:       Brachial: 144 mmHg       Ankle (PT): 150 mmHg       Ankle (DP): 154 mmHg       1st Digit: 110 mmHg          GATO: 1.07       TBI: 0.76          VPR:            High thigh: Normal            Low thigh: Normal            Calf: Normal            Ankle: Normal          1st Digit PPG: Normal     LEFT:       Brachial: 143 mmHg       Ankle (PT): 150 mmHg       Ankle (DP): 156 mmHg       1st Digit: 94 mmHg          GATO: 1.08       TBI: 0.65          VPR:            High thigh: Normal            Low thigh: Normal            Calf: Normal            Ankle: Normal          1st Digit PPG: Normal                                                                      IMPRESSION:  1. RIGHT:       A. Resting GATO is normal, 1.07.       B. Resting TBI is normal, 0.76.     2. LEFT:       A. Resting GATO is normal, 1.08.       B. Resting TBI is ABNORMAL, 0.65.     3. Exercise study not performed.     Guidelines:     GATO Diagnostic Criteria (Based on criteria published in Circulation  2011; 124: 1282-7062):    > 1.4: Non compressible    1.00 - 1.40: Normal    0.91 - 0.99: Borderline    At or below 0.90: Abnormal     SHERLY BOWERS MD      VENOUS ULTRASOUND LEFT LOWER EXTREMITY April 10, 2024 2:13 PM      HISTORY: Left calf pain for 1.5 months. Recent air travel, evaluate  for deep vein thrombosis. Pain of left calf.     COMPARISON: None.     TECHNIQUE: Color Doppler and spectral waveform analysis performed  throughout the deep veins of the left lower extremity.     FINDINGS: The left common femoral, proximal great saphenous, femoral,  and popliteal veins demonstrate normal blood flow, compression, and  augmentation. Posterior tibial and peroneal veins are compressible.  Contralateral right common femoral vein is  patent.                                                                      IMPRESSION: Negative for deep vein thrombosis in the left lower     MARCELINA YORK MD          EXAM: NM LYMPHOSCINTIGRAPHY EDEMA  LOCATION: [facility]  DATE: 5/29/2024     INDICATION: Lymphedema  COMPARISON: None available at time of dictation  TECHNIQUE: The primary site was prepped and draped in sterile fashion. 4.4 mCi of Tc-99m sulfur colloid was injected in the right foot and 4.4 mCi of Tc-99m sulfur colloid was injected in the left foot. Imaging was then performed immediately after   injection, 25 minutes, 50 minutes, 75 minutes, and 90 minutes.      FINDINGS: While the drainage pathways are poorly visualized due to the scar artifact in the feet, drainage tube reaches the inguinal region at 75 minutes and liver at 90 minutes suggesting patent lower extremity lymphatics.                                                                      IMPRESSION:      No convincing evidence of obstruction.    CT ABDOMEN/PELVIS WITH CONTRAST May 21, 2024 7:16 AM     CLINICAL HISTORY: Lymphedema. Endometrial cancer (H).     TECHNIQUE: CT scan of the abdomen and pelvis was performed following  injection of IV contrast. Multiplanar reformats were obtained. Dose  reduction techniques were used.  CONTRAST: 79mL Isovue-370.     COMPARISON: 10/6/2023.     FINDINGS:   LOWER CHEST: Normal.     HEPATOBILIARY: Normal.     PANCREAS: Normal.     SPLEEN: Normal.     ADRENAL GLANDS: Normal.     KIDNEYS/BLADDER: Normal.     BOWEL: Extensive colonic diverticulosis without signs of  diverticulitis.     PELVIC ORGANS: Hysterectomy. No pelvic masses.     ADDITIONAL FINDINGS: Patent iliac veins and IVC. No ascites.     MUSCULOSKELETAL: No suspicious bone lesions.                                                                      IMPRESSION: Interval hysterectomy. No evidence of recurrent or  metastatic disease in the abdomen or pelvis.      JULIO GOMEZ MD             Assessment and Plan:     1. Lymphedema, BLE    2. Endometrial cancer (H) S/P ROBE with BSO and left pelvic node dissection 10/30/2023    3. Pain of left lower leg    4. Benign essential hypertension    5. Hyperlipidemia LDL goal <70    6. Type 2 diabetes mellitus without complication, without long-term current use of insulin (H)      This is a very pleasant 84-year-old female with classical features of bilateral lower extremity lymphedema left more than right side and history of endometrial cancer status post total abdominal hysterectomy and bilateral salpingo-oophorectomy left-sided pelvic node dissection on October 30, 2023 having pain and discomfort in the left leg unable to walk more than 3 blocks left leg is heavier than the right leg.  No injury no history of a cellulitis.  Last CT scan was done before the surgery.  4 weeks ago underwent bilateral lower extremity venous duplex negative for DVT and recently GATO with TBI which was unremarkable.  No evidence of arterial insufficiency or venous insufficiency.  She never smoked.  Type 2 diabetes reasonably controlled hypertension well-controlled lipids are decent range with statin.  She has been taking aspirin /Tylenol which is helping her pain.    Lymphoscintigram negative  CT abdomen pelvis negative    Reviewed imaging studies with the patient, she will need additional evaluation and she will benefit with compression with MLD, wraps etc.    At present my recommendations,  Continue lymphedema therapy, compression, MLD, wraps and eventually pump therapy  Take lymphatic formula 1000 daily   For chronic left leg pain work with her primary  Tight control of the diabetes and blood pressure  Continue current medications  Follow-up in 6 months    40 minutes spent on the date of the encounter doing chart review, review of recent imaging studies, lymphedema therapy evaluation, history, exam, documentation and addressed above-mentioned  multiple issues.  She had a lot of questions all of them were answered     The longitudinal care of plan for the above diagnoses was addressed during this visit. Due to added complexity of care, we will continue to supprt Kelly Barnes and the subsequent management of this/these conditions and with ongoing continuity of care for this/these conditions.     AVS with detailed instructions given      Copy of this note to primary care physician    Copy of this note to primary care physician    Richard Freedman MD,FAHA,FSVM,FNLA, FACP  Vascular Medicine  Clinical Hypertension Specialist   Clinical Lipidologist

## 2024-06-10 ENCOUNTER — THERAPY VISIT (OUTPATIENT)
Dept: PHYSICAL THERAPY | Facility: CLINIC | Age: 84
End: 2024-06-10
Attending: INTERNAL MEDICINE
Payer: COMMERCIAL

## 2024-06-10 DIAGNOSIS — I89.0 LYMPHEDEMA: Primary | ICD-10-CM

## 2024-06-10 PROCEDURE — 97140 MANUAL THERAPY 1/> REGIONS: CPT | Mod: GP | Performed by: PHYSICAL THERAPIST

## 2024-06-11 ENCOUNTER — LAB (OUTPATIENT)
Dept: LAB | Facility: CLINIC | Age: 84
End: 2024-06-11
Payer: COMMERCIAL

## 2024-06-11 DIAGNOSIS — N18.31 CHRONIC KIDNEY DISEASE, STAGE 3A (H): ICD-10-CM

## 2024-06-11 DIAGNOSIS — E11.59 TYPE 2 DIABETES MELLITUS WITH OTHER CIRCULATORY COMPLICATIONS (H): Primary | ICD-10-CM

## 2024-06-11 LAB
CHOLEST SERPL-MCNC: 193 MG/DL
CREAT UR-MCNC: 70 MG/DL
FASTING STATUS PATIENT QL REPORTED: YES
HBA1C MFR BLD: 8.1 % (ref 0–5.6)
HDLC SERPL-MCNC: 75 MG/DL
LDLC SERPL CALC-MCNC: 101 MG/DL
MICROALBUMIN UR-MCNC: <12 MG/L
MICROALBUMIN/CREAT UR: NORMAL MG/G{CREAT}
NONHDLC SERPL-MCNC: 118 MG/DL
TRIGL SERPL-MCNC: 86 MG/DL

## 2024-06-11 PROCEDURE — 82570 ASSAY OF URINE CREATININE: CPT

## 2024-06-11 PROCEDURE — 83036 HEMOGLOBIN GLYCOSYLATED A1C: CPT

## 2024-06-11 PROCEDURE — 80061 LIPID PANEL: CPT

## 2024-06-11 PROCEDURE — 36415 COLL VENOUS BLD VENIPUNCTURE: CPT

## 2024-06-11 PROCEDURE — 82043 UR ALBUMIN QUANTITATIVE: CPT

## 2024-06-13 DIAGNOSIS — E11.9 TYPE 2 DIABETES MELLITUS WITHOUT COMPLICATION, WITHOUT LONG-TERM CURRENT USE OF INSULIN (H): Primary | ICD-10-CM

## 2024-06-14 NOTE — RESULT ENCOUNTER NOTE
Thank you very much for getting labs done!    Your microalbumen is normal, which is great news. This means you do not have protein in the urine, and that your kidneys are currently functioning well. Keeping blood pressure and blood fats low is the best way to preserve your kidney function over your lifetime.     Your cholesterol is at goal, the medication is working well, please keep taking your Zocor/simvastatin as you have been doing.      Your HgA1C, also called glycosylated hemoglobin, which measures the level of sugar in your blood over the past few months, is not  goal, so we need to find a way to control your blood sugar better. I recommend increasing your sitagliptin (Januvia) from 25 to 50 mg daily. I sent in a new prescription to the [facility] pharmacy, and you can take #2 of the tablets that you have at home at once to use up the medication you already have.  We can talk about this more at your visit next week.    Sincerely,  Dr. Lea Lopez MD  6/13/2024

## 2024-06-17 ENCOUNTER — THERAPY VISIT (OUTPATIENT)
Dept: PHYSICAL THERAPY | Facility: CLINIC | Age: 84
End: 2024-06-17
Payer: COMMERCIAL

## 2024-06-17 DIAGNOSIS — I89.0 LYMPHEDEMA: Primary | ICD-10-CM

## 2024-06-17 PROCEDURE — 97140 MANUAL THERAPY 1/> REGIONS: CPT | Mod: GP | Performed by: PHYSICAL THERAPIST

## 2024-06-19 ENCOUNTER — THERAPY VISIT (OUTPATIENT)
Dept: PHYSICAL THERAPY | Facility: CLINIC | Age: 84
End: 2024-06-19
Payer: COMMERCIAL

## 2024-06-19 DIAGNOSIS — I89.0 LYMPHEDEMA: Primary | ICD-10-CM

## 2024-06-19 PROCEDURE — 97140 MANUAL THERAPY 1/> REGIONS: CPT | Mod: GP | Performed by: PHYSICAL THERAPIST

## 2024-06-20 ENCOUNTER — OFFICE VISIT (OUTPATIENT)
Dept: FAMILY MEDICINE | Facility: CLINIC | Age: 84
End: 2024-06-20
Payer: COMMERCIAL

## 2024-06-20 ENCOUNTER — MYC MEDICAL ADVICE (OUTPATIENT)
Dept: FAMILY MEDICINE | Facility: CLINIC | Age: 84
End: 2024-06-20

## 2024-06-20 ENCOUNTER — HOSPITAL ENCOUNTER (OUTPATIENT)
Dept: ULTRASOUND IMAGING | Facility: CLINIC | Age: 84
Discharge: HOME | End: 2024-06-20
Attending: FAMILY MEDICINE | Admitting: FAMILY MEDICINE
Payer: COMMERCIAL

## 2024-06-20 VITALS
HEART RATE: 83 BPM | TEMPERATURE: 98.1 F | BODY MASS INDEX: 28.98 KG/M2 | SYSTOLIC BLOOD PRESSURE: 138 MMHG | RESPIRATION RATE: 18 BRPM | WEIGHT: 153.5 LBS | OXYGEN SATURATION: 99 % | DIASTOLIC BLOOD PRESSURE: 84 MMHG | HEIGHT: 61 IN

## 2024-06-20 DIAGNOSIS — M79.605 PAIN OF LEFT LOWER EXTREMITY: ICD-10-CM

## 2024-06-20 DIAGNOSIS — E11.59 TYPE 2 DIABETES MELLITUS WITH OTHER CIRCULATORY COMPLICATIONS (H): ICD-10-CM

## 2024-06-20 DIAGNOSIS — E78.5 HYPERLIPIDEMIA LDL GOAL <100: ICD-10-CM

## 2024-06-20 DIAGNOSIS — Z00.00 ENCOUNTER FOR MEDICARE ANNUAL WELLNESS EXAM: Primary | ICD-10-CM

## 2024-06-20 DIAGNOSIS — E11.9 TYPE 2 DIABETES MELLITUS WITHOUT COMPLICATION, WITHOUT LONG-TERM CURRENT USE OF INSULIN (H): ICD-10-CM

## 2024-06-20 LAB
ALBUMIN SERPL BCG-MCNC: 4.5 G/DL (ref 3.5–5.2)
ALP SERPL-CCNC: 78 U/L (ref 40–150)
ALT SERPL W P-5'-P-CCNC: 14 U/L (ref 0–50)
ANION GAP SERPL CALCULATED.3IONS-SCNC: 11 MMOL/L (ref 7–15)
AST SERPL W P-5'-P-CCNC: 28 U/L (ref 0–45)
BASOPHILS # BLD AUTO: 0 10E3/UL (ref 0–0.2)
BASOPHILS NFR BLD AUTO: 0 %
BILIRUB SERPL-MCNC: 0.3 MG/DL
BUN SERPL-MCNC: 25.5 MG/DL (ref 8–23)
CALCIUM SERPL-MCNC: 9.7 MG/DL (ref 8.8–10.2)
CHLORIDE SERPL-SCNC: 98 MMOL/L (ref 98–107)
CK SERPL-CCNC: 47 U/L (ref 26–192)
CREAT SERPL-MCNC: 0.95 MG/DL (ref 0.51–0.95)
DEPRECATED HCO3 PLAS-SCNC: 26 MMOL/L (ref 22–29)
EGFRCR SERPLBLD CKD-EPI 2021: 59 ML/MIN/1.73M2
EOSINOPHIL # BLD AUTO: 0.2 10E3/UL (ref 0–0.7)
EOSINOPHIL NFR BLD AUTO: 2 %
ERYTHROCYTE [DISTWIDTH] IN BLOOD BY AUTOMATED COUNT: 12.7 % (ref 10–15)
GLUCOSE SERPL-MCNC: 162 MG/DL (ref 70–99)
HCT VFR BLD AUTO: 44.7 % (ref 35–47)
HGB BLD-MCNC: 14.6 G/DL (ref 11.7–15.7)
IMM GRANULOCYTES # BLD: 0 10E3/UL
IMM GRANULOCYTES NFR BLD: 0 %
LYMPHOCYTES # BLD AUTO: 1.2 10E3/UL (ref 0.8–5.3)
LYMPHOCYTES NFR BLD AUTO: 13 %
MCH RBC QN AUTO: 27.8 PG (ref 26.5–33)
MCHC RBC AUTO-ENTMCNC: 32.7 G/DL (ref 31.5–36.5)
MCV RBC AUTO: 85 FL (ref 78–100)
MONOCYTES # BLD AUTO: 0.6 10E3/UL (ref 0–1.3)
MONOCYTES NFR BLD AUTO: 6 %
NEUTROPHILS # BLD AUTO: 7.8 10E3/UL (ref 1.6–8.3)
NEUTROPHILS NFR BLD AUTO: 80 %
PLATELET # BLD AUTO: 242 10E3/UL (ref 150–450)
POTASSIUM SERPL-SCNC: 4.2 MMOL/L (ref 3.4–5.3)
PROT SERPL-MCNC: 7.6 G/DL (ref 6.4–8.3)
RBC # BLD AUTO: 5.26 10E6/UL (ref 3.8–5.2)
SODIUM SERPL-SCNC: 135 MMOL/L (ref 135–145)
WBC # BLD AUTO: 9.8 10E3/UL (ref 4–11)

## 2024-06-20 PROCEDURE — 36415 COLL VENOUS BLD VENIPUNCTURE: CPT | Performed by: FAMILY MEDICINE

## 2024-06-20 PROCEDURE — 80053 COMPREHEN METABOLIC PANEL: CPT | Performed by: FAMILY MEDICINE

## 2024-06-20 PROCEDURE — 99207 PR FOOT EXAM NO CHARGE: CPT | Performed by: FAMILY MEDICINE

## 2024-06-20 PROCEDURE — 99214 OFFICE O/P EST MOD 30 MIN: CPT | Mod: 25 | Performed by: FAMILY MEDICINE

## 2024-06-20 PROCEDURE — G0439 PPPS, SUBSEQ VISIT: HCPCS | Performed by: FAMILY MEDICINE

## 2024-06-20 PROCEDURE — 85025 COMPLETE CBC W/AUTO DIFF WBC: CPT | Performed by: FAMILY MEDICINE

## 2024-06-20 PROCEDURE — 93971 EXTREMITY STUDY: CPT | Mod: LT

## 2024-06-20 PROCEDURE — 82550 ASSAY OF CK (CPK): CPT | Performed by: FAMILY MEDICINE

## 2024-06-20 RX ORDER — SIMVASTATIN 20 MG
20 TABLET ORAL AT BEDTIME
Qty: 90 TABLET | Refills: 3 | Status: CANCELLED | OUTPATIENT
Start: 2024-06-20

## 2024-06-20 RX ORDER — RESPIRATORY SYNCYTIAL VIRUS VACCINE 120MCG/0.5
0.5 KIT INTRAMUSCULAR ONCE
Qty: 1 EACH | Refills: 0 | Status: CANCELLED | OUTPATIENT
Start: 2024-06-20 | End: 2024-06-20

## 2024-06-20 SDOH — HEALTH STABILITY: PHYSICAL HEALTH: ON AVERAGE, HOW MANY DAYS PER WEEK DO YOU ENGAGE IN MODERATE TO STRENUOUS EXERCISE (LIKE A BRISK WALK)?: 0 DAYS

## 2024-06-20 ASSESSMENT — SOCIAL DETERMINANTS OF HEALTH (SDOH): HOW OFTEN DO YOU GET TOGETHER WITH FRIENDS OR RELATIVES?: MORE THAN THREE TIMES A WEEK

## 2024-06-20 ASSESSMENT — PAIN SCALES - GENERAL: PAINLEVEL: MODERATE PAIN (5)

## 2024-06-20 NOTE — PATIENT INSTRUCTIONS
"Please call [facility] Radiology Department to schedule an outpatient appointment for your leg ultrasound at [phone].    I do recommend getting the RSV vaccine! You can get this done at any pharmacy.     Shingles vaccine  I strongly recommend getting the shingles vaccine (Shingrix) if you are over age 50, but please check with your insurance to see how much you might have to pay for this vaccine! If you are on most insurance plans including Medicare, it's covered if you get it at a pharmacy but not in a clinic.    This shot will prevent shingles, a painful skin rash that you are at risk for getting if you have ever had chicken pox. Sometimes the pain will last the rest of your life, even after the rash has healed, and there is not much that we can do to relieve the pain. The vaccine is 98% effective at preventing shingles.    Please consider getting this vaccine! You'll need #2 vaccines 2-4 months apart to be protected.    Patient Education   Preventive Care Advice   This is general advice we often give to help people stay healthy. Your care team may have specific advice just for you. Please talk to your care team about your own preventive care needs.  Lifestyle  Exercise at least 150 minutes each week (30 minutes a day, 5 days a week).  Do muscle strengthening activities 2 days a week. These help control your weight and prevent disease.  No smoking.  Wear sunscreen to prevent skin cancer.  Have your home tested for radon every 2 to 5 years. Radon is a colorless, odorless gas that can harm your lungs. To learn more, go to www.health.[facility].mn.us and search for \"Radon in Homes.\"  Keep guns unloaded and locked up in a safe place like a safe or gun vault, or, use a gun lock and hide the keys. Always lock away bullets separately. To learn more, visit [facility].[place].gov and search for \"safe gun storage.\"  Nutrition  Eat 5 or more servings of fruits and vegetables each day.  Try wheat bread, brown rice and whole grain pasta " (instead of white bread, rice, and pasta).  Get enough calcium and vitamin D. Check the label on foods and aim for 100% of the RDA (recommended daily allowance).  Regular exams  Have a dental exam and cleaning every 6 months.  See your health care team every year to talk about:  Any changes in your health.  Any medicines your care team has prescribed.  Preventive care, family planning, and ways to prevent chronic diseases.  Shots (vaccines)   HPV shots (up to age 26), if you've never had them before.  Hepatitis B shots (up to age 59), if you've never had them before.  COVID-19 shot: Get this shot when it's due.  Flu shot: Get a flu shot every year.  Tetanus shot: Get a tetanus shot every 10 years.  Pneumococcal, hepatitis A, and RSV shots: Ask your care team if you need these based on your risk.  Shingles shot (for age 50 and up).  General health tests  Diabetes screening:  Starting at age 35, Get screened for diabetes at least every 3 years.  If you are younger than age 35, ask your care team if you should be screened for diabetes.  Cholesterol test: At age 39, start having a cholesterol test every 5 years, or more often if advised.  Bone density scan (DEXA): At age 50, ask your care team if you should have this scan for osteoporosis (brittle bones).  Hepatitis C: Get tested at least once in your life.  Abdominal aortic aneurysm screening: Talk to your doctor about having this screening if you:  Have ever smoked; and  Are biologically male; and  Are between the ages of 65 and 75.  STIs (sexually transmitted infections)  Before age 24: Ask your care team if you should be screened for STIs.  After age 24: Get screened for STIs if you're at risk. You are at risk for STIs (including HIV) if:  You are sexually active with more than one person.  You don't use condoms every time.  You or a partner was diagnosed with a sexually transmitted infection.  If you are at risk for HIV, ask about PrEP medicine to prevent HIV.  Get  tested for HIV at least once in your life, whether you are at risk for HIV or not.  Cancer screening tests  Cervical cancer screening: If you have a cervix, begin getting regular cervical cancer screening tests at age 21. Most people who have regular screenings with normal results can stop after age 65. Talk about this with your provider.  Breast cancer scan (mammogram): If you've ever had breasts, begin having regular mammograms starting at age 40. This is a scan to check for breast cancer.  Colon cancer screening: It is important to start screening for colon cancer at age 45.  Have a colonoscopy test every 10 years (or more often if you're at risk) Or, ask your provider about stool tests like a FIT test every year or Cologuard test every 3 years.  To learn more about your testing options, visit: www.[employer].com/856382.pdf.  For help making a decision, visit: esteban/xq96692.  Prostate cancer screening test: If you have a prostate and are age 55 to 69, ask your provider if you would benefit from a yearly prostate cancer screening test.  Lung cancer screening: If you are a current or former smoker age 50 to 80, ask your care team if ongoing lung cancer screenings are right for you.  For informational purposes only. Not to replace the advice of your health care provider. Copyright   2023 [facility]. All rights reserved. Clinically reviewed by the [facility] Transitions Program. [employer] 852128 - REV 04/24.  Preventing Falls: Care Instructions  Injuries and health problems such as trouble walking or poor eyesight can increase your risk of falling. So can some medicines. But there are things you can do to help prevent falls. You can exercise to get stronger. You can also arrange your home to make it safer.    Talk to your doctor about the medicines you take. Ask if any of them increase the risk of falls and whether they can be changed or stopped.   Try to exercise regularly. It can help improve  "your strength and balance. This can help lower your risk of falling.     Practice fall safety and prevention.    Wear low-heeled shoes that fit well and give your feet good support. Talk to your doctor if you have foot problems that make this hard.  Carry a cellphone or wear a medical alert device that you can use to call for help.  Use stepladders instead of chairs to reach high objects. Don't climb if you're at risk for falls. Ask for help, if needed.  Wear the correct eyeglasses, if you need them.    Make your home safer.    Remove rugs, cords, clutter, and furniture from walkways.  Keep your house well lit. Use night-lights in hallways and bathrooms.  Install and use sturdy handrails on stairways.  Wear nonskid footwear, even inside. Don't walk barefoot or in socks without shoes.    Be safe outside.    Use handrails, curb cuts, and ramps whenever possible.  Keep your hands free by using a shoulder bag or backpack.  Try to walk in well-lit areas. Watch out for uneven ground, changes in pavement, and debris.  Be careful in the winter. Walk on the grass or gravel when sidewalks are slippery. Use de-icer on steps and walkways. Add non-slip devices to shoes.    Put grab bars and nonskid mats in your shower or tub and near the toilet. Try to use a shower chair or bath bench when bathing.   Get into a tub or shower by putting in your weaker leg first. Get out with your strong side first. Have a phone or medical alert device in the bathroom with you.   Where can you learn more?  Go to https://www.[employer].com.net/patiented  Enter G117 in the search box to learn more about \"Preventing Falls: Care Instructions.\"  Current as of: July 17, 2023               Content Version: 14.0    2045-8058 [employer].   Care instructions adapted under license by your healthcare professional. If you have questions about a medical condition or this instruction, always ask your healthcare professional. Healthwise, [employer] " disclaims any warranty or liability for your use of this information.

## 2024-06-20 NOTE — RESULT ENCOUNTER NOTE
Hello!  Thank you for getting labs done. Your cbc, or complete blood count, which measures red blood cells (to check for anemia and other vitamin deficiencies) and white blood cells (to check for infection and leukemia) is normal, which is great!  You do not have anemia or an infection.    Your platelets, which reflect liver function and ability to clot, are normal as well.     If you have any questions, please contact the clinic or schedule an appointment with me, thank you!    Sincerely,    CHRISTA CABALLERO MD   6/20/2024

## 2024-06-20 NOTE — PROGRESS NOTES
Preventive Care Visit  [facility]  Lea Lopez MD, Family Medicine  Jun 20, 2024      Assessment & Plan     (Z00.00) Encounter for Medicare annual wellness exam  (primary encounter diagnosis)  routine    (E78.5) Hyperlipidemia LDL goal <100  Comment:   LDL Cholesterol Calculated   Date Value Ref Range Status   06/11/2024 101 (H) <=100 mg/dL Final   05/11/2021 103 (H) <100 mg/dL Final     Comment:     Above desirable:  100-129 mg/dl  Borderline High:  130-159 mg/dL  High:             160-189 mg/dL  Very high:       >189 mg/dl        Almost at goal, encouraged health eating, recheck in 6 months    (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  Plan: FOOT EXAM        The patient indicates understanding of these issues and agrees with the plan.     (M79.605) Pain of left lower extremity  New, previously undiagnosed problem with uncertain prognosis and additional work-up planned.   Noted abnormal GATO, with inability to complete treadmill test as unable to complete treadmill. Symptom of severe pain of calf with activity for longer than 5 minutes with  immediate resolution with rest makes vascular disease overwhelmingly likely. I will obtain venous duplex as below, consider angiogram or other more invasive test only if duplex non diagnostic.  Continued to encourage daily aspirin and activity as much as tolerated.  The patient indicates understanding of these issues and agrees with the plan.     Plan: CK total, Comprehensive metabolic panel (BMP +         Alb, Alk Phos, ALT, AST, Total. Bili, TP), US          "Lower Extremity Venous Duplex Left, CBC with         platelets and differential        Will fu as indicated.     Patient has been advised of split billing requirements and indicates understanding: Yes        BMI  Estimated body mass index is 28.61 kg/m  as calculated from the following:    Height as of this encounter: 1.56 m (5' 1.42\").    Weight as of this encounter: 69.6 kg (153 lb 8 oz).       Counseling  Appropriate preventive services were discussed with this patient, including applicable screening as appropriate for fall prevention, nutrition, physical activity, Tobacco-use cessation, weight loss and cognition.  Checklist reviewing preventive services available has been given to the patient.  Reviewed patient's diet, addressing concerns and/or questions.   The patient was instructed to see the dentist every 6 months.     Hoang Mendoza is a 84 year old, presenting for the following:  Medicare Visit (Pain in left leg since Feb 2024.)        6/20/2024    10:00 AM   Additional Questions   Roomed by Love DICKINSON     Recent Labs   Lab Test 06/11/24  0740 11/26/23  0931   CHOL 193 200*   HDL 75 71   * 112*   TRIG 86 86       Health Care Directive  Patient does not have a Health Care Directive or Living Will: Discussed advance care planning with patient; information given to patient to review.    HPI    Left leg pain  Left leg pain that worsens over the course of the day, associated with swelling, although the swelling is not new. Walking for more than 5 minutes triggers pain.    Pain involves leg between ankle and knee, sometimes into thigh. She thinks the pain is muscle pain, not bony. Pain is aggravated by any walking or weight bearing, completely relieved with rest. She reports no pain every morning when she wakes. She denies foot pain, color changes of leg or foot.     She had a recent LOWER EXTREMITY venous duplex scan that was negative for a blood clot.     Wearing compression socks helps a little.  She " takes aspirin daily which helps a little, but over the counter pain meds wear off and don't help at all in the afternoon when pain is severe.    She's had GATO with abnormal LLE, but Dr. Freedman, vascular clinic [place], did NOT feel that she had peripheral vascular disease and did not recommend follow up.     GATO IMPRESSION:  1. RIGHT:       A. Resting GATO is normal, 1.07.       B. Resting TBI is normal, 0.76.     2. LEFT:       A. Resting GATO is normal, 1.08.       B. Resting TBI is ABNORMAL, 0.65.    Diabetes Follow-up    How often are you checking your blood sugar? Two times daily  Blood sugar testing frequency justification:  A1c increasing  What time of day are you checking your blood sugars (select all that apply)?  Before and after meals  Have you had any blood sugars above 200?  No  Have you had any blood sugars below 70?  No  What symptoms do you notice when your blood sugar is low?  None  What concerns do you have today about your diabetes? None   Do you have any of these symptoms? (Select all that apply)  No numbness or tingling in feet.  No redness, sores or blisters on feet.  No complaints of excessive thirst.  No reports of blurry vision.  No significant changes to weight.    BP Readings from Last 2 Encounters:   06/20/24 138/84   06/05/24 136/83     Hemoglobin A1C (%)   Date Value   06/11/2024 8.1 (H)   11/26/2023 7.7 (H)   05/11/2021 7.3 (H)   10/07/2020 7.2 (H)     LDL Cholesterol Calculated (mg/dL)   Date Value   06/11/2024 101 (H)   11/26/2023 112 (H)   05/11/2021 103 (H)   10/07/2020 134 (H)               6/20/2024   General Health   How would you rate your overall physical health? (!) FAIR   Feel stress (tense, anxious, or unable to sleep) Not at all            6/20/2024   Nutrition   Diet: I don't know            6/20/2024   Exercise   Days per week of moderate/strenous exercise 0 days      (!) EXERCISE CONCERN      6/20/2024   Social Factors   Frequency of gathering with friends or relatives  More than three times a week   Worry food won't last until get money to buy more No   Food not last or not have enough money for food? No   Do you have housing? (Housing is defined as stable permanent housing and does not include staying ouside in a car, in a tent, in an abandoned building, in an overnight shelter, or couch-surfing.) Yes   Are you worried about losing your housing? No   Lack of transportation? No   Unable to get utilities (heat,electricity)? No            6/20/2024   Fall Risk   Fallen 2 or more times in the past year? No   Trouble with walking or balance? Yes   Gait Speed Test (Document in seconds) 4.44   Gait Speed Test Interpretation Less than or equal to 5.00 seconds - PASS             6/20/2024   Activities of Daily Living- Home Safety   Needs help with the following daily activites None of the above   Safety concerns in the home None of the above            6/20/2024   Dental   Dentist two times every year? (!) NO            6/20/2024   Hearing Screening   Hearing concerns? None of the above            6/20/2024   Driving Risk Screening   Patient/family members have concerns about driving No            6/20/2024   General Alertness/Fatigue Screening   Have you been more tired than usual lately? No            6/20/2024   Urinary Incontinence Screening   Bothered by leaking urine in past 6 months No            6/20/2024   TB Screening   Were you born outside of the [place]? No            Today's PHQ-2 Score:       6/20/2024     9:53 AM   PHQ-2 ( 1999 [employer])   Q1: Little interest or pleasure in doing things 0   Q2: Feeling down, depressed or hopeless 0   PHQ-2 Score 0   Q1: Little interest or pleasure in doing things Not at all   Q2: Feeling down, depressed or hopeless Not at all   PHQ-2 Score 0           6/20/2024   Substance Use   Alcohol more than 3/day or more than 7/wk No   Do you have a current opioid prescription? No   How severe/bad is pain from 1 to 10? 9/10   Do you use any other substances  recreationally? No        Social History     Tobacco Use    Smoking status: Never    Smokeless tobacco: Never   Vaping Use    Vaping status: Never Used   Substance Use Topics    Alcohol use: Yes     Alcohol/week: 10.0 standard drinks of alcohol     Comment: 1-2 drinks per week    Drug use: No          Mammogram Screening - After age 74- determine frequency with patient based on health status, life expectancy and patient goals        Reviewed and updated as needed this visit by Provider                    Past Medical History:   Diagnosis Date    Acute kidney injury (H24) 12/19/2020    Allergic rhinitis, cause unspecified     Anemia     Aortic stenosis 02/29/2016    With new murmur noted 2/2016, normal echo, repeat echo 2/2017.    Aortic valve sclerosis     Atypical chest pain 07/24/2015    cuboid     left foot    Endometrial cancer (H)     Fungemia 08/16/2021    History of Clostridium difficile colitis     Hyperlipidemia LDL goal <100 11/01/2012    Hypertension goal BP (blood pressure) < 140/90     Musculoskeletal chest pain 11/25/2016    Obesity, Class I, BMI 30-34.9 11/11/2015    Pneumonia 03/2016    Pyelonephritis     Sepsis, due to unspecified organism, unspecified whether acute organ dysfunction present (H) 12/19/2020    Type 2 diabetes, HbA1c goal < 7% (H)     Urinary tract infection associated with nephrostomy catheter, initial encounter  (H24) 08/08/2021     Past Surgical History:   Procedure Laterality Date    CATARACT EXTRACTION      CYSTOSCOPY, RETROGRADES, INSERT STENT URETER(S), COMBINED Right 07/15/2021    Procedure: CYSTOURETEROSCOPY, WITH RETROGRADE PYELOGRAM,  AND STENT INSERTION RIGHT;  Surgeon: Kirk Law MD;  Location: [facility] OR    LAPAROSCOPIC HYSTERECTOMY TOTAL, BILATERAL SALPINGO-OOPHORECTOMY, NODE DISSECTION, COMBINED N/A 10/30/2023    Procedure: TOTAL LAPAROSCOPIC HYSTERECTOMY, WITH BILATERAL SALPINGO-OOPHORECTOMY, BILATEAL PELVIC SENTINEL LYMPH NODE;  Surgeon: Kaykay Burden MD;   Location: [facility] OR    LASER HOLMIUM NEPHROLITHOTOMY VIA PERCUTANEOUS NEPHROSTOMY Right 2021    Procedure: NEPHROLITHOTOMY, PERCUTANEOUS, USING HOLMIUM LASER RIGHT;  Surgeon: Kirk Law MD;  Location: [facility] OR    LASER HOLMIUM NEPHROLITHOTOMY VIA PERCUTANEOUS NEPHROSTOMY Right 2021    Procedure: Ureteroscopic assisted secondary right percutaneous nephrolihtotomy, Holmium laser lithotriipsy;  Surgeon: Kirk Law MD;  Location: [facility] OR    PICC SINGLE LUMEN PLACEMENT Left 2021    41cm (2cm external), Medial brachial vein     OB History    Para Term  AB Living   1 0 0 0 0 1   SAB IAB Ectopic Multiple Live Births   0 0 0 0 0      # Outcome Date GA Lbr Mic/2nd Weight Sex Type Anes PTL Lv   1               Current providers sharing in care for this patient include:  Patient Care Team:  Lea Lopez MD as PCP - General (Family Medicine)  Jose Sue [facility] as Pharmacist (Pharmacist)  Brenda Snowden CNP as Nurse Practitioner (Urology)  Kirk Law MD as MD (Urology)  Brenda Snwoden CNP as Assigned Surgical Provider  Cindy Cortez, PT as Physical Therapist (Physical Therapy)  Dacia Lopes APRN CNP as Assigned OBGYN Provider  Kaykay Burden MD as Assigned Cancer Care Provider  Lea Lopez MD as Assigned PCP  Genet Velazquez, PT as Physical Therapist (Physical Therapy)  Richard Freedman MD as Assigned Heart and Vascular Provider    The following health maintenance items are reviewed in Epic and correct as of today:  Health Maintenance   Topic Date Due    ZOSTER IMMUNIZATION (1 of 2) Never done    RSV VACCINE (Pregnancy & 60+) (1 - 1-dose 60+ series) Never done    DEXA  2012    COVID-19 Vaccine (2023- season) 2024    DIABETIC FOOT EXAM  06/15/2024    MEDICARE ANNUAL WELLNESS VISIT  06/15/2024    EYE EXAM  10/29/2024    HEMOGLOBIN  10/30/2024    ANNUAL REVIEW OF HM ORDERS  2024     "BMP  11/26/2024    CMP  11/26/2024    A1C  12/11/2024    LIPID  12/11/2024    MICROALBUMIN  06/11/2025    FALL RISK ASSESSMENT  06/20/2025    ADVANCE CARE PLANNING  06/15/2028    DTAP/TDAP/TD IMMUNIZATION (2 - Td or Tdap) 10/06/2031    PHQ-2 (once per calendar year)  Completed    INFLUENZA VACCINE  Completed    Pneumococcal Vaccine: 65+ Years  Completed    URINALYSIS  Completed    IPV IMMUNIZATION  Aged Out    HPV IMMUNIZATION  Aged Out    MENINGITIS IMMUNIZATION  Aged Out    RSV MONOCLONAL ANTIBODY  Aged Out    MAMMO SCREENING  Discontinued         Review of Systems  Constitutional, neuro, ENT, endocrine, pulmonary, cardiac, gastrointestinal, genitourinary, musculoskeletal, integument and psychiatric systems are negative, except as otherwise noted.     Objective    Exam  /84   Pulse 83   Temp 98.1  F (36.7  C) (Temporal)   Resp 18   Ht 1.56 m (5' 1.42\")   Wt 69.6 kg (153 lb 8 oz)   LMP  (LMP Unknown)   SpO2 99%   BMI 28.61 kg/m     Estimated body mass index is 28.61 kg/m  as calculated from the following:    Height as of this encounter: 1.56 m (5' 1.42\").    Weight as of this encounter: 69.6 kg (153 lb 8 oz).    Physical Exam  GENERAL: alert and no distress  EYES: Eyes grossly normal to inspection, PERRL and conjunctivae and sclerae normal  HENT: ear canals and TM's normal, nose and mouth without ulcers or lesions  NECK: no adenopathy, no asymmetry, masses, or scars  RESP: lungs clear to auscultation - no rales, rhonchi or wheezes  CV: regular rate and rhythm, normal S1 S2, no S3 or S4, no murmur, click or rub, no peripheral edema  ABDOMEN: soft, nontender, no hepatosplenomegaly, no masses and bowel sounds normal  MS: no gross musculoskeletal defects noted, no edema  SKIN: no suspicious lesions or rashes  NEURO: Normal strength and tone, mentation intact and speech normal  PSYCH: mentation appears normal, affect normal/bright  Diabetic foot exam: normal DP and PT pulses, no trophic changes or " ulcerative lesions, normal sensory exam, normal monofilament exam, and left leg wrapped from mid foot to knee. Capillary refill delayed. Feet are warm.        6/20/2024   Mini Cog   Clock Draw Score 2 Normal   3 Item Recall 3 objects recalled   Mini Cog Total Score 5                 Signed Electronically by: Lea Lopez MD

## 2024-06-21 ENCOUNTER — THERAPY VISIT (OUTPATIENT)
Dept: PHYSICAL THERAPY | Facility: CLINIC | Age: 84
End: 2024-06-21
Payer: COMMERCIAL

## 2024-06-21 DIAGNOSIS — E78.5 HYPERLIPIDEMIA LDL GOAL <100: ICD-10-CM

## 2024-06-21 DIAGNOSIS — I73.9 CLAUDICATION OF CALF MUSCLES (H): Primary | ICD-10-CM

## 2024-06-21 DIAGNOSIS — M79.662 PAIN OF LEFT LOWER LEG: ICD-10-CM

## 2024-06-21 DIAGNOSIS — I89.0 LYMPHEDEMA: Primary | ICD-10-CM

## 2024-06-21 DIAGNOSIS — M54.89 OTHER BACK PAIN, UNSPECIFIED CHRONICITY: ICD-10-CM

## 2024-06-21 PROCEDURE — 97140 MANUAL THERAPY 1/> REGIONS: CPT | Mod: GP | Performed by: PHYSICAL THERAPIST

## 2024-06-21 PROCEDURE — 97110 THERAPEUTIC EXERCISES: CPT | Mod: GP | Performed by: PHYSICAL THERAPIST

## 2024-06-21 RX ORDER — SIMVASTATIN 20 MG
20 TABLET ORAL
Qty: 90 TABLET | Refills: 3 | Status: SHIPPED | OUTPATIENT
Start: 2024-06-21

## 2024-06-21 NOTE — RESULT ENCOUNTER NOTE
Hi, I talked to my colleague about next steps, and I do recommend some additional imaging, a test called a CT angiogram that uses dye contrast to outline the blood vessels, and another ultrasound to take a different look at your blood vessels.  Please call [phone] ([phone]) to schedule these 2 tests.    My concern is that you might have something called a popliteal artery entrapment, which can be a little difficult to diagnose, but is treatable!    I'll be out of town next week unfortunately but my colleagues will keep an eye out for your results if they come back before I'm back in town.    Here's hoping we can get some answers!    Sincerely,  Dr. Lea Lopez MD  6/21/2024

## 2024-06-21 NOTE — RESULT ENCOUNTER NOTE
That was fast!  So the good news is that the blood vessels of your lower extremity all look great, with no clots or other abnormalities seen. However, that does leave what is causing your significant leg pain a mystery.  I'm going to discuss this with my colleague who's worked in the cardiovascular department to get her ideas about next steps and who to see tomorrow. I'll let you know when I have a better idea of what is needed next!    Sincerely,  Dr. Lea Lopez MD  6/20/2024

## 2024-06-24 ENCOUNTER — THERAPY VISIT (OUTPATIENT)
Dept: PHYSICAL THERAPY | Facility: CLINIC | Age: 84
End: 2024-06-24
Payer: COMMERCIAL

## 2024-06-24 DIAGNOSIS — I89.0 LYMPHEDEMA: Primary | ICD-10-CM

## 2024-06-24 PROCEDURE — 97535 SELF CARE MNGMENT TRAINING: CPT | Mod: GP | Performed by: PHYSICAL THERAPIST

## 2024-06-24 PROCEDURE — 97140 MANUAL THERAPY 1/> REGIONS: CPT | Mod: GP | Performed by: PHYSICAL THERAPIST

## 2024-06-25 ENCOUNTER — TELEPHONE (OUTPATIENT)
Dept: FAMILY MEDICINE | Facility: CLINIC | Age: 84
End: 2024-06-25

## 2024-06-25 ENCOUNTER — THERAPY VISIT (OUTPATIENT)
Dept: PHYSICAL THERAPY | Facility: CLINIC | Age: 84
End: 2024-06-25
Payer: COMMERCIAL

## 2024-06-25 DIAGNOSIS — M54.9 BACK PAIN, UNSPECIFIED BACK LOCATION, UNSPECIFIED BACK PAIN LATERALITY, UNSPECIFIED CHRONICITY: Primary | ICD-10-CM

## 2024-06-25 DIAGNOSIS — M54.89 OTHER BACK PAIN, UNSPECIFIED CHRONICITY: ICD-10-CM

## 2024-06-25 PROCEDURE — 97162 PT EVAL MOD COMPLEX 30 MIN: CPT | Mod: GP | Performed by: PHYSICAL THERAPIST

## 2024-06-25 PROCEDURE — 97530 THERAPEUTIC ACTIVITIES: CPT | Mod: GP | Performed by: PHYSICAL THERAPIST

## 2024-06-25 NOTE — PROGRESS NOTES
PHYSICAL THERAPY EVALUATION  Type of Visit: Evaluation              Subjective       Presenting condition or subjective complaint: leg pain. Has L calf pain primarily with walking, began in February 2024. Generally better in the morning, although first few steps out of bed can be painful. Feels like it might be better after a few cups of coffee. End of the day can barely walk. Aspirin helps, but towards the end of the day nothing helps. No c/o instability/unsteadiness/loss of balance. Achy tightness, like a Juan horse that won't go away. Can walk half a block before onset of pain, can sit and pain will go away. Denies spread of pain up L leg into thigh/buttock/low back, no symptoms on R side. Does not go into foot/toes. Denies numbness/burning/tingling. Denies history of back problems, but does have history of hip problems. 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 Has always walked a bit internally rotated on the L.     Has been seeing Genet Velazquez for lymphedema therapy. She is not certain why she has lymphedema. Occurs in both legs (hx 14 years), L>R, believes it to be hereditary.     Had hysterectomy October '23, had her 6-month follow up and had no concerns. Onset of these L calf symptoms was in February '24. US, GATO, lymphoscintigraphy all normal, leading to low suspicion of vascular causes. Has US, CT next week. Going to [facility] in the fall and hopes to walk as much as possible throughout the trip.     Date of onset: 02/01/24    Relevant medical history:     Dates & types of surgery: hyserectomy  October 2023    Prior diagnostic imaging/testing results: Other ulrasound   Prior therapy history for the same diagnosis, illness or injury: Yes lymphedema  ongoing    Prior Level of Function  Transfers: Independent  Ambulation: Independent  ADL: Independent    Living Environment  Social support: With family members   Type of home: House   Stairs  to enter the home:         Ramp: No   Stairs inside the home: Yes 4 Is there a railing: Yes     Help at home: None  Equipment owned:       Employment: No    Hobbies/Interests: reading travel cooking walking    Patient goals for therapy: walk without pain    Pain assessment: Pain present     Objective   LUMBAR EXAMINATION    Posture: Mild loss of lumbar lordosis, no lateral deviations present. Stands with bilateral mild genu valgum of knees, mild toeing in of L foot.   Ambulates with narrow base of support, same static deviations as noted above, decreased R step length and L stance time.   Ambulating 50' x3 increases L calf pain from baseline 5/10 pain to 8/10 pain. Upon sitting, symptoms immediately resolve. After 2 minutes of conversation notes she had residual 1-2/10 pain.     Baseline sx's: L calf pain 5/10.   Active ROM ROM   Flexion Nil loss, mildly increased pain returning to stand. Repeated flexion in standing: increase, not worse after   Extension Mod loss, mildly increased pain return to standing. Repeated extension in standing: increase, not worse after  *Subjectively reports greater pain with extension compared to flexion    L R   Rotation na na   Sidebend na na   Sideglide na na     Neurological testing (myotomes, sensation, reflexes, nerve tension) not indicated at this time.    Special Tests:  Lees: n/a  Spring Testing: Unremarkable T6-L2. Mild L calf pain produced with spring testing L3-4-5.      Assessment & Plan   CLINICAL IMPRESSIONS  Medical Diagnosis: Other back pain, unspecified chronicity    Treatment Diagnosis: L calf pain, evaluating radiculopathy   Impression/Assessment: Patient is a 84 year old female with L calf complaints.  The following significant findings have been identified: Pain, Decreased ROM/flexibility, Decreased joint mobility, Decreased strength, Impaired gait, Impaired muscle performance, Decreased activity tolerance, and Impaired posture. These impairments interfere with  their ability to perform self care tasks, recreational activities, household chores, household mobility, and community mobility as compared to previous level of function.     Clinical Decision Making (Complexity):  Clinical Presentation: Evolving/Changing  Clinical Presentation Rationale: based on medical and personal factors listed in PT evaluation  Clinical Decision Making (Complexity): Moderate complexity    PLAN OF CARE  Treatment Interventions:  Modalities: E-stim, Hot Pack, Ultrasound  Interventions: Gait Training, Manual Therapy, Neuromuscular Re-education, Therapeutic Activity, Therapeutic Exercise, Self-Care/Home Management    Long Term Goals     PT Goal 1  Goal Identifier: Ambulation  Goal Description: Patient will walk 1 block without stopping and without increase in calf pain in order to promote community mobility and overall health and wellness.  Target Date: 08/06/24      Frequency of Treatment: 1x/month  Duration of Treatment: 3 months    Recommended Referrals to Other Professionals:  none  Education Assessment:   Learner/Method: Patient;Listening;Demonstration;Pictures/Video;No Barriers to Learning    Risks and benefits of evaluation/treatment have been explained.   Patient/Family/caregiver agrees with Plan of Care.     Evaluation Time:     PT Eval, Moderate Complexity Minutes (13553): 25   Present: Not applicable     Signing Clinician: Frederic Onofre, PT        [facility]                                                                                   OUTPATIENT PHYSICAL THERAPY      PLAN OF TREATMENT FOR OUTPATIENT REHABILITATION   Patient's Last Name, First Name, Kelyl Enciso YOB: 1940   Provider's Name   [facility]   Medical Record No.  [id number]     Onset Date: 02/01/24  Start of Care Date: 06/25/24     Medical Diagnosis:  Other back pain, unspecified chronicity      PT Treatment Diagnosis:  L calf  pain, evaluating radiculopathy Plan of Treatment  Frequency/Duration: 1x/month/ 3 months    Certification date from 06/25/24 to 09/22/24         See note for plan of treatment details and functional goals     Frederic Onofre PT                         I CERTIFY THE NEED FOR THESE SERVICES FURNISHED UNDER        THIS PLAN OF TREATMENT AND WHILE UNDER MY CARE     (Physician attestation of this document indicates review and certification of the therapy plan).              Referring Provider:  Lea Lopez MD    Initial Assessment  See Epic Evaluation- Start of Care Date: 06/25/24

## 2024-06-25 NOTE — TELEPHONE ENCOUNTER
----- Message from Vero CHIRINOS sent at 6/25/2024  3:27 PM CDT -----  Regarding: PT Referal Request  Hello,  This patient was recommended to see orthopedic PT by her lymphedema therapist for back pain. Would someone be able to place an order for her? Her PCP is Dr. Lopez.  Thank you!  Marilin

## 2024-06-26 ENCOUNTER — THERAPY VISIT (OUTPATIENT)
Dept: PHYSICAL THERAPY | Facility: CLINIC | Age: 84
End: 2024-06-26
Payer: COMMERCIAL

## 2024-06-26 DIAGNOSIS — I89.0 LYMPHEDEMA: Primary | ICD-10-CM

## 2024-06-26 PROCEDURE — 97140 MANUAL THERAPY 1/> REGIONS: CPT | Mod: GP | Performed by: PHYSICAL THERAPIST

## 2024-06-28 ENCOUNTER — THERAPY VISIT (OUTPATIENT)
Dept: PHYSICAL THERAPY | Facility: CLINIC | Age: 84
End: 2024-06-28
Payer: COMMERCIAL

## 2024-06-28 DIAGNOSIS — I89.0 LYMPHEDEMA: Primary | ICD-10-CM

## 2024-06-28 PROCEDURE — 97140 MANUAL THERAPY 1/> REGIONS: CPT | Mod: GP | Performed by: PHYSICAL THERAPIST

## 2024-07-01 ENCOUNTER — THERAPY VISIT (OUTPATIENT)
Dept: PHYSICAL THERAPY | Facility: CLINIC | Age: 84
End: 2024-07-01
Payer: COMMERCIAL

## 2024-07-01 DIAGNOSIS — I89.0 LYMPHEDEMA: Primary | ICD-10-CM

## 2024-07-01 PROCEDURE — 97140 MANUAL THERAPY 1/> REGIONS: CPT | Mod: GP | Performed by: PHYSICAL THERAPIST

## 2024-07-01 NOTE — PROGRESS NOTES
PLAN  Continue therapy per current plan of care.    Beginning/End Dates of Progress Note Reporting Period:  05/10/2024 to 07/01/2024    Referring Provider:  Richard Freedman     07/01/24 0500   Appointment Info   Signing clinician's name / credentials Genet Velazquez PT,CLT-[email or web address]   Visits Used 10   Medical Diagnosis BLE lymphedema   PT Tx Diagnosis Lymphedema   Quick Adds Certification   Progress Note/Certification   Start of Care Date 05/10/24   Onset of illness/injury or Date of Surgery 05/08/24   Therapy Frequency 3x a week   Predicted Duration 90 days   Certification date from 05/10/24   Certification date to 08/07/24   Progress Note Completed Date 07/01/24   GOALS   PT Goals 2;3;4;5   PT Goal 1   Goal Identifier Lymphedema Home Program   Goal Description Pt will be independent with drainage exercise, self massage, and skin care to best manage swelling to decrease symptoms.   Goal Progress In Progress: Leg well reduced, awaiting fitting appt for transition to compression garments   Target Date 08/07/24   PT Goal 2   Goal Identifier Volume   Goal Description Pt will have 5% reduction in BLE volume for improved tissue integrity, decreased risk of infection, and improved fit of clothing   Goal Progress -10% 6/26/24   Target Date 07/09/24   Date Met 06/26/24   PT Goal 3   Goal Identifier LLIS   Goal Description Pt will have at least 7 point reduction in score on subsequent assessment to reflect the MCID in impact of swelling on quality of life.   Goal Progress To be completed once compression garments available.   Target Date 08/07/24   PT Goal 4   Goal Identifier Maintenance   Goal Description Pt will use compression garments as instructed AND home management tools/program for long term management of chronic condition to prevent tissue fibrosis, infection, wound and other secondary sequelae   Goal Progress Fitting scheduled 7/15   Target Date 08/07/24   PT Goal 5   Goal Identifier Pain   Goal Description  Pt will report left leg pain no greater than 3/10 to demonstrate greater than 50% reduction to allow return to daily exercise walks and allow travel to [place].   Goal Progress Minimal change to pain, further testing scheduled   Target Date 08/07/24   Subjective Report   Subjective Report Removed GCB this AM to shower and replaced with flexigrip. Calf still hurts, has not noticed a change thus far.   Objective Measure 1   Objective Measure Volume RLE   Details 3365.48 mL (-3.4%)   Objective Measure 2   Objective Measure Volume LLE   Details 3547.12 mL (-10.1%)   Manual Therapy   Manual Therapy: Mobilization, MFR, MLD, friction massage minutes (71175) 58   Manual Therapy 1 MLD   Manual Therapy 1 - Details Ongoing MLD for LLE clearance. Pt does have good reduction, but full posterior to mallelous. MLD completed per previous route, but increased time for softening of foot and ankle today. Deeper work into left calf prior to final clearance.   Manual Therapy 2 GCB   Manual Therapy 2 - Details GCB with clean short stretch as pt was able to wash all bandages this weekend. GCB reapplied with short stretch x3 with addition of gray foam malleolar pad to lateral malleous today. PT repots comfort with application. Continue GCB until fitting.   Plan   Home program Continue Edema HEP   Plan for next session MLD, GCB   Total Session Time   Timed Code Treatment Minutes 58   Total Treatment Time (sum of timed and untimed services) 58

## 2024-07-03 ENCOUNTER — THERAPY VISIT (OUTPATIENT)
Dept: PHYSICAL THERAPY | Facility: CLINIC | Age: 84
End: 2024-07-03
Payer: COMMERCIAL

## 2024-07-03 ENCOUNTER — HOSPITAL ENCOUNTER (OUTPATIENT)
Dept: ULTRASOUND IMAGING | Facility: CLINIC | Age: 84
Discharge: HOME | End: 2024-07-03
Attending: FAMILY MEDICINE
Payer: COMMERCIAL

## 2024-07-03 ENCOUNTER — HOSPITAL ENCOUNTER (OUTPATIENT)
Dept: CT IMAGING | Facility: CLINIC | Age: 84
Discharge: HOME | End: 2024-07-03
Attending: FAMILY MEDICINE
Payer: COMMERCIAL

## 2024-07-03 DIAGNOSIS — I73.9 CLAUDICATION OF CALF MUSCLES (H): ICD-10-CM

## 2024-07-03 DIAGNOSIS — I89.0 LYMPHEDEMA: Primary | ICD-10-CM

## 2024-07-03 PROCEDURE — 97140 MANUAL THERAPY 1/> REGIONS: CPT | Mod: GP | Performed by: PHYSICAL THERAPIST

## 2024-07-03 PROCEDURE — 250N000011 HC RX IP 250 OP 636: Performed by: FAMILY MEDICINE

## 2024-07-03 PROCEDURE — 75635 CT ANGIO ABDOMINAL ARTERIES: CPT

## 2024-07-03 PROCEDURE — 93925 LOWER EXTREMITY STUDY: CPT

## 2024-07-03 PROCEDURE — 250N000009 HC RX 250: Performed by: FAMILY MEDICINE

## 2024-07-03 RX ORDER — IOPAMIDOL 755 MG/ML
100 INJECTION, SOLUTION INTRAVASCULAR ONCE
Status: COMPLETED | OUTPATIENT
Start: 2024-07-03 | End: 2024-07-03

## 2024-07-03 RX ADMIN — IOPAMIDOL 100 ML: 755 INJECTION, SOLUTION INTRAVENOUS at 09:39

## 2024-07-03 RX ADMIN — SODIUM CHLORIDE 80 ML: 9 INJECTION, SOLUTION INTRAVENOUS at 09:39

## 2024-07-08 ENCOUNTER — THERAPY VISIT (OUTPATIENT)
Dept: PHYSICAL THERAPY | Facility: CLINIC | Age: 84
End: 2024-07-08
Payer: COMMERCIAL

## 2024-07-08 DIAGNOSIS — I89.0 LYMPHEDEMA: Primary | ICD-10-CM

## 2024-07-08 PROCEDURE — 97140 MANUAL THERAPY 1/> REGIONS: CPT | Mod: GP | Performed by: PHYSICAL THERAPIST

## 2024-07-09 ENCOUNTER — VIRTUAL VISIT (OUTPATIENT)
Dept: FAMILY MEDICINE | Facility: CLINIC | Age: 84
End: 2024-07-09
Payer: COMMERCIAL

## 2024-07-09 DIAGNOSIS — I73.9 CLAUDICATION OF CALF MUSCLES (H): Primary | ICD-10-CM

## 2024-07-09 DIAGNOSIS — E11.59 TYPE 2 DIABETES MELLITUS WITH OTHER CIRCULATORY COMPLICATIONS (H): ICD-10-CM

## 2024-07-09 DIAGNOSIS — M79.662 PAIN OF LEFT LOWER LEG: ICD-10-CM

## 2024-07-09 PROCEDURE — 99443 PR PHYSICIAN TELEPHONE EVALUATION 21-30 MIN: CPT | Mod: 93 | Performed by: FAMILY MEDICINE

## 2024-07-09 NOTE — PATIENT INSTRUCTIONS
Please call [facility] Radiology Department to schedule an outpatient appointment for your MRI of your left calf at [phone].

## 2024-07-09 NOTE — RESULT ENCOUNTER NOTE
Patient was seen today in clinic.  I discussed results in clinic, please see clinic progress note.    Lea Lopez MD 7/9/2024

## 2024-07-09 NOTE — PROGRESS NOTES
Kelly is a 84 year old who is being evaluated via a billable telephone visit.    What phone number would you like to be contacted at? [phone]   How would you like to obtain your AVS? Oliva  Originating Location (pt. Location): Home    Distant Location (provider location):  Off-site    Assessment & Plan     (I73.9) Claudication of calf muscles (H24)  (primary encounter diagnosis)  (M79.662) Pain of left lower leg  Severe pain, greatly limiting daily activity.    Plan:  Tibia Fibula Lower Leg Left wo & w Contr        I reviewed recent CTA that shows patent LOWER EXTREMITY arteries, including popliteal, including during provocative maneuvers (hard dorsa/plantar flexion of left foot).    Comment: differential still includes vascular vs nerve entrapment. Will obtain MRI as below,and Will fu as indicated.   Plan:  Tibia Fibula Lower Leg Left wo & w Contr        Recommended scheduling acetaminophen/aspirin daily for pain control.    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 She can do seated or lying exercises without any pain, and bike and she's been trying to do this to stay a little active. She denies color changes, temperature changes or other skin abnormalities of her leg. She is getting lymphedema treatment, including compression stockings, with resolution of edema without resolution of pain with activity.  She's taking aspirin for pain relief which helps temper the pain  "somewhat.    She's had negative imaging including CTA abdomen pelvis runoff with no significant stenosis noted of popliteal artery. She reports that the radiologist had her flex and point her foot during the testing and  she did it \"as hard as she could\". She reports that flexing her foot and biking does NOT cause pain.      Diabetes (Leg pain - left leg) and Results (Follow up labs)      7/9/2024     7:50 AM   Additional Questions   Roomed by Love DICKINSON     History of Present Illness       Diabetes:   She presents for follow up of diabetes.  She is checking home blood glucose two times daily.   She checks blood glucose before meals.  Blood glucose is never over 200 and never under 70.  When her blood glucose is low, the patient is asymptomatic for confusion, blurred vision, lethargy and reports not feeling dizzy, shaky, or weak.   She has no concerns regarding her diabetes at this time.   She is not experiencing numbness or burning in feet, excessive thirst, blurry vision, weight changes or redness, sores or blisters on feet.           Reason for visit:  Follow up on leg pain, daibetes and lab results        Diabetes Data      BP Readings from Last 2 Encounters:   06/20/24 138/84   06/05/24 136/83     Hemoglobin A1C (%)   Date Value   06/11/2024 8.1 (H)   11/26/2023 7.7 (H)   05/11/2021 7.3 (H)   10/07/2020 7.2 (H)     LDL Cholesterol Calculated (mg/dL)   Date Value   06/11/2024 101 (H)   11/26/2023 112 (H)   05/11/2021 103 (H)   10/07/2020 134 (H)         Review of Systems  Constitutional, HEENT, cardiovascular, pulmonary, gi and gu systems are negative, except as otherwise noted.      Objective    Vitals - Patient Reported  Weight (Patient Reported): 70.8 kg (156 lb)  Height (Patient Reported): 154.9 cm (5' 1\")  BMI (Based on Pt Reported Ht/Wt): 29.48  Pain Score: Moderate Pain (5)  Pain Loc: Lower Leg        Physical Exam   General: Alert and no distress //Respiratory: No audible wheeze, cough, or shortness of " breath // Psychiatric:  Appropriate affect, tone, and pace of words      CTA Abdomen Pelvis Runoff w Contrast    Result Date: 7/3/2024  CTA ABDOMEN AND PELVIS RUNOFF WITH CONTRAST  7/3/2024 10:45 AM HISTORY: Claudication of the lower extremities. COMPARISON: Ankle brachial index dated 4/17/2024, CT of the chest, abdomen, pelvis dated 10/6/2023. TECHNIQUE: CT angiogram of the abdomen and pelvis with bilateral lower extremity runoff was performed following the administration of 100 mL intravenous contrast. Images arere viewed in multiple planes and 3-D reconstructions were also performed. Radiation dose for this scan was reduced using automated exposure control, adjustment of the mA and/or kV according to patient size, or iterative reconstruction technique. FINDINGS: Vascular exam: Abdominal aorta: The abdominal aorta is of normal caliber without aneurysmal dilatation or significant stenosis. The celiac trunk, bilateral renal arteries, and inferior mesenteric artery are patent without significant stenoses. There is a mild stenosis in the proximal superior mesenteric artery due to focal soft plaque and/or intramural hematoma. This is not significantly changed when compared to the prior exam. Iliac arteries: The iliac arteries are patent without significant stenoses. Right lower extremity angiogram: Common femoral artery: No significant stenosis. Profunda femoris artery: No significant stenosis. Superficial femoral artery: No significant stenosis. Popliteal artery: No significant stenosis. Tibial arteries: Three-vessel runoff. Left lower extremity angiogram: Common femoral artery: No significant stenosis. Profunda femoris artery: No significant stenosis. Superficial femoral artery: No significant stenosis. Popliteal artery: No significant stenosis. Tibial arteries: Three-vessel runoff. Soft tissue exam: The lung bases are clear. Abdomen/pelvis: There are areas of scarring in the right kidney. The remaining solid organs  in the abdomen are unremarkable. There are extensive diffuse colonic diverticuli throughout the colon. Sclerosis at the sacroiliac joints.     IMPRESSION: No significant stenoses in the arteries of the abdomen, pelvis and lower extremities. HOA VALVERDE MD   SYSTEM ID:  [id number]    US Lower Extremity Arterial Duplex Bilateral    Result Date: 7/3/2024  [facility] DATE: 7/3/2024 EXAM: US LOWER EXTREMITY ARTERIAL DUPLEX BILATERAL INDICATION: please evaluate for popliteal artery entrapment; Claudication of calf muscles (H24). Left calf pain with walking, relieved with rest, history of abnormal TBI. TECHNIQUE: Duplex imaging of the aorta and common iliac arteries was performed, including gray-scale and color flow imaging, Doppler waveform analysis, and spectral Doppler imaging. COMPARISON: GATO 4/17/2024 FINDINGS: Patent bilateral popliteal arteries with normal resting velocities and multiphasic waveforms throughout. No significant change in popliteal artery velocities or waveforms throughout plantarflexion and dorsiflexion popliteal entrapment maneuvers to suggest dynamic compression/stenosis.     IMPRESSION: Patent bilateral popliteal arteries without evidence of dynamic compression with popliteal entrapment maneuvers. SAM CASTILLO MD   SYSTEM ID:  [id number]       Phone call duration: 30 minutes  Signed Electronically by: Lea Lopez MD

## 2024-07-10 ENCOUNTER — THERAPY VISIT (OUTPATIENT)
Dept: PHYSICAL THERAPY | Facility: CLINIC | Age: 84
End: 2024-07-10
Payer: COMMERCIAL

## 2024-07-10 DIAGNOSIS — I89.0 LYMPHEDEMA: Primary | ICD-10-CM

## 2024-07-10 PROCEDURE — 97140 MANUAL THERAPY 1/> REGIONS: CPT | Mod: GP | Performed by: PHYSICAL THERAPIST

## 2024-07-12 ENCOUNTER — THERAPY VISIT (OUTPATIENT)
Dept: PHYSICAL THERAPY | Facility: CLINIC | Age: 84
End: 2024-07-12
Payer: COMMERCIAL

## 2024-07-12 DIAGNOSIS — I89.0 LYMPHEDEMA: Primary | ICD-10-CM

## 2024-07-12 PROCEDURE — 97535 SELF CARE MNGMENT TRAINING: CPT | Mod: GP | Performed by: PHYSICAL THERAPIST

## 2024-07-12 PROCEDURE — 97140 MANUAL THERAPY 1/> REGIONS: CPT | Mod: GP | Performed by: PHYSICAL THERAPIST

## 2024-07-15 ENCOUNTER — THERAPY VISIT (OUTPATIENT)
Dept: PHYSICAL THERAPY | Facility: CLINIC | Age: 84
End: 2024-07-15
Payer: COMMERCIAL

## 2024-07-15 DIAGNOSIS — M54.89 OTHER BACK PAIN, UNSPECIFIED CHRONICITY: Primary | ICD-10-CM

## 2024-07-15 PROCEDURE — 97530 THERAPEUTIC ACTIVITIES: CPT | Mod: GP | Performed by: PHYSICAL THERAPIST

## 2024-07-15 PROCEDURE — 97110 THERAPEUTIC EXERCISES: CPT | Mod: GP | Performed by: PHYSICAL THERAPIST

## 2024-07-25 ENCOUNTER — HOSPITAL ENCOUNTER (OUTPATIENT)
Dept: MRI IMAGING | Facility: CLINIC | Age: 84
Discharge: HOME | End: 2024-07-25
Attending: FAMILY MEDICINE | Admitting: FAMILY MEDICINE
Payer: COMMERCIAL

## 2024-07-25 DIAGNOSIS — M79.662 PAIN OF LEFT LOWER LEG: ICD-10-CM

## 2024-07-25 DIAGNOSIS — I73.9 CLAUDICATION OF CALF MUSCLES (H): ICD-10-CM

## 2024-07-25 PROCEDURE — 73720 MRI LWR EXTREMITY W/O&W/DYE: CPT | Mod: LT

## 2024-07-25 PROCEDURE — A9585 GADOBUTROL INJECTION: HCPCS | Performed by: FAMILY MEDICINE

## 2024-07-25 PROCEDURE — 73720 MRI LWR EXTREMITY W/O&W/DYE: CPT | Mod: 26 | Performed by: RADIOLOGY

## 2024-07-25 PROCEDURE — 255N000002 HC RX 255 OP 636: Performed by: FAMILY MEDICINE

## 2024-07-25 RX ORDER — GADOBUTROL 604.72 MG/ML
7 INJECTION INTRAVENOUS ONCE
Status: COMPLETED | OUTPATIENT
Start: 2024-07-25 | End: 2024-07-25

## 2024-07-25 RX ADMIN — GADOBUTROL 7 ML: 604.72 INJECTION INTRAVENOUS at 20:29

## 2024-07-30 ENCOUNTER — VIRTUAL VISIT (OUTPATIENT)
Dept: FAMILY MEDICINE | Facility: CLINIC | Age: 84
End: 2024-07-30
Payer: COMMERCIAL

## 2024-07-30 DIAGNOSIS — M79.662 PAIN OF LEFT LOWER LEG: Primary | ICD-10-CM

## 2024-07-30 PROCEDURE — 99442 PR PHYSICIAN TELEPHONE EVALUATION 11-20 MIN: CPT | Mod: 93 | Performed by: FAMILY MEDICINE

## 2024-07-30 NOTE — RESULT ENCOUNTER NOTE
Hi, good news, your most recent imaging was normal, with no nerve problems seen.    Sincerely,  Dr. Lea Lopez MD  7/30/2024

## 2024-07-30 NOTE — PROGRESS NOTES
Kelly is a 84 year old who is being evaluated via a billable telephone visit.    What phone number would you like to be contacted at? [phone]  How would you like to obtain your AVS? Zbigniewhart  Originating Location (pt. Location): Home    Distant Location (provider location):  Off-site    Assessment & Plan     (D72.677) Pain of left lower leg  (primary encounter diagnosis)  Comment: symptoms most consistent with claudication but with normal imaging thus far. symptoms are severe and disabling. I'll obtain EMG to assess for nerve compression, and will refer to [facility] for 2nd opinion, additional work up.    Plan: EMG        Will fu as indicated.     Any [facility] Location    Scheduling Instructions:  [email or web address] [place] will call you to coordinate your care as prescribed by your provider. If you don't hear from a representative within 2 business days, please call [phone].   EMG    Subjective   Kelly is a 84 year old, presenting for the following health issues:  Results      7/30/2024     2:18 PM   Additional Questions   Roomed by Love DICKINSON     History of Present Illness       Reason for visit:  Test results      Persistent left calf pain with activity  See prior notes. Onset February 2024, gradually worsening. Initially walking 25 -30 minutes daily helped. She went to [place] in March, was walking a lot more and pain became severe. She wore compression socks on flights, and symptoms started prior to airplane flights. No pain at rest. Pain with more than 1 minutes of activity (walking). She's been managing lymphedema by wearing compression socks, a leg sock/compression garment that hasn't made any difference. She continues to take a daily aspirin, hasn't helped pain at all. Acetaminophen helps a very little bit, but she's still very limited in activity due to pain.    She's had following imaging, all normal:     MRI tibia/fibula 7/25/24:  No MR evidence of left lower extremity neuropathy. Subcutaneous edema  without  other abnormality in proximity to patient's marker over the  posterior mid calf.    CTA abdomen Pelvis 7/3/2024  IMPRESSION: No significant stenoses in the arteries of the abdomen,  pelvis and lower extremities.    US 7/3/2024  Patent bilateral popliteal arteries without evidence of  dynamic compression with popliteal entrapment maneuvers.    Only abnormal test is GATO.  GATO 4/17/2024   IMPRESSION:  1. RIGHT:       A. Resting GATO is normal, 1.07.       B. Resting TBI is normal, 0.76.     2. LEFT:       A. Resting GATO is normal, 1.08.       B. Resting TBI is ABNORMAL, 0.65.    Review of Systems  Constitutional, HEENT, cardiovascular, pulmonary, gi and gu systems are negative, except as otherwise noted.      Objective    Vitals - Patient Reported  Pain Score: Extreme Pain (8)  Pain Loc: Lower Leg    Physical Exam   General: Alert and no distress //Respiratory: No audible wheeze, cough, or shortness of breath // Psychiatric:  Appropriate affect, tone, and pace of words        Phone call duration: 25 minutes  Signed Electronically by: Lea Lopez MD

## 2024-07-31 ENCOUNTER — THERAPY VISIT (OUTPATIENT)
Dept: PHYSICAL THERAPY | Facility: CLINIC | Age: 84
End: 2024-07-31
Payer: COMMERCIAL

## 2024-07-31 ENCOUNTER — TELEPHONE (OUTPATIENT)
Dept: FAMILY MEDICINE | Facility: CLINIC | Age: 84
End: 2024-07-31

## 2024-07-31 DIAGNOSIS — I89.0 LYMPHEDEMA: Primary | ICD-10-CM

## 2024-07-31 DIAGNOSIS — M79.662 PAIN OF LEFT LOWER LEG: Primary | ICD-10-CM

## 2024-07-31 PROCEDURE — 97535 SELF CARE MNGMENT TRAINING: CPT | Mod: GP | Performed by: PHYSICAL THERAPIST

## 2024-07-31 PROCEDURE — 97140 MANUAL THERAPY 1/> REGIONS: CPT | Mod: GP | Performed by: PHYSICAL THERAPIST

## 2024-07-31 NOTE — TELEPHONE ENCOUNTER
Order/Referral Request    Who is requesting: Patient    Orders being requested: EMG    Reason service is needed/diagnosis: Leg Pain. Patient already has an order for it but for [facility] but won't be able to be seen until October. Per patient already talked to insurance and [facility] is covered for patient and can be seen sooner there.    When are orders needed by: as soon as able    Has this been discussed with Provider: Yes    Does patient have a preference on a Group/Provider/Facility? [facility]    Does patient have an appointment scheduled?: Yes: Is scheduled with [facility] no date was given though    Where to send orders: Fax [phone]    Could we send this information to you in [facility] or would you prefer to receive a phone call?:   Patient would prefer a phone call   Okay to leave a detailed message?: Yes at Cell number on file:    Telephone Information:   Mobile [phone]

## 2024-07-31 NOTE — TELEPHONE ENCOUNTER
Dr. Lopez-Please review and sign if agree.  EMG specifying [facility] pended.    Thank you!  MICHAEL DiggsN, RN-[facility]

## 2024-07-31 NOTE — PROGRESS NOTES
DISCHARGE  Reason for Discharge: Patient has met all goals.  Pt met all edema related goals, pt continues to have medical work up regarding left leg pain    Equipment Issued: recommendations for compression, HEP    Discharge Plan: Patient to continue home program.    Referring Provider:  Richard Freedman     07/31/24 0500   Appointment Info   Signing clinician's name / credentials Genet Velazquez PT,CLT-ABDIFATAH   Visits Used 15   Medical Diagnosis BLE lymphedema   PT Tx Diagnosis Lymphedema   Quick Adds Certification   Progress Note/Certification   Start of Care Date 05/10/24   Onset of illness/injury or Date of Surgery 05/08/24   Therapy Frequency 3x a week   Predicted Duration 90 days   Certification date from 05/10/24   Certification date to 08/07/24   Progress Note Completed Date 07/01/24   GOALS   PT Goals 2;3;4;5   PT Goal 1   Goal Identifier Lymphedema Home Program   Goal Description Pt will be independent with drainage exercise, self massage, and skin care to best manage swelling to decrease symptoms.   Goal Progress Pt has managed indepdnently for the last 2 weeks with use of compression, self massage and elevation with further reduction in limb volume   Target Date 08/07/24   Date Met 07/31/24   PT Goal 2   Goal Identifier Volume   Goal Description Pt will have 5% reduction in BLE volume for improved tissue integrity, decreased risk of infection, and improved fit of clothing   Goal Progress -10% 6/26/24   Target Date 07/09/24   Date Met 06/26/24   PT Goal 3   Goal Identifier LLIS   Goal Description Pt will have at least 7 point reduction in score on subsequent assessment to reflect the MCID in impact of swelling on quality of life.   Goal Progress Scoring 8/56   Target Date 08/07/24   Date Met 07/31/24   PT Goal 4   Goal Identifier Maintenance   Goal Description Pt will use compression garments as instructed AND home management tools/program for long term management of chronic condition to prevent  tissue fibrosis, infection, wound and other secondary sequelae   Goal Progress Pt in long term compression with knee high velcro garments and compression sock   Target Date 08/07/24   Date Met 07/31/24   PT Goal 5   Goal Identifier Pain   Goal Description Pt will report left leg pain no greater than 3/10 to demonstrate greater than 50% reduction to allow return to daily exercise walks and allow travel to [place].   Goal Progress Minimal change to pain, pt continues to undergo medical workup on pain   Target Date 08/07/24   Subjective Report   Subjective Report Reports swelling has been excellent in the AM, gets worse throughout the day. Got new garments, needing training on using them.   Objective Measure 1   Objective Measure Volume RLE   Details 3365.48 mL (-3.4%)   Objective Measure 2   Objective Measure Volume LLE   Details 3498.72 mL (-10.6%)   Manual Therapy   Manual Therapy: Mobilization, MFR, MLD, friction massage minutes (49134) 25   Manual Therapy 3 Assessment   Manual Therapy 3 - Details Measurements and assessment of LE edema after 2 weeks of self maintenance by pt. Measurements show pt has further reduced LE volume with good compliance with HEP. Skin assessment shows 1+ pitting at ankle and dorsal foot, but no redness and visible reduction of edema since eval   Self Care/home Management   ADL/Home Mgmt Training (96855) 31   Self Care 1 Compression garment training   Self Care 1 - Details Pt utilizing compression sock with flexigrip cover for the last 2 weeks independently. She recieved her new velcro garment in the mail last week, but has not tried wearing it yet. Pt educated on use and donning with CLT applying first, then having pt apply indepdnently x3 to ensure good understanding. Pt able to apply without cues, and able to measure compression indepdnently at the end of the session.   Education   Learner/Method Patient;Demonstration;Pictures/Video;Listening   Education Comments Compression donning and  long term plan   Plan   Home program Continue edema HEP, compression daily, off at night   Plan for next session d/c today   Total Session Time   Timed Code Treatment Minutes 56   Total Treatment Time (sum of timed and untimed services) 56

## 2024-08-01 NOTE — TELEPHONE ENCOUNTER
ASSESSMENT / PLAN:  (M79.662) Pain of left lower leg  (primary encounter diagnosis)  Plan: EMG        Referral to [facility] ordered. Please let her know,t beba you!  Sincerely,  Dr. Lea Lopez MD  8/1/2024

## 2024-08-05 ENCOUNTER — TRANSFERRED RECORDS (OUTPATIENT)
Dept: HEALTH INFORMATION MANAGEMENT | Facility: CLINIC | Age: 84
End: 2024-08-05

## 2024-08-09 ENCOUNTER — MYC MEDICAL ADVICE (OUTPATIENT)
Dept: FAMILY MEDICINE | Facility: CLINIC | Age: 84
End: 2024-08-09
Payer: COMMERCIAL

## 2024-08-14 ENCOUNTER — TELEPHONE (OUTPATIENT)
Dept: FAMILY MEDICINE | Facility: CLINIC | Age: 84
End: 2024-08-14
Payer: COMMERCIAL

## 2024-08-14 DIAGNOSIS — G62.9 POLYNEUROPATHY: Primary | ICD-10-CM

## 2024-08-15 RX ORDER — GABAPENTIN 100 MG/1
CAPSULE ORAL
Qty: 60 CAPSULE | Refills: 0 | Status: SHIPPED | OUTPATIENT
Start: 2024-08-15

## 2024-08-15 NOTE — TELEPHONE ENCOUNTER
No GONZALO found on site  Dr. Lopez, please have patient complete at appt 8/23. Message has been added for rooming staff awareness-

## 2024-08-15 NOTE — TELEPHONE ENCOUNTER
Covering Clinicians-Please review and sign if agree.    Care Team-Please re-fax request for EMG report to Margo.    Call received from Dr. Lopez:  Found patient's EMG  Patient needs Neurology referral and would like to start patient on Gabapentin  Needs copy of patient's EMG from 8/5/24 with Margo SÁNCHEZ needs to be resent to Margo: [phone]    Verbal order received for Neurology referral and informed Dr. Lopez writer needs to clarify preferred pharmacy with patient.    Writer will call patient to communicate plan of care.    Dr. Lopez verbalized understanding and in agreement with plan.    Writer called patient and reviewed plan of care per Dr. Lopez, along with Neurology scheduling number.    Patient verbalized understanding and in agreement with plan.    Pharmacy confirmed with patient and pended.    Thank you!  MEHREEN Grijalva, MICHAELN, RN-[facility]

## 2024-08-15 NOTE — TELEPHONE ENCOUNTER
Patient calling back. Relayed the below rx and POC information. Neurology scheduling number given to pt. Patient states she has no questions at this time.    MICHAEL RamirezN, RN (she/her)  [facility] RN

## 2024-08-15 NOTE — TELEPHONE ENCOUNTER
I tried to contact [place] referral line [phone] but office closed, I will try again later.  Sincerely,  Dr. Lea Lopez MD  8/14/2024      My last note 7/30/24:  (M79.662) Pain of left lower leg  (primary encounter diagnosis)  Comment: symptoms most consistent with claudication but with normal imaging thus far. symptoms are severe and disabling. I'll obtain EMG to assess for nerve compression, and will refer to [place] for 2nd opinion, additional work up.    HPI Persistent left calf pain with activity  See prior notes. Onset February 2024, gradually worsening. Initially walking 25 -30 minutes daily helped. She went to [place] in March, was walking a lot more and pain became severe. She wore compression socks on flights, and symptoms started prior to airplane flights. No pain at rest. Pain with more than 1 minutes of activity (walking). She's been managing lymphedema by wearing compression socks, a leg sock/compression garment that hasn't made any difference. She continues to take a daily aspirin, hasn't helped pain at all. Acetaminophen helps a very little bit, but she's still very limited in activity due to pain.     She's had following imaging, all normal:      MRI tibia/fibula 7/25/24:  No MR evidence of left lower extremity neuropathy. Subcutaneous edema  without other abnormality in proximity to patient's marker over the  posterior mid calf.     CTA abdomen Pelvis 7/3/2024  IMPRESSION: No significant stenoses in the arteries of the abdomen,  pelvis and lower extremities.     US 7/3/2024  Patent bilateral popliteal arteries without evidence of  dynamic compression with popliteal entrapment maneuvers.     Only abnormal test is GATO.  GATO 4/17/2024   IMPRESSION:  1. RIGHT:       A. Resting GATO is normal, 1.07.       B. Resting TBI is normal, 0.76.     2. LEFT:       A. Resting GATO is normal, 1.08.       B. Resting TBI is ABNORMAL, 0.65.

## 2024-08-15 NOTE — TELEPHONE ENCOUNTER
No answer. Left message on patient's voicemail to call back and speak with a triage nurse.     Sent mychart message.     Moriah Real RN  [facility]

## 2024-08-23 ENCOUNTER — OFFICE VISIT (OUTPATIENT)
Dept: FAMILY MEDICINE | Facility: CLINIC | Age: 84
End: 2024-08-23
Payer: COMMERCIAL

## 2024-08-23 VITALS
OXYGEN SATURATION: 99 % | DIASTOLIC BLOOD PRESSURE: 62 MMHG | BODY MASS INDEX: 29.62 KG/M2 | WEIGHT: 156.9 LBS | HEART RATE: 62 BPM | HEIGHT: 61 IN | TEMPERATURE: 97.7 F | SYSTOLIC BLOOD PRESSURE: 126 MMHG | RESPIRATION RATE: 14 BRPM

## 2024-08-23 DIAGNOSIS — M79.662 PAIN OF LEFT LOWER LEG: ICD-10-CM

## 2024-08-23 DIAGNOSIS — G60.9 IDIOPATHIC PERIPHERAL NEUROPATHY: ICD-10-CM

## 2024-08-23 DIAGNOSIS — E11.9 TYPE 2 DIABETES MELLITUS WITHOUT COMPLICATION, WITHOUT LONG-TERM CURRENT USE OF INSULIN (H): ICD-10-CM

## 2024-08-23 DIAGNOSIS — I73.9 CLAUDICATION OF CALF MUSCLES (H): Primary | ICD-10-CM

## 2024-08-23 PROBLEM — M54.89 OTHER BACK PAIN, UNSPECIFIED CHRONICITY: Status: RESOLVED | Noted: 2024-06-25 | Resolved: 2024-08-23

## 2024-08-23 PROBLEM — E11.40 DIABETIC NEUROPATHY (H): Status: ACTIVE | Noted: 2024-08-05

## 2024-08-23 PROCEDURE — 99214 OFFICE O/P EST MOD 30 MIN: CPT | Performed by: FAMILY MEDICINE

## 2024-08-23 PROCEDURE — G2211 COMPLEX E/M VISIT ADD ON: HCPCS | Performed by: FAMILY MEDICINE

## 2024-08-23 PROCEDURE — 99207 PR FOOT EXAM NO CHARGE: CPT | Performed by: FAMILY MEDICINE

## 2024-08-23 ASSESSMENT — PAIN SCALES - GENERAL: PAINLEVEL: MODERATE PAIN (4)

## 2024-08-23 NOTE — PROGRESS NOTES
Assessment & Plan   The longitudinal plan of care for the diagnosis(es)/condition(s) as documented were addressed during this visit. Due to the added complexity in care, I will continue to support Kelly in the subsequent management and with ongoing continuity of care.    (I73.9) Claudication of calf muscles (H24)  (primary encounter diagnosis)  with  (M79.662) Pain of left lower leg  Comment: significantly limiting activity  New, previously undiagnosed problem with uncertain prognosis and additional work-up planned.   Plan: etiology still unclear; vascular vs neuropathic vs neurogenic vs orthopedic (chronic exertional compartment syndrome?)    (G60.9) Idiopathic peripheral neuropathy  Comment: bilateral, moderately severe, noted on EMG at [facility], see scanned report from 8/5/2024.  Plan: patient has not had typical neuropathy symptoms and although she does have diabetes since 2011, it has been generally well controlled. She has a neurology appointment scheduled although the next available appointment is in January. I'm not convinced that this finding explains her left leg calf pain.    Medication review: she had nitrofurantoin for 10 days 7/22/2021. She has been on no other medications known to be associated with neuropathy.    (E11.9) Type 2 diabetes mellitus without complication, without long-term current use of insulin (H)  Plan: sitagliptin (JANUVIA) 50 MG tablet          Subjective   Kelly is a 84 year old, presenting for the following health issues:  Musculoskeletal Problem (Left - pt states it started since March and they haven't able to find the cause of why it's in pain.) and Refill Request (For Januvia but pt want to talk to provider first and also refill on gabapentin but unsure how.  )    See prior notes. Kelly has had sudden onset of severe pain in left calf with walking or lifting something heavy, greatly limiting activity. Prolonged standing still can cause a sensation of bilateral leg pain.  Rest  "relieves symptoms immediately. She can use a bicycle exercise machine with no pain at all.    She tried the gabapentin, gradually increased dosage from 100 mg to 300 mg, didn't help at all and the 300 mg dose made her feel funny.  #3 aspirin reduces pain somewhat.    I reviewed recent EMG done at [facility] 8/5/2024.  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 Axonal process seems to be primary pathology.    Normal result: left radial sensory nerve, L3-S1 myotome.    Abnormal result:  Bilateral no response of left superficial peroneal sensory nerve.  Mildly reduced amplitude of left peroneal motor nerve and left tibial motor nerve with normal distal latency.    Other recent normal imaging includes  7/25/24 MR tibia/fibula, 7/3/2024 CTA abdominal/pelvis, and US of lower extremities with popliteal entrapment maneuvers, 6/20/24 venous duplex left lower extremities      8/23/2024     6:59 AM   Additional Questions   Roomed by Quang KITCHEN MA   Accompanied by Self     History of Present Illness       Reason for visit:  Leg pain    She eats 2-3 servings of fruits and vegetables daily.She consumes 0 sweetened beverage(s) daily.She exercises with enough effort to increase her heart rate 10 to 19 minutes per day.  She exercises with enough effort to increase her heart rate 6 days per week.   She is taking medications regularly.       Review of Systems  Constitutional, HEENT, cardiovascular, pulmonary, gi and gu systems are negative, except as otherwise noted.      Objective    /62 (BP Location: Right arm, Patient Position: Sitting, Cuff Size: Adult Regular)   Pulse 62   Temp 97.7  F (36.5  C) (Temporal)   Resp 14   Ht 1.556 m (5' 1.25\")   Wt 71.2 kg (156 lb 14.4 oz)   LMP  (LMP Unknown)   SpO2 99%   BMI 29.40 kg/m    Body mass index is 29.4 kg/m .  Physical Exam   GENERAL: alert and " no distress  NECK: no adenopathy, no asymmetry, masses, or scars  RESP: lungs clear to auscultation - no rales, rhonchi or wheezes  CV: regular rate and rhythm, normal S1 S2, no S3 or S4, no murmur, click or rub, no peripheral edema  MS: no gross musculoskeletal defects noted, no edema  NEURO: Normal strength and tone, mentation intact and speech normal  Diabetic foot exam: normal DP and PT pulses, no trophic changes or ulcerative lesions, and patient reports perhaps slightly reduced sensation of both feet.    No results found for any visits on 08/23/24.        Signed Electronically by: Lea Lopez MD

## 2024-08-23 NOTE — PATIENT INSTRUCTIONS
Aspirin maximum dose is 4000 mg in 24 hours, or 1000 mg every 6 hours. Do not take aspirin while using Salonpas.    Acetaminophen maximum dose 2000 mg in 25 hours, so 1000 mg every 12 hours or 500 mg every 6 hours.    Ibuprofen 2400 mg in 24 hours with food or water, 600 mg every 6 hours or 800 mg every 8 hours.

## 2024-08-24 ENCOUNTER — LAB (OUTPATIENT)
Dept: LAB | Facility: CLINIC | Age: 84
End: 2024-08-24
Payer: COMMERCIAL

## 2024-08-24 DIAGNOSIS — G60.9 IDIOPATHIC PERIPHERAL NEUROPATHY: ICD-10-CM

## 2024-08-24 PROCEDURE — 84207 ASSAY OF VITAMIN B-6: CPT | Mod: 90

## 2024-08-24 PROCEDURE — 36415 COLL VENOUS BLD VENIPUNCTURE: CPT

## 2024-08-24 PROCEDURE — 99000 SPECIMEN HANDLING OFFICE-LAB: CPT

## 2024-08-27 ENCOUNTER — TELEPHONE (OUTPATIENT)
Dept: CARDIOLOGY | Facility: CLINIC | Age: 84
End: 2024-08-27
Payer: COMMERCIAL

## 2024-08-27 NOTE — TELEPHONE ENCOUNTER
1st attempt- Left voicemail for the patient to call back and schedule the following:    Appointment type:  New Cardiology  Provider:  Dr Arreaga  Return date:  next avail  Additional appointment(s) needed:    Additonal Notes:  Cannot get through on pts mobile line- busy signal. LMOM for pt on landline. Pt is asking to see Dr Arreaga- NEW VASCULAR CONSULT-    Specialty phone number: [phone]

## 2024-08-28 LAB — PYRIDOXAL PHOS SERPL-SCNC: 44 NMOL/L

## 2024-08-29 NOTE — RESULT ENCOUNTER NOTE
Good news, your B6 level is just right, neither too low or too high!    Sincerely,  Dr. Lea Lopez MD  8/29/2024

## 2024-08-30 ENCOUNTER — MYC MEDICAL ADVICE (OUTPATIENT)
Dept: FAMILY MEDICINE | Facility: CLINIC | Age: 84
End: 2024-08-30

## 2024-08-30 DIAGNOSIS — I73.9 CLAUDICATION OF CALF MUSCLES (H): Primary | ICD-10-CM

## 2024-08-30 DIAGNOSIS — M79.662 PAIN OF LEFT LOWER LEG: ICD-10-CM

## 2024-08-30 DIAGNOSIS — M43.16 SPONDYLOLISTHESIS AT L4-L5 LEVEL: ICD-10-CM

## 2024-08-30 DIAGNOSIS — G60.9 IDIOPATHIC PERIPHERAL NEUROPATHY: ICD-10-CM

## 2024-09-04 NOTE — TELEPHONE ENCOUNTER
Dr. Lopez - see pt Oliva, advisement appreciated.    MEHREEN Grewal, BSN, RN (she/her)  [facility] RN     Pt finished with PO contrast at this time        Kofi Chaney RN  02/04/19 8000

## 2024-09-06 NOTE — TELEPHONE ENCOUNTER
Dr. Lopez: upcoming appt 12/3/24 is with cardiology/vascular; I do not see this referral.  I do see a neurology referral and she is scheduled 10/15/24.    Pended priority vascular referral to [place] unless we want to send a priority referral within [place].    I finally heard from Dr. Arreaga's .  I have an appointment on Dec. 3 in [facility].  That's a long drive but other places were in January.  It is also a long time to wait.   Please expedite a trip to [place] if possible. and could you update me about that process. My insurance will cover part of it.  It is very hard to be in this much pain and not know what to do and what is going on.  Even the oxycodone only helped a little.  Should try a pain clinic? How about a simple x-ray?    MICHAEL NevesN, PHN, RN  [facility]  [phone]

## 2024-09-10 ENCOUNTER — VIRTUAL VISIT (OUTPATIENT)
Dept: FAMILY MEDICINE | Facility: CLINIC | Age: 84
End: 2024-09-10
Payer: COMMERCIAL

## 2024-09-10 DIAGNOSIS — I73.9 CLAUDICATION OF CALF MUSCLES (H): Primary | ICD-10-CM

## 2024-09-10 DIAGNOSIS — G62.9 POLYNEUROPATHY: ICD-10-CM

## 2024-09-10 DIAGNOSIS — E11.9 TYPE 2 DIABETES MELLITUS WITHOUT COMPLICATION, WITHOUT LONG-TERM CURRENT USE OF INSULIN (H): ICD-10-CM

## 2024-09-10 PROCEDURE — 99443 PR PHYSICIAN TELEPHONE EVALUATION 21-30 MIN: CPT | Mod: 93 | Performed by: FAMILY MEDICINE

## 2024-09-10 RX ORDER — BLOOD SUGAR DIAGNOSTIC
STRIP MISCELLANEOUS
Qty: 100 STRIP | Refills: 3 | Status: SHIPPED | OUTPATIENT
Start: 2024-09-10

## 2024-09-10 RX ORDER — OXYCODONE HYDROCHLORIDE 5 MG/1
5 TABLET ORAL EVERY 6 HOURS PRN
Qty: 30 TABLET | Refills: 0 | Status: SHIPPED | OUTPATIENT
Start: 2024-09-10 | End: 2024-09-20

## 2024-09-10 NOTE — PROGRESS NOTES
Kelly is a 84 year old who is being evaluated via a billable telephone visit.    What phone number would you like to be contacted at? [phone]   How would you like to obtain your AVS? Zbigniewhargracie  Originating Location (pt. Location): Home    Distant Location (provider location):  Off-site    Assessment & Plan     (I73.9) Claudication of calf muscles (H24)  (primary encounter diagnosis)  Comment: ongoing pain of left calf without specific etiology determined.  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  Pain is worsening, greatly limiting any activity at this point.    She has not previously used narcotics for pain management but pain is worsening and activity tolerable only with small amount of oxycodone, I did prescribe this for her.  Continue other medication including daily aspirin.    Plan: oxyCODONE (ROXICODONE) 5 MG tablet, Pain         Management  Referral  I referred her to [facility] V[facility] department.  I called and they will obtain imaging results, and contact patient to set up an appointment when these are reviewed.   Patient's [place] ID is [id number]          (E11.9) Type 2 diabetes mellitus without complication, without long-term current use of insulin (H)  Comment: at goal  Plan: blood glucose (ONETOUCH ULTRA) test strip          (G62.9) Polyneuropathy  Comment: newer diagnosis, bilateral      Subjective   Kelly is a 84 year old, presenting for the following health issues    Ongoing severe leg pain  Sudden onset in April, gradually worsening, and greatly limiting activity. She describes pain of left lower leg onset with any activity and completely relieved with rest. She used to walk regularly with no pain prior to April of this year when symptoms started. She has a trip planned to [facility] in 2 weeks trip Sept 30th to October 8th, and really wants to go. Doesn't have cardiologist appointment  until December, and neurology appointment in January.    She takes 650 mg aspirin, acetaminophen and advil as needed for pain, still has severe pain with walking, and now with prolonged standing which is new.  Aspirin seems to help the most with pain, acetaminophen doesn't help at all. Pedaling/biking does NOT hurt. [place] and that did help reduce pain more. Gabapentin didn't help at all.    Pain is ONLY with activity or more recently standing, NOT with pointing and flexing foot.    Imaging thus far essentially negative for cause of pain.  MR noted edema of both calves. No muscle atrophy noted.    4/10/2024 LOWER EXTREMITY US  Normal, no DVT noted    4/17/24 Initial treadmill test (ABIs)   Kelly reports she couldn't complete at pace that was initially set, and test unable to be done at slower pace, so resting ABIs only done.  2. LEFT:       A. Resting GATO is normal, 1.08.       B. Resting TBI is ABNORMAL, 0.65.    She was seen by vascular specialist 5/8/24 Dr Freedman, who felt symptoms were related solely to peripheral edema and not related to exam:  She has a good palpable and dopplerable peripheral pulses  No evidence of venous insufficiency and no varicose veins  No cellulitis    5/21/24 CT Abdominal/pelvis  Normal    5/29/24 NM Lymphscintigraphy  Normal, patent LOWER EXTREMITY lymphatics    6/20/2023 lower extremity US  Normal, no DVT    7/3/2024 CTA abdomen/pelvis  No stenosis noted of abdominal, pelvic or lower extremity arteries       7/26/24 MR tibia/fibula  No MR evidence of left lower extremity neuropathy. Subcutaneous edema  without other abnormality in proximity to patient's marker over the  posterior mid calf.  Follow Up (Rx refill)      9/10/2024     8:31 AM   Additional Questions   Roomed by POP Noe   Accompanied by self         9/10/2024     8:31 AM   Patient Reported Additional Medications   Patient reports taking the following new medications none     History of  "Present Illness       Reason for visit:  Rx refill, pain left leg    She eats 4 or more servings of fruits and vegetables daily.She consumes 0 sweetened beverage(s) daily.She exercises with enough effort to increase her heart rate 9 or less minutes per day.  She exercises with enough effort to increase her heart rate 3 or less days per week.   She is taking medications regularly.         Review of Systems  Constitutional, HEENT, cardiovascular, pulmonary, gi and gu systems are negative, except as otherwise noted.      Objective    Vitals - Patient Reported  Systolic (Patient Reported): 128  Diastolic (Patient Reported): 79  Blood pressure taken with manual cuff (will exclude from quality measure):  (automatic)  Weight (Patient Reported): 71.2 kg (157 lb)  Height (Patient Reported): 162.6 cm (5' 4\")  BMI (Based on Pt Reported Ht/Wt): 26.95  Temperature (Patient Reported): 46.4  F (8  C)  Pain Loc: Other - see comment (left leg)    Physical Exam   General: Alert and no distress //Respiratory: No audible wheeze, cough, or shortness of breath // Psychiatric:  Appropriate affect, tone, and pace of words        Phone call duration: 25 minutes  Signed Electronically by: Lea Lopez MD    "

## 2024-09-20 PROBLEM — G62.9 POLYNEUROPATHY: Status: ACTIVE | Noted: 2024-09-20

## 2024-09-20 NOTE — TELEPHONE ENCOUNTER
Please call [facility]iology at [phone] to schedule your lumbar CT  before our appointment on Tuesday, 9/24/24 if you can.    Locations: [facility], [address] [place] [address]    ASSESSMENT / PLAN:  I referred her to [facility] Vascular department.  I called referral line at [phone] and they will obtain imaging results, and contact patient to set up an appointment when these are reviewed.   Patient's [place] ID is [phone]  I signed referral below as well.

## 2024-09-21 ASSESSMENT — PAIN SCALES - PAIN ENJOYMENT GENERAL ACTIVITY SCALE (PEG)
INTERFERED_GENERAL_ACTIVITY: 9
PEG_TOTALSCORE: 7.67
PEG_TOTALSCORE: 7.67
INTERFERED_ENJOYMENT_LIFE: 6
AVG_PAIN_PASTWEEK: 8

## 2024-09-21 ASSESSMENT — ANXIETY QUESTIONNAIRES
GAD7 TOTAL SCORE: 0
7. FEELING AFRAID AS IF SOMETHING AWFUL MIGHT HAPPEN: NOT AT ALL

## 2024-09-24 ENCOUNTER — VIRTUAL VISIT (OUTPATIENT)
Dept: FAMILY MEDICINE | Facility: CLINIC | Age: 84
End: 2024-09-24
Payer: COMMERCIAL

## 2024-09-24 ENCOUNTER — ANCILLARY PROCEDURE (OUTPATIENT)
Dept: GENERAL RADIOLOGY | Facility: CLINIC | Age: 84
End: 2024-09-24
Attending: FAMILY MEDICINE
Payer: COMMERCIAL

## 2024-09-24 DIAGNOSIS — I73.9 CLAUDICATION OF CALF MUSCLES (H): Primary | ICD-10-CM

## 2024-09-24 DIAGNOSIS — I73.9 CLAUDICATION OF CALF MUSCLES (H): ICD-10-CM

## 2024-09-24 DIAGNOSIS — Z78.0 MENOPAUSE: ICD-10-CM

## 2024-09-24 PROCEDURE — 99443 PR PHYSICIAN TELEPHONE EVALUATION 21-30 MIN: CPT | Mod: 93 | Performed by: FAMILY MEDICINE

## 2024-09-24 PROCEDURE — 72100 X-RAY EXAM L-S SPINE 2/3 VWS: CPT | Mod: TC | Performed by: RADIOLOGY

## 2024-09-24 NOTE — PROGRESS NOTES
Kelly is a 84 year old who is being evaluated via a billable telephone visit.    What phone number would you like to be contacted at? [phone]    How would you like to obtain your AVS? Oliva  Originating Location (pt. Location): Home    Distant Location (provider location):  Off-site    Assessment & Plan     (I73.9) Claudication of calf muscles (H24)  Comment: vascular vs neurogenic, see complete summary of work up and symptoms 9/10/24 visit.  Will evaluate for spinal stenosis/spondylolisthesis. Note that as far as I can tell she's getting the initial treatments for this already, has had some physical therapy and is now taking medications.  Next step, imaging, refer to spine specialist if indicated.  Plan: XR Lumbar Spine 2/3 Views          (Z78.0) Menopause  Plan: DEXA HIP/PELVIS/SPINE - Future          Treatment for neurogenic claudication:  One randomized study compared two six-week physical therapy regimens in 58 patients with LSS: manual physical therapy and treadmill walking versus lumbar flexion exercises, subtherapeutic ultrasound, and treadmill walking [15]. At six weeks, a greater proportion of patients in the first treatment group reported improvement (79 versus 41 percent); the difference was not statistically significant at one year (62 versus 41 percent).   Subjective   Kelly is a 84 year old, presenting for the following health issues:  Musculoskeletal Problem (Leg pain )      9/24/2024     7:57 AM   Additional Questions   Roomed by Anh PAINTER   She's had physical therapy and had exercises assigned, she describes leg lifts, lying on back and pressing back in to floor. She's had lymphedema exercises as well. She can't tolerate treadmill walking because of onset of severe pain.    She takes #2 325 mg tablets aspirin and 200 mg of Advil which reduces the pain to tolerable levels for a few hours. Oxycodone also helps a little.    Musculoskeletal Problem    History of Present Illness       Reason for  visit:  Rx refill, pain left leg    She eats 4 or more servings of fruits and vegetables daily.She consumes 0 sweetened beverage(s) daily.She exercises with enough effort to increase her heart rate 9 or less minutes per day.  She exercises with enough effort to increase her heart rate 3 or less days per week.   She is taking medications regularly.         Review of Systems  Constitutional, HEENT, cardiovascular, pulmonary, gi and gu systems are negative, except as otherwise noted.      Objective           Vitals:  No vitals were obtained today due to virtual visit.    Physical Exam   General: Alert and no distress //Respiratory: No audible wheeze, cough, or shortness of breath // Psychiatric:  Appropriate affect, tone, and pace of words        Phone call duration: 28 minutes  Signed Electronically by: Lea Lopez MD

## 2024-09-24 NOTE — PROGRESS NOTES
"Date:9/25/2024      COMPREHENSIVE PAIN CLINIC INITIAL EVALUATION  I had the pleasure of meeting Ms. Kelly Barnes on 9/25/2024 in the [facility] in consult for Dr. Lea Lopez, PCP with regards to her pain.  The patient is a 84 year old female with past medical history of claudication of calf muscles, diabetic neuropathy, polyneuropathy, lymphedema, osteopenia, HTN, HLD, CKD stage 3a, endometrial cancer, DM II, chronic intractable pain who presents for evaluation of chronic pain.      History of of chronic pain on initial exam 9/25/2024                               Subjective:  Patient endorses chronic intermittent L) LE muscle spasms that started 04/2024 without a precipitating event. She endorses pain in L) LE calf with any activity, standing or walking that resolves with rest. She has had 2-3 US, CT, MRI, Xray, EMG.  She is having a CT on Friday.  She reports there is no reason why she can't have lumbar MRI.  She was told her symptoms are not due to vascular claudication but maybe neurogenic claudication.   Symptoms usually start within 5 minutes of standing or walking.  Symptoms worsen as day progresses.  Symptoms resolve at rest.  Patient has neuropathy in lower extremities verified by EMG recently.  Patient has not had any spine or joint surgery in the past.  The patient describes the pain as \"like a cordell horse, aching\".  She experiences a weakness, heaviness in b/l lateral legs at times.  She reports that the pain is made worse by standing and walking.  Her pain is improved with sitting, rest.  Pain interferes with activity, ADL's.  She can walk 2-3 blocks and then the pain becomes intolerable   Pain does interrupt her sleep.   She rates her currenty pain score at 0/10 sitting, but it or as severe as 9/10 when walking.  She wears compression garmets for lymphedema.  She has been referred to [facility].  She has a walking stick with a chair.    Patient endorses anxiety and " depression.  Patient does follow with a mental health care provider.  Patient is unable to walk for any length of time due to the pain.    Imaging Pending: CT lumbar spine 09/27/2024.  She has an appointment with Neurology January 2025.  She has an appointment with a Cardiovascular provider 12/3/2024.    She is planning on going to [facility] on Monday for 8 days.      Progress Notes Reviewed:  09/24/2024 Dr. Lea Lopez, PCP virtual visit - claudication of calf muscles vascular vs neurogenic.  Did PT.  08/23/2024 Dr. Lea Lopez, PCP  07/31/2023 PT    She denies any new problems with falls or balance, any new numbness or weakness of the arms or legs, any new bowel or bladder incontinence, any night sweats or unexplained fevers, or any sudden or unexpected weight loss.  He denies saddle anesthesia. He denies changes in gait, instability, or falling episodes.     Kelly Barnes has not been seen at a pain clinic in the past.        Current Treatments:  09/10/2024 Oxycodone 5mg 30 tabs for 7 days  08/15/2024 Gabapentin 100mg 60 tabs for 6 days - was not helpful so she stopped taking it    She takes the next three medication together twice daily.  ASA helps a little  Advil helps a little  Tylenol helps a little    Oxycodone helps a little    Anticoagulation:  none      Previous Medication Treatments Included:  Anti-convulsants: low dose gabapentin was not effective  Muscle relaxors: no  Anti-depressants: no  Benzodiazapine's: no  Acetaminophen/NSAIDs: not helpful  Topicals: no  Opioids: oxycodone helps a little  Compression stocks not helpful    Other Treatments Have Included:  Physical therapy: no  Pain Psychology: no  Chiropractic: no  Acupuncture: no  TENs Unit: no  Injections: no  Surgeries: no  Dry Needling: no  Massage:no    Implantable devices:  none      Past Medical History:  Medical history reviewed.  Past Medical History:   Diagnosis Date    Acute kidney injury (H24) 12/19/2020    Allergic rhinitis,  cause unspecified     Anemia     Aortic stenosis 02/29/2016    With new murmur noted 2/2016, normal echo, repeat echo 2/2017.    Aortic valve sclerosis     Atypical chest pain 07/24/2015    cuboid     left foot    Endometrial cancer (H)     Fungemia 08/16/2021    History of Clostridium difficile colitis     Hyperlipidemia LDL goal <100 11/01/2012    Hypertension goal BP (blood pressure) < 140/90     Musculoskeletal chest pain 11/25/2016    Obesity, Class I, BMI 30-34.9 11/11/2015    Pneumonia 03/2016    Pyelonephritis     Sepsis, due to unspecified organism, unspecified whether acute organ dysfunction present (H) 12/19/2020    Type 2 diabetes, HbA1c goal < 7% (H)     Urinary tract infection associated with nephrostomy catheter, initial encounter  (H24) 08/08/2021      Patient Active Problem List   Diagnosis    Osteopenia    Hypertension goal BP (blood pressure) < 140/90    Hyperlipidemia LDL goal <100    Type 2 diabetes mellitus without complication, without long-term current use of insulin (H)    Family history of breast cancer in sister    Hepatitis A immune    Chronic kidney disease, stage 3a (H)    Endometrial cancer (H)    Type 2 diabetes mellitus with other circulatory complications (H)    Lymphedema    Claudication of calf muscles (H24)    Pain of left lower leg    Diabetic neuropathy (H)    Polyneuropathy         Past Surgical History:  Pertinent surgical history reviewed.  Past Surgical History:   Procedure Laterality Date    CATARACT EXTRACTION      CYSTOSCOPY, RETROGRADES, INSERT STENT URETER(S), COMBINED Right 07/15/2021    Procedure: CYSTOURETEROSCOPY, WITH RETROGRADE PYELOGRAM,  AND STENT INSERTION RIGHT;  Surgeon: Kirk Law MD;  Location: [facility]    LAPAROSCOPIC HYSTERECTOMY TOTAL, BILATERAL SALPINGO-OOPHORECTOMY, NODE DISSECTION, COMBINED N/A 10/30/2023    Procedure: TOTAL LAPAROSCOPIC HYSTERECTOMY, WITH BILATERAL SALPINGO-OOPHORECTOMY, BILATEAL PELVIC SENTINEL LYMPH NODE;  Surgeon: Jenise  Kaykay LEHMAN MD;  Location: [facility] OR    LASER HOLMIUM NEPHROLITHOTOMY VIA PERCUTANEOUS NEPHROSTOMY Right 08/05/2021    Procedure: NEPHROLITHOTOMY, PERCUTANEOUS, USING HOLMIUM LASER RIGHT;  Surgeon: Kirk Law MD;  Location: [facility]    LASER HOLMIUM NEPHROLITHOTOMY VIA PERCUTANEOUS NEPHROSTOMY Right 08/13/2021    Procedure: Ureteroscopic assisted secondary right percutaneous nephrolihtotomy, Holmium laser lithotriipsy;  Surgeon: Kirk Law MD;  [facility]    PICC SINGLE LUMEN PLACEMENT Left 08/17/2021    41cm (2cm external), Medial brachial vein          Medications: Pertinent medications reviewed.  Current Outpatient Medications   Medication Sig Dispense Refill    aspirin (ASA) 325 MG EC tablet Take 325 mg by mouth as needed for moderate pain      blood glucose (ONETOUCH ULTRA) test strip Use to test blood sugar 2 times daily or as directed. 100 strip 3    blood glucose monitoring (ONE TOUCH ULTRA 2) meter device kit Use to test blood sugar 2 times daily or as directed. 1 kit 0    gabapentin (NEURONTIN) 100 MG capsule Take 1-3 capsules (100-300 mg) by mouth every 8 hours PRN for pain (Patient not taking: Reported on 9/10/2024) 60 capsule 0    glimepiride (AMARYL) 4 MG tablet TAKE ONE TABLET BY MOUTH TWICE A DAY WITH MEALS 180 tablet 3    Lancets (ONETOUCH DELICA PLUS [id number]) MISC USE ONE DEVICE BY IN VITRO ROUTE TWO TIMES A  each 3    losartan-hydrochlorothiazide (HYZAAR) 50-12.5 MG tablet Take 1 tablet by mouth every morning 90 tablet 3    potassium citrate (UROCIT-K) 10 MEQ (1080 MG) CR tablet Take 1 tablet (10 mEq) by mouth 2 times daily 180 tablet 3    simvastatin (ZOCOR) 20 MG tablet TAKE ONE TABLET BY MOUTH AT BEDTIME 90 tablet 3    sitagliptin (JANUVIA) 50 MG tablet Take 1 tablet (50 mg) by mouth daily. 90 tablet 3       [place] Prescription Monitoring Program reviewed 9/25/2024.  No concern for abuse or misuse of controlled medications based on this report.  09/10/2024 Oxycodone 5mg 30  tabs for 7 days  08/15/2024 Gabapentin 100mg 60 tabs for 6 days      Allergies: Pertinent allergies reviewed.     Allergies   Allergen Reactions    Ibuprofen Other (See Comments)     numbness in hands and feet    Keflex [Cephalexin] Rash    Metformin Rash       Family History:   family history includes Hypertension in her mother.    Social History:   She is  and lives in [place], [place].  She is independent in ADL's.  She  reports that she has never smoked. She has never been exposed to tobacco smoke. She has never used smokeless tobacco. She reports current alcohol use of about 10.0 standard drinks of alcohol per week. She reports that she does not use drugs.  Social History     Social History Narrative    Not on file         Review of Systems:      (Positive responses bolded)  GENERAL: fever/chills, fatigue, general unwell feeling, weight gain/loss  HEAD/EYES:  headache, dizziness, or vision changes  EARS/NOSE/THROAT: nosebleeds, hearing loss, sinus infection, earache, tinnitus  IMMUNE:  allergies, cancer, immune deficiency, or infections  SKIN:  itching, rash, hives  HEME/Lymphatic: anemia, easy bruising, easy bleeding  RESPIRATORY: cough, wheezing, or shortness of breath  CARDIOVASCULAR/Circulation: extremity edema, syncope, hypertension, tachycardia, or angina  GASTROINTESTINAL: abdominal pain, nausea/emesis, diarrhea, constipation, hematochezia, or melena  ENDOCRINE:  diabetes, steroid use, thyroid disease or osteopenia  MUSCULOSKELETAL: myalgias, joint pain, stiffness, neck pain, back pain, arthritis, or gout  GENITOURINARY: frequency, urgency, dysuria, difficulty voiding, hematuria or incontinence  NEUROLOGIC: weakness, numbness, paresthesias, seizure, tremor, stroke or memory loss  PSYCHIATRIC: depression, anxiety, stress, suicidal thoughts/attempts or mood swings      Physical Exam:    Constitutional: She is oriented to person, place, and time.  She appears well-developed and well-nourished. She is  not in acute distress.   HENT:     Head: Normocephalic and atraumatic.     Eyes: Pupils are equal, round, and reactive to light. EOM are normal. No scleral icterus.   Pulmonary/Chest:  NWOB. No respiratory distress.   Neurological: She is alert and oriented to person, place, and time. Coordination grossly normal.  Romberg test negative. Whitmore's test is negative  Skin: Skin is warm and dry. She is not diaphoretic.   Psychiatric: She has a normal mood and affect. Her behavior is normal. Judgment and thought content normal.  Patient answers questions appropriately.  MSK: Gait is normal however L) toes point medially.  Patient can not  walk on toes, heals, heal toe walk and perform heal to shin testing without mild difficulty.      Lumbar/Thoracic spine:   ROM: flexion 60 degrees, extension 10 degrees, left lateral 20 degrees, and right lateral 20 degrees  Rotation/ext to right: pain free  Rotation/ext to left: pain free  Myofascial tenderness:none  Focal tenderness: No SI joint, gluteal, piriformis, or GT tenderness  Normal 5/5 LE strength bilaterally   Normal sensation to light touch in the lower extremities bilaterally   No allodynia, dysesthesia, or hyperalgesia in the lower extremities bilaterally   Reflexes: Lower extremity reflexes within normal limits bilaterally  Straight leg raise: Negative on the left  Negative on the right      Imagin/10/2024 LOWER EXTREMITY US  Normal, no DVT noted     24 Initial treadmill test (ABIs)   Kelly reports she couldn't complete at pace that was initially set, and test unable to be done at slower pace, so resting ABIs only done.  2. LEFT:       A. Resting GATO is normal, 1.08.       B. Resting TBI is ABNORMAL, 0.65.    CTA abdomen Pelvis 7/3/2024  IMPRESSION: No significant stenoses in the arteries of the abdomen,  pelvis and lower extremities.     US 7/3/2024  Patent bilateral popliteal arteries without evidence of  dynamic compression with popliteal entrapment  maneuvers.      EM2024 EMG [facility] was reviewed.      Diagnosis:      Plan on initial consult on 2024:    Diagnostics: CT lumbar spine 2024.  She will inquire about lumbar MRI.        Medications:  No medication changes today.    The following OTC pain medications may be helpful, use as directed: Voltaren Gel 1%, CBD products, Arnica products, Capsaicin products, Australian Dream Cream, Epson It, Lidocaine Patch, Solanpas, Biofreeze, Aspercream, Tiger Balm and Tommy Emu cream.  Apply heat or cold PRN.      Therapies:  PHYSICAL THERAPY - Encourage patient to continue working with physical therapy.  Continue to do exercises learned at PT on a daily basis.  Discussed the importance of core strengthening, ROM, stretching exercises with the patient and how each of these entities is important in decreasing pain.  Explained to the patient that the purpose of physical therapy is to teach the patient a home exercise program.  These exercises need to be performed every day in order to decrease pain and prevent future occurrences of pain.          Interventions:  none      Follow up:   Return to clinic in 2 wks to review CT lumbar spine        APRIL Montana, RN, CNP, FNP  [facility]        BILLING TIME DOCUMENTATION:   The total TIME spent on this patient on the date of the encounter/appointment was 79 minutes.            Answers submitted by the patient for this visit:  Patient Health Questionnaire (G7) (Submitted on 2024)  JENNIE 7 TOTAL SCORE: 0

## 2024-09-24 NOTE — TELEPHONE ENCOUNTER
I had clinic visit with patient, please see visit notes.  Sincerely,  Dr. Lea Lopez MD  9/24/2024

## 2024-09-24 NOTE — PATIENT INSTRUCTIONS
Pain medication regime  I recommend taking all at one every 6 hours with food or water  325 - 650 mg of aspirin  600 mg of ibuprofen (#3 tablets)  500 mg or 650 mg of acetaminophen    Use 5 mg hydrocodone as needed    GFR Estimate   Date Value Ref Range Status   06/20/2024 59 (L) >60 mL/min/1.73m2 Final     Comment:     eGFR calculated using 2021 CKD-EPI equation.   05/11/2021 55 (L) >60 mL/min/[1.73_m2] Final     Comment:     Non  GFR Calc  Starting 12/18/2018, serum creatinine based estimated GFR (eGFR) will be   calculated using the Chronic Kidney Disease Epidemiology Collaboration   (CKD-EPI) equation.       GFR, ESTIMATED POCT   Date Value Ref Range Status   05/21/2024 45 (L) >60 mL/min/1.73m2 Final

## 2024-09-25 ENCOUNTER — OFFICE VISIT (OUTPATIENT)
Dept: PALLIATIVE MEDICINE | Facility: CLINIC | Age: 84
End: 2024-09-25
Attending: FAMILY MEDICINE
Payer: COMMERCIAL

## 2024-09-25 DIAGNOSIS — M47.816 FACET ARTHRITIS OF LUMBAR REGION: ICD-10-CM

## 2024-09-25 DIAGNOSIS — I73.9 CLAUDICATION OF CALF MUSCLES (H): Primary | ICD-10-CM

## 2024-09-25 DIAGNOSIS — G89.29 CHRONIC INTRACTABLE PAIN: ICD-10-CM

## 2024-09-25 DIAGNOSIS — I89.0 LYMPHEDEMA: ICD-10-CM

## 2024-09-25 DIAGNOSIS — M79.662 PAIN OF LEFT LOWER LEG: ICD-10-CM

## 2024-09-25 DIAGNOSIS — M43.16 SPONDYLOLISTHESIS AT L4-L5 LEVEL: ICD-10-CM

## 2024-09-25 PROCEDURE — 99205 OFFICE O/P NEW HI 60 MIN: CPT | Performed by: NURSE PRACTITIONER

## 2024-09-25 NOTE — RESULT ENCOUNTER NOTE
Thank you for getting the xray done!  You do have disc disease, or degerative arthritis of your spine, and your 4th lumbar vertebra is displaced relative to your 5th, but to the mildest degree. I do think it's worth seeing a spine specialist to see if there's anything they can offer you.    All of the big orthopedic groups have spine specialist, so you're welcome to go outside [facility] if you'd like, ie, [facility], [facility] or [facility], if they can see you sooner (check that they're covered by insurance!).    [facility] will call you to coordinate your care as prescribed by your provider. If you don't hear from a representative within 2 business days, please call [phone].    Sincerely,  Dr. Lea Lopez MD  9/25/2024

## 2024-09-25 NOTE — PATIENT INSTRUCTIONS
Plan on initial consult on 9/24/2024:      Diagnostics: CT lumbar spine 09/27/2024.        Medications:  No medication changes today.    The following OTC pain medications may be helpful, use as directed: Voltaren Gel 1%, CBD products, Arnica products, Capsaicin products, Australian Dream Cream, Epson It, Lidocaine Patch, Solanpas, Biofreeze, Aspercream, Tiger Balm and Tommy Emu cream.  Apply heat or cold PRN.      Therapies:  PHYSICAL THERAPY - Encourage patient to continue working with physical therapy.  Continue to do exercises learned at PT on a daily basis.  Discussed the importance of core strengthening, ROM, stretching exercises with the patient and how each of these entities is important in decreasing pain.  Explained to the patient that the purpose of physical therapy is to teach the patient a home exercise program.  These exercises need to be performed every day in order to decrease pain and prevent future occurrences of pain.          Interventions:  none      Follow up:     Return to clinic in 2 wks to review CT lumbar spine        APRIL Montana, RN, CNP, FNP  [facility]          Clinic Number:  [phone]   Call with any questions about your care and for scheduling assistance.   Calls are returned Monday through Friday between 8 AM and 4:30 PM. We usually get back to you within 2 business days depending on the issue/request.    If we are prescribing your medications:  For opioid medication refills, call the clinic or send a [employer] message 7 days in advance.  Please include:  Name of requested medication  Name of the pharmacy.  For non-opioid medications, call your pharmacy directly to request a refill. Please allow 3-4 days to be processed.   Per [place] State Law:  All controlled substance prescriptions must be filled within 30 days of being written.    For those controlled substances allowing refills, pickup must occur within 30 days of last fill.       We believe regular attendance is key to your success in our program!    Any time you are unable to keep your appointment we ask that you call us at least 24 hours in advance to cancel.This will allow us to offer the appointment time to another patient.   Multiple missed appointments may lead to dismissal from the clinic.

## 2024-09-26 ENCOUNTER — MYC MEDICAL ADVICE (OUTPATIENT)
Dept: FAMILY MEDICINE | Facility: CLINIC | Age: 84
End: 2024-09-26
Payer: COMMERCIAL

## 2024-09-26 ENCOUNTER — PATIENT OUTREACH (OUTPATIENT)
Dept: CARE COORDINATION | Facility: CLINIC | Age: 84
End: 2024-09-26
Payer: COMMERCIAL

## 2024-09-26 NOTE — TELEPHONE ENCOUNTER
Please review response to result note. Patient asking for advisement on who to see for referral-thanks!

## 2024-09-27 ENCOUNTER — HOSPITAL ENCOUNTER (OUTPATIENT)
Dept: CT IMAGING | Facility: CLINIC | Age: 84
Discharge: HOME | End: 2024-09-27
Attending: FAMILY MEDICINE | Admitting: FAMILY MEDICINE
Payer: COMMERCIAL

## 2024-09-27 DIAGNOSIS — M43.16 SPONDYLOLISTHESIS AT L4-L5 LEVEL: ICD-10-CM

## 2024-09-27 DIAGNOSIS — G60.9 IDIOPATHIC PERIPHERAL NEUROPATHY: ICD-10-CM

## 2024-09-27 DIAGNOSIS — I73.9 CLAUDICATION OF CALF MUSCLES (H): ICD-10-CM

## 2024-09-27 DIAGNOSIS — M79.662 PAIN OF LEFT LOWER LEG: ICD-10-CM

## 2024-09-27 PROCEDURE — 72131 CT LUMBAR SPINE W/O DYE: CPT

## 2024-10-04 NOTE — RESULT ENCOUNTER NOTE
See my note 9/24/24:  Thank you for getting the xray done!  You do have disc disease, or degerative arthritis of your spine, and your 4th lumbar vertebra is displaced relative to your 5th, but to the mildest degree. I do think it's worth seeing a spine specialist to see if there's anything they can offer you.

## 2024-10-04 NOTE — TELEPHONE ENCOUNTER
I sent the following [employer] message, nothing further to do, encounter closed.    Sincerely,  Dr. Lea Lopez MD  10/3/2024      HI, I'm sorry but I don't know someone specific to recommend you see, I do think the next available provider is best at this point to try to get your symptoms addressed as quickly as possible.  Sometimes there's just not much choice. I'm happy to work with you after you've seen the specialist to coordinate whatever needs to be done!    Sincerely,  Dr. Lea Lopez MD  10/3/2024

## 2024-10-09 ENCOUNTER — TELEPHONE (OUTPATIENT)
Dept: FAMILY MEDICINE | Facility: CLINIC | Age: 84
End: 2024-10-09
Payer: COMMERCIAL

## 2024-10-09 NOTE — TELEPHONE ENCOUNTER
"Patient calling.    Severe leg pain \"for quite awhile:  Dr. Lopez referred to [facility]. Has appt 10/29/24.  Had lumbar spine imaging done, Dr. Lopez referred patient to spine clinic. Patient offered appt 10/11/24.  Wondering if she should keep both appointments--\"I don't want to waste the spine doctor's time if I'm getting in with vascular.\"    Advised patient to keep both referrals, as they are ordered to address different concerns:  Per imaging report, spine doctor referral is related to disc disease in her spine and leg pain.  Vascular referral will address potential vascular causes (claudication) of leg pain.    Alma Ho RN  [facility]    "

## 2024-10-11 ENCOUNTER — TRANSFERRED RECORDS (OUTPATIENT)
Dept: HEALTH INFORMATION MANAGEMENT | Facility: CLINIC | Age: 84
End: 2024-10-11
Payer: COMMERCIAL

## 2024-10-15 ENCOUNTER — PRE VISIT (OUTPATIENT)
Dept: UROLOGY | Facility: CLINIC | Age: 84
End: 2024-10-15

## 2024-10-15 NOTE — TELEPHONE ENCOUNTER
Reason for visit: follow-up     Relevant information: annual, MARYJO ordered and scheduled. Last year seen for uric acid kidney stone    Records/imaging/labs/orders: all records available    Luzmaria Joshi  10/15/2024  10:00 AM

## 2024-10-19 ENCOUNTER — HEALTH MAINTENANCE LETTER (OUTPATIENT)
Age: 84
End: 2024-10-19

## 2024-10-21 ENCOUNTER — TRANSFERRED RECORDS (OUTPATIENT)
Dept: HEALTH INFORMATION MANAGEMENT | Facility: CLINIC | Age: 84
End: 2024-10-21
Payer: COMMERCIAL

## 2024-10-21 ENCOUNTER — MYC MEDICAL ADVICE (OUTPATIENT)
Dept: FAMILY MEDICINE | Facility: CLINIC | Age: 84
End: 2024-10-21
Payer: COMMERCIAL

## 2024-10-29 ENCOUNTER — TRANSFERRED RECORDS (OUTPATIENT)
Dept: MULTI SPECIALTY CLINIC | Facility: CLINIC | Age: 84
End: 2024-10-29

## 2024-10-29 LAB — RETINOPATHY: NORMAL

## 2024-11-04 NOTE — PROGRESS NOTES
Assessment and Plan:     Assessment: 84 year old female with a history of uric acid kidney stones, managed with potassium citrate. She has been taking 10 mEq twice daily but requests to go down to just once daily. We discussed the role of potassium citrate and how frequency of administration impacts its effectiveness. She voices understanding for this today but would still like to only take it once. Will change her prescription today to reflect this. We did discuss that if she were to develop new stones in the next year, we would need to go back up to twice daily. She agrees with this plan.     Plan:  -Continue potassium citrate, reducing to 10 mEq once daily. New Rx sent.   -Follow up with me in one year with a renal ultrasound completed beforehand.     Brenda Snowden CNP  Department of Urology           Chief Complaint:   Kidney stone follow up         History of Present Illness:    Kelly Barnes is a 84 year old female with a history of uric acid kidney stones, managed with potassium citrate 10 mEq twice daily. At the time of our last visit one year ago, she was stone-free on imaging.     MARYJO completed today just prior to our visit, showing no stones. Final radiologist read pending. She has been doing well and denies any issues/concerns. She does request going down on the potassium citrate to once daily.          Past Medical History:     Past Medical History:   Diagnosis Date    Acute kidney injury (H) 12/19/2020    Allergic rhinitis, cause unspecified     Anemia     Aortic stenosis 02/29/2016    With new murmur noted 2/2016, normal echo, repeat echo 2/2017.    Aortic valve sclerosis     Atypical chest pain 07/24/2015    cuboid     left foot    Endometrial cancer (H)     Fungemia 08/16/2021    History of Clostridium difficile colitis     Hyperlipidemia LDL goal <100 11/01/2012    Hypertension goal BP (blood pressure) < 140/90     Musculoskeletal chest pain 11/25/2016    Obesity, Class I, BMI 30-34.9  11/11/2015    Pneumonia 03/2016    Pyelonephritis     Sepsis, due to unspecified organism, unspecified whether acute organ dysfunction present (H) 12/19/2020    Type 2 diabetes, HbA1c goal < 7% (H)     Urinary tract infection associated with nephrostomy catheter, initial encounter (H) 08/08/2021            Past Surgical History:     Past Surgical History:   Procedure Laterality Date    CATARACT EXTRACTION      CYSTOSCOPY, RETROGRADES, INSERT STENT URETER(S), COMBINED Right 07/15/2021    Procedure: CYSTOURETEROSCOPY, WITH RETROGRADE PYELOGRAM,  AND STENT INSERTION RIGHT;  Surgeon: Kirk Law MD;  [facility]    LAPAROSCOPIC HYSTERECTOMY TOTAL, BILATERAL SALPINGO-OOPHORECTOMY, NODE DISSECTION, COMBINED N/A 10/30/2023    Procedure: TOTAL LAPAROSCOPIC HYSTERECTOMY, WITH BILATERAL SALPINGO-OOPHORECTOMY, BILATEAL PELVIC SENTINEL LYMPH NODE;  Surgeon: Kaykay Burden MD;  [facility]    LASER HOLMIUM NEPHROLITHOTOMY VIA PERCUTANEOUS NEPHROSTOMY Right 08/05/2021    Procedure: NEPHROLITHOTOMY, PERCUTANEOUS, USING HOLMIUM LASER RIGHT;  Surgeon: Kirk Law MD;  [facility]    LASER HOLMIUM NEPHROLITHOTOMY VIA PERCUTANEOUS NEPHROSTOMY Right 08/13/2021    Procedure: Ureteroscopic assisted secondary right percutaneous nephrolihtotomy, Holmium laser lithotriipsy;  Surgeon: Kirk Law MD;  [facility]    PICC SINGLE LUMEN PLACEMENT Left 08/17/2021    41cm (2cm external), Medial brachial vein            Medications     Current Outpatient Medications   Medication Sig Dispense Refill    aspirin (ASA) 325 MG EC tablet Take 325 mg by mouth as needed for moderate pain      blood glucose (ONETOUCH ULTRA) test strip Use to test blood sugar 2 times daily or as directed. 100 strip 3    blood glucose monitoring (ONE TOUCH ULTRA 2) meter device kit Use to test blood sugar 2 times daily or as directed. 1 kit 0    gabapentin (NEURONTIN) 100 MG capsule Take 1-3 capsules (100-300 mg) by mouth every 8  hours PRN for pain 60 capsule 0    glimepiride (AMARYL) 4 MG tablet TAKE ONE TABLET BY MOUTH TWICE A DAY WITH MEALS 180 tablet 3    Lancets (ONETOUCH DELICA PLUS [id number]) MISC USE ONE DEVICE BY IN VITRO ROUTE TWO TIMES A  each 3    losartan-hydrochlorothiazide (HYZAAR) 50-12.5 MG tablet Take 1 tablet by mouth every morning 90 tablet 3    potassium citrate (UROCIT-K) 10 MEQ (1080 MG) CR tablet Take 1 tablet (10 mEq) by mouth daily. 90 tablet 3    simvastatin (ZOCOR) 20 MG tablet TAKE ONE TABLET BY MOUTH AT BEDTIME 90 tablet 3    sitagliptin (JANUVIA) 50 MG tablet Take 1 tablet (50 mg) by mouth daily. 90 tablet 3     No current facility-administered medications for this visit.            Allergies:   Ibuprofen, Keflex [cephalexin], and Metformin         Review of Systems:  From intake questionnaire   Negative 14 system review except as noted on HPI, nurse's note.         Physical Exam:   Patient is a 84 year old  female   Vitals: Blood pressure 137/82, pulse 85, SpO2 97%, not currently breastfeeding.  General Appearance Adult: Alert, no acute distress, oriented  Lungs: no respiratory distress, or pursed lip breathing  Heart: No obvious jugular venous distension present  Abdomen: soft, nontender, no organomegaly or masses, There is no height or weight on file to calculate BMI.  : deferred      Labs and Pathology:    I personally reviewed all applicable laboratory data and went over findings with patient  Significant for:    CBC RESULTS:  Recent Labs   Lab Test 06/20/24  1117 10/30/23  0722 10/03/23  1106 05/31/23  0819   WBC 9.8 10.1 11.1* 7.0   HGB 14.6 14.1 14.6 14.0    281 294 307        BMP RESULTS:  Recent Labs   Lab Test 06/20/24  1117 05/21/24  0707 11/26/23  0931 10/31/23  0841 10/31/23  0157 10/30/23  0648 10/03/23  1106 05/31/23  0819 07/13/21  0721 05/11/21  0724 12/21/20  0705 12/20/20  0654 12/20/20  0405     --  139  --   --   --  138 141   < > 140 139 138 137   POTASSIUM 4.2  --   5.2  --   --   --  3.8 4.4   < > 4.9 4.1 3.9 3.8   CHLORIDE 98  --  101  --   --   --  99 105   < > 108 110* 106 104   CO2 26  --  28  --   --   --  26 26   < > 27 23 27 26   ANIONGAP 11  --  10  --   --   --  13 10   < > 5 6 5 7   *  --  191* 170* 197*   < > 211* 174*   < > 157* 169* 198* 251*   BUN 25.5*  --  26.4*  --   --   --  28.9* 30.2*   < > 30 14 20 22   CR 0.95 1.2* 1.06*  --   --   --  1.04* 1.07*   < > 0.97 1.04 1.08* 1.11*   GFRESTIMATED 59* 45* 52*  --   --   --  53* 52*   < > 55* 51* 48* 47*   GFRESTBLACK  --   --   --   --   --   --   --   --   --  64 59* 56* 54*   JAN 9.7  --  9.7  --   --   --  9.5 9.4   < > 9.1 7.9* 8.1* 8.2*    < > = values in this interval not displayed.       UA RESULTS:   Recent Labs   Lab Test 05/24/22  0848 10/12/21  2200 10/12/21  1804   SG 1.015 1.020 1.020   URINEPH 5.5 5.0 5.0   NITRITE Negative Negative Negative   RBCU 0-2 1 2-5*   WBCU 0-5 32* 5-10*

## 2024-11-05 ENCOUNTER — OFFICE VISIT (OUTPATIENT)
Dept: UROLOGY | Facility: CLINIC | Age: 84
End: 2024-11-05
Payer: COMMERCIAL

## 2024-11-05 ENCOUNTER — ANCILLARY PROCEDURE (OUTPATIENT)
Dept: ULTRASOUND IMAGING | Facility: CLINIC | Age: 84
End: 2024-11-05
Attending: NURSE PRACTITIONER
Payer: COMMERCIAL

## 2024-11-05 VITALS — HEART RATE: 85 BPM | DIASTOLIC BLOOD PRESSURE: 82 MMHG | OXYGEN SATURATION: 97 % | SYSTOLIC BLOOD PRESSURE: 137 MMHG

## 2024-11-05 DIAGNOSIS — N20.0 URIC ACID KIDNEY STONE: ICD-10-CM

## 2024-11-05 PROCEDURE — 99213 OFFICE O/P EST LOW 20 MIN: CPT | Performed by: NURSE PRACTITIONER

## 2024-11-05 PROCEDURE — 76770 US EXAM ABDO BACK WALL COMP: CPT | Mod: GC | Performed by: RADIOLOGY

## 2024-11-05 RX ORDER — POTASSIUM CITRATE 10 MEQ/1
10 TABLET, EXTENDED RELEASE ORAL DAILY
Qty: 90 TABLET | Refills: 3 | Status: SHIPPED | OUTPATIENT
Start: 2024-11-05

## 2024-11-05 ASSESSMENT — PAIN SCALES - GENERAL: PAINLEVEL_OUTOF10: MODERATE PAIN (5)

## 2024-11-05 NOTE — PATIENT INSTRUCTIONS
UROLOGY CLINIC VISIT PATIENT INSTRUCTIONS    -Continue the potassium citrate- will go down to once daily.   -Follow up with me in one year with a renal ultrasound completed beforehand.     If you have any issues, questions or concerns in the meantime, do not hesitate to contact us at [phone] or via [employer].     Brenda Snowden, CNP  Department of Urology

## 2024-11-05 NOTE — LETTER
11/5/2024       RE: Kelly Barnes  [address]  [facility] [address]     Dear Colleague,    Thank you for referring your patient, Kelly Barnes, to the [facility] [place] at [facility]. Please see a copy of my visit note below.         Assessment and Plan:     Assessment: 84 year old female with a history of uric acid kidney stones, managed with potassium citrate. She has been taking 10 mEq twice daily but requests to go down to just once daily. We discussed the role of potassium citrate and how frequency of administration impacts its effectiveness. She voices understanding for this today but would still like to only take it once. Will change her prescription today to reflect this. We did discuss that if she were to develop new stones in the next year, we would need to go back up to twice daily. She agrees with this plan.     Plan:  -Continue potassium citrate, reducing to 10 mEq once daily. New Rx sent.   -Follow up with me in one year with a renal ultrasound completed beforehand.     Brenda Snowden, CNP  Department of Urology           Chief Complaint:   Kidney stone follow up         History of Present Illness:    Kelly Barnes is a 84 year old female with a history of uric acid kidney stones, managed with potassium citrate 10 mEq twice daily. At the time of our last visit one year ago, she was stone-free on imaging.     MARYJO completed today just prior to our visit, showing no stones. Final radiologist read pending. She has been doing well and denies any issues/concerns. She does request going down on the potassium citrate to once daily.          Past Medical History:     Past Medical History:   Diagnosis Date     Acute kidney injury (H) 12/19/2020     Allergic rhinitis, cause unspecified      Anemia      Aortic stenosis 02/29/2016    With new murmur noted 2/2016, normal echo, repeat echo 2/2017.     Aortic valve sclerosis       Atypical chest pain 07/24/2015     cuboid     left foot     Endometrial cancer (H)      Fungemia 08/16/2021     History of Clostridium difficile colitis      Hyperlipidemia LDL goal <100 11/01/2012     Hypertension goal BP (blood pressure) < 140/90      Musculoskeletal chest pain 11/25/2016     Obesity, Class I, BMI 30-34.9 11/11/2015     Pneumonia 03/2016     Pyelonephritis      Sepsis, due to unspecified organism, unspecified whether acute organ dysfunction present (H) 12/19/2020     Type 2 diabetes, HbA1c goal < 7% (H)      Urinary tract infection associated with nephrostomy catheter, initial encounter (H) 08/08/2021            Past Surgical History:     Past Surgical History:   Procedure Laterality Date     CATARACT EXTRACTION       CYSTOSCOPY, RETROGRADES, INSERT STENT URETER(S), COMBINED Right 07/15/2021    Procedure: CYSTOURETEROSCOPY, WITH RETROGRADE PYELOGRAM,  AND STENT INSERTION RIGHT;  Surgeon: Kirk Law MD;  [facility]     LAPAROSCOPIC HYSTERECTOMY TOTAL, BILATERAL SALPINGO-OOPHORECTOMY, NODE DISSECTION, COMBINED N/A 10/30/2023    Procedure: TOTAL LAPAROSCOPIC HYSTERECTOMY, WITH BILATERAL SALPINGO-OOPHORECTOMY, BILATEAL PELVIC SENTINEL LYMPH NODE;  Surgeon: Kaykay Burden MD;  [facility]     LASER HOLMIUM NEPHROLITHOTOMY VIA PERCUTANEOUS NEPHROSTOMY Right 08/05/2021    Procedure: NEPHROLITHOTOMY, PERCUTANEOUS, USING HOLMIUM LASER RIGHT;  Surgeon: Kirk Law MD;  [facility]     LASER HOLMIUM NEPHROLITHOTOMY VIA PERCUTANEOUS NEPHROSTOMY Right 08/13/2021    Procedure: Ureteroscopic assisted secondary right percutaneous nephrolihtotomy, Holmium laser lithotriipsy;  Surgeon: Kirk Law MD;  [facility]     PICC SINGLE LUMEN PLACEMENT Left 08/17/2021    41cm (2cm external), Medial brachial vein            Medications     Current Outpatient Medications   Medication Sig Dispense Refill     aspirin (ASA) 325 MG EC tablet Take 325 mg by mouth as needed for  moderate pain       blood glucose (ONETOUCH ULTRA) test strip Use to test blood sugar 2 times daily or as directed. 100 strip 3     blood glucose monitoring (ONE TOUCH ULTRA 2) meter device kit Use to test blood sugar 2 times daily or as directed. 1 kit 0     gabapentin (NEURONTIN) 100 MG capsule Take 1-3 capsules (100-300 mg) by mouth every 8 hours PRN for pain 60 capsule 0     glimepiride (AMARYL) 4 MG tablet TAKE ONE TABLET BY MOUTH TWICE A DAY WITH MEALS 180 tablet 3     Lancets (ONETOUCH DELICA PLUS [id number]) MISC USE ONE DEVICE BY IN VITRO ROUTE TWO TIMES A  each 3     losartan-hydrochlorothiazide (HYZAAR) 50-12.5 MG tablet Take 1 tablet by mouth every morning 90 tablet 3     potassium citrate (UROCIT-K) 10 MEQ (1080 MG) CR tablet Take 1 tablet (10 mEq) by mouth daily. 90 tablet 3     simvastatin (ZOCOR) 20 MG tablet TAKE ONE TABLET BY MOUTH AT BEDTIME 90 tablet 3     sitagliptin (JANUVIA) 50 MG tablet Take 1 tablet (50 mg) by mouth daily. 90 tablet 3     No current facility-administered medications for this visit.            Allergies:   Ibuprofen, Keflex [cephalexin], and Metformin         Review of Systems:  From intake questionnaire   Negative 14 system review except as noted on HPI, nurse's note.         Physical Exam:   Patient is a 84 year old  female   Vitals: Blood pressure 137/82, pulse 85, SpO2 97%, not currently breastfeeding.  General Appearance Adult: Alert, no acute distress, oriented  Lungs: no respiratory distress, or pursed lip breathing  Heart: No obvious jugular venous distension present  Abdomen: soft, nontender, no organomegaly or masses, There is no height or weight on file to calculate BMI.  : deferred      Labs and Pathology:    I personally reviewed all applicable laboratory data and went over findings with patient  Significant for:    CBC RESULTS:  Recent Labs   Lab Test 06/20/24  1117 10/30/23  0722 10/03/23  1106 05/31/23  0819   WBC 9.8 10.1 11.1* 7.0   HGB 14.6 14.1  14.6 14.0    281 294 307        BMP RESULTS:  Recent Labs   Lab Test 06/20/24  1117 05/21/24  0707 11/26/23  0931 10/31/23  0841 10/31/23  0157 10/30/23  0648 10/03/23  1106 05/31/23  0819 07/13/21  0721 05/11/21  0724 12/21/20  0705 12/20/20  0654 12/20/20  0405     --  139  --   --   --  138 141   < > 140 139 138 137   POTASSIUM 4.2  --  5.2  --   --   --  3.8 4.4   < > 4.9 4.1 3.9 3.8   CHLORIDE 98  --  101  --   --   --  99 105   < > 108 110* 106 104   CO2 26  --  28  --   --   --  26 26   < > 27 23 27 26   ANIONGAP 11  --  10  --   --   --  13 10   < > 5 6 5 7   *  --  191* 170* 197*   < > 211* 174*   < > 157* 169* 198* 251*   BUN 25.5*  --  26.4*  --   --   --  28.9* 30.2*   < > 30 14 20 22   CR 0.95 1.2* 1.06*  --   --   --  1.04* 1.07*   < > 0.97 1.04 1.08* 1.11*   GFRESTIMATED 59* 45* 52*  --   --   --  53* 52*   < > 55* 51* 48* 47*   GFRESTBLACK  --   --   --   --   --   --   --   --   --  64 59* 56* 54*   JAN 9.7  --  9.7  --   --   --  9.5 9.4   < > 9.1 7.9* 8.1* 8.2*    < > = values in this interval not displayed.       UA RESULTS:   Recent Labs   Lab Test 05/24/22  0848 10/12/21  2200 10/12/21  1804   SG 1.015 1.020 1.020   URINEPH 5.5 5.0 5.0   NITRITE Negative Negative Negative   RBCU 0-2 1 2-5*   WBCU 0-5 32* 5-10*       Again, thank you for allowing me to participate in the care of your patient.      Sincerely,    Brenda Snowden, CNP

## 2024-11-05 NOTE — NURSING NOTE
Chief Complaint   Patient presents with    Return Patient    Kidney Stone Related     Hx Uric Acid Kidney Stone         Blood pressure 137/82, pulse 85, SpO2 97%, not currently breastfeeding. There is no height or weight on file to calculate BMI.    Patient Active Problem List   Diagnosis    Osteopenia    Hypertension goal BP (blood pressure) < 140/90    Hyperlipidemia LDL goal <100    Type 2 diabetes mellitus without complication, without long-term current use of insulin (H)    Family history of breast cancer in sister    Hepatitis A immune    Chronic kidney disease, stage 3a (H)    Endometrial cancer (H)    Type 2 diabetes mellitus with other circulatory complications (H)    Lymphedema    Claudication of calf muscles (H)    Pain of left lower leg    Diabetic neuropathy (H)    Polyneuropathy       Allergies   Allergen Reactions    Ibuprofen Other (See Comments)     numbness in hands and feet    Keflex [Cephalexin] Rash    Metformin Rash       Current Outpatient Medications   Medication Sig Dispense Refill    aspirin (ASA) 325 MG EC tablet Take 325 mg by mouth as needed for moderate pain      blood glucose (ONETOUCH ULTRA) test strip Use to test blood sugar 2 times daily or as directed. 100 strip 3    blood glucose monitoring (ONE TOUCH ULTRA 2) meter device kit Use to test blood sugar 2 times daily or as directed. 1 kit 0    gabapentin (NEURONTIN) 100 MG capsule Take 1-3 capsules (100-300 mg) by mouth every 8 hours PRN for pain 60 capsule 0    glimepiride (AMARYL) 4 MG tablet TAKE ONE TABLET BY MOUTH TWICE A DAY WITH MEALS 180 tablet 3    Lancets (ONETOUCH DELICA PLUS [id number]) MISC USE ONE DEVICE BY IN VITRO ROUTE TWO TIMES A  each 3    losartan-hydrochlorothiazide (HYZAAR) 50-12.5 MG tablet Take 1 tablet by mouth every morning 90 tablet 3    potassium citrate (UROCIT-K) 10 MEQ (1080 MG) CR tablet Take 1 tablet (10 mEq) by mouth 2 times daily 180 tablet 3    simvastatin (ZOCOR) 20 MG tablet TAKE ONE TABLET  BY MOUTH AT BEDTIME 90 tablet 3    sitagliptin (JANUVIA) 50 MG tablet Take 1 tablet (50 mg) by mouth daily. 90 tablet 3       Social History     Tobacco Use    Smoking status: Never     Passive exposure: Never    Smokeless tobacco: Never   Vaping Use    Vaping status: Never Used   Substance Use Topics    Alcohol use: Yes     Alcohol/week: 10.0 standard drinks of alcohol     Comment: 1-2 drinks per week    Drug use: No       Farida Washington LPN  11/5/2024  7:31 AM

## 2024-11-11 ENCOUNTER — LAB (OUTPATIENT)
Dept: LAB | Facility: CLINIC | Age: 84
End: 2024-11-11
Payer: COMMERCIAL

## 2024-11-11 DIAGNOSIS — E11.40 DIABETIC NEUROPATHY (H): Primary | ICD-10-CM

## 2024-11-11 LAB
CHOLEST SERPL-MCNC: 201 MG/DL
EST. AVERAGE GLUCOSE BLD GHB EST-MCNC: 183 MG/DL
FASTING STATUS PATIENT QL REPORTED: YES
HBA1C MFR BLD: 8 % (ref 0–5.6)
HDLC SERPL-MCNC: 77 MG/DL
LDLC SERPL CALC-MCNC: 107 MG/DL
NONHDLC SERPL-MCNC: 124 MG/DL
TRIGL SERPL-MCNC: 85 MG/DL

## 2024-11-11 PROCEDURE — 36415 COLL VENOUS BLD VENIPUNCTURE: CPT

## 2024-11-11 PROCEDURE — 83036 HEMOGLOBIN GLYCOSYLATED A1C: CPT

## 2024-11-11 PROCEDURE — 80061 LIPID PANEL: CPT

## 2024-11-12 ENCOUNTER — HOSPITAL ENCOUNTER (OUTPATIENT)
Dept: MAMMOGRAPHY | Facility: CLINIC | Age: 84
Discharge: HOME | End: 2024-11-12
Attending: FAMILY MEDICINE | Admitting: FAMILY MEDICINE
Payer: COMMERCIAL

## 2024-11-12 ENCOUNTER — VIRTUAL VISIT (OUTPATIENT)
Dept: FAMILY MEDICINE | Facility: CLINIC | Age: 84
End: 2024-11-12
Payer: COMMERCIAL

## 2024-11-12 DIAGNOSIS — E11.9 TYPE 2 DIABETES MELLITUS WITHOUT COMPLICATION, WITHOUT LONG-TERM CURRENT USE OF INSULIN (H): ICD-10-CM

## 2024-11-12 DIAGNOSIS — Z12.31 VISIT FOR SCREENING MAMMOGRAM: ICD-10-CM

## 2024-11-12 DIAGNOSIS — G62.89 AXONAL SENSORIMOTOR NEUROPATHY: Primary | ICD-10-CM

## 2024-11-12 PROCEDURE — 77063 BREAST TOMOSYNTHESIS BI: CPT

## 2024-11-12 PROCEDURE — 99443 PR PHYSICIAN TELEPHONE EVALUATION 21-30 MIN: CPT | Mod: 93 | Performed by: FAMILY MEDICINE

## 2024-11-12 RX ORDER — GABAPENTIN 300 MG/1
300 CAPSULE ORAL 3 TIMES DAILY
Qty: 90 CAPSULE | Refills: 1 | Status: SHIPPED | OUTPATIENT
Start: 2024-11-12

## 2024-11-12 RX ORDER — GABAPENTIN 100 MG/1
CAPSULE ORAL
Status: SHIPPED
Start: 2024-11-12

## 2024-11-12 NOTE — RESULT ENCOUNTER NOTE
Patient was seen today in clinic.  I discussed results in clinic, please see clinic progress note.    Lea Loepz MD 11/12/2024

## 2024-11-12 NOTE — PROGRESS NOTES
Kelly is a 84 year old who is being evaluated via a billable telephone visit.    What phone number would you like to be contacted at? [phone]  How would you like to obtain your AVS? [place]  Originating Location (pt. 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  Plan: gabapentin (NEURONTIN) 300 MG capsule          (E11.9) Type 2 diabetes mellitus without complication, without long-term current use of insulin (H)  Comment: A1c not at goal, probably due to less activity due to pain, will increase januvia to 100 mg po daily  Plan: repeat A1c in 3 months        Blood sugar testing frequency justification:  Uncontrolled diabetes      Subjective   Kelly is a 84 year old, presenting for the following health issues:  Axonal Sensorimotor neuropathy  Persistent exercise induced calf pain with comprehensive vascular work up including most recently at the [facility] showing no evidence of vascular disease. She has an appointment with a [place] neurologist in January and she's on the wait list for a [place] neurologist.    She's been using oxycodone very sparingly as needed.    TSH   Date Value Ref Range Status   08/17/2023 1.33 0.30 - 4.20 uIU/mL Final   10/15/2019 1.11 0.40 - 4.00 mU/L Final        RECHECK (Leg pain follow up), Diabetes, and Recheck Medication (Gabapentin and glimepiride )        11/12/2024     1:34 PM   Additional Questions   Roomed by Kasandra DICKINSON     History of Present Illness       Diabetes:   She presents for follow up of diabetes.  She is checking home blood glucose two times daily.   She checks blood glucose before meals.  Blood glucose is sometimes over 200 and never under  "70.  When her blood glucose is low, the patient is asymptomatic for confusion, blurred vision, lethargy and reports not feeling dizzy, shaky, or weak.  She is concerned about blood sugar frequently over 200.    She is not experiencing numbness or burning in feet, excessive thirst, blurry vision, weight changes or redness, sores or blisters on feet. The patient has not had a diabetic eye exam in the last 12 months.      She consumes 0 sweetened beverage(s) daily.She exercises with enough effort to increase her heart rate 10 to 19 minutes per day.  She exercises with enough effort to increase her heart rate 3 or less days per week.      Diabetes Follow-up    How often are you checking your blood sugar? Two times daily  Blood sugar testing frequency justification:   blood sugars have been higher than before  What time of day are you checking your blood sugars (select all that apply)?  Before and after meals  Have you had any blood sugars above 200?  Yes   Have you had any blood sugars below 70?  No  What symptoms do you notice when your blood sugar is low?  None     BP Readings from Last 2 Encounters:   11/05/24 137/82   08/23/24 126/62     Hemoglobin A1C (%)   Date Value   11/11/2024 8.0 (H)   06/11/2024 8.1 (H)   05/11/2021 7.3 (H)   10/07/2020 7.2 (H)     LDL Cholesterol Calculated (mg/dL)   Date Value   11/11/2024 107 (H)   06/11/2024 101 (H)   05/11/2021 103 (H)   10/07/2020 134 (H)             Review of Systems  Constitutional, HEENT, cardiovascular, pulmonary, gi and gu systems are negative, except as otherwise noted.      Objective    Vitals - Patient Reported  Weight (Patient Reported): 69.9 kg (154 lb)  Height (Patient Reported): 157.5 cm (5' 2\")  BMI (Based on Pt Reported Ht/Wt): 28.17  Pain Score: Severe Pain (7)  Pain Loc: Lower Leg    Physical Exam   General: Alert and no distress //Respiratory: No audible wheeze, cough, or shortness of breath // Psychiatric:  Appropriate affect, tone, and pace of " words          Phone call duration: 24 minutes  Signed Electronically by: Lea Lopez MD

## 2024-12-03 ENCOUNTER — MYC MEDICAL ADVICE (OUTPATIENT)
Dept: FAMILY MEDICINE | Facility: CLINIC | Age: 84
End: 2024-12-03

## 2024-12-03 ENCOUNTER — OFFICE VISIT (OUTPATIENT)
Dept: CARDIOLOGY | Facility: CLINIC | Age: 84
End: 2024-12-03
Payer: COMMERCIAL

## 2024-12-03 VITALS
BODY MASS INDEX: 29.99 KG/M2 | WEIGHT: 160 LBS | SYSTOLIC BLOOD PRESSURE: 123 MMHG | DIASTOLIC BLOOD PRESSURE: 76 MMHG | OXYGEN SATURATION: 98 % | HEART RATE: 69 BPM | RESPIRATION RATE: 16 BRPM

## 2024-12-03 DIAGNOSIS — I73.9 CLAUDICATION OF CALF MUSCLES (H): Primary | ICD-10-CM

## 2024-12-03 NOTE — PROGRESS NOTES
Vascular Cardiology Consultation      HPI:     This is a 84 yr old here for evaluation of leg pain and to get a second opinion. She  has had months duration of left calf pain with walking. She had very extensive vascular testing at the [facility] including lymphatic imaging, venous imaging, ultrasound and CT arterial imaging and provocative popliteal ultrasound imaging. She tested negative for aortoiliac arterial disease, venous thrombosis, popliteal entrapment, lower arterial insufficiency. ABIs were normal. She also reports having nerve studies which were normal. She has not seen orthopedics yet and has not had dedicated leg muscular / bone imaging.     ASSESSMENT/PLAN:    Very pleasant 84 yr old here for leg pain evaluation.    1. Leg pain: vascular (arterial and venous) disease essentially ruled out. I have provided her with reassurance and believe she had very good and adequate testing to exclude this. I have directed her to try [facility] for opinion on musculoskeletal causes and follow up with neurology is ongoing.     Follow up MARIELA Arreaga MD [facility]          PAST MEDICAL HISTORY  Past Medical History:   Diagnosis Date    Acute kidney injury (H) 12/19/2020    Allergic rhinitis, cause unspecified     Anemia     Aortic stenosis 02/29/2016    With new murmur noted 2/2016, normal echo, repeat echo 2/2017.    Aortic valve sclerosis     Atypical chest pain 07/24/2015    cuboid     left foot    Endometrial cancer (H)     Fungemia 08/16/2021    History of Clostridium difficile colitis     Hyperlipidemia LDL goal <100 11/01/2012    Hypertension goal BP (blood pressure) < 140/90     Musculoskeletal chest pain 11/25/2016    Obesity, Class I, BMI 30-34.9 11/11/2015    Pneumonia 03/2016    Pyelonephritis     Sepsis, due to unspecified organism, unspecified whether acute organ dysfunction present (H) 12/19/2020    Type 2 diabetes, HbA1c goal < 7% (H)     Urinary tract infection associated  with nephrostomy catheter, initial encounter (H) 08/08/2021       CURRENT MEDICATIONS  Current Outpatient Medications   Medication Sig Dispense Refill    aspirin (ASA) 325 MG EC tablet Take 325 mg by mouth as needed for moderate pain      blood glucose (ONETOUCH ULTRA) test strip Use to test blood sugar 2 times daily or as directed. 100 strip 3    blood glucose monitoring (ONE TOUCH ULTRA 2) meter device kit Use to test blood sugar 2 times daily or as directed. 1 kit 0    gabapentin (NEURONTIN) 100 MG capsule Take 1-3 capsules (100-300 mg) by mouth every 8 hours PRN for pain      gabapentin (NEURONTIN) 300 MG capsule Take 1 capsule (300 mg) by mouth 3 times daily. 90 capsule 1    glimepiride (AMARYL) 4 MG tablet TAKE ONE TABLET BY MOUTH TWICE A DAY WITH MEALS 180 tablet 3    Lancets (ONETOUCH DELICA PLUS [id number]) MISC USE ONE DEVICE BY IN VITRO ROUTE TWO TIMES A  each 3    losartan-hydrochlorothiazide (HYZAAR) 50-12.5 MG tablet Take 1 tablet by mouth every morning 90 tablet 3    potassium citrate (UROCIT-K) 10 MEQ (1080 MG) CR tablet Take 1 tablet (10 mEq) by mouth daily. 90 tablet 3    simvastatin (ZOCOR) 20 MG tablet TAKE ONE TABLET BY MOUTH AT BEDTIME 90 tablet 3    sitagliptin (JANUVIA) 100 MG tablet Take 1 tablet (100 mg) by mouth daily. 30 tablet 3    sitagliptin (JANUVIA) 50 MG tablet Take 1 tablet (50 mg) by mouth daily. 90 tablet 3       PAST SURGICAL HISTORY:  Past Surgical History:   Procedure Laterality Date    CATARACT EXTRACTION      CYSTOSCOPY, RETROGRADES, INSERT STENT URETER(S), COMBINED Right 07/15/2021    Procedure: CYSTOURETEROSCOPY, WITH RETROGRADE PYELOGRAM,  AND STENT INSERTION RIGHT;  Surgeon: Kirk Law MD;  [facility]    LAPAROSCOPIC HYSTERECTOMY TOTAL, BILATERAL SALPINGO-OOPHORECTOMY, NODE DISSECTION, COMBINED N/A 10/30/2023    Procedure: TOTAL LAPAROSCOPIC HYSTERECTOMY, WITH BILATERAL SALPINGO-OOPHORECTOMY, BILATEAL PELVIC SENTINEL LYMPH NODE;  Surgeon: Jenise  Kaykay LEHMAN MD;  [facility]    LASER HOLMIUM NEPHROLITHOTOMY VIA PERCUTANEOUS NEPHROSTOMY Right 08/05/2021    Procedure: NEPHROLITHOTOMY, PERCUTANEOUS, USING HOLMIUM LASER RIGHT;  Surgeon: Kirk Law MD;  [facility]    LASER HOLMIUM NEPHROLITHOTOMY VIA PERCUTANEOUS NEPHROSTOMY Right 08/13/2021    Procedure: Ureteroscopic assisted secondary right percutaneous nephrolihtotomy, Holmium laser lithotriipsy;  Surgeon: Kirk Law MD;  [facility]    PICC SINGLE LUMEN PLACEMENT Left 08/17/2021    41cm (2cm external), Medial brachial vein       ALLERGIES     Allergies   Allergen Reactions    Ibuprofen Other (See Comments)     numbness in hands and feet    Keflex [Cephalexin] Rash    Metformin Rash       FAMILY HISTORY  Family History   Problem Relation Age of Onset    Hypertension Mother     Diabetes No family hx of     Cerebrovascular Disease No family hx of     Breast Cancer No family hx of     Cancer - colorectal No family hx of     Prostate Cancer No family hx of     Anesthesia Reaction No family hx of     Bleeding Disorder No family hx of     Clotting Disorder No family hx of        SOCIAL HISTORY  Social History     Socioeconomic History    Marital status:      Spouse name: Not on file    Number of children: Not on file    Years of education: Not on file    Highest education level: Not on file   Occupational History    Not on file   Tobacco Use    Smoking status: Never     Passive exposure: Never    Smokeless tobacco: Never   Vaping Use    Vaping status: Never Used   Substance and Sexual Activity    Alcohol use: Yes     Alcohol/week: 10.0 standard drinks of alcohol     Comment: 1-2 drinks per week    Drug use: No    Sexual activity: Yes     Partners: Male   Other Topics Concern     Service No    Blood Transfusions No    Caffeine Concern No    Occupational Exposure No    Hobby Hazards No    Sleep Concern Yes    Stress Concern No    Weight Concern Yes    Special Diet No     Back Care No    Exercise Yes     Comment: walk    Bike Helmet No    Seat Belt Yes    Self-Exams Yes    Parent/sibling w/ CABG, MI or angioplasty before 65F 55M? No   Social History Narrative    Not on file     Social Drivers of Health     Financial Resource Strain: Low Risk  (6/20/2024)    Financial Resource Strain     Within the past 12 months, have you or your family members you live with been unable to get utilities (heat, electricity) when it was really needed?: No   Food Insecurity: No Food Insecurity (10/24/2024)    Received from [facility]    Hunger Vital Sign     Worried About Running Out of Food in the Last Year: Never true     Ran Out of Food in the Last Year: Never true   Transportation Needs: No Transportation Needs (10/24/2024)    Received from [facility]    PRAPARE - Transportation     Lack of Transportation (Medical): No     Lack of Transportation (Non-Medical): No   Physical Activity: Inactive (10/24/2024)    Received from [facility]    Exercise Vital Sign     Days of Exercise per Week: 0 days     Minutes of Exercise per Session: 10 min   Stress: No Stress Concern Present (6/20/2024)    [employer] - Occupational Stress Questionnaire     Feeling of Stress : Not at all   Social Connections: Unknown (6/20/2024)    Social Connection and Isolation Panel [NHANES]     Frequency of Communication with Friends and Family: Not on file     Frequency of Social Gatherings with Friends and Family: More than three times a week     Attends Confucianist Services: Not on file     Active Member of Clubs or Organizations: Not on file     Attends Club or Organization Meetings: Not on file     Marital Status: Not on file   Interpersonal Safety: Low Risk  (6/20/2024)    Interpersonal Safety     Do you feel physically and emotionally safe where you currently live?: Yes     Within the past 12 months, have you been hit, slapped, kicked or otherwise physically hurt by someone?: No     Within the  past 12 months, have you been humiliated or emotionally abused in other ways by your partner or ex-partner?: No   Housing Stability: Low Risk  (10/24/2024)    [facility]    Housing Stability     What is your living situation today?: I have a steady place to live       ROS:   Constitutional: No fever, chills, or sweats. No weight gain/loss   ENT: No visual disturbance, ear ache, epistaxis, sore throat  Allergies/Immunologic: Negative  Respiratory: No cough, hemoptysia  Cardiovascular: As per HPI  GI: No nausea, vomiting, hematemesis, melena, or hematochezia  : No urinary frequency, dysuria, or hematuria  Integument: Negative  Psychiatric: Negative  Neuro: Negative  Endocrinology: Negative   Musculoskeletal: Negative  Vascular: No walking impairment, claudication, ischemic rest pain or nonhealing wounds    EXAM:  /76 (BP Location: Right arm, Patient Position: Sitting)   Pulse 69   Resp 16   Wt 72.6 kg (160 lb)   LMP  (LMP Unknown)   SpO2 98%   BMI 29.99 kg/m    In general, the patient is a pleasant female in no apparent distress.    HEENT: NC/AT.  PERRLA.  EOMI.  Sclerae white, not injected.    Neck: No adenopathy.  No thyromegaly. Carotids +2/2 bilaterally without bruits.  No jugular venous distension.   Heart: RRR. Normal S1, S2 splits physiologically. No murmur, rub, click, or gallop.   Lungs: CTA.  No ronchi, wheezes, rales.  No dullness to percussion.   Abdomen: Soft, nontender, nondistended.  Extremities: No clubbing, cyanosis, or edema.  No wounds. No varicose veins signs of chronic venous insufficiency.   Vascular: No bruits are noted.    Labs:  LIPID RESULTS:  Lab Results   Component Value Date    CHOL 201 (H) 11/11/2024    CHOL 194 05/11/2021    HDL 77 11/11/2024    HDL 77 05/11/2021     (H) 11/11/2024     (H) 05/11/2021    TRIG 85 11/11/2024    TRIG 68 05/11/2021    CHOLHDLRATIO 3.1 11/10/2015    NHDL 124 11/11/2024    NHDL 117 05/11/2021       LIVER ENZYME  RESULTS:  Lab Results   Component Value Date    AST 28 06/20/2024    AST 15 12/19/2020    ALT 14 06/20/2024    ALT 19 12/19/2020       CBC RESULTS:  Lab Results   Component Value Date    WBC 9.8 06/20/2024    WBC 9.2 12/21/2020    RBC 5.26 (H) 06/20/2024    RBC 4.15 12/21/2020    HGB 14.6 06/20/2024    HGB 11.3 (L) 12/21/2020    HCT 44.7 06/20/2024    HCT 34.8 (L) 12/21/2020    MCV 85 06/20/2024    MCV 84 12/21/2020    MCH 27.8 06/20/2024    MCH 27.2 12/21/2020    MCHC 32.7 06/20/2024    MCHC 32.5 12/21/2020    RDW 12.7 06/20/2024    RDW 13.3 12/21/2020     06/20/2024     12/21/2020       BMP RESULTS:  Lab Results   Component Value Date     06/20/2024     05/11/2021    POTASSIUM 4.2 06/20/2024    POTASSIUM 3.7 09/19/2022    POTASSIUM 4.9 05/11/2021    CHLORIDE 98 06/20/2024    CHLORIDE 102 09/19/2022    CHLORIDE 108 05/11/2021    CO2 26 06/20/2024    CO2 26 09/19/2022    CO2 27 05/11/2021    ANIONGAP 11 06/20/2024    ANIONGAP 7 09/19/2022    ANIONGAP 5 05/11/2021     (H) 06/20/2024     (H) 10/31/2023     (H) 09/19/2022     (H) 05/11/2021    BUN 25.5 (H) 06/20/2024    BUN 32 (H) 09/19/2022    BUN 30 05/11/2021    CR 0.95 06/20/2024    CR 0.97 05/11/2021    GFRESTIMATED 59 (L) 06/20/2024    GFRESTIMATED 45 (L) 05/21/2024    GFRESTIMATED 55 (L) 05/11/2021    GFRESTBLACK 64 05/11/2021    JAN 9.7 06/20/2024    JAN 9.1 05/11/2021        A1C RESULTS:  Lab Results   Component Value Date    A1C 8.0 (H) 11/11/2024    A1C 7.3 (H) 05/11/2021     Labs have all been personally reviewed.

## 2024-12-03 NOTE — LETTER
12/3/2024    Lea Lopez MD  [address]  [place] [address]    RE: Kelly CHIRINOS Cameron       Dear Colleague,     I had the pleasure of seeing Kelly Gainesriam in the [facility].           Vascular Cardiology Consultation      HPI:     This is a 84 yr old here for evaluation of leg pain and to get a second opinion. She  has had months duration of left calf pain with walking. She had very extensive vascular testing at the [facility] including lymphatic imaging, venous imaging, ultrasound and CT arterial imaging and provocative popliteal ultrasound imaging. She tested negative for aortoiliac arterial disease, venous thrombosis, popliteal entrapment, lower arterial insufficiency. ABIs were normal. She also reports having nerve studies which were normal. She has not seen orthopedics yet and has not had dedicated leg muscular / bone imaging.     ASSESSMENT/PLAN:    Very pleasant 84 yr old here for leg pain evaluation.    1. Leg pain: vascular (arterial and venous) disease essentially ruled out. I have provided her with reassurance and believe she had very good and adequate testing to exclude this. I have directed her to try [facility] for opinion on musculoskeletal causes and follow up with neurology is ongoing.     Follow up MARIELA Arreaga MD [facility]          PAST MEDICAL HISTORY  Past Medical History:   Diagnosis Date     Acute kidney injury (H) 12/19/2020     Allergic rhinitis, cause unspecified      Anemia      Aortic stenosis 02/29/2016    With new murmur noted 2/2016, normal echo, repeat echo 2/2017.     Aortic valve sclerosis      Atypical chest pain 07/24/2015     cuboid     left foot     Endometrial cancer (H)      Fungemia 08/16/2021     History of Clostridium difficile colitis      Hyperlipidemia LDL goal <100 11/01/2012     Hypertension goal BP (blood pressure) < 140/90      Musculoskeletal chest pain 11/25/2016     Obesity, Class I, BMI 30-34.9  11/11/2015     Pneumonia 03/2016     Pyelonephritis      Sepsis, due to unspecified organism, unspecified whether acute organ dysfunction present (H) 12/19/2020     Type 2 diabetes, HbA1c goal < 7% (H)      Urinary tract infection associated with nephrostomy catheter, initial encounter (H) 08/08/2021       CURRENT MEDICATIONS  Current Outpatient Medications   Medication Sig Dispense Refill     aspirin (ASA) 325 MG EC tablet Take 325 mg by mouth as needed for moderate pain       blood glucose (ONETOUCH ULTRA) test strip Use to test blood sugar 2 times daily or as directed. 100 strip 3     blood glucose monitoring (ONE TOUCH ULTRA 2) meter device kit Use to test blood sugar 2 times daily or as directed. 1 kit 0     gabapentin (NEURONTIN) 100 MG capsule Take 1-3 capsules (100-300 mg) by mouth every 8 hours PRN for pain       gabapentin (NEURONTIN) 300 MG capsule Take 1 capsule (300 mg) by mouth 3 times daily. 90 capsule 1     glimepiride (AMARYL) 4 MG tablet TAKE ONE TABLET BY MOUTH TWICE A DAY WITH MEALS 180 tablet 3     Lancets (ONETOUCH DELICA PLUS [id number]) MISC USE ONE DEVICE BY IN VITRO ROUTE TWO TIMES A  each 3     losartan-hydrochlorothiazide (HYZAAR) 50-12.5 MG tablet Take 1 tablet by mouth every morning 90 tablet 3     potassium citrate (UROCIT-K) 10 MEQ (1080 MG) CR tablet Take 1 tablet (10 mEq) by mouth daily. 90 tablet 3     simvastatin (ZOCOR) 20 MG tablet TAKE ONE TABLET BY MOUTH AT BEDTIME 90 tablet 3     sitagliptin (JANUVIA) 100 MG tablet Take 1 tablet (100 mg) by mouth daily. 30 tablet 3     sitagliptin (JANUVIA) 50 MG tablet Take 1 tablet (50 mg) by mouth daily. 90 tablet 3       PAST SURGICAL HISTORY:  Past Surgical History:   Procedure Laterality Date     CATARACT EXTRACTION       CYSTOSCOPY, RETROGRADES, INSERT STENT URETER(S), COMBINED Right 07/15/2021    Procedure: CYSTOURETEROSCOPY, WITH RETROGRADE PYELOGRAM,  AND STENT INSERTION RIGHT;  Surgeon: Kirk Law MD;  Location:   OR     LAPAROSCOPIC HYSTERECTOMY TOTAL, BILATERAL SALPINGO-OOPHORECTOMY, NODE DISSECTION, COMBINED N/A 10/30/2023    Procedure: TOTAL LAPAROSCOPIC HYSTERECTOMY, WITH BILATERAL SALPINGO-OOPHORECTOMY, BILATEAL PELVIC SENTINEL LYMPH NODE;  Surgeon: Kaykay Burden MD;  [facility]     LASER HOLMIUM NEPHROLITHOTOMY VIA PERCUTANEOUS NEPHROSTOMY Right 08/05/2021    Procedure: NEPHROLITHOTOMY, PERCUTANEOUS, USING HOLMIUM LASER RIGHT;  Surgeon: Kirk Law MD;  [facility]     LASER HOLMIUM NEPHROLITHOTOMY VIA PERCUTANEOUS NEPHROSTOMY Right 08/13/2021    Procedure: Ureteroscopic assisted secondary right percutaneous nephrolihtotomy, Holmium laser lithotriipsy;  Surgeon: Kirk Law MD;  [facility]     PICC SINGLE LUMEN PLACEMENT Left 08/17/2021    41cm (2cm external), Medial brachial vein       ALLERGIES     Allergies   Allergen Reactions     Ibuprofen Other (See Comments)     numbness in hands and feet     Keflex [Cephalexin] Rash     Metformin Rash       FAMILY HISTORY  Family History   Problem Relation Age of Onset     Hypertension Mother      Diabetes No family hx of      Cerebrovascular Disease No family hx of      Breast Cancer No family hx of      Cancer - colorectal No family hx of      Prostate Cancer No family hx of      Anesthesia Reaction No family hx of      Bleeding Disorder No family hx of      Clotting Disorder No family hx of        SOCIAL HISTORY  Social History     Socioeconomic History     Marital status:      Spouse name: Not on file     Number of children: Not on file     Years of education: Not on file     Highest education level: Not on file   Occupational History     Not on file   Tobacco Use     Smoking status: Never     Passive exposure: Never     Smokeless tobacco: Never   Vaping Use     Vaping status: Never Used   Substance and Sexual Activity     Alcohol use: Yes     Alcohol/week: 10.0 standard drinks of alcohol     Comment: 1-2 drinks per week     Drug  use: No     Sexual activity: Yes     Partners: Male   Other Topics Concern      Service No     Blood Transfusions No     Caffeine Concern No     Occupational Exposure No     Hobby Hazards No     Sleep Concern Yes     Stress Concern No     Weight Concern Yes     Special Diet No     Back Care No     Exercise Yes     Comment: walk     Bike Helmet No     Seat Belt Yes     Self-Exams Yes     Parent/sibling w/ CABG, MI or angioplasty before 65F 55M? No   Social History Narrative     Not on file     Social Drivers of Health     Financial Resource Strain: Low Risk  (6/20/2024)    Financial Resource Strain      Within the past 12 months, have you or your family members you live with been unable to get utilities (heat, electricity) when it was really needed?: No   Food Insecurity: No Food Insecurity (10/24/2024)    Received from [facility]    Hunger Vital Sign      Worried About Running Out of Food in the Last Year: Never true      Ran Out of Food in the Last Year: Never true   Transportation Needs: No Transportation Needs (10/24/2024)    Received from [facility]    PRAPARE - Transportation      Lack of Transportation (Medical): No      Lack of Transportation (Non-Medical): No   Physical Activity: Inactive (10/24/2024)    Received from [facility]    Exercise Vital Sign      Days of Exercise per Week: 0 days      Minutes of Exercise per Session: 10 min   Stress: No Stress Concern Present (6/20/2024)    [employer] - Occupational Stress Questionnaire      Feeling of Stress : Not at all   Social Connections: Unknown (6/20/2024)    Social Connection and Isolation Panel [NHANES]      Frequency of Communication with Friends and Family: Not on file      Frequency of Social Gatherings with Friends and Family: More than three times a week      Attends Confucianism Services: Not on file      Active Member of Clubs or Organizations: Not on file      Attends Club or Organization Meetings: Not on file       Marital Status: Not on file   Interpersonal Safety: Low Risk  (6/20/2024)    Interpersonal Safety      Do you feel physically and emotionally safe where you currently live?: Yes      Within the past 12 months, have you been hit, slapped, kicked or otherwise physically hurt by someone?: No      Within the past 12 months, have you been humiliated or emotionally abused in other ways by your partner or ex-partner?: No   Housing Stability: Low Risk  (10/24/2024)    Received from [facility]    Housing Stability      What is your living situation today?: I have a steady place to live       ROS:   Constitutional: No fever, chills, or sweats. No weight gain/loss   ENT: No visual disturbance, ear ache, epistaxis, sore throat  Allergies/Immunologic: Negative  Respiratory: No cough, hemoptysia  Cardiovascular: As per HPI  GI: No nausea, vomiting, hematemesis, melena, or hematochezia  : No urinary frequency, dysuria, or hematuria  Integument: Negative  Psychiatric: Negative  Neuro: Negative  Endocrinology: Negative   Musculoskeletal: Negative  Vascular: No walking impairment, claudication, ischemic rest pain or nonhealing wounds    EXAM:  /76 (BP Location: Right arm, Patient Position: Sitting)   Pulse 69   Resp 16   Wt 72.6 kg (160 lb)   LMP  (LMP Unknown)   SpO2 98%   BMI 29.99 kg/m    In general, the patient is a pleasant female in no apparent distress.    HEENT: NC/AT.  PERRLA.  EOMI.  Sclerae white, not injected.    Neck: No adenopathy.  No thyromegaly. Carotids +2/2 bilaterally without bruits.  No jugular venous distension.   Heart: RRR. Normal S1, S2 splits physiologically. No murmur, rub, click, or gallop.   Lungs: CTA.  No ronchi, wheezes, rales.  No dullness to percussion.   Abdomen: Soft, nontender, nondistended.  Extremities: No clubbing, cyanosis, or edema.  No wounds. No varicose veins signs of chronic venous insufficiency.   Vascular: No bruits are noted.    Labs:  LIPID RESULTS:  Lab Results    Component Value Date    CHOL 201 (H) 11/11/2024    CHOL 194 05/11/2021    HDL 77 11/11/2024    HDL 77 05/11/2021     (H) 11/11/2024     (H) 05/11/2021    TRIG 85 11/11/2024    TRIG 68 05/11/2021    CHOLHDLRATIO 3.1 11/10/2015    NHDL 124 11/11/2024    NHDL 117 05/11/2021       LIVER ENZYME RESULTS:  Lab Results   Component Value Date    AST 28 06/20/2024    AST 15 12/19/2020    ALT 14 06/20/2024    ALT 19 12/19/2020       CBC RESULTS:  Lab Results   Component Value Date    WBC 9.8 06/20/2024    WBC 9.2 12/21/2020    RBC 5.26 (H) 06/20/2024    RBC 4.15 12/21/2020    HGB 14.6 06/20/2024    HGB 11.3 (L) 12/21/2020    HCT 44.7 06/20/2024    HCT 34.8 (L) 12/21/2020    MCV 85 06/20/2024    MCV 84 12/21/2020    MCH 27.8 06/20/2024    MCH 27.2 12/21/2020    MCHC 32.7 06/20/2024    MCHC 32.5 12/21/2020    RDW 12.7 06/20/2024    RDW 13.3 12/21/2020     06/20/2024     12/21/2020       BMP RESULTS:  Lab Results   Component Value Date     06/20/2024     05/11/2021    POTASSIUM 4.2 06/20/2024    POTASSIUM 3.7 09/19/2022    POTASSIUM 4.9 05/11/2021    CHLORIDE 98 06/20/2024    CHLORIDE 102 09/19/2022    CHLORIDE 108 05/11/2021    CO2 26 06/20/2024    CO2 26 09/19/2022    CO2 27 05/11/2021    ANIONGAP 11 06/20/2024    ANIONGAP 7 09/19/2022    ANIONGAP 5 05/11/2021     (H) 06/20/2024     (H) 10/31/2023     (H) 09/19/2022     (H) 05/11/2021    BUN 25.5 (H) 06/20/2024    BUN 32 (H) 09/19/2022    BUN 30 05/11/2021    CR 0.95 06/20/2024    CR 0.97 05/11/2021    GFRESTIMATED 59 (L) 06/20/2024    GFRESTIMATED 45 (L) 05/21/2024    GFRESTIMATED 55 (L) 05/11/2021    GFRESTBLACK 64 05/11/2021    JAN 9.7 06/20/2024    JAN 9.1 05/11/2021        A1C RESULTS:  Lab Results   Component Value Date    A1C 8.0 (H) 11/11/2024    A1C 7.3 (H) 05/11/2021     Labs have all been personally reviewed.      Thank you for allowing me to participate in the care of your patient.      Sincerely,      Marzena Arreaga MD     [facility]  cc:   Lea Lopez MD  [address]  [place] [address]

## 2024-12-05 DIAGNOSIS — G62.89 AXONAL SENSORIMOTOR NEUROPATHY: Primary | ICD-10-CM

## 2024-12-05 DIAGNOSIS — M79.662 PAIN OF LEFT LOWER LEG: ICD-10-CM

## 2024-12-05 RX ORDER — OXYCODONE HYDROCHLORIDE 5 MG/1
5 TABLET ORAL EVERY 6 HOURS PRN
COMMUNITY
Start: 2024-09-10 | End: 2024-12-05

## 2024-12-05 RX ORDER — OXYCODONE HYDROCHLORIDE 5 MG/1
5 TABLET ORAL EVERY 6 HOURS PRN
Qty: 20 TABLET | Refills: 0 | Status: SHIPPED | OUTPATIENT
Start: 2024-12-05

## 2024-12-16 DIAGNOSIS — I10 HYPERTENSION GOAL BP (BLOOD PRESSURE) < 140/90: ICD-10-CM

## 2024-12-17 RX ORDER — LOSARTAN POTASSIUM AND HYDROCHLOROTHIAZIDE 12.5; 5 MG/1; MG/1
1 TABLET ORAL EVERY MORNING
Qty: 90 TABLET | Refills: 3 | Status: SHIPPED | OUTPATIENT
Start: 2024-12-17

## 2025-01-05 ENCOUNTER — MYC MEDICAL ADVICE (OUTPATIENT)
Dept: FAMILY MEDICINE | Facility: CLINIC | Age: 85
End: 2025-01-05
Payer: COMMERCIAL

## 2025-01-05 NOTE — PROGRESS NOTES
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    She is here today for a surveillance visit.     Oncology history:   23: Endometrial biopsy:  - Endometrioid adenocarcinoma, FIGO grade 1     10/6/23: CT cap IMPRESSION:   1. Endometrial tumor consistent with biopsy proven endometrioid adenocarcinoma.  2. No solid organ metastases identified. No evidence of local metastatic disease to the extent assessed on CT.     10/30/23:   A. Left pelvic sentinel lymph node, lymphadenectomy:  - One reactive lymph node examined, negative for malignancy (0/1)      B. Right pelvic sentinel lymph node, lymphadenectomy:  - One lymph node positive for isolated tumor cells (ITCs)      C. Uterus, cervix, bilateral fallopian tubes, and bilateral ovaries, hysterectomy with bilateral salpingo-oophorectomy:  - Endometrial endometrioid adenocarcinoma, FIGO grade 1, MMR-intact, with microcystic, elongated and fragmented (MELF)-pattern of myoinvasion  - Leiomyoma (0.3 cm)  - Cervix with no significant histologic abnormality   - Ovaries with atrophic changes  - Fallopian tubes with no significant histologic abnormality       Today she comes to clinic and denies any concerns for her visit. She denies any vaginal bleeding, no changes in her bowel or bladder habits, no nausea/emesis, no lower extremity edema, and no difficulties eating or sleeping. She denies any abdominal discomfort/bloating, no fevers or chills, and no chest pain or shortness of breath. She does have an ongoing pain in her left calf only when she walks and has been evaluated for this by several specialties with no cause identified; she is doing PT. Her home BPs run ~130's/79; she has not yet taken her medications this morning.     Annual exam with  PCP:6/24  Mammogram:11/24  Colonoscopy:no longer doing      Review of Systems  See HPI      Past Medical History:    Past Medical History:   Diagnosis Date    Acute kidney injury (H) 12/19/2020    Allergic rhinitis, cause unspecified     Anemia     Aortic stenosis 02/29/2016    With new murmur noted 2/2016, normal echo, repeat echo 2/2017.    Aortic valve sclerosis     Atypical chest pain 07/24/2015    cuboid     left foot    Endometrial cancer (H)     Fungemia 08/16/2021    History of Clostridium difficile colitis     Hyperlipidemia LDL goal <100 11/01/2012    Hypertension goal BP (blood pressure) < 140/90     Musculoskeletal chest pain 11/25/2016    Obesity, Class I, BMI 30-34.9 11/11/2015    Pneumonia 03/2016    Pyelonephritis     Sepsis, due to unspecified organism, unspecified whether acute organ dysfunction present (H) 12/19/2020    Type 2 diabetes, HbA1c goal < 7% (H)     Urinary tract infection associated with nephrostomy catheter, initial encounter (H) 08/08/2021         Past Surgical History:    Past Surgical History:   Procedure Laterality Date    CATARACT EXTRACTION      CYSTOSCOPY, RETROGRADES, INSERT STENT URETER(S), COMBINED Right 07/15/2021    Procedure: CYSTOURETEROSCOPY, WITH RETROGRADE PYELOGRAM,  AND STENT INSERTION RIGHT;  Surgeon: Kirk Law MD;  [facility]    LAPAROSCOPIC HYSTERECTOMY TOTAL, BILATERAL SALPINGO-OOPHORECTOMY, NODE DISSECTION, COMBINED N/A 10/30/2023    Procedure: TOTAL LAPAROSCOPIC HYSTERECTOMY, WITH BILATERAL SALPINGO-OOPHORECTOMY, BILATEAL PELVIC SENTINEL LYMPH NODE;  Surgeon: Kaykay Burden MD;  [facility]    LASER HOLMIUM NEPHROLITHOTOMY VIA PERCUTANEOUS NEPHROSTOMY Right 08/05/2021    Procedure: NEPHROLITHOTOMY, PERCUTANEOUS, USING HOLMIUM LASER RIGHT;  Surgeon: Kirk Law MD;  [facility]    LASER HOLMIUM NEPHROLITHOTOMY VIA PERCUTANEOUS NEPHROSTOMY Right 08/13/2021    Procedure: Ureteroscopic assisted secondary right percutaneous  nephrolihtotomy, Holmium laser lithotriipsy;  Surgeon: Kirk Law MD;  [facility]    PICC SINGLE LUMEN PLACEMENT Left 08/17/2021    41cm (2cm external), Medial brachial vein       Current Medications:     Current Outpatient Medications   Medication Sig Dispense Refill    aspirin (ASA) 325 MG EC tablet Take 325 mg by mouth as needed for moderate pain      blood glucose (ONETOUCH ULTRA) test strip Use to test blood sugar 2 times daily or as directed. 100 strip 3    blood glucose monitoring (ONE TOUCH ULTRA 2) meter device kit Use to test blood sugar 2 times daily or as directed. 1 kit 0    gabapentin (NEURONTIN) 300 MG capsule Take 1 capsule (300 mg) by mouth 3 times daily. 90 capsule 1    glimepiride (AMARYL) 4 MG tablet TAKE ONE TABLET BY MOUTH TWICE A DAY WITH MEALS 180 tablet 3    Lancets (ONETOUCH DELICA PLUS [id number]) MISC USE ONE DEVICE BY IN VITRO ROUTE TWO TIMES A  each 3    losartan-hydrochlorothiazide (HYZAAR) 50-12.5 MG tablet TAKE ONE TABLET BY MOUTH EVERY MORNING 90 tablet 3    oxyCODONE (ROXICODONE) 5 MG tablet Take 1 tablet (5 mg) by mouth every 6 hours as needed for severe pain or breakthrough pain. 20 tablet 0    potassium citrate (UROCIT-K) 10 MEQ (1080 MG) CR tablet Take 1 tablet (10 mEq) by mouth daily. 90 tablet 3    simvastatin (ZOCOR) 20 MG tablet TAKE ONE TABLET BY MOUTH AT BEDTIME 90 tablet 3    sitagliptin (JANUVIA) 100 MG tablet Take 1 tablet (100 mg) by mouth daily. 30 tablet 3    gabapentin (NEURONTIN) 100 MG capsule Take 1-3 capsules (100-300 mg) by mouth every 8 hours PRN for pain (Patient not taking: Reported on 1/6/2025)      ibuprofen (ADVIL/MOTRIN) 200 MG tablet Take 200 mg by mouth every 6 hours as needed.      sitagliptin (JANUVIA) 50 MG tablet Take 1 tablet (50 mg) by mouth daily. (Patient not taking: Reported on 1/6/2025) 90 tablet 3         Allergies:        Allergies   Allergen Reactions    Ibuprofen Other (See Comments)     numbness in hands and feet     Keflex [Cephalexin] Rash    Metformin Rash        Social History:     Social History     Tobacco Use    Smoking status: Never     Passive exposure: Never    Smokeless tobacco: Never   Substance Use Topics    Alcohol use: Yes     Alcohol/week: 10.0 standard drinks of alcohol     Comment: 1-2 drinks per week       History   Drug Use No         Family History:     The patient's family history is notable for     Family History   Problem Relation Age of Onset    Hypertension Mother     Diabetes No family hx of     Cerebrovascular Disease No family hx of     Breast Cancer No family hx of     Cancer - colorectal No family hx of     Prostate Cancer No family hx of     Anesthesia Reaction No family hx of     Bleeding Disorder No family hx of     Clotting Disorder No family hx of          Physical Exam:     BP (!) 145/82 (BP Location: Right arm, Patient Position: Sitting, Cuff Size: Adult Regular)   Pulse 69   Temp 98.6  F (37  C) (Oral)   Resp 14   Wt 71.9 kg (158 lb 8 oz)   LMP  (LMP Unknown)   SpO2 96%   BMI 29.70 kg/m    Body mass index is 29.7 kg/m .    General Appearance: healthy and alert, no distress     HEENT: no thyromegaly, no palpable nodules or masses        Cardiovascular: regular rate and rhythm, no gallops, rubs or murmurs     Respiratory: lungs clear, no rales, rhonchi or wheezes, normal diaphragmatic excursion    Musculoskeletal: extremities non tender and without edema    Skin: no lesions or rashes     Neurological: normal gait, no gross defects     Psychiatric: appropriate mood and affect                               Hematological: normal cervical, supraclavicular and inguinal lymph nodes     Gastrointestinal:       abdomen soft, non-tender, non-distended, no organomegaly or masses    Genitourinary: External genitalia and urethral meatus appears normal.  Vagina is smooth without nodularity or masses.  Cervix surgically absent. Bimanual exam reveal no masses, nodularity or fullness.  Rectal exam  deferred secondary to stool in rectum.      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    She is here today for a surveillance visit.     21 minutes spent on the date of the encounter doing chart review, history and exam, documentation and further activities as noted above      Plan:     1.)       Patient to RTC in 6 months for her next surveillance visit. Reviewed recommendations from [facility] not to perform surveillance pap smears in women diagnosed with endometrial cancer as this does not improve detection of local recurrence. Reviewed signs and symptoms for when she should contact the clinic or seek additional care. Patient to contact the clinic with any questions or concerns in the interim.  Answered all of her questions to the best of my ability.     2.) Genetic risk factors were assessed and the patient has intact MMR proteins.     3.) Labs and/or tests ordered include:  none.     4.) Health maintenance issues addressed today include annual health maintenance and non-gynecologic issues with PCP. Suggested having Mg checked regarding leg pain.    5.)        HTN: continue to monitor BP at home and follow up with PCP if readings remain >140/90.    APRIL Rogers, WHNP-BC, ANP-BC, AOCNP  Women's Health Nurse Practitioner  Adult Nurse Practitioner  Division of Gynecologic Oncology        CC  Patient Care Team:  Lea Lopez MD as PCP - General (Family Medicine)  Jose Sue RPH as Pharmacist (Pharmacist)  Brenda Snowden CNP as Nurse Practitioner (Urology)  Kirk Law MD as MD (Urology)  Brenda Snowden CNP as Assigned Surgical Provider  Cindy Cortez PT as Physical Therapist (Physical Therapy)  Dacia Lopes APRN CNP as Assigned OBGYN Provider  Kaykay Burden MD as Assigned Cancer Care Provider  Lea Lopez MD as Assigned PCP  Genet Velazquez PT as Physical  Therapist (Physical Therapy)  Richard Freedman MD as Assigned Heart and Vascular Provider  Deshaun Bullard MD as MD (Neurology)  Marzena Arreaga MD as MD (Cardiovascular Disease)  SELF, REFERRED

## 2025-01-06 ENCOUNTER — TELEPHONE (OUTPATIENT)
Dept: NEUROLOGY | Facility: CLINIC | Age: 85
End: 2025-01-06

## 2025-01-06 ENCOUNTER — ONCOLOGY VISIT (OUTPATIENT)
Dept: ONCOLOGY | Facility: CLINIC | Age: 85
End: 2025-01-06
Attending: NURSE PRACTITIONER
Payer: COMMERCIAL

## 2025-01-06 VITALS
OXYGEN SATURATION: 96 % | SYSTOLIC BLOOD PRESSURE: 145 MMHG | RESPIRATION RATE: 14 BRPM | DIASTOLIC BLOOD PRESSURE: 82 MMHG | HEART RATE: 69 BPM | WEIGHT: 158.5 LBS | TEMPERATURE: 98.6 F | BODY MASS INDEX: 29.7 KG/M2

## 2025-01-06 DIAGNOSIS — I10 HYPERTENSION GOAL BP (BLOOD PRESSURE) < 140/90: ICD-10-CM

## 2025-01-06 DIAGNOSIS — C54.1 ENDOMETRIAL CANCER (H): Primary | ICD-10-CM

## 2025-01-06 PROCEDURE — G0463 HOSPITAL OUTPT CLINIC VISIT: HCPCS | Performed by: NURSE PRACTITIONER

## 2025-01-06 PROCEDURE — 99213 OFFICE O/P EST LOW 20 MIN: CPT | Performed by: NURSE PRACTITIONER

## 2025-01-06 RX ORDER — IBUPROFEN 200 MG
200 TABLET ORAL EVERY 6 HOURS PRN
COMMUNITY

## 2025-01-06 ASSESSMENT — PAIN SCALES - GENERAL: PAINLEVEL_OUTOF10: SEVERE PAIN (7)

## 2025-01-06 NOTE — LETTER
2025      Kelly Barnes  [address]  [place] [address]      Dear Colleague,    Thank you for referring your patient, Kelly Barnes, to the [facility]. Please see a copy of my visit note below.                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    She is here today for a surveillance visit.     Oncology history:   23: Endometrial biopsy:  - Endometrioid adenocarcinoma, FIGO grade 1     10/6/23: CT cap IMPRESSION:   1. Endometrial tumor consistent with biopsy proven endometrioid adenocarcinoma.  2. No solid organ metastases identified. No evidence of local metastatic disease to the extent assessed on CT.     10/30/23:   A. Left pelvic sentinel lymph node, lymphadenectomy:  - One reactive lymph node examined, negative for malignancy (0/1)      B. Right pelvic sentinel lymph node, lymphadenectomy:  - One lymph node positive for isolated tumor cells (ITCs)      C. Uterus, cervix, bilateral fallopian tubes, and bilateral ovaries, hysterectomy with bilateral salpingo-oophorectomy:  - Endometrial endometrioid adenocarcinoma, FIGO grade 1, MMR-intact, with microcystic, elongated and fragmented (MELF)-pattern of myoinvasion  - Leiomyoma (0.3 cm)  - Cervix with no significant histologic abnormality   - Ovaries with atrophic changes  - Fallopian tubes with no significant histologic abnormality       Today she comes to clinic and denies any concerns for her visit. She denies any vaginal bleeding, no changes in her bowel or bladder habits, no nausea/emesis, no lower extremity edema, and no difficulties eating or sleeping. She denies any abdominal discomfort/bloating, no fevers or chills, and no chest pain or shortness of breath. She does have an ongoing pain in her left calf only when she  walks and has been evaluated for this by several specialties with no cause identified; she is doing PT. Her home BPs run ~130's/79; she has not yet taken her medications this morning.     Annual exam with PCP:6/24  Mammogram:11/24  Colonoscopy:no longer doing      Review of Systems  See HPI      Past Medical History:    Past Medical History:   Diagnosis Date     Acute kidney injury (H) 12/19/2020     Allergic rhinitis, cause unspecified      Anemia      Aortic stenosis 02/29/2016    With new murmur noted 2/2016, normal echo, repeat echo 2/2017.     Aortic valve sclerosis      Atypical chest pain 07/24/2015     cuboid     left foot     Endometrial cancer (H)      Fungemia 08/16/2021     History of Clostridium difficile colitis      Hyperlipidemia LDL goal <100 11/01/2012     Hypertension goal BP (blood pressure) < 140/90      Musculoskeletal chest pain 11/25/2016     Obesity, Class I, BMI 30-34.9 11/11/2015     Pneumonia 03/2016     Pyelonephritis      Sepsis, due to unspecified organism, unspecified whether acute organ dysfunction present (H) 12/19/2020     Type 2 diabetes, HbA1c goal < 7% (H)      Urinary tract infection associated with nephrostomy catheter, initial encounter (H) 08/08/2021         Past Surgical History:    Past Surgical History:   Procedure Laterality Date     CATARACT EXTRACTION       CYSTOSCOPY, RETROGRADES, INSERT STENT URETER(S), COMBINED Right 07/15/2021    Procedure: CYSTOURETEROSCOPY, WITH RETROGRADE PYELOGRAM,  AND STENT INSERTION RIGHT;  Surgeon: Kirk Law MD;  [facility]     LAPAROSCOPIC HYSTERECTOMY TOTAL, BILATERAL SALPINGO-OOPHORECTOMY, NODE DISSECTION, COMBINED N/A 10/30/2023    Procedure: TOTAL LAPAROSCOPIC HYSTERECTOMY, WITH BILATERAL SALPINGO-OOPHORECTOMY, BILATEAL PELVIC SENTINEL LYMPH NODE;  Surgeon: Kaykay Burden MD;  [facility]     LASER HOLMIUM NEPHROLITHOTOMY VIA PERCUTANEOUS NEPHROSTOMY Right 08/05/2021    Procedure: NEPHROLITHOTOMY, PERCUTANEOUS,  USING HOLMIUM LASER RIGHT;  Surgeon: Kirk Law MD;  [facility]     LASER HOLMIUM NEPHROLITHOTOMY VIA PERCUTANEOUS NEPHROSTOMY Right 08/13/2021    Procedure: Ureteroscopic assisted secondary right percutaneous nephrolihtotomy, Holmium laser lithotriipsy;  Surgeon: Kirk Law MD;  [facility]     PICC SINGLE LUMEN PLACEMENT Left 08/17/2021    41cm (2cm external), Medial brachial vein       Current Medications:     Current Outpatient Medications   Medication Sig Dispense Refill     aspirin (ASA) 325 MG EC tablet Take 325 mg by mouth as needed for moderate pain       blood glucose (ONETOUCH ULTRA) test strip Use to test blood sugar 2 times daily or as directed. 100 strip 3     blood glucose monitoring (ONE TOUCH ULTRA 2) meter device kit Use to test blood sugar 2 times daily or as directed. 1 kit 0     gabapentin (NEURONTIN) 300 MG capsule Take 1 capsule (300 mg) by mouth 3 times daily. 90 capsule 1     glimepiride (AMARYL) 4 MG tablet TAKE ONE TABLET BY MOUTH TWICE A DAY WITH MEALS 180 tablet 3     Lancets (ONETOUCH DELICA PLUS [id number]) MISC USE ONE DEVICE BY IN VITRO ROUTE TWO TIMES A  each 3     losartan-hydrochlorothiazide (HYZAAR) 50-12.5 MG tablet TAKE ONE TABLET BY MOUTH EVERY MORNING 90 tablet 3     oxyCODONE (ROXICODONE) 5 MG tablet Take 1 tablet (5 mg) by mouth every 6 hours as needed for severe pain or breakthrough pain. 20 tablet 0     potassium citrate (UROCIT-K) 10 MEQ (1080 MG) CR tablet Take 1 tablet (10 mEq) by mouth daily. 90 tablet 3     simvastatin (ZOCOR) 20 MG tablet TAKE ONE TABLET BY MOUTH AT BEDTIME 90 tablet 3     sitagliptin (JANUVIA) 100 MG tablet Take 1 tablet (100 mg) by mouth daily. 30 tablet 3     gabapentin (NEURONTIN) 100 MG capsule Take 1-3 capsules (100-300 mg) by mouth every 8 hours PRN for pain (Patient not taking: Reported on 1/6/2025)       ibuprofen (ADVIL/MOTRIN) 200 MG tablet Take 200 mg by mouth every 6 hours as needed.       sitagliptin  (JANUVIA) 50 MG tablet Take 1 tablet (50 mg) by mouth daily. (Patient not taking: Reported on 1/6/2025) 90 tablet 3         Allergies:        Allergies   Allergen Reactions     Ibuprofen Other (See Comments)     numbness in hands and feet     Keflex [Cephalexin] Rash     Metformin Rash        Social History:     Social History     Tobacco Use     Smoking status: Never     Passive exposure: Never     Smokeless tobacco: Never   Substance Use Topics     Alcohol use: Yes     Alcohol/week: 10.0 standard drinks of alcohol     Comment: 1-2 drinks per week       History   Drug Use No         Family History:     The patient's family history is notable for     Family History   Problem Relation Age of Onset     Hypertension Mother      Diabetes No family hx of      Cerebrovascular Disease No family hx of      Breast Cancer No family hx of      Cancer - colorectal No family hx of      Prostate Cancer No family hx of      Anesthesia Reaction No family hx of      Bleeding Disorder No family hx of      Clotting Disorder No family hx of          Physical Exam:     BP (!) 145/82 (BP Location: Right arm, Patient Position: Sitting, Cuff Size: Adult Regular)   Pulse 69   Temp 98.6  F (37  C) (Oral)   Resp 14   Wt 71.9 kg (158 lb 8 oz)   LMP  (LMP Unknown)   SpO2 96%   BMI 29.70 kg/m    Body mass index is 29.7 kg/m .    General Appearance: healthy and alert, no distress     HEENT: no thyromegaly, no palpable nodules or masses        Cardiovascular: regular rate and rhythm, no gallops, rubs or murmurs     Respiratory: lungs clear, no rales, rhonchi or wheezes, normal diaphragmatic excursion    Musculoskeletal: extremities non tender and without edema    Skin: no lesions or rashes     Neurological: normal gait, no gross defects     Psychiatric: appropriate mood and affect                               Hematological: normal cervical, supraclavicular and inguinal lymph nodes     Gastrointestinal:       abdomen soft, non-tender,  non-distended, no organomegaly or masses    Genitourinary: External genitalia and urethral meatus appears normal.  Vagina is smooth without nodularity or masses.  Cervix surgically absent. Bimanual exam reveal no masses, nodularity or fullness.  Rectal exam deferred secondary to stool in rectum.      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    She is here today for a surveillance visit.     21 minutes spent on the date of the encounter doing chart review, history and exam, documentation and further activities as noted above      Plan:     1.)       Patient to RTC in 6 months for her next surveillance visit. Reviewed recommendations from [facility] not to perform surveillance pap smears in women diagnosed with endometrial cancer as this does not improve detection of local recurrence. Reviewed signs and symptoms for when she should contact the clinic or seek additional care. Patient to contact the clinic with any questions or concerns in the interim.  Answered all of her questions to the best of my ability.     2.) Genetic risk factors were assessed and the patient has intact MMR proteins.     3.) Labs and/or tests ordered include:  none.     4.) Health maintenance issues addressed today include annual health maintenance and non-gynecologic issues with PCP. Suggested having Mg checked regarding leg pain.    5.)        HTN: continue to monitor BP at home and follow up with PCP if readings remain >140/90.    APRIL Rogers, WHNP-BC, ANP-BC, AOCNP  Women's Health Nurse Practitioner  Adult Nurse Practitioner  Division of Gynecologic Oncology        CC  Patient Care Team:  Lea Lopez MD as PCP - General (Family Medicine)  Jose Sue RP as Pharmacist (Pharmacist)  Brenda Snowden CNP as Nurse Practitioner (Urology)  Kirk Law MD as MD (Urology)  Brenda Snowden CNP as Assigned Surgical  Provider  Cindy Cortez, PT as Physical Therapist (Physical Therapy)  Dacia Lopes APRN CNP as Assigned OBGYN Provider  Kaykay Burden MD as Assigned Cancer Care Provider  Lea Lopez MD as Assigned PCP  Genet Velazquez, PT as Physical Therapist (Physical Therapy)  Richard Freedman MD as Assigned Heart and Vascular Provider  Deshaun Bullard MD as MD (Neurology)  Marzena rAreaga MD as MD (Cardiovascular Disease)  SELF, REFERRED      Again, thank you for allowing me to participate in the care of your patient.        Sincerely,        APRIL Hutchinson CNP    Electronically signed

## 2025-01-06 NOTE — TELEPHONE ENCOUNTER
Dr. Bullard okay with seeing pt for 2nd opinion.     Called pt and let her know okay to keep appointment.      She was appreciative and had no further questions at this time.     Yasmine RN, BSN  [facility] Neurology

## 2025-01-06 NOTE — NURSING NOTE
"Oncology Rooming Note    January 6, 2025 7:00 AM   Kelly Barnes is a 84 year old female who presents for:    Chief Complaint   Patient presents with    Oncology Clinic Visit     Endometrial cancer     Initial Vitals: BP (!) 145/82 (BP Location: Right arm, Patient Position: Sitting, Cuff Size: Adult Regular)   Pulse 69   Temp 98.6  F (37  C) (Oral)   Resp 14   Wt 71.9 kg (158 lb 8 oz)   LMP  (LMP Unknown)   SpO2 96%   BMI 29.70 kg/m   Estimated body mass index is 29.7 kg/m  as calculated from the following:    Height as of 8/23/24: 1.556 m (5' 1.25\").    Weight as of this encounter: 71.9 kg (158 lb 8 oz). Body surface area is 1.76 meters squared.  Severe Pain (7) Comment: Data Unavailable   No LMP recorded (lmp unknown). Patient is postmenopausal.  Allergies reviewed: Yes  Medications reviewed: Yes    Medications: Medication refills not needed today.  Pharmacy name entered into [employer]:    [facility] [place], [place] - [address]  [facility] - [place], [place] - [address]  WRITTEN PRESCRIPTION REQUESTED  [facility] [place], [place] - [phone]    Frailty Screening:   Is the patient here for a new oncology consult visit in cancer care? 2. No      Clinical concerns: Patient states no new concerns to discuss with provider.       Skye Ramirez, EMT            "

## 2025-01-06 NOTE — TELEPHONE ENCOUNTER
[facility] with [facility] and was called to determine if new patient appointment was still needed.    Patient stated she has not gotten any proper follow up from [facility] and would like a 2nd opinion from Dr. Bullard regarding the leg pain.    Patient states she has had an EMG/ Lumbar MRI done at [facility] and would like someone to at least call her to discuss possible next steps.

## 2025-01-21 ENCOUNTER — VIRTUAL VISIT (OUTPATIENT)
Dept: FAMILY MEDICINE | Facility: CLINIC | Age: 85
End: 2025-01-21
Payer: COMMERCIAL

## 2025-01-21 DIAGNOSIS — E11.59 TYPE 2 DIABETES MELLITUS WITH OTHER CIRCULATORY COMPLICATIONS (H): ICD-10-CM

## 2025-01-21 DIAGNOSIS — N18.31 CHRONIC KIDNEY DISEASE, STAGE 3A (H): ICD-10-CM

## 2025-01-21 DIAGNOSIS — M79.662 PAIN OF LEFT LOWER LEG: Primary | ICD-10-CM

## 2025-01-21 DIAGNOSIS — M43.16 SPONDYLOLISTHESIS OF LUMBAR REGION: ICD-10-CM

## 2025-01-21 DIAGNOSIS — C54.1 ENDOMETRIAL CANCER (H): ICD-10-CM

## 2025-01-21 DIAGNOSIS — M48.07 SPINAL STENOSIS OF LUMBOSACRAL REGION: ICD-10-CM

## 2025-01-21 PROCEDURE — 98014 SYNCH AUDIO-ONLY EST MOD 30: CPT | Performed by: FAMILY MEDICINE

## 2025-01-21 NOTE — PROGRESS NOTES
Kelly is a 84 year old who is being evaluated via a billable telephone visit.    What phone number would you like to be contacted at? [phone]   How would you like to obtain your AVS? MyChart  Originating Location (pt. Location): Home  {PROVIDER LOCATION On-site should be selected for visits conducted from your clinic location or adjoining [facility] hospital, academic office, or other nearby [facility] building. Off-site should be selected for all other provider locations, including home:499223}  Distant Location (provider location):  {virtual location provider:883215}  Telephone visit completed due to {audio only reason:767474}    {PROVIDER CHARTING PREFERENCE:422236}    Subjective   Kelly is a 84 year old, presenting for the following health issues:  Calf Pain      1/21/2025    10:05 AM   Additional Questions   Roomed by Nhung Collier     HPI     {SUPERLIST (Optional):956463}  {additonal problems for provider to add (Optional):718196}    {ROS Picklists (Optional):858696}      Objective           Vitals:  No vitals were obtained today due to virtual visit.    Physical Exam   General: Alert and no distress //Respiratory: No audible wheeze, cough, or shortness of breath // Psychiatric:  Appropriate affect, tone, and pace of words      {Diagnostic Test Results (Optional):926093}      Phone call duration: *** minutes  Signed Electronically by: Lea Lopez MD  {Email feedback regarding this note to [email or web address]   :500090}

## 2025-01-21 NOTE — PROGRESS NOTES
"Kelly is a 84 year old who is being evaluated via a billable telephone visit.    Originating Location (pt. Location): Home    Distant Location (provider location):  Off-site  Telephone visit completed due to the patient did not consent to a video visit.    Assessment & Plan     (M79.662) Pain of left lower leg  (primary encounter diagnosis)  See HPi, severe, greatly limiting activity and etiology still unclear.  Possible chronic compartment syndrome, will refer to specialist to see if she can be evaluated for this.  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   05/11/2021 55 (L) >60 mL/min/[1.73_m2] Final     Comment:     Non  GFR Calc  Starting 12/18/2018, serum creatinine based estimated GFR (eGFR) will be   calculated using the Chronic Kidney Disease Epidemiology Collaboration   (CKD-EPI) equation.       GFR, ESTIMATED POCT   Date Value Ref Range Status   05/21/2024 45 (L) >60 mL/min/1.73m2 Final       BMI  Estimated body mass index is 29.7 kg/m  as calculated from the following:    Height as of 8/23/24: 1.556 m (5' 1.25\").    Weight as of 1/6/25: 71.9 kg (158 lb 8 oz).   Weight management plan: activity as tolerated    Subjective   Kelly is a 84 year old, presenting for the following health issues:  No chief complaint on file.  Leg Pain  Ongoing, " severe, aggravated by activity, and greatly limiting activity. Onset last March. Pain with moderate activity (walking the length of one room), and once pain has started it persists and is hard to treat.  Gabapentin hasn't helped at all. Only narcotic pain medication helps, but only a little, used in combination with acetaminophen.    She's seen multiple specialists through [facility] and [facility], and has had a lot imaging and tests.  She has been diagnosed with   Lumbar spondylosis with severe L5-S1 central canal narrowing.  Left knee degenerative changes.  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    She most recently saw Dr. Chou, neurologist at [facility], who recommended she go to       1/21/2025    10:05 AM   Additional Questions   Roomed by Nhung Collier     HPI         Review of Systems  Constitutional, HEENT, cardiovascular, pulmonary, gi and gu systems are negative, except as otherwise noted.      Objective           Vitals:  No vitals were obtained today due to virtual visit.    Physical Exam   General: Alert and no distress //Respiratory: No audible wheeze, cough, or shortness of breath // Psychiatric:  Appropriate affect, tone, and pace of words      Phone call duration: 28 minutes  Signed Electronically by: Lea Lopez MD

## 2025-01-22 ENCOUNTER — PATIENT OUTREACH (OUTPATIENT)
Dept: CARE COORDINATION | Facility: CLINIC | Age: 85
End: 2025-01-22
Payer: COMMERCIAL

## 2025-02-04 ENCOUNTER — TELEPHONE (OUTPATIENT)
Dept: ORTHOPEDICS | Facility: CLINIC | Age: 85
End: 2025-02-04
Payer: COMMERCIAL

## 2025-02-04 NOTE — TELEPHONE ENCOUNTER
Patient was left a voicemail regarding her appointment on the 2/12/2025. Unfortunately, Dr. Monzon would not see her due to previous diagnoses of her spine. We would recommend she be seen by a spine specific provider and cancel the current appointment she has with Dr. Monzon.     Shelton Overton, ATC

## 2025-02-12 ENCOUNTER — OFFICE VISIT (OUTPATIENT)
Dept: ORTHOPEDICS | Facility: CLINIC | Age: 85
End: 2025-02-12
Attending: FAMILY MEDICINE
Payer: COMMERCIAL

## 2025-02-12 DIAGNOSIS — M17.12 PRIMARY OSTEOARTHRITIS OF LEFT KNEE: Primary | ICD-10-CM

## 2025-02-12 DIAGNOSIS — M79.662 PAIN OF LEFT LOWER LEG: ICD-10-CM

## 2025-02-12 DIAGNOSIS — S80.02XA CONTUSION OF LEFT KNEE, INITIAL ENCOUNTER: ICD-10-CM

## 2025-02-12 DIAGNOSIS — M48.062 LUMBAR STENOSIS WITH NEUROGENIC CLAUDICATION: ICD-10-CM

## 2025-02-12 PROCEDURE — 99205 OFFICE O/P NEW HI 60 MIN: CPT | Performed by: FAMILY MEDICINE

## 2025-02-12 NOTE — LETTER
"2/12/2025      Kelly Barnes  [address]  [place] [address]      Dear Colleague,    Thank you for referring your patient, Kelly Barnes, to the [facility] [place]. Please see a copy of my visit note below.    CHIEF COMPLAINT:  Pain of the Left Leg     HISTORY OF PRESENT ILLNESS  Ms. Barnes is a pleasant 85 year old year old female who presents to clinic today with left knee pain. Kelly explains that she fell on 2/3/2025 and landed on her knee. She now lacks some ROM and has some bruising present. This pain has improved over the last week.  Still has pain with walking, though mild.    Currently undergoing ongoing workup for calf pain present since March 2024. This presents with the knee pain as a deep aching pain like a \"cordell horse\" that presents after walking 1/2 block to 1 block.  Denies any calf tenseness or firmness. No tingling associated into lower leg or foot.  When she is able to rest, improved symptoms typically. This has been addressed with significant vascular testing at [facility], Neurology at [facility], as well as plan to see spine team at [facility], neurology at [facility].  She was also referred to orthopedics to rule out CECS.     Workup to date:  EMG -negative for radiculitis but found axonal polyneuropathy  Vascular workup - negative including r/o popliteal artery entrapment  Neurology at [facility] - Spinal stenosis, thought less likely contributory to proximal calf pain.  CT lumbar   MRI lumbar spine  MR tibia/fibula  CTA Abdomen/pelvis  [facility]    Treatment to date for calf:  Compression stockings  Epidural injection  Gabapentin - no assistance  Aspirin - helpful  Oxycodone    Onset: sudden  Location: left knee  Quality:  aching and dull  Duration: 1 weeks   Severity: 5/10 at worst  Timing:constant  Modifying factors:  resting and non-use makes it better, movement and use makes it worse  Associated signs & symptoms: pain, tenderness, and bruising  Previous " similar pain: No  Treatments to date: Rest     Additional history: as documented    Review of Systems:  A 10-point review of systems was obtained and is negative except for as noted in the HPI.     MEDICAL HISTORY  Patient Active Problem List   Diagnosis     Osteopenia     Hypertension goal BP (blood pressure) < 140/90     Hyperlipidemia LDL goal <100     Type 2 diabetes mellitus without complication, without long-term current use of insulin (H)     Family history of breast cancer in sister     Hepatitis A immune     Chronic kidney disease, stage 3a (H)     Endometrial cancer (H)     Type 2 diabetes mellitus with other circulatory complications (H)     Lymphedema     Claudication of calf muscles     Pain of left lower leg     Diabetic neuropathy (H)     Polyneuropathy     Axonal sensorimotor neuropathy       Current Outpatient Medications   Medication Sig Dispense Refill     aspirin (ASA) 325 MG EC tablet Take 325 mg by mouth as needed for moderate pain       blood glucose (ONETOUCH ULTRA) test strip Use to test blood sugar 2 times daily or as directed. 100 strip 3     blood glucose monitoring (ONE TOUCH ULTRA 2) meter device kit Use to test blood sugar 2 times daily or as directed. 1 kit 0     gabapentin (NEURONTIN) 100 MG capsule Take 1-3 capsules (100-300 mg) by mouth every 8 hours PRN for pain (Patient not taking: Reported on 1/6/2025)       glimepiride (AMARYL) 4 MG tablet TAKE ONE TABLET BY MOUTH TWICE A DAY WITH MEALS 180 tablet 3     ibuprofen (ADVIL/MOTRIN) 200 MG tablet Take 200 mg by mouth every 6 hours as needed.       Lancets (ONETOUCH DELICA PLUS [id number]) MISC USE ONE DEVICE BY IN VITRO ROUTE TWO TIMES A  each 3     losartan-hydrochlorothiazide (HYZAAR) 50-12.5 MG tablet TAKE ONE TABLET BY MOUTH EVERY MORNING 90 tablet 3     oxyCODONE (ROXICODONE) 5 MG tablet Take 1 tablet (5 mg) by mouth every 6 hours as needed for severe pain or breakthrough pain. 20 tablet 0     potassium citrate (UROCIT-K)  10 MEQ (1080 MG) CR tablet Take 1 tablet (10 mEq) by mouth daily. 90 tablet 3     simvastatin (ZOCOR) 20 MG tablet TAKE ONE TABLET BY MOUTH AT BEDTIME 90 tablet 3     sitagliptin (JANUVIA) 100 MG tablet Take 1 tablet (100 mg) by mouth daily. 30 tablet 3     sitagliptin (JANUVIA) 50 MG tablet Take 1 tablet (50 mg) by mouth daily. (Patient not taking: Reported on 1/6/2025) 90 tablet 3       Allergies   Allergen Reactions     Ibuprofen Other (See Comments)     numbness in hands and feet     Keflex [Cephalexin] Rash     Metformin Rash       Family History   Problem Relation Age of Onset     Hypertension Mother      Diabetes No family hx of      Cerebrovascular Disease No family hx of      Breast Cancer No family hx of      Cancer - colorectal No family hx of      Prostate Cancer No family hx of      Anesthesia Reaction No family hx of      Bleeding Disorder No family hx of      Clotting Disorder No family hx of        Additional medical/Social/Surgical histories reviewed in [facility] and updated as appropriate.       PHYSICAL EXAM  LMP  (LMP Unknown)     General  - normal appearance, in no obvious distress  Musculoskeletal - Left lower leg/ knee  - stance: normal gait without limp  - inspection: Ecchymosis of anterolateral knee.  - palpation: Tenderness lateral tibial plateau at region of ecchymosis. Calf is non-tender, soft compartments of lower leg.  - ROM: 135 degrees flexion, 0 degrees extension, not painful, normal actively and passively compared to contralateral  - strength: 5/5 in flexion, 5/5 in extension  - special tests:  (-) Lachman  (-) Steve  (-) varus at 0 and 30 degrees flexion  (-) valgus at 0 and 30 degrees flexion  Neuro  - no sensory or motor deficit, grossly normal coordination, normal muscle tone  Skin  - no ecchymosis, erythema, warmth, or induration, no obvious rash    IMAGING :     EXAM:  MR TIBIA FIBULA LEFT WITHOUT AND WITH IV CONTRAST    DIAGNOSTIC REVIEW    Noninvasive lower extremity venous  studies from November 1st did not show venous outflow obstruction nor venous incompetence in either leg. There was normal calf pump function bilaterally.    A bilateral lower extremity venous duplex ultrasound exam has ruled out DVT.    An EMG on November 1st was abnormal with electrophysiologic evidence of a length-dependent sensorimotor axonal peripheral neuropathy.     1 Entire spine localizer demonstrates variant anatomy with a transitional lumbosacral vertebra which represents a transitional S1 vertebra which is lumbarized. Note that this nomenclature differs from the nipple Kaletra used on the previous outside CT  report. Careful correlation with localizer radiographs recommended if any future intervention is contemplated to ensure appropriate level.  2. Degenerative changes most notable at the L5-transitional lumbarized S1 level where there is some reactive edema associated with left facet degeneration, moderate to advanced spinal canal narrowing, moderate to advanced left lateral recess and moderate   right lateral recess narrowing and mild to moderate left and mild right foraminal narrowing.  3. Mild to moderate narrowing of the left greater than right lateral recess and left foramen at the transitional lumbarized S1-S2 level.  Narrative    EXAM: MR LUMBAR SPINE WITHOUT IV CONTRAST    COMPARISON: Written report from outside CT lumbar spine 9/27/2024. No images available for comparison.    FINDINGS: Entire spine localizer demonstrates variant anatomy with a transitional lumbosacral vertebra which represents a transitional S1 vertebra which is lumbarized. Note that this nomenclature differs from the nipple Kaletra used on the previous  outside CT report. Careful correlation with localizer radiographs recommended if any future intervention is contemplated to ensure appropriate level.    Mild lumbar curve convex left. Normal vertebral body heights. The conus is normal with tip at the L2 vertebral body level.  The marrow signal is generally within normal limits with incidentally noted small hemangiomas in the T12 and L1 vertebral bodies..  No paraspinal masses.    T11-T12 through L1-L2: These levels visualized on the sagittal sequences. Mild scattered degenerative changes and spondylosis without significant spinal canal or foraminal narrowing.    L2-L3: Minimal disc and facet degeneration. No focal disc herniation. Spinal canal and foramina patent.    L3-L4: Mild disc degeneration. Mild to moderate facet degeneration. Spinal canal and foramina patent.    L4-L5: Mild-moderate disc and facet degeneration. Annular bulge and posterior spurring. Mild spinal canal narrowing and mild bilateral foraminal narrowing.    L5-Transitional Lumbarized S1: Moderate disc degeneration. Moderate to advanced left greater than right facet degeneration with small to moderate-sized bilateral facet joint effusions. Reactive edema associated with the left facet degeneration. Grade 1  anterolisthesis L5 on S1. Broad-based posterior spurring and annular bulging asymmetric left. Moderate to advanced spinal canal narrowing with moderate to advanced narrowing of the superior aspect of the left lateral recess. Moderate narrowing of the  superior aspect of the right lateral recess. Mild to moderate left and mild right foraminal narrowing.    Transitional Lumbarized S1-S2: Mild disc degeneration. Shallow annular bulge. Moderate facet degeneration. Spinal canal patent. Mild to moderate narrowing of the superior aspect of the left greater than right lateral recess. Mild to moderate left  foraminal narrowing. Right foramen patent.     EXAM: US LOWER EXTREMITY VEINS BILATERAL   Exam performed with color and spectral Doppler analysis.     COMPARISON: Outside ultrasounds dated 6/20/2024 and 4/10/2024.     FINDINGS:   RIGHT:   Common Femoral Vein: Negative.   Profunda Femoral Vein: Negative.   Femoral Vein: Negative.   Popliteal Vein: Negative.   Gastrocnemius  Veins: Negative where seen.   Soleal Veins: Negative where seen.   Posterior Tibial Veins: Negative where seen.   Peroneal Veins: Negative where seen.   Great Saphenous Vein: Negative where seen.   Small Saphenous Vein: Not evaluated.   Popliteal Fossa: Negative.     LEFT:   Common Femoral Vein: Negative.   Profunda Femoral Vein: Negative.   Femoral Vein: Negative.   Popliteal Vein: Negative.   Gastrocnemius Veins: Negative where seen.   Soleal Veins: Negative where seen.   Posterior Tibial Veins: Negative where seen.   Peroneal Veins: Negative where seen.   Great Saphenous Vein: Negative where seen.   Small Saphenous Vein: Not evaluated.   Popliteal Fossa: Negative.      EXAM: CT LUMBAR SPINE W/O CONTRAST  LOCATION: [facility]  DATE: 9/27/2024     INDICATION: Low back pain; r o Spondylolysis; Low back pain with standing walking extension; No known automatically detected potential contraindications to CT  COMPARISON: None.  TECHNIQUE: Routine CT Lumbar Spine without IV contrast. Multiplanar reformats. Dose reduction techniques were used.      FINDINGS:  VERTEBRA: Normal vertebral body heights. Trace L4-L5 degenerative anterolisthesis. Otherwise preserved vertebral alignment. No fracture or posttraumatic subluxation. Multilevel bilateral facet arthropathy at L3-L4, L4-L5 and L5-S1.     CANAL/FORAMINA: Posterior disc osteophyte complex and bilateral facet arthropathy at L5-S1 on the left contributing to mild to moderate neural foraminal stenosis. Posterior disc osteophyte complex and bilateral facet arthropathy contribute to mild to   moderate canal stenosis at L4-L5. Otherwise no high-grade canal or neural foraminal stenosis.     PARASPINAL: Bilateral sacroiliac joint degenerative change. No acute extraspinal abnormality.                                                                      IMPRESSION: No acute fracture or posttraumatic subluxation.    MR left tibia/fibula without and with IV  contrast 7/26/2024 7:42 AM     Techniques: Multiplanar multisequence imaging of the left tibia/fibula  was obtained without and with administration of intravenous contrast  using routine protocol. Contrast: 7 mL Gadavist     History: Pain of left lower leg; Claudication of calf muscles (H24)       Comparison: CT runoff 7/3/2024     Findings:     Marker over the posterior mid leg centered 15.5 cm distal to the  femoral tibial articulation. Subcutaneous edema in this region without  mass or fluid collection. Subcutaneous edema appears relatively  symmetric on large field-of-view coronal images. No abnormal  enhancement.     Osseous structures     Osseous structures: No fracture, stress reaction, avascular necrosis,  or focal osseous lesion is seen.     Muscles  Muscles: The visualized muscles are unremarkable without edema or  atrophy.     Joint/Periarticular soft tissue     Internal derangement of joint assessment are limited owing to chosen  field of view. At least a small right knee effusion. No substantial  left knee effusion.     Other Findings:  None.                                                                      Impression:     No MR evidence of left lower extremity neuropathy. Subcutaneous edema  without other abnormality in proximity to patient's marker over the  posterior mid calf.     YOCASTA LOWERY MD (Joe)     CTA ABDOMEN AND PELVIS RUNOFF WITH CONTRAST  7/3/2024 10:45 AM      HISTORY: Claudication of the lower extremities.                                                                   IMPRESSION: No significant stenoses in the arteries of the abdomen,  pelvis and lower extremities.       ASSESSMENT & PLAN  Ms. Barnes is a 85 year old year old female who presents to clinic today with acute right knee pain and chronic left lower leg pain without inciting event or trauma.    Discussed reassuring left knee examination. MR tibia/fibula does reveal osteoarthritis of knee however pain is most  related to contusion of tibia.  Her area of greatest concern even today is still the chronic calf pain/claudicatory symptoms with walking.      No evidence today to suggest any compartment syndrome or exertional compartment syndrome.  At least moderate lumbar stenosis at L4-L5 would indeed remain my area of greatest concern understanding normal vascular workup to date.  Discussed lower likelihood of knee osteoarthritis referring pain to calf.     Diagnosis:   Lumbar stenosis with neurogenic claudication  Primary osteoarthritis of left knee  Chronic Pain of left lower leg  Contusion of left knee    After discussion of her knee, I do think she will continue to improve over the next 3 to 4 weeks with expectant management, continuing ice, aspirin as needed. Knee osteoarthritis is present however suspect minimal exacerbation with fall.    The primary reason she presents today is for rule out of additional orthopedic causes for her left lateral calf pain.  There are no findings to suggest exertional compartment syndrome.  I do not believe compartment testing would be of benefit today.     It is my thought that her lumbar stenosis is the likely culprit and we discussed consideration for further diagnostic/therapeutic injections to consider relief.  Discussed normal EMG would not definitively rule out lumbar radiculitis especially if positional. She is seeing Dr. Sneed and our spine team later this month, they can discuss whether epidural steroid injection would be of benefit to further investigate this complex and elusive condition.  Additionally we discussed diagnostic/therapeutic injection of knee can be undertaken as well to see if this can help her proximal calf symptoms.  We     she should continue utilizing aspirin.  No benefit from gabapentin and therefore I do not have any additional recommendations on its use today.  Continue utilizing cane for stability and support.  Follow-up with Dr. Sneed this month and  neurology team in April.     It was a pleasure seeing Kelly today.    65 minutes on date of the encounter doing chart review, history and examination, independent imaging review, documentation, and additional activities noted above. Extensive chart review of [facility] workup including CT, MR, vascular consult, neurology consult, EMG, US, and CTA.      Corbin Monzon DO, FRANK    Primary Care Sports Medicine  Department of Orthopedic Surgery  [facility]      Again, thank you for allowing me to participate in the care of your patient.        Sincerely,        Corbin Monzon DO    Electronically signed

## 2025-02-12 NOTE — PROGRESS NOTES
"CHIEF COMPLAINT:  Pain of the Left Leg     HISTORY OF PRESENT ILLNESS  Ms. Barnes is a pleasant 85 year old year old female who presents to clinic today with left knee pain. Kelly explains that she fell on 2/3/2025 and landed on her knee. She now lacks some ROM and has some bruising present. This pain has improved over the last week.  Still has pain with walking, though mild.    Currently undergoing ongoing workup for calf pain present since March 2024. This presents with the knee pain as a deep aching pain like a \"cordell horse\" that presents after walking 1/2 block to 1 block.  Denies any calf tenseness or firmness. No tingling associated into lower leg or foot.  When she is able to rest, improved symptoms typically. This has been addressed with significant vascular testing at [facility], Neurology at [place], as well as plan to see spine team at [facility], neurology at [facility].  She was also referred to orthopedics to rule out CECS.     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  CT lumbar   MRI lumbar spine  MR tibia/fibula  CTA Abdomen/pelvis  [facility]    Treatment to date for calf:  Compression stockings  Epidural injection  Gabapentin - no assistance  Aspirin - helpful  Oxycodone    Onset: sudden  Location: left knee  Quality:  aching and dull  Duration: 1 weeks   Severity: 5/10 at worst  Timing:constant  Modifying factors:  resting and non-use makes it better, movement and use makes it worse  Associated signs & symptoms: pain, tenderness, and bruising  Previous similar pain: No  Treatments to date: Rest     Additional history: as documented    Review of Systems:  A 10-point review of systems was obtained and is negative except for as noted in the HPI.     MEDICAL HISTORY  Patient Active Problem List "   Diagnosis    Osteopenia    Hypertension goal BP (blood pressure) < 140/90    Hyperlipidemia LDL goal <100    Type 2 diabetes mellitus without complication, without long-term current use of insulin (H)    Family history of breast cancer in sister    Hepatitis A immune    Chronic kidney disease, stage 3a (H)    Endometrial cancer (H)    Type 2 diabetes mellitus with other circulatory complications (H)    Lymphedema    Claudication of calf muscles    Pain of left lower leg    Diabetic neuropathy (H)    Polyneuropathy    Axonal sensorimotor neuropathy       Current Outpatient Medications   Medication Sig Dispense Refill    aspirin (ASA) 325 MG EC tablet Take 325 mg by mouth as needed for moderate pain      blood glucose (ONETOUCH ULTRA) test strip Use to test blood sugar 2 times daily or as directed. 100 strip 3    blood glucose monitoring (ONE TOUCH ULTRA 2) meter device kit Use to test blood sugar 2 times daily or as directed. 1 kit 0    gabapentin (NEURONTIN) 100 MG capsule Take 1-3 capsules (100-300 mg) by mouth every 8 hours PRN for pain (Patient not taking: Reported on 1/6/2025)      glimepiride (AMARYL) 4 MG tablet TAKE ONE TABLET BY MOUTH TWICE A DAY WITH MEALS 180 tablet 3    ibuprofen (ADVIL/MOTRIN) 200 MG tablet Take 200 mg by mouth every 6 hours as needed.      Lancets (ONETOUCH DELICA PLUS [id number]) MISC USE ONE DEVICE BY IN VITRO ROUTE TWO TIMES A  each 3    losartan-hydrochlorothiazide (HYZAAR) 50-12.5 MG tablet TAKE ONE TABLET BY MOUTH EVERY MORNING 90 tablet 3    oxyCODONE (ROXICODONE) 5 MG tablet Take 1 tablet (5 mg) by mouth every 6 hours as needed for severe pain or breakthrough pain. 20 tablet 0    potassium citrate (UROCIT-K) 10 MEQ (1080 MG) CR tablet Take 1 tablet (10 mEq) by mouth daily. 90 tablet 3    simvastatin (ZOCOR) 20 MG tablet TAKE ONE TABLET BY MOUTH AT BEDTIME 90 tablet 3    sitagliptin (JANUVIA) 100 MG tablet Take 1 tablet (100 mg) by mouth daily. 30 tablet 3    sitagliptin  (JANUVIA) 50 MG tablet Take 1 tablet (50 mg) by mouth daily. (Patient not taking: Reported on 1/6/2025) 90 tablet 3       Allergies   Allergen Reactions    Ibuprofen Other (See Comments)     numbness in hands and feet    Keflex [Cephalexin] Rash    Metformin Rash       Family History   Problem Relation Age of Onset    Hypertension Mother     Diabetes No family hx of     Cerebrovascular Disease No family hx of     Breast Cancer No family hx of     Cancer - colorectal No family hx of     Prostate Cancer No family hx of     Anesthesia Reaction No family hx of     Bleeding Disorder No family hx of     Clotting Disorder No family hx of        Additional medical/Social/Surgical histories reviewed in [facility] and updated as appropriate.       PHYSICAL EXAM  LMP  (LMP Unknown)     General  - normal appearance, in no obvious distress  Musculoskeletal - Left lower leg/ knee  - stance: normal gait without limp  - inspection: Ecchymosis of anterolateral knee.  - palpation: Tenderness lateral tibial plateau at region of ecchymosis. Calf is non-tender, soft compartments of lower leg.  - ROM: 135 degrees flexion, 0 degrees extension, not painful, normal actively and passively compared to contralateral  - strength: 5/5 in flexion, 5/5 in extension  - special tests:  (-) Lachman  (-) Steve  (-) varus at 0 and 30 degrees flexion  (-) valgus at 0 and 30 degrees flexion  Neuro  - no sensory or motor deficit, grossly normal coordination, normal muscle tone  Skin  - no ecchymosis, erythema, warmth, or induration, no obvious rash    IMAGING :     EXAM:  MR TIBIA FIBULA LEFT WITHOUT AND WITH IV CONTRAST    DIAGNOSTIC REVIEW    Noninvasive lower extremity venous studies from November 1st did not show venous outflow obstruction nor venous incompetence in either leg. There was normal calf pump function bilaterally.    A bilateral lower extremity venous duplex ultrasound exam has ruled out DVT.    An EMG on November 1st was abnormal with  electrophysiologic evidence of a length-dependent sensorimotor axonal peripheral neuropathy.     1 Entire spine localizer demonstrates variant anatomy with a transitional lumbosacral vertebra which represents a transitional S1 vertebra which is lumbarized. Note that this nomenclature differs from the nipple Kaletra used on the previous outside CT  report. Careful correlation with localizer radiographs recommended if any future intervention is contemplated to ensure appropriate level.  2. Degenerative changes most notable at the L5-transitional lumbarized S1 level where there is some reactive edema associated with left facet degeneration, moderate to advanced spinal canal narrowing, moderate to advanced left lateral recess and moderate   right lateral recess narrowing and mild to moderate left and mild right foraminal narrowing.  3. Mild to moderate narrowing of the left greater than right lateral recess and left foramen at the transitional lumbarized S1-S2 level.  Narrative    EXAM: MR LUMBAR SPINE WITHOUT IV CONTRAST    COMPARISON: Written report from outside CT lumbar spine 9/27/2024. No images available for comparison.    FINDINGS: Entire spine localizer demonstrates variant anatomy with a transitional lumbosacral vertebra which represents a transitional S1 vertebra which is lumbarized. Note that this nomenclature differs from the nipple Kaletra used on the previous  outside CT report. Careful correlation with localizer radiographs recommended if any future intervention is contemplated to ensure appropriate level.    Mild lumbar curve convex left. Normal vertebral body heights. The conus is normal with tip at the L2 vertebral body level. The marrow signal is generally within normal limits with incidentally noted small hemangiomas in the T12 and L1 vertebral bodies..  No paraspinal masses.    T11-T12 through L1-L2: These levels visualized on the sagittal sequences. Mild scattered degenerative changes and  spondylosis without significant spinal canal or foraminal narrowing.    L2-L3: Minimal disc and facet degeneration. No focal disc herniation. Spinal canal and foramina patent.    L3-L4: Mild disc degeneration. Mild to moderate facet degeneration. Spinal canal and foramina patent.    L4-L5: Mild-moderate disc and facet degeneration. Annular bulge and posterior spurring. Mild spinal canal narrowing and mild bilateral foraminal narrowing.    L5-Transitional Lumbarized S1: Moderate disc degeneration. Moderate to advanced left greater than right facet degeneration with small to moderate-sized bilateral facet joint effusions. Reactive edema associated with the left facet degeneration. Grade 1  anterolisthesis L5 on S1. Broad-based posterior spurring and annular bulging asymmetric left. Moderate to advanced spinal canal narrowing with moderate to advanced narrowing of the superior aspect of the left lateral recess. Moderate narrowing of the  superior aspect of the right lateral recess. Mild to moderate left and mild right foraminal narrowing.    Transitional Lumbarized S1-S2: Mild disc degeneration. Shallow annular bulge. Moderate facet degeneration. Spinal canal patent. Mild to moderate narrowing of the superior aspect of the left greater than right lateral recess. Mild to moderate left  foraminal narrowing. Right foramen patent.     EXAM: US LOWER EXTREMITY VEINS BILATERAL   Exam performed with color and spectral Doppler analysis.     COMPARISON: Outside ultrasounds dated 6/20/2024 and 4/10/2024.     FINDINGS:   RIGHT:   Common Femoral Vein: Negative.   Profunda Femoral Vein: Negative.   Femoral Vein: Negative.   Popliteal Vein: Negative.   Gastrocnemius Veins: Negative where seen.   Soleal Veins: Negative where seen.   Posterior Tibial Veins: Negative where seen.   Peroneal Veins: Negative where seen.   Great Saphenous Vein: Negative where seen.   Small Saphenous Vein: Not evaluated.   Popliteal Fossa: Negative.      LEFT:   Common Femoral Vein: Negative.   Profunda Femoral Vein: Negative.   Femoral Vein: Negative.   Popliteal Vein: Negative.   Gastrocnemius Veins: Negative where seen.   Soleal Veins: Negative where seen.   Posterior Tibial Veins: Negative where seen.   Peroneal Veins: Negative where seen.   Great Saphenous Vein: Negative where seen.   Small Saphenous Vein: Not evaluated.   Popliteal Fossa: Negative.      EXAM: CT LUMBAR SPINE W/O CONTRAST  LOCATION: [facility]  DATE: 9/27/2024     INDICATION: Low back pain; r o Spondylolysis; Low back pain with standing walking extension; No known automatically detected potential contraindications to CT  COMPARISON: None.  TECHNIQUE: Routine CT Lumbar Spine without IV contrast. Multiplanar reformats. Dose reduction techniques were used.      FINDINGS:  VERTEBRA: Normal vertebral body heights. Trace L4-L5 degenerative anterolisthesis. Otherwise preserved vertebral alignment. No fracture or posttraumatic subluxation. Multilevel bilateral facet arthropathy at L3-L4, L4-L5 and L5-S1.     CANAL/FORAMINA: Posterior disc osteophyte complex and bilateral facet arthropathy at L5-S1 on the left contributing to mild to moderate neural foraminal stenosis. Posterior disc osteophyte complex and bilateral facet arthropathy contribute to mild to   moderate canal stenosis at L4-L5. Otherwise no high-grade canal or neural foraminal stenosis.     PARASPINAL: Bilateral sacroiliac joint degenerative change. No acute extraspinal abnormality.                                                                      IMPRESSION: No acute fracture or posttraumatic subluxation.    MR left tibia/fibula without and with IV contrast 7/26/2024 7:42 AM     Techniques: Multiplanar multisequence imaging of the left tibia/fibula  was obtained without and with administration of intravenous contrast  using routine protocol. Contrast: 7 mL Gadavist     History: Pain of left lower leg;  Claudication of calf muscles (H24)       Comparison: CT runoff 7/3/2024     Findings:     Marker over the posterior mid leg centered 15.5 cm distal to the  femoral tibial articulation. Subcutaneous edema in this region without  mass or fluid collection. Subcutaneous edema appears relatively  symmetric on large field-of-view coronal images. No abnormal  enhancement.     Osseous structures     Osseous structures: No fracture, stress reaction, avascular necrosis,  or focal osseous lesion is seen.     Muscles  Muscles: The visualized muscles are unremarkable without edema or  atrophy.     Joint/Periarticular soft tissue     Internal derangement of joint assessment are limited owing to chosen  field of view. At least a small right knee effusion. No substantial  left knee effusion.     Other Findings:  None.                                                                      Impression:     No MR evidence of left lower extremity neuropathy. Subcutaneous edema  without other abnormality in proximity to patient's marker over the  posterior mid calf.     YOCASTA LOWERY MD (Joe)     CTA ABDOMEN AND PELVIS RUNOFF WITH CONTRAST  7/3/2024 10:45 AM      HISTORY: Claudication of the lower extremities.                                                                   IMPRESSION: No significant stenoses in the arteries of the abdomen,  pelvis and lower extremities.       ASSESSMENT & PLAN  Ms. Barnes is a 85 year old year old female who presents to clinic today with acute right knee pain and chronic left lower leg pain without inciting event or trauma.    Discussed reassuring left knee examination. MR tibia/fibula does reveal osteoarthritis of knee however pain is most related to contusion of tibia.  Her area of greatest concern even today is still the chronic calf pain/claudicatory symptoms with walking.      No evidence today to suggest any compartment syndrome or exertional compartment syndrome.  At least moderate lumbar  stenosis at L4-L5 would indeed remain my area of greatest concern understanding normal vascular workup to date.  Discussed lower likelihood of knee osteoarthritis referring pain to calf.     Diagnosis:   Lumbar stenosis with neurogenic claudication  Primary osteoarthritis of left knee  Chronic Pain of left lower leg  Contusion of left knee    After discussion of her knee, I do think she will continue to improve over the next 3 to 4 weeks with expectant management, continuing ice, aspirin as needed. Knee osteoarthritis is present however suspect minimal exacerbation with fall.    The primary reason she presents today is for rule out of additional orthopedic causes for her left lateral calf pain.  There are no findings to suggest exertional compartment syndrome.  I do not believe compartment testing would be of benefit today.     It is my thought that her lumbar stenosis is the likely culprit and we discussed consideration for further diagnostic/therapeutic injections to consider relief.  Discussed normal EMG would not definitively rule out lumbar radiculitis especially if positional. She is seeing Dr. Sneed and our spine team later this month, they can discuss whether epidural steroid injection would be of benefit to further investigate this complex and elusive condition.  Additionally we discussed diagnostic/therapeutic injection of knee can be undertaken as well to see if this can help her proximal calf symptoms.  We     she should continue utilizing aspirin.  No benefit from gabapentin and therefore I do not have any additional recommendations on its use today.  Continue utilizing cane for stability and support.  Follow-up with Dr. Sneed this month and neurology team in April.     It was a pleasure seeing Kelly today.    65 minutes on date of the encounter doing chart review, history and examination, independent imaging review, documentation, and additional activities noted above. Extensive chart review of  [facility] workup including CT, MR, vascular consult, neurology consult, EMG, US, and CTA.      Corbin Monzon DO, FRANK    Primary Care Sports Medicine  Department of Orthopedic Surgery  [facility]

## 2025-02-14 ENCOUNTER — MYC MEDICAL ADVICE (OUTPATIENT)
Dept: FAMILY MEDICINE | Facility: CLINIC | Age: 85
End: 2025-02-14
Payer: COMMERCIAL

## 2025-02-14 DIAGNOSIS — M79.662 PAIN OF LEFT LOWER LEG: ICD-10-CM

## 2025-02-14 DIAGNOSIS — G62.89 AXONAL SENSORIMOTOR NEUROPATHY: ICD-10-CM

## 2025-02-17 ENCOUNTER — OFFICE VISIT (OUTPATIENT)
Dept: URGENT CARE | Facility: URGENT CARE | Age: 85
End: 2025-02-17
Payer: COMMERCIAL

## 2025-02-17 VITALS
OXYGEN SATURATION: 98 % | WEIGHT: 157 LBS | RESPIRATION RATE: 16 BRPM | HEART RATE: 95 BPM | DIASTOLIC BLOOD PRESSURE: 77 MMHG | BODY MASS INDEX: 29.42 KG/M2 | TEMPERATURE: 98.4 F | SYSTOLIC BLOOD PRESSURE: 122 MMHG

## 2025-02-17 DIAGNOSIS — Z71.1 PHYSICALLY WELL BUT WORRIED: ICD-10-CM
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
## 2025-02-17 PROCEDURE — 99213 OFFICE O/P EST LOW 20 MIN: CPT | Performed by: PHYSICIAN ASSISTANT
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
## 2025-02-18 RX ORDER — OXYCODONE HYDROCHLORIDE 5 MG/1
5 TABLET ORAL EVERY 6 HOURS PRN
Qty: 20 TABLET | Refills: 0 | Status: SHIPPED | OUTPATIENT
Start: 2025-02-18

## 2025-02-18 NOTE — TELEPHONE ENCOUNTER
Dr. Lopez - see MyChart, rx pended below if in agreement.    MEHREEN Grewal, BSN, PHN, AMB-BC (she/her)  [facility] RN

## 2025-02-22 ENCOUNTER — HEALTH MAINTENANCE LETTER (OUTPATIENT)
Age: 85
End: 2025-02-22

## 2025-02-23 ENCOUNTER — APPOINTMENT (OUTPATIENT)
Dept: CT IMAGING | Facility: CLINIC | Age: 85
End: 2025-02-23
Attending: EMERGENCY MEDICINE
Payer: COMMERCIAL

## 2025-02-23 ENCOUNTER — HOSPITAL ENCOUNTER (OUTPATIENT)
Facility: CLINIC | Age: 85
Setting detail: OBSERVATION
Discharge: HOME | End: 2025-02-24
Attending: EMERGENCY MEDICINE | Admitting: NEUROLOGICAL SURGERY
Payer: COMMERCIAL

## 2025-02-23 ENCOUNTER — APPOINTMENT (OUTPATIENT)
Dept: MRI IMAGING | Facility: CLINIC | Age: 85
End: 2025-02-23
Attending: EMERGENCY MEDICINE
Payer: COMMERCIAL

## 2025-02-23 DIAGNOSIS — I89.0 LYMPHEDEMA: ICD-10-CM

## 2025-02-23 DIAGNOSIS — E11.9 TYPE 2 DIABETES MELLITUS WITHOUT COMPLICATION, WITHOUT LONG-TERM CURRENT USE OF INSULIN (H): Primary | ICD-10-CM

## 2025-02-23 DIAGNOSIS — I62.00 SUBDURAL HEMORRHAGE (H): ICD-10-CM

## 2025-02-23 DIAGNOSIS — E11.65 POORLY CONTROLLED DIABETES MELLITUS (H): ICD-10-CM

## 2025-02-23 DIAGNOSIS — E78.5 HYPERLIPIDEMIA LDL GOAL <100: ICD-10-CM

## 2025-02-23 DIAGNOSIS — R73.9 HYPERGLYCEMIA: ICD-10-CM

## 2025-02-23 DIAGNOSIS — I10 HYPERTENSION GOAL BP (BLOOD PRESSURE) < 140/90: ICD-10-CM

## 2025-02-23 DIAGNOSIS — Z86.39 HISTORY OF DIABETES MELLITUS: ICD-10-CM

## 2025-02-23 DIAGNOSIS — N18.31 CHRONIC KIDNEY DISEASE, STAGE 3A (H): ICD-10-CM

## 2025-02-23 DIAGNOSIS — R29.898 LEFT LEG WEAKNESS: ICD-10-CM

## 2025-02-23 LAB
ALBUMIN SERPL BCG-MCNC: 4.2 G/DL (ref 3.5–5.2)
ALP SERPL-CCNC: 152 U/L (ref 40–150)
ALT SERPL W P-5'-P-CCNC: 8 U/L (ref 0–50)
ANION GAP SERPL CALCULATED.3IONS-SCNC: 13 MMOL/L (ref 7–15)
APTT PPP: 32 SECONDS (ref 22–38)
AST SERPL W P-5'-P-CCNC: 22 U/L (ref 0–45)
BASOPHILS # BLD AUTO: 0.1 10E3/UL (ref 0–0.2)
BASOPHILS NFR BLD AUTO: 0 %
BILIRUB DIRECT SERPL-MCNC: 0.1 MG/DL (ref 0–0.3)
BILIRUB SERPL-MCNC: 0.3 MG/DL
BUN SERPL-MCNC: 29.6 MG/DL (ref 8–23)
CALCIUM SERPL-MCNC: 9.7 MG/DL (ref 8.8–10.4)
CHLORIDE SERPL-SCNC: 100 MMOL/L (ref 98–107)
CREAT SERPL-MCNC: 1 MG/DL (ref 0.51–0.95)
EGFRCR SERPLBLD CKD-EPI 2021: 55 ML/MIN/1.73M2
EOSINOPHIL # BLD AUTO: 0.1 10E3/UL (ref 0–0.7)
EOSINOPHIL NFR BLD AUTO: 1 %
ERYTHROCYTE [DISTWIDTH] IN BLOOD BY AUTOMATED COUNT: 12.5 % (ref 10–15)
GLUCOSE BLDC GLUCOMTR-MCNC: 177 MG/DL (ref 70–99)
GLUCOSE BLDC GLUCOMTR-MCNC: 275 MG/DL (ref 70–99)
GLUCOSE SERPL-MCNC: 332 MG/DL (ref 70–99)
HCO3 SERPL-SCNC: 23 MMOL/L (ref 22–29)
HCT VFR BLD AUTO: 39 % (ref 35–47)
HGB BLD-MCNC: 13 G/DL (ref 11.7–15.7)
IMM GRANULOCYTES # BLD: 0.1 10E3/UL
IMM GRANULOCYTES NFR BLD: 0 %
INR PPP: 0.95 (ref 0.85–1.15)
LYMPHOCYTES # BLD AUTO: 1.2 10E3/UL (ref 0.8–5.3)
LYMPHOCYTES NFR BLD AUTO: 9 %
MAGNESIUM SERPL-MCNC: 2.1 MG/DL (ref 1.7–2.3)
MCH RBC QN AUTO: 28.4 PG (ref 26.5–33)
MCHC RBC AUTO-ENTMCNC: 33.3 G/DL (ref 31.5–36.5)
MCV RBC AUTO: 85 FL (ref 78–100)
MONOCYTES # BLD AUTO: 0.7 10E3/UL (ref 0–1.3)
MONOCYTES NFR BLD AUTO: 5 %
NEUTROPHILS # BLD AUTO: 11.2 10E3/UL (ref 1.6–8.3)
NEUTROPHILS NFR BLD AUTO: 84 %
NRBC # BLD AUTO: 0 10E3/UL
NRBC BLD AUTO-RTO: 0 /100
PLATELET # BLD AUTO: 376 10E3/UL (ref 150–450)
POTASSIUM SERPL-SCNC: 3.7 MMOL/L (ref 3.4–5.3)
PROT SERPL-MCNC: 7.3 G/DL (ref 6.4–8.3)
RADIOLOGIST FLAGS: ABNORMAL
RADIOLOGIST FLAGS: ABNORMAL
RBC # BLD AUTO: 4.57 10E6/UL (ref 3.8–5.2)
SODIUM SERPL-SCNC: 136 MMOL/L (ref 135–145)
TROPONIN T SERPL HS-MCNC: 22 NG/L
WBC # BLD AUTO: 13.3 10E3/UL (ref 4–11)

## 2025-02-23 PROCEDURE — 36415 COLL VENOUS BLD VENIPUNCTURE: CPT | Performed by: EMERGENCY MEDICINE

## 2025-02-23 PROCEDURE — 93010 ELECTROCARDIOGRAM REPORT: CPT | Performed by: EMERGENCY MEDICINE

## 2025-02-23 PROCEDURE — 93005 ELECTROCARDIOGRAM TRACING: CPT | Performed by: EMERGENCY MEDICINE

## 2025-02-23 PROCEDURE — 80048 BASIC METABOLIC PNL TOTAL CA: CPT | Performed by: EMERGENCY MEDICINE

## 2025-02-23 PROCEDURE — 99291 CRITICAL CARE FIRST HOUR: CPT | Mod: 25 | Performed by: EMERGENCY MEDICINE

## 2025-02-23 PROCEDURE — 70496 CT ANGIOGRAPHY HEAD: CPT

## 2025-02-23 PROCEDURE — 83735 ASSAY OF MAGNESIUM: CPT | Performed by: EMERGENCY MEDICINE

## 2025-02-23 PROCEDURE — 70498 CT ANGIOGRAPHY NECK: CPT | Mod: 26 | Performed by: RADIOLOGY

## 2025-02-23 PROCEDURE — 70496 CT ANGIOGRAPHY HEAD: CPT | Mod: 26 | Performed by: RADIOLOGY

## 2025-02-23 PROCEDURE — 82248 BILIRUBIN DIRECT: CPT | Performed by: EMERGENCY MEDICINE

## 2025-02-23 PROCEDURE — 258N000003 HC RX IP 258 OP 636: Performed by: EMERGENCY MEDICINE

## 2025-02-23 PROCEDURE — 99291 CRITICAL CARE FIRST HOUR: CPT | Performed by: EMERGENCY MEDICINE

## 2025-02-23 PROCEDURE — 250N000011 HC RX IP 250 OP 636: Performed by: EMERGENCY MEDICINE

## 2025-02-23 PROCEDURE — 85730 THROMBOPLASTIN TIME PARTIAL: CPT | Performed by: EMERGENCY MEDICINE

## 2025-02-23 PROCEDURE — 85025 COMPLETE CBC W/AUTO DIFF WBC: CPT | Performed by: EMERGENCY MEDICINE

## 2025-02-23 PROCEDURE — 84484 ASSAY OF TROPONIN QUANT: CPT | Performed by: EMERGENCY MEDICINE

## 2025-02-23 PROCEDURE — 70553 MRI BRAIN STEM W/O & W/DYE: CPT | Mod: 26 | Performed by: RADIOLOGY

## 2025-02-23 PROCEDURE — 70450 CT HEAD/BRAIN W/O DYE: CPT

## 2025-02-23 PROCEDURE — 70553 MRI BRAIN STEM W/O & W/DYE: CPT

## 2025-02-23 PROCEDURE — 85610 PROTHROMBIN TIME: CPT | Performed by: EMERGENCY MEDICINE

## 2025-02-23 PROCEDURE — 96361 HYDRATE IV INFUSION ADD-ON: CPT | Performed by: EMERGENCY MEDICINE

## 2025-02-23 PROCEDURE — 82962 GLUCOSE BLOOD TEST: CPT

## 2025-02-23 RX ORDER — HYDRALAZINE HYDROCHLORIDE 20 MG/ML
10-20 INJECTION INTRAMUSCULAR; INTRAVENOUS EVERY 30 MIN PRN
Status: DISCONTINUED | OUTPATIENT
Start: 2025-02-23 | End: 2025-02-24 | Stop reason: HOSPADM

## 2025-02-23 RX ORDER — ONDANSETRON 4 MG/1
4 TABLET, ORALLY DISINTEGRATING ORAL EVERY 6 HOURS PRN
Status: DISCONTINUED | OUTPATIENT
Start: 2025-02-23 | End: 2025-02-24 | Stop reason: HOSPADM

## 2025-02-23 RX ORDER — LABETALOL HYDROCHLORIDE 5 MG/ML
10-20 INJECTION, SOLUTION INTRAVENOUS EVERY 10 MIN PRN
Status: DISCONTINUED | OUTPATIENT
Start: 2025-02-23 | End: 2025-02-24 | Stop reason: HOSPADM

## 2025-02-23 RX ORDER — PROCHLORPERAZINE MALEATE 5 MG/1
5 TABLET ORAL EVERY 6 HOURS PRN
Status: DISCONTINUED | OUTPATIENT
Start: 2025-02-23 | End: 2025-02-24 | Stop reason: HOSPADM

## 2025-02-23 RX ORDER — ONDANSETRON 2 MG/ML
4 INJECTION INTRAMUSCULAR; INTRAVENOUS EVERY 6 HOURS PRN
Status: DISCONTINUED | OUTPATIENT
Start: 2025-02-23 | End: 2025-02-24 | Stop reason: HOSPADM

## 2025-02-23 RX ORDER — IOPAMIDOL 755 MG/ML
67 INJECTION, SOLUTION INTRAVASCULAR ONCE
Status: COMPLETED | OUTPATIENT
Start: 2025-02-23 | End: 2025-02-23

## 2025-02-23 RX ADMIN — SODIUM CHLORIDE, POTASSIUM CHLORIDE, SODIUM LACTATE AND CALCIUM CHLORIDE 1000 ML: 600; 310; 30; 20 INJECTION, SOLUTION INTRAVENOUS at 22:37

## 2025-02-23 RX ADMIN — IOPAMIDOL 67 ML: 755 INJECTION, SOLUTION INTRAVENOUS at 21:23

## 2025-02-23 ASSESSMENT — ACTIVITIES OF DAILY LIVING (ADL)
ADLS_ACUITY_SCORE: 53

## 2025-02-23 ASSESSMENT — COLUMBIA-SUICIDE SEVERITY RATING SCALE - C-SSRS
1. IN THE PAST MONTH, HAVE YOU WISHED YOU WERE DEAD OR WISHED YOU COULD GO TO SLEEP AND NOT WAKE UP?: NO
2. HAVE YOU ACTUALLY HAD ANY THOUGHTS OF KILLING YOURSELF IN THE PAST MONTH?: NO
6. HAVE YOU EVER DONE ANYTHING, STARTED TO DO ANYTHING, OR PREPARED TO DO ANYTHING TO END YOUR LIFE?: NO

## 2025-02-24 ENCOUNTER — APPOINTMENT (OUTPATIENT)
Dept: PHYSICAL THERAPY | Facility: CLINIC | Age: 85
End: 2025-02-24
Payer: COMMERCIAL

## 2025-02-24 ENCOUNTER — APPOINTMENT (OUTPATIENT)
Dept: CT IMAGING | Facility: CLINIC | Age: 85
End: 2025-02-24
Payer: COMMERCIAL

## 2025-02-24 ENCOUNTER — APPOINTMENT (OUTPATIENT)
Dept: NEUROLOGY | Facility: CLINIC | Age: 85
End: 2025-02-24
Payer: COMMERCIAL

## 2025-02-24 VITALS
OXYGEN SATURATION: 94 % | BODY MASS INDEX: 28.72 KG/M2 | TEMPERATURE: 97.9 F | SYSTOLIC BLOOD PRESSURE: 111 MMHG | HEART RATE: 73 BPM | DIASTOLIC BLOOD PRESSURE: 65 MMHG | HEIGHT: 62 IN | RESPIRATION RATE: 16 BRPM

## 2025-02-24 PROBLEM — R29.898 LEFT LEG WEAKNESS: Status: ACTIVE | Noted: 2025-02-24

## 2025-02-24 PROBLEM — Z86.39 HISTORY OF DIABETES MELLITUS: Status: ACTIVE | Noted: 2025-02-24

## 2025-02-24 PROBLEM — R73.9 HYPERGLYCEMIA: Status: ACTIVE | Noted: 2025-02-24

## 2025-02-24 PROBLEM — I62.00 SUBDURAL HEMORRHAGE (H): Status: ACTIVE | Noted: 2025-02-24

## 2025-02-24 LAB
ALBUMIN UR-MCNC: NEGATIVE MG/DL
ANION GAP SERPL CALCULATED.3IONS-SCNC: 15 MMOL/L (ref 7–15)
APPEARANCE UR: CLEAR
ATRIAL RATE - MUSE: 96 BPM
BACTERIA #/AREA URNS HPF: ABNORMAL /HPF
BILIRUB UR QL STRIP: NEGATIVE
BUN SERPL-MCNC: 21.8 MG/DL (ref 8–23)
CALCIUM SERPL-MCNC: 10.1 MG/DL (ref 8.8–10.4)
CF REDUC 30M P MA P HEP LENFR BLD TEG: 0 % (ref 0–8)
CF REDUC 60M P MA P HEPASE LENFR BLD TEG: 1.4 % (ref 0–15)
CFT P HPASE BLD TEG: 1.1 MINUTE (ref 1–3)
CHLORIDE SERPL-SCNC: 101 MMOL/L (ref 98–107)
CI (COAGULATION INDEX)(Z): 2.5 (ref -3–3)
CLOT ANGLE P HPASE BLD TEG: 74.4 DEGREES (ref 53–72)
CLOT INIT P HPASE BLD TEG: 6.4 MINUTE (ref 5–10)
CLOT STRENGTH P HPASE BLD TEG: 15.6 KD/SC (ref 4.5–11)
COLOR UR AUTO: ABNORMAL
CREAT SERPL-MCNC: 0.87 MG/DL (ref 0.51–0.95)
DIASTOLIC BLOOD PRESSURE - MUSE: NORMAL MMHG
EGFRCR SERPLBLD CKD-EPI 2021: 65 ML/MIN/1.73M2
ERYTHROCYTE [DISTWIDTH] IN BLOOD BY AUTOMATED COUNT: 12.6 % (ref 10–15)
EST. AVERAGE GLUCOSE BLD GHB EST-MCNC: 209 MG/DL
GLUCOSE BLDC GLUCOMTR-MCNC: 114 MG/DL (ref 70–99)
GLUCOSE BLDC GLUCOMTR-MCNC: 175 MG/DL (ref 70–99)
GLUCOSE BLDC GLUCOMTR-MCNC: 211 MG/DL (ref 70–99)
GLUCOSE SERPL-MCNC: 145 MG/DL (ref 70–99)
GLUCOSE UR STRIP-MCNC: NEGATIVE MG/DL
HBA1C MFR BLD: 8.9 %
HCO3 SERPL-SCNC: 24 MMOL/L (ref 22–29)
HCT VFR BLD AUTO: 45.5 % (ref 35–47)
HGB BLD-MCNC: 14.7 G/DL (ref 11.7–15.7)
HGB UR QL STRIP: NEGATIVE
HOLD SPECIMEN: NORMAL
INTERPRETATION ECG - MUSE: NORMAL
KETONES UR STRIP-MCNC: NEGATIVE MG/DL
LEUKOCYTE ESTERASE UR QL STRIP: ABNORMAL
MAGNESIUM SERPL-MCNC: 2.3 MG/DL (ref 1.7–2.3)
MCF P HPASE BLD TEG: 75.7 MM (ref 50–70)
MCH RBC QN AUTO: 27.6 PG (ref 26.5–33)
MCHC RBC AUTO-ENTMCNC: 32.3 G/DL (ref 31.5–36.5)
MCV RBC AUTO: 86 FL (ref 78–100)
MUCOUS THREADS #/AREA URNS LPF: PRESENT /LPF
NITRATE UR QL: NEGATIVE
P AXIS - MUSE: 35 DEGREES
PH UR STRIP: 5 [PH] (ref 5–7)
PHOSPHATE SERPL-MCNC: 3.2 MG/DL (ref 2.5–4.5)
PLATELET # BLD AUTO: 415 10E3/UL (ref 150–450)
POTASSIUM SERPL-SCNC: 3.7 MMOL/L (ref 3.4–5.3)
PR INTERVAL - MUSE: 170 MS
QRS DURATION - MUSE: 78 MS
QT - MUSE: 346 MS
QTC - MUSE: 437 MS
R AXIS - MUSE: 3 DEGREES
RBC # BLD AUTO: 5.32 10E6/UL (ref 3.8–5.2)
RBC URINE: 2 /HPF
SODIUM SERPL-SCNC: 140 MMOL/L (ref 135–145)
SP GR UR STRIP: 1.01 (ref 1–1.03)
SYSTOLIC BLOOD PRESSURE - MUSE: NORMAL MMHG
T AXIS - MUSE: 13 DEGREES
UROBILINOGEN UR STRIP-MCNC: NORMAL MG/DL
VENTRICULAR RATE- MUSE: 96 BPM
WBC # BLD AUTO: 10.5 10E3/UL (ref 4–11)
WBC URINE: 8 /HPF

## 2025-02-24 PROCEDURE — 36415 COLL VENOUS BLD VENIPUNCTURE: CPT

## 2025-02-24 PROCEDURE — 95718 EEG PHYS/QHP 2-12 HR W/VEEG: CPT | Performed by: PSYCHIATRY & NEUROLOGY

## 2025-02-24 PROCEDURE — 255N000002 HC RX 255 OP 636: Performed by: EMERGENCY MEDICINE

## 2025-02-24 PROCEDURE — 83036 HEMOGLOBIN GLYCOSYLATED A1C: CPT

## 2025-02-24 PROCEDURE — 85014 HEMATOCRIT: CPT

## 2025-02-24 PROCEDURE — 80048 BASIC METABOLIC PNL TOTAL CA: CPT

## 2025-02-24 PROCEDURE — 70498 CT ANGIOGRAPHY NECK: CPT | Mod: 26 | Performed by: RADIOLOGY

## 2025-02-24 PROCEDURE — 99222 1ST HOSP IP/OBS MODERATE 55: CPT | Mod: GC | Performed by: NEUROLOGICAL SURGERY

## 2025-02-24 PROCEDURE — 250N000012 HC RX MED GY IP 250 OP 636 PS 637

## 2025-02-24 PROCEDURE — 96361 HYDRATE IV INFUSION ADD-ON: CPT

## 2025-02-24 PROCEDURE — 70450 CT HEAD/BRAIN W/O DYE: CPT | Mod: XU

## 2025-02-24 PROCEDURE — 97116 GAIT TRAINING THERAPY: CPT | Mod: GP

## 2025-02-24 PROCEDURE — 250N000011 HC RX IP 250 OP 636

## 2025-02-24 PROCEDURE — 82962 GLUCOSE BLOOD TEST: CPT

## 2025-02-24 PROCEDURE — 84100 ASSAY OF PHOSPHORUS: CPT

## 2025-02-24 PROCEDURE — 83735 ASSAY OF MAGNESIUM: CPT

## 2025-02-24 PROCEDURE — 97161 PT EVAL LOW COMPLEX 20 MIN: CPT | Mod: GP

## 2025-02-24 PROCEDURE — 96374 THER/PROPH/DIAG INJ IV PUSH: CPT | Mod: 59 | Performed by: EMERGENCY MEDICINE

## 2025-02-24 PROCEDURE — 97530 THERAPEUTIC ACTIVITIES: CPT | Mod: GP

## 2025-02-24 PROCEDURE — 250N000013 HC RX MED GY IP 250 OP 250 PS 637

## 2025-02-24 PROCEDURE — G0378 HOSPITAL OBSERVATION PER HR: HCPCS

## 2025-02-24 PROCEDURE — 96361 HYDRATE IV INFUSION ADD-ON: CPT | Performed by: EMERGENCY MEDICINE

## 2025-02-24 PROCEDURE — A9585 GADOBUTROL INJECTION: HCPCS | Performed by: EMERGENCY MEDICINE

## 2025-02-24 PROCEDURE — 87086 URINE CULTURE/COLONY COUNT: CPT | Performed by: EMERGENCY MEDICINE

## 2025-02-24 PROCEDURE — 70496 CT ANGIOGRAPHY HEAD: CPT | Mod: 26 | Performed by: RADIOLOGY

## 2025-02-24 PROCEDURE — 999N000111 HC STATISTIC OT IP EVAL DEFER

## 2025-02-24 PROCEDURE — 95711 VEEG 2-12 HR UNMONITORED: CPT

## 2025-02-24 PROCEDURE — 70496 CT ANGIOGRAPHY HEAD: CPT

## 2025-02-24 PROCEDURE — 81003 URINALYSIS AUTO W/O SCOPE: CPT | Performed by: EMERGENCY MEDICINE

## 2025-02-24 PROCEDURE — 85396 CLOTTING ASSAY WHOLE BLOOD: CPT

## 2025-02-24 RX ORDER — LEVETIRACETAM 750 MG/1
750 TABLET ORAL 2 TIMES DAILY
Status: DISCONTINUED | OUTPATIENT
Start: 2025-02-24 | End: 2025-02-24

## 2025-02-24 RX ORDER — GLIMEPIRIDE 4 MG/1
4 TABLET ORAL 2 TIMES DAILY WITH MEALS
Status: DISCONTINUED | OUTPATIENT
Start: 2025-02-24 | End: 2025-02-24 | Stop reason: HOSPADM

## 2025-02-24 RX ORDER — NICOTINE POLACRILEX 4 MG
15-30 LOZENGE BUCCAL
Status: DISCONTINUED | OUTPATIENT
Start: 2025-02-24 | End: 2025-02-24 | Stop reason: HOSPADM

## 2025-02-24 RX ORDER — LEVETIRACETAM 750 MG/1
750 TABLET ORAL 2 TIMES DAILY
Status: DISCONTINUED | OUTPATIENT
Start: 2025-02-24 | End: 2025-02-24 | Stop reason: HOSPADM

## 2025-02-24 RX ORDER — IOPAMIDOL 755 MG/ML
67 INJECTION, SOLUTION INTRAVASCULAR ONCE
Status: COMPLETED | OUTPATIENT
Start: 2025-02-24 | End: 2025-02-24

## 2025-02-24 RX ORDER — LEVETIRACETAM 750 MG/1
750 TABLET ORAL 2 TIMES DAILY
Qty: 60 TABLET | Refills: 1 | Status: SHIPPED | OUTPATIENT
Start: 2025-02-24 | End: 2025-05-06

## 2025-02-24 RX ORDER — LEVETIRACETAM 10 MG/ML
1000 INJECTION INTRAVASCULAR ONCE
Status: COMPLETED | OUTPATIENT
Start: 2025-02-24 | End: 2025-02-24

## 2025-02-24 RX ORDER — DEXTROSE MONOHYDRATE 25 G/50ML
25-50 INJECTION, SOLUTION INTRAVENOUS
Status: DISCONTINUED | OUTPATIENT
Start: 2025-02-24 | End: 2025-02-24 | Stop reason: HOSPADM

## 2025-02-24 RX ORDER — SODIUM CHLORIDE, SODIUM LACTATE, POTASSIUM CHLORIDE, CALCIUM CHLORIDE 600; 310; 30; 20 MG/100ML; MG/100ML; MG/100ML; MG/100ML
INJECTION, SOLUTION INTRAVENOUS CONTINUOUS
Status: DISCONTINUED | OUTPATIENT
Start: 2025-02-24 | End: 2025-02-24

## 2025-02-24 RX ORDER — LOSARTAN POTASSIUM AND HYDROCHLOROTHIAZIDE 12.5; 5 MG/1; MG/1
1 TABLET ORAL EVERY MORNING
Status: DISCONTINUED | OUTPATIENT
Start: 2025-02-24 | End: 2025-02-24 | Stop reason: HOSPADM

## 2025-02-24 RX ORDER — ACETAMINOPHEN 325 MG/1
650 TABLET ORAL EVERY 4 HOURS PRN
Status: DISCONTINUED | OUTPATIENT
Start: 2025-02-24 | End: 2025-02-24 | Stop reason: HOSPADM

## 2025-02-24 RX ORDER — IBUPROFEN 200 MG
200 TABLET ORAL EVERY 6 HOURS PRN
COMMUNITY
Start: 2025-03-03 | End: 2025-05-06

## 2025-02-24 RX ORDER — GADOBUTROL 604.72 MG/ML
7 INJECTION INTRAVENOUS ONCE
Status: COMPLETED | OUTPATIENT
Start: 2025-02-24 | End: 2025-02-24

## 2025-02-24 RX ADMIN — GADOBUTROL 7 ML: 604.72 INJECTION INTRAVENOUS at 00:13

## 2025-02-24 RX ADMIN — INSULIN ASPART 1 UNITS: 100 INJECTION, SOLUTION INTRAVENOUS; SUBCUTANEOUS at 08:45

## 2025-02-24 RX ADMIN — LEVETIRACETAM 750 MG: 750 TABLET, FILM COATED ORAL at 13:00

## 2025-02-24 RX ADMIN — IOPAMIDOL 67 ML: 755 INJECTION, SOLUTION INTRAVENOUS at 10:28

## 2025-02-24 RX ADMIN — INSULIN ASPART 2 UNITS: 100 INJECTION, SOLUTION INTRAVENOUS; SUBCUTANEOUS at 13:00

## 2025-02-24 RX ADMIN — LEVETIRACETAM 1000 MG: 10 INJECTION INTRAVENOUS at 01:01

## 2025-02-24 ASSESSMENT — ACTIVITIES OF DAILY LIVING (ADL)
ADLS_ACUITY_SCORE: 53

## 2025-02-24 NOTE — PLAN OF CARE
Occupational Therapy: Orders received. Chart reviewed and discussed with care team.? Occupational Therapy not indicated due to pt up SBA, no skilled ADL needs at this time.? Defer discharge recommendations to Physical Therapy.? Will complete orders.

## 2025-02-24 NOTE — PROGRESS NOTES
ED PT Evaluation:      02/24/25 1006   Appointment Info   Signing Clinician's Name / Credentials (PT) Manan Parada, PT, DPT   Quick Adds   Quick Adds Certification   Living Environment   People in Home child(vanda), adult   Current Living Arrangements house   Home Accessibility stairs to enter home   Number of Stairs, Main Entrance 3   Stair Railings, Main Entrance railings safe and in good condition   Transportation Anticipated car, drives self;family or friend will provide   Living Environment Comments Lives in a house with their son. All needs met on main level.   Self-Care   Equipment Currently Used at Home cane, straight   Fall history within last six months yes   Number of times patient has fallen within last six months 1   Activity/Exercise/Self-Care Comment IND/mod I with mobility and ADLs at baseline. Uses SPC in community due to chronic LLE pain with prolonged standing/walking. Son able to assist prn. Reports several instances of LLE weakness/buckling following by dystonic-like movements which last for a few minutes.   General Information   Onset of Illness/Injury or Date of Surgery 02/23/25   Referring Physician Carl Talamantes MD   Patient/Family Therapy Goals Statement (PT) Return home   Pertinent History of Current Problem (include personal factors and/or comorbidities that impact the POC) Per EMR: Kelly Barnes is a 85 year old RIGHT-handed female with past medical history significant for chronic LEFT calf pain (for which she takes [id number]), aortic stenosis with aortic valve sclerosis, CKD3, hypertension, hyperlipidemia, Type 2 Diabetes mellitus, and endometrial cancer s/p hysterectomy, bilateral oophorectomy and left pelvic lymph node dissection (2023) who presents due to 1 day of LEFT leg weakness and spasm.   Existing Precautions/Restrictions fall   General Observations pt supine, pleasant, agreeable to session.   Cognition   Affect/Mental Status (Cognition) WFL   Posture    Posture Forward head  position;Protracted shoulders   Range of Motion (ROM)   Range of Motion ROM is WFL   Strength (Manual Muscle Testing)   Strength (Manual Muscle Testing) Deficits observed during functional mobility   Strength Comments Limited endurance 2/2 LLE pain/weakness   Bed Mobility   Bed Mobility supine-sit   Supine-Sit [place] (Bed Mobility) independent   Transfers   Transfers sit-stand transfer   Sit-Stand Transfer   Sit-Stand [place] (Transfers) modified independence   Assistive Device (Sit-Stand Transfers) cane, straight   Gait/Stairs (Locomotion)   [place] Level (Gait) contact guard   Assistive Device (Gait) cane, straight   Distance in Feet (Gait) 5   Balance   Balance Comments IND sitting; CGA static/dynamic stance with SPC   Sensory Examination   Sensory Perception patient reports no sensory changes   Sensory Perception Comments baseline neuropathy B UE/LE   Coordination   Coordination no deficits were identified   Clinical Impression   Criteria for Skilled Therapeutic Intervention Yes, treatment indicated   PT Diagnosis (PT) Impaired functional mobility   Influenced by the following impairments pain, weakness, deconditioning, LLE dystonia-like symptoms   Functional limitations due to impairments gait, balance, stairs, activity tolerance, cares   Clinical Presentation (PT Evaluation Complexity) stable   Clinical Presentation Rationale Clinical judgment   Clinical Decision Making (Complexity) low complexity   Planned Therapy Interventions (PT) balance training;gait training;home exercise program;patient/family education;stair training;strengthening;progressive activity/exercise;risk factor education;home program guidelines   Risk & Benefits of therapy have been explained evaluation/treatment results reviewed;care plan/treatment goals reviewed;risks/benefits reviewed;current/potential barriers reviewed;participants voiced agreement with care plan;participants included;patient   PT Total Evaluation Time    PT Eval, Low Complexity Minutes (28599) 6   Therapy Certification   Start of care date 02/23/25   Certification date from 02/24/25   Certification date to 03/10/25   Medical Diagnosis subdural hemorrhage   Physical Therapy Goals   PT Frequency Daily   PT Predicted Duration/Target Date for Goal Attainment 03/10/25   PT Goals Gait;Stairs;PT Goal 1   PT: Gait Modified independent;Assistive device;Straight cane;Greater than 200 feet   PT: Stairs Modified independent;3 stairs  (railings per home setup)   PT: Goal 1 Demonstrate low falls risk with a balance assessment score of < 13.5 seconds on TUG,  > 19/24 on Dynamic Gait Index, > 9/12 on 4-Item Dynamic Gait Index, >22/30 on Functional Gait Assessment, or > 45/56 on Clements Balance Assessment.   PT Discharge Planning   PT Plan gait with SPC, stairs   PT Discharge Recommendation (DC Rec) home with assist;home with outpatient physical therapy   PT Rationale for DC Rec Patient currently limited by chronic LLE pain and intermittent episodes of LLE weakness followed by involuntary muscular contractions/spasms. Moving sufficiently to return home with assist from their son. Recommend OP PT follow up to address/monitor symptoms and further progress functional mobility.   PT Brief overview of current status SBA with SPC   PT Total Distance Amb During Session (feet) 200   Physical Therapy Time and Intention   Total Session Time (sum of timed and untimed services) 6     [facility]                                                                                   OUTPATIENT PHYSICAL THERAPY    PLAN OF TREATMENT FOR OUTPATIENT REHABILITATION   Patient's Last Name, First Name, Kelly Enciso YOB: 1940   Provider's Name   [facility]   Medical Record No.  [id number]     Onset Date: 02/23/25 Start of Care Date: (P) 02/23/25     Medical Diagnosis:  (P) subdural hemorrhage               PT Diagnosis:  (P)  Impaired functional mobility Certification Dates:  From: (P) 02/24/25  To: (P) 03/10/25       See note for plan of treatment, functional goals, and certification details.    I CERTIFY THE NEED FOR THESE SERVICES FURNISHED UNDER        THIS PLAN OF TREATMENT AND WHILE UNDER MY CARE (Physician co-signature of this document indicates review and certification of the therapy plan).

## 2025-02-24 NOTE — MEDICATION SCRIBE - ADMISSION MEDICATION HISTORY
Medication Scribe Admission Medication History    Admission medication history is complete. The information provided in this note is only as accurate as the sources available at the time of the update.    Information Source(s): Patient and [facility]  via in-person    Pertinent Information: per pt+[facility], pt reported taking medications on PTA medication list as directed.   Ibuprofen is listed as one of pt's allergic medication, but pt said she is no longer allergic to Ibuprofen, which reported taking in last three months.    Pt also reported taking Aspirin yesterday, but was put on hold here.       Changes made to PTA medication list:  Added: None  Deleted: None  Changed: None    Allergies reviewed with patient and updates made in EHR: yes    Medication History Completed By: Keila Tavera 2/24/2025 6:16 AM    PTA Med List   Medication Sig Last Dose/Taking    aspirin (ASA) 325 MG EC tablet Take 325 mg by mouth as needed for moderate pain 2/23/2025 Evening    blood glucose (ONETOUCH ULTRA) test strip Use to test blood sugar 2 times daily or as directed. Taking    blood glucose monitoring (ONE TOUCH ULTRA 2) meter device kit Use to test blood sugar 2 times daily or as directed. Taking    glimepiride (AMARYL) 4 MG tablet TAKE ONE TABLET BY MOUTH TWICE A DAY WITH MEALS 2/23/2025    Lancets (ONETOUCH DELICA PLUS [id number]) MISC USE ONE DEVICE BY IN VITRO ROUTE TWO TIMES A DAY Taking    losartan-hydrochlorothiazide (HYZAAR) 50-12.5 MG tablet TAKE ONE TABLET BY MOUTH EVERY MORNING 2/23/2025 Morning    oxyCODONE (ROXICODONE) 5 MG tablet Take 1 tablet (5 mg) by mouth every 6 hours as needed for severe pain or breakthrough pain. 2/21/2025    simvastatin (ZOCOR) 20 MG tablet TAKE ONE TABLET BY MOUTH AT BEDTIME 2/22/2025 Bedtime    sitagliptin (JANUVIA) 100 MG tablet Take 1 tablet (100 mg) by mouth daily. 2/23/2025 Evening
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
## 2025-02-24 NOTE — DISCHARGE SUMMARY
"[facility] Discharge Summary and Instructions    Kelly Barnes MRN# [id number]   Age: 85 year old YOB: 1940     Date of Admission:  2/23/2025  Date of Discharge::  2/24/2025  Admitting Physician:  Jason Porter MD  Discharge Physician:  Jason Porter MD          Admission Diagnoses:   Left leg weakness  History of diabetes mellitus  Hyperglycemia  Subdural hemorrhage (H)          Discharge Diagnosis:     Left leg weakness  History of diabetes mellitus  Hyperglycemia  Subdural hemorrhage (H)     Clinically Significant Risk Factors Present on Admission                  # Drug Induced Platelet Defect: home medication list includes an antiplatelet medication       # Hypertension: Noted on problem list            # DMII: A1C = 8.9 % (Ref range: <5.7 %) within past 6 months    # Overweight: Estimated body mass index is 28.72 kg/m  as calculated from the following:    Height as of this encounter: 1.575 m (5' 2\").    Weight as of 2/17/25: 71.2 kg (157 lb).                    Brief History of Illness:   Kelly Barnes is a 85 year old RIGHT-handed female with past medical history significant for chronic LEFT calf pain (for which she takes [id number]), aortic stenosis with aortic valve sclerosis, CKD3, hypertension, hyperlipidemia, Type 2 Diabetes mellitus, and endometrial cancer s/p hysterectomy, bilateral oophorectomy and left pelvic lymph node dissection (2023) who presents with RIGHT-sided acute on chronic tentorial subdural and chronic RIGHT subdural. Fortunately she remains neurologically intact.  Patient has elected to undergo above-mentioned procedure.           Hospital Course:   Patient was admitted to observation for monitoring. Repeat CT head was stable. Stroke Neurology team assisted with workup after stroke code called with initial presentation. MRI brain obtained with stroke workup re-demonstrated right cerebral density and parafalcine SDH. CTA and CTV  head and neck were negative for vessel " thrombosis. EEG was reported as negative. Patient was treated with Keppra and will continue at discharge for a 30 day course for seizure prophylaxis.   PT/OT evaluated patient and recommended discharge home with outpatient PT. Referral placed.  Patient was discharged home on 02/24/25 with plan for 2 week follow up at Neurosurgery clinic with repeat head CT, where will be determined if ok to resume ASA. Neurology referral placed.           Discharge Medications:     Discharge Medication List as of 2/24/2025  7:24 PM        START taking these medications    Details   levETIRAcetam (KEPPRA) 750 MG tablet Take 1 tablet (750 mg) by mouth 2 times daily., Disp-60 tablet, R-1, E-Prescribe           CONTINUE these medications which have CHANGED    Details   ibuprofen (ADVIL/MOTRIN) 200 MG tablet Take 1 tablet (200 mg) by mouth every 6 hours as needed., HistoricalDo not resume until 3/3/25           CONTINUE these medications which have NOT CHANGED    Details   blood glucose (ONETOUCH ULTRA) test strip Use to test blood sugar 2 times daily or as directed., Disp-100 strip, R-3, E-Prescribe      blood glucose monitoring (ONE TOUCH ULTRA 2) meter device kit Use to test blood sugar 2 times daily or as directed.Disp-1 kit, [id number]      glimepiride (AMARYL) 4 MG tablet TAKE ONE TABLET BY MOUTH TWICE A DAY WITH MEALS, Disp-180 tablet, R-3, E-Prescribe      Lancets (ONETOUCH DELICA PLUS [id number]) MISC USE ONE DEVICE BY IN VITRO ROUTE TWO TIMES A DAY, Disp-200 each, R-3, E-Prescribe      losartan-hydrochlorothiazide (HYZAAR) 50-12.5 MG tablet TAKE ONE TABLET BY MOUTH EVERY MORNING, Disp-90 tablet, R-3, E-Prescribe      oxyCODONE (ROXICODONE) 5 MG tablet Take 1 tablet (5 mg) by mouth every 6 hours as needed for severe pain or breakthrough pain., Disp-20 tablet, R-0, E-Prescribe      simvastatin (ZOCOR) 20 MG tablet TAKE ONE TABLET BY MOUTH AT BEDTIME, Disp-90 tablet, R-3, E-Prescribe      !! sitagliptin (JANUVIA) 100 MG tablet  "Take 1 tablet (100 mg) by mouth daily., Disp-30 tablet, R-3, E-Prescribe      gabapentin (NEURONTIN) 100 MG capsule Take 1-3 capsules (100-300 mg) by mouth every 8 hours PRN for pain, No Print Out      potassium citrate (UROCIT-K) 10 MEQ (1080 MG) CR tablet Take 1 tablet (10 mEq) by mouth daily., Disp-90 tablet, R-3, E-Prescribe      !! sitagliptin (JANUVIA) 50 MG tablet Take 1 tablet (50 mg) by mouth daily., Disp-90 tablet, R-3, E-Prescribe       !! - Potential duplicate medications found. Please discuss with provider.        STOP taking these medications       aspirin (ASA) 325 MG EC tablet Comments:   Reason for Stopping:                      Exam:   Physical Exam  /65   Pulse 73   Temp 97.9  F (36.6  C) (Oral)   Resp 16   Ht 1.575 m (5' 2\")   LMP  (LMP Unknown)   SpO2 94%   BMI 28.72 kg/m    Gen: Appears comfortable, NAD  Neurologic:  - Alert & Oriented to person, place, time, and situation  - Follows commands briskly  - Speech fluent, spontaneous. No aphasia or dysarthria.  - No gaze preference. No apparent hemineglect.  - PERRL, EOMI  - Strong eye closure, jaw clench, and cheek puff  - Face symmetric with sensation intact to light touch  - Tongue protrudes midline  - Trapezii muscles 5/5 bilaterally  - No pronator drift     Del Tr Bi WE WF Gr   R 5 5 5 5 5 5   L 5 5 5 5 5 5    HF KE KF DF PF EHL   R 5 5 5 5 5 5   L 5 5 5 5 5 5     Reflexes 2+ throughout    Sensation intact and symmetric to light touch throughout           Discharge Instructions and Follow-Up:     Discharge diet: Regular   Discharge activity: You may advance activity as tolerated. No strenuous exercise or heay lifting greater than 10 lbs for 4 weeks or until seen and cleared in clinic.     Discharge follow-up: Follow-up with Neurosurgery [facility] in 2 weeks for wound check/post-hospital evaluation with head CT to be obtained prior.    Follow up at Neurology clinic, with Dr. Bullard on 4/11/25    Follow up with PCP for blood glucose " and medication management    Per [place] regulations regarding seizures and driving, you are prohibited from driving for three months following any seizure activity (involuntary activity). Please refrain from activities that could result in self-injury or harm to others for three months after any seizure with impaired awareness or motor control. This includes avoiding activities such as holding babies at heights where dropping could occur, bathing infants without continuous adult supervision, operating power tools, handling firearms, exposure to heights, hot cooking oil, swimming alone, among others.          Please call if you have:  1. increased pain, redness, drainage, swelling at your incision  2. fevers > 101.5 F degrees  3. with any questions or concerns.  You may reach the Neurosurgery clinic at [phone] during regular work hours. ER at [phone].    and ask for the Neurosurgery Resident on call at [phone], during off hours or weekends.         Discharge Disposition:     Discharged to home        Carolina Goodson PA-C  Neurosurgery Department  Pager: [phone]    Carl Talamantes MD  Neurosurgery PGY2  On call pager #[phone]
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
## 2025-02-24 NOTE — ED PROVIDER NOTES
"ED Provider Note  [facility]      History   No chief complaint on file.    HPI  Kelly Barnes is a 85 year old female who has had left lower leg discomfort off and on for a year with extensive workups now presents with 1-1/2 days of waxing and waning left leg weakness without pain. Patient reports that she first noticed it when she was walking yesterday at approximately 11 AM when her leg buckled and felt like it was moving by itself or spasming over the course of 5 minutes. It has occurred once this morning and once this evening prompting her to come in. She has noticed over the course of today that she needs to hold onto something to ambulate secondary to her left leg being weaker than normal. Patient denies headache, neck pain, or chest pain. Takes baby aspirin but otherwise no anticoagulants. Significant medical history of diabetes, hypertension, high cholesterol.    This part of the medical record was transcribed by Jonathan Baron Medical Scribe, from a dictation done by Caleb Shafer MD.        Physical Exam   BP: (!) 164/91  Pulse: 92  Temp: 98.7  F (37.1  C)  Resp: 16  Height: 157.5 cm (5' 2\")  SpO2: 96 %  Physical Exam  Overall well-appearing.   Pleasant, alert, oriented.   No facial droop.   Sensation intact bilateral face upper and lower extremities   Upper extremities with 5 out of 5 strength   Lower extremities with 4 out of 5 strength on left leg on hip flexion, lower leg extension and flexion   Right leg with 5 out of 5 strength throughout ambulating deferred secondary to fall risk    ED Course, Procedures, & Data      Procedures            EKG Interpretation:      Interpreted by Caleb Shafer MD  Time reviewed: 1941  Symptoms at time of EKG: Left leg weakness  Rhythm: normal sinus   Rate: 96 bpm  Axis: Normal  ST Segments/ T Waves: No ST elevations    Clinical Impression: Normal sinus rhythm without ST elevations with similar morphology to October 13, " 2021                 Results for orders placed or performed during the hospital encounter of 02/23/25   CT Head w/o Contrast     Status: Abnormal   Result Value Ref Range    Radiologist flags Acute on chronic subdural hematomas (AA)     Narrative    EXAM: CTA HEAD NECK W CONTRAST, CT HEAD W/O CONTRAST  LOCATION: [facility]  DATE: 2/23/2025    INDICATION: LLE weakness x 11 am yesterday.  COMPARISON: None.  CONTRAST: Iopamidol (Isovue 370) solution 67 mL.  TECHNIQUE: Head and neck CT angiogram with IV contrast. Noncontrast head CT followed by axial helical CT images of the head and neck vessels obtained during the arterial phase of intravenous contrast administration. Axial 2D reconstructed images and   multiplanar 3D MIP reconstructed images of the head and neck vessels were performed by the technologist. Dose reduction techniques were used. All stenosis measurements made according to NASCET criteria unless otherwise specified.    FINDINGS:   NONCONTRAST HEAD CT:   INTRACRANIAL CONTENTS: There is a mixed attenuating subdural hematoma along the interhemispheric falx with a mixed attenuating component extending to the right of midline in the frontoparietal region. The maximal thickness is 11 mm. This also extends   along the right tentorial leaflet where there is maximal thickness of 3 mm. There is a primarily intermediate attenuating subdural collection overlying the right frontoparietal convexity with a small component of hyperattenuating blood products. There is   a maximal thickness of 7 mm. There is mass effect associated with both areas resulting in 6 mm of right to left shift of midline structures. There is a questionable trace amount of hyperattenuating subarachnoid hemorrhage within a left frontal focus   (see image 17 of series). No CT evidence of acute infarct. Mild presumed chronic small vessel ischemic changes. Mild to moderate generalized volume loss. No  hydrocephalus.     VISUALIZED ORBITS/SINUSES/MASTOIDS: No intraorbital abnormality. No paranasal sinus mucosal disease. No middle ear or mastoid effusion.    BONES/SOFT TISSUES: No acute abnormality.    HEAD CTA:  ANTERIOR CIRCULATION: No large vessel occlusion or flow-limiting stenosis. Mild atheromatous changes in the carotid siphons. Within the limits of CTA, no convincing aneurysm or high flow vascular malformation. Standard [place] of [place] anatomy.    POSTERIOR CIRCULATION: No large vessel occlusion or flow-limiting stenosis. Focal nonflow limiting stenosis involving the V4 segment of the right vertebral artery. Dominant left and smaller right vertebral artery contribute to a normal basilar artery.     DURAL VENOUS SINUSES: Expected enhancement of the major dural venous sinuses.    NECK CTA:  RIGHT CAROTID: Mild atheromatous change. No measurable stenosis or dissection.    LEFT CAROTID: Atheromatous change. No measurable stenosis or dissection.    VERTEBRAL ARTERIES: No focal stenosis or dissection. Dominant left and smaller right vertebral arteries.    AORTIC ARCH: Classic aortic arch anatomy with no significant stenosis at the origin of the great vessels.    NONVASCULAR STRUCTURES: There are a few small thyroid nodules, the largest of which measures 11 mm in the right lobe.      Impression    IMPRESSION:   HEAD CT:  1.  Mixed attenuating subdural hematoma along the interhemispheric falx with maximal thickness of 11 mm. This extends along the right tentorial leaflet with maximal thickness of 3 mm. Appearance is consistent with acute on chronic subdural hematoma.  2.  Mixed attenuating subdural hematoma overlying the right frontoparietal convexity with maximal thickness of 7 mm. Appearance is consistent with acute on chronic subdural hematoma.  3.  Mass effect associated with both of these areas results in 6 mm of right to left shift of midline structures.  4.  Questionable focus of subarachnoid hemorrhage  within a left frontal sulcus.    HEAD CTA:  1.  No large vessel occlusion or flow-limiting stenosis.  2.  Mild atheromatous changes in the carotid siphons and the V4 segment of the right vertebral artery.    NECK CTA:  1. No measurable stenosis or dissection.      [Critical Result: Acute on chronic subdural hematomas]    Finding was identified on 2/23/2025 10:12 PM CST.     Dr. Caleb Shafer was contacted by me on 2/23/2025 10:24 PM CST and verbalized understanding of the critical result.      CTA Head Neck with Contrast     Status: Abnormal   Result Value Ref Range    Radiologist flags Acute on chronic subdural hematomas (AA)     Narrative    EXAM: CTA HEAD NECK W CONTRAST, CT HEAD W/O CONTRAST  LOCATION: [facility]  DATE: 2/23/2025    INDICATION: LLE weakness x 11 am yesterday.  COMPARISON: None.  CONTRAST: Iopamidol (Isovue 370) solution 67 mL.  TECHNIQUE: Head and neck CT angiogram with IV contrast. Noncontrast head CT followed by axial helical CT images of the head and neck vessels obtained during the arterial phase of intravenous contrast administration. Axial 2D reconstructed images and   multiplanar 3D MIP reconstructed images of the head and neck vessels were performed by the technologist. Dose reduction techniques were used. All stenosis measurements made according to NASCET criteria unless otherwise specified.    FINDINGS:   NONCONTRAST HEAD CT:   INTRACRANIAL CONTENTS: There is a mixed attenuating subdural hematoma along the interhemispheric falx with a mixed attenuating component extending to the right of midline in the frontoparietal region. The maximal thickness is 11 mm. This also extends   along the right tentorial leaflet where there is maximal thickness of 3 mm. There is a primarily intermediate attenuating subdural collection overlying the right frontoparietal convexity with a small component of hyperattenuating blood products. There is   a maximal  thickness of 7 mm. There is mass effect associated with both areas resulting in 6 mm of right to left shift of midline structures. There is a questionable trace amount of hyperattenuating subarachnoid hemorrhage within a left frontal focus   (see image 17 of series). No CT evidence of acute infarct. Mild presumed chronic small vessel ischemic changes. Mild to moderate generalized volume loss. No hydrocephalus.     VISUALIZED ORBITS/SINUSES/MASTOIDS: No intraorbital abnormality. No paranasal sinus mucosal disease. No middle ear or mastoid effusion.    BONES/SOFT TISSUES: No acute abnormality.    HEAD CTA:  ANTERIOR CIRCULATION: No large vessel occlusion or flow-limiting stenosis. Mild atheromatous changes in the carotid siphons. Within the limits of CTA, no convincing aneurysm or high flow vascular malformation. Standard [place] of [place] anatomy.    POSTERIOR CIRCULATION: No large vessel occlusion or flow-limiting stenosis. Focal nonflow limiting stenosis involving the V4 segment of the right vertebral artery. Dominant left and smaller right vertebral artery contribute to a normal basilar artery.     DURAL VENOUS SINUSES: Expected enhancement of the major dural venous sinuses.    NECK CTA:  RIGHT CAROTID: Mild atheromatous change. No measurable stenosis or dissection.    LEFT CAROTID: Atheromatous change. No measurable stenosis or dissection.    VERTEBRAL ARTERIES: No focal stenosis or dissection. Dominant left and smaller right vertebral arteries.    AORTIC ARCH: Classic aortic arch anatomy with no significant stenosis at the origin of the great vessels.    NONVASCULAR STRUCTURES: There are a few small thyroid nodules, the largest of which measures 11 mm in the right lobe.      Impression    IMPRESSION:   HEAD CT:  1.  Mixed attenuating subdural hematoma along the interhemispheric falx with maximal thickness of 11 mm. This extends along the right tentorial leaflet with maximal thickness of 3 mm. Appearance is  consistent with acute on chronic subdural hematoma.  2.  Mixed attenuating subdural hematoma overlying the right frontoparietal convexity with maximal thickness of 7 mm. Appearance is consistent with acute on chronic subdural hematoma.  3.  Mass effect associated with both of these areas results in 6 mm of right to left shift of midline structures.  4.  Questionable focus of subarachnoid hemorrhage within a left frontal sulcus.    HEAD CTA:  1.  No large vessel occlusion or flow-limiting stenosis.  2.  Mild atheromatous changes in the carotid siphons and the V4 segment of the right vertebral artery.    NECK CTA:  1. No measurable stenosis or dissection.      [Critical Result: Acute on chronic subdural hematomas]    Finding was identified on 2/23/2025 10:12 PM CST.     Dr. Caleb Shafer was contacted by me on 2/23/2025 10:24 PM CST and verbalized understanding of the critical result.      Basic metabolic panel     Status: Abnormal   Result Value Ref Range    Sodium 136 135 - 145 mmol/L    Potassium 3.7 3.4 - 5.3 mmol/L    Chloride 100 98 - 107 mmol/L    Carbon Dioxide (CO2) 23 22 - 29 mmol/L    Anion Gap 13 7 - 15 mmol/L    Urea Nitrogen 29.6 (H) 8.0 - 23.0 mg/dL    Creatinine 1.00 (H) 0.51 - 0.95 mg/dL    GFR Estimate 55 (L) >60 mL/min/1.73m2    Calcium 9.7 8.8 - 10.4 mg/dL    Glucose 332 (H) 70 - 99 mg/dL   INR     Status: Normal   Result Value Ref Range    INR 0.95 0.85 - 1.15   Partial thromboplastin time     Status: Normal   Result Value Ref Range    aPTT 32 22 - 38 Seconds   Troponin T, High Sensitivity     Status: Abnormal   Result Value Ref Range    Troponin T, High Sensitivity 22 (H) <=14 ng/L   Magnesium     Status: Normal   Result Value Ref Range    Magnesium 2.1 1.7 - 2.3 mg/dL   Hepatic function panel     Status: Abnormal   Result Value Ref Range    Protein Total 7.3 6.4 - 8.3 g/dL    Albumin 4.2 3.5 - 5.2 g/dL    Bilirubin Total 0.3 <=1.2 mg/dL    Alkaline Phosphatase 152 (H) 40 - 150 U/L    AST 22 0 -  45 U/L    ALT 8 0 - 50 U/L    Bilirubin Direct 0.10 0.00 - 0.30 mg/dL   CBC with platelets and differential     Status: Abnormal   Result Value Ref Range    WBC Count 13.3 (H) 4.0 - 11.0 10e3/uL    RBC Count 4.57 3.80 - 5.20 10e6/uL    Hemoglobin 13.0 11.7 - 15.7 g/dL    Hematocrit 39.0 35.0 - 47.0 %    MCV 85 78 - 100 fL    MCH 28.4 26.5 - 33.0 pg    MCHC 33.3 31.5 - 36.5 g/dL    RDW 12.5 10.0 - 15.0 %    Platelet Count 376 150 - 450 10e3/uL    % Neutrophils 84 %    % Lymphocytes 9 %    % Monocytes 5 %    % Eosinophils 1 %    % Basophils 0 %    % Immature Granulocytes 0 %    NRBCs per 100 WBC 0 <1 /100    Absolute Neutrophils 11.2 (H) 1.6 - 8.3 10e3/uL    Absolute Lymphocytes 1.2 0.8 - 5.3 10e3/uL    Absolute Monocytes 0.7 0.0 - 1.3 10e3/uL    Absolute Eosinophils 0.1 0.0 - 0.7 10e3/uL    Absolute Basophils 0.1 0.0 - 0.2 10e3/uL    Absolute Immature Granulocytes 0.1 <=0.4 10e3/uL    Absolute NRBCs 0.0 10e3/uL   Glucose by meter     Status: Abnormal   Result Value Ref Range    GLUCOSE BY METER POCT 275 (H) 70 - 99 mg/dL   Glucose by meter     Status: Abnormal   Result Value Ref Range    GLUCOSE BY METER POCT 177 (H) 70 - 99 mg/dL   EKG 12-lead, tracing only     Status: None (Preliminary result)   Result Value Ref Range    Systolic Blood Pressure  mmHg    Diastolic Blood Pressure  mmHg    Ventricular Rate 96 BPM    Atrial Rate 96 BPM    NV Interval 170 ms    QRS Duration 78 ms     ms    QTc 437 ms    P Axis 35 degrees    R AXIS 3 degrees    T Axis 13 degrees    Interpretation ECG       Sinus rhythm with sinus arrhythmia  Possible Inferior infarct , age undetermined  Abnormal ECG     CBC with Platelets & Differential     Status: Abnormal    Narrative    The following orders were created for panel order CBC with Platelets & Differential.  Procedure                               Abnormality         Status                     ---------                               -----------         ------                     CBC  with platelets and d...[[id number]]  Abnormal            Final result                 Please view results for these tests on the individual orders.     Medications   labetalol (NORMODYNE/TRANDATE) injection 10-20 mg (has no administration in time range)   hydrALAZINE (APRESOLINE) injection 10-20 mg (has no administration in time range)   ondansetron (ZOFRAN ODT) ODT tab 4 mg (has no administration in time range)     Or   ondansetron (ZOFRAN) injection 4 mg (has no administration in time range)   prochlorperazine (COMPAZINE) injection 5 mg (has no administration in time range)     Or   prochlorperazine (COMPAZINE) tablet 5 mg (has no administration in time range)   iopamidol (ISOVUE-370) solution 67 mL (67 mLs Intravenous $Given 2/23/25 2123)   sodium chloride (PF) 0.9% PF flush 90 mL (90 mLs Intravenous $Given 2/23/25 2124)   lactated ringers BOLUS 1,000 mL (1,000 mLs Intravenous $New Bag 2/23/25 2237)     Labs Ordered and Resulted from Time of ED Arrival to Time of ED Departure   BASIC METABOLIC PANEL - Abnormal       Result Value    Sodium 136      Potassium 3.7      Chloride 100      Carbon Dioxide (CO2) 23      Anion Gap 13      Urea Nitrogen 29.6 (*)     Creatinine 1.00 (*)     GFR Estimate 55 (*)     Calcium 9.7      Glucose 332 (*)    TROPONIN T, HIGH SENSITIVITY - Abnormal    Troponin T, High Sensitivity 22 (*)    HEPATIC FUNCTION PANEL - Abnormal    Protein Total 7.3      Albumin 4.2      Bilirubin Total 0.3      Alkaline Phosphatase 152 (*)     AST 22      ALT 8      Bilirubin Direct 0.10     CBC WITH PLATELETS AND DIFFERENTIAL - Abnormal    WBC Count 13.3 (*)     RBC Count 4.57      Hemoglobin 13.0      Hematocrit 39.0      MCV 85      MCH 28.4      MCHC 33.3      RDW 12.5      Platelet Count 376      % Neutrophils 84      % Lymphocytes 9      % Monocytes 5      % Eosinophils 1      % Basophils 0      % Immature Granulocytes 0      NRBCs per 100 WBC 0      Absolute Neutrophils 11.2 (*)     Absolute  Lymphocytes 1.2      Absolute Monocytes 0.7      Absolute Eosinophils 0.1      Absolute Basophils 0.1      Absolute Immature Granulocytes 0.1      Absolute NRBCs 0.0     GLUCOSE BY METER - Abnormal    GLUCOSE BY METER POCT 275 (*)    GLUCOSE BY METER - Abnormal    GLUCOSE BY METER POCT 177 (*)    INR - Normal    INR 0.95     PARTIAL THROMBOPLASTIN TIME - Normal    aPTT 32     MAGNESIUM - Normal    Magnesium 2.1     GLUCOSE MONITOR NURSING POCT   ROUTINE UA WITH MICROSCOPIC REFLEX TO CULTURE   TEG WITH HEPARINASE     CTA Head Neck with Contrast   Final Result   Abnormal   IMPRESSION:    HEAD CT:   1.  Mixed attenuating subdural hematoma along the interhemispheric falx with maximal thickness of 11 mm. This extends along the right tentorial leaflet with maximal thickness of 3 mm. Appearance is consistent with acute on chronic subdural hematoma.   2.  Mixed attenuating subdural hematoma overlying the right frontoparietal convexity with maximal thickness of 7 mm. Appearance is consistent with acute on chronic subdural hematoma.   3.  Mass effect associated with both of these areas results in 6 mm of right to left shift of midline structures.   4.  Questionable focus of subarachnoid hemorrhage within a left frontal sulcus.      HEAD CTA:   1.  No large vessel occlusion or flow-limiting stenosis.   2.  Mild atheromatous changes in the carotid siphons and the V4 segment of the right vertebral artery.      NECK CTA:   1. No measurable stenosis or dissection.         [Critical Result: Acute on chronic subdural hematomas]      Finding was identified on 2/23/2025 10:12 PM CST.       Dr. Caleb Shafer was contacted by me on 2/23/2025 10:24 PM CST and verbalized understanding of the critical result.          CT Head w/o Contrast   Final Result   Abnormal   IMPRESSION:    HEAD CT:   1.  Mixed attenuating subdural hematoma along the interhemispheric falx with maximal thickness of 11 mm. This extends along the right tentorial  leaflet with maximal thickness of 3 mm. Appearance is consistent with acute on chronic subdural hematoma.   2.  Mixed attenuating subdural hematoma overlying the right frontoparietal convexity with maximal thickness of 7 mm. Appearance is consistent with acute on chronic subdural hematoma.   3.  Mass effect associated with both of these areas results in 6 mm of right to left shift of midline structures.   4.  Questionable focus of subarachnoid hemorrhage within a left frontal sulcus.      HEAD CTA:   1.  No large vessel occlusion or flow-limiting stenosis.   2.  Mild atheromatous changes in the carotid siphons and the V4 segment of the right vertebral artery.      NECK CTA:   1. No measurable stenosis or dissection.         [Critical Result: Acute on chronic subdural hematomas]      Finding was identified on 2/23/2025 10:12 PM CST.       Dr. Caleb Shafer was contacted by me on 2/23/2025 10:24 PM CST and verbalized understanding of the critical result.          MR Brain w/o & w Contrast    (Results Pending)        Critical Care Addendum    My initial assessment, based on my review of nursing observations, review of vital signs, focused history, physical exam, and 12 lead ECG analysis, established that Kelly Barnes has ischemic or hemorrhagic stroke, which requires immediate intervention, and therefore She is critically ill.     After the initial assessment, the care team initiated multiple lab tests, initiated IV fluid administration, and consulted with stroke neurology  to provide stabilization care. Due to the critical nature of this patient, I reassessed vital signs, physical exam, 12 lead ECG analysis, mental status, and neurologic status multiple times prior to She disposition.     Time also spent performing documentation, reviewing test results, discussion with consultants, and coordination of care.     Critical care time (excluding teaching time and procedures): 35 minutes.       Assessment & Plan     Concern for subacute stroke with left leg weakness.   Based upon stroke code activation standard practice, urgent stroke consult (rather than tier 1 or tier 2 stroke code)   - Stat CT head, CTA head and neck, MRI brain, stat neurostroke consult   - EKG, stroke labs    9pm -labs show hyperglycemia.  Will treat with IV fluids.  Elevated white cell count 13.  Will check urinalysis    1030p -I discussed with [place] radiology who reports a right-sided subdural hemorrhage with acute on chronic blood which may fit the left sided leg weakness and focal symptoms.  I reevaluated patient who is alert oriented and sitting up in bed.  I communicated to stroke neurology resident via page regarding this acute finding    1145p -I discussed with neurosurgery who was evaluated.  Recommends 6-hour head CT.  I recommended inpatient admission but neurosurgery would like to see repeat CT to comment on best admitting service.      I have reviewed the nursing notes.   I have reviewed the findings, diagnosis, plan and need for follow up with the patient.    New Prescriptions    No medications on file       Final diagnoses:   Left leg weakness   History of diabetes mellitus   Hyperglycemia   Subdural hemorrhage (H)   I, Jonathan Baron, am serving as a trained medical scribe to document services personally performed by Caleb Shafer MD, based on the provider's statements to me.     I, Caleb Shafer MD, was physically present and have reviewed and verified the accuracy of this note documented by Jonathan Baron.     Caleb Shafer MD  [facility] EMERGENCY DEPARTMENT  2/23/2025     Caleb Shafer MD  02/23/25 2136       Caleb Shafer MD  02/23/25 2231       Caleb Shafer MD  02/23/25 [id number]
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
## 2025-02-24 NOTE — ED TRIAGE NOTES
Ambulatory to with complaints of left leg pain and involuntary movement of the left leg. Pt endorses falling on her left knee approx. 1 month ago. PT denies any numbness or tingling sensation. Pt states it does not feel like a muscle spasm.     BP (!) 164/91   Pulse 92   Temp 98.7  F (37.1  C)   Resp 16   LMP  (LMP Unknown)   SpO2 96%     Hx: T2DM, lymphedema, htn, osteopenia, CKD, HLD,      Triage Assessment (Adult)       Row Name 02/23/25 1827          Triage Assessment    Airway WDL WDL        Respiratory WDL    Respiratory WDL WDL        Skin Circulation/Temperature WDL    Skin Circulation/Temperature WDL WDL        Cardiac WDL    Cardiac WDL WDL        Peripheral/Neurovascular WDL    Peripheral Neurovascular WDL WDL        Cognitive/Neuro/Behavioral WDL    Cognitive/Neuro/Behavioral WDL WDL

## 2025-02-25 ENCOUNTER — PATIENT OUTREACH (OUTPATIENT)
Dept: CARE COORDINATION | Facility: CLINIC | Age: 85
End: 2025-02-25
Payer: COMMERCIAL

## 2025-02-25 LAB — BACTERIA UR CULT: NORMAL

## 2025-02-25 NOTE — PLAN OF CARE
"Goal Outcome Evaluation:       BP (!) 147/87 (BP Location: Right arm)   Pulse 75   Temp 97.7  F (36.5  C) (Oral)   Resp 18   Ht 1.575 m (5' 2\")   LMP  (LMP Unknown)   SpO2 99%   BMI 28.72 kg/m            Neuro: Aox4.    Cardiac:  NS           Respiratory: Non labored on RA.   GI/: Voids spontaneously. No BM on shift.    Diet/appetite: Regular diet   Activity:  SB assist with cane   Pain: denies pain.   Skin: no skin deficits     Plan: Transfer to Obs for POC.                  "

## 2025-02-25 NOTE — RESULT ENCOUNTER NOTE
Final urine culture report is negative.  Adult: Negative urine culture parameters per protocol: Any # urogenital erum, single or mixed   [facility] Emergency Dept discharge antibiotic prescribed (If applicable): none  Treatment recommendations per [facility] ED Lab Result Urine Culture protocol: No change in plan of care. 
Yes

## 2025-02-25 NOTE — INTERIM SUMMARY
NEUROLOGY INTERVAL NOTE    -Reviewed Electroencephalogram results, negative for epileptiform activity or discharges c/f seizure.    -Ok to discharge home from vascular neurology perspective with close follow up.   -Hold off on resuming anti platelets at this time, defer timeline for resumption to neurosurgery primary team.     Reccs:  Levetiracetam 750mg BID x 30 days.  Follow up with neurology as scheduled. 4/11/2025  Follow up with neurosurgery in outpatient clinic.  Please place in patient's discharge instructions:   Per [place] regulations regarding seizures and driving, you are prohibited from driving for three months following any seizure activity (involuntary activity). Please refrain from activities that could result in self-injury or harm to others for three months after any seizure with impaired awareness or motor control. This includes avoiding activities such as holding babies at heights where dropping could occur, bathing infants without continuous adult supervision, operating power tools, handling firearms, exposure to heights, hot cooking oil, swimming alone, among others.    Discussed recommendations with primary team and vascular neurology attending. Please call/message w questions .    Soraya Ramirez MD  Neurology PGY-2

## 2025-02-25 NOTE — PROGRESS NOTES
Clinic Care Coordination Contact    Situation: Patient chart reviewed by care coordinator.    Background: Patient was seen at [facility] ED for one-sided weakness.    Assessment: Per provider note: My initial assessment, based on my review of nursing observations, review of vital signs, focused history, physical exam, and 12 lead ECG analysis, established that Kelly Barnes has ischemic or hemorrhagic stroke, which requires immediate intervention, and therefore She is critically ill.      After the initial assessment, the care team initiated multiple lab tests, initiated IV fluid administration, and consulted with stroke neurology  to provide stabilization care. Due to the critical nature of this patient, I reassessed vital signs, physical exam, 12 lead ECG analysis, mental status, and neurologic status multiple times prior to She disposition.     Time also spent performing documentation, reviewing test results, discussion with consultants, and coordination of care.     Critical care time (excluding teaching time and procedures): 35 minutes.         Assessment & Plan    Concern for subacute stroke with left leg weakness.   Based upon stroke code activation standard practice, urgent stroke consult (rather than tier 1 or tier 2 stroke code)   - Stat CT head, CTA head and neck, MRI brain, stat neurostroke consult   - EKG, stroke labs     9pm -labs show hyperglycemia.  Will treat with IV fluids.  Elevated white cell count 13.  Will check urinalysis     1030p -I discussed with [facility] radiology who reports a right-sided subdural hemorrhage with acute on chronic blood which may fit the left sided leg weakness and focal symptoms.  I reevaluated patient who is alert oriented and sitting up in bed.  I communicated to stroke neurology resident via page regarding this acute finding     1145p -Provider discussed with neurosurgery who was evaluated.  Recommends 6-hour head CT.  I recommended inpatient  admission but neurosurgery would like to see repeat CT to comment on best admitting service.       Provider has reviewed the nursing notes.   Provider has reviewed the findings, diagnosis, plan and need for follow up with the patient. Patient was treated with Keppra and will continue at discharge for a 30 day course for seizure prophylaxis.   PT/OT evaluated patient and recommended discharge home with outpatient PT. Referral placed.  Patient was discharged home on 02/24/25 with plan for 2 week follow up at Neurosurgery clinic with repeat head CT, where will be determined if ok to resume ASA. Neurology referral placed.     Plan/Recommendations: No further action needed by Care Coordination.     Caroline Guido RN, BSN, CPHN, [facility]  [facility], and [facility]  [email or web address]  Office: [phone]  Employed by [facility]   (On behalf of Rachana Hutchinson RN CC)

## 2025-02-26 ENCOUNTER — OFFICE VISIT (OUTPATIENT)
Dept: PALLIATIVE MEDICINE | Facility: CLINIC | Age: 85
End: 2025-02-26
Attending: FAMILY MEDICINE
Payer: COMMERCIAL

## 2025-02-26 VITALS — SYSTOLIC BLOOD PRESSURE: 131 MMHG | DIASTOLIC BLOOD PRESSURE: 79 MMHG | HEART RATE: 79 BPM | OXYGEN SATURATION: 97 %

## 2025-02-26 DIAGNOSIS — M48.07 SPINAL STENOSIS OF LUMBOSACRAL REGION: ICD-10-CM

## 2025-02-26 DIAGNOSIS — M43.16 SPONDYLOLISTHESIS OF LUMBAR REGION: ICD-10-CM

## 2025-02-26 DIAGNOSIS — E11.40 TYPE 2 DIABETES MELLITUS WITH DIABETIC NEUROPATHY, WITHOUT LONG-TERM CURRENT USE OF INSULIN (H): Primary | ICD-10-CM

## 2025-02-26 DIAGNOSIS — M48.062 SPINAL STENOSIS OF LUMBAR REGION WITH NEUROGENIC CLAUDICATION: ICD-10-CM

## 2025-02-26 PROCEDURE — 99417 PROLNG OP E/M EACH 15 MIN: CPT | Performed by: PAIN MEDICINE

## 2025-02-26 PROCEDURE — 3075F SYST BP GE 130 - 139MM HG: CPT | Performed by: PAIN MEDICINE

## 2025-02-26 PROCEDURE — 3078F DIAST BP <80 MM HG: CPT | Performed by: PAIN MEDICINE

## 2025-02-26 PROCEDURE — 1125F AMNT PAIN NOTED PAIN PRSNT: CPT | Performed by: PAIN MEDICINE

## 2025-02-26 PROCEDURE — 99215 OFFICE O/P EST HI 40 MIN: CPT | Performed by: PAIN MEDICINE

## 2025-02-26 ASSESSMENT — PAIN SCALES - GENERAL: PAINLEVEL_OUTOF10: MODERATE PAIN (4)

## 2025-02-26 NOTE — PATIENT INSTRUCTIONS
Further procedures recommended:  hesistant given recent subdural    - consider Left l4-5, L5-S1 tfesi  - Medication Management:    - just started keppra   - consider low dose lyrica  - consider low dose zanaflex    - Physical Therapy: continue home regimen   - Clinical Health Psychologist to address issues of relaxation, behavioral change, coping style, and other factors important to improvement: consider  - Diagnostic Studies: reviewed with patient   - Urine toxicology screen today: no   - Follow up: pending decision to proceed    - follow up with neurology   - would consider follow up with neurosurgery     Clinic Number:  [phone]  Call with any questions about your care and for scheduling assistance.   Calls are returned Monday through Friday between 8 AM and 4:30 PM. We usually get back to you within 2 business days depending on the issue/request.    If we are prescribing your medications:  For opioid medication refills, call the clinic or send a Gim[employer] message 7 days in advance.  Please include:  Name of requested medication  Name of the pharmacy.  For non-opioid medications, call your pharmacy directly to request a refill. Please allow 3-4 days to be processed.   Per [place] State Law:  All controlled substance prescriptions must be filled within 30 days of being written.    For those controlled substances allowing refills, pickup must occur within 30 days of last fill.      We believe regular attendance is key to your success in our program!    Any time you are unable to keep your appointment we ask that you call us at least 24 hours in advance to cancel.This will allow us to offer the appointment time to another patient.   Multiple missed appointments may lead to dismissal from the clinic.

## 2025-02-26 NOTE — PROGRESS NOTES
[facility]    Date of visit: 2/26/2025    Primary Care Provider: Dr. Lea Lopez    Reason for consultation:    Kelly Barnes is a 85 year old female who is seen in consultation today at the request of her primary care physician, Lea Lopez.       Chief Complaint:    Left leg calf pain    Pain history: Very complex hx   Of note pt has poorly controlled DM   Seen by [facility] and [place] [facility]   Kelly Barnes is a 85 year old female with a PMH significant for chronic left leg pain with cont symp  Pt seen at [facility] neg  work up vascular   Ext work up with [facility]   3/24 on a trip to [place]  She started to get severe left leg pain  The pain started approx a couple of days into her trip   The pain is mainly in the back of her leg below the knee  The pain is mainly a deep aching pain  The pt reports her chronic LE edema and erythema did not worsen after fall  The pain is worse throughout the day  Better in the am  Benefit when not waking  Worse with walking  Notes sig decrease in mobility   Benefit with asa- but found to subdural hematoma after fall  Of note recent fall in the setting of leg buckling  Denies any head trauma, but was found to have a subdural hematoma   Of note pt has follow up     Pt reports symp worse got worse after fall  Noted increased weakness  Repeat CT neg   Denies allodynia   Dec sensation     Neuro work up neg   OF note when pt walking and then stops symptoms improve pretty quickly   Pt only able to walk a couple of blocks   Of note pt has a fam hx of venous insuffiencey   Prefer shopping cart   Of note was not able to finish debora 2/2 to risk of falling per pt     Denies red flags including: bowel or bladder symptoms, fever, chills, saddle anesthesia, profound motor loss, history of cancer, history of immune compromise, weight loss.     Impact of Pain: There is significant limitation     Current medication treatments include:  SHORT RX FOR OXY  Pain  medications are being prescribed by .    Previous medication treatments included:  dania    Other treatments have included:  Kelly Barnes has been seen at a pain clinic in the past.      PT: yes      Interventional:  Acupuncture/chiro: n  TENs Unit: n  Injections: n      Past Medical History:  Past Medical History:   Diagnosis Date    Acute kidney injury 12/19/2020    Allergic rhinitis, cause unspecified     Anemia     Aortic stenosis 02/29/2016    With new murmur noted 2/2016, normal echo, repeat echo 2/2017.    Aortic valve sclerosis     Atypical chest pain 07/24/2015    cuboid     left foot    Endometrial cancer (H)     Fungemia 08/16/2021    History of Clostridium difficile colitis     Hyperlipidemia LDL goal <100 11/01/2012    Hypertension goal BP (blood pressure) < 140/90     Musculoskeletal chest pain 11/25/2016    Obesity, Class I, BMI 30-34.9 11/11/2015    Pneumonia 03/2016    Pyelonephritis     Sepsis, due to unspecified organism, unspecified whether acute organ dysfunction present (H) 12/19/2020    Type 2 diabetes, HbA1c goal < 7% (H)     Urinary tract infection associated with nephrostomy catheter, initial encounter 08/08/2021     Past Surgical History:  Past Surgical History:   Procedure Laterality Date    CATARACT EXTRACTION      CYSTOSCOPY, RETROGRADES, INSERT STENT URETER(S), COMBINED Right 07/15/2021    Procedure: CYSTOURETEROSCOPY, WITH RETROGRADE PYELOGRAM,  AND STENT INSERTION RIGHT;  Surgeon: Kirk Law MD;  Location: [facility]    LAPAROSCOPIC HYSTERECTOMY TOTAL, BILATERAL SALPINGO-OOPHORECTOMY, NODE DISSECTION, COMBINED N/A 10/30/2023    Procedure: TOTAL LAPAROSCOPIC HYSTERECTOMY, WITH BILATERAL SALPINGO-OOPHORECTOMY, BILATEAL PELVIC SENTINEL LYMPH NODE;  Surgeon: Kaykay Burden MD;  Location: [facility] OR    LASER HOLMIUM NEPHROLITHOTOMY VIA PERCUTANEOUS NEPHROSTOMY Right 08/05/2021    Procedure: NEPHROLITHOTOMY, PERCUTANEOUS, USING HOLMIUM LASER RIGHT;  Surgeon: Kirk Law MD;   [facility]    LASER HOLMIUM NEPHROLITHOTOMY VIA PERCUTANEOUS NEPHROSTOMY Right 08/13/2021    Procedure: Ureteroscopic assisted secondary right percutaneous nephrolihtotomy, Holmium laser lithotriipsy;  Surgeon: Kirk Law MD;  Location: [facility] OR    PICC SINGLE LUMEN PLACEMENT Left 08/17/2021    41cm (2cm external), Medial brachial vein     Medications:  Current Outpatient Medications   Medication Sig Dispense Refill    blood glucose (ONETOUCH ULTRA) test strip Use to test blood sugar 2 times daily or as directed. 100 strip 3    blood glucose monitoring (ONE TOUCH ULTRA 2) meter device kit Use to test blood sugar 2 times daily or as directed. 1 kit 0    glimepiride (AMARYL) 4 MG tablet TAKE ONE TABLET BY MOUTH TWICE A DAY WITH MEALS 180 tablet 3    [START ON 3/3/2025] ibuprofen (ADVIL/MOTRIN) 200 MG tablet Take 1 tablet (200 mg) by mouth every 6 hours as needed.      Lancets (ONETOUCH DELICA PLUS LYAVVC17Z) MISC USE ONE DEVICE BY IN VITRO ROUTE TWO TIMES A  each 3    levETIRAcetam (KEPPRA) 750 MG tablet Take 1 tablet (750 mg) by mouth 2 times daily. 60 tablet 1    losartan-hydrochlorothiazide (HYZAAR) 50-12.5 MG tablet TAKE ONE TABLET BY MOUTH EVERY MORNING 90 tablet 3    oxyCODONE (ROXICODONE) 5 MG tablet Take 1 tablet (5 mg) by mouth every 6 hours as needed for severe pain or breakthrough pain. 20 tablet 0    simvastatin (ZOCOR) 20 MG tablet TAKE ONE TABLET BY MOUTH AT BEDTIME 90 tablet 3    sitagliptin (JANUVIA) 100 MG tablet Take 1 tablet (100 mg) by mouth daily. 30 tablet 3    potassium citrate (UROCIT-K) 10 MEQ (1080 MG) CR tablet Take 1 tablet (10 mEq) by mouth daily. (Patient not taking: Reported on 2/24/2025) 90 tablet 3    sitagliptin (JANUVIA) 50 MG tablet Take 1 tablet (50 mg) by mouth daily. (Patient not taking: Reported on 1/6/2025) 90 tablet 3     Allergies:     Allergies   Allergen Reactions    Ibuprofen Other (See Comments)     numbness in hands and feet    Keflex [Cephalexin] Rash     Metformin Rash       Family history:  Family History   Problem Relation Age of Onset    Hypertension Mother     Diabetes No family hx of     Cerebrovascular Disease No family hx of     Breast Cancer No family hx of     Cancer - colorectal No family hx of     Prostate Cancer No family hx of     Anesthesia Reaction No family hx of     Bleeding Disorder No family hx of     Clotting Disorder No family hx of              Diagnostic Tests:  EMG showed only minor chronic neurogenic changes in the left calf     IMPRESSION: Patent bilateral popliteal arteries without evidence of  dynamic compression with popliteal entrapment maneuvers.   FINDINGS:  RIGHT:       Brachial: 144 mmHg       Ankle (PT): 150 mmHg       Ankle (DP): 154 mmHg       1st Digit: 110 mmHg          GATO: 1.07       TBI: 0.76          VPR:            High thigh: Normal            Low thigh: Normal            Calf: Normal            Ankle: Normal          1st Digit PPG: Normal     LEFT:       Brachial: 143 mmHg       Ankle (PT): 150 mmHg       Ankle (DP): 156 mmHg       1st Digit: 94 mmHg          GATO: 1.08       TBI: 0.65          VPR:            High thigh: Normal            Low thigh: Normal            Calf: Normal            Ankle: Normal          1st Digit PPG: Normal                                                                      IMPRESSION:  1. RIGHT:       A. Resting GATO is normal, 1.07.       B. Resting TBI is normal, 0.76.     2. LEFT:       A. Resting GATO is normal, 1.08.       B. Resting TBI is ABNORMAL, 0.65.     3. Exercise study not performed.     Guidelines:     GATO Diagnostic Criteria (Based on criteria published in Circulation  2011; 124: 6004-9207):    > 1.4: Non compressible    1.00 - 1.40: Normal    0.91 - 0.99: Borderline    At or below 0.90: Abnormal  INDICATION: Lymphedema  COMPARISON: None available at time of dictation  TECHNIQUE: The primary site was prepped and draped in sterile fashion. 4.4 mCi of Tc-99m sulfur colloid was  injected in the right foot and 4.4 mCi of Tc-99m sulfur colloid was injected in the left foot. Imaging was then performed immediately after   injection, 25 minutes, 50 minutes, 75 minutes, and 90 minutes.      FINDINGS: While the drainage pathways are poorly visualized due to the scar artifact in the feet, drainage tube reaches the inguinal region at 75 minutes and liver at 90 minutes suggesting patent lower extremity lymphatics.                                                                        INDICATION: Lymphedema  COMPARISON: None available at time of dictation  TECHNIQUE: The primary site was prepped and draped in sterile fashion. 4.4 mCi of Tc-99m sulfur colloid was injected in the right foot and 4.4 mCi of Tc-99m sulfur colloid was injected in the left foot. Imaging was then performed immediately after   injection, 25 minutes, 50 minutes, 75 minutes, and 90 minutes.      FINDINGS: While the drainage pathways are poorly visualized due to the scar artifact in the feet, drainage tube reaches the inguinal region at 75 minutes and liver at 90 minutes suggesting patent lower extremity lymphatics.                                                                      IMPRESSION:      INDICATION: Lymphedema  COMPARISON: None available at time of dictation  TECHNIQUE: The primary site was prepped and draped in sterile fashion. 4.4 mCi of Tc-99m sulfur colloid was injected in the right foot and 4.4 mCi of Tc-99m sulfur colloid was injected in the left foot. Imaging was then performed immediately after   injection, 25 minutes, 50 minutes, 75 minutes, and 90 minutes.      FINDINGS: While the drainage pathways are poorly visualized due to the scar artifact in the feet, drainage tube reaches the inguinal region at 75 minutes and liver at 90 minutes suggesting patent lower extremity lymphatics.                                                                      IMPRESSION:    T11-T12 through L1-L2: These levels  visualized on the sagittal sequences. Mild scattered degenerative changes and spondylosis without significant spinal canal or foraminal narrowing.     L2-L3: Minimal disc and facet degeneration. No focal disc herniation. Spinal canal and foramina patent.     L3-L4: Mild disc degeneration. Mild to moderate facet degeneration. Spinal canal and foramina patent.     L4-L5: Mild-moderate disc and facet degeneration. Annular bulge and posterior spurring. Mild spinal canal narrowing and mild bilateral foraminal narrowing.     L5-Transitional Lumbarized S1: Moderate disc degeneration. Moderate to advanced left greater than right facet degeneration with small to moderate-sized bilateral facet joint effusions. Reactive edema associated with the left facet degeneration. Grade 1   anterolisthesis L5 on S1. Broad-based posterior spurring and annular bulging asymmetric left. Moderate to advanced spinal canal narrowing with moderate to advanced narrowing of the superior aspect of the left lateral recess. Moderate narrowing of the   superior aspect of the right lateral recess. Mild to moderate left and mild right foraminal narrowing.     Transitional Lumbarized S1-S2: Mild disc degeneration. Shallow annular bulge. Moderate facet degeneration. Spinal canal patent. Mild to moderate narrowing of the superior aspect of the left greater than right lateral recess. Mild to moderate left   foraminal narrowing. Right foramen patent.         Physical Exam:  Vitals:    02/26/25 0844   BP: 131/79   BP Location: Right arm   Pulse: 79   SpO2: 97%     Exam:  Constitutional: healthy, alert, and no distress  Head: normocephalic. Atraumatic.     Skin: erythema -  left leg  Psychiatric: mentation appears normal and affect normal/bright    Musculoskeletal exam:  Gait/Station/Posture: wnl    Thoracic spine:  Normal   Lumbar spine: wnl  Straight leg exam: neg  Sacroiliac (SI) joint tenderness: neg    Left leg swelling- larger than right. + erythema  unable to appreciate pitting edema     Neurologic exam:  Motor:  5/5 UE and LE strengt    Reflexes:     Biceps:     R:  2/4 L: 2/4   Brachioradialis   R:  2/4 L: 2/4   Triceps:  R:  2/4 L: 2/4   Patella:  R:  2/4 L: 2/4   Achilles:  R:  2/4 L: 2/4   Sensory:  (upper and lower extremities):   Light touch: dec  dorsal foot and calf   +TTP through lower leg   Allodynia: absent    Hyperalgesia: absent         Assessment:  Kelly Barnes is a 85 year old female with a past medical history significant for left leg pain presenting with left leg pain      Kelly was seen today for pain.    Diagnoses and all orders for this visit:    Type 2 diabetes mellitus with diabetic neuropathy, without long-term current use of insulin (H)    Spondylolisthesis of lumbar region  -     Spine  Referral    Spinal stenosis of lumbosacral region  -     Spine  Referral    Spinal stenosis of lumbar region with neurogenic claudication            Plan:  Diagnosis reviewed, treatment options addressed, and risks/benefits discussed. The patient was involved in shared decision making regarding the plan as laid out below.    - Further procedures recommended:  hesistant given recent subdural    - consider Left l4-5, L5-S1 tfesi  - Medication Management:    - just started keppra   - consider low dose lyrica  - consider low dose zanaflex    - Physical Therapy: continue home regimen   - Clinical Health Psychologist to address issues of relaxation, behavioral change, coping style, and other factors important to improvement: consider  - Diagnostic Studies: reviewed with patient   - Urine toxicology screen today: no   - Follow up: pending decision to proceed    - follow up with neurology   - would consider follow up with neurosurgery             The total TIME spent on this patient on the day of the appointment was 60 minutes.   Time spent preparing to see the patient (reviewing records and tests)  Time spend face to face with the  patient  Time spent ordering tests, medications, procedures and referrals  Time spent Referring and communicating with other healthcare professionals  Documenting clinical information in Epic  Kiel Sneed MD.  Medical Spine Specialist  [facility]

## 2025-03-06 ENCOUNTER — OFFICE VISIT (OUTPATIENT)
Dept: FAMILY MEDICINE | Facility: CLINIC | Age: 85
End: 2025-03-06
Payer: COMMERCIAL

## 2025-03-06 VITALS
TEMPERATURE: 97.2 F | OXYGEN SATURATION: 96 % | BODY MASS INDEX: 27.97 KG/M2 | HEART RATE: 110 BPM | WEIGHT: 152 LBS | HEIGHT: 62 IN | DIASTOLIC BLOOD PRESSURE: 70 MMHG | SYSTOLIC BLOOD PRESSURE: 110 MMHG | RESPIRATION RATE: 14 BRPM

## 2025-03-06 DIAGNOSIS — M48.07 SPINAL STENOSIS OF LUMBOSACRAL REGION: ICD-10-CM

## 2025-03-06 DIAGNOSIS — Z09 HOSPITAL DISCHARGE FOLLOW-UP: Primary | ICD-10-CM

## 2025-03-06 DIAGNOSIS — M43.16 SPONDYLOLISTHESIS OF LUMBAR REGION: ICD-10-CM

## 2025-03-06 DIAGNOSIS — I62.03 ACUTE ON CHRONIC INTRACRANIAL SUBDURAL HEMATOMA (H): ICD-10-CM

## 2025-03-06 DIAGNOSIS — E11.41 DIABETIC MONONEUROPATHY ASSOCIATED WITH TYPE 2 DIABETES MELLITUS (H): ICD-10-CM

## 2025-03-06 DIAGNOSIS — E78.5 HYPERLIPIDEMIA LDL GOAL <100: ICD-10-CM

## 2025-03-06 DIAGNOSIS — I62.01 ACUTE ON CHRONIC INTRACRANIAL SUBDURAL HEMATOMA (H): ICD-10-CM

## 2025-03-06 RX ORDER — SIMVASTATIN 20 MG
20 TABLET ORAL AT BEDTIME
Qty: 90 TABLET | Refills: 3 | Status: SHIPPED | OUTPATIENT
Start: 2025-03-06

## 2025-03-06 ASSESSMENT — PAIN SCALES - GENERAL: PAINLEVEL_OUTOF10: MODERATE PAIN (4)

## 2025-03-06 NOTE — PROGRESS NOTES
Assessment & Plan     (Z09) Hospital discharge follow-up  (primary encounter diagnosis)  Comment:   Plan: overall stable since discharge, upcoming appts with PCP, PT and neurology to determine the next steps in balance and pain control.  Advised we will follow up after CT head tomorrow.    (I62.01,  I62.03) Acute on chronic intracranial subdural hematoma (H)  Comment:   Plan: improving, following closely with neurology    (E11.41) Diabetic mononeuropathy associated with type 2 diabetes mellitus (H)  Comment:   Plan: stable pain, per pain clinic, consider lyrica and or tizanidine    (E78.5) Hyperlipidemia LDL goal <100  Comment:   Plan: simvastatin (ZOCOR) 20 MG tablet            (M48.07) Spinal stenosis of lumbosacral region  Comment:   Plan:     (M43.16) Spondylolisthesis of lumbar region  Comment:   Plan: follow up with PT and neuro/pain clinic for possible interventions discussed below.        MED REC REQUIRED  Post Medication Reconciliation Status:  Discharge medications reconciled, continue medications without change        Subjective   Kelly is a 85 year old, presenting for the following health issues:  Hospital F/U        3/6/2025    11:10 AM   Additional Questions   Roomed by Glenna Miller   Accompanied by Self     HPI        ED/UC Followup:    Facility:  [facility] Emergency Department   Date of visit: 2/23/2025 - 2/24/2025 (25 hours)   Reason for visit: One-sided Weakness   Current Status: Pt stated she is still having weakness on her left leg. Just the leg and not the whole side.     ED notes from 2/23/25:  Assessment & Plan    Concern for subacute stroke with left leg weakness.   Based upon stroke code activation standard practice, urgent stroke consult (rather than tier 1 or tier 2 stroke code)   - Stat CT head, CTA head and neck, MRI brain, stat neurostroke consult   - EKG, stroke labs     9pm -labs show hyperglycemia.  Will treat with IV fluids.  Elevated white cell count 13.  Will check  urinalysis     1030p -I discussed with [facility]ology who reports a right-sided subdural hemorrhage with acute on chronic blood which may fit the left sided leg weakness and focal symptoms.  I reevaluated patient who is alert oriented and sitting up in bed.  I communicated to stroke neurology resident via page regarding this acute finding     1145p -I discussed with neurosurgery who was evaluated.  Recommends 6-hour head CT.  I recommended inpatient admission but neurosurgery would like to see repeat CT to comment on best admitting service.    2/26/25 Pain clinic note:    Assessment:  Kelly Barnes is a 85 year old female with a past medical history significant for left leg pain presenting with left leg pain        Kelly was seen today for pain.     Diagnoses and all orders for this visit:     Type 2 diabetes mellitus with diabetic neuropathy, without long-term current use of insulin (H)     Spondylolisthesis of lumbar region  -     Spine  Referral     Spinal stenosis of lumbosacral region  -     Spine  Referral     Spinal stenosis of lumbar region with neurogenic claudication       Plan:  Diagnosis reviewed, treatment options addressed, and risks/benefits discussed. The patient was involved in shared decision making regarding the plan as laid out below.     - Further procedures recommended:  hesistant given recent subdural               - consider Left l4-5, L5-S1 tfesi  - Medication Management:               - just started keppra              - consider low dose lyrica  - consider low dose zanaflex    - Physical Therapy: continue home regimen   - Clinical Health Psychologist to address issues of relaxation, behavioral change, coping style, and other factors important to improvement: consider  - Diagnostic Studies: reviewed with patient   - Urine toxicology screen today: no   - Follow up: pending decision to proceed               - follow up with neurology              - would consider follow  "up with neurosurgery         TODAY:  Pt stated she is still having weakness on her left leg. Just the leg and not the whole side, no pain at rest.  She notes positive outlook from pain clinic visit, hoping for possible interventions once neurology signs off on SDH.   No new headache sx, no back pain, noting her balance is slightly worse and BS readings are elevated from a lack of mobility and exercise.  She has met with PT for assessment and has her 1st session next week.    PCP follow up is also scheduled for 3/11/25        Objective    /70   Pulse 110   Temp 97.2  F (36.2  C) (Temporal)   Resp 14   Ht 1.575 m (5' 2\")   Wt 68.9 kg (152 lb)   LMP  (LMP Unknown)   SpO2 96%   Breastfeeding No   BMI 27.80 kg/m    Body mass index is 27.8 kg/m .  Physical Exam   GENERAL: healthy, alert and no distress  EYES: Eyes grossly normal to inspection, EOM intact and conjunctivae normal  RESP: breathing comfortably on room air  PSYCH: mentation appears normal, affect normal/bright              Signed Electronically by: Sha Calvillo PA-C    "

## 2025-03-07 ENCOUNTER — ANCILLARY PROCEDURE (OUTPATIENT)
Dept: CT IMAGING | Facility: CLINIC | Age: 85
End: 2025-03-07
Payer: COMMERCIAL

## 2025-03-07 DIAGNOSIS — I62.00 SUBDURAL HEMORRHAGE (H): ICD-10-CM

## 2025-03-07 PROCEDURE — 70450 CT HEAD/BRAIN W/O DYE: CPT | Mod: GC | Performed by: RADIOLOGY

## 2025-03-10 ENCOUNTER — THERAPY VISIT (OUTPATIENT)
Dept: PHYSICAL THERAPY | Facility: CLINIC | Age: 85
End: 2025-03-10
Payer: COMMERCIAL

## 2025-03-10 DIAGNOSIS — I62.00 SUBDURAL HEMORRHAGE (H): Primary | ICD-10-CM

## 2025-03-11 ENCOUNTER — VIRTUAL VISIT (OUTPATIENT)
Dept: FAMILY MEDICINE | Facility: CLINIC | Age: 85
End: 2025-03-11
Payer: COMMERCIAL

## 2025-03-11 DIAGNOSIS — E11.9 TYPE 2 DIABETES MELLITUS WITHOUT COMPLICATION, WITHOUT LONG-TERM CURRENT USE OF INSULIN (H): Primary | ICD-10-CM

## 2025-03-11 DIAGNOSIS — I62.01 ACUTE ON CHRONIC INTRACRANIAL SUBDURAL HEMATOMA (H): ICD-10-CM

## 2025-03-11 DIAGNOSIS — R29.898 LEFT LEG WEAKNESS: ICD-10-CM

## 2025-03-11 DIAGNOSIS — I62.03 ACUTE ON CHRONIC INTRACRANIAL SUBDURAL HEMATOMA (H): ICD-10-CM

## 2025-03-11 DIAGNOSIS — M79.662 PAIN OF LEFT LOWER LEG: ICD-10-CM

## 2025-03-11 PROCEDURE — 98014 SYNCH AUDIO-ONLY EST MOD 30: CPT | Performed by: FAMILY MEDICINE
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
## 2025-03-17 ENCOUNTER — THERAPY VISIT (OUTPATIENT)
Dept: PHYSICAL THERAPY | Facility: CLINIC | Age: 85
End: 2025-03-17
Payer: COMMERCIAL

## 2025-03-17 DIAGNOSIS — I62.00 SUBDURAL HEMORRHAGE (H): Primary | ICD-10-CM

## 2025-03-24 ENCOUNTER — THERAPY VISIT (OUTPATIENT)
Dept: PHYSICAL THERAPY | Facility: CLINIC | Age: 85
End: 2025-03-24
Payer: COMMERCIAL

## 2025-03-24 DIAGNOSIS — I62.00 SUBDURAL HEMORRHAGE (H): Primary | ICD-10-CM

## 2025-03-24 NOTE — PATIENT INSTRUCTIONS
Please bring your cane in next time and we will practice using this.   Keep doing exercises daily, taking frequent breaks as needed.  Continue doing the pedal bike 10-12 minutes    Iza Jones DPT  Physical Therapist  [address]  [address] [place] [address]    Appointments: [phone]

## 2025-04-07 ENCOUNTER — THERAPY VISIT (OUTPATIENT)
Dept: PHYSICAL THERAPY | Facility: CLINIC | Age: 85
End: 2025-04-07
Payer: COMMERCIAL

## 2025-04-07 DIAGNOSIS — I62.00 SUBDURAL HEMORRHAGE (H): Primary | ICD-10-CM

## 2025-04-15 ENCOUNTER — THERAPY VISIT (OUTPATIENT)
Dept: PHYSICAL THERAPY | Facility: CLINIC | Age: 85
End: 2025-04-15
Payer: COMMERCIAL

## 2025-04-15 DIAGNOSIS — I62.00 SUBDURAL HEMORRHAGE (H): Primary | ICD-10-CM

## 2025-04-15 NOTE — PROGRESS NOTES
"   04/15/25 0500   Appointment Info   Signing clinician's name / credentials Iza Jones, PT   Visits Used 6   Medical Diagnosis Subdural hemorrhage   PT Tx Diagnosis Force Production Deficit   Progress Note/Certification   Start of Care Date 02/28/25   Onset of illness/injury or Date of Surgery 02/23/25   Therapy Frequency 1x/week   Predicted Duration 8 weeks   Certification date from 02/28/25   Certification date to 04/25/25   PT Goal 1   Goal Identifier HEP   Goal Description Patient will be independent with Home Exercise Program for continued improvements in health and wellness upon d/c from therapy   Goal Progress 4/15: has been compliant with HEP, fits it into her daily routine and has continued to also do the peddle bike.   Target Date 04/25/25   Date Met 04/15/25   PT Goal 2   Goal Identifier 5 time sit to stand   Goal Description Patient will improve score on 5 time sit to stand to <20 sec to indicate improved functional strength   Rationale to maximize safety and independence with performance of ADLs and functional tasks   Goal Progress met   Target Date 04/25/25   Date Met 04/07/25   PT Goal 3   Goal Identifier Static Balance   Goal Description Patient will demonstrate ability to maintain Romberg position with eyes closed for 30 sec or better as an indication of improved static balance   Rationale to maximize safety and independence with performance of ADLs and functional tasks   Target Date 04/25/25   Goal Progress 4/15: able to hold for 30 sec with small space between feet.   Subjective Report   Subjective Report Still has leg pain, but balance feels much improved. Arrives today with her cane, no walker. She can move around longer if she uses her walker (because of her leg pain), but does not feel she relies on it for balance.  Exercises going well except the step ups.   Objective Measure 2   Objective Measure Condition 2 of mCSTIB   Details <20\", but able to complete with 1\" between feet for 30 sec " "  Neuromuscular Re-education   Neuromuscular re-ed of mvmt, balance, coord, kinesthetic sense, posture, proprioception minutes (01814) 32   Neuro Re-ed 3 Static stance EC   Neuro Re-ed 3 - Details NS FA 1\" EC x 30\"   Neuro Re-ed 4 Walking with head turns   Neuro Re-ed 4 - Details HV 60 feet with no SEC   Neuro Re-ed 5 Step ups   Neuro Re-ed 5 - Details 4\" step x 10, stepping up with R leg, down with left for stair climbing and navigating curbs; Able to complete on 6\" step with intermittent UE support on rail   Neuro Re-ed 6 Toe taps   Neuro Re-ed 6 - Details 6\" step x 20 alternating with no UE support   Skilled Intervention Reviewed/updated HEP for continued balance gains.   Patient Response/Progress Unsteady/fearful with step-ups, but demonstrating progress since last visit.  Frequent breaks due to L LE pain.   Education   Learner/Method Patient;Listening;Demonstration;Pictures/Video   Plan   Home program PT exercises for leg pain/back, seated peddle bike, PTRx balance exercises   Updates to plan of care Patient is meeting majority of her goals, and is limited primarily by left LE pain, rather than balance. No further PT indicated at this time.   Total Session Time   Timed Code Treatment Minutes 32   Total Treatment Time (sum of timed and untimed services) 32         DISCHARGE  Reason for Discharge: Patient has met majority of goals.    Equipment Issued: none    Discharge Plan: Patient to continue home program.    Referring Provider:  Carolina Goodson    "

## 2025-04-16 ENCOUNTER — TELEPHONE (OUTPATIENT)
Dept: NEUROSURGERY | Facility: CLINIC | Age: 85
End: 2025-04-16
Payer: COMMERCIAL

## 2025-04-16 NOTE — TELEPHONE ENCOUNTER
[facility] Call Center    Phone Message    May a detailed message be left on voicemail: yes     Reason for Call: Other: Patient is calling regarding her referral. She is already scheduled for an appointment with Dr. Carney on 5/9. She is wondering if an additional appointment is necessary. Please review and call back.      Action Taken: Message routed to:  Other: [facility] Neurosurgery    Travel Screening: Not Applicable

## 2025-04-16 NOTE — TELEPHONE ENCOUNTER
Patient called back confused about her appointment if it was still scheduled.     Looks like patient may not be a returning patient-     Scheduling will have nursing review and determine who the patient should be scheduled with.

## 2025-04-22 ENCOUNTER — HOSPITAL ENCOUNTER (OUTPATIENT)
Dept: CT IMAGING | Facility: CLINIC | Age: 85
Discharge: HOME | End: 2025-04-22
Attending: PSYCHIATRY & NEUROLOGY | Admitting: PSYCHIATRY & NEUROLOGY
Payer: COMMERCIAL

## 2025-04-22 DIAGNOSIS — I62.00 SUBDURAL HEMORRHAGE (H): ICD-10-CM

## 2025-04-22 PROCEDURE — 70450 CT HEAD/BRAIN W/O DYE: CPT

## 2025-05-06 ENCOUNTER — VIRTUAL VISIT (OUTPATIENT)
Dept: PHARMACY | Facility: CLINIC | Age: 85
End: 2025-05-06
Payer: COMMERCIAL

## 2025-05-06 DIAGNOSIS — G62.9 POLYNEUROPATHY: ICD-10-CM

## 2025-05-06 DIAGNOSIS — E11.9 TYPE 2 DIABETES MELLITUS WITHOUT COMPLICATION, WITHOUT LONG-TERM CURRENT USE OF INSULIN (H): Primary | ICD-10-CM

## 2025-05-06 DIAGNOSIS — M79.662 PAIN OF LEFT LOWER LEG: ICD-10-CM

## 2025-05-06 DIAGNOSIS — I62.00 SUBDURAL HEMORRHAGE (H): ICD-10-CM

## 2025-05-06 DIAGNOSIS — I10 HYPERTENSION GOAL BP (BLOOD PRESSURE) < 140/90: ICD-10-CM

## 2025-05-06 DIAGNOSIS — E78.5 HYPERLIPIDEMIA LDL GOAL <100: ICD-10-CM

## 2025-05-06 RX ORDER — ACETAMINOPHEN 500 MG
1000 TABLET ORAL 3 TIMES DAILY
COMMUNITY
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
## 2025-05-06 NOTE — Clinical Note
Hi Dr. Lopez, she prefers to discuss any diabetes medication changes with you at upcoming appointment. She may benefit from changing glimepiride to SGLT2 inhibitor or GLP-1 agonist to minimize hypoglycemia risk. I'd be happy to follow-up with her after you see her if you think it would be of benefit.  Thanks, Monserrat Jc, PharmD Medication Therapy Management Pharmacist [phone]

## 2025-05-06 NOTE — LETTER
_  Medication List        Prepared on: May 6, 2025     Bring your Medication List when you go to the doctor, hospital, or   emergency room. And, share it with your family or caregivers.     Note any changes to how you take your medications.  Cross out medications when you no longer use them.    Medication How I take it Why I use it Prescriber   acetaminophen (TYLENOL) 500 MG tablet Take 1,000 mg by mouth 3 times daily.  pain Patient Reported   blood glucose (ONETOUCH ULTRA) test strip Use to test blood sugar 2 times daily or as directed. Type 2 diabetes mellitus without complication, without long-term current use of insulin (H) Lea Lopez MD   blood glucose monitoring (ONE TOUCH ULTRA 2) meter device kit Use to test blood sugar 2 times daily or as directed. Type 2 diabetes mellitus without complication, without long-term current use of insulin (H) Lea Lopez MD   diclofenac (VOLTAREN) 1 % topical gel Apply 4 g topically 4 times daily. To the left knee/calf area Pain of left lower leg Lea Lopez MD   glimepiride (AMARYL) 4 MG tablet TAKE ONE TABLET BY MOUTH TWICE A DAY WITH MEALS Type 2 diabetes mellitus without complication, without long-term current use of insulin (H) Lea Lopez MD   insulin glargine (LANTUS PEN) 100 UNIT/ML pen Inject 10 Units subcutaneously at bedtime. Type 2 diabetes mellitus without complication, without long-term current use of insulin (H) Lea Lopez MD   Lancets (ONETOUCH DELICA PLUS [id number]) MISC USE ONE DEVICE BY IN VITRO ROUTE TWO TIMES A DAY Type 2 diabetes mellitus without complication, without long-term current use of insulin (H) Lea Lopez MD   losartan-hydrochlorothiazide (HYZAAR) 50-12.5 MG tablet TAKE ONE TABLET BY MOUTH EVERY MORNING Hypertension Goal BP (Blood Pressure) < 140/90 Lea Lopez MD   oxyCODONE (ROXICODONE) 5 MG tablet Take 1 tablet (5 mg) by mouth every 6 hours as needed for severe  pain or breakthrough pain. Axonal sensorimotor neuropathy; Pain of left lower leg Lea Lopez MD   potassium citrate (UROCIT-K) 10 MEQ (1080 MG) CR tablet Take 1 tablet (10 mEq) by mouth daily. Uric Acid Kidney Stone Brenda Snowden CNP   simvastatin (ZOCOR) 20 MG tablet Take 1 tablet (20 mg) by mouth at bedtime. Hyperlipidemia LDL Goal <100 Sha Calvillo PA-C   sitagliptin (JANUVIA) 100 MG tablet Take 1 tablet (100 mg) by mouth daily. Type 2 diabetes mellitus without complication, without long-term current use of insulin (H) Lea Lopez MD         Add new medications, over-the-counter drugs, herbals, vitamins, or  minerals in the blank rows below.    Medication How I take it Why I use it Prescriber                                      Allergies:      - Ibuprofen - Other (See Comments)  - Keflex [cephalexin] - Rash  - Metformin - Rash        Side effects I have had:      Not on File        Other Information:              My notes and questions:

## 2025-05-06 NOTE — LETTER
May 6, 2025  Kelly Barnes  [address]  [facility] [address]    Dear Ms. Barnes, [facility]     Thank you for talking with me on May 6, 2025 about your health and medications. As a follow-up to our conversation, I have included two documents:      Your Recommended To-Do List has steps you should take to get the best results from your medications.  Your Medication List will help you keep track of your medications and how to take them.    If you want to talk about these documents, please call Leah Jc RPH at phone: [phone], Monday-Friday 8-4:30pm.    I look forward to working with you and your doctors to make sure your medications work well for you.    Sincerely,  Leah Jc RPH  [facility] Pharmacist, [facility]

## 2025-05-06 NOTE — PATIENT INSTRUCTIONS
"Recommendations from today's MTM visit:                                                    MTM (medication therapy management) is a service provided by a clinical pharmacist designed to help you get the most of out of your medicines.   Today we reviewed what your medicines are for, how to know if they are working, that your medicines are safe and how to make your medicine regimen as easy as possible.      Ok to take acetaminophen 500 mg tablets, take two (1000 mg) three times daily, scheduled.  Or can take acetaminophen 650 mg tablets, take two (1300 mg) in the morning and at night, and a 500 mg tablet in the afternoon.   Can also try diclofenac (Voltaren) gel, apply 4 g to the left knee/calf up to 4 times daily as needed.    Follow-up: Return in about 1 year (around 5/6/2026) for Medication Therapy Management.    It was great speaking with you today.  I value your experience and would be very thankful for your time in providing feedback in our clinic survey. In the next few days, you may receive an email or text message from [employer] with a link to a survey related to your  clinical pharmacist.\"     To schedule another MTM appointment, please call the clinic directly or you may call the MTM scheduling line at [phone] or toll-free at [phone].     My Clinical Pharmacist's contact information:                                                      Please feel free to contact me with any questions or concerns you have.      Monserrat Jc, PharmD  Medication Therapy Management Pharmacist     "

## 2025-05-06 NOTE — LETTER
"Recommended To-Do List      Prepared on: May 6, 2025       You can get the best results from your medications by completing the items on this \"To-Do List.\"      Bring your To-Do List when you go to your doctor. And, share it with your family or caregivers.    My To-Do List:  What we talked about: What I should do:   A new medication that may be of benefit to you    Start taking diclofenac (VOLTAREN)          What we talked about: What I should do:   The importance of taking your medication as intended    Education: Ok to take acetaminophen 500 mg tablets, take two (1000 mg) three times daily, scheduled.  Or can take acetaminophen 650 mg tablets, take two (1300 mg) in the morning and at night, and a 500 mg tablet in the afternoon.            What we talked about: What I should do:                     "

## 2025-05-08 DIAGNOSIS — E11.9 TYPE 2 DIABETES MELLITUS WITHOUT COMPLICATION, WITHOUT LONG-TERM CURRENT USE OF INSULIN (H): ICD-10-CM

## 2025-05-08 RX ORDER — GLIMEPIRIDE 4 MG/1
4 TABLET ORAL 2 TIMES DAILY WITH MEALS
Qty: 180 TABLET | Refills: 1 | Status: SHIPPED | OUTPATIENT
Start: 2025-05-08

## 2025-05-09 ENCOUNTER — OFFICE VISIT (OUTPATIENT)
Dept: NEUROSURGERY | Facility: CLINIC | Age: 85
End: 2025-05-09
Attending: PSYCHIATRY & NEUROLOGY
Payer: COMMERCIAL

## 2025-05-09 VITALS
BODY MASS INDEX: 27.6 KG/M2 | DIASTOLIC BLOOD PRESSURE: 82 MMHG | SYSTOLIC BLOOD PRESSURE: 156 MMHG | OXYGEN SATURATION: 99 % | WEIGHT: 150 LBS | HEIGHT: 62 IN | HEART RATE: 76 BPM

## 2025-05-09 DIAGNOSIS — M54.17 LUMBOSACRAL RADICULOPATHY: ICD-10-CM

## 2025-05-09 DIAGNOSIS — S06.5X0D POST-TRAUMATIC SUBDURAL HEMATOMA, WITHOUT LOSS OF CONSCIOUSNESS, SUBSEQUENT ENCOUNTER: ICD-10-CM

## 2025-05-09 DIAGNOSIS — M79.605 PAIN OF LEFT LOWER EXTREMITY: ICD-10-CM

## 2025-05-09 DIAGNOSIS — M48.062 SPINAL STENOSIS OF LUMBAR REGION WITH NEUROGENIC CLAUDICATION: ICD-10-CM

## 2025-05-09 DIAGNOSIS — M43.10 DEGENERATIVE SPONDYLOLISTHESIS: Primary | ICD-10-CM

## 2025-05-09 PROCEDURE — 3077F SYST BP >= 140 MM HG: CPT | Performed by: NEUROLOGICAL SURGERY

## 2025-05-09 PROCEDURE — 3079F DIAST BP 80-89 MM HG: CPT | Performed by: NEUROLOGICAL SURGERY

## 2025-05-09 PROCEDURE — 99214 OFFICE O/P EST MOD 30 MIN: CPT | Performed by: NEUROLOGICAL SURGERY

## 2025-05-09 PROCEDURE — 1126F AMNT PAIN NOTED NONE PRSNT: CPT | Performed by: NEUROLOGICAL SURGERY

## 2025-05-09 ASSESSMENT — PAIN SCALES - GENERAL: PAINLEVEL_OUTOF10: NO PAIN (0)

## 2025-05-09 NOTE — PROGRESS NOTES
It was a pleasure to see Kelly Barnes today in Neurosurgery Clinic. She is a 85 year old female Who is here for evaluation of her left lower extremity pain.  She has this pain in the back of her calf when she is walking.  This keeps her from walking more than about a block.  It is easier to push a grocery cart.  She has been having this problem for at least the last 6 to 9 months.  She has been previously evaluated at the [facility].  She is also seeing Dr. Sneed with pain management.     She also has some neuropathy but this problem seems very different.  Her evaluation at [facility] did not find any vascular cause for her pain either.    She did physical therapy over the winter without significant improvement and has not had any injections prior.  She does take Tylenol for her pain.    She also had an accident in February with a small subdural hematoma.  She is recovering well from this.    .    Past Medical History:   Diagnosis Date    Acute kidney injury 12/19/2020    Allergic rhinitis, cause unspecified     Anemia     Aortic stenosis 02/29/2016    With new murmur noted 2/2016, normal echo, repeat echo 2/2017.    Aortic valve sclerosis     Atypical chest pain 07/24/2015    cuboid     left foot    Endometrial cancer (H)     Fungemia 08/16/2021    History of Clostridium difficile colitis     Hyperlipidemia LDL goal <100 11/01/2012    Hypertension goal BP (blood pressure) < 140/90     Musculoskeletal chest pain 11/25/2016    Obesity, Class I, BMI 30-34.9 11/11/2015    Pneumonia 03/2016    Pyelonephritis     Sepsis, due to unspecified organism, unspecified whether acute organ dysfunction present (H) 12/19/2020    Type 2 diabetes, HbA1c goal < 7% (H)     Urinary tract infection associated with nephrostomy catheter, initial encounter 08/08/2021     Past Surgical History:   Procedure Laterality Date    CATARACT EXTRACTION      CYSTOSCOPY, RETROGRADES, INSERT STENT URETER(S), COMBINED Right 07/15/2021     Procedure: CYSTOURETEROSCOPY, WITH RETROGRADE PYELOGRAM,  AND STENT INSERTION RIGHT;  Surgeon: Kirk Law MD;  [facility]    LAPAROSCOPIC HYSTERECTOMY TOTAL, BILATERAL SALPINGO-OOPHORECTOMY, NODE DISSECTION, COMBINED N/A 10/30/2023    Procedure: TOTAL LAPAROSCOPIC HYSTERECTOMY, WITH BILATERAL SALPINGO-OOPHORECTOMY, BILATEAL PELVIC SENTINEL LYMPH NODE;  Surgeon: Kaykay Burden MD;  [facility]    LASER HOLMIUM NEPHROLITHOTOMY VIA PERCUTANEOUS NEPHROSTOMY Right 08/05/2021    Procedure: NEPHROLITHOTOMY, PERCUTANEOUS, USING HOLMIUM LASER RIGHT;  Surgeon: Kirk Law MD;  [facility]    LASER HOLMIUM NEPHROLITHOTOMY VIA PERCUTANEOUS NEPHROSTOMY Right 08/13/2021    Procedure: Ureteroscopic assisted secondary right percutaneous nephrolihtotomy, Holmium laser lithotriipsy;  Surgeon: Kirk Law MD;  [facility]    PICC SINGLE LUMEN PLACEMENT Left 08/17/2021    41cm (2cm external), Medial brachial vein        Allergies   Allergen Reactions    Ibuprofen Other (See Comments)     numbness in hands and feet    Keflex [Cephalexin] Rash    Metformin Rash       Current Outpatient Medications:     acetaminophen (TYLENOL) 500 MG tablet, Take 1,000 mg by mouth 3 times daily., Disp: , Rfl:     blood glucose (ONETOUCH ULTRA) test strip, Use to test blood sugar 2 times daily or as directed., Disp: 100 strip, Rfl: 3    blood glucose monitoring (ONE TOUCH ULTRA 2) meter device kit, Use to test blood sugar 2 times daily or as directed., Disp: 1 kit, Rfl: 0    diclofenac (VOLTAREN) 1 % topical gel, Apply 4 g topically 4 times daily. To the left knee/calf area, Disp: 350 g, Rfl: 1    glimepiride (AMARYL) 4 MG tablet, TAKE ONE TABLET BY MOUTH TWICE A DAY WITH MEALS, Disp: 180 tablet, Rfl: 1    insulin glargine (LANTUS PEN) 100 UNIT/ML pen, Inject 10 Units subcutaneously at bedtime., Disp: 15 mL, Rfl: 1    Lancets (ONETOUCH DELICA PLUS [id number]) MISC, USE ONE DEVICE BY IN VITRO ROUTE TWO TIMES A  DAY, Disp: 200 each, Rfl: 3    losartan-hydrochlorothiazide (HYZAAR) 50-12.5 MG tablet, TAKE ONE TABLET BY MOUTH EVERY MORNING, Disp: 90 tablet, Rfl: 3    oxyCODONE (ROXICODONE) 5 MG tablet, Take 1 tablet (5 mg) by mouth every 6 hours as needed for severe pain or breakthrough pain., Disp: 20 tablet, Rfl: 0    potassium citrate (UROCIT-K) 10 MEQ (1080 MG) CR tablet, Take 1 tablet (10 mEq) by mouth daily. (Patient not taking: Reported on 2/24/2025), Disp: 90 tablet, Rfl: 3    simvastatin (ZOCOR) 20 MG tablet, Take 1 tablet (20 mg) by mouth at bedtime., Disp: 90 tablet, Rfl: 3    sitagliptin (JANUVIA) 100 MG tablet, Take 1 tablet (100 mg) by mouth daily. (Patient not taking: Reported on 5/6/2025), Disp: 90 tablet, Rfl: 3  Social History     Socioeconomic History    Marital status:      Spouse name: None    Number of children: None    Years of education: None    Highest education level: None   Tobacco Use    Smoking status: Never     Passive exposure: Never    Smokeless tobacco: Never   Vaping Use    Vaping status: Never Used   Substance and Sexual Activity    Alcohol use: Yes     Alcohol/week: 10.0 standard drinks of alcohol     Comment: 1-2 drinks per week    Drug use: No    Sexual activity: Yes     Partners: Male   Other Topics Concern     Service No    Blood Transfusions No    Caffeine Concern No    Occupational Exposure No    Hobby Hazards No    Sleep Concern Yes    Stress Concern No    Weight Concern Yes    Special Diet No    Back Care No    Exercise Yes     Comment: walk    Bike Helmet No    Seat Belt Yes    Self-Exams Yes    Parent/sibling w/ CABG, MI or angioplasty before 65F 55M? No     Social Drivers of Health     Financial Resource Strain: Low Risk  (3/6/2025)    Financial Resource Strain     Within the past 12 months, have you or your family members you live with been unable to get utilities (heat, electricity) when it was really needed?: No   Food Insecurity: Low Risk  (3/6/2025)    Food  Insecurity     Within the past 12 months, did you worry that your food would run out before you got money to buy more?: No     Within the past 12 months, did the food you bought just not last and you didn t have money to get more?: No   Transportation Needs: Low Risk  (3/6/2025)    Transportation Needs     Within the past 12 months, has lack of transportation kept you from medical appointments, getting your medicines, non-medical meetings or appointments, work, or from getting things that you need?: No   Physical Activity: Inactive (10/24/2024)    Received from [facility]    Exercise Vital Sign     Days of Exercise per Week: 0 days     Minutes of Exercise per Session: 10 min   Stress: No Stress Concern Present (6/20/2024)    [employer] - Occupational Stress Questionnaire     Feeling of Stress : Not at all   Social Connections: Unknown (6/20/2024)    Social Connection and Isolation Panel [NHANES]     Frequency of Social Gatherings with Friends and Family: More than three times a week   Interpersonal Safety: Low Risk  (3/6/2025)    Interpersonal Safety     Do you feel physically and emotionally safe where you currently live?: Yes     Within the past 12 months, have you been hit, slapped, kicked or otherwise physically hurt by someone?: No     Within the past 12 months, have you been humiliated or emotionally abused in other ways by your partner or ex-partner?: No   Housing Stability: Low Risk  (3/6/2025)    Housing Stability     Do you have housing? : Yes     Are you worried about losing your housing?: No      Problem (# of Occurrences) Relation (Name,Age of Onset)    Hypertension (1) Mother (mother)           Negative family history of: Diabetes, Cerebrovascular Disease, Breast Cancer, Cancer - colorectal, Prostate Cancer, Anesthesia Reaction, Bleeding Disorder, Clotting Disorder             ROS: 10 point ROS neg other than the symptoms noted above in the HPI.    Vitals:    05/09/25  "0743   BP: (!) 156/82   Pulse: 76   SpO2: 99%   Weight: 68 kg (150 lb)   Height: 1.575 m (5' 2\")     Estimated body mass index is 27.44 kg/m  as calculated from the following:    Height as of this encounter: 1.575 m (5' 2\").    Weight as of this encounter: 68 kg (150 lb).  No Pain (0)    Oswestry (LIBBY) Questionnaire        6/25/2024     8:32 AM   OSWESTRY DISABILITY INDEX   Count 9   Sum 7   Oswestry Score (%) 15.56 %        Data saved with a previous flowsheet row definition       Visual Analog Scale (VAS) Questionnaire         No data to display                   Awake and alert  Bilateral lower extremity strength is 5 out of 5 in all muscle groups  Reflexes symmetric and normal  Decrease sensation to pinprick in what seems to be a left S1 distribution.  Negative straight leg raise  Negative SAVANAH sign.    Imaging: Review of x-rays of the lumbar spine from September show L4-5 spondylolisthesis but otherwise relatively good lumbar spinal alignment.  Review of her MRI from the same timeframe shows stenosis at L4-5 and L5-S1 particularly on the left which I think is concordant with her current symptomatology.  The imaging was reviewed with the patient shown to the patient clinic today.    We also reviewed her head CTs which show resolution of the subdural hematoma without any concerning findings.    Assessment: 1.  Lumbar spondylolisthesis and stenosis with radiculopathy.  2.  Traumatic subdural hematoma, resolving.    Plan: At this point I think an epidural injection might be a good way to see if we can get some relief for her while walking.  However if this does not help I do think that a left L4-5 minimally invasive bilateral decompression and left L5-S1 decompression would be beneficial for her symptoms.  We will see how she does with an injection which I believe would be safe to perform at this point after the subdural hematoma.  If she does not get significant relief then she should return to clinic for " reevaluation and discussion of potential surgery.

## 2025-05-09 NOTE — PATIENT INSTRUCTIONS
Patient Next Steps:    Order placed for epidural steroid injection  The steroid can take 10-14 days to reach max effect  Please call our clinic if symptoms persist after this timeframe.  You can call to schedule injection at [phone] ([facility] region)  You can call [facility] to schedule your injection: [phone]-Will request Dr Nedra Carney would like to see you back in the clinic for follow up 2 weeks after the injection if no relief. Please call the number below to schedule.       Please call us if you have any further questions or concerns.    [facility]   Phone: [phone]  Fax: [phone]

## 2025-05-09 NOTE — LETTER
5/9/2025      Kelly Barnes  [address]  [place] [address]      Dear Colleague,    Thank you for referring your patient, Kelly Barnes, to the [facility]. Please see a copy of my visit note below.    It was a pleasure to see Kelly Barnes today in Neurosurgery Clinic. She is a 85 year old female Who is here for evaluation of her left lower extremity pain.  She has this pain in the back of her calf when she is walking.  This keeps her from walking more than about a block.  It is easier to push a grocery cart.  She has been having this problem for at least the last 6 to 9 months.  She has been previously evaluated at the [facility].  She is also seeing Dr. Sneed with pain management.     She also has some neuropathy but this problem seems very different.  Her evaluation at [facility] did not find any vascular cause for her pain either.    She did physical therapy over the winter without significant improvement and has not had any injections prior.  She does take Tylenol for her pain.    She also had an accident in February with a small subdural hematoma.  She is recovering well from this.    .    Past Medical History:   Diagnosis Date     Acute kidney injury 12/19/2020     Allergic rhinitis, cause unspecified      Anemia      Aortic stenosis 02/29/2016    With new murmur noted 2/2016, normal echo, repeat echo 2/2017.     Aortic valve sclerosis      Atypical chest pain 07/24/2015     cuboid     left foot     Endometrial cancer (H)      Fungemia 08/16/2021     History of Clostridium difficile colitis      Hyperlipidemia LDL goal <100 11/01/2012     Hypertension goal BP (blood pressure) < 140/90      Musculoskeletal chest pain 11/25/2016     Obesity, Class I, BMI 30-34.9 11/11/2015     Pneumonia 03/2016     Pyelonephritis      Sepsis, due to unspecified organism, unspecified whether acute organ dysfunction present (H) 12/19/2020     Type 2 diabetes, HbA1c goal < 7% (H)   Colonoscopy   WHAT YOU NEED TO KNOW:   A colonoscopy is a procedure to examine the inside of your colon (intestine) with a scope  Polyps or tissue growths may have been removed during your colonoscopy  It is normal to feel bloated and to have some abdominal discomfort  You should be passing gas  If you have hemorrhoids or you had polyps removed, you may have a small amount of bleeding  DISCHARGE INSTRUCTIONS:   Seek care immediately if:   You have sudden, severe abdominal pain  You have problems swallowing  You have a large amount of black, sticky bowel movements or blood in your bowel movements  You have sudden trouble breathing  You feel weak, lightheaded, or faint or your heart beats faster than normal for you  Contact your healthcare provider if:   You have a fever and chills  You have nausea or are vomiting  Your abdomen is bloated or feels full and hard  You have abdominal pain  You have black, sticky bowel movements or blood in your bowel movements  You have not had a bowel movement for 3 days after your procedure  You have rash or hives  You have questions or concerns about your procedure  Activity:   Do not lift, strain, or run for 24 hours after your procedure  Rest after your procedure  You have been given medicine to relax you  Do not drive or make important decisions until the day after your procedure  Return to your normal activity as directed  Relieve gas and discomfort from bloating by lying on your right side with a heating pad on your abdomen  You may need to take short walks to help the gas move out  Eat small meals until bloating is relieved  Follow up with your healthcare provider as directed: Write down your questions so you remember to ask them during your visits  If you take a “blood thinner”, please review the specific instructions from your endoscopist about when you should resume it   These can be found in the “Recommendation”     Urinary tract infection associated with nephrostomy catheter, initial encounter 08/08/2021     Past Surgical History:   Procedure Laterality Date     CATARACT EXTRACTION       CYSTOSCOPY, RETROGRADES, INSERT STENT URETER(S), COMBINED Right 07/15/2021    Procedure: CYSTOURETEROSCOPY, WITH RETROGRADE PYELOGRAM,  AND STENT INSERTION RIGHT;  Surgeon: Kirk Law MD;  [facility]     LAPAROSCOPIC HYSTERECTOMY TOTAL, BILATERAL SALPINGO-OOPHORECTOMY, NODE DISSECTION, COMBINED N/A 10/30/2023    Procedure: TOTAL LAPAROSCOPIC HYSTERECTOMY, WITH BILATERAL SALPINGO-OOPHORECTOMY, BILATEAL PELVIC SENTINEL LYMPH NODE;  Surgeon: Kaykay Burden MD;  [facility]     LASER HOLMIUM NEPHROLITHOTOMY VIA PERCUTANEOUS NEPHROSTOMY Right 08/05/2021    Procedure: NEPHROLITHOTOMY, PERCUTANEOUS, USING HOLMIUM LASER RIGHT;  Surgeon: Kirk Law MD;  [facility]     LASER HOLMIUM NEPHROLITHOTOMY VIA PERCUTANEOUS NEPHROSTOMY Right 08/13/2021    Procedure: Ureteroscopic assisted secondary right percutaneous nephrolihtotomy, Holmium laser lithotriipsy;  Surgeon: Kirk Law MD;  [facility]     PICC SINGLE LUMEN PLACEMENT Left 08/17/2021    41cm (2cm external), Medial brachial vein        Allergies   Allergen Reactions     Ibuprofen Other (See Comments)     numbness in hands and feet     Keflex [Cephalexin] Rash     Metformin Rash       Current Outpatient Medications:      acetaminophen (TYLENOL) 500 MG tablet, Take 1,000 mg by mouth 3 times daily., Disp: , Rfl:      blood glucose (ONETOUCH ULTRA) test strip, Use to test blood sugar 2 times daily or as directed., Disp: 100 strip, Rfl: 3     blood glucose monitoring (ONE TOUCH ULTRA 2) meter device kit, Use to test blood sugar 2 times daily or as directed., Disp: 1 kit, Rfl: 0     diclofenac (VOLTAREN) 1 % topical gel, Apply 4 g topically 4 times daily. To the left knee/calf area, Disp: 350 g, Rfl: 1     glimepiride (AMARYL) 4 MG tablet, TAKE ONE  and “Your Medication list” sections of this After Visit Summary  TABLET BY MOUTH TWICE A DAY WITH MEALS, Disp: 180 tablet, Rfl: 1     insulin glargine (LANTUS PEN) 100 UNIT/ML pen, Inject 10 Units subcutaneously at bedtime., Disp: 15 mL, Rfl: 1     Lancets (ONETOUCH DELICA PLUS [id number]) MISC, USE ONE DEVICE BY IN VITRO ROUTE TWO TIMES A DAY, Disp: 200 each, Rfl: 3     losartan-hydrochlorothiazide (HYZAAR) 50-12.5 MG tablet, TAKE ONE TABLET BY MOUTH EVERY MORNING, Disp: 90 tablet, Rfl: 3     oxyCODONE (ROXICODONE) 5 MG tablet, Take 1 tablet (5 mg) by mouth every 6 hours as needed for severe pain or breakthrough pain., Disp: 20 tablet, Rfl: 0     potassium citrate (UROCIT-K) 10 MEQ (1080 MG) CR tablet, Take 1 tablet (10 mEq) by mouth daily. (Patient not taking: Reported on 2/24/2025), Disp: 90 tablet, Rfl: 3     simvastatin (ZOCOR) 20 MG tablet, Take 1 tablet (20 mg) by mouth at bedtime., Disp: 90 tablet, Rfl: 3     sitagliptin (JANUVIA) 100 MG tablet, Take 1 tablet (100 mg) by mouth daily. (Patient not taking: Reported on 5/6/2025), Disp: 90 tablet, Rfl: 3  Social History     Socioeconomic History     Marital status:      Spouse name: None     Number of children: None     Years of education: None     Highest education level: None   Tobacco Use     Smoking status: Never     Passive exposure: Never     Smokeless tobacco: Never   Vaping Use     Vaping status: Never Used   Substance and Sexual Activity     Alcohol use: Yes     Alcohol/week: 10.0 standard drinks of alcohol     Comment: 1-2 drinks per week     Drug use: No     Sexual activity: Yes     Partners: Male   Other Topics Concern      Service No     Blood Transfusions No     Caffeine Concern No     Occupational Exposure No     Hobby Hazards No     Sleep Concern Yes     Stress Concern No     Weight Concern Yes     Special Diet No     Back Care No     Exercise Yes     Comment: walk     Bike Helmet No     Seat Belt Yes     Self-Exams Yes     Parent/sibling w/ CABG, MI or angioplasty before 65F 55M? No     Social  Drivers of Health     Financial Resource Strain: Low Risk  (3/6/2025)    Financial Resource Strain      Within the past 12 months, have you or your family members you live with been unable to get utilities (heat, electricity) when it was really needed?: No   Food Insecurity: Low Risk  (3/6/2025)    Food Insecurity      Within the past 12 months, did you worry that your food would run out before you got money to buy more?: No      Within the past 12 months, did the food you bought just not last and you didn t have money to get more?: No   Transportation Needs: Low Risk  (3/6/2025)    Transportation Needs      Within the past 12 months, has lack of transportation kept you from medical appointments, getting your medicines, non-medical meetings or appointments, work, or from getting things that you need?: No   Physical Activity: Inactive (10/24/2024)    Received from [facility]    Exercise Vital Sign      Days of Exercise per Week: 0 days      Minutes of Exercise per Session: 10 min   Stress: No Stress Concern Present (6/20/2024)    [employer]izen [employer] [place] [employer] - Occupational Stress Questionnaire      Feeling of Stress : Not at all   Social Connections: Unknown (6/20/2024)    Social Connection and Isolation Panel [NHANES]      Frequency of Social Gatherings with Friends and Family: More than three times a week   Interpersonal Safety: Low Risk  (3/6/2025)    Interpersonal Safety      Do you feel physically and emotionally safe where you currently live?: Yes      Within the past 12 months, have you been hit, slapped, kicked or otherwise physically hurt by someone?: No      Within the past 12 months, have you been humiliated or emotionally abused in other ways by your partner or ex-partner?: No   Housing Stability: Low Risk  (3/6/2025)    Housing Stability      Do you have housing? : Yes      Are you worried about losing your housing?: No      Problem (# of Occurrences) Relation (Name,Age of Onset)     "Hypertension (1) Mother (mother)           Negative family history of: Diabetes, Cerebrovascular Disease, Breast Cancer, Cancer - colorectal, Prostate Cancer, Anesthesia Reaction, Bleeding Disorder, Clotting Disorder             ROS: 10 point ROS neg other than the symptoms noted above in the HPI.    Vitals:    05/09/25 0743   BP: (!) 156/82   Pulse: 76   SpO2: 99%   Weight: 68 kg (150 lb)   Height: 1.575 m (5' 2\")     Estimated body mass index is 27.44 kg/m  as calculated from the following:    Height as of this encounter: 1.575 m (5' 2\").    Weight as of this encounter: 68 kg (150 lb).  No Pain (0)    Oswestry (LIBBY) Questionnaire        6/25/2024     8:32 AM   OSWESTRY DISABILITY INDEX   Count 9   Sum 7   Oswestry Score (%) 15.56 %        Data saved with a previous flowsheet row definition       Visual Analog Scale (VAS) Questionnaire         No data to display                   Awake and alert  Bilateral lower extremity strength is 5 out of 5 in all muscle groups  Reflexes symmetric and normal  Decrease sensation to pinprick in what seems to be a left S1 distribution.  Negative straight leg raise  Negative SAVANAH sign.    Imaging: Review of x-rays of the lumbar spine from September show L4-5 spondylolisthesis but otherwise relatively good lumbar spinal alignment.  Review of her MRI from the same timeframe shows stenosis at L4-5 and L5-S1 particularly on the left which I think is concordant with her current symptomatology.  The imaging was reviewed with the patient shown to the patient clinic today.    We also reviewed her head CTs which show resolution of the subdural hematoma without any concerning findings.    Assessment: 1.  Lumbar spondylolisthesis and stenosis with radiculopathy.  2.  Traumatic subdural hematoma, resolving.    Plan: At this point I think an epidural injection might be a good way to see if we can get some relief for her while walking.  However if this does not help I do think that a left L4-5 " minimally invasive bilateral decompression and left L5-S1 decompression would be beneficial for her symptoms.  We will see how she does with an injection which I believe would be safe to perform at this point after the subdural hematoma.  If she does not get significant relief then she should return to clinic for reevaluation and discussion of potential surgery.       Again, thank you for allowing me to participate in the care of your patient.        Sincerely,        Cornelio Carney MD    Electronically signed

## 2025-05-09 NOTE — NURSING NOTE
"Kelly Barnes is a 85 year old female who presents for:  Chief Complaint   Patient presents with    RECHECK     Spinal stenosis of lumbar region. Patient reports no pain in low back, but some pain in lower left leg. Occasional tingling/burning down left leg. No pain or discomfort in head.        Initial Vitals:  BP (!) 156/82   Pulse 76   Ht 5' 2\" (1.575 m)   Wt 150 lb (68 kg)   LMP  (LMP Unknown)   SpO2 99%   BMI 27.44 kg/m   Estimated body mass index is 27.44 kg/m  as calculated from the following:    Height as of this encounter: 5' 2\" (1.575 m).    Weight as of this encounter: 150 lb (68 kg). Body surface area is 1.72 meters squared. BP completed using cuff size: regular  No Pain (0)    Patient BP is slightly elevated today and will continue to monitor at home after the appointment.      Dakota Ramirez    "

## 2025-05-15 ENCOUNTER — TELEPHONE (OUTPATIENT)
Dept: PALLIATIVE MEDICINE | Facility: CLINIC | Age: 85
End: 2025-05-15
Payer: COMMERCIAL

## 2025-05-15 NOTE — TELEPHONE ENCOUNTER
"Screening Questions for Radiology Injections:    Injection to be done at which interventional clinic site? [facility]    If choosing [facility] for location, please inform patient:  \"[facility] is a Hospital based clinic. Before your visit, you should check with your insurance about how it covers the charges for facility services in a hospital-based clinic.     Procedure ordered by Nedra    Procedure ordered? L5-S1 interlaminar MANDY   Transforaminal Cervical MANDY - Send to [facility] ([facility]) - No [facility] providers perform this procedure    What insurance would patient like us to bill for this procedure? ucare  IF SCHEDULING IN [place] PAIN OR SPINE PLEASE SCHEDULE AT LEAST 7-10 BUSINESS DAYS OUT SO A PA CAN BE OBTAINED  Worker's comp or MVA (motor vehicle accident) -Any injection DO NOT SCHEDULE and route to Catalina Miles.    [employer] insurance - For ALL INJECTIONS DO NOT SCHEDULE and route to [facility].     ALL BCBS, [employer] and [employer] CIG[employer] - DO NOT SCHEDULE and route to [facility]  MEDICA- ALL INJECTIONS- route to [facility]    Is patient scheduled at [facility]? no   If YES, route every encounter to [facility] SPINE CENTER CARE NAVIGATION POOL [[phone]]    Is an  needed? No     Patient has a  home? (Review Grid) YES: ok    Any chance of pregnancy? NO   If YES, do NOT schedule and route to RN pool  - Dr. Dinero route to PM&R Nurse  [[id number]]      Is patient actively being treated for cancer or immunocompromised? No  If YES, do NOT schedule and route to RN pool/ Dr. Dinero's Team    Does the patient have a bleeding or clotting disorder? No   If YES, okay to schedule AND route to RN nurse / Dr. Dinero's Team   (For any patients with platelet count <100, RN must forward to provider)    Is patient taking any Blood Thinners OR Antiplatelet medication?  No   If hold needed, do NOT schedule, route to RN pool/ Dr. Dinero's Team  Examples: "   Blood Thinners: (Coumadin, Warfarin, Jantoven, Pradaxa, Xarelto, Eliquis, Edoxaban, Enoxaparin, Lovenox, Heparin, Arixtra, Fondaparinux or Fragmin)  Antiplatelet Medications: (Plavix, Brilinta or Effient)     Is patient taking any aspirin products (includes Excedrin and Fiorinal)? No.    If yes route to RN pool/ Dr. Dinero's Team - Do not schedule    Is patient taking any GLP-1 Antagonist (hold needed for sedation patients only) No   (semaglutide (Ozempic, Wegovy), dulaglutide (Trulicity), exenatide ER (Bydureon), tirzepatide (Mounjaro), Liraglutide (Saxenda, Victoza), semaglutide (Rybelsus), Terzepatide (Zepbound)  If YES, okay to schedule AND route to RN  / Dr. Dinero's Team      Any allergies to contrast dye, iodine, shellfish, or numbing and steroid medications? No  If YES, schedule and add allergy information to appointment notes AND route to the RN pool/ Dr. Dinero's Team  If MANDY and Contrast Dye / Iodine Allergy? DO NOT SCHEDULE, route to RN pool/ Dr. Dinero's Team  Allergies: Ibuprofen, Keflex [cephalexin], and Metformin     Does patient have an active infection or treated for one within the past week? No  Is patient currently taking any antibiotics or steroid medications?  No   For patients on chronic, preventative, or prophylactic antibiotics, procedures may be scheduled.   For patients on antibiotics for active or recent infection, schedule 4 days after completed.  For patients on steroid medications, schedule 4 days after completed.     Has the patient had a flu shot or any other vaccinations within the past 7 days? No  If yes, explain that for the vaccine to work best they need to:     wait 1 week before and 1 week after getting any Vaccine  wait 1 week before and 2 weeks after getting any Covid Vaccine   If patient has concerns about the timing, send to RN pool/ Dr. Dinero's Team    Does patient have an MRI/CT?  YES: mri Include Date and Check Procedure Scheduling Grid to see if required.  Was  the MRI/CT done within the last 3 years?  Yes   If no route to RN Pool/ Dr. Dinero's Team  If yes, where was the MRI/CT done? [facility]   Refer to PACS Transmissions list for approved external locations and route to RN Pool High Priority/ Dr. Knapps Team  If MRI was not done at approved external location do NOT schedule and route to RN pool/ Dr. Knapps Team    If patient has an imaging disc, the injection MAY be scheduled but patient must bring disc to appt or appt will be cancelled.    Is patient able to transfer to a procedure table with minimal or no assistance? Yes   If no, do NOT schedule and route to RN Pool/ Dr. Dinero's Team    Procedure Specific Instructions:  If celiac plexus block, informed patient NPO for 6 hours and that it is okay to take medications with sips of water, especially blood pressure medications Not Applicable       If this is for a cervical procedure, informed patient that aspirin needs to be held for 6 days.   Not Applicable    Sedation, If Sedation is ordered for any procedure, patient must be NPO for 6 hours prior to procedure Not Applicable    If IV needed:  Do not schedule procedures requiring IV placement in the first appointment of the day or first appointment after lunch. Do NOT schedule at 0745, 0815 or 1245.   Instructed patient to arrive 30 minutes early for IV start if required. (Check Procedure Scheduling Grid)  Not Applicable    Reminders:  If you are started on any steroids or antibiotics between now and your appointment, you must contact us because the procedure may need to be cancelled.  Yes    As a reminder, receiving steroids can decrease your body's ability to fight infection.   Would you still like to move forward with scheduling the injection?  Yes    IV Sedation is not provided for procedures. If oral anti-anxiety medication is needed, the patient should request this from their referring provider.    Instruct patient to arrive as directed prior to the scheduled  appointment time:  If IV needed 30 minutes before appointment time     For patients 85 or older we recommend having an adult stay w/ them for the remainder of the day.     If the patient is Diabetic, remind them to bring their glucometer.    Dr. Littlejohn Pt's - Imaging Orders Needed   Please send all injections to RN Pool NO   Red Flags? NO    Does the patient have any questions?  NO  Alesia Miles  [facility]

## 2025-05-20 ENCOUNTER — MYC MEDICAL ADVICE (OUTPATIENT)
Dept: FAMILY MEDICINE | Facility: CLINIC | Age: 85
End: 2025-05-20
Payer: COMMERCIAL

## 2025-05-21 ENCOUNTER — OFFICE VISIT (OUTPATIENT)
Dept: PALLIATIVE MEDICINE | Facility: CLINIC | Age: 85
End: 2025-05-21
Payer: COMMERCIAL

## 2025-05-21 VITALS — OXYGEN SATURATION: 96 % | HEART RATE: 90 BPM | SYSTOLIC BLOOD PRESSURE: 146 MMHG | DIASTOLIC BLOOD PRESSURE: 86 MMHG

## 2025-05-21 DIAGNOSIS — M48.062 SPINAL STENOSIS OF LUMBAR REGION WITH NEUROGENIC CLAUDICATION: Primary | ICD-10-CM

## 2025-05-21 PROCEDURE — 3077F SYST BP >= 140 MM HG: CPT | Performed by: PAIN MEDICINE

## 2025-05-21 PROCEDURE — 99214 OFFICE O/P EST MOD 30 MIN: CPT | Performed by: PAIN MEDICINE

## 2025-05-21 PROCEDURE — 1125F AMNT PAIN NOTED PAIN PRSNT: CPT | Performed by: PAIN MEDICINE

## 2025-05-21 PROCEDURE — 3079F DIAST BP 80-89 MM HG: CPT | Performed by: PAIN MEDICINE

## 2025-05-21 PROCEDURE — G2211 COMPLEX E/M VISIT ADD ON: HCPCS | Performed by: PAIN MEDICINE

## 2025-05-21 RX ORDER — PREGABALIN 25 MG/1
25 CAPSULE ORAL 2 TIMES DAILY
Qty: 60 CAPSULE | Refills: 0 | Status: SHIPPED | OUTPATIENT
Start: 2025-05-21

## 2025-05-21 ASSESSMENT — PAIN SCALES - GENERAL: PAINLEVEL_OUTOF10: SEVERE PAIN (7)

## 2025-05-21 NOTE — PROGRESS NOTES
[facility]    Date of visit: 5/21/25    Primary Care Provider: Dr. Lea Lopez  Interval   Pain is worse  Rad to her LLE  Reports symp are more constant  Cont to have some improvement with flexion   Denies any recent falls  Denies any overt weaknss    Saw neurosurg discussed surgical options, but plan to first trial of an epidural   Pt wanted to discuss procedure further       Pain history: Very complex hx   Of note pt has poorly controlled DM   Seen by [facility] and [facility]   Kelly Barnes is a 85 year old female with a PMH significant for chronic left leg pain with cont symp  Pt seen at [facility] neg  work up vascular   Ext work up with [facility]   3/24 on a trip to [place]  She started to get severe left leg pain  The pain started approx a couple of days into her trip   The pain is mainly in the back of her leg below the knee  The pain is mainly a deep aching pain  The pt reports her chronic LE edema and erythema did not worsen after fall  The pain is worse throughout the day  Better in the am  Benefit when not waking  Worse with walking  Notes sig decrease in mobility   Benefit with asa- but found to subdural hematoma after fall  Of note recent fall in the setting of leg buckling  Denies any head trauma, but was found to have a subdural hematoma   Of note pt has follow up     Pt reports symp worse got worse after fall  Noted increased weakness  Repeat CT neg   Denies allodynia   Dec sensation     Neuro work up neg   OF note when pt walking and then stops symptoms improve pretty quickly   Pt only able to walk a couple of blocks   Of note pt has a fam hx of venous insuffiencey   Prefer shopping cart   Of note was not able to finish debora 2/2 to risk of falling per pt     Denies red flags including: bowel or bladder symptoms, fever, chills, saddle anesthesia, profound motor loss, history of cancer, history of immune compromise, weight loss.     Impact of Pain: There is significant  limitation     Current medication treatments include:  SHORT RX FOR OXY  Pain medications are being prescribed by .    Previous medication treatments included:  dania    Other treatments have included:  Kelly Barnes has been seen at a pain clinic in the past.      PT: yes      Interventional:  Acupuncture/chiro: n  TENs Unit: n  Injections: n      Past Medical History:  Past Medical History:   Diagnosis Date    Acute kidney injury 12/19/2020    Allergic rhinitis, cause unspecified     Anemia     Aortic stenosis 02/29/2016    With new murmur noted 2/2016, normal echo, repeat echo 2/2017.    Aortic valve sclerosis     Atypical chest pain 07/24/2015    cuboid     left foot    Endometrial cancer (H)     Fungemia 08/16/2021    History of Clostridium difficile colitis     Hyperlipidemia LDL goal <100 11/01/2012    Hypertension goal BP (blood pressure) < 140/90     Musculoskeletal chest pain 11/25/2016    Obesity, Class I, BMI 30-34.9 11/11/2015    Pneumonia 03/2016    Pyelonephritis     Sepsis, due to unspecified organism, unspecified whether acute organ dysfunction present (H) 12/19/2020    Type 2 diabetes, HbA1c goal < 7% (H)     Urinary tract infection associated with nephrostomy catheter, initial encounter 08/08/2021     Past Surgical History:  Past Surgical History:   Procedure Laterality Date    CATARACT EXTRACTION      CYSTOSCOPY, RETROGRADES, INSERT STENT URETER(S), COMBINED Right 07/15/2021    Procedure: CYSTOURETEROSCOPY, WITH RETROGRADE PYELOGRAM,  AND STENT INSERTION RIGHT;  Surgeon: Kirk Law MD;  Location: [facility] OR    LAPAROSCOPIC HYSTERECTOMY TOTAL, BILATERAL SALPINGO-OOPHORECTOMY, NODE DISSECTION, COMBINED N/A 10/30/2023    Procedure: TOTAL LAPAROSCOPIC HYSTERECTOMY, WITH BILATERAL SALPINGO-OOPHORECTOMY, BILATEAL PELVIC SENTINEL LYMPH NODE;  Surgeon: Kaykay Burden MD;  Location: [facility] OR    LASER HOLMIUM NEPHROLITHOTOMY VIA PERCUTANEOUS NEPHROSTOMY Right 08/05/2021    Procedure: NEPHROLITHOTOMY,  PERCUTANEOUS, USING HOLMIUM LASER RIGHT;  Surgeon: Kirk Law MD;  Location: [facility] OR    LASER HOLMIUM NEPHROLITHOTOMY VIA PERCUTANEOUS NEPHROSTOMY Right 08/13/2021    Procedure: Ureteroscopic assisted secondary right percutaneous nephrolihtotomy, Holmium laser lithotriipsy;  Surgeon: Kirk Law MD;  Location: [facility] OR    PICC SINGLE LUMEN PLACEMENT Left 08/17/2021    41cm (2cm external), Medial brachial vein     Medications:  Current Outpatient Medications   Medication Sig Dispense Refill    acetaminophen (TYLENOL) 500 MG tablet Take 1,000 mg by mouth 3 times daily.      blood glucose (ONETOUCH ULTRA) test strip Use to test blood sugar 2 times daily or as directed. 100 strip 3    blood glucose monitoring (ONE TOUCH ULTRA 2) meter device kit Use to test blood sugar 2 times daily or as directed. 1 kit 0    glimepiride (AMARYL) 4 MG tablet TAKE ONE TABLET BY MOUTH TWICE A DAY WITH MEALS 180 tablet 1    insulin glargine (LANTUS PEN) 100 UNIT/ML pen Inject 10 Units subcutaneously at bedtime. 15 mL 1    Lancets (ONETOUCH DELICA PLUS [id number]) MISC USE ONE DEVICE BY IN VITRO ROUTE TWO TIMES A  each 3    losartan-hydrochlorothiazide (HYZAAR) 50-12.5 MG tablet TAKE ONE TABLET BY MOUTH EVERY MORNING 90 tablet 3    oxyCODONE (ROXICODONE) 5 MG tablet Take 1 tablet (5 mg) by mouth every 6 hours as needed for severe pain or breakthrough pain. 20 tablet 0    simvastatin (ZOCOR) 20 MG tablet Take 1 tablet (20 mg) by mouth at bedtime. 90 tablet 3    diclofenac (VOLTAREN) 1 % topical gel Apply 4 g topically 4 times daily. To the left knee/calf area (Patient not taking: Reported on 5/21/2025) 350 g 1    potassium citrate (UROCIT-K) 10 MEQ (1080 MG) CR tablet Take 1 tablet (10 mEq) by mouth daily. (Patient not taking: Reported on 5/21/2025) 90 tablet 3    sitagliptin (JANUVIA) 100 MG tablet Take 1 tablet (100 mg) by mouth daily. (Patient not taking: Reported on 5/21/2025) 90 tablet 3     Allergies:     Allergies    Allergen Reactions    Keflex [Cephalexin] Rash    Metformin Rash       Family history:  Family History   Problem Relation Age of Onset    Hypertension Mother     Diabetes No family hx of     Cerebrovascular Disease No family hx of     Breast Cancer No family hx of     Cancer - colorectal No family hx of     Prostate Cancer No family hx of     Anesthesia Reaction No family hx of     Bleeding Disorder No family hx of     Clotting Disorder No family hx of              Diagnostic Tests:  EMG showed only minor chronic neurogenic changes in the left calf     IMPRESSION: Patent bilateral popliteal arteries without evidence of  dynamic compression with popliteal entrapment maneuvers.   FINDINGS:  RIGHT:       Brachial: 144 mmHg       Ankle (PT): 150 mmHg       Ankle (DP): 154 mmHg       1st Digit: 110 mmHg          GATO: 1.07       TBI: 0.76          VPR:            High thigh: Normal            Low thigh: Normal            Calf: Normal            Ankle: Normal          1st Digit PPG: Normal     LEFT:       Brachial: 143 mmHg       Ankle (PT): 150 mmHg       Ankle (DP): 156 mmHg       1st Digit: 94 mmHg          GATO: 1.08       TBI: 0.65          VPR:            High thigh: Normal            Low thigh: Normal            Calf: Normal            Ankle: Normal          1st Digit PPG: Normal                                                                      IMPRESSION:  1. RIGHT:       A. Resting GATO is normal, 1.07.       B. Resting TBI is normal, 0.76.     2. LEFT:       A. Resting GATO is normal, 1.08.       B. Resting TBI is ABNORMAL, 0.65.     3. Exercise study not performed.     Guidelines:     GATO Diagnostic Criteria (Based on criteria published in Circulation  2011; 124: 7566-0176):    > 1.4: Non compressible    1.00 - 1.40: Normal    0.91 - 0.99: Borderline    At or below 0.90: Abnormal  INDICATION: Lymphedema  COMPARISON: None available at time of dictation  TECHNIQUE: The primary site was prepped and draped in  sterile fashion. 4.4 mCi of Tc-99m sulfur colloid was injected in the right foot and 4.4 mCi of Tc-99m sulfur colloid was injected in the left foot. Imaging was then performed immediately after   injection, 25 minutes, 50 minutes, 75 minutes, and 90 minutes.      FINDINGS: While the drainage pathways are poorly visualized due to the scar artifact in the feet, drainage tube reaches the inguinal region at 75 minutes and liver at 90 minutes suggesting patent lower extremity lymphatics.                                                                        INDICATION: Lymphedema  COMPARISON: None available at time of dictation  TECHNIQUE: The primary site was prepped and draped in sterile fashion. 4.4 mCi of Tc-99m sulfur colloid was injected in the right foot and 4.4 mCi of Tc-99m sulfur colloid was injected in the left foot. Imaging was then performed immediately after   injection, 25 minutes, 50 minutes, 75 minutes, and 90 minutes.      FINDINGS: While the drainage pathways are poorly visualized due to the scar artifact in the feet, drainage tube reaches the inguinal region at 75 minutes and liver at 90 minutes suggesting patent lower extremity lymphatics.                                                                      IMPRESSION:      INDICATION: Lymphedema  COMPARISON: None available at time of dictation  TECHNIQUE: The primary site was prepped and draped in sterile fashion. 4.4 mCi of Tc-99m sulfur colloid was injected in the right foot and 4.4 mCi of Tc-99m sulfur colloid was injected in the left foot. Imaging was then performed immediately after   injection, 25 minutes, 50 minutes, 75 minutes, and 90 minutes.      FINDINGS: While the drainage pathways are poorly visualized due to the scar artifact in the feet, drainage tube reaches the inguinal region at 75 minutes and liver at 90 minutes suggesting patent lower extremity lymphatics.                                                                       IMPRESSION:    T11-T12 through L1-L2: These levels visualized on the sagittal sequences. Mild scattered degenerative changes and spondylosis without significant spinal canal or foraminal narrowing.     L2-L3: Minimal disc and facet degeneration. No focal disc herniation. Spinal canal and foramina patent.     L3-L4: Mild disc degeneration. Mild to moderate facet degeneration. Spinal canal and foramina patent.     L4-L5: Mild-moderate disc and facet degeneration. Annular bulge and posterior spurring. Mild spinal canal narrowing and mild bilateral foraminal narrowing.     L5-Transitional Lumbarized S1: Moderate disc degeneration. Moderate to advanced left greater than right facet degeneration with small to moderate-sized bilateral facet joint effusions. Reactive edema associated with the left facet degeneration. Grade 1   anterolisthesis L5 on S1. Broad-based posterior spurring and annular bulging asymmetric left. Moderate to advanced spinal canal narrowing with moderate to advanced narrowing of the superior aspect of the left lateral recess. Moderate narrowing of the   superior aspect of the right lateral recess. Mild to moderate left and mild right foraminal narrowing.     Transitional Lumbarized S1-S2: Mild disc degeneration. Shallow annular bulge. Moderate facet degeneration. Spinal canal patent. Mild to moderate narrowing of the superior aspect of the left greater than right lateral recess. Mild to moderate left   foraminal narrowing. Right foramen patent.         Physical Exam:  Vitals:    05/21/25 0924   BP: (!) 146/86   BP Location: Left arm   Pulse: 90   SpO2: 96%     Exam:  Constitutional: healthy, alert, and no distress  Head: normocephalic. Atraumatic.     Skin: erythema -  left leg  Psychiatric: mentation appears normal and affect normal/bright    Musculoskeletal exam:  Gait/Station/Posture: wnl    Thoracic spine:  Normal   Lumbar spine: wnl  Straight leg exam: neg  Sacroiliac (SI) joint tenderness:  neg    Left leg swelling- larger than right. + erythema unable to appreciate pitting edema     Neurologic exam:  Motor:  5/5 UE and LE strengt    Reflexes:     Biceps:     R:  2/4 L: 2/4   Brachioradialis   R:  2/4 L: 2/4   Triceps:  R:  2/4 L: 2/4   Patella:  R:  2/4 L: 2/4   Achilles:  R:  2/4 L: 2/4   Sensory:  (upper and lower extremities):   Light touch: dec  dorsal foot and calf   +TTP through lower leg   Allodynia: absent    Hyperalgesia: absent         Assessment:  Kelly Barnes is a 85 year old female with a past medical history significant for left leg pain presenting with left leg pain      Kelly was seen today for pain.    Diagnoses and all orders for this visit:    Spinal stenosis of lumbar region with neurogenic claudication            Plan:  Diagnosis reviewed, treatment options addressed, and risks/benefits discussed. The patient was involved in shared decision making regarding the plan as laid out below.    - Further procedures recommended:  hesistant given recent subdural    - L5-S1 ILESI   - Medication Management:    - continue acetaminophen    - stopped keppra   - Will start  low dose lyrica 25mg at night for 1 week then add am dose as tolerated .   - consider low dose zanaflex    - Physical Therapy: continue home regimen   - Clinical Health Psychologist to address issues of relaxation, behavioral change, coping style, and other factors important to improvement: consider  - Diagnostic Studies: reviewed with patient   - Urine toxicology screen today: no   - Follow up:    - For procedure   -follow up with neurosurgery             The total TIME spent on this patient on the day of the appointment was 30 minutes.   Time spent preparing to see the patient (reviewing records and tests)  Time spend face to face with the patient  Time spent ordering tests, medications, procedures and referrals  Time spent Referring and communicating with other healthcare professionals  Documenting clinical information  in Epic  Kiel Sneed MD.  Medical Spine Specialist  [facility]

## 2025-05-21 NOTE — PATIENT INSTRUCTIONS
- Further procedures recommended:  hesistant given recent subdural    - L5-S1 ILESI   - Medication Management:    - continue acetaminophen    - stopped keppra   - Will start  low dose lyrica 25mg at night for 1 week then add am dose as tolerated .   - consider low dose zanaflex    - Physical Therapy: continue home regimen   - Clinical Health Psychologist to address issues of relaxation, behavioral change, coping style, and other factors important to improvement: consider  - Diagnostic Studies: reviewed with patient   - Urine toxicology screen today: no   - Follow up:    - For procedure   -follow up with neurosurgery           Clinic Number:  [phone]  Call with any questions about your care and for scheduling assistance.   Calls are returned Monday through Friday between 8 AM and 4:30 PM. We usually get back to you within 2 business days depending on the issue/request.    If we are prescribing your medications:  For opioid medication refills, call the clinic or send a [employer] message 7 days in advance.  Please include:  Name of requested medication  Name of the pharmacy.  For non-opioid medications, call your pharmacy directly to request a refill. Please allow 3-4 days to be processed.   Per [place] State Law:  All controlled substance prescriptions must be filled within 30 days of being written.    For those controlled substances allowing refills, pickup must occur within 30 days of last fill.      We believe regular attendance is key to your success in our program!    Any time you are unable to keep your appointment we ask that you call us at least 24 hours in advance to cancel.This will allow us to offer the appointment time to another patient.   Multiple missed appointments may lead to dismissal from the clinic.

## 2025-05-27 ENCOUNTER — VIRTUAL VISIT (OUTPATIENT)
Dept: FAMILY MEDICINE | Facility: CLINIC | Age: 85
End: 2025-05-27
Payer: COMMERCIAL

## 2025-05-27 DIAGNOSIS — M48.062 SPINAL STENOSIS OF LUMBAR REGION WITH NEUROGENIC CLAUDICATION: ICD-10-CM

## 2025-05-27 DIAGNOSIS — E78.5 HYPERLIPIDEMIA LDL GOAL <100: ICD-10-CM

## 2025-05-27 DIAGNOSIS — G62.89 AXONAL SENSORIMOTOR NEUROPATHY: ICD-10-CM

## 2025-05-27 DIAGNOSIS — M79.662 PAIN OF LEFT LOWER LEG: ICD-10-CM

## 2025-05-27 DIAGNOSIS — E11.9 TYPE 2 DIABETES MELLITUS WITHOUT COMPLICATION, WITHOUT LONG-TERM CURRENT USE OF INSULIN (H): Primary | ICD-10-CM

## 2025-05-27 DIAGNOSIS — M43.16 SPONDYLOLISTHESIS OF LUMBAR REGION: ICD-10-CM

## 2025-05-27 DIAGNOSIS — M48.07 SPINAL STENOSIS OF LUMBOSACRAL REGION: ICD-10-CM

## 2025-05-27 PROBLEM — I89.0 LYMPHEDEMA: Status: RESOLVED | Noted: 2024-05-10 | Resolved: 2025-05-27

## 2025-05-27 PROCEDURE — 1126F AMNT PAIN NOTED NONE PRSNT: CPT | Mod: 93 | Performed by: FAMILY MEDICINE

## 2025-05-27 PROCEDURE — 98013 SYNCH AUDIO-ONLY EST LOW 20: CPT | Performed by: FAMILY MEDICINE

## 2025-05-27 RX ORDER — PREGABALIN 25 MG/1
25 CAPSULE ORAL 2 TIMES DAILY
Status: SHIPPED
Start: 2025-05-27

## 2025-05-27 RX ORDER — OXYCODONE HYDROCHLORIDE 5 MG/1
5 TABLET ORAL EVERY 6 HOURS PRN
Qty: 20 TABLET | Refills: 0 | Status: SHIPPED | OUTPATIENT
Start: 2025-05-27

## 2025-05-27 NOTE — PROGRESS NOTES
Kelly is a 85 year old who is being evaluated via a billable telephone visit.    What phone number would you like to be contacted at? [phone]  How would you like to obtain your AVS? [facility]  Originating Location (pt. Location): Home    Distant Location (provider location):  Off-site  Telephone visit completed due to the patient did not consent to a video visit.    Assessment & Plan     (E11.9) Type 2 diabetes mellitus without complication, without long-term current use of insulin (H)  (primary encounter diagnosis)  At goal  Plan: Adult Eye  Referral, Albumin Random         Urine Quantitative with Creat Ratio,         Comprehensive metabolic panel, Hemoglobin A1c          (E11.40) Diabetic neuropathy (H)  Stable    (E78.5) Hyperlipidemia LDL goal <100  Comment:   LDL Cholesterol Calculated   Date Value Ref Range Status   11/11/2024 107 (H) <100 mg/dL Final   05/11/2021 103 (H) <100 mg/dL Final     Comment:     Above desirable:  100-129 mg/dl  Borderline High:  130-159 mg/dL  High:             160-189 mg/dL  Very high:       >189 mg/dl      Recheck due, she will schedule fasting labs  Plan: Lipid panel reflex to direct LDL Fasting,         Comprehensive metabolic panel          (M79.662) Pain of left lower leg  Severe, not well controlled, seeing specialist, has procedure scheduled.  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 Rare, prn use narcotic for sever pain. She has used this as directed in the past with no worrisome side effects.  Plan: oxyCODONE (ROXICODONE) 5 MG tablet          Subjective   Kelly is a 85 year old, presenting for the following health issues:  Recheck Medication        3/11/2025     7:47 AM   Additional Questions   Roomed by Kasandra DICKINSON     History of Present Illness       Reason for visit:   Medication         Diabetes Follow-up    How often are you checking your blood sugar? One time daily  What time of day are you checking your blood sugars (select all that apply)?  Before and after meals  Have you had any blood sugars above 200?  No  Have you had any blood sugars below 70?  No  What symptoms do you notice when your blood sugar is low?  None  What concerns do you have today about your diabetes? None   Do you have any of these symptoms? (Select all that apply)  no sores on feet  Have you had a diabetic eye exam in the last 12 months? Yes- Date of last eye exam: 10/29/2024,  Location: [facility]        BP Readings from Last 2 Encounters:   05/21/25 (!) 146/86   05/09/25 (!) 156/82     Hemoglobin A1C (%)   Date Value   02/24/2025 8.9 (H)   11/11/2024 8.0 (H)   05/11/2021 7.3 (H)   10/07/2020 7.2 (H)     LDL Cholesterol Calculated (mg/dL)   Date Value   11/11/2024 107 (H)   06/11/2024 101 (H)   05/11/2021 103 (H)   10/07/2020 134 (H)             Hyperlipidemia Follow-Up    Are you regularly taking any medication or supplement to lower your cholesterol?   Yes- simvastatin  Are you having muscle aches or other side effects that you think could be caused by your cholesterol lowering medication?  No    Pain History:  When did you first notice your pain? Over one year ago   Have you seen this provider for your pain in the past? Yes   Where in your body do you have pain? Leg pain, aggravated by activity, and worsening, because now sometimes has pain at rest whereas in the past pain would resolve completely with rest  She has recently seen Dr. Bullard, neurologist, and has been referred for spine procedure.        4/27/2013     8:59 AM 9/21/2024     1:19 PM   JENNIE-7 SCORE   Total Score 5    Total Score  0 (minimal anxiety)   Total Score  0      - Do you ever take more pain medicine than prescribed? No      PDMP Review         Value Time User    State PDMP site checked  Yes 3/11/2025  8:04 AM Jessica  Lea Yoder MD          Last CSA Agreement:   CSA -- Patient Level:    CSA: None found at the patient level.     t  Includes non-opioid pharmacological medications and referrals  :[id number]}    Review of Systems  Constitutional, HEENT, cardiovascular, pulmonary, gi and gu systems are negative, except as otherwise noted.      Objective    Vitals - Patient Reported  Weight (Patient Reported): 68 kg (150 lb)  Pain Score: No Pain (0)        Physical Exam   General: Alert and no distress //Respiratory: No audible wheeze, cough, or shortness of breath // Psychiatric:  Appropriate affect, tone, and pace of words        Phone call duration: 20 minutes minutes  Signed Electronically by: Lea Lopez MD

## 2025-05-28 ENCOUNTER — RADIOLOGY INJECTION OFFICE VISIT (OUTPATIENT)
Dept: PALLIATIVE MEDICINE | Facility: CLINIC | Age: 85
End: 2025-05-28
Attending: NEUROLOGICAL SURGERY
Payer: COMMERCIAL

## 2025-05-28 ENCOUNTER — PATIENT OUTREACH (OUTPATIENT)
Dept: CARE COORDINATION | Facility: CLINIC | Age: 85
End: 2025-05-28

## 2025-05-28 VITALS — HEART RATE: 66 BPM | DIASTOLIC BLOOD PRESSURE: 81 MMHG | OXYGEN SATURATION: 99 % | SYSTOLIC BLOOD PRESSURE: 138 MMHG

## 2025-05-28 DIAGNOSIS — M43.10 DEGENERATIVE SPONDYLOLISTHESIS: ICD-10-CM

## 2025-05-28 DIAGNOSIS — M54.16 LUMBAR RADICULOPATHY: ICD-10-CM

## 2025-05-28 DIAGNOSIS — S06.5X0D POST-TRAUMATIC SUBDURAL HEMATOMA, WITHOUT LOSS OF CONSCIOUSNESS, SUBSEQUENT ENCOUNTER: ICD-10-CM

## 2025-05-28 DIAGNOSIS — M54.17 LUMBOSACRAL RADICULOPATHY: ICD-10-CM

## 2025-05-28 DIAGNOSIS — M48.062 SPINAL STENOSIS OF LUMBAR REGION WITH NEUROGENIC CLAUDICATION: ICD-10-CM

## 2025-05-28 DIAGNOSIS — M79.605 PAIN OF LEFT LOWER EXTREMITY: ICD-10-CM

## 2025-05-28 PROCEDURE — 62323 NJX INTERLAMINAR LMBR/SAC: CPT | Performed by: PAIN MEDICINE

## 2025-05-28 RX ORDER — TRIAMCINOLONE ACETONIDE 40 MG/ML
40 INJECTION, SUSPENSION INTRA-ARTICULAR; INTRAMUSCULAR ONCE
Status: COMPLETED | OUTPATIENT
Start: 2025-05-28 | End: 2025-05-28

## 2025-05-28 RX ADMIN — TRIAMCINOLONE ACETONIDE 40 MG: 40 INJECTION, SUSPENSION INTRA-ARTICULAR; INTRAMUSCULAR at 14:54

## 2025-05-28 ASSESSMENT — PAIN SCALES - GENERAL
PAINLEVEL_OUTOF10: SEVERE PAIN (7)
PAINLEVEL_OUTOF10: NO PAIN (0)

## 2025-05-28 NOTE — NURSING NOTE
Discharge Information    IV Discontiued Time:  NA    Amount of Fluid Infused:  NA    Discharge Criteria = When patient returns to baseline or as per MD order    Consciousness:  Pt is fully awake    Circulation:  BP +/- 20% of pre-procedure level    Respiration:  Patient is able to breathe deeply    O2 Sat:  Patient is able to maintain O2 Sat >92% on room air    Activity:  Moves 4 extremities on command    Ambulation:  Patient is able to stand and walk or stand and pivot into wheelchair    Dressing:  Clean/dry or No Dressing    Notes:   Discharge instructions and AVS given to patient    Patient meets criteria for discharge?  YES    Admitted to PCU?  No    Responsible adult present to accompany patient home?  Yes    Signature/Title:    Yane Pardo RN  RN Care Coordinator  [facility]

## 2025-05-28 NOTE — PATIENT INSTRUCTIONS
[facility]   Procedure Discharge Instructions    Today you saw:    Dr. Kiel Sneed,         You had a(n):  Lumbar epidural steroid injection Interlaminar     Medications used for interlaminar MANDY: Lidocaine, Omnipaque, Kenalog, Bupivicaine, normal saline          Be cautious with walking. Numbness and/or weakness in the lower extremities may occur for up to 6-8 hours after the procedure due to effect of the local anesthetic  Do not drive for 6 hours. The effect of the local anesthetic could slow your reflexes.   You may resume your regular activities after 24 hours  Avoid strenuous activity for the first 24 hours  You may shower, however avoid swimming, tub baths or hot tubs for 24 hours following your procedure  You may have a mild to moderate increase in pain for several days following the injection.  It may take up to 14 days for the steroid medication to start working although you may feel the effect as early as a few days after the procedure.     You may use ice packs for 10-15 minutes, 3 to 4 times a day at the injection site for comfort  Do not use heat to painful areas for 6 to 8 hours. This will give the local anesthetic time to wear off and prevent you from accidentally burning your skin.   Unless you have been directed to avoid the use of anti-inflammatory medications (NSAIDS), you may use medications such as ibuprofen, Aleve or Tylenol for pain control if needed.   If you have diabetes, check your blood sugar more frequently than usual as your blood sugar may be higher than normal for 10-14 days following a steroid injection. Contact your doctor who manages your diabetes if your blood sugar is higher than usual  Possible side effects of steroids that you may experience include flushing, elevated blood pressure, increased appetite, mild headaches and restlessness.  All of these symptoms will get better with time.  If you experience any of the following, call the Pain  Clinic during work hours (Mon-Friday 8-4:30 pm) at [phone] or the Provider Line after hours at [phone]:  -Fever over 100 degree F  -Swelling, bleeding, redness, drainage, warmth at the injection site  -Progressive weakness or numbness in your legs  -Loss of bowel or bladder function  -Unusual headache not relieved by Tylenol or other pain reliever  -Unusual new onset of pain that is not improving

## 2025-05-28 NOTE — PROGRESS NOTES
Pre procedure Diagnosis: Lumbar radiculopathy  Post procedure Diagnosis: Same  Procedure performed: L5-S1 interlaminar epidural steroid injection   Anesthesia: none  Complications: none  Operators: Kiel Sneed MD     Indications:   Kelly Barnes is a 85 year old female.  The patient has a history of lbp rad to her LLE.  Examination shows + slump.  she has tried conservative treatment including meds/pt.    MRI reviewdd  Options/alternatives, benefits and risks were discussed with the patient including but not limited to bleeding, infection, no pain relief, tissue trauma, exposure to radiation, reaction to medications, spinal cord injury, dural puncture, weakness, numbness and headache.  Questions were answered to her satisfaction and she wishes to proceed. Voluntary informed consent was obtained and signed.     Vitals were reviewed: Yes  Allergies were reviewed:  Yes   Medications were reviewed:  Yes   Pre-procedure pain score: 7/10    Procedure:  The patient's medical history, medications, and allergies were reviewed and reconciled.  After obtaining signed informed consent, the patient was brought into the procedure suite and was placed in a prone position on the procedure table.   A Pause for the Cause was performed.  Patient was prepped and draped in the usual sterile fashion.     The L5-S1 interspace was identified with AP fluoroscopy.  A total of 3ml of 1% lidocaine was used to anesthetize the skin and subcutaneous tissues for a  midline approach.    A 20gauge 3.5inch Touhy needle was advanced utilizing intermittent AP and Lateral fluoroscopy and air for loss of resistance.  The epidural space was encountered on the first pass without difficulties.  Aspiration for blood and CSF was negative.  Needle position was verified by injecting 1 ml of Omnipaque utilizing real-time fluoroscopy that showed good needle placement and epidural spread without signs of intravascular or intrathecal uptake.  Omnipaque  wasted:  9 ml.    Then, after repeated negative aspiration for blood or CSF, a combination of Kenalog 40 mg, Bupivicaine 0.25% 2 ml, diluted with 3 ml of normal saline to a total injectate volume of 6 ml was injected into the epidural space in a slow and incremental fashion and the needle was restyletted and withdrawn.  All injected medications were preservative free.    The injection site was cleaned and a sterile dressing was applied.    The patient tolerated the procedure well without complications and was taken to the recovery room for continued observation.    Images were saved to PACS.    Post-procedure pain score: 0/10  Follow-up includes:   -f/u with referring provider     Kiel Sneed MD  [facility]

## 2025-05-28 NOTE — NURSING NOTE
Pre-procedure Intake  If YES to any questions or NO to having a   Please complete laminated checklist and leave on the computer keyboard for Provider, verbally inform provider if able.    For SCS Trial, RFA's or any sedation procedure:  Have you been fasting? NA  If yes, for how long?     Are you taking any any blood thinners such as Coumadin, Warfarin, Jantoven, Pradaxa Xarelto, Eliquis, Edoxaban, Enoxaparin, Lovenox, Heparin, Arixtra, Fondaparinux, or Fragmin? OR Antiplatelet medication such as Plavix, Brilinta, or Effient?   No   If yes, when did you take your last dose?     Do you take aspirin?  No  If cervical procedure, have you held aspirin for 6 days?       Is the Pt taking any GLP-1 Antagonist (hold needed for sedation patients only)  (semaglutide (Ozempic, Wegovy), dulaglutide (Trulicity), exenatide ER (Bydureon), tirzepatide (Mounjaro), Liraglutide (Saxenda, Victoza), semaglutide (Rybelsus)     No   If yes, when did you take your last dose?     Do you have any allergies to contrast dye, iodine, steroid and/or numbing medications?  NO    Are you currently taking antibiotics or have an active infection?  NO    Have you had a fever/elevated temperature within the past week? NO    Are you currently taking oral steroids? NO    Do you have a ? Yes    Are you pregnant or breastfeeding?  NO    Have you received any vaccinations in the last week? No    Notify provider and RNs if systolic BP >170, diastolic BP >100, P >100 or O2 sats < 90%

## 2025-05-30 ENCOUNTER — MYC MEDICAL ADVICE (OUTPATIENT)
Dept: FAMILY MEDICINE | Facility: CLINIC | Age: 85
End: 2025-05-30

## 2025-06-03 NOTE — TELEPHONE ENCOUNTER
Dr. Lopez --  Please review and advise: increased blood sugar due to corticosteroid injection given on 5/28. Blood sugars increased (213 morning) on 5/30.     Patient wondering if she should take Januvia again or increase dose of Lantus or other recommendation. Dangerous level where needs to be evaluated.     Priscila Lopez, BSN RN  [facility]

## 2025-06-03 NOTE — TELEPHONE ENCOUNTER
Relayed Lantus dose increase and plan of care to patient who verbalized agreement and understanding.    MEHREEN Grewal, BSN, PHN, AMB-BC (she/her)  [facility] RN

## 2025-06-03 NOTE — TELEPHONE ENCOUNTER
Please let her know to increase Lantus to 12 units at bedtime, and to call back if glucose still elevated tomorrow, thanks!    Sincerely,  Dr. Lea Lopez MD  6/3/2025

## 2025-06-14 ENCOUNTER — HEALTH MAINTENANCE LETTER (OUTPATIENT)
Age: 85
End: 2025-06-14

## 2025-06-19 DIAGNOSIS — M79.662 PAIN OF LEFT LOWER LEG: Primary | ICD-10-CM

## 2025-06-19 DIAGNOSIS — G62.89 AXONAL SENSORIMOTOR NEUROPATHY: ICD-10-CM

## 2025-06-19 DIAGNOSIS — Z51.81 ENCOUNTER FOR THERAPEUTIC DRUG LEVEL MONITORING: ICD-10-CM

## 2025-06-25 ENCOUNTER — LAB (OUTPATIENT)
Dept: LAB | Facility: CLINIC | Age: 85
End: 2025-06-25
Attending: FAMILY MEDICINE
Payer: COMMERCIAL

## 2025-06-25 DIAGNOSIS — G62.89 AXONAL SENSORIMOTOR NEUROPATHY: ICD-10-CM

## 2025-06-25 DIAGNOSIS — E11.9 TYPE 2 DIABETES MELLITUS WITHOUT COMPLICATION, WITHOUT LONG-TERM CURRENT USE OF INSULIN (H): ICD-10-CM

## 2025-06-25 DIAGNOSIS — M79.662 PAIN OF LEFT LOWER LEG: ICD-10-CM

## 2025-06-25 DIAGNOSIS — Z51.81 ENCOUNTER FOR THERAPEUTIC DRUG LEVEL MONITORING: ICD-10-CM

## 2025-06-25 DIAGNOSIS — E78.5 HYPERLIPIDEMIA LDL GOAL <100: ICD-10-CM

## 2025-06-25 LAB
ALBUMIN SERPL BCG-MCNC: 4.2 G/DL (ref 3.5–5.2)
ALP SERPL-CCNC: 67 U/L (ref 40–150)
ALT SERPL W P-5'-P-CCNC: 18 U/L (ref 0–50)
ANION GAP SERPL CALCULATED.3IONS-SCNC: 14 MMOL/L (ref 7–15)
AST SERPL W P-5'-P-CCNC: 26 U/L (ref 0–45)
BASOPHILS # BLD AUTO: 0 10E3/UL (ref 0–0.2)
BASOPHILS NFR BLD AUTO: 0 %
BILIRUB SERPL-MCNC: 0.4 MG/DL
BUN SERPL-MCNC: 23.8 MG/DL (ref 8–23)
CALCIUM SERPL-MCNC: 9.6 MG/DL (ref 8.8–10.4)
CHLORIDE SERPL-SCNC: 102 MMOL/L (ref 98–107)
CHOLEST SERPL-MCNC: 196 MG/DL
CREAT SERPL-MCNC: 1.05 MG/DL (ref 0.51–0.95)
EGFRCR SERPLBLD CKD-EPI 2021: 52 ML/MIN/1.73M2
EOSINOPHIL # BLD AUTO: 0.1 10E3/UL (ref 0–0.7)
EOSINOPHIL NFR BLD AUTO: 1 %
ERYTHROCYTE [DISTWIDTH] IN BLOOD BY AUTOMATED COUNT: 13 % (ref 10–15)
EST. AVERAGE GLUCOSE BLD GHB EST-MCNC: 171 MG/DL
FASTING STATUS PATIENT QL REPORTED: YES
FASTING STATUS PATIENT QL REPORTED: YES
GLUCOSE SERPL-MCNC: 95 MG/DL (ref 70–99)
HBA1C MFR BLD: 7.6 % (ref 0–5.6)
HCO3 SERPL-SCNC: 25 MMOL/L (ref 22–29)
HCT VFR BLD AUTO: 40.2 % (ref 35–47)
HDLC SERPL-MCNC: 75 MG/DL
HGB BLD-MCNC: 13.3 G/DL (ref 11.7–15.7)
IMM GRANULOCYTES # BLD: 0 10E3/UL
IMM GRANULOCYTES NFR BLD: 0 %
LDLC SERPL CALC-MCNC: 105 MG/DL
LYMPHOCYTES # BLD AUTO: 1.4 10E3/UL (ref 0.8–5.3)
LYMPHOCYTES NFR BLD AUTO: 15 %
MCH RBC QN AUTO: 28 PG (ref 26.5–33)
MCHC RBC AUTO-ENTMCNC: 33.1 G/DL (ref 31.5–36.5)
MCV RBC AUTO: 85 FL (ref 78–100)
MONOCYTES # BLD AUTO: 0.7 10E3/UL (ref 0–1.3)
MONOCYTES NFR BLD AUTO: 8 %
NEUTROPHILS # BLD AUTO: 7.2 10E3/UL (ref 1.6–8.3)
NEUTROPHILS NFR BLD AUTO: 76 %
NONHDLC SERPL-MCNC: 121 MG/DL
PLATELET # BLD AUTO: 347 10E3/UL (ref 150–450)
POTASSIUM SERPL-SCNC: 3.7 MMOL/L (ref 3.4–5.3)
PROT SERPL-MCNC: 6.8 G/DL (ref 6.4–8.3)
RBC # BLD AUTO: 4.75 10E6/UL (ref 3.8–5.2)
SODIUM SERPL-SCNC: 141 MMOL/L (ref 135–145)
TRIGL SERPL-MCNC: 80 MG/DL
WBC # BLD AUTO: 9.5 10E3/UL (ref 4–11)

## 2025-06-25 PROCEDURE — 36415 COLL VENOUS BLD VENIPUNCTURE: CPT

## 2025-06-25 PROCEDURE — 80061 LIPID PANEL: CPT

## 2025-06-25 PROCEDURE — 83036 HEMOGLOBIN GLYCOSYLATED A1C: CPT

## 2025-06-25 PROCEDURE — 80053 COMPREHEN METABOLIC PANEL: CPT

## 2025-06-25 PROCEDURE — 85025 COMPLETE CBC W/AUTO DIFF WBC: CPT

## 2025-06-26 ENCOUNTER — LAB (OUTPATIENT)
Dept: LAB | Facility: CLINIC | Age: 85
End: 2025-06-26
Attending: FAMILY MEDICINE
Payer: COMMERCIAL

## 2025-06-26 ENCOUNTER — RESULTS FOLLOW-UP (OUTPATIENT)
Dept: FAMILY MEDICINE | Facility: CLINIC | Age: 85
End: 2025-06-26

## 2025-06-26 DIAGNOSIS — E11.9 TYPE 2 DIABETES MELLITUS WITHOUT COMPLICATION, WITHOUT LONG-TERM CURRENT USE OF INSULIN (H): ICD-10-CM

## 2025-06-26 DIAGNOSIS — Z51.81 ENCOUNTER FOR THERAPEUTIC DRUG LEVEL MONITORING: ICD-10-CM

## 2025-06-26 DIAGNOSIS — G62.89 AXONAL SENSORIMOTOR NEUROPATHY: ICD-10-CM

## 2025-06-26 DIAGNOSIS — M79.662 PAIN OF LEFT LOWER LEG: ICD-10-CM

## 2025-06-26 LAB
CREAT UR-MCNC: 90 MG/DL
CREAT UR-MCNC: 90.1 MG/DL
MICROALBUMIN UR-MCNC: 20.7 MG/L
MICROALBUMIN/CREAT UR: 22.97 MG/G CR (ref 0–25)

## 2025-06-27 PROBLEM — M48.062 SPINAL STENOSIS OF LUMBAR REGION WITH NEUROGENIC CLAUDICATION: Status: ACTIVE | Noted: 2025-06-27

## 2025-06-27 PROBLEM — Z79.891 CHRONIC PRESCRIPTION OPIATE USE: Status: ACTIVE | Noted: 2025-06-27

## 2025-07-01 LAB
OXYMORPHONE UR CFM-MCNC: 59 NG/ML
OXYMORPHONE/CREAT UR: 66 NG/MG {CREAT}

## 2025-07-06 NOTE — PROGRESS NOTES
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  She is here today for a surveillance visit.     Oncology history:   23: Endometrial biopsy:  - Endometrioid adenocarcinoma, FIGO grade 1     10/6/23: CT cap IMPRESSION:   1. Endometrial tumor consistent with biopsy proven endometrioid adenocarcinoma.  2. No solid organ metastases identified. No evidence of local metastatic disease to the extent assessed on CT.     10/30/23:   A. Left pelvic sentinel lymph node, lymphadenectomy:  - One reactive lymph node examined, negative for malignancy (0/1)      B. Right pelvic sentinel lymph node, lymphadenectomy:  - One lymph node positive for isolated tumor cells (ITCs)      C. Uterus, cervix, bilateral fallopian tubes, and bilateral ovaries, hysterectomy with bilateral salpingo-oophorectomy:  - Endometrial endometrioid adenocarcinoma, FIGO grade 1, MMR-intact, with microcystic, elongated and fragmented (MELF)-pattern of myoinvasion  - Leiomyoma (0.3 cm)  - Cervix with no significant histologic abnormality   - Ovaries with atrophic changes  - Fallopian tubes with no significant histologic abnormality             Today she comes to clinic and denies any concerns for her visit. She denies any vaginal bleeding, no changes in her bowel or bladder habits, no nausea/emesis, no new lower extremity edema (has had lower leg swelling for years;wears compression stockings), and no difficulties eating. She denies any abdominal discomfort/bloating, no fevers or chills, and no chest pain or shortness of breath. She spent the weekend at her cabin in [place] with her family.         Annual exam with PCP:   Mammogram:   DEXA: scheduled   Colonoscopy:no longer doing      Review of Systems  See HPI      Past Medical History:    Past Medical  History:   Diagnosis Date    Acute kidney injury 12/19/2020    Anemia     Aortic stenosis 02/29/2016    With new murmur noted 2/2016, normal echo, repeat echo 2/2017.    Aortic valve sclerosis     cuboid     left foot    Endometrial cancer (H)     Fungemia 08/16/2021    History of Clostridium difficile colitis     Hyperlipidemia LDL goal <100 11/01/2012    Hypertension goal BP (blood pressure) < 140/90     Obesity, Class I, BMI 30-34.9 11/11/2015    Pneumonia 03/2016    Pyelonephritis     Sepsis, due to unspecified organism, unspecified whether acute organ dysfunction present (H) 12/19/2020    Urinary tract infection associated with nephrostomy catheter, initial encounter 08/08/2021         Past Surgical History:    Past Surgical History:   Procedure Laterality Date    CATARACT EXTRACTION      CYSTOSCOPY, RETROGRADES, INSERT STENT URETER(S), COMBINED Right 07/15/2021    Procedure: CYSTOURETEROSCOPY, WITH RETROGRADE PYELOGRAM,  AND STENT INSERTION RIGHT;  Surgeon: Kirk Law MD;  Location: [facility] OR    LAPAROSCOPIC HYSTERECTOMY TOTAL, BILATERAL SALPINGO-OOPHORECTOMY, NODE DISSECTION, COMBINED N/A 10/30/2023    Procedure: TOTAL LAPAROSCOPIC HYSTERECTOMY, WITH BILATERAL SALPINGO-OOPHORECTOMY, BILATEAL PELVIC SENTINEL LYMPH NODE;  Surgeon: Kaykay Burden MD;  Location: [facility] OR    LASER HOLMIUM NEPHROLITHOTOMY VIA PERCUTANEOUS NEPHROSTOMY Right 08/05/2021    Procedure: NEPHROLITHOTOMY, PERCUTANEOUS, USING HOLMIUM LASER RIGHT;  Surgeon: Kirk Law MD;  Location: [facility]    LASER HOLMIUM NEPHROLITHOTOMY VIA PERCUTANEOUS NEPHROSTOMY Right 08/13/2021    Procedure: Ureteroscopic assisted secondary right percutaneous nephrolihtotomy, Holmium laser lithotriipsy;  Surgeon: Kirk Law MD;  Location: [facility] OR    PICC SINGLE LUMEN PLACEMENT Left 08/17/2021    41cm (2cm external), Medial brachial vein       Current Medications:     Current Outpatient Medications   Medication Sig Dispense Refill    acetaminophen  (TYLENOL) 500 MG tablet Take 1,000 mg by mouth 3 times daily.      blood glucose (ONETOUCH ULTRA) test strip Use to test blood sugar 2 times daily or as directed. 100 strip 3    blood glucose monitoring (ONE TOUCH ULTRA 2) meter device kit Use to test blood sugar 2 times daily or as directed. 1 kit 0    diclofenac (VOLTAREN) 1 % topical gel Apply 4 g topically 4 times daily. To the left knee/calf area      glimepiride (AMARYL) 4 MG tablet TAKE ONE TABLET BY MOUTH TWICE A DAY WITH MEALS 180 tablet 1    insulin glargine (LANTUS PEN) 100 UNIT/ML pen Inject 10 Units subcutaneously at bedtime. 15 mL 1    Lancets (ONETOUCH DELICA PLUS [id number]) MISC USE ONE DEVICE BY IN VITRO ROUTE TWO TIMES A  each 3    losartan-hydrochlorothiazide (HYZAAR) 50-12.5 MG tablet TAKE ONE TABLET BY MOUTH EVERY MORNING 90 tablet 3    oxyCODONE (ROXICODONE) 5 MG tablet Take 1 tablet (5 mg) by mouth every 6 hours as needed for severe pain or breakthrough pain. 20 tablet 0    potassium citrate (UROCIT-K) 10 MEQ (1080 MG) CR tablet Take 1 tablet (10 mEq) by mouth daily. (Patient not taking: Reported on 6/27/2025) 90 tablet 3    pregabalin (LYRICA) 25 MG capsule Take 1 capsule (25 mg) by mouth 2 times daily. 60 capsule 3    simvastatin (ZOCOR) 20 MG tablet Take 1 tablet (20 mg) by mouth at bedtime. 90 tablet 3    sitagliptin (JANUVIA) 100 MG tablet Take 1 tablet (100 mg) by mouth daily. (Patient not taking: Reported on 6/27/2025) 90 tablet 3         Allergies:        Allergies   Allergen Reactions    Keflex [Cephalexin] Rash    Metformin Rash        Social History:     Social History     Tobacco Use    Smoking status: Never     Passive exposure: Never    Smokeless tobacco: Never   Substance Use Topics    Alcohol use: Yes     Alcohol/week: 2.0 standard drinks of alcohol     Types: 1 Glasses of wine, 1 Cans of beer per week     Comment: 1-2 drinks per week       History   Drug Use No         Family History:     The patient's family history is  notable for     Family History   Problem Relation Age of Onset    Hypertension Mother     Myocardial Infarction Father 62    Coronary Artery Disease Father 60    Diabetes No family hx of     Cerebrovascular Disease No family hx of     Breast Cancer No family hx of     Cancer - colorectal No family hx of     Prostate Cancer No family hx of     Anesthesia Reaction No family hx of     Bleeding Disorder No family hx of     Clotting Disorder No family hx of          Physical Exam:     /78   Pulse 63   Temp 98.4  F (36.9  C) (Oral)   Resp 16   Wt 68 kg (150 lb)   LMP  (LMP Unknown)   SpO2 96%   BMI 27.88 kg/m    Body mass index is 27.88 kg/m .    General Appearance: healthy and alert, no distress     HEENT: no thyromegaly, no palpable nodules or masses        Cardiovascular: regular rate and rhythm, no gallops, rubs or murmurs     Respiratory: lungs clear, no rales, rhonchi or wheezes, normal diaphragmatic excursion    Musculoskeletal: extremities non tender and without edema    Skin: no lesions or rashes     Neurological: normal gait, no gross defects     Psychiatric: appropriate mood and affect                               Hematological: normal cervical, supraclavicular and inguinal lymph nodes     Gastrointestinal:       abdomen soft, non-tender, non-distended, no organomegaly or masses    Genitourinary: External genitalia and urethral meatus appears normal.  Vagina is smooth without nodularity or masses; scant amount of oxidized physiological discharge at vaginal apex.  Cervix surgically absent.  Bimanual exam reveal no masses, nodularity or fullness.  Recto-vaginal exam confirms these findings.      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  She is here today for a surveillance visit.     22 minutes spent on the date of the encounter doing chart review, history and exam, documentation and further activities as noted  above      Plan:     1.)       Patient to RTC in 6 months for her next surveillance visit. Reviewed she will be seen every 6 months until 10/2028 and then annually thereafter. Reviewed recommendations from [facility] not to perform surveillance pap smears in women diagnosed with endometrial cancer as this does not improve detection of local recurrence. Reviewed signs and symptoms for when she should contact the clinic or seek additional care. Patient to contact the clinic with any questions or concerns in the interim.  Answered all of her questions to the best of my ability.     2.) Genetic risk factors were assessed and the patient has intact MMR proteins.     3.) Labs and/or tests ordered include:  none.     4.) Health maintenance issues addressed today include annual health maintenance and non-gynecologic issues with PCP.    APRIL Rogers, WHNP-BC, ANP-BC, AOCNP  Women's Health Nurse Practitioner  Adult Nurse Practitioner  Division of Gynecologic Oncology        CC  Patient Care Team:  Lea Lopez MD as PCP - General (Family Medicine)  Jose Sue RPH as Pharmacist (Pharmacist)  Brenda Snowden CNP as Nurse Practitioner (Urology)  Kirk Law MD as MD (Urology)  Brenda Snowden CNP as Assigned Surgical Provider  Cindy Cortez PT as Physical Therapist (Physical Therapy)  Lea Lopez MD as Assigned PCP  Genet Velazquez, PT as Physical Therapist (Physical Therapy)  Richard Freedman MD as Assigned Heart and Vascular Provider  Deshaun Bullard MD as MD (Neurology)  Marzena Arreaga MD as MD (Cardiovascular Disease)  Tana Peña APRN CNP as Assigned Cancer Care Provider  Lea Lopez MD as Assigned Pain Medication Provider  Corbin Monzon DO as Assigned Musculoskeletal Provider  Kiel Sneed MD as MD (Pain Medicine)  Leah Jc [facility] as Pharmacist (Pharmacist Ambulatory Care)  Leah Jc [facility]  as Assigned [place] Pharmacist  Rommel, Cornelio Gayle MD as Assigned Neuroscience Provider  SELF, REFERRED

## 2025-07-07 ENCOUNTER — ONCOLOGY VISIT (OUTPATIENT)
Dept: ONCOLOGY | Facility: CLINIC | Age: 85
End: 2025-07-07
Attending: NURSE PRACTITIONER
Payer: COMMERCIAL

## 2025-07-07 VITALS
HEART RATE: 63 BPM | OXYGEN SATURATION: 96 % | RESPIRATION RATE: 16 BRPM | WEIGHT: 150 LBS | TEMPERATURE: 98.4 F | SYSTOLIC BLOOD PRESSURE: 125 MMHG | BODY MASS INDEX: 27.88 KG/M2 | DIASTOLIC BLOOD PRESSURE: 78 MMHG

## 2025-07-07 DIAGNOSIS — C54.1 ENDOMETRIAL CANCER (H): Primary | ICD-10-CM

## 2025-07-07 PROCEDURE — 99213 OFFICE O/P EST LOW 20 MIN: CPT | Performed by: NURSE PRACTITIONER

## 2025-07-07 PROCEDURE — G0463 HOSPITAL OUTPT CLINIC VISIT: HCPCS | Performed by: NURSE PRACTITIONER

## 2025-07-07 ASSESSMENT — PAIN SCALES - GENERAL: PAINLEVEL_OUTOF10: NO PAIN (0)

## 2025-07-07 NOTE — LETTER
2025      Kelly Barnes  [address]  [facility] [address]      Dear Colleague,    Thank you for referring your patient, Kelly Barnes, to the [facility]. Please see a copy of my visit note below.                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  She is here today for a surveillance visit.     Oncology history:   23: Endometrial biopsy:  - Endometrioid adenocarcinoma, FIGO grade 1     10/6/23: CT cap IMPRESSION:   1. Endometrial tumor consistent with biopsy proven endometrioid adenocarcinoma.  2. No solid organ metastases identified. No evidence of local metastatic disease to the extent assessed on CT.     10/30/23:   A. Left pelvic sentinel lymph node, lymphadenectomy:  - One reactive lymph node examined, negative for malignancy (0/1)      B. Right pelvic sentinel lymph node, lymphadenectomy:  - One lymph node positive for isolated tumor cells (ITCs)      C. Uterus, cervix, bilateral fallopian tubes, and bilateral ovaries, hysterectomy with bilateral salpingo-oophorectomy:  - Endometrial endometrioid adenocarcinoma, FIGO grade 1, MMR-intact, with microcystic, elongated and fragmented (MELF)-pattern of myoinvasion  - Leiomyoma (0.3 cm)  - Cervix with no significant histologic abnormality   - Ovaries with atrophic changes  - Fallopian tubes with no significant histologic abnormality             Today she comes to clinic and denies any concerns for her visit. She denies any vaginal bleeding, no changes in her bowel or bladder habits, no nausea/emesis, no new lower extremity edema (has had lower leg swelling for years;wears compression stockings), and no difficulties eating. She denies any abdominal discomfort/bloating, no fevers or chills, and no chest pain or shortness of breath.  She spent the weekend at her cabin in [place] with her family.         Annual exam with PCP: 6/25  Mammogram: 11/24  DEXA: scheduled 8/8  Colonoscopy:no longer doing      Review of Systems  See HPI      Past Medical History:    Past Medical History:   Diagnosis Date     Acute kidney injury 12/19/2020     Anemia      Aortic stenosis 02/29/2016    With new murmur noted 2/2016, normal echo, repeat echo 2/2017.     Aortic valve sclerosis      cuboid     left foot     Endometrial cancer (H)      Fungemia 08/16/2021     History of Clostridium difficile colitis      Hyperlipidemia LDL goal <100 11/01/2012     Hypertension goal BP (blood pressure) < 140/90      Obesity, Class I, BMI 30-34.9 11/11/2015     Pneumonia 03/2016     Pyelonephritis      Sepsis, due to unspecified organism, unspecified whether acute organ dysfunction present (H) 12/19/2020     Urinary tract infection associated with nephrostomy catheter, initial encounter 08/08/2021         Past Surgical History:    Past Surgical History:   Procedure Laterality Date     CATARACT EXTRACTION       CYSTOSCOPY, RETROGRADES, INSERT STENT URETER(S), COMBINED Right 07/15/2021    Procedure: CYSTOURETEROSCOPY, WITH RETROGRADE PYELOGRAM,  AND STENT INSERTION RIGHT;  Surgeon: Kirk Law MD;  Location: [facility] OR     LAPAROSCOPIC HYSTERECTOMY TOTAL, BILATERAL SALPINGO-OOPHORECTOMY, NODE DISSECTION, COMBINED N/A 10/30/2023    Procedure: TOTAL LAPAROSCOPIC HYSTERECTOMY, WITH BILATERAL SALPINGO-OOPHORECTOMY, BILATEAL PELVIC SENTINEL LYMPH NODE;  Surgeon: Kaykay Burden MD;  Location: [facility] OR     LASER HOLMIUM NEPHROLITHOTOMY VIA PERCUTANEOUS NEPHROSTOMY Right 08/05/2021    Procedure: NEPHROLITHOTOMY, PERCUTANEOUS, USING HOLMIUM LASER RIGHT;  Surgeon: Kirk Law MD;  Location: [facility]     LASER HOLMIUM NEPHROLITHOTOMY VIA PERCUTANEOUS NEPHROSTOMY Right 08/13/2021    Procedure: Ureteroscopic assisted secondary right percutaneous nephrolihtotomy, Holmium laser lithotriipsy;   Surgeon: Kirk Law MD;  Location: [facility] OR     PICC SINGLE LUMEN PLACEMENT Left 08/17/2021    41cm (2cm external), Medial brachial vein       Current Medications:     Current Outpatient Medications   Medication Sig Dispense Refill     acetaminophen (TYLENOL) 500 MG tablet Take 1,000 mg by mouth 3 times daily.       blood glucose (ONETOUCH ULTRA) test strip Use to test blood sugar 2 times daily or as directed. 100 strip 3     blood glucose monitoring (ONE TOUCH ULTRA 2) meter device kit Use to test blood sugar 2 times daily or as directed. 1 kit 0     diclofenac (VOLTAREN) 1 % topical gel Apply 4 g topically 4 times daily. To the left knee/calf area       glimepiride (AMARYL) 4 MG tablet TAKE ONE TABLET BY MOUTH TWICE A DAY WITH MEALS 180 tablet 1     insulin glargine (LANTUS PEN) 100 UNIT/ML pen Inject 10 Units subcutaneously at bedtime. 15 mL 1     Lancets (ONETOUCH DELICA PLUS MMPKLB86R) MISC USE ONE DEVICE BY IN VITRO ROUTE TWO TIMES A  each 3     losartan-hydrochlorothiazide (HYZAAR) 50-12.5 MG tablet TAKE ONE TABLET BY MOUTH EVERY MORNING 90 tablet 3     oxyCODONE (ROXICODONE) 5 MG tablet Take 1 tablet (5 mg) by mouth every 6 hours as needed for severe pain or breakthrough pain. 20 tablet 0     potassium citrate (UROCIT-K) 10 MEQ (1080 MG) CR tablet Take 1 tablet (10 mEq) by mouth daily. (Patient not taking: Reported on 6/27/2025) 90 tablet 3     pregabalin (LYRICA) 25 MG capsule Take 1 capsule (25 mg) by mouth 2 times daily. 60 capsule 3     simvastatin (ZOCOR) 20 MG tablet Take 1 tablet (20 mg) by mouth at bedtime. 90 tablet 3     sitagliptin (JANUVIA) 100 MG tablet Take 1 tablet (100 mg) by mouth daily. (Patient not taking: Reported on 6/27/2025) 90 tablet 3         Allergies:        Allergies   Allergen Reactions     Keflex [Cephalexin] Rash     Metformin Rash        Social History:     Social History     Tobacco Use     Smoking status: Never     Passive exposure: Never     Smokeless tobacco:  Never   Substance Use Topics     Alcohol use: Yes     Alcohol/week: 2.0 standard drinks of alcohol     Types: 1 Glasses of wine, 1 Cans of beer per week     Comment: 1-2 drinks per week       History   Drug Use No         Family History:     The patient's family history is notable for     Family History   Problem Relation Age of Onset     Hypertension Mother      Myocardial Infarction Father 62     Coronary Artery Disease Father 60     Diabetes No family hx of      Cerebrovascular Disease No family hx of      Breast Cancer No family hx of      Cancer - colorectal No family hx of      Prostate Cancer No family hx of      Anesthesia Reaction No family hx of      Bleeding Disorder No family hx of      Clotting Disorder No family hx of          Physical Exam:     /78   Pulse 63   Temp 98.4  F (36.9  C) (Oral)   Resp 16   Wt 68 kg (150 lb)   LMP  (LMP Unknown)   SpO2 96%   BMI 27.88 kg/m    Body mass index is 27.88 kg/m .    General Appearance: healthy and alert, no distress     HEENT: no thyromegaly, no palpable nodules or masses        Cardiovascular: regular rate and rhythm, no gallops, rubs or murmurs     Respiratory: lungs clear, no rales, rhonchi or wheezes, normal diaphragmatic excursion    Musculoskeletal: extremities non tender and without edema    Skin: no lesions or rashes     Neurological: normal gait, no gross defects     Psychiatric: appropriate mood and affect                               Hematological: normal cervical, supraclavicular and inguinal lymph nodes     Gastrointestinal:       abdomen soft, non-tender, non-distended, no organomegaly or masses    Genitourinary: External genitalia and urethral meatus appears normal.  Vagina is smooth without nodularity or masses; scant amount of oxidized physiological discharge at vaginal apex.  Cervix surgically absent.  Bimanual exam reveal no masses, nodularity or fullness.  Recto-vaginal exam confirms these findings.      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  She is here today for a surveillance visit.     22 minutes spent on the date of the encounter doing chart review, history and exam, documentation and further activities as noted above      Plan:     1.)       Patient to RTC in 6 months for her next surveillance visit. Reviewed she will be seen every 6 months until 10/2028 and then annually thereafter. Reviewed recommendations from [facility] not to perform surveillance pap smears in women diagnosed with endometrial cancer as this does not improve detection of local recurrence. Reviewed signs and symptoms for when she should contact the clinic or seek additional care. Patient to contact the clinic with any questions or concerns in the interim.  Answered all of her questions to the best of my ability.     2.) Genetic risk factors were assessed and the patient has intact MMR proteins.     3.) Labs and/or tests ordered include:  none.     4.) Health maintenance issues addressed today include annual health maintenance and non-gynecologic issues with PCP.    APRIL Rogers, WHNP-BC, ANP-BC, AOCNP  Women's Health Nurse Practitioner  Adult Nurse Practitioner  Division of Gynecologic Oncology        CC  Patient Care Team:  Lea Lopez MD as PCP - General (Family Medicine)  Jose Sue [facility] as Pharmacist (Pharmacist)  Brenda Snowden CNP as Nurse Practitioner (Urology)  Kirk Law MD as MD (Urology)  Brenda Snowden CNP as Assigned Surgical Provider  Cindy Cortez PT as Physical Therapist (Physical Therapy)  Lea Lopez MD as Assigned PCP  Genet Velazquez, PT as Physical Therapist (Physical Therapy)  Richard Freedman MD as Assigned Heart and Vascular Provider  Deshaun Bullard MD as MD (Neurology)  Marzena Arreaga MD as MD (Cardiovascular Disease)  Tana Peña APRN CNP as  Assigned Cancer Care Provider  Lea Lopez MD as Assigned Pain Medication Provider  Corbin Monzon DO as Assigned Musculoskeletal Provider  Kiel Sneed MD as MD (Pain Medicine)  Leah Jc RPH as Pharmacist (Pharmacist Ambulatory Care)  Leah Jc RPH as Assigned MTM Pharmacist  Cornelio Carney MD as Assigned Neuroscience Provider  SELF, REFERRED    Again, thank you for allowing me to participate in the care of your patient.        Sincerely,        APRIL Hutchinson CNP    Electronically signed

## 2025-07-07 NOTE — NURSING NOTE
"Oncology Rooming Note    July 7, 2025 7:07 AM   Kelly Barnes is a 85 year old female who presents for:    Chief Complaint   Patient presents with    Oncology Clinic Visit     Endometrial Cancer     Initial Vitals: LMP  (LMP Unknown)  Estimated body mass index is 26.94 kg/m  as calculated from the following:    Height as of 6/27/25: 1.562 m (5' 1.5\").    Weight as of 6/27/25: 65.7 kg (144 lb 14.4 oz). There is no height or weight on file to calculate BSA.  Data Unavailable Comment: Data Unavailable   No LMP recorded (lmp unknown). Patient is postmenopausal.  Allergies reviewed: Yes  Medications reviewed: Yes    Medications: Medication refills not needed today.  Pharmacy name entered into [facility]:    [facility] - [place], [place] - [address] [facility]  WRITTEN PRESCRIPTION REQUESTED  [facility] [place], [place] - [phone]    Frailty Screening:   Is the patient here for a new oncology consult visit in cancer care? 2. No    PHQ9:  Did this patient require a PHQ9?: No      Clinical concerns: None       Sarai Ndiaye LPN  7/7/2025              "

## 2025-08-06 ENCOUNTER — OFFICE VISIT (OUTPATIENT)
Dept: PALLIATIVE MEDICINE | Facility: CLINIC | Age: 85
End: 2025-08-06
Attending: PAIN MEDICINE
Payer: COMMERCIAL

## 2025-08-06 VITALS — OXYGEN SATURATION: 97 % | HEART RATE: 63 BPM | SYSTOLIC BLOOD PRESSURE: 131 MMHG | DIASTOLIC BLOOD PRESSURE: 77 MMHG

## 2025-08-06 DIAGNOSIS — I73.9 CLAUDICATION OF CALF MUSCLES: ICD-10-CM

## 2025-08-06 DIAGNOSIS — M54.16 LUMBAR RADICULOPATHY: Primary | ICD-10-CM

## 2025-08-06 PROCEDURE — 3075F SYST BP GE 130 - 139MM HG: CPT | Performed by: PAIN MEDICINE

## 2025-08-06 PROCEDURE — G2211 COMPLEX E/M VISIT ADD ON: HCPCS | Performed by: PAIN MEDICINE

## 2025-08-06 PROCEDURE — 1125F AMNT PAIN NOTED PAIN PRSNT: CPT | Performed by: PAIN MEDICINE

## 2025-08-06 PROCEDURE — 3078F DIAST BP <80 MM HG: CPT | Performed by: PAIN MEDICINE

## 2025-08-06 PROCEDURE — 99214 OFFICE O/P EST MOD 30 MIN: CPT | Performed by: PAIN MEDICINE

## 2025-08-06 ASSESSMENT — PAIN SCALES - GENERAL: PAINLEVEL_OUTOF10: MILD PAIN (3)

## 2025-08-08 ENCOUNTER — ANCILLARY PROCEDURE (OUTPATIENT)
Dept: BONE DENSITY | Facility: CLINIC | Age: 85
End: 2025-08-08
Attending: FAMILY MEDICINE
Payer: COMMERCIAL

## 2025-08-08 DIAGNOSIS — Z78.0 MENOPAUSE PRESENT: ICD-10-CM

## 2025-08-08 PROCEDURE — 77080 DXA BONE DENSITY AXIAL: CPT

## 2025-08-11 ENCOUNTER — THERAPY VISIT (OUTPATIENT)
Dept: PHYSICAL THERAPY | Facility: CLINIC | Age: 85
End: 2025-08-11
Attending: PSYCHIATRY & NEUROLOGY
Payer: COMMERCIAL

## 2025-08-11 DIAGNOSIS — M48.062 SPINAL STENOSIS, LUMBAR REGION, WITH NEUROGENIC CLAUDICATION: Primary | ICD-10-CM

## 2025-08-11 DIAGNOSIS — M48.062 SPINAL STENOSIS OF LUMBAR REGION WITH NEUROGENIC CLAUDICATION: ICD-10-CM

## 2025-08-11 PROCEDURE — 97161 PT EVAL LOW COMPLEX 20 MIN: CPT | Mod: GP

## 2025-08-11 PROCEDURE — 97110 THERAPEUTIC EXERCISES: CPT | Mod: GP

## 2025-08-13 ENCOUNTER — THERAPY VISIT (OUTPATIENT)
Dept: PHYSICAL THERAPY | Facility: CLINIC | Age: 85
End: 2025-08-13
Payer: COMMERCIAL

## 2025-08-13 DIAGNOSIS — M54.16 LUMBAR RADICULOPATHY: ICD-10-CM

## 2025-08-13 DIAGNOSIS — I73.9 CLAUDICATION OF CALF MUSCLES: ICD-10-CM

## 2025-08-13 PROCEDURE — 20560 NDL INSJ W/O NJX 1 OR 2 MUSC: CPT | Performed by: PHYSICAL THERAPIST

## 2025-08-13 ASSESSMENT — ACTIVITIES OF DAILY LIVING (ADL)
GO DOWN STAIRS: ACTIVITY IS SOMEWHAT DIFFICULT
MY_BACK_PAIN_HAS_SPREAD_DOWN_MY_LEG(S)_AT_SOME_TIME_IN_THE_LAST_2_WEEKS: AGREE
I_HAVE_HAD_PAIN_IN_THE_SHOULDER_OR_NECK_AT_SOME_TIME_IN_THE_LAST_2_WEEKS: DISAGREE
RISE FROM A CHAIR: ACTIVITY IS NOT DIFFICULT
TRAVELING: I CAN TRAVEL ANYWHERE BUT IT GIVES ME ADDITIONAL PAIN.
SIT WITH YOUR KNEE BENT: ACTIVITY IS SOMEWHAT DIFFICULT
SQUAT: ACTIVITY IS SOMEWHAT DIFFICULT
COMPUTED_OSWESTRY_SCORE: 24.44
KNEEL ON THE FRONT OF YOUR KNEE: I AM UNABLE TO DO THE ACTIVITY
LIFTING: I CAN LIFT HEAVY WEIGHTS BUT IT GIVES ME ADDITIONAL PAIN.
AS_A_RESULT_OF_YOUR_KNEE_INJURY,_HOW_WOULD_YOU_RATE_YOUR_CURRENT_LEVEL_OF_DAILY_ACTIVITY?: NEARLY NORMAL
GO UP STAIRS: ACTIVITY IS SOMEWHAT DIFFICULT
SITTING: I CAN SIT IN ANY CHAIR AS LONG AS I LIKE.
PERSONAL_CARE: I CAN LOOK AFTER MYSELF NORMALLY WITHOUT CAUSING ADDITIONAL PAIN.
WALK: ACTIVITY IS MINIMALLY DIFFICULT
OSWESTRY_DISABILITY_INDEX:_COUNT: 9
STAND: ACTIVITY IS NOT DIFFICULT
PAIN_INTENSITY: THE PAIN IS VERY MILD AT THE MOMENT.
KEELE_TOTAL_SCORE: 2
STIFFNESS: THE SYMPTOM AFFECTS MY ACTIVITY SLIGHTLY
SECTION_4-WALKING: PAIN PREVENTS ME FROM WALKING MORE THAN 100 YARDS.
GO UP STAIRS: ACTIVITY IS SOMEWHAT DIFFICULT
IN_GENERAL_I_HAVE_NOT_ENJOYED_ALL_THE_THINGS_I_USED_TO_ENJOY: DISAGREE
I_HAVE_ONLY_WALKED_SHORT_DISTANCES_BECAUSE_OF_MY_BACK_PAIN: AGREE
KNEE_ACTIVITY_OF_DAILY_LIVING_SUM: 29
STANDING: PAIN PREVENTS ME FROM STANDING FOR MORE THAN 1 HOUR.
SIT WITH YOUR KNEE BENT: ACTIVITY IS SOMEWHAT DIFFICULT
SLEEPING: MY SLEEP IS OCCASIONALLY INTERRUPTED BY PAIN.
AS_A_RESULT_OF_YOUR_KNEE_INJURY,_HOW_WOULD_YOU_RATE_YOUR_CURRENT_LEVEL_OF_DAILY_ACTIVITY?: NEARLY NORMAL
OSWESTRY DISABILITY INDEX_TOTAL_SCORE: 11
HOW_WOULD_YOU_RATE_THE_OVERALL_FUNCTION_OF_YOUR_KNEE_DURING_YOUR_USUAL_DAILY_ACTIVITIES?: NEARLY NORMAL
HOW_WOULD_YOU_RATE_THE_CURRENT_FUNCTION_OF_YOUR_KNEE_DURING_YOUR_USUAL_DAILY_ACTIVITIES_ON_A_SCALE_FROM_0_TO_100_WITH_100_BEING_YOUR_LEVEL_OF_KNEE_FUNCTION_PRIOR_TO_YOUR_INJURY_AND_0_BEING_THE_INABILITY_TO_PERFORM_ANY_OF_YOUR_USUAL_DAILY_ACTIVITIES?: 10
GO DOWN STAIRS: ACTIVITY IS SOMEWHAT DIFFICULT
HOW_BOTHERSOME_HAS_YOUR_BACK_PAIN_BEEN_IN_THE_LAST_2_WEEKS: MODERATELY
RISE FROM A CHAIR: ACTIVITY IS NOT DIFFICULT
PLEASE_INDICATE_YOR_PRIMARY_REASON_FOR_REFERRAL_TO_THERAPY:: KNEE
STIFFNESS: THE SYMPTOM AFFECTS MY ACTIVITY SLIGHTLY
HOW_WOULD_YOU_RATE_THE_CURRENT_FUNCTION_OF_YOUR_KNEE_DURING_YOUR_USUAL_DAILY_ACTIVITIES_ON_A_SCALE_FROM_0_TO_100_WITH_100_BEING_YOUR_LEVEL_OF_KNEE_FUNCTION_PRIOR_TO_YOUR_INJURY_AND_0_BEING_THE_INABILITY_TO_PERFORM_ANY_OF_YOUR_USUAL_DAILY_ACTIVITIES?: 10
STAND: ACTIVITY IS NOT DIFFICULT
IN_THE_LAST_2_WEEKS_I_HAVE_DRESSED_MORE_SLOWLY_THAN_USUAL_BECAUSE_OF_BACK_PAIN: DISAGREE
SQUAT: ACTIVITY IS SOMEWHAT DIFFICULT
PLEASE_INDICATE_YOR_PRIMARY_REASON_FOR_REFERRAL_TO_THERAPY:: LOWER BACK, MID BACK, AND/OR SACRUM
KNEEL ON THE FRONT OF YOUR KNEE: I AM UNABLE TO DO THE ACTIVITY
WORRYING_THOUGHTS_HAVE_BEEN_GOING_THROUGH_MY_MIND_A_LOT_OF_THE_TIME: DISAGREE
SOCIAL_LIFE: PAIN HAS NO SIGNIFICANT EFFECT ON MY SOCIAL LIFE APART FROM LIMITING MY MORE ENERGETIC INTERESTS, E.G. SPORT, ETC.
HOW_WOULD_YOU_RATE_THE_OVERALL_FUNCTION_OF_YOUR_KNEE_DURING_YOUR_USUAL_DAILY_ACTIVITIES?: NEARLY NORMAL
KEELE ASSESSMENT OF PARTICIPATION_SUB_SCORE_(Q5-9): 0
WALK: ACTIVITY IS MINIMALLY DIFFICULT

## 2025-08-15 PROBLEM — H90.3 BILATERAL SENSORINEURAL HEARING LOSS: Status: ACTIVE | Noted: 2025-08-15

## 2025-08-18 ENCOUNTER — THERAPY VISIT (OUTPATIENT)
Dept: PHYSICAL THERAPY | Facility: CLINIC | Age: 85
End: 2025-08-18
Payer: COMMERCIAL

## 2025-08-18 DIAGNOSIS — M48.062 SPINAL STENOSIS, LUMBAR REGION, WITH NEUROGENIC CLAUDICATION: Primary | ICD-10-CM

## 2025-08-18 PROCEDURE — 97110 THERAPEUTIC EXERCISES: CPT | Mod: GP

## 2025-08-21 ENCOUNTER — THERAPY VISIT (OUTPATIENT)
Dept: PHYSICAL THERAPY | Facility: CLINIC | Age: 85
End: 2025-08-21
Payer: COMMERCIAL

## 2025-08-21 DIAGNOSIS — M48.062 SPINAL STENOSIS, LUMBAR REGION, WITH NEUROGENIC CLAUDICATION: Primary | ICD-10-CM

## 2025-08-21 ASSESSMENT — ACTIVITIES OF DAILY LIVING (ADL)
LIFTING: I CAN LIFT HEAVY WEIGHTS BUT IT GIVES ME ADDITIONAL PAIN.
KEELE_TOTAL_SCORE: INCOMPLETE
RISE FROM A CHAIR: ACTIVITY IS NOT DIFFICULT
IN_THE_LAST_2_WEEKS_I_HAVE_DRESSED_MORE_SLOWLY_THAN_USUAL_BECAUSE_OF_BACK_PAIN: AGREE
PAIN_INTENSITY: THE PAIN IS MODERATE AT THE MOMENT.
HOW_WOULD_YOU_RATE_THE_CURRENT_FUNCTION_OF_YOUR_KNEE_DURING_YOUR_USUAL_DAILY_ACTIVITIES_ON_A_SCALE_FROM_0_TO_100_WITH_100_BEING_YOUR_LEVEL_OF_KNEE_FUNCTION_PRIOR_TO_YOUR_INJURY_AND_0_BEING_THE_INABILITY_TO_PERFORM_ANY_OF_YOUR_USUAL_DAILY_ACTIVITIES?: 80
STIFFNESS: THE SYMPTOM AFFECTS MY ACTIVITY SLIGHTLY
PLEASE_INDICATE_YOR_PRIMARY_REASON_FOR_REFERRAL_TO_THERAPY:: LOWER BACK, MID BACK, AND/OR SACRUM
KNEE_ACTIVITY_OF_DAILY_LIVING_SUM: 50
OSWESTRY DISABILITY INDEX_TOTAL_SCORE: 14
WEAKNESS: I DO NOT HAVE THE SYMPTOM
PAIN: THE SYMPTOM AFFECTS MY ACTIVITY SLIGHTLY
COMPUTED_OSWESTRY_SCORE: 31.11
PERSONAL_CARE: I CAN LOOK AFTER MYSELF NORMALLY BUT IT IS VERY PAINFUL.
WALK: ACTIVITY IS SOMEWHAT DIFFICULT
RAW_SCORE: 50
SQUAT: ACTIVITY IS SOMEWHAT DIFFICULT
IN_GENERAL_I_HAVE_NOT_ENJOYED_ALL_THE_THINGS_I_USED_TO_ENJOY: AGREE
SWELLING: THE SYMPTOM AFFECTS MY ACTIVITY SLIGHTLY
SOCIAL_LIFE: PAIN HAS NO SIGNIFICANT EFFECT ON MY SOCIAL LIFE APART FROM LIMITING MY MORE ENERGETIC INTERESTS, E.G. SPORT, ETC.
HOW_WOULD_YOU_RATE_THE_OVERALL_FUNCTION_OF_YOUR_KNEE_DURING_YOUR_USUAL_DAILY_ACTIVITIES?: NEARLY NORMAL
HOW_WOULD_YOU_RATE_THE_OVERALL_FUNCTION_OF_YOUR_KNEE_DURING_YOUR_USUAL_DAILY_ACTIVITIES?: NEARLY NORMAL
WORRYING_THOUGHTS_HAVE_BEEN_GOING_THROUGH_MY_MIND_A_LOT_OF_THE_TIME: DISAGREE
WEAKNESS: I DO NOT HAVE THE SYMPTOM
AS_A_RESULT_OF_YOUR_KNEE_INJURY,_HOW_WOULD_YOU_RATE_YOUR_CURRENT_LEVEL_OF_DAILY_ACTIVITY?: NEARLY NORMAL
GIVING WAY, BUCKLING OR SHIFTING OF KNEE: I DO NOT HAVE THE SYMPTOM
KNEE_ACTIVITY_OF_DAILY_LIVING_SCORE: 71.43
GIVING WAY, BUCKLING OR SHIFTING OF KNEE: I DO NOT HAVE THE SYMPTOM
STIFFNESS: THE SYMPTOM AFFECTS MY ACTIVITY SLIGHTLY
STANDING: PAIN PREVENTS ME FROM STANDING FOR MORE THAN HALF AN HOUR.
SIT WITH YOUR KNEE BENT: ACTIVITY IS NOT DIFFICULT
RISE FROM A CHAIR: ACTIVITY IS NOT DIFFICULT
SLEEPING: MY SLEEP IS OCCASIONALLY INTERRUPTED BY PAIN.
KEELE ASSESSMENT OF PARTICIPATION_SUB_SCORE_(Q5-9): 1
PAIN: THE SYMPTOM AFFECTS MY ACTIVITY SLIGHTLY
MY_BACK_PAIN_HAS_SPREAD_DOWN_MY_LEG(S)_AT_SOME_TIME_IN_THE_LAST_2_WEEKS: AGREE
KNEEL ON THE FRONT OF YOUR KNEE: ACTIVITY IS VERY DIFFICULT
SITTING: I CAN SIT IN ANY CHAIR AS LONG AS I LIKE.
OSWESTRY_DISABILITY_INDEX:_COUNT: 9
HOW_WOULD_YOU_RATE_THE_CURRENT_FUNCTION_OF_YOUR_KNEE_DURING_YOUR_USUAL_DAILY_ACTIVITIES_ON_A_SCALE_FROM_0_TO_100_WITH_100_BEING_YOUR_LEVEL_OF_KNEE_FUNCTION_PRIOR_TO_YOUR_INJURY_AND_0_BEING_THE_INABILITY_TO_PERFORM_ANY_OF_YOUR_USUAL_DAILY_ACTIVITIES?: 80
WALK: ACTIVITY IS SOMEWHAT DIFFICULT
HOW_BOTHERSOME_HAS_YOUR_BACK_PAIN_BEEN_IN_THE_LAST_2_WEEKS: VERY MUCH
SQUAT: ACTIVITY IS SOMEWHAT DIFFICULT
PLEASE_INDICATE_YOR_PRIMARY_REASON_FOR_REFERRAL_TO_THERAPY:: KNEE
STAND: ACTIVITY IS NOT DIFFICULT
AS_A_RESULT_OF_YOUR_KNEE_INJURY,_HOW_WOULD_YOU_RATE_YOUR_CURRENT_LEVEL_OF_DAILY_ACTIVITY?: NEARLY NORMAL
STAND: ACTIVITY IS NOT DIFFICULT
SECTION_4-WALKING: PAIN PREVENTS ME FROM WALKING MORE THAN 100 YARDS.
GO UP STAIRS: ACTIVITY IS SOMEWHAT DIFFICULT
TRAVELING: I CAN TRAVEL ANYWHERE BUT IT GIVES ME ADDITIONAL PAIN.
GO DOWN STAIRS: ACTIVITY IS SOMEWHAT DIFFICULT
LIMPING: THE SYMPTOM AFFECTS MY ACTIVITY SLIGHTLY
KNEEL ON THE FRONT OF YOUR KNEE: ACTIVITY IS VERY DIFFICULT
SWELLING: THE SYMPTOM AFFECTS MY ACTIVITY SLIGHTLY
GO DOWN STAIRS: ACTIVITY IS SOMEWHAT DIFFICULT
I_HAVE_ONLY_WALKED_SHORT_DISTANCES_BECAUSE_OF_MY_BACK_PAIN: AGREE
SIT WITH YOUR KNEE BENT: ACTIVITY IS NOT DIFFICULT
GO UP STAIRS: ACTIVITY IS SOMEWHAT DIFFICULT
LIMPING: THE SYMPTOM AFFECTS MY ACTIVITY SLIGHTLY

## 2025-08-25 ENCOUNTER — THERAPY VISIT (OUTPATIENT)
Dept: PHYSICAL THERAPY | Facility: CLINIC | Age: 85
End: 2025-08-25
Payer: COMMERCIAL

## 2025-08-25 DIAGNOSIS — M48.062 SPINAL STENOSIS, LUMBAR REGION, WITH NEUROGENIC CLAUDICATION: Primary | ICD-10-CM

## 2025-08-25 PROCEDURE — 97110 THERAPEUTIC EXERCISES: CPT | Mod: GP

## 2025-09-04 ENCOUNTER — THERAPY VISIT (OUTPATIENT)
Dept: PHYSICAL THERAPY | Facility: CLINIC | Age: 85
End: 2025-09-04
Payer: COMMERCIAL

## 2025-09-04 DIAGNOSIS — M48.062 SPINAL STENOSIS, LUMBAR REGION, WITH NEUROGENIC CLAUDICATION: Primary | ICD-10-CM

## (undated) DEVICE — SYR 10ML FINGER CONTROL W/O NDL 309695

## (undated) DEVICE — SYR 30ML LL W/O NDL 302832

## (undated) DEVICE — NDL 25GA 1.5" 305127

## (undated) DEVICE — STRAP KNEE/BODY 31143004

## (undated) DEVICE — NDL PERC ACCESS NAVIGUIDE W/SLDER 18GX20CM M0067001330

## (undated) DEVICE — GLOVE PROTEXIS W/NEU-THERA 7.5  2D73TE75

## (undated) DEVICE — SUCTION WATERBUG FLOOR PUDDLE VAC 9321

## (undated) DEVICE — CATH URETERAL OPEN END 5FRX70CM M0064002010

## (undated) DEVICE — SUCTION MANIFOLD NEPTUNE 2 SYS 4 PORT 0702-020-000

## (undated) DEVICE — SOL NACL 0.9% IRRIG 3000ML BAG 2B7477

## (undated) DEVICE — URETEROSCOPE FLEX SLG USE STD 7.7FR LITHOVUE M0067913500

## (undated) DEVICE — TUBING SET THERMEDX UROLOGY SGL USE LL0006

## (undated) DEVICE — BAG DRAIN BILIARY NEPHROSTOMY REMINGTON 600ML LF 600-D

## (undated) DEVICE — ESU LIGASURE MARYLAND LAPAROSCOPIC SLR/DVDR 5MMX37CM LF1937

## (undated) DEVICE — ENDO TROCAR FIRST ENTRY KII FIOS Z-THRD 05X100MM CTF03

## (undated) DEVICE — GOWN IMPERVIOUS SPECIALTY XLG/XLONG 32474

## (undated) DEVICE — GLOVE BIOGEL PI MICRO SZ 6.0 48560

## (undated) DEVICE — RETR ELEV / UTERINE MANIPULATOR V-CARE LG CUP 60-6085-202A

## (undated) DEVICE — NDL INSUFFLATION 13GA 120MM C2201

## (undated) DEVICE — WIPES FOLEY CARE SURESTEP PROVON DFC100

## (undated) DEVICE — KIT CATH BALLOON NEPHROMAX 24FRX12CM M0062101600

## (undated) DEVICE — BOWL STERILE 32OZ DYND50320

## (undated) DEVICE — TUBING SUCTION MEDI-VAC 1/4"X20' N620A

## (undated) DEVICE — SOL WATER IRRIG 1000ML BOTTLE 2F7114

## (undated) DEVICE — DRAPE SHEET REV FOLD 3/4 9349

## (undated) DEVICE — SU WND CLOSURE RELOAD VLOC 180 ABS 0 GREEN 8" VLOCA008L

## (undated) DEVICE — ENDO POUCH UNIVERSAL RETRIEVAL SYSTEM INZII 5MM CD003

## (undated) DEVICE — ESU GROUND PAD ADULT W/CORD E7507

## (undated) DEVICE — DRAPE C-ARM W/STRAPS 42X72" 07-CA104

## (undated) DEVICE — DRAPE MAYO STAND 23X54 8337

## (undated) DEVICE — LINEN TOWEL PACK X5 5464

## (undated) DEVICE — NDL SPINAL 22GA 3.5" QUINCKE 405181

## (undated) DEVICE — SPECIMEN CONTAINER 5OZ STERILE 2600SA

## (undated) DEVICE — SU VICRYL 0 TIE 54" J608H

## (undated) DEVICE — DRSG TEGADERM 4X4 3/4" 1626W

## (undated) DEVICE — LASER FIBER HOLMIUM FLEXIVA ID 200 M006R8403910

## (undated) DEVICE — TUBING SMOKE EVAC PNEUMOCLEAR HIGH FLOW 0620050250

## (undated) DEVICE — ESU ENDO SCISSORS 5MM CVD 5DCS

## (undated) DEVICE — ADAPTER SCOPE UROLOK II LF M0067301400

## (undated) DEVICE — TUBING IRRIG TUR Y TYPE 96" LF 6543-01

## (undated) DEVICE — TEST TUBE W/SCREW CAP 17361

## (undated) DEVICE — Device

## (undated) DEVICE — DEVICE SUTURE PASSER 14GA WECK EFX EFXSP2

## (undated) DEVICE — SOL NACL 0.9% IRRIG 1000ML BOTTLE 2F7124

## (undated) DEVICE — LINEN GOWN X4 5410

## (undated) DEVICE — PREP CHLORAPREP 26ML TINTED HI-LITE ORANGE 930815

## (undated) DEVICE — WIRE GUIDE NITINOL BIWIRE 0.035"X150CM G46141

## (undated) DEVICE — CATH ANGIO JB1 SOFT-VU 5FRX65CM MARINER 11734101

## (undated) DEVICE — GUIDEWIRE AMPLATZ SUPER STIFF .035 X 145CM M0066401080

## (undated) DEVICE — SUCTION IRR STRYKERFLOW II W/TIP 250-070-520

## (undated) DEVICE — ENDO TROCAR SLEEVE KII Z-THREADED 05X100MM CTS02

## (undated) DEVICE — ANTIFOG SOLUTION W/FOAM PAD 31142527

## (undated) DEVICE — NDL COUNTER 40CT  31142311

## (undated) DEVICE — NDL PERC ACCESS 18GX20CM M0067001220

## (undated) DEVICE — DRSG GAUZE 4X4" TRAY 6939

## (undated) DEVICE — ENDO SEAL BX PORT BPS-A

## (undated) DEVICE — NDL COUNTER 20CT 31142493

## (undated) DEVICE — BASKET STONE EXTRACTOR NITINOL NCIRCLE 1.5FRX115CM G46206

## (undated) DEVICE — TAPE DURAPORE 3" SILK 1538-3

## (undated) DEVICE — SU VICRYL 2-0 SH 27" UND J417H

## (undated) DEVICE — GUIDEWIRE SENSOR DUAL FLEX STR 0.035"X150CM M0066703080

## (undated) DEVICE — DRAIN SKATER 10FRX35CM NPHSTH 755610035

## (undated) DEVICE — BLADE KNIFE SURG 11 371111

## (undated) DEVICE — CATH NEPHROSTOMY 10FR FLEXIMA M001271800

## (undated) DEVICE — SOL NACL 0.9% INJ 1000ML BAG 2B1324X

## (undated) DEVICE — APPLICATORS COTTON TIP 6"X2 STERILE LF C15053-006

## (undated) DEVICE — DRAPE LEGGINGS CLEAR 8430

## (undated) DEVICE — BASKET NITINOL TIPLESS HALO  1.5FRX120CM 554120

## (undated) DEVICE — SU VICRYL 4-0 PS-2 18" UND J496H

## (undated) DEVICE — TUBING IRR TUR Y TYPE 2C4041

## (undated) DEVICE — CATH TRAY FOLEY SURESTEP 16FR W/URNE MTR STLK LATEX A303316A

## (undated) DEVICE — TUBING SUCTION 10'X3/16" N510

## (undated) DEVICE — KOH COLPOTOMIZER OCCLUDER  CPO-6

## (undated) DEVICE — ENDO POUCH UNIV RETRIEVAL SYSTEM INZII 10MM CD001

## (undated) DEVICE — PACK CYSTO UMMC CUSTOM

## (undated) DEVICE — DRAPE LAVH/LAPAROSCOPY W/POUCH 29474

## (undated) DEVICE — DRAPE LINGEMAN PERC PROCEDURE 1-0815

## (undated) DEVICE — LINEN TOWEL PACK X6 WHITE 5487

## (undated) DEVICE — DEVICE ENDO STITCH APPLIER 10MM 173016

## (undated) DEVICE — CATH TRAY FOLEY SURESTEP 16FR WDRAIN BAG STLK LATEX A300316A

## (undated) DEVICE — LINEN ORTHO PACK 5446

## (undated) DEVICE — DRSG KERLIX FLUFFS X5

## (undated) DEVICE — DILATOR RENAL AMPLATZ TYPE 8FRX35CM M0062601010

## (undated) DEVICE — LINEN TOWEL PACK X30 5481

## (undated) DEVICE — SYR 50ML SLIP TIP W/O NDL 309654

## (undated) DEVICE — GLOVE BIOGEL PI MICRO INDICATOR UNDERGLOVE SZ 6.5 48965

## (undated) DEVICE — SU ETHILON 3-0 PS-1 18" 1663H

## (undated) DEVICE — LASER FIBER HOLMIUM FLEXIVA 200UM M0068403910 840-391

## (undated) DEVICE — PAD CHUX UNDERPAD 23X24" 7136

## (undated) DEVICE — PROBE LITHOTRIPTOR 3.4X396MM SHOCKPULSE BLUE SPL-PDBX340

## (undated) DEVICE — PAD CHUX UNDERPAD 30X36" P3036C

## (undated) DEVICE — ADAPTER CATH CHECK-FLO 9FR FLL 050885 G15476

## (undated) DEVICE — RETR ELEV / UTERINE MANIPULATOR V-CARE SM CUP 60-6085-200A

## (undated) DEVICE — SYR 10ML LL W/O NDL 302995

## (undated) DEVICE — TUBING EXTENSION SET 20" B1327

## (undated) DEVICE — SU DERMABOND ADVANCED .7ML DNX12

## (undated) DEVICE — TUBING IRRIG CYSTO/BLADDER SET 81" LF 2C4040

## (undated) DEVICE — CATH ANGIO KUMPE SOFT-VU 5FRX65CM CVD H787107327015

## (undated) DEVICE — DRAPE PCNL 41-0021

## (undated) RX ORDER — ACETAMINOPHEN 325 MG/1
TABLET ORAL
Status: DISPENSED
Start: 2023-10-30

## (undated) RX ORDER — OXYCODONE HYDROCHLORIDE 5 MG/1
TABLET ORAL
Status: DISPENSED
Start: 2021-08-13

## (undated) RX ORDER — HEPARIN SODIUM 5000 [USP'U]/.5ML
INJECTION, SOLUTION INTRAVENOUS; SUBCUTANEOUS
Status: DISPENSED
Start: 2023-10-30

## (undated) RX ORDER — ONDANSETRON 2 MG/ML
INJECTION INTRAMUSCULAR; INTRAVENOUS
Status: DISPENSED
Start: 2021-08-05

## (undated) RX ORDER — DEXAMETHASONE SODIUM PHOSPHATE 4 MG/ML
INJECTION, SOLUTION INTRA-ARTICULAR; INTRALESIONAL; INTRAMUSCULAR; INTRAVENOUS; SOFT TISSUE
Status: DISPENSED
Start: 2021-08-05

## (undated) RX ORDER — HYDROMORPHONE HCL IN WATER/PF 6 MG/30 ML
PATIENT CONTROLLED ANALGESIA SYRINGE INTRAVENOUS
Status: DISPENSED
Start: 2023-10-30

## (undated) RX ORDER — FENTANYL CITRATE 50 UG/ML
INJECTION, SOLUTION INTRAMUSCULAR; INTRAVENOUS
Status: DISPENSED
Start: 2023-10-30

## (undated) RX ORDER — LIDOCAINE HYDROCHLORIDE 20 MG/ML
INJECTION, SOLUTION EPIDURAL; INFILTRATION; INTRACAUDAL; PERINEURAL
Status: DISPENSED
Start: 2021-08-05

## (undated) RX ORDER — BUPIVACAINE HYDROCHLORIDE 2.5 MG/ML
INJECTION, SOLUTION EPIDURAL; INFILTRATION; INTRACAUDAL
Status: DISPENSED
Start: 2023-10-30

## (undated) RX ORDER — FENTANYL CITRATE 50 UG/ML
INJECTION, SOLUTION INTRAMUSCULAR; INTRAVENOUS
Status: DISPENSED
Start: 2021-08-13

## (undated) RX ORDER — PROPOFOL 10 MG/ML
INJECTION, EMULSION INTRAVENOUS
Status: DISPENSED
Start: 2021-08-05

## (undated) RX ORDER — BUPIVACAINE HYDROCHLORIDE 2.5 MG/ML
INJECTION, SOLUTION EPIDURAL; INFILTRATION; INTRACAUDAL
Status: DISPENSED
Start: 2021-08-05

## (undated) RX ORDER — PHENAZOPYRIDINE HYDROCHLORIDE 200 MG/1
TABLET, FILM COATED ORAL
Status: DISPENSED
Start: 2023-10-30

## (undated) RX ORDER — FENTANYL CITRATE 50 UG/ML
INJECTION, SOLUTION INTRAMUSCULAR; INTRAVENOUS
Status: DISPENSED
Start: 2021-08-05

## (undated) RX ORDER — EPHEDRINE SULFATE 50 MG/ML
INJECTION, SOLUTION INTRAMUSCULAR; INTRAVENOUS; SUBCUTANEOUS
Status: DISPENSED
Start: 2021-08-05

## (undated) RX ORDER — HYDROMORPHONE HYDROCHLORIDE 1 MG/ML
INJECTION, SOLUTION INTRAMUSCULAR; INTRAVENOUS; SUBCUTANEOUS
Status: DISPENSED
Start: 2023-10-30

## (undated) RX ORDER — CEFTRIAXONE SODIUM 1 G
VIAL (EA) INJECTION
Status: DISPENSED
Start: 2021-08-05

## (undated) RX ORDER — FENTANYL CITRATE-0.9 % NACL/PF 10 MCG/ML
PLASTIC BAG, INJECTION (ML) INTRAVENOUS
Status: DISPENSED
Start: 2021-08-05

## (undated) RX ORDER — LIDOCAINE HYDROCHLORIDE 20 MG/ML
INJECTION, SOLUTION EPIDURAL; INFILTRATION; INTRACAUDAL; PERINEURAL
Status: DISPENSED
Start: 2021-07-15

## (undated) RX ORDER — ACETAMINOPHEN 325 MG/1
TABLET ORAL
Status: DISPENSED
Start: 2021-08-05

## (undated) RX ORDER — ONDANSETRON 2 MG/ML
INJECTION INTRAMUSCULAR; INTRAVENOUS
Status: DISPENSED
Start: 2021-07-15

## (undated) RX ORDER — OXYCODONE HYDROCHLORIDE 5 MG/1
TABLET ORAL
Status: DISPENSED
Start: 2021-08-05

## (undated) RX ORDER — FENTANYL CITRATE 50 UG/ML
INJECTION, SOLUTION INTRAMUSCULAR; INTRAVENOUS
Status: DISPENSED
Start: 2021-07-15

## (undated) RX ORDER — ACETAMINOPHEN 325 MG/1
TABLET ORAL
Status: DISPENSED
Start: 2021-07-15

## (undated) RX ORDER — HYDROMORPHONE HYDROCHLORIDE 1 MG/ML
INJECTION, SOLUTION INTRAMUSCULAR; INTRAVENOUS; SUBCUTANEOUS
Status: DISPENSED
Start: 2021-08-05

## (undated) RX ORDER — INDOCYANINE GREEN AND WATER 25 MG
KIT INJECTION
Status: DISPENSED
Start: 2023-10-30